# Patient Record
Sex: FEMALE | Race: WHITE | Employment: OTHER | URBAN - METROPOLITAN AREA
[De-identification: names, ages, dates, MRNs, and addresses within clinical notes are randomized per-mention and may not be internally consistent; named-entity substitution may affect disease eponyms.]

---

## 2017-01-06 ENCOUNTER — TELEPHONE (OUTPATIENT)
Dept: PULMONOLOGY | Age: 42
End: 2017-01-06

## 2017-01-31 PROBLEM — J44.1 COPD WITH EXACERBATION (HCC): Status: ACTIVE | Noted: 2017-01-31

## 2017-01-31 PROBLEM — E87.29 RESPIRATORY ACIDOSIS: Status: ACTIVE | Noted: 2017-01-31

## 2017-01-31 PROBLEM — J96.00 ACUTE RESPIRATORY FAILURE (HCC): Status: ACTIVE | Noted: 2017-01-31

## 2017-02-13 ENCOUNTER — TELEPHONE (OUTPATIENT)
Dept: PULMONOLOGY | Age: 42
End: 2017-02-13

## 2017-02-13 DIAGNOSIS — J44.9 COPD, MODERATE (HCC): Primary | ICD-10-CM

## 2017-02-21 ENCOUNTER — OFFICE VISIT (OUTPATIENT)
Dept: PULMONOLOGY | Age: 42
End: 2017-02-21

## 2017-02-21 ENCOUNTER — HOSPITAL ENCOUNTER (OUTPATIENT)
Dept: CARDIAC REHAB | Age: 42
Discharge: HOME | End: 2017-02-21
Attending: INTERNAL MEDICINE | Admitting: INTERNAL MEDICINE

## 2017-02-21 VITALS
HEIGHT: 60 IN | SYSTOLIC BLOOD PRESSURE: 116 MMHG | HEART RATE: 91 BPM | OXYGEN SATURATION: 95 % | WEIGHT: 251.8 LBS | DIASTOLIC BLOOD PRESSURE: 70 MMHG | RESPIRATION RATE: 18 BRPM | TEMPERATURE: 98.6 F | BODY MASS INDEX: 49.43 KG/M2

## 2017-02-21 DIAGNOSIS — R06.83 SNORING: ICD-10-CM

## 2017-02-21 DIAGNOSIS — G47.10 HYPERSOMNIA: ICD-10-CM

## 2017-02-21 DIAGNOSIS — J44.9 COPD, SEVERE (HCC): Primary | ICD-10-CM

## 2017-02-21 DIAGNOSIS — Z72.0 TOBACCO ABUSE: ICD-10-CM

## 2017-02-21 DIAGNOSIS — I50.810 RIGHT HEART FAILURE (HCC): ICD-10-CM

## 2017-02-21 DIAGNOSIS — J96.11 CHRONIC RESPIRATORY FAILURE WITH HYPOXIA (HCC): ICD-10-CM

## 2017-02-21 PROCEDURE — 99214 OFFICE O/P EST MOD 30 MIN: CPT | Performed by: NURSE PRACTITIONER

## 2017-02-21 PROCEDURE — 94620 PR PULMONARY STRESS TESTING,SIMPLE: CPT | Performed by: INTERNAL MEDICINE

## 2017-02-21 RX ORDER — CLONIDINE HYDROCHLORIDE 0.1 MG/1
0.1 TABLET ORAL 2 TIMES DAILY
Status: ON HOLD | COMMUNITY
End: 2020-11-11 | Stop reason: HOSPADM

## 2017-02-21 RX ORDER — GABAPENTIN 100 MG/1
100 CAPSULE ORAL 4 TIMES DAILY
COMMUNITY
End: 2021-02-22 | Stop reason: DRUGHIGH

## 2017-02-26 ENCOUNTER — HOSPITAL ENCOUNTER (OUTPATIENT)
Dept: OTHER | Age: 42
Discharge: HOME | End: 2017-02-28
Attending: NURSE PRACTITIONER | Admitting: NURSE PRACTITIONER

## 2017-02-26 DIAGNOSIS — J96.11 CHRONIC RESPIRATORY FAILURE WITH HYPOXIA (HCC): ICD-10-CM

## 2017-02-26 DIAGNOSIS — R06.83 SNORING: ICD-10-CM

## 2017-02-26 DIAGNOSIS — G47.10 HYPERSOMNIA: ICD-10-CM

## 2017-02-26 DIAGNOSIS — J44.9 COPD, SEVERE (HCC): ICD-10-CM

## 2017-03-02 ENCOUNTER — TELEPHONE (OUTPATIENT)
Dept: PULMONOLOGY | Age: 42
End: 2017-03-02

## 2017-03-02 DIAGNOSIS — G47.33 OSA (OBSTRUCTIVE SLEEP APNEA): Primary | ICD-10-CM

## 2017-03-02 DIAGNOSIS — J44.9 COPD, MODERATE (HCC): ICD-10-CM

## 2017-03-07 ENCOUNTER — HOSPITAL ENCOUNTER (OUTPATIENT)
Dept: OTHER | Age: 42
Discharge: HOME | End: 2017-03-31
Attending: INTERNAL MEDICINE | Admitting: INTERNAL MEDICINE

## 2017-03-08 ENCOUNTER — TELEPHONE (OUTPATIENT)
Dept: PULMONOLOGY | Age: 42
End: 2017-03-08

## 2017-03-09 DIAGNOSIS — J44.1 CHRONIC OBSTRUCTIVE PULMONARY DISEASE WITH ACUTE EXACERBATION (HCC): ICD-10-CM

## 2017-03-09 DIAGNOSIS — J44.9 COPD, MODERATE (HCC): Primary | ICD-10-CM

## 2017-03-11 RX ORDER — BUDESONIDE AND FORMOTEROL FUMARATE DIHYDRATE 160; 4.5 UG/1; UG/1
2 AEROSOL RESPIRATORY (INHALATION) 2 TIMES DAILY
Qty: 3 INHALER | Refills: 1 | Status: SHIPPED | OUTPATIENT
Start: 2017-03-11 | End: 2020-05-20 | Stop reason: SDUPTHER

## 2017-03-11 RX ORDER — ALBUTEROL SULFATE 2.5 MG/3ML
2.5 SOLUTION RESPIRATORY (INHALATION) 4 TIMES DAILY
Qty: 360 EACH | Refills: 1 | Status: SHIPPED | OUTPATIENT
Start: 2017-03-11 | End: 2020-02-20

## 2017-03-31 ENCOUNTER — HOSPITAL ENCOUNTER (OUTPATIENT)
Dept: OTHER | Age: 42
Discharge: HOME | End: 2017-04-01
Admitting: NURSE PRACTITIONER

## 2017-04-01 ENCOUNTER — HOSPITAL ENCOUNTER (OUTPATIENT)
Dept: OTHER | Age: 42
Discharge: HOME | End: 2017-04-30
Attending: INTERNAL MEDICINE | Admitting: INTERNAL MEDICINE

## 2017-04-12 ENCOUNTER — TELEPHONE (OUTPATIENT)
Dept: PULMONOLOGY | Age: 42
End: 2017-04-12

## 2017-04-12 RX ORDER — PREDNISONE 20 MG/1
40 TABLET ORAL DAILY
Qty: 10 TABLET | Refills: 0 | Status: SHIPPED | OUTPATIENT
Start: 2017-04-12 | End: 2017-04-17

## 2017-04-18 ENCOUNTER — OFFICE VISIT (OUTPATIENT)
Dept: PULMONOLOGY | Age: 42
End: 2017-04-18

## 2017-04-18 VITALS
RESPIRATION RATE: 16 BRPM | HEIGHT: 60 IN | DIASTOLIC BLOOD PRESSURE: 74 MMHG | BODY MASS INDEX: 51.24 KG/M2 | OXYGEN SATURATION: 97 % | WEIGHT: 261 LBS | TEMPERATURE: 98.1 F | HEART RATE: 80 BPM | SYSTOLIC BLOOD PRESSURE: 138 MMHG

## 2017-04-18 DIAGNOSIS — J96.11 CHRONIC RESPIRATORY FAILURE WITH HYPOXIA (HCC): ICD-10-CM

## 2017-04-18 DIAGNOSIS — G47.33 OSA (OBSTRUCTIVE SLEEP APNEA): ICD-10-CM

## 2017-04-18 DIAGNOSIS — R06.02 SHORTNESS OF BREATH: ICD-10-CM

## 2017-04-18 DIAGNOSIS — J44.9 COPD, MODERATE (HCC): Primary | ICD-10-CM

## 2017-04-18 DIAGNOSIS — I50.810 RIGHT HEART FAILURE (HCC): ICD-10-CM

## 2017-04-18 DIAGNOSIS — E66.01 MORBID OBESITY DUE TO EXCESS CALORIES (HCC): ICD-10-CM

## 2017-04-18 LAB
ANION GAP SERPL CALCULATED.3IONS-SCNC: 11 MMOL/L (ref 3–16)
BUN BLDV-MCNC: 20 MG/DL (ref 7–20)
CALCIUM SERPL-MCNC: 9.1 MG/DL (ref 8.3–10.6)
CHLORIDE BLD-SCNC: 97 MMOL/L (ref 99–110)
CO2: 35 MMOL/L (ref 21–32)
CREAT SERPL-MCNC: 0.7 MG/DL (ref 0.6–1.1)
GFR AFRICAN AMERICAN: >60
GFR NON-AFRICAN AMERICAN: >60
GLUCOSE BLD-MCNC: 99 MG/DL (ref 70–99)
POTASSIUM SERPL-SCNC: 3.7 MMOL/L (ref 3.5–5.1)
SODIUM BLD-SCNC: 143 MMOL/L (ref 136–145)

## 2017-04-18 PROCEDURE — 99214 OFFICE O/P EST MOD 30 MIN: CPT | Performed by: NURSE PRACTITIONER

## 2017-04-18 RX ORDER — FUROSEMIDE 40 MG/1
40 TABLET ORAL DAILY
Qty: 60 TABLET | Refills: 3 | Status: SHIPPED | OUTPATIENT
Start: 2017-04-18 | End: 2021-02-22 | Stop reason: DRUGHIGH

## 2017-04-19 RX ORDER — BUDESONIDE AND FORMOTEROL FUMARATE DIHYDRATE 160; 4.5 UG/1; UG/1
2 AEROSOL RESPIRATORY (INHALATION) 2 TIMES DAILY
Qty: 1 INHALER | Refills: 0 | COMMUNITY
Start: 2017-04-19 | End: 2017-07-14 | Stop reason: SDUPTHER

## 2017-05-01 ENCOUNTER — HOSPITAL ENCOUNTER (OUTPATIENT)
Dept: OTHER | Age: 42
Discharge: HOME | End: 2017-05-03
Attending: NURSE PRACTITIONER | Admitting: NURSE PRACTITIONER

## 2017-05-01 DIAGNOSIS — J44.9 COPD, MODERATE (HCC): ICD-10-CM

## 2017-05-01 DIAGNOSIS — G47.33 OSA (OBSTRUCTIVE SLEEP APNEA): ICD-10-CM

## 2017-05-05 ENCOUNTER — TELEPHONE (OUTPATIENT)
Dept: PULMONOLOGY | Age: 42
End: 2017-05-05

## 2017-05-18 ENCOUNTER — TELEPHONE (OUTPATIENT)
Dept: PULMONOLOGY | Age: 42
End: 2017-05-18

## 2017-05-24 ENCOUNTER — TELEPHONE (OUTPATIENT)
Dept: PULMONOLOGY | Age: 42
End: 2017-05-24

## 2017-07-07 ENCOUNTER — HOSPITAL ENCOUNTER (OUTPATIENT)
Dept: WOUND CARE | Age: 42
Discharge: HOME | End: 2017-07-07
Attending: NURSE PRACTITIONER | Admitting: NURSE PRACTITIONER

## 2017-07-07 VITALS
DIASTOLIC BLOOD PRESSURE: 85 MMHG | TEMPERATURE: 98.3 F | BODY MASS INDEX: 55.19 KG/M2 | WEIGHT: 281.09 LBS | SYSTOLIC BLOOD PRESSURE: 124 MMHG | HEART RATE: 80 BPM | RESPIRATION RATE: 16 BRPM | HEIGHT: 60 IN

## 2017-07-07 DIAGNOSIS — L97.922 NONHEALING ULCER OF LEFT LOWER LEG WITH FAT LAYER EXPOSED (HCC): ICD-10-CM

## 2017-07-07 PROCEDURE — 11042 DBRDMT SUBQ TIS 1ST 20SQCM/<: CPT | Performed by: NURSE PRACTITIONER

## 2017-07-07 PROCEDURE — 99203 OFFICE O/P NEW LOW 30 MIN: CPT | Performed by: NURSE PRACTITIONER

## 2017-07-07 RX ORDER — LIDOCAINE HYDROCHLORIDE 40 MG/ML
SOLUTION TOPICAL ONCE
Status: DISCONTINUED | OUTPATIENT
Start: 2017-07-07 | End: 2017-07-08 | Stop reason: HOSPADM

## 2017-07-07 RX ORDER — CLINDAMYCIN HYDROCHLORIDE 300 MG/1
300 CAPSULE ORAL EVERY 6 HOURS
COMMUNITY
End: 2017-07-14 | Stop reason: ALTCHOICE

## 2017-07-07 RX ORDER — ACETAMINOPHEN 500 MG
1000 TABLET ORAL EVERY 6 HOURS PRN
COMMUNITY
End: 2022-07-17 | Stop reason: ALTCHOICE

## 2017-07-07 RX ORDER — OMEPRAZOLE 40 MG/1
40 CAPSULE, DELAYED RELEASE ORAL EVERY EVENING
COMMUNITY

## 2017-07-07 ASSESSMENT — PAIN DESCRIPTION - LOCATION: LOCATION: LEG

## 2017-07-07 ASSESSMENT — PAIN DESCRIPTION - PAIN TYPE: TYPE: ACUTE PAIN

## 2017-07-07 ASSESSMENT — PAIN SCALES - GENERAL: PAINLEVEL_OUTOF10: 7

## 2017-07-07 ASSESSMENT — PAIN DESCRIPTION - ORIENTATION: ORIENTATION: LEFT

## 2017-07-07 ASSESSMENT — PAIN DESCRIPTION - DESCRIPTORS: DESCRIPTORS: ACHING;BURNING;STABBING

## 2017-07-14 ENCOUNTER — HOSPITAL ENCOUNTER (OUTPATIENT)
Dept: WOUND CARE | Age: 42
Discharge: HOME | End: 2017-07-14
Attending: NURSE PRACTITIONER | Admitting: NURSE PRACTITIONER

## 2017-07-14 VITALS
SYSTOLIC BLOOD PRESSURE: 137 MMHG | DIASTOLIC BLOOD PRESSURE: 78 MMHG | HEART RATE: 90 BPM | TEMPERATURE: 99.3 F | RESPIRATION RATE: 18 BRPM

## 2017-07-14 DIAGNOSIS — L97.922 NONHEALING ULCER OF LEFT LOWER LEG WITH FAT LAYER EXPOSED (HCC): ICD-10-CM

## 2017-07-14 DIAGNOSIS — M79.89 LEG SWELLING: ICD-10-CM

## 2017-07-14 DIAGNOSIS — Z72.0 TOBACCO USE: ICD-10-CM

## 2017-07-14 DIAGNOSIS — L97.921 NONHEALING ULCER OF LEFT LOWER LEG LIMITED TO BREAKDOWN OF SKIN (HCC): ICD-10-CM

## 2017-07-14 DIAGNOSIS — R60.0 LOCALIZED EDEMA: Primary | ICD-10-CM

## 2017-07-14 PROCEDURE — 97597 DBRDMT OPN WND 1ST 20 CM/<: CPT | Performed by: NURSE PRACTITIONER

## 2017-07-14 RX ORDER — LIDOCAINE 50 MG/G
OINTMENT TOPICAL PRN
Status: DISCONTINUED | OUTPATIENT
Start: 2017-07-14 | End: 2017-07-15 | Stop reason: HOSPADM

## 2017-08-28 ENCOUNTER — OFFICE VISIT (OUTPATIENT)
Dept: PULMONOLOGY | Age: 42
End: 2017-08-28

## 2017-08-28 VITALS
DIASTOLIC BLOOD PRESSURE: 72 MMHG | SYSTOLIC BLOOD PRESSURE: 120 MMHG | WEIGHT: 272 LBS | HEIGHT: 60 IN | RESPIRATION RATE: 24 BRPM | TEMPERATURE: 98.3 F | OXYGEN SATURATION: 96 % | HEART RATE: 90 BPM | BODY MASS INDEX: 53.4 KG/M2

## 2017-08-28 DIAGNOSIS — E66.2 OBESITY HYPOVENTILATION SYNDROME (HCC): ICD-10-CM

## 2017-08-28 DIAGNOSIS — J96.11 CHRONIC RESPIRATORY FAILURE WITH HYPOXIA AND HYPERCAPNIA (HCC): ICD-10-CM

## 2017-08-28 DIAGNOSIS — J96.12 CHRONIC RESPIRATORY FAILURE WITH HYPOXIA AND HYPERCAPNIA (HCC): ICD-10-CM

## 2017-08-28 DIAGNOSIS — G47.33 OSA TREATED WITH BIPAP: Primary | ICD-10-CM

## 2017-08-28 DIAGNOSIS — J44.9 COPD, SEVERE (HCC): ICD-10-CM

## 2017-08-28 DIAGNOSIS — Z72.0 TOBACCO ABUSE: ICD-10-CM

## 2017-08-28 DIAGNOSIS — I50.810 RIGHT HEART FAILURE (HCC): ICD-10-CM

## 2017-08-28 DIAGNOSIS — J44.9 COPD WITH ASTHMA (HCC): ICD-10-CM

## 2017-08-28 PROCEDURE — 99214 OFFICE O/P EST MOD 30 MIN: CPT | Performed by: INTERNAL MEDICINE

## 2017-08-28 ASSESSMENT — SLEEP AND FATIGUE QUESTIONNAIRES
ESS TOTAL SCORE: 13
HOW LIKELY ARE YOU TO NOD OFF OR FALL ASLEEP WHILE WATCHING TV: 3
HOW LIKELY ARE YOU TO NOD OFF OR FALL ASLEEP IN A CAR, WHILE STOPPED FOR A FEW MINUTES IN TRAFFIC: 0
HOW LIKELY ARE YOU TO NOD OFF OR FALL ASLEEP WHEN YOU ARE A PASSENGER IN A CAR FOR AN HOUR WITHOUT A BREAK: 2
HOW LIKELY ARE YOU TO NOD OFF OR FALL ASLEEP WHILE SITTING AND READING: 3
HOW LIKELY ARE YOU TO NOD OFF OR FALL ASLEEP WHILE SITTING QUIETLY AFTER LUNCH WITHOUT ALCOHOL: 2
HOW LIKELY ARE YOU TO NOD OFF OR FALL ASLEEP WHILE SITTING AND TALKING TO SOMEONE: 1
HOW LIKELY ARE YOU TO NOD OFF OR FALL ASLEEP WHILE LYING DOWN TO REST IN THE AFTERNOON WHEN CIRCUMSTANCES PERMIT: 1
HOW LIKELY ARE YOU TO NOD OFF OR FALL ASLEEP WHILE SITTING INACTIVE IN A PUBLIC PLACE: 1

## 2018-01-16 ENCOUNTER — OFFICE VISIT (OUTPATIENT)
Dept: PULMONOLOGY | Age: 43
End: 2018-01-16

## 2018-01-16 VITALS
TEMPERATURE: 99 F | DIASTOLIC BLOOD PRESSURE: 78 MMHG | HEIGHT: 60 IN | OXYGEN SATURATION: 93 % | BODY MASS INDEX: 50.26 KG/M2 | HEART RATE: 114 BPM | RESPIRATION RATE: 24 BRPM | WEIGHT: 256 LBS | SYSTOLIC BLOOD PRESSURE: 130 MMHG

## 2018-01-16 DIAGNOSIS — R50.81 FEVER IN OTHER DISEASES: ICD-10-CM

## 2018-01-16 DIAGNOSIS — J96.11 CHRONIC RESPIRATORY FAILURE WITH HYPOXIA AND HYPERCAPNIA (HCC): ICD-10-CM

## 2018-01-16 DIAGNOSIS — Z72.0 TOBACCO ABUSE: ICD-10-CM

## 2018-01-16 DIAGNOSIS — J96.12 CHRONIC RESPIRATORY FAILURE WITH HYPOXIA AND HYPERCAPNIA (HCC): ICD-10-CM

## 2018-01-16 DIAGNOSIS — J44.1 COPD EXACERBATION (HCC): Primary | ICD-10-CM

## 2018-01-16 PROCEDURE — 99214 OFFICE O/P EST MOD 30 MIN: CPT | Performed by: INTERNAL MEDICINE

## 2018-01-16 RX ORDER — PREDNISONE 10 MG/1
TABLET ORAL
Qty: 30 TABLET | Refills: 0 | Status: SHIPPED | OUTPATIENT
Start: 2018-01-16 | End: 2018-10-24 | Stop reason: ALTCHOICE

## 2018-01-16 RX ORDER — OSELTAMIVIR PHOSPHATE 75 MG/1
75 CAPSULE ORAL 2 TIMES DAILY
Qty: 10 CAPSULE | Refills: 0 | Status: SHIPPED | OUTPATIENT
Start: 2018-01-16 | End: 2018-01-21

## 2018-01-16 RX ORDER — BUDESONIDE AND FORMOTEROL FUMARATE DIHYDRATE 160; 4.5 UG/1; UG/1
2 AEROSOL RESPIRATORY (INHALATION) 2 TIMES DAILY
Qty: 1 INHALER | Refills: 6 | Status: SHIPPED | OUTPATIENT
Start: 2018-01-16 | End: 2018-10-24 | Stop reason: SDUPTHER

## 2018-01-16 RX ORDER — DOXYCYCLINE HYCLATE 100 MG/1
100 CAPSULE ORAL 2 TIMES DAILY
Qty: 20 CAPSULE | Refills: 0 | Status: SHIPPED | OUTPATIENT
Start: 2018-01-16 | End: 2018-10-24 | Stop reason: ALTCHOICE

## 2018-01-16 ASSESSMENT — SLEEP AND FATIGUE QUESTIONNAIRES
HOW LIKELY ARE YOU TO NOD OFF OR FALL ASLEEP IN A CAR, WHILE STOPPED FOR A FEW MINUTES IN TRAFFIC: 0
HOW LIKELY ARE YOU TO NOD OFF OR FALL ASLEEP WHILE SITTING AND TALKING TO SOMEONE: 0
HOW LIKELY ARE YOU TO NOD OFF OR FALL ASLEEP WHILE SITTING QUIETLY AFTER LUNCH WITHOUT ALCOHOL: 2
HOW LIKELY ARE YOU TO NOD OFF OR FALL ASLEEP WHILE SITTING AND READING: 2
HOW LIKELY ARE YOU TO NOD OFF OR FALL ASLEEP WHEN YOU ARE A PASSENGER IN A CAR FOR AN HOUR WITHOUT A BREAK: 1
HOW LIKELY ARE YOU TO NOD OFF OR FALL ASLEEP WHILE SITTING INACTIVE IN A PUBLIC PLACE: 1
HOW LIKELY ARE YOU TO NOD OFF OR FALL ASLEEP WHILE WATCHING TV: 2
ESS TOTAL SCORE: 9
HOW LIKELY ARE YOU TO NOD OFF OR FALL ASLEEP WHILE LYING DOWN TO REST IN THE AFTERNOON WHEN CIRCUMSTANCES PERMIT: 1

## 2018-03-14 ENCOUNTER — TELEPHONE (OUTPATIENT)
Dept: PULMONOLOGY | Age: 43
End: 2018-03-14

## 2018-03-14 DIAGNOSIS — J44.9 COPD, MILD (HCC): Primary | ICD-10-CM

## 2018-03-14 RX ORDER — ALBUTEROL SULFATE 2.5 MG/3ML
2.5 SOLUTION RESPIRATORY (INHALATION) EVERY 4 HOURS PRN
Qty: 360 ML | Refills: 3 | Status: SHIPPED | OUTPATIENT
Start: 2018-03-14 | End: 2018-10-15 | Stop reason: SDUPTHER

## 2018-03-14 RX ORDER — ALBUTEROL SULFATE 90 UG/1
2 AEROSOL, METERED RESPIRATORY (INHALATION) EVERY 4 HOURS PRN
Qty: 1 INHALER | Refills: 11 | Status: SHIPPED | OUTPATIENT
Start: 2018-03-14 | End: 2018-10-24 | Stop reason: ALTCHOICE

## 2018-05-22 ENCOUNTER — TELEPHONE (OUTPATIENT)
Dept: PULMONOLOGY | Age: 43
End: 2018-05-22

## 2018-10-15 DIAGNOSIS — J44.9 COPD, MILD (HCC): ICD-10-CM

## 2018-10-16 RX ORDER — ALBUTEROL SULFATE 2.5 MG/3ML
2.5 SOLUTION RESPIRATORY (INHALATION) EVERY 4 HOURS PRN
Qty: 360 ML | Refills: 0 | Status: SHIPPED | OUTPATIENT
Start: 2018-10-16 | End: 2020-02-20 | Stop reason: SDUPTHER

## 2018-10-24 ENCOUNTER — OFFICE VISIT (OUTPATIENT)
Dept: PULMONOLOGY | Age: 43
End: 2018-10-24
Payer: MEDICAID

## 2018-10-24 VITALS
RESPIRATION RATE: 24 BRPM | WEIGHT: 278 LBS | SYSTOLIC BLOOD PRESSURE: 127 MMHG | DIASTOLIC BLOOD PRESSURE: 82 MMHG | HEIGHT: 60 IN | HEART RATE: 110 BPM | BODY MASS INDEX: 54.58 KG/M2 | OXYGEN SATURATION: 93 % | TEMPERATURE: 99 F

## 2018-10-24 DIAGNOSIS — J44.1 COPD EXACERBATION (HCC): Primary | ICD-10-CM

## 2018-10-24 DIAGNOSIS — J44.9 COPD, SEVERE (HCC): ICD-10-CM

## 2018-10-24 DIAGNOSIS — J44.9 COPD WITH ASTHMA (HCC): ICD-10-CM

## 2018-10-24 DIAGNOSIS — Z23 NEED FOR INFLUENZA VACCINATION: ICD-10-CM

## 2018-10-24 DIAGNOSIS — J96.12 CHRONIC RESPIRATORY FAILURE WITH HYPOXIA AND HYPERCAPNIA (HCC): ICD-10-CM

## 2018-10-24 DIAGNOSIS — J96.11 CHRONIC RESPIRATORY FAILURE WITH HYPOXIA AND HYPERCAPNIA (HCC): ICD-10-CM

## 2018-10-24 PROCEDURE — 1036F TOBACCO NON-USER: CPT | Performed by: INTERNAL MEDICINE

## 2018-10-24 PROCEDURE — 99214 OFFICE O/P EST MOD 30 MIN: CPT | Performed by: INTERNAL MEDICINE

## 2018-10-24 PROCEDURE — G8427 DOCREV CUR MEDS BY ELIG CLIN: HCPCS | Performed by: INTERNAL MEDICINE

## 2018-10-24 PROCEDURE — G8482 FLU IMMUNIZE ORDER/ADMIN: HCPCS | Performed by: INTERNAL MEDICINE

## 2018-10-24 PROCEDURE — G8926 SPIRO NO PERF OR DOC: HCPCS | Performed by: INTERNAL MEDICINE

## 2018-10-24 PROCEDURE — G8417 CALC BMI ABV UP PARAM F/U: HCPCS | Performed by: INTERNAL MEDICINE

## 2018-10-24 PROCEDURE — 3023F SPIROM DOC REV: CPT | Performed by: INTERNAL MEDICINE

## 2018-10-24 RX ORDER — ALBUTEROL SULFATE 2.5 MG/3ML
2.5 SOLUTION RESPIRATORY (INHALATION) EVERY 4 HOURS PRN
Qty: 360 ML | Refills: 3 | Status: SHIPPED | OUTPATIENT
Start: 2018-10-24 | End: 2020-02-20

## 2018-10-24 RX ORDER — AZITHROMYCIN 250 MG/1
250 TABLET, FILM COATED ORAL DAILY
Qty: 6 TABLET | Refills: 0 | Status: SHIPPED | OUTPATIENT
Start: 2018-10-24 | End: 2020-02-20

## 2018-10-24 RX ORDER — PREDNISONE 10 MG/1
TABLET ORAL
Qty: 30 TABLET | Refills: 0 | Status: SHIPPED | OUTPATIENT
Start: 2018-10-24 | End: 2020-02-20

## 2018-10-24 NOTE — PROGRESS NOTES
Vaccine Information Sheet, \"Influenza - Inactivated\"  given to Mary Ann Abreu, or parent/legal guardian of  Mary Ann Abreu and verbalized understanding. Patient responses:    Have you ever had a reaction to a flu vaccine? No  Are you able to eat eggs without adverse effects? Yes  Do you have any current illness? No  Have you ever had Guillian [place] Syndrome? No    Flu vaccine given per order. Please see immunization tab.
Concern    None     Social History Narrative    None     Current Outpatient Prescriptions   Medication Sig Dispense Refill    albuterol (PROVENTIL) (2.5 MG/3ML) 0.083% nebulizer solution Take 3 mLs by nebulization every 4 hours as needed for Wheezing 360 mL 0    acetaminophen (TYLENOL) 500 MG tablet Take 1,000 mg by mouth every 6 hours as needed for Pain      omeprazole (PRILOSEC) 20 MG delayed release capsule Take 40 mg by mouth daily      furosemide (LASIX) 40 MG tablet Take 1 tablet by mouth daily (Patient taking differently: Take 40 mg by mouth 2 times daily Will start BID today per patient) 60 tablet 3    budesonide-formoterol (SYMBICORT) 160-4.5 MCG/ACT AERO Inhale 2 puffs into the lungs 2 times daily 3 Inhaler 1    albuterol (PROVENTIL) (2.5 MG/3ML) 0.083% nebulizer solution Take 3 mLs by nebulization 4 times daily 360 each 1    cloNIDine (CATAPRES) 0.1 MG tablet Take 0.1 mg by mouth 2 times daily      gabapentin (NEURONTIN) 100 MG capsule Take 100 mg by mouth 3 times daily      albuterol sulfate HFA (VENTOLIN HFA) 108 (90 BASE) MCG/ACT inhaler Inhale 2 puffs into the lungs every 6 hours as needed for Wheezing 1 Inhaler 3    tiotropium (SPIRIVA) 18 MCG inhalation capsule Inhale 1 capsule into the lungs daily 30 capsule 5    Spacer/Aero-Holding Chambers MARILOU 1 Device by Does not apply route daily as needed 1 Device 0    OXYGEN Inhale 2 L into the lungs See Admin Instructions. (Patient taking differently: Inhale 2 L into the lungs See Admin Instructions Continuous at night or as needed when at home per patient) 1 Can 0    methadone 10 MG/5ML solution Take 130 mg by mouth daily  From [facility] . No current facility-administered medications for this visit. REVIEW OF SYSTEMS:  Constitutional: Negative for fever.   No fevers  HENT: Negative for sore throat  Eyes: Negative for redness   Respiratory: SOB, more use of albuterol (getting it from friend) and more

## 2018-10-25 PROCEDURE — 90688 IIV4 VACCINE SPLT 0.5 ML IM: CPT | Performed by: INTERNAL MEDICINE

## 2018-10-25 PROCEDURE — 96372 THER/PROPH/DIAG INJ SC/IM: CPT | Performed by: INTERNAL MEDICINE

## 2019-05-09 DIAGNOSIS — J44.9 COPD, MILD (HCC): ICD-10-CM

## 2019-08-27 ENCOUNTER — TELEPHONE (OUTPATIENT)
Dept: PULMONOLOGY | Age: 44
End: 2019-08-27

## 2019-08-27 DIAGNOSIS — J44.9 CHRONIC OBSTRUCTIVE PULMONARY DISEASE, UNSPECIFIED COPD TYPE (HCC): Primary | ICD-10-CM

## 2020-02-20 ENCOUNTER — OFFICE VISIT (OUTPATIENT)
Dept: PULMONOLOGY | Age: 45
End: 2020-02-20
Payer: MEDICARE

## 2020-02-20 VITALS
TEMPERATURE: 97.3 F | WEIGHT: 280 LBS | HEIGHT: 60 IN | HEART RATE: 100 BPM | DIASTOLIC BLOOD PRESSURE: 84 MMHG | BODY MASS INDEX: 54.97 KG/M2 | SYSTOLIC BLOOD PRESSURE: 136 MMHG | RESPIRATION RATE: 16 BRPM | OXYGEN SATURATION: 88 %

## 2020-02-20 PROCEDURE — 99214 OFFICE O/P EST MOD 30 MIN: CPT | Performed by: INTERNAL MEDICINE

## 2020-02-20 RX ORDER — ALBUTEROL SULFATE 90 UG/1
2 AEROSOL, METERED RESPIRATORY (INHALATION) EVERY 4 HOURS PRN
Qty: 1 INHALER | Refills: 11 | Status: SHIPPED | OUTPATIENT
Start: 2020-02-20 | End: 2020-05-20 | Stop reason: SDUPTHER

## 2020-02-20 RX ORDER — BUDESONIDE AND FORMOTEROL FUMARATE DIHYDRATE 160; 4.5 UG/1; UG/1
2 AEROSOL RESPIRATORY (INHALATION) 2 TIMES DAILY
Qty: 1 INHALER | Refills: 6 | Status: SHIPPED
Start: 2020-02-20 | End: 2020-05-20 | Stop reason: CLARIF

## 2020-02-20 RX ORDER — IPRATROPIUM BROMIDE AND ALBUTEROL SULFATE 2.5; .5 MG/3ML; MG/3ML
1 SOLUTION RESPIRATORY (INHALATION) EVERY 4 HOURS
Qty: 360 ML | Refills: 1 | Status: SHIPPED | OUTPATIENT
Start: 2020-02-20 | End: 2020-07-17

## 2020-02-20 RX ORDER — PREDNISONE 10 MG/1
TABLET ORAL
Qty: 30 TABLET | Refills: 0 | Status: SHIPPED
Start: 2020-02-20 | End: 2020-05-20 | Stop reason: CLARIF

## 2020-02-20 ASSESSMENT — SLEEP AND FATIGUE QUESTIONNAIRES
HOW LIKELY ARE YOU TO NOD OFF OR FALL ASLEEP WHILE SITTING INACTIVE IN A PUBLIC PLACE: 1
HOW LIKELY ARE YOU TO NOD OFF OR FALL ASLEEP IN A CAR, WHILE STOPPED FOR A FEW MINUTES IN TRAFFIC: 0
HOW LIKELY ARE YOU TO NOD OFF OR FALL ASLEEP WHILE SITTING QUIETLY AFTER LUNCH WITHOUT ALCOHOL: 1
HOW LIKELY ARE YOU TO NOD OFF OR FALL ASLEEP WHILE WATCHING TV: 1
HOW LIKELY ARE YOU TO NOD OFF OR FALL ASLEEP WHILE SITTING AND TALKING TO SOMEONE: 1
HOW LIKELY ARE YOU TO NOD OFF OR FALL ASLEEP WHILE LYING DOWN TO REST IN THE AFTERNOON WHEN CIRCUMSTANCES PERMIT: 2
HOW LIKELY ARE YOU TO NOD OFF OR FALL ASLEEP WHEN YOU ARE A PASSENGER IN A CAR FOR AN HOUR WITHOUT A BREAK: 1
HOW LIKELY ARE YOU TO NOD OFF OR FALL ASLEEP WHILE SITTING AND READING: 2
ESS TOTAL SCORE: 9

## 2020-02-20 NOTE — PROGRESS NOTES
Cigarettes     Start date: 1984     Last attempt to quit: 2017     Years since quittin.1    Smokeless tobacco: Never Used    Tobacco comment: lives with smoker    Substance and Sexual Activity    Alcohol use: No     Alcohol/week: 0.0 standard drinks    Drug use: No    Sexual activity: None   Lifestyle    Physical activity:     Days per week: None     Minutes per session: None    Stress: None   Relationships    Social connections:     Talks on phone: None     Gets together: None     Attends Buddhist service: None     Active member of club or organization: None     Attends meetings of clubs or organizations: None     Relationship status: None    Intimate partner violence:     Fear of current or ex partner: None     Emotionally abused: None     Physically abused: None     Forced sexual activity: None   Other Topics Concern    None   Social History Narrative    None     Current Outpatient Medications   Medication Sig Dispense Refill    acetaminophen (TYLENOL) 500 MG tablet Take 1,000 mg by mouth every 6 hours as needed for Pain      omeprazole (PRILOSEC) 20 MG delayed release capsule Take 40 mg by mouth daily      furosemide (LASIX) 40 MG tablet Take 1 tablet by mouth daily (Patient taking differently: Take 40 mg by mouth 2 times daily Will start BID today per patient) 60 tablet 3    budesonide-formoterol (SYMBICORT) 160-4.5 MCG/ACT AERO Inhale 2 puffs into the lungs 2 times daily 3 Inhaler 1    cloNIDine (CATAPRES) 0.1 MG tablet Take 0.1 mg by mouth 2 times daily      gabapentin (NEURONTIN) 100 MG capsule Take 100 mg by mouth 3 times daily      tiotropium (SPIRIVA) 18 MCG inhalation capsule Inhale 1 capsule into the lungs daily 30 capsule 5    Spacer/Aero-Holding Chambers MARILOU 1 Device by Does not apply route daily as needed 1 Device 0    OXYGEN Inhale 2 L into the lungs See Admin Instructions.  (Patient taking differently: Inhale 2 L into the lungs See Admin Instructions Continuous at night or as needed when at home per patient) 1 Can 0    methadone 10 MG/5ML solution Take 130 mg by mouth daily  From [facility]adone clinic daily . No current facility-administered medications for this visit. REVIEW OF SYSTEMS:  Constitutional: cannot sleep, Drink [employer] all day long   HENT: Negative for sore throat  Eyes: Negative for redness   Respiratory: SOB all the time   Cardiovascular: Negative for chest pain. Gastrointestinal: Negative for vomiting, diarrhea   Genitourinary: Negative for hematuria   Musculoskeletal: Negative for arthralgias   Skin: Negative for rash  Neurological: Negative for syncope  Hematological: Negative for adenopathy  Psychiatric/Behavorial: Negative for anxiety    Objective:   PHYSICAL EXAM:  Blood pressure 136/84, pulse 100, temperature 97.3 °F (36.3 °C), temperature source Oral, resp. rate 16, height 5' (1.524 m), weight 280 lb (127 kg), SpO2 (!) 88 %, not currently breastfeeding.'  Gen: Looks terrible when compared to before   Eyes: Right eye opacified cornea  ENT: No discharge. Pharynx with mild injection   Neck: Trachea midline. No obvious mass. Resp:Diffuse wheezing  CV: Regular. No murmur or rub. GI: Non-tender. Non-distended. No hernia. obese  Skin: Edema  Lymph: No cervical LAD. No supraclavicular LAD. M/S: No cyanosis. No clubbing. No joint deformity. Neuro: Moves all four extremities. Psych: Oriented x 3. No anxiety. Awake. Alert. Intact judgement and insight.     Data Reviewed:   CBC and Renal reviewed  Last CBC  Lab Results   Component Value Date    WBC 11.7 02/06/2017    RBC 4.81 02/06/2017    HGB 12.2 02/06/2017    MCV 79.6 02/06/2017     02/06/2017     Last Renal  Lab Results   Component Value Date     04/18/2017    K 3.7 04/18/2017    CL 97 04/18/2017    CO2 35 04/18/2017    CO2 33 02/06/2017    CO2 31 02/05/2017    BUN 20 04/18/2017    CREATININE 0.7 04/18/2017    GLUCOSE 99 04/18/2017    CALCIUM 9.1 04/18/2017

## 2020-02-20 NOTE — PATIENT INSTRUCTIONS
Continue with inhalers    Walk test    Use duonebs breathing treatments every 4 hours as needed    Take prednisone burst    Need to get out of the house in order to sleep better     Follow up in 3 months

## 2020-03-10 ENCOUNTER — HOSPITAL ENCOUNTER (OUTPATIENT)
Dept: CARDIAC REHAB | Age: 45
Setting detail: THERAPIES SERIES
Discharge: HOME | End: 2020-03-10
Payer: MEDICARE

## 2020-03-10 PROCEDURE — 94618 PULMONARY STRESS TESTING: CPT

## 2020-03-10 PROCEDURE — 94618 PULMONARY STRESS TESTING: CPT | Performed by: INTERNAL MEDICINE

## 2020-03-10 NOTE — PROCEDURES
[facility] Cardiopulmonary Rehabilitation   Six Minute Walk Test    Marrero Lexi  DKari O.B: 1975  Account Number: [de-identified]  AGE: 38 y.o.     OP test [x]   Pulmonary Rehab PRE []  POST   []    Diagnosis: Chronic respiratory failure with hypoxia and hypercapnia ([facility] [address]      Referring Physician: Dr Charleen Esquivel    Gender []  male [x] female AGE:44     Height:5 ft 0 in 152 cm    Weight: 243 lbs  110 kg  Blood Pressure 136/80    Medications affecting heart rate:  2 puffs albuterol mdi @4hrs prn, Duoneb by nebulizer Q4 hrs prn      Supplemental O2 during the test []  no [x] yes 4 lpm     [x] continuous []  intermittent    Device : [] NC []  HFNC    []  oximizer  [] mask      Laps completed: 8 (1 lap = 100 ft)        Baseline (resting) Walking End of Test (recovery)      Heart Rate 109   124  99    BP  136/80   140/80  132/80    Dyspnea 3   6  4 (Laila scale)    Fatigue 3   6  3 (Laila scale)    SpO2 RA 92%   87%     O2 2L 98%   88%   O2 3L 97%   88%   O2 4L 98%   92%  99%      Stopped or paused before 6 mins? []  no [x] yes How long? 20 secs    Symptoms at end of exercise:[] angina  [] dizziness  []  hip, leg, calf, back pain       []  no complaints [x]  other  Loud exp wheezing     Number of laps 8 (x 30.48 meters) + final partial lap: Total distance walked in 6 mins: 244 meters    Predicted distance: 422 meters  Percent predicted:58%              [] normal       [x] reduced     Summary: This is an outpatient 6 minute walk test, performed on supplemental oxygen. The patient ambulated 244 meters which is abnormal- 58-% predicted. Her, heart rate response was normal, the blood pressure response was normal, oxygen saturations were abnormal as she desaturated while ambulating on room air. The patient desaturated to 87% while ambulating on room air, and was placed on 4 L of oxygen to keep saturations above 90%. The degree of symptoms based on the Laila Dyspnea/Fatigue scale were increased with testing. This test does indicate the need for supplemental oxygen. Elevated resting heart rate may suggest deconditioning           JACQUELIN Winston, DO  Pulmonary Rehab Director

## 2020-03-11 ENCOUNTER — TELEPHONE (OUTPATIENT)
Dept: PULMONOLOGY | Age: 45
End: 2020-03-11

## 2020-03-11 NOTE — TELEPHONE ENCOUNTER
Unable to reach patient with 6 min walk results. Per Dr. Hasmukh Ordoñez, it showed patient needs to be on 4L O2 with exertion.   See results in procedures

## 2020-03-23 ENCOUNTER — TELEPHONE (OUTPATIENT)
Dept: PULMONOLOGY | Age: 45
End: 2020-03-23

## 2020-05-20 ENCOUNTER — VIRTUAL VISIT (OUTPATIENT)
Dept: PULMONOLOGY | Age: 45
End: 2020-05-20
Payer: MEDICARE

## 2020-05-20 PROCEDURE — G8417 CALC BMI ABV UP PARAM F/U: HCPCS | Performed by: INTERNAL MEDICINE

## 2020-05-20 PROCEDURE — 1036F TOBACCO NON-USER: CPT | Performed by: INTERNAL MEDICINE

## 2020-05-20 PROCEDURE — 3023F SPIROM DOC REV: CPT | Performed by: INTERNAL MEDICINE

## 2020-05-20 PROCEDURE — G8427 DOCREV CUR MEDS BY ELIG CLIN: HCPCS | Performed by: INTERNAL MEDICINE

## 2020-05-20 PROCEDURE — G8926 SPIRO NO PERF OR DOC: HCPCS | Performed by: INTERNAL MEDICINE

## 2020-05-20 PROCEDURE — 99214 OFFICE O/P EST MOD 30 MIN: CPT | Performed by: INTERNAL MEDICINE

## 2020-05-20 RX ORDER — BUDESONIDE AND FORMOTEROL FUMARATE DIHYDRATE 160; 4.5 UG/1; UG/1
2 AEROSOL RESPIRATORY (INHALATION) 2 TIMES DAILY
Qty: 1 INHALER | Refills: 5 | Status: SHIPPED | OUTPATIENT
Start: 2020-05-20 | End: 2020-08-11 | Stop reason: SDUPTHER

## 2020-05-20 RX ORDER — ALBUTEROL SULFATE 90 UG/1
2 AEROSOL, METERED RESPIRATORY (INHALATION) EVERY 4 HOURS PRN
Qty: 1 INHALER | Refills: 11 | Status: SHIPPED | OUTPATIENT
Start: 2020-05-20 | End: 2020-08-11 | Stop reason: SDUPTHER

## 2020-07-17 ENCOUNTER — HOSPITAL ENCOUNTER (OUTPATIENT)
Age: 45
Discharge: HOME | End: 2020-07-17
Payer: MEDICARE

## 2020-07-17 ENCOUNTER — HOSPITAL ENCOUNTER (OUTPATIENT)
Dept: GENERAL RADIOLOGY | Age: 45
Discharge: HOME | End: 2020-07-17
Payer: MEDICARE

## 2020-07-17 PROCEDURE — 72072 X-RAY EXAM THORAC SPINE 3VWS: CPT

## 2020-07-20 RX ORDER — IPRATROPIUM BROMIDE AND ALBUTEROL SULFATE 2.5; .5 MG/3ML; MG/3ML
SOLUTION RESPIRATORY (INHALATION)
Qty: 120 VIAL | Refills: 3 | Status: SHIPPED | OUTPATIENT
Start: 2020-07-20 | End: 2020-07-27

## 2020-07-24 ENCOUNTER — HOSPITAL ENCOUNTER (OUTPATIENT)
Dept: ULTRASOUND IMAGING | Age: 45
Discharge: HOME | End: 2020-07-24
Payer: MEDICARE

## 2020-07-24 PROCEDURE — 76999 ECHO EXAMINATION PROCEDURE: CPT

## 2020-07-27 RX ORDER — IPRATROPIUM BROMIDE AND ALBUTEROL SULFATE 2.5; .5 MG/3ML; MG/3ML
SOLUTION RESPIRATORY (INHALATION)
Qty: 360 ML | Refills: 0 | Status: SHIPPED | OUTPATIENT
Start: 2020-07-27 | End: 2021-10-07

## 2020-08-11 ENCOUNTER — TELEPHONE (OUTPATIENT)
Dept: PULMONOLOGY | Age: 45
End: 2020-08-11

## 2020-08-11 RX ORDER — PREDNISONE 20 MG/1
TABLET ORAL
Qty: 13 TABLET | Refills: 0 | Status: ON HOLD
Start: 2020-08-11 | End: 2020-11-11 | Stop reason: HOSPADM

## 2020-08-11 RX ORDER — ALBUTEROL SULFATE 90 UG/1
2 AEROSOL, METERED RESPIRATORY (INHALATION) EVERY 4 HOURS PRN
Qty: 1 INHALER | Refills: 5 | Status: ON HOLD | OUTPATIENT
Start: 2020-08-11 | End: 2021-06-13

## 2020-08-11 RX ORDER — BUDESONIDE AND FORMOTEROL FUMARATE DIHYDRATE 160; 4.5 UG/1; UG/1
2 AEROSOL RESPIRATORY (INHALATION) 2 TIMES DAILY
Qty: 1 INHALER | Refills: 5 | Status: SHIPPED
Start: 2020-08-11 | End: 2021-02-22 | Stop reason: SDUPTHER

## 2020-08-11 NOTE — TELEPHONE ENCOUNTER
Patient called the answering service complaining of several days of shortness of breath. She denies cough or chest congestion. She is requesting steroids. She says she has also been out of her inhalers. I have sent prescriptions for a 10-day taper of prednisone as well as Symbicort and albuterol inhaler.

## 2020-10-22 ENCOUNTER — TELEPHONE (OUTPATIENT)
Dept: PULMONOLOGY | Age: 45
End: 2020-10-22

## 2020-10-22 RX ORDER — ALBUTEROL SULFATE 90 UG/1
2 AEROSOL, METERED RESPIRATORY (INHALATION) EVERY 4 HOURS PRN
Qty: 1 INHALER | Refills: 11 | Status: SHIPPED
Start: 2020-10-22 | End: 2020-11-17 | Stop reason: SDUPTHER

## 2020-10-22 RX ORDER — PREDNISONE 10 MG/1
TABLET ORAL
Qty: 30 TABLET | Refills: 0 | Status: ON HOLD
Start: 2020-10-22 | End: 2020-11-11 | Stop reason: HOSPADM

## 2020-10-22 NOTE — TELEPHONE ENCOUNTER
Complains of SOB  Duration: Couple of days  Cough with sputum production? No cough  Color: N/A  Fever? No  Any other Symptoms? No  Any current treatment tried? Neb and O2. O2 is set on 4L continuous   Using inhalers? Yes   Do they help? Not the Proair, the others do  Pharmacy? [facility] [address]    Patient is requesting a pred taper and refill on ventolin, not proair. I told her the proair is probably what her insurance covers.

## 2020-11-08 ENCOUNTER — TELEPHONE (OUTPATIENT)
Dept: PULMONOLOGY | Age: 45
End: 2020-11-08

## 2020-11-08 ENCOUNTER — APPOINTMENT (OUTPATIENT)
Dept: GENERAL RADIOLOGY | Age: 45
DRG: 189 | End: 2020-11-08
Payer: MEDICARE

## 2020-11-08 ENCOUNTER — HOSPITAL ENCOUNTER (INPATIENT)
Age: 45
LOS: 3 days | Discharge: HOME HEALTH | DRG: 189 | End: 2020-11-11
Attending: EMERGENCY MEDICINE | Admitting: INTERNAL MEDICINE
Payer: MEDICARE

## 2020-11-08 PROBLEM — I50.23 ACUTE ON CHRONIC SYSTOLIC (CONGESTIVE) HEART FAILURE (HCC): Status: ACTIVE | Noted: 2020-11-08

## 2020-11-08 PROBLEM — J81.1 PULMONARY EDEMA: Status: ACTIVE | Noted: 2020-11-08

## 2020-11-08 LAB
A/G RATIO: 1.2 (ref 1.1–2.2)
ALBUMIN SERPL-MCNC: 3.8 G/DL (ref 3.4–5)
ALP BLD-CCNC: 76 U/L (ref 40–129)
ALT SERPL-CCNC: 27 U/L (ref 10–40)
ANION GAP SERPL CALCULATED.3IONS-SCNC: 9 MMOL/L (ref 3–16)
AST SERPL-CCNC: 28 U/L (ref 15–37)
BASE EXCESS ARTERIAL: 10.5 MMOL/L (ref -3–3)
BASE EXCESS ARTERIAL: 13 MMOL/L (ref -3–3)
BASE EXCESS ARTERIAL: 14 MMOL/L (ref -3–3)
BASE EXCESS ARTERIAL: 7.1 MMOL/L (ref -3–3)
BASOPHILS ABSOLUTE: 0.1 K/UL (ref 0–0.2)
BASOPHILS RELATIVE PERCENT: 0.4 %
BILIRUB SERPL-MCNC: 1.1 MG/DL (ref 0–1)
BILIRUBIN URINE: NEGATIVE
BLOOD, URINE: ABNORMAL
BUN BLDV-MCNC: 12 MG/DL (ref 7–20)
CALCIUM SERPL-MCNC: 9.3 MG/DL (ref 8.3–10.6)
CARBOXYHEMOGLOBIN ARTERIAL: 1.4 % (ref 0–1.5)
CARBOXYHEMOGLOBIN ARTERIAL: 1.5 % (ref 0–1.5)
CARBOXYHEMOGLOBIN ARTERIAL: 1.7 % (ref 0–1.5)
CARBOXYHEMOGLOBIN ARTERIAL: 3.1 % (ref 0–1.5)
CHLORIDE BLD-SCNC: 96 MMOL/L (ref 99–110)
CLARITY: CLEAR
CO2: 35 MMOL/L (ref 21–32)
COARSE CASTS, UA: ABNORMAL /LPF (ref 0–2)
COLOR: YELLOW
CREAT SERPL-MCNC: 0.6 MG/DL (ref 0.6–1.1)
EOSINOPHILS ABSOLUTE: 0.1 K/UL (ref 0–0.6)
EOSINOPHILS RELATIVE PERCENT: 0.6 %
EPITHELIAL CELLS, UA: ABNORMAL /HPF (ref 0–5)
GFR AFRICAN AMERICAN: >60
GFR NON-AFRICAN AMERICAN: >60
GLOBULIN: 3.2 G/DL
GLUCOSE BLD-MCNC: 195 MG/DL (ref 70–99)
GLUCOSE URINE: NEGATIVE MG/DL
HCO3 ARTERIAL: 38.8 MMOL/L (ref 21–29)
HCO3 ARTERIAL: 42.4 MMOL/L (ref 21–29)
HCO3 ARTERIAL: 43 MMOL/L (ref 21–29)
HCO3 ARTERIAL: 44.1 MMOL/L (ref 21–29)
HCT VFR BLD CALC: 41.8 % (ref 36–48)
HEMOGLOBIN, ART, EXTENDED: 11.8 G/DL (ref 12–16)
HEMOGLOBIN, ART, EXTENDED: 12.4 G/DL (ref 12–16)
HEMOGLOBIN, ART, EXTENDED: 12.5 G/DL (ref 12–16)
HEMOGLOBIN, ART, EXTENDED: 12.7 G/DL (ref 12–16)
HEMOGLOBIN: 12.9 G/DL (ref 12–16)
HYALINE CASTS: ABNORMAL /LPF (ref 0–2)
KETONES, URINE: NEGATIVE MG/DL
LACTIC ACID: 2.1 MMOL/L (ref 0.4–2)
LEUKOCYTE ESTERASE, URINE: NEGATIVE
LYMPHOCYTES ABSOLUTE: 2.1 K/UL (ref 1–5.1)
LYMPHOCYTES RELATIVE PERCENT: 11.5 %
MCH RBC QN AUTO: 26.1 PG (ref 26–34)
MCHC RBC AUTO-ENTMCNC: 30.8 G/DL (ref 31–36)
MCV RBC AUTO: 84.7 FL (ref 80–100)
METHEMOGLOBIN ARTERIAL: 0.4 %
METHEMOGLOBIN ARTERIAL: 0.6 %
MICROSCOPIC EXAMINATION: YES
MONOCYTES ABSOLUTE: 1.2 K/UL (ref 0–1.3)
MONOCYTES RELATIVE PERCENT: 6.7 %
NEUTROPHILS ABSOLUTE: 14.6 K/UL (ref 1.7–7.7)
NEUTROPHILS RELATIVE PERCENT: 80.8 %
NITRITE, URINE: NEGATIVE
O2 CONTENT ARTERIAL: 17 ML/DL
O2 CONTENT ARTERIAL: 18 ML/DL
O2 SAT, ARTERIAL: 100 %
O2 SAT, ARTERIAL: 100 %
O2 SAT, ARTERIAL: 99.6 %
O2 SAT, ARTERIAL: 99.7 %
O2 THERAPY: ABNORMAL
PCO2 ARTERIAL: 102 MMHG (ref 35–45)
PCO2 ARTERIAL: 105 MMHG (ref 35–45)
PCO2 ARTERIAL: 87.3 MMHG (ref 35–45)
PCO2 ARTERIAL: 90.7 MMHG (ref 35–45)
PDW BLD-RTO: 20.2 % (ref 12.4–15.4)
PH ARTERIAL: 7.19 (ref 7.35–7.45)
PH ARTERIAL: 7.21 (ref 7.35–7.45)
PH ARTERIAL: 7.3 (ref 7.35–7.45)
PH ARTERIAL: 7.3 (ref 7.35–7.45)
PH UA: 6.5 (ref 5–8)
PLATELET # BLD: 293 K/UL (ref 135–450)
PMV BLD AUTO: 8 FL (ref 5–10.5)
PO2 ARTERIAL: 333 MMHG (ref 75–108)
PO2 ARTERIAL: 346 MMHG (ref 75–108)
PO2 ARTERIAL: 359 MMHG (ref 75–108)
PO2 ARTERIAL: 454 MMHG (ref 75–108)
POTASSIUM REFLEX MAGNESIUM: 4.1 MMOL/L (ref 3.5–5.1)
PRO-BNP: 7278 PG/ML (ref 0–124)
PROTEIN UA: 100 MG/DL
RBC # BLD: 4.93 M/UL (ref 4–5.2)
RBC UA: ABNORMAL /HPF (ref 0–4)
SODIUM BLD-SCNC: 140 MMOL/L (ref 136–145)
SPECIFIC GRAVITY UA: 1.01 (ref 1–1.03)
TCO2 ARTERIAL: 41.9 MMOL/L
TCO2 ARTERIAL: 45.6 MMOL/L
TCO2 ARTERIAL: 45.7 MMOL/L
TCO2 ARTERIAL: 46.9 MMOL/L
TOTAL PROTEIN: 7 G/DL (ref 6.4–8.2)
TROPONIN: <0.01 NG/ML
URINE REFLEX TO CULTURE: ABNORMAL
URINE TYPE: ABNORMAL
UROBILINOGEN, URINE: 2 E.U./DL
WBC # BLD: 18.1 K/UL (ref 4–11)
WBC UA: ABNORMAL /HPF (ref 0–5)

## 2020-11-08 PROCEDURE — 83880 ASSAY OF NATRIURETIC PEPTIDE: CPT

## 2020-11-08 PROCEDURE — 84484 ASSAY OF TROPONIN QUANT: CPT

## 2020-11-08 PROCEDURE — 96374 THER/PROPH/DIAG INJ IV PUSH: CPT

## 2020-11-08 PROCEDURE — 2700000000 HC OXYGEN THERAPY PER DAY

## 2020-11-08 PROCEDURE — 6370000000 HC RX 637 (ALT 250 FOR IP): Performed by: INTERNAL MEDICINE

## 2020-11-08 PROCEDURE — U0002 COVID-19 LAB TEST NON-CDC: HCPCS

## 2020-11-08 PROCEDURE — 2580000003 HC RX 258: Performed by: INTERNAL MEDICINE

## 2020-11-08 PROCEDURE — 94640 AIRWAY INHALATION TREATMENT: CPT

## 2020-11-08 PROCEDURE — 85025 COMPLETE CBC W/AUTO DIFF WBC: CPT

## 2020-11-08 PROCEDURE — U0003 INFECTIOUS AGENT DETECTION BY NUCLEIC ACID (DNA OR RNA); SEVERE ACUTE RESPIRATORY SYNDROME CORONAVIRUS 2 (SARS-COV-2) (CORONAVIRUS DISEASE [COVID-19]), AMPLIFIED PROBE TECHNIQUE, MAKING USE OF HIGH THROUGHPUT TECHNOLOGIES AS DESCRIBED BY CMS-2020-01-R: HCPCS

## 2020-11-08 PROCEDURE — 82803 BLOOD GASES ANY COMBINATION: CPT

## 2020-11-08 PROCEDURE — 93005 ELECTROCARDIOGRAM TRACING: CPT | Performed by: EMERGENCY MEDICINE

## 2020-11-08 PROCEDURE — 2500000003 HC RX 250 WO HCPCS: Performed by: EMERGENCY MEDICINE

## 2020-11-08 PROCEDURE — 6360000002 HC RX W HCPCS: Performed by: EMERGENCY MEDICINE

## 2020-11-08 PROCEDURE — 6370000000 HC RX 637 (ALT 250 FOR IP): Performed by: NURSE PRACTITIONER

## 2020-11-08 PROCEDURE — 81001 URINALYSIS AUTO W/SCOPE: CPT

## 2020-11-08 PROCEDURE — 6370000000 HC RX 637 (ALT 250 FOR IP): Performed by: EMERGENCY MEDICINE

## 2020-11-08 PROCEDURE — 80053 COMPREHEN METABOLIC PANEL: CPT

## 2020-11-08 PROCEDURE — 99285 EMERGENCY DEPT VISIT HI MDM: CPT

## 2020-11-08 PROCEDURE — 71045 X-RAY EXAM CHEST 1 VIEW: CPT

## 2020-11-08 PROCEDURE — 2000000000 HC ICU R&B

## 2020-11-08 PROCEDURE — 36600 WITHDRAWAL OF ARTERIAL BLOOD: CPT

## 2020-11-08 PROCEDURE — 6360000002 HC RX W HCPCS: Performed by: NURSE PRACTITIONER

## 2020-11-08 PROCEDURE — 51702 INSERT TEMP BLADDER CATH: CPT

## 2020-11-08 PROCEDURE — 36415 COLL VENOUS BLD VENIPUNCTURE: CPT

## 2020-11-08 PROCEDURE — 6360000002 HC RX W HCPCS: Performed by: INTERNAL MEDICINE

## 2020-11-08 PROCEDURE — 94761 N-INVAS EAR/PLS OXIMETRY MLT: CPT

## 2020-11-08 PROCEDURE — 96375 TX/PRO/DX INJ NEW DRUG ADDON: CPT

## 2020-11-08 PROCEDURE — 94660 CPAP INITIATION&MGMT: CPT

## 2020-11-08 PROCEDURE — 83605 ASSAY OF LACTIC ACID: CPT

## 2020-11-08 RX ORDER — CLONIDINE HYDROCHLORIDE 0.1 MG/1
0.1 TABLET ORAL 2 TIMES DAILY
Status: DISCONTINUED | OUTPATIENT
Start: 2020-11-08 | End: 2020-11-10

## 2020-11-08 RX ORDER — AZITHROMYCIN 250 MG/1
250 TABLET, FILM COATED ORAL SEE ADMIN INSTRUCTIONS
Qty: 6 TABLET | Refills: 0 | Status: ON HOLD
Start: 2020-11-08 | End: 2020-11-11 | Stop reason: HOSPADM

## 2020-11-08 RX ORDER — LABETALOL HYDROCHLORIDE 5 MG/ML
10 INJECTION, SOLUTION INTRAVENOUS ONCE
Status: COMPLETED | OUTPATIENT
Start: 2020-11-08 | End: 2020-11-08

## 2020-11-08 RX ORDER — SODIUM CHLORIDE 0.9 % (FLUSH) 0.9 %
10 SYRINGE (ML) INJECTION PRN
Status: DISCONTINUED | OUTPATIENT
Start: 2020-11-08 | End: 2020-11-11 | Stop reason: HOSPADM

## 2020-11-08 RX ORDER — METHYLPREDNISOLONE SODIUM SUCCINATE 40 MG/ML
40 INJECTION, POWDER, LYOPHILIZED, FOR SOLUTION INTRAMUSCULAR; INTRAVENOUS EVERY 6 HOURS
Status: DISCONTINUED | OUTPATIENT
Start: 2020-11-08 | End: 2020-11-10

## 2020-11-08 RX ORDER — POLYETHYLENE GLYCOL 3350 17 G/17G
17 POWDER, FOR SOLUTION ORAL DAILY PRN
Status: DISCONTINUED | OUTPATIENT
Start: 2020-11-08 | End: 2020-11-11 | Stop reason: HOSPADM

## 2020-11-08 RX ORDER — IBUPROFEN 800 MG/1
800 TABLET ORAL DAILY
Status: ON HOLD | COMMUNITY
End: 2021-06-17 | Stop reason: ALTCHOICE

## 2020-11-08 RX ORDER — ONDANSETRON 2 MG/ML
4 INJECTION INTRAMUSCULAR; INTRAVENOUS EVERY 6 HOURS PRN
Status: DISCONTINUED | OUTPATIENT
Start: 2020-11-08 | End: 2020-11-11 | Stop reason: HOSPADM

## 2020-11-08 RX ORDER — ACETAMINOPHEN 650 MG/1
650 SUPPOSITORY RECTAL EVERY 6 HOURS PRN
Status: DISCONTINUED | OUTPATIENT
Start: 2020-11-08 | End: 2020-11-11 | Stop reason: HOSPADM

## 2020-11-08 RX ORDER — PANTOPRAZOLE SODIUM 40 MG/1
40 TABLET, DELAYED RELEASE ORAL
Status: DISCONTINUED | OUTPATIENT
Start: 2020-11-09 | End: 2020-11-11 | Stop reason: HOSPADM

## 2020-11-08 RX ORDER — ALBUTEROL SULFATE 90 UG/1
2 AEROSOL, METERED RESPIRATORY (INHALATION) EVERY 4 HOURS PRN
Status: DISCONTINUED | OUTPATIENT
Start: 2020-11-08 | End: 2020-11-11 | Stop reason: HOSPADM

## 2020-11-08 RX ORDER — SODIUM CHLORIDE 0.9 % (FLUSH) 0.9 %
10 SYRINGE (ML) INJECTION EVERY 12 HOURS SCHEDULED
Status: DISCONTINUED | OUTPATIENT
Start: 2020-11-08 | End: 2020-11-11 | Stop reason: HOSPADM

## 2020-11-08 RX ORDER — IPRATROPIUM BROMIDE AND ALBUTEROL SULFATE 2.5; .5 MG/3ML; MG/3ML
1 SOLUTION RESPIRATORY (INHALATION) 4 TIMES DAILY
Status: DISCONTINUED | OUTPATIENT
Start: 2020-11-08 | End: 2020-11-09

## 2020-11-08 RX ORDER — FUROSEMIDE 10 MG/ML
40 INJECTION INTRAMUSCULAR; INTRAVENOUS 2 TIMES DAILY
Status: DISCONTINUED | OUTPATIENT
Start: 2020-11-08 | End: 2020-11-09

## 2020-11-08 RX ORDER — BUDESONIDE AND FORMOTEROL FUMARATE DIHYDRATE 160; 4.5 UG/1; UG/1
2 AEROSOL RESPIRATORY (INHALATION) 2 TIMES DAILY
Status: DISCONTINUED | OUTPATIENT
Start: 2020-11-08 | End: 2020-11-11 | Stop reason: HOSPADM

## 2020-11-08 RX ORDER — GABAPENTIN 100 MG/1
100 CAPSULE ORAL 3 TIMES DAILY
Status: DISCONTINUED | OUTPATIENT
Start: 2020-11-08 | End: 2020-11-08

## 2020-11-08 RX ORDER — ACETAMINOPHEN 325 MG/1
650 TABLET ORAL EVERY 6 HOURS PRN
Status: DISCONTINUED | OUTPATIENT
Start: 2020-11-08 | End: 2020-11-11 | Stop reason: HOSPADM

## 2020-11-08 RX ORDER — PREDNISONE 20 MG/1
40 TABLET ORAL DAILY
Status: DISCONTINUED | OUTPATIENT
Start: 2020-11-11 | End: 2020-11-10

## 2020-11-08 RX ORDER — FUROSEMIDE 10 MG/ML
40 INJECTION INTRAMUSCULAR; INTRAVENOUS ONCE
Status: COMPLETED | OUTPATIENT
Start: 2020-11-08 | End: 2020-11-08

## 2020-11-08 RX ORDER — PREDNISONE 20 MG/1
TABLET ORAL
Qty: 13 TABLET | Refills: 0 | Status: ON HOLD
Start: 2020-11-08 | End: 2020-11-11 | Stop reason: HOSPADM

## 2020-11-08 RX ORDER — FUROSEMIDE 10 MG/ML
20 INJECTION INTRAMUSCULAR; INTRAVENOUS ONCE
Status: DISCONTINUED | OUTPATIENT
Start: 2020-11-08 | End: 2020-11-08

## 2020-11-08 RX ORDER — PROMETHAZINE HYDROCHLORIDE 25 MG/1
12.5 TABLET ORAL EVERY 6 HOURS PRN
Status: DISCONTINUED | OUTPATIENT
Start: 2020-11-08 | End: 2020-11-11 | Stop reason: HOSPADM

## 2020-11-08 RX ORDER — GABAPENTIN 100 MG/1
100 CAPSULE ORAL 4 TIMES DAILY
Status: DISCONTINUED | OUTPATIENT
Start: 2020-11-08 | End: 2020-11-11 | Stop reason: HOSPADM

## 2020-11-08 RX ORDER — FENTANYL CITRATE 50 UG/ML
100 INJECTION, SOLUTION INTRAMUSCULAR; INTRAVENOUS ONCE
Status: COMPLETED | OUTPATIENT
Start: 2020-11-08 | End: 2020-11-08

## 2020-11-08 RX ADMIN — CLONIDINE HYDROCHLORIDE 0.1 MG: 0.1 TABLET ORAL at 20:59

## 2020-11-08 RX ADMIN — LABETALOL HYDROCHLORIDE 10 MG: 5 INJECTION INTRAVENOUS at 13:27

## 2020-11-08 RX ADMIN — FUROSEMIDE 40 MG: 10 INJECTION, SOLUTION INTRAMUSCULAR; INTRAVENOUS at 21:00

## 2020-11-08 RX ADMIN — ENOXAPARIN SODIUM 40 MG: 40 INJECTION SUBCUTANEOUS at 21:00

## 2020-11-08 RX ADMIN — FUROSEMIDE 40 MG: 10 INJECTION, SOLUTION INTRAMUSCULAR; INTRAVENOUS at 13:24

## 2020-11-08 RX ADMIN — IPRATROPIUM BROMIDE AND ALBUTEROL SULFATE 1 AMPULE: .5; 3 SOLUTION RESPIRATORY (INHALATION) at 20:28

## 2020-11-08 RX ADMIN — GABAPENTIN 100 MG: 100 CAPSULE ORAL at 21:05

## 2020-11-08 RX ADMIN — NITROGLYCERIN 1 INCH: 20 OINTMENT TOPICAL at 13:27

## 2020-11-08 RX ADMIN — CEFTRIAXONE 1 G: 1 INJECTION, POWDER, FOR SOLUTION INTRAMUSCULAR; INTRAVENOUS at 20:59

## 2020-11-08 RX ADMIN — FENTANYL CITRATE 100 MCG: 50 INJECTION, SOLUTION INTRAMUSCULAR; INTRAVENOUS at 14:09

## 2020-11-08 RX ADMIN — METHYLPREDNISOLONE SODIUM SUCCINATE 40 MG: 40 INJECTION, POWDER, FOR SOLUTION INTRAMUSCULAR; INTRAVENOUS at 21:00

## 2020-11-08 RX ADMIN — AZITHROMYCIN MONOHYDRATE 500 MG: 500 INJECTION, POWDER, LYOPHILIZED, FOR SOLUTION INTRAVENOUS at 22:00

## 2020-11-08 ASSESSMENT — PAIN SCALES - GENERAL
PAINLEVEL_OUTOF10: 8
PAINLEVEL_OUTOF10: 6

## 2020-11-08 NOTE — ED NOTES
Bed: A-17  Expected date: 11/8/20  Expected time: 12:28 PM  Means of arrival:   Comments:  42F CPAP     Wili Carpio RN  11/08/20 [id number]

## 2020-11-08 NOTE — ED NOTES
Patient resting comfortably with BIPAP. Denies needs. Denies pain. Will continue to monitor closely.            Caity Newby RN  11/08/20 [id number]

## 2020-11-08 NOTE — TELEPHONE ENCOUNTER
Patient called the answering service complaining of shortness of breath. She requested antibiotics and prednisone. She says she has noticed oxygen desaturation greater than normal with ambulation. We discussed that I will prescribe a 10-day taper of prednisone and 5 days of azithromycin, however, if she does not improve or gets worse over course the next 24 hours she should go to the emergency room. She expressed understanding.

## 2020-11-08 NOTE — ED TRIAGE NOTES
Pt arrived to ED via EMS for a complaint of shortness of breath. Pt arrived on CPAP machine. Pt states that she has been experiencing SOB x 1 week. Pt states that she called her pulmonologist who prescribed a medication and when her  left the house to  the medication she called EMS due to experiencing difficulty breathing. Pt denies any cardiac hhx but does have COPD. Pt denies any chest pain. Pt takes methadone daily and requests methadone for generalized pain. Pt has a hx of prescription drug use and pain disorder. VS noted, elevated blood pressure and stable. Patient A&Ox4. Respirations easy and unlabored. Skin warm and dry and appropriate for ethnicity. No acute distress noted at this time. Patient placed on continuous telemetry and pulse ox upon arrival to room.

## 2020-11-08 NOTE — ED PROVIDER NOTES
[facility] [place]      Pt Name: Diego Mahmood  MRN: [id number]  [place] 1975  Date of evaluation: 11/8/2020  Provider: David Hamilton  Chief Complaint   Patient presents with    Shortness of Breath     pt brought to ED via EMS from home for shortness of breath. per EMS pt 60% on 2L NC upon arrival. Hx COPD and CHF. pt 82% on CPAP upon arrival.        This patient is a high risk for COVID-19 viral infection. Therefore, full personal protective equipment was used including face shield, N95 mask, head cover, impermeable gown, gloves, and protection was discussed. All equipment was cleaned with with chlorine wipes after I evaluated the patient. HPI  Diego Mahmood is a 54 y.o. female who presents with short of breath for 2 days. She has COPD and wears oxygen at home. Pulse ox was 63% on arrival of EMS. That was on room air. They put her on oxygen per CPAP and her oxygen level came up to 89%. She had 1 DuoNeb in route. She may have had another DuoNeb at home. She received Solu-Medrol 125 mg IV. On EMS exam there physical exam revealed decreased breath sounds pulse 140 and blood pressure 133/74. Patient was weak and cyanotic. Patient is alert but severely dyspneic and can give no history at this time. Viola Anderson REVIEW OF SYSTEMS  Reviewed Nurses' notes and concur. History limited due to severe dyspnea. Patient's last menstrual period was 08/14/2015.     PAST MEDICAL HISTORY  Past Medical History:   Diagnosis Date    Arthritis     Asthma     Blind right eye     COPD (chronic obstructive pulmonary disease) ([address]     GERD (gastroesophageal reflux disease)     Hypertension     Movement disorder     Neuromuscular disorder ([facility] [address]     Pneumonia        FAMILY HISTORY  Family History   Problem Relation Age of Onset    Cancer Mother         lung cancer    Osteoporosis Mother     Cancer Father         cancer    Pain      omeprazole (PRILOSEC) 20 MG delayed release capsule Take 40 mg by mouth daily      furosemide (LASIX) 40 MG tablet Take 1 tablet by mouth daily (Patient taking differently: Take 40 mg by mouth 2 times daily Will start BID today per patient) 60 tablet 3    cloNIDine (CATAPRES) 0.1 MG tablet Take 0.1 mg by mouth 2 times daily      gabapentin (NEURONTIN) 100 MG capsule Take 100 mg by mouth 3 times daily      Spacer/Aero-Holding Chambers MARILOU 1 Device by Does not apply route daily as needed 1 Device 0    OXYGEN Inhale 2 L into the lungs See Admin Instructions. (Patient taking differently: Inhale 2 L into the lungs See Admin Instructions Continuous at night or as needed when at home per patient) 1 Can 0    methadone 10 MG/5ML solution Take 130 mg by mouth daily  [facility] daily . ALLERGIES  Allergies   Allergen Reactions    Latex      Rash developed    Sulfa Antibiotics Rash     Throat swelling    Aspartame     Morphine And Related     Nsaids Other (See Comments)     ulcer    Ultram [Tramadol Hcl] Swelling and Rash         PHYSICAL EXAM  VITAL SIGNS: BP (!) 106/92   Pulse 96   Temp 97.2 °F (36.2 °C) (Axillary)   Resp 14   LMP 08/14/2015   SpO2 100%   Constitutional: Well-developed, extremely well-nourished, appears cyanotic and severely dyspneic. Pulse ox came up to 95% with BiPAP per respiratory, nontoxic, activity: Lying on the cart, speaking in one-word sentences,  HENT: Normocephalic, Atraumatic, Bilateral external ears normal, TM's were normal, Mucus membranes are moist and oropharynx is patent and clear, No oral exudates, Nose normal, perioral cyanosis. Eyes: PERRLA, EOMI, Conjunctiva normal, No discharge. No scleral icterus. Neck: Normal range of motion, No tenderness, Supple, no JVD. Lymphatic: No lymphadenopathy noted. Cardiovascular: Mildly tachycardic heart rate, regular rhythm, no murmurs, no gallops, no rubs. Thorax & Lungs:  Moderately decreased 38.8 (*)     Base Excess, Arterial 7.1 (*)     Carboxyhgb, Arterial 3.1 (*)     All other components within normal limits    Narrative:     Ellareid Marley tel. [phone],  Chemistry results called to and read back by Vero Sam RN, 11/08/2020  13:28, by [facility]  Performed at:  [address] [place] [address]   Phone [phone]   URINE RT REFLEX TO CULTURE - Abnormal; Notable for the following components:    Blood, Urine SMALL (*)     Protein,  (*)     Urobilinogen, Urine 2.0 (*)     All other components within normal limits    Narrative:     Performed at:  [address] [address]   Phone [phone]   BRAIN NATRIURETIC PEPTIDE - Abnormal; Notable for the following components:    Pro-BNP 7,278 (*)     All other components within normal limits    Narrative:     Performed at:  [facility]  [address] [facility] [place] [address]   Phone [phone]   MICROSCOPIC URINALYSIS - Abnormal; Notable for the following components:    Hyaline Casts, UA 6-10 (*)     Coarse Casts, UA 3-5 (*)     RBC, UA 5-10 (*)     All other components within normal limits    Narrative:     Performed at:  [address] [place] [address]   Phone [phone]   TROPONIN    Narrative:     Performed at:  [address] [place] [address]   Phone [phone]   BLOOD GAS, ARTERIAL       ?   EKG  EKG Interpretation    Interpreted by emergency department physician  Time read: 1320    Rhythm: Sinus tachycardia  Ventricular Rate: 131  QRS Axis: 9  Ectopy: None noted  Conduction: Sinus tachycardia with left atrial hypertrophy and short MD interval  ST Segments: normal  T Waves: normal  Q Waves: None    Other findings: Motion artifact making it difficult to read

## 2020-11-08 NOTE — H&P
Hospital Medicine History & Physical      PCP: Regla Huang    Date of Admission: 11/8/2020    Date of Service: Pt seen/examined on 11/08/2020 and Admitted to Inpatient with expected LOS greater than two midnights due to medical therapy. Chief Complaint:  SOB, COUGH      History Of Present Illness:    54 y.o. female who presented to [facility] with worsening shortness of breath and cough per EMS she was down to 60 her saturation in emergency department she was on CPAP still low 80s started on BiPAP improved patient is complaining of any shortness of breath for the last few days associated with cough denies fever or chills poor historian at this time she is a little bit somnolent, denies abdominal pain nausea or vomiting. In emergency department found to have respiratory acidosis and hypoxia along with generalized edema minimally elevated lactic acid started on BiPAP we were asked to admit for further management and treatment    Past Medical History:          Diagnosis Date    Arthritis     Asthma     Blind right eye     COPD (chronic obstructive pulmonary disease) (HCC)     GERD (gastroesophageal reflux disease)     Hypertension     Movement disorder     Neuromuscular disorder ([address]     Pneumonia        Past Surgical History:          Procedure Laterality Date    ABDOMEN SURGERY      CHOLECYSTECTOMY      DILATATION, ESOPHAGUS      EYE SURGERY      TUBAL LIGATION         Medications Prior to Admission:      Prior to Admission medications    Medication Sig Start Date End Date Taking?  Authorizing Provider   predniSONE (DELTASONE) 20 MG tablet Take 40 mg daily for 4 days, then 20 mg daily for 3 days, then 10 mg daily for 3 days, then stop 11/8/20   Marko Dhillon MD   azithromycin (ZITHROMAX) 250 MG tablet Take 1 tablet by mouth See Admin Instructions for 5 days 500mg on day 1 followed by 250mg on days 2 - 5 11/8/20 11/13/20  Marko Dhillon MD   predniSONE (DELTASONE) 10 MG tablet 40mg for See Admin Instructions Continuous at night or as needed when at home per patient 12/5/13   Lb Monterroso MD   methadone 10 MG/5ML solution Take 130 mg by mouth daily  From [facility] methadone clinic daily . Historical Provider, MD       Allergies:  Latex; Sulfa antibiotics; Aspartame; Morphine and related; Nsaids; and Ultram [tramadol hcl]    Social History:      The patient currently lives at home    TOBACCO:   reports that she quit smoking about 2 years ago. Her smoking use included cigarettes. She started smoking about 36 years ago. She has a 15.00 pack-year smoking history. She has never used smokeless tobacco.  ETOH:   reports no history of alcohol use. E-Cigarettes Vaping or Juuling     Questions Responses    Vaping Use     Start Date     Does device contain nicotine? Quit Date     Vaping Type             Family History:      Reviewed in detail and npositive as follows:        Problem Relation Age of Onset    Cancer Mother         lung cancer    Osteoporosis Mother     Cancer Father         cancer    Arthritis Father        REVIEW OF SYSTEMS:   Pertinent positives as noted in the HPI. All other systems reviewed and negative. PHYSICAL EXAM PERFORMED:    /82   Pulse 88   Temp 97.2 °F (36.2 °C) (Axillary)   Resp 16   LMP 08/14/2015   SpO2 100%     General appearance: Moderate respiratory distress  HEENT:  Normal cephalic  Neck: Supple  Respiratory: Expiratory wheezes  Cardiovascular:  Regular rate and rhythm with normal S1/S2 without murmurs, rubs or gallops. Abdomen: Soft, non-tender, non-distended, obese  Musculoskeletal:  No clubbing, cyanosis. Bilateral pitting edema  Skin: Skin color, texture, turgor normal.  No rashes or lesions.   Neurologic: Moving all extremities  Psychiatric:  Alert   Capillary Refill: Brisk,< 3 seconds   Peripheral Pulses: +2 palpable, equal bilaterally       Labs:     Recent Labs     11/08/20  1303   WBC 18.1*   HGB 12.9   HCT 41.8        Recent Labs 11/08/20  1303      K 4.1   CL 96*   CO2 35*   BUN 12   CREATININE 0.6   CALCIUM 9.3     Recent Labs     11/08/20  1303   AST 28   ALT 27   BILITOT 1.1*   ALKPHOS 76     No results for input(s): INR in the last 72 hours. Recent Labs     11/08/20  1303   TROPONINI <0.01       Urinalysis:      Lab Results   Component Value Date    NITRU Negative 11/08/2020    WBCUA 0-2 11/08/2020    RBCUA 5-10 11/08/2020    BLOODU SMALL 11/08/2020    SPECGRAV 1.012 11/08/2020    GLUCOSEU Negative 11/08/2020       Radiology:     CXR: I have reviewed the CXR with the following interpretation: Pulmonary edema  EKG:  I have reviewed the EKG with the following interpretation: No ST elevation    XR CHEST PORTABLE   Final Result   Findings suggestive of CHF decompensation. ASSESSMENT:    Active Hospital Problems    Diagnosis Date Noted    Acute on chronic systolic (congestive) heart failure (HCC) [I50.23] 11/08/2020    Pulmonary edema [J81.1] 11/08/2020    COPD exacerbation (HCC) [J44.1] 01/31/2017    Acute on chronic respiratory failure with hypoxia and hypercapnia (HCC) [J96.21, J96.22]     Tobacco use [Z72.0]          PLAN:    1. Acute on chronic respiratory failure with hypoxia and hypercapnia likely multifactorial patient is wheezing, crackles, peripheral edema chest x-ray with pulmonary edema, on BiPAP at this time will repeat ABG, patient at risk for need of intubation and further complications, keep saturation above 90, will adjust BiPAP setting based on blood gas, discussed with RT, discussed with patient, patient will be admitted to intensive care unit at this time. At this time I will hold her methadone that I believe might attributed to her current presentation, consider restarting when her mental status improved  2. Acute on chronic systolic heart failure, will repeat echo, Lasix 40 mg IV twice daily, strict I&O, telemetry monitoring.   3.  COPD exacerbation, IV steroids, IV ceftriaxone and azithromycin, pulmonary consulted  4. Tobacco smoking, counseled for cessation  5. Acute metabolic encephalopathy improving now  6. Elevated lactic acid, trend patient is not septic at this time, likely due to [place]      Total critical care time including but not limited to physical exam, ordering and following on critical labs, ordering critical IV medication in a patient with potential life-threatening complications and not including any procedure time 42 minutes    DVT Prophylaxis: Lovenox  Diet: No diet orders on file  Code Status: Prior        Omar Parry MD    Thank you Dominick Juarez for the opportunity to be involved in this patient's care. If you have any questions or concerns please feel free to contact me at [phone].

## 2020-11-09 LAB
ANION GAP SERPL CALCULATED.3IONS-SCNC: 8 MMOL/L (ref 3–16)
BASOPHILS ABSOLUTE: 0 K/UL (ref 0–0.2)
BASOPHILS RELATIVE PERCENT: 0.2 %
BUN BLDV-MCNC: 19 MG/DL (ref 7–20)
CALCIUM SERPL-MCNC: 8.8 MG/DL (ref 8.3–10.6)
CHLORIDE BLD-SCNC: 92 MMOL/L (ref 99–110)
CO2: 42 MMOL/L (ref 21–32)
CREAT SERPL-MCNC: 0.7 MG/DL (ref 0.6–1.1)
EKG ATRIAL RATE: 131 BPM
EKG DIAGNOSIS: NORMAL
EKG P AXIS: 45 DEGREES
EKG P-R INTERVAL: 100 MS
EKG Q-T INTERVAL: 294 MS
EKG QRS DURATION: 84 MS
EKG QTC CALCULATION (BAZETT): 434 MS
EKG R AXIS: 9 DEGREES
EKG T AXIS: 27 DEGREES
EKG VENTRICULAR RATE: 131 BPM
EOSINOPHILS ABSOLUTE: 0 K/UL (ref 0–0.6)
EOSINOPHILS RELATIVE PERCENT: 0 %
GFR AFRICAN AMERICAN: >60
GFR NON-AFRICAN AMERICAN: >60
GLUCOSE BLD-MCNC: 118 MG/DL (ref 70–99)
GLUCOSE BLD-MCNC: 140 MG/DL (ref 70–99)
GLUCOSE BLD-MCNC: 140 MG/DL (ref 70–99)
GLUCOSE BLD-MCNC: 177 MG/DL (ref 70–99)
HCT VFR BLD CALC: 38.1 % (ref 36–48)
HEMOGLOBIN: 12 G/DL (ref 12–16)
LACTIC ACID: 1.2 MMOL/L (ref 0.4–2)
LACTIC ACID: 2.2 MMOL/L (ref 0.4–2)
LACTIC ACID: 2.3 MMOL/L (ref 0.4–2)
LACTIC ACID: 2.5 MMOL/L (ref 0.4–2)
LACTIC ACID: 2.6 MMOL/L (ref 0.4–2)
LV EF: 43 %
LVEF MODALITY: NORMAL
LYMPHOCYTES ABSOLUTE: 0.5 K/UL (ref 1–5.1)
LYMPHOCYTES RELATIVE PERCENT: 6 %
MAGNESIUM: 1.9 MG/DL (ref 1.8–2.4)
MCH RBC QN AUTO: 26.3 PG (ref 26–34)
MCHC RBC AUTO-ENTMCNC: 31.4 G/DL (ref 31–36)
MCV RBC AUTO: 83.7 FL (ref 80–100)
MONOCYTES ABSOLUTE: 0.4 K/UL (ref 0–1.3)
MONOCYTES RELATIVE PERCENT: 4.8 %
NEUTROPHILS ABSOLUTE: 8.1 K/UL (ref 1.7–7.7)
NEUTROPHILS RELATIVE PERCENT: 89 %
PDW BLD-RTO: 19.9 % (ref 12.4–15.4)
PERFORMED ON: ABNORMAL
PHOSPHORUS: 3.8 MG/DL (ref 2.5–4.9)
PLATELET # BLD: 236 K/UL (ref 135–450)
PMV BLD AUTO: 8.1 FL (ref 5–10.5)
POTASSIUM SERPL-SCNC: 3.8 MMOL/L (ref 3.5–5.1)
PROCALCITONIN: 0.49 NG/ML (ref 0–0.15)
PROCALCITONIN: 0.55 NG/ML (ref 0–0.15)
RBC # BLD: 4.55 M/UL (ref 4–5.2)
SARS-COV-2, PCR: NOT DETECTED
SODIUM BLD-SCNC: 142 MMOL/L (ref 136–145)
WBC # BLD: 9.1 K/UL (ref 4–11)

## 2020-11-09 PROCEDURE — 6360000002 HC RX W HCPCS: Performed by: INTERNAL MEDICINE

## 2020-11-09 PROCEDURE — 93010 ELECTROCARDIOGRAM REPORT: CPT | Performed by: INTERNAL MEDICINE

## 2020-11-09 PROCEDURE — 85025 COMPLETE CBC W/AUTO DIFF WBC: CPT

## 2020-11-09 PROCEDURE — 2700000000 HC OXYGEN THERAPY PER DAY

## 2020-11-09 PROCEDURE — 83735 ASSAY OF MAGNESIUM: CPT

## 2020-11-09 PROCEDURE — 6370000000 HC RX 637 (ALT 250 FOR IP): Performed by: INTERNAL MEDICINE

## 2020-11-09 PROCEDURE — 6370000000 HC RX 637 (ALT 250 FOR IP): Performed by: NURSE PRACTITIONER

## 2020-11-09 PROCEDURE — 83605 ASSAY OF LACTIC ACID: CPT

## 2020-11-09 PROCEDURE — 94660 CPAP INITIATION&MGMT: CPT

## 2020-11-09 PROCEDURE — 6360000002 HC RX W HCPCS: Performed by: NURSE PRACTITIONER

## 2020-11-09 PROCEDURE — 99223 1ST HOSP IP/OBS HIGH 75: CPT | Performed by: INTERNAL MEDICINE

## 2020-11-09 PROCEDURE — 93306 TTE W/DOPPLER COMPLETE: CPT

## 2020-11-09 PROCEDURE — 84145 PROCALCITONIN (PCT): CPT

## 2020-11-09 PROCEDURE — 84100 ASSAY OF PHOSPHORUS: CPT

## 2020-11-09 PROCEDURE — 2580000003 HC RX 258: Performed by: INTERNAL MEDICINE

## 2020-11-09 PROCEDURE — 2000000000 HC ICU R&B

## 2020-11-09 PROCEDURE — 94640 AIRWAY INHALATION TREATMENT: CPT

## 2020-11-09 PROCEDURE — 94761 N-INVAS EAR/PLS OXIMETRY MLT: CPT

## 2020-11-09 PROCEDURE — 36415 COLL VENOUS BLD VENIPUNCTURE: CPT

## 2020-11-09 PROCEDURE — 80048 BASIC METABOLIC PNL TOTAL CA: CPT

## 2020-11-09 RX ORDER — LACTOBACILLUS RHAMNOSUS GG 10B CELL
2 CAPSULE ORAL 2 TIMES DAILY WITH MEALS
Status: DISCONTINUED | OUTPATIENT
Start: 2020-11-09 | End: 2020-11-11 | Stop reason: HOSPADM

## 2020-11-09 RX ORDER — METHADONE HYDROCHLORIDE 10 MG/1
130 TABLET ORAL DAILY
Status: DISCONTINUED | OUTPATIENT
Start: 2020-11-09 | End: 2020-11-11 | Stop reason: HOSPADM

## 2020-11-09 RX ORDER — METHOCARBAMOL 500 MG/1
1000 TABLET, FILM COATED ORAL ONCE
Status: COMPLETED | OUTPATIENT
Start: 2020-11-09 | End: 2020-11-10

## 2020-11-09 RX ADMIN — IPRATROPIUM BROMIDE AND ALBUTEROL 1 PUFF: 20; 100 SPRAY, METERED RESPIRATORY (INHALATION) at 08:30

## 2020-11-09 RX ADMIN — FUROSEMIDE 40 MG: 10 INJECTION, SOLUTION INTRAMUSCULAR; INTRAVENOUS at 07:30

## 2020-11-09 RX ADMIN — ENOXAPARIN SODIUM 40 MG: 40 INJECTION SUBCUTANEOUS at 20:47

## 2020-11-09 RX ADMIN — ENOXAPARIN SODIUM 40 MG: 40 INJECTION SUBCUTANEOUS at 07:29

## 2020-11-09 RX ADMIN — PANTOPRAZOLE SODIUM 40 MG: 40 TABLET, DELAYED RELEASE ORAL at 07:30

## 2020-11-09 RX ADMIN — BUDESONIDE AND FORMOTEROL FUMARATE DIHYDRATE 2 PUFF: 160; 4.5 AEROSOL RESPIRATORY (INHALATION) at 20:55

## 2020-11-09 RX ADMIN — INSULIN LISPRO 1 UNITS: 100 INJECTION, SOLUTION INTRAVENOUS; SUBCUTANEOUS at 11:05

## 2020-11-09 RX ADMIN — GABAPENTIN 100 MG: 100 CAPSULE ORAL at 20:44

## 2020-11-09 RX ADMIN — Medication 10 ML: at 07:29

## 2020-11-09 RX ADMIN — BUDESONIDE AND FORMOTEROL FUMARATE DIHYDRATE 2 PUFF: 160; 4.5 AEROSOL RESPIRATORY (INHALATION) at 08:30

## 2020-11-09 RX ADMIN — METHADONE HYDROCHLORIDE 130 MG: 10 TABLET ORAL at 11:04

## 2020-11-09 RX ADMIN — ACETAMINOPHEN 650 MG: 325 TABLET ORAL at 22:47

## 2020-11-09 RX ADMIN — METHYLPREDNISOLONE SODIUM SUCCINATE 40 MG: 40 INJECTION, POWDER, FOR SOLUTION INTRAMUSCULAR; INTRAVENOUS at 05:04

## 2020-11-09 RX ADMIN — CLONIDINE HYDROCHLORIDE 0.1 MG: 0.1 TABLET ORAL at 07:30

## 2020-11-09 RX ADMIN — SALINE NASAL SPRAY 1 SPRAY: 1.5 SOLUTION NASAL at 20:45

## 2020-11-09 RX ADMIN — CLONIDINE HYDROCHLORIDE 0.1 MG: 0.1 TABLET ORAL at 20:44

## 2020-11-09 RX ADMIN — IPRATROPIUM BROMIDE AND ALBUTEROL 1 PUFF: 20; 100 SPRAY, METERED RESPIRATORY (INHALATION) at 16:23

## 2020-11-09 RX ADMIN — IPRATROPIUM BROMIDE AND ALBUTEROL 1 PUFF: 20; 100 SPRAY, METERED RESPIRATORY (INHALATION) at 12:12

## 2020-11-09 RX ADMIN — METHYLPREDNISOLONE SODIUM SUCCINATE 40 MG: 40 INJECTION, POWDER, FOR SOLUTION INTRAMUSCULAR; INTRAVENOUS at 07:29

## 2020-11-09 RX ADMIN — SODIUM CHLORIDE, PRESERVATIVE FREE 10 ML: 5 INJECTION INTRAVENOUS at 20:55

## 2020-11-09 RX ADMIN — Medication 2 CAPSULE: at 16:31

## 2020-11-09 RX ADMIN — METHYLPREDNISOLONE SODIUM SUCCINATE 40 MG: 40 INJECTION, POWDER, FOR SOLUTION INTRAMUSCULAR; INTRAVENOUS at 13:40

## 2020-11-09 RX ADMIN — Medication 2 CAPSULE: at 11:04

## 2020-11-09 RX ADMIN — CEFTRIAXONE 2 G: 2 INJECTION, POWDER, FOR SOLUTION INTRAMUSCULAR; INTRAVENOUS at 20:44

## 2020-11-09 RX ADMIN — TIOTROPIUM BROMIDE INHALATION SPRAY 2 PUFF: 3.12 SPRAY, METERED RESPIRATORY (INHALATION) at 08:29

## 2020-11-09 RX ADMIN — IPRATROPIUM BROMIDE AND ALBUTEROL 1 PUFF: 20; 100 SPRAY, METERED RESPIRATORY (INHALATION) at 20:54

## 2020-11-09 RX ADMIN — METHYLPREDNISOLONE SODIUM SUCCINATE 40 MG: 40 INJECTION, POWDER, FOR SOLUTION INTRAMUSCULAR; INTRAVENOUS at 20:44

## 2020-11-09 RX ADMIN — INSULIN LISPRO 1 UNITS: 100 INJECTION, SOLUTION INTRAVENOUS; SUBCUTANEOUS at 16:31

## 2020-11-09 RX ADMIN — AZITHROMYCIN MONOHYDRATE 500 MG: 500 INJECTION, POWDER, LYOPHILIZED, FOR SOLUTION INTRAVENOUS at 20:45

## 2020-11-09 ASSESSMENT — PAIN DESCRIPTION - ORIENTATION
ORIENTATION: LOWER

## 2020-11-09 ASSESSMENT — PAIN DESCRIPTION - LOCATION
LOCATION: BACK

## 2020-11-09 ASSESSMENT — PAIN SCALES - GENERAL
PAINLEVEL_OUTOF10: 6
PAINLEVEL_OUTOF10: 2
PAINLEVEL_OUTOF10: 10
PAINLEVEL_OUTOF10: 8
PAINLEVEL_OUTOF10: 0
PAINLEVEL_OUTOF10: 6
PAINLEVEL_OUTOF10: 0
PAINLEVEL_OUTOF10: 8

## 2020-11-09 ASSESSMENT — PAIN DESCRIPTION - PROGRESSION
CLINICAL_PROGRESSION: NOT CHANGED
CLINICAL_PROGRESSION: GRADUALLY IMPROVING
CLINICAL_PROGRESSION: NOT CHANGED
CLINICAL_PROGRESSION: GRADUALLY WORSENING
CLINICAL_PROGRESSION: GRADUALLY WORSENING

## 2020-11-09 ASSESSMENT — PAIN DESCRIPTION - FREQUENCY
FREQUENCY: CONTINUOUS

## 2020-11-09 ASSESSMENT — PAIN DESCRIPTION - DESCRIPTORS
DESCRIPTORS: ACHING

## 2020-11-09 ASSESSMENT — PAIN - FUNCTIONAL ASSESSMENT
PAIN_FUNCTIONAL_ASSESSMENT: PREVENTS OR INTERFERES SOME ACTIVE ACTIVITIES AND ADLS
PAIN_FUNCTIONAL_ASSESSMENT: PREVENTS OR INTERFERES WITH ALL ACTIVE AND SOME PASSIVE ACTIVITIES
PAIN_FUNCTIONAL_ASSESSMENT: PREVENTS OR INTERFERES WITH MANY ACTIVE NOT PASSIVE ACTIVITIES
PAIN_FUNCTIONAL_ASSESSMENT: PREVENTS OR INTERFERES SOME ACTIVE ACTIVITIES AND ADLS
PAIN_FUNCTIONAL_ASSESSMENT: PREVENTS OR INTERFERES WITH ALL ACTIVE AND SOME PASSIVE ACTIVITIES

## 2020-11-09 ASSESSMENT — PAIN DESCRIPTION - PAIN TYPE
TYPE: CHRONIC PAIN

## 2020-11-09 ASSESSMENT — PAIN DESCRIPTION - ONSET
ONSET: ON-GOING

## 2020-11-09 NOTE — ED NOTES
Patient requesting a break from her bipap. Respiratory called and informed of patient request. Patient also requesting her nightly dose of methadone.  Dr. Bobby Jimenez informed of patient request.      Kimberly Taylor RN  11/09/20 [id number]

## 2020-11-09 NOTE — PROGRESS NOTES
Pt admitted for ER to 2125 vis stretcher. Reports received from [place], [place]. [facility]VS. Pt is A&O x4. Pt on 6L high flow NC. Admission assessment complete, see flowsheet. Pt oriented to room. Call light within reach, bed in locked in lowest position. Patient encouraged to call with needs. Will continue to monitor.

## 2020-11-09 NOTE — ED NOTES
RN at bedside pt given juice and sandwich, and updated on plan of care.      Peter Torres RN  11/09/20 [id number]

## 2020-11-09 NOTE — CONSULTS
PATIENT IS SEEN AT THE REQUEST OF DR. Linda Dupree for Respiratory failure    CONSULTING PHYSICIAN: Tiffany     HISTORY OF PRESENT ILLNESS:  This is a 54 y.o. female who presented to the ED on 11/8 with a CC of SOB. Per ED notes she was noted to be hypoxic at home and needed CPAP for transfer to the hospital.  COVID test was ordered here but she said she tested negative during the week. She is feeling better today but has not been feeling well for days. Said she was waiting for supplies for her bipap machine and that is why she has not been using it. Established Pulmonologist:  Levi Raines     PAST MEDICAL HISTORY:  Past Medical History:   Diagnosis Date    Arthritis     Asthma     Blind right eye     COPD (chronic obstructive pulmonary disease) ([address]     GERD (gastroesophageal reflux disease)     Hypertension     Movement disorder     Neuromuscular disorder ([address]     Pneumonia        PAST SURGICAL HISTORY:  Past Surgical History:   Procedure Laterality Date    ABDOMEN SURGERY      CHOLECYSTECTOMY      DILATATION, ESOPHAGUS      EYE SURGERY      TUBAL LIGATION         FAMILY HISTORY:  family history includes Arthritis in her father; Cancer in her father and mother; Osteoporosis in her mother. SOCIAL HISTORY:   reports that she quit smoking about 2 years ago. Her smoking use included cigarettes. She started smoking about 36 years ago. She has a 15.00 pack-year smoking history.  She has never used smokeless tobacco.    Scheduled Meds:   albuterol-ipratropium  1 puff Inhalation 4x daily    cefTRIAXone (ROCEPHIN) IV  2 g Intravenous Q24H    lactobacillus  2 capsule Oral BID WC    insulin lispro  0-6 Units Subcutaneous TID WC    insulin lispro  0-3 Units Subcutaneous Nightly    methadone  130 mg Oral Daily    budesonide-formoterol  2 puff Inhalation BID    cloNIDine  0.1 mg Oral BID    pantoprazole  40 mg Oral QAM AC    sodium chloride flush  10 mL Intravenous 2 times per day    methylPREDNISolone  40 mg Intravenous Q6H    Followed by   [employer] ON 11/11/2020] predniSONE  40 mg Oral Daily    azithromycin  500 mg Intravenous Q24H    gabapentin  100 mg Oral 4x Daily    enoxaparin  40 mg Subcutaneous BID       Continuous Infusions:      PRN Meds:  albuterol sulfate HFA, sodium chloride flush, acetaminophen **OR** acetaminophen, polyethylene glycol, promethazine **OR** ondansetron, perflutren lipid microspheres    ALLERGIES:  Patient is allergic to latex; sulfa antibiotics; aspartame; morphine and related; nsaids; and ultram [tramadol hcl]. REVIEW OF SYSTEMS:  Constitutional: Negative for fever or chills  HENT: Negative for sore throat  Eyes: Negative for redness   Respiratory: SOB and difficulty in breathing, not using bpap   Cardiovascular: Negative for chest pain  Gastrointestinal: Negative for vomiting, diarrhea   Genitourinary: Negative for hematuria, negative for dysuria  Musculoskeletal: Negative for arthralgias   Skin: Negative for rash  Neurological: Negative for syncope  Hematological: Negative for adenopathy  Extremities:  Negative for swelling    PHYSICAL EXAM:  Blood pressure 133/88, pulse 88, temperature 98.6 °F (37 °C), temperature source Oral, resp. rate 21, height 5' (1.524 m), weight 220 lb 10.9 oz (100.1 kg), last menstrual period 08/14/2015, SpO2 93 %, not currently breastfeeding.'  Gen: Looks much older than listed age, lethargic   Eyes: PERRL. No sclera icterus. No conjunctival injection. ENT: No discharge. Pharynx clear. Neck: Trachea midline. No obvious mass. Resp: Wheezing, poor air movement   CV: Regular rate. Regular rhythm. No murmur or rub. GI: Non-tender. Non-distended. No hernia. BS present. Skin: Warm and dry. No nodule on exposed extremities. Lymph: No cervical LAD. No supraclavicular LAD. M/S: No cyanosis. No joint deformity.    Neuro: Lethargic, awakens easily but falls asleep easy   EXT:   no edema, no clubbing    LABS:  CBC:   Recent Labs

## 2020-11-09 NOTE — ED NOTES
Patient updated on communication with Dr. Ching Hoff. Patient agreeable to plan about methadone. Patient given cedric lam, and she has her drink at bedside. Denies other needs at this time. Patient is tolerating NC well at this time 95% with NC.       Susan Tay RN  11/09/20 8417 Godwin Covarrubias RN  11/09/20 1229

## 2020-11-09 NOTE — PLAN OF CARE
Problem: Falls - Risk of:  Goal: Will remain free from falls  Description: Will remain free from falls  Outcome: Ongoing     Problem: Falls - Risk of:  Goal: Absence of physical injury  Description: Absence of physical injury  Outcome: Ongoing     Problem: OXYGENATION/RESPIRATORY FUNCTION  Goal: Patient will maintain patent airway  Outcome: Ongoing     Problem: OXYGENATION/RESPIRATORY FUNCTION  Goal: Patient will achieve/maintain normal respiratory rate/effort  Description: Respiratory rate and effort will be within normal limits for the patient  Outcome: Ongoing     Problem: HEMODYNAMIC STATUS  Goal: Patient has stable vital signs and fluid balance  Outcome: Ongoing     Problem: FLUID AND ELECTROLYTE IMBALANCE  Goal: Fluid and electrolyte balance are achieved/maintained  Outcome: Ongoing     Problem: ACTIVITY INTOLERANCE/IMPAIRED MOBILITY  Goal: Mobility/activity is maintained at optimum level for patient  Outcome: Ongoing

## 2020-11-09 NOTE — ED NOTES
Report given to Marbella Mix RN to assume care. Denies questions.        Laura Powell RN  11/08/20 1926

## 2020-11-09 NOTE — PROGRESS NOTES
Hospitalist Progress Note      PCP: [facility]    Date of Admission: 11/8/2020    Chief Complaint: Shortness of breath    Hospital Course: Patient is a 48-year-old female with a past medical history of COPD along with acute on chronic hypoxic hypercapnic respiratory failure who presents to the hospital with shortness of breath along with cough. There was initial concern for COVID-19 but testing came back negative. Patient was found to be hypoxic with hypercapnia on ABG and was placed on BiPAP. Subjective: Patient seen and examined. Patient appears much more awake and alert today. There was concern for polypharmacy and/or CO2 narcosis being the cause of her confusion yesterday and this morning. Patient does take methadone daily 132 mg. COVID-19 PCR came back negative. Patient's BNP is elevated along with her lactic acid. Awaiting further microbiology.       Medications:  Reviewed    Infusion Medications   Scheduled Medications    albuterol-ipratropium  1 puff Inhalation 4x daily    cefTRIAXone (ROCEPHIN) IV  2 g Intravenous Q24H    lactobacillus  2 capsule Oral BID WC    insulin lispro  0-6 Units Subcutaneous TID WC    insulin lispro  0-3 Units Subcutaneous Nightly    methadone  130 mg Oral Daily    budesonide-formoterol  2 puff Inhalation BID    cloNIDine  0.1 mg Oral BID    pantoprazole  40 mg Oral QAM AC    sodium chloride flush  10 mL Intravenous 2 times per day    methylPREDNISolone  40 mg Intravenous Q6H    Followed by   Tanja Melissa ON 11/11/2020] predniSONE  40 mg Oral Daily    azithromycin  500 mg Intravenous Q24H    gabapentin  100 mg Oral 4x Daily    enoxaparin  40 mg Subcutaneous BID     PRN Meds: albuterol sulfate HFA, sodium chloride flush, acetaminophen **OR** acetaminophen, polyethylene glycol, promethazine **OR** ondansetron, perflutren lipid microspheres      Intake/Output Summary (Last 24 hours) at 11/9/2020 0905  Last data filed at 11/9/2020 0807  Gross per 24 hour decompensation.                  Assessment/Plan:    Acute on chronic respiratory failure with hypoxia and hypercapnia  BiPAP as needed  Continue supplemental oxygen  Wean oxygen to keep saturations above 90%    Acute on chronic systolic heart failure  Lasix on hold due to contraction alkalosis  Repeat echocardiogram ordered  Strict I's and O's  Telemetry monitoring  CHF nurse consulted    COPD exacerbation  Continue Rocephin and azithromycin  IV steroids started    Tobacco abuse  Counseled on cessation    Acute metabolic encephalopathy  Likely secondary to CO2 narcosis resolved    Lactic acidosis  Likely secondary to poor perfusion and hypoxia  Continue to trend      DVT Prophylaxis: Lovenox  Diet: DIET CARDIAC;  Code Status: Full Code    PT/OT Eval Status: Not applicable    Dispo -inpatient, can likely transfer from ICU    Julia Lr MD

## 2020-11-09 NOTE — PROGRESS NOTES
4 Eyes Skin Assessment     NAME:  Etelvina Pgae  YOB: 1975  MEDICAL RECORD NUMBER:  [id number]    The patient is being assess for  Shift Handoff    I agree that 2 RN's have performed a thorough Head to Toe Skin Assessment on the patient. ALL assessment sites listed below have been assessed. Areas assessed by both nurses:    Head, Face, Ears, Shoulders, Back, Chest, Arms, Elbows, Hands, Sacrum. Buttock, Coccyx, Ischium and Legs. Feet and Heels        Does the Patient have a Wound? Yes wound(s) were present on assessment.  LDA wound assessment was Initiated and completed        Jaison Prevention initiated:  Yes   Wound Care Orders initiated:  No    Pressure Injury (Stage 3,4, Unstageable, DTI, NWPT, and Complex wounds) if present place consult order under [de-identified] No    New and Established Ostomies if present place consult order under : No      Nurse 1 eSignature: Electronically signed by Edna Zuniga RN on 11/9/20 at 7:22 AM EST    **SHARE this note so that the co-signing nurse is able to place an eSignature**    Nurse 2 eSignature: {Esignature:[id number]}

## 2020-11-09 NOTE — PROGRESS NOTES
Pharmacy Medication Reconciliation Note     List of medications patient is currently taking is complete. Source of information:   1. Called Pt in her room    Notes regarding home medications:   1. Pt very hard to hear, but had only a few meds PTA  2. Pt stated she has not had her Methadone liquid (130-132 mg) yet today--she requests it!     Pt denied taking any other OTC or herbal medications    Don Rodriguez, Pharmacy Intern  11/8/2020  9:05 PM

## 2020-11-10 LAB
ANION GAP SERPL CALCULATED.3IONS-SCNC: 9 MMOL/L (ref 3–16)
BUN BLDV-MCNC: 19 MG/DL (ref 7–20)
CALCIUM SERPL-MCNC: 8.9 MG/DL (ref 8.3–10.6)
CHLORIDE BLD-SCNC: 89 MMOL/L (ref 99–110)
CO2: 41 MMOL/L (ref 21–32)
CREAT SERPL-MCNC: 0.6 MG/DL (ref 0.6–1.1)
GFR AFRICAN AMERICAN: >60
GFR NON-AFRICAN AMERICAN: >60
GLUCOSE BLD-MCNC: 107 MG/DL (ref 70–99)
GLUCOSE BLD-MCNC: 122 MG/DL (ref 70–99)
GLUCOSE BLD-MCNC: 128 MG/DL (ref 70–99)
GLUCOSE BLD-MCNC: 169 MG/DL (ref 70–99)
GLUCOSE BLD-MCNC: 309 MG/DL (ref 70–99)
LACTIC ACID: 2.2 MMOL/L (ref 0.4–2)
MAGNESIUM: 1.8 MG/DL (ref 1.8–2.4)
PERFORMED ON: ABNORMAL
PHOSPHORUS: 2.5 MG/DL (ref 2.5–4.9)
POTASSIUM SERPL-SCNC: 3.3 MMOL/L (ref 3.5–5.1)
POTASSIUM SERPL-SCNC: 3.4 MMOL/L (ref 3.5–5.1)
SODIUM BLD-SCNC: 139 MMOL/L (ref 136–145)

## 2020-11-10 PROCEDURE — 2700000000 HC OXYGEN THERAPY PER DAY

## 2020-11-10 PROCEDURE — 87449 NOS EACH ORGANISM AG IA: CPT

## 2020-11-10 PROCEDURE — 6370000000 HC RX 637 (ALT 250 FOR IP): Performed by: INTERNAL MEDICINE

## 2020-11-10 PROCEDURE — 84132 ASSAY OF SERUM POTASSIUM: CPT

## 2020-11-10 PROCEDURE — 6360000002 HC RX W HCPCS: Performed by: INTERNAL MEDICINE

## 2020-11-10 PROCEDURE — 97165 OT EVAL LOW COMPLEX 30 MIN: CPT

## 2020-11-10 PROCEDURE — 36415 COLL VENOUS BLD VENIPUNCTURE: CPT

## 2020-11-10 PROCEDURE — 1200000000 HC SEMI PRIVATE

## 2020-11-10 PROCEDURE — 6360000002 HC RX W HCPCS: Performed by: NURSE PRACTITIONER

## 2020-11-10 PROCEDURE — 83605 ASSAY OF LACTIC ACID: CPT

## 2020-11-10 PROCEDURE — 94640 AIRWAY INHALATION TREATMENT: CPT

## 2020-11-10 PROCEDURE — 6370000000 HC RX 637 (ALT 250 FOR IP): Performed by: NURSE PRACTITIONER

## 2020-11-10 PROCEDURE — 80048 BASIC METABOLIC PNL TOTAL CA: CPT

## 2020-11-10 PROCEDURE — 97161 PT EVAL LOW COMPLEX 20 MIN: CPT

## 2020-11-10 PROCEDURE — 83735 ASSAY OF MAGNESIUM: CPT

## 2020-11-10 PROCEDURE — 2580000003 HC RX 258: Performed by: INTERNAL MEDICINE

## 2020-11-10 PROCEDURE — 99223 1ST HOSP IP/OBS HIGH 75: CPT | Performed by: INTERNAL MEDICINE

## 2020-11-10 PROCEDURE — 94660 CPAP INITIATION&MGMT: CPT

## 2020-11-10 PROCEDURE — 94761 N-INVAS EAR/PLS OXIMETRY MLT: CPT

## 2020-11-10 PROCEDURE — 97530 THERAPEUTIC ACTIVITIES: CPT

## 2020-11-10 PROCEDURE — 84100 ASSAY OF PHOSPHORUS: CPT

## 2020-11-10 PROCEDURE — 99233 SBSQ HOSP IP/OBS HIGH 50: CPT | Performed by: INTERNAL MEDICINE

## 2020-11-10 RX ORDER — MAGNESIUM SULFATE IN WATER 40 MG/ML
2 INJECTION, SOLUTION INTRAVENOUS ONCE
Status: COMPLETED | OUTPATIENT
Start: 2020-11-10 | End: 2020-11-10

## 2020-11-10 RX ORDER — FUROSEMIDE 10 MG/ML
20 INJECTION INTRAMUSCULAR; INTRAVENOUS ONCE
Status: COMPLETED | OUTPATIENT
Start: 2020-11-10 | End: 2020-11-10

## 2020-11-10 RX ORDER — PREDNISONE 20 MG/1
40 TABLET ORAL DAILY
Status: DISCONTINUED | OUTPATIENT
Start: 2020-11-10 | End: 2020-11-11 | Stop reason: HOSPADM

## 2020-11-10 RX ORDER — LISINOPRIL 5 MG/1
2.5 TABLET ORAL DAILY
Status: DISCONTINUED | OUTPATIENT
Start: 2020-11-10 | End: 2020-11-11 | Stop reason: HOSPADM

## 2020-11-10 RX ORDER — CARVEDILOL 3.12 MG/1
3.12 TABLET ORAL 2 TIMES DAILY
Status: DISCONTINUED | OUTPATIENT
Start: 2020-11-10 | End: 2020-11-11 | Stop reason: HOSPADM

## 2020-11-10 RX ORDER — POTASSIUM CHLORIDE 7.45 MG/ML
10 INJECTION INTRAVENOUS PRN
Status: DISCONTINUED | OUTPATIENT
Start: 2020-11-10 | End: 2020-11-11 | Stop reason: HOSPADM

## 2020-11-10 RX ADMIN — BUDESONIDE AND FORMOTEROL FUMARATE DIHYDRATE 2 PUFF: 160; 4.5 AEROSOL RESPIRATORY (INHALATION) at 20:01

## 2020-11-10 RX ADMIN — METHOCARBAMOL TABLETS 1000 MG: 500 TABLET, COATED ORAL at 00:11

## 2020-11-10 RX ADMIN — IPRATROPIUM BROMIDE AND ALBUTEROL 1 PUFF: 20; 100 SPRAY, METERED RESPIRATORY (INHALATION) at 15:49

## 2020-11-10 RX ADMIN — GABAPENTIN 100 MG: 100 CAPSULE ORAL at 16:55

## 2020-11-10 RX ADMIN — IPRATROPIUM BROMIDE AND ALBUTEROL 1 PUFF: 20; 100 SPRAY, METERED RESPIRATORY (INHALATION) at 20:01

## 2020-11-10 RX ADMIN — Medication 2 PUFF: at 02:31

## 2020-11-10 RX ADMIN — SALINE NASAL SPRAY 1 SPRAY: 1.5 SOLUTION NASAL at 16:58

## 2020-11-10 RX ADMIN — INSULIN LISPRO 4 UNITS: 100 INJECTION, SOLUTION INTRAVENOUS; SUBCUTANEOUS at 16:55

## 2020-11-10 RX ADMIN — ENOXAPARIN SODIUM 40 MG: 40 INJECTION SUBCUTANEOUS at 08:10

## 2020-11-10 RX ADMIN — SODIUM CHLORIDE, PRESERVATIVE FREE 10 ML: 5 INJECTION INTRAVENOUS at 08:00

## 2020-11-10 RX ADMIN — POTASSIUM CHLORIDE 10 MEQ: 7.46 INJECTION, SOLUTION INTRAVENOUS at 12:25

## 2020-11-10 RX ADMIN — CEFTRIAXONE 2 G: 2 INJECTION, POWDER, FOR SOLUTION INTRAMUSCULAR; INTRAVENOUS at 21:06

## 2020-11-10 RX ADMIN — FUROSEMIDE 20 MG: 10 INJECTION, SOLUTION INTRAMUSCULAR; INTRAVENOUS at 08:30

## 2020-11-10 RX ADMIN — LISINOPRIL 2.5 MG: 5 TABLET ORAL at 16:55

## 2020-11-10 RX ADMIN — IPRATROPIUM BROMIDE AND ALBUTEROL 1 PUFF: 20; 100 SPRAY, METERED RESPIRATORY (INHALATION) at 11:54

## 2020-11-10 RX ADMIN — GABAPENTIN 100 MG: 100 CAPSULE ORAL at 12:41

## 2020-11-10 RX ADMIN — Medication 2 CAPSULE: at 08:10

## 2020-11-10 RX ADMIN — GABAPENTIN 100 MG: 100 CAPSULE ORAL at 21:06

## 2020-11-10 RX ADMIN — Medication 2 CAPSULE: at 16:55

## 2020-11-10 RX ADMIN — POTASSIUM CHLORIDE 10 MEQ: 7.46 INJECTION, SOLUTION INTRAVENOUS at 14:45

## 2020-11-10 RX ADMIN — AZITHROMYCIN MONOHYDRATE 500 MG: 500 INJECTION, POWDER, LYOPHILIZED, FOR SOLUTION INTRAVENOUS at 21:06

## 2020-11-10 RX ADMIN — PREDNISONE 40 MG: 20 TABLET ORAL at 08:26

## 2020-11-10 RX ADMIN — METHADONE HYDROCHLORIDE 130 MG: 10 TABLET ORAL at 08:10

## 2020-11-10 RX ADMIN — POTASSIUM CHLORIDE 10 MEQ: 7.46 INJECTION, SOLUTION INTRAVENOUS at 10:28

## 2020-11-10 RX ADMIN — CARVEDILOL 3.12 MG: 3.12 TABLET, FILM COATED ORAL at 21:06

## 2020-11-10 RX ADMIN — PANTOPRAZOLE SODIUM 40 MG: 40 TABLET, DELAYED RELEASE ORAL at 06:53

## 2020-11-10 RX ADMIN — BUDESONIDE AND FORMOTEROL FUMARATE DIHYDRATE 2 PUFF: 160; 4.5 AEROSOL RESPIRATORY (INHALATION) at 08:01

## 2020-11-10 RX ADMIN — POTASSIUM CHLORIDE 10 MEQ: 7.46 INJECTION, SOLUTION INTRAVENOUS at 08:27

## 2020-11-10 RX ADMIN — METHYLPREDNISOLONE SODIUM SUCCINATE 40 MG: 40 INJECTION, POWDER, FOR SOLUTION INTRAMUSCULAR; INTRAVENOUS at 01:51

## 2020-11-10 RX ADMIN — MAGNESIUM SULFATE HEPTAHYDRATE 2 G: 40 INJECTION, SOLUTION INTRAVENOUS at 08:26

## 2020-11-10 RX ADMIN — CLONIDINE HYDROCHLORIDE 0.1 MG: 0.1 TABLET ORAL at 08:10

## 2020-11-10 RX ADMIN — ENOXAPARIN SODIUM 40 MG: 40 INJECTION SUBCUTANEOUS at 21:05

## 2020-11-10 RX ADMIN — GABAPENTIN 100 MG: 100 CAPSULE ORAL at 08:10

## 2020-11-10 RX ADMIN — IPRATROPIUM BROMIDE AND ALBUTEROL 1 PUFF: 20; 100 SPRAY, METERED RESPIRATORY (INHALATION) at 08:01

## 2020-11-10 ASSESSMENT — PAIN DESCRIPTION - ORIENTATION
ORIENTATION: LOWER
ORIENTATION: LOWER

## 2020-11-10 ASSESSMENT — PAIN SCALES - GENERAL
PAINLEVEL_OUTOF10: 6
PAINLEVEL_OUTOF10: 10
PAINLEVEL_OUTOF10: 0
PAINLEVEL_OUTOF10: 7
PAINLEVEL_OUTOF10: 0
PAINLEVEL_OUTOF10: 7
PAINLEVEL_OUTOF10: 0

## 2020-11-10 ASSESSMENT — PAIN DESCRIPTION - LOCATION
LOCATION: BACK
LOCATION: BACK

## 2020-11-10 ASSESSMENT — PAIN - FUNCTIONAL ASSESSMENT
PAIN_FUNCTIONAL_ASSESSMENT: PREVENTS OR INTERFERES SOME ACTIVE ACTIVITIES AND ADLS
PAIN_FUNCTIONAL_ASSESSMENT: PREVENTS OR INTERFERES SOME ACTIVE ACTIVITIES AND ADLS

## 2020-11-10 ASSESSMENT — PAIN DESCRIPTION - ONSET
ONSET: ON-GOING

## 2020-11-10 ASSESSMENT — PAIN DESCRIPTION - PROGRESSION
CLINICAL_PROGRESSION: GRADUALLY IMPROVING
CLINICAL_PROGRESSION: NOT CHANGED
CLINICAL_PROGRESSION: RAPIDLY IMPROVING

## 2020-11-10 ASSESSMENT — PAIN DESCRIPTION - DESCRIPTORS
DESCRIPTORS: ACHING
DESCRIPTORS: ACHING;CONSTANT

## 2020-11-10 ASSESSMENT — PAIN DESCRIPTION - PAIN TYPE
TYPE: CHRONIC PAIN
TYPE: CHRONIC PAIN;ACUTE PAIN
TYPE: CHRONIC PAIN;ACUTE PAIN

## 2020-11-10 ASSESSMENT — PAIN DESCRIPTION - FREQUENCY
FREQUENCY: CONTINUOUS
FREQUENCY: CONTINUOUS

## 2020-11-10 NOTE — PROGRESS NOTES
Physical Therapy    Facility/Department: [facility] ICU  Initial Assessment    NAME: Deondre Mireles  : 1975  MRN: [id number]    Date of Service: 11/10/2020    Discharge Recommendations:  Continue to assess pending progress   PT Equipment Recommendations  Other: Will monitor. Pt has cane. Assessment   Body structures, Functions, Activity limitations: Decreased functional mobility ; Decreased endurance  Assessment: 55 y/o female admit 2020 with Acute Respiratory, COPD, Pulm Edema. PMH as noted including COPD, Blind R Eye. PTA pt living with , grandson (10 yr) in apt setting with steps to access. Pt reports independent with daily care and functional mobility (primarily within home). Pt reports adequate assist/support upon return home. Do not anticipate need cont PT Services upon d/c. Will monitor pt's progress. Prognosis: Good  Decision Making: Low Complexity  History: 55 y/o female admit 2020 with Acute Respiratory, COPD, Pulm Edema. PMH as noted including COPD, Blind R Eye. Exam: See above. Clinical Presentation: See above. Patient Education: Role of PT, POC, Need to call for assist, Safe use of Walker. Barriers to Learning: None. REQUIRES PT FOLLOW UP: Yes  Activity Tolerance  Activity Tolerance: Patient limited by endurance       Patient Diagnosis(es): The primary encounter diagnosis was Acute respiratory failure with hypoxia and hypercapnia ([address] Diagnoses of History of COPD and Pulmonary edema cardiac cause [facility]) were also pertinent to this visit. has a past medical history of Arthritis, Asthma, Blind right eye, COPD (chronic obstructive pulmonary disease) ([address] GERD (gastroesophageal reflux disease), Hypertension, Movement disorder, Neuromuscular disorder ([address] and Pneumonia. has a past surgical history that includes Cholecystectomy; Tubal ligation;  Abdomen surgery; eye surgery; and Dilatation, esophagus. Restrictions  Restrictions/Precautions  Restrictions/Precautions: Fall Risk  Position Activity Restriction  Other position/activity restrictions: O2 4L via NC (home O2 pta). Fluid Restriction 2000 ml/day. Blind R Eye. Vision/Hearing  Vision: Impaired(Blind R Eye.)  Hearing: Within functional limits     Subjective  General  Chart Reviewed: Yes  Patient assessed for rehabilitation services?: Yes  Additional Pertinent Hx: 55 y/o female admit 11/9/2020 with Acute Respiratory, COPD, Pulm Edema. PMH as noted including COPD, Blind R Eye. Family / Caregiver Present: No  Referring Practitioner: Dr. Melissa Mcleod  Diagnosis: Acute Respiratory, COPD, Pulm Edema  Follows Commands: Within Functional Limits  Subjective  Subjective: Pt agreeable to PT Eval/Rx. Pain Screening  Patient Currently in Pain: Yes          Orientation  Orientation  Overall Orientation Status: Within Functional Limits  Social/Functional History  Social/Functional History  Lives With: Spouse(, Grandson (21 yr). )  Type of Home: Apartment(5-6 steps to access apt.)  Home Layout: Two level  Home Access: Stairs to enter with rails(5-6 steps to enter.)  Bathroom Shower/Tub: Tub/Shower unit  Bathroom Toilet: Standard  Bathroom Accessibility: Accessible  Home Equipment: Cane, Oxygen  ADL Assistance: Independent  Homemaking Assistance: (Shared with .)  Ambulation Assistance: Independent(Without assist device within apt (furniture \"touch\" at times). Use of Cane when going out.)  Transfer Assistance: Independent  Occupation: On disability  Additional Comments: Pt reports adequate assist/support upon d/c home.   Cognition   Cognition  Overall Cognitive Status: WFL    Objective          AROM RLE (degrees)  RLE AROM: WFL  AROM LLE (degrees)  LLE AROM : WFL  AROM RUE (degrees)  RUE AROM : WFL  AROM LUE (degrees)  LUE AROM : WFL  Strength RLE  Strength RLE: WFL  Strength LLE  Strength LLE: WFL  Strength RUE  Strength RUE: WFL  Strength LUE  Strength LUE: WFL        Bed mobility  Supine to Sit: Stand by assistance(HOB elevated. Use of Cardiac Pillow.)  Transfers  Sit to Stand: Stand by assistance  Stand to sit: Stand by assistance  Ambulation  Ambulation?: Yes  Ambulation 1  Distance: Pt amb 5-6 steps bed to chair with Walker Slight CGA. No LE buckling/giving way although fatigued following. Comments: O2 sats briefly 87-88% with 4L during OOB activities, quickly recovered. Balance  Sitting - Static: Good  Sitting - Dynamic: Good  Standing - Static: Good(With Walker.)  Standing - Dynamic: Good;-(With Walker.)        Plan   Plan  Times per week: 3-5x week while in acute care setting. Current Treatment Recommendations: Functional Mobility Training, Transfer Training, Gait Training, Safety Education & Training, Patient/Caregiver Education & Training  Safety Devices  Type of devices: Call light within reach, Left in chair, Nurse notified(Pt aware to call for assist.)            AM-PAC Score  AM-PAC Inpatient Mobility Raw Score : 18 (11/10/20 1010)  AM-PAC Inpatient T-Scale Score : 43.63 (11/10/20 1010)  Mobility Inpatient CMS 0-100% Score: 46.58 (11/10/20 1010)  Mobility Inpatient CMS G-Code Modifier : CK (11/10/20 1010)          Goals  Short term goals  Time Frame for Short term goals: Upon d/c acute care setting. Short term goal 1: Bed Mob Independent. Short term goal 2: Transfers with/without assist device Supervision. Short term goal 3: Amb with/without assist device 50' SBA. Patient Goals   Patient goals : Return home with .        Therapy Time   Individual Concurrent Group Co-treatment   Time In 0900         Time Out 0930         Minutes Alex 2660 Mehrdad Amanda, PT

## 2020-11-10 NOTE — CONSULTS
Referring Physician: Dr. Sandy Michel  Reason for Consultation: Acute on chronic CHF exacerbation  Chief Complaint: Short of breath    Subjective:   History of Present Illness:  Kylee Gonzalez is a 54 y.o. patient who presented to the hospital with complaints of aggressive shortness of breath over the past few weeks. She became acutely and severely short of breath over the weekend and sought medical attention in the emergency department. In the emergency department she was evidently in significant distress and hypoxic. She was also profoundly hypercarbic and somnolent. She was admitted to the MICU. She has a known history of COPD with chronic hypoxic respiratory failure requiring supplemental oxygen. Her BNP was significantly elevated and she had an echocardiogram which shows a dilated LV with an ejection fraction of approximately 45%. The RV also appears dilated with normal systolic function. The patient was treated for a COPD exacerbation as well as with IV Lasix for his CHF exacerbation. She has apparently shown quite significant improvements. Currently she states that she feels better than she has in months. She is chronically on methadone which she says that she takes for prior addiction to pain medications. She is morbidly obese and acknowledges that she is not particularly active. She was not compliant with her home BiPAP. She was also not compliant with her home furosemide. When she was short of breath over the past few weeks, she denies associated chest pain. Past Medical History:   has a past medical history of Arthritis, Asthma, Blind right eye, COPD (chronic obstructive pulmonary disease) ([address] GERD (gastroesophageal reflux disease), Hypertension, Movement disorder, Neuromuscular disorder ([address] and Pneumonia. Surgical History:   has a past surgical history that includes Cholecystectomy; Tubal ligation; Abdomen surgery; eye surgery; and Dilatation, esophagus.      Social History: reports that she quit smoking about 2 years ago. Her smoking use included cigarettes. She started smoking about 36 years ago. She has a 15.00 pack-year smoking history. She has never used smokeless tobacco. She reports that she does not drink alcohol or use drugs. Family History:  family history includes Arthritis in her father; Cancer in her father and mother; Osteoporosis in her mother. Home Medications:  Were reviewed and are listed in nursing record and/or below  Prior to Admission medications    Medication Sig Start Date End Date Taking? Authorizing Provider   ibuprofen (ADVIL;MOTRIN) 800 MG tablet Take 800 mg by mouth daily   Yes Historical Provider, MD   albuterol sulfate  (90 Base) MCG/ACT inhaler Inhale 2 puffs into the lungs every 4 hours as needed for Wheezing or Shortness of Breath (or cough) 10/22/20  Yes Brooke Kay DO   budesonide-formoterol [facility]) 160-4.5 MCG/ACT AERO Inhale 2 puffs into the lungs 2 times daily 8/11/20  Yes Emma Ramirez MD   ipratropium-albuterol (DUONEB) 0.5-2.5 (3) MG/3ML SOLN nebulizer solution INHALE ONE VIAL (3 MILLILITERS) VIA NEBULIZATION BY MOUTH EVERY 4 HOURS 7/27/20  Yes Brooke Kay DO   tiotropium (SPIRIVA) 18 MCG inhalation capsule Inhale 1 capsule into the lungs daily 5/20/20  Yes Brooke Kay DO   omeprazole (PRILOSEC) 20 MG delayed release capsule Take 40 mg by mouth daily   Yes Historical Provider, MD   furosemide (LASIX) 40 MG tablet Take 1 tablet by mouth daily  Patient taking differently: Take 40 mg by mouth 2 times daily Will start BID today per patient 4/18/17  Yes JULIUS Bruce - CNP   cloNIDine (CATAPRES) 0.1 MG tablet Take 0.1 mg by mouth 2 times daily   Yes Historical Provider, MD   gabapentin (NEURONTIN) 100 MG capsule Take 100 mg by mouth 4 times daily. Yes Historical Provider, MD   methadone 10 MG/5ML solution Take 130 mg by mouth daily  From [facility] .    Yes Historical Provider, MD   predniSONE (DELTASONE) 20 MG tablet Take 40 mg daily for 4 days, then 20 mg daily for 3 days, then 10 mg daily for 3 days, then stop 11/8/20   Germain Chavira MD   azithromycin (ZITHROMAX) 250 MG tablet Take 1 tablet by mouth See Admin Instructions for 5 days 500mg on day 1 followed by 250mg on days 2 - 5 11/8/20 11/13/20  Germain Chavira MD   predniSONE (DELTASONE) 10 MG tablet 40mg for 3days 30mg for 3days 20mg for 3days 10mg for 3days 10/22/20   Lawerence Socorro, DO   predniSONE (DELTASONE) 20 MG tablet Take 40 mg daily for 4 days, then 20 mg daily for 3 days, then 10 mg daily for 3 days, then stop 8/11/20   Germain Chavira MD   albuterol sulfate HFA (PROAIR HFA) 108 (90 Base) MCG/ACT inhaler Inhale 2 puffs into the lungs every 4 hours as needed for Wheezing or Shortness of Breath 8/11/20   Germain Chavira MD   acetaminophen (TYLENOL) 500 MG tablet Take 1,000 mg by mouth every 6 hours as needed for Pain    Historical Provider, MD   Spacer/Aero-Holding Sands Daub 1 Device by Does not apply route daily as needed 6/30/15   JULIUS Nolen - CNP   OXYGEN Inhale 2 L into the lungs See Admin Instructions.   Patient taking differently: Inhale 2 L into the lungs See Admin Instructions Continuous at night or as needed when at home per patient 12/5/13   Mat Alvarenga MD        CURRENT Medications:  potassium chloride 10 mEq/100 mL IVPB (Peripheral Line), PRN  predniSONE (DELTASONE) tablet 40 mg, Daily  albuterol-ipratropium (COMBIVENT RESPIMAT)  MCG/ACT inhaler 1 puff, 4x daily  cefTRIAXone (ROCEPHIN) 2 g IVPB in D5W 50ml minibag, Q24H  lactobacillus (CULTURELLE) capsule 2 capsule, BID WC  insulin lispro (HUMALOG) injection vial 0-6 Units, TID WC  insulin lispro (HUMALOG) injection vial 0-3 Units, Nightly  methadone (DOLOPHINE) tablet 130 mg, Daily  sodium chloride (OCEAN, BABY AYR) 0.65 % nasal spray 1 spray, PRN  albuterol sulfate  (90 Base) MCG/ACT inhaler 2 puff, Q4H PRN  budesonide-formoterol (SYMBICORT) 160-4.5 MCG/ACT inhaler 2 puff, BID  cloNIDine (CATAPRES) tablet 0.1 mg, BID  pantoprazole (PROTONIX) tablet 40 mg, QAM AC  sodium chloride flush 0.9 % injection 10 mL, 2 times per day  sodium chloride flush 0.9 % injection 10 mL, PRN  acetaminophen (TYLENOL) tablet 650 mg, Q6H PRN    Or  acetaminophen (TYLENOL) suppository 650 mg, Q6H PRN  polyethylene glycol (GLYCOLAX) packet 17 g, Daily PRN  promethazine (PHENERGAN) tablet 12.5 mg, Q6H PRN    Or  ondansetron (ZOFRAN) injection 4 mg, Q6H PRN  perflutren lipid microspheres (DEFINITY) injection 1.65 mg, ONCE PRN  azithromycin (ZITHROMAX) 500 mg in D5W 250ml addavial, Q24H  gabapentin (NEURONTIN) capsule 100 mg, 4x Daily  enoxaparin (LOVENOX) injection 40 mg, BID        Allergies:  Latex; Sulfa antibiotics; Aspartame; Morphine and related; Nsaids; and Ultram [tramadol hcl]     Review of Systems:   · Constitutional: no unanticipated weight loss. There's been no change in energy level, sleep pattern, or activity level. No fevers, chills. · Eyes: No visual changes or diplopia. No scleral icterus. · ENT: No Headaches, hearing loss or vertigo. No mouth sores or sore throat. · Cardiovascular: as reviewed in HPI  · Respiratory: No cough or wheezing, no sputum production. No hemoptysis. · Gastrointestinal: No abdominal pain, appetite loss, blood in stools. No change in bowel or bladder habits. · Genitourinary: No dysuria, trouble voiding, or hematuria. · Musculoskeletal:  No gait disturbance, no joint complaints. · Integumentary: No rash or pruritis. · Neurological: No headache, diplopia, change in muscle strength, numbness or tingling. · Psychiatric: No anxiety or depression. · Endocrine: No temperature intolerance. No excessive thirst, fluid intake, or urination. No tremor. · Hematologic/Lymphatic: No abnormal bruising or bleeding, blood clots or swollen lymph nodes.   · Allergic/Immunologic: No nasal congestion or hives. Objective:   PHYSICAL EXAM:    Vitals:    11/10/20 1228   BP: 120/79   Pulse: 75   Resp: 13   Temp: 98.1 °F (36.7 °C)   SpO2: 94%    Weight: 231 lb 7.7 oz (105 kg)       General Appearance:  Alert, cooperative, no distress, appears older than stated age. Morbidly obese. Wearing supplemental O2. Head:  Normocephalic, without obvious abnormality, atraumatic. Eyes:  Blind right eye and dense cataract. No scleral icterus. Mouth: Moist mucosa, no pharyngeal erythema. Poor dentition. Nose: Nares normal. No drainage or sinus tenderness. Neck: Supple, symmetrical, trachea midline. No adenopathy. No tenderness/mass/nodules. No carotid bruit or elevated JVD. Lungs:   Clear to auscultation bilaterally, respirations unlabored. No wheeze, rales, or rhonchi. Chest Wall:  No tenderness or deformity. Heart:  Regular rate. S1/S2 normal. No murmur, rub, or gallop. Abdomen:   Soft, non-tender, bowel sounds active. Musculoskeletal: No muscle wasting or digital clubbing. Extremities: Extremities normal, atraumatic. No cyanosis or edema. Pulses: 2+ radial and carotid pulses, symmetric. Skin: No rashes or lesions. Pysch: Normal mood and affect. Alert and oriented x 4.    Neurologic: Normal gross motor and sensory exam.       Labs     CBC:   Lab Results   Component Value Date    WBC 9.1 11/09/2020    RBC 4.55 11/09/2020    HGB 12.0 11/09/2020    HCT 38.1 11/09/2020    MCV 83.7 11/09/2020    RDW 19.9 11/09/2020     11/09/2020     CMP:  Lab Results   Component Value Date     11/10/2020    K 3.3 11/10/2020    K 4.1 11/08/2020    CL 89 11/10/2020    CO2 41 11/10/2020    BUN 19 11/10/2020    CREATININE 0.6 11/10/2020    GFRAA >60 11/10/2020    GFRAA >60 05/31/2013    AGRATIO 1.2 11/08/2020    LABGLOM >60 11/10/2020    GLUCOSE 169 11/10/2020    PROT 7.0 11/08/2020    PROT 7.7 03/07/2013    CALCIUM 8.9 11/10/2020    BILITOT 1.1 11/08/2020    ALKPHOS 76 11/08/2020    AST 28

## 2020-11-10 NOTE — PROGRESS NOTES
Tried patient on nasal BiPAP at 16/8. Patient stated that she was having an anxiety attack and couldn't handle nasal BiPAP. Patient then stated that she wanted to try the full face mask again. Placed patient back on full face mask and patient again started panicking and requested it off again. Patient also requested her inhalers b/c she felt like she couldn't breathe. Patient stated she may try BiPAP again when she isn't feeling so anxious.   Electronically signed by Shanice Ivan RCP on 11/10/2020 at 2:43 AM

## 2020-11-10 NOTE — CONSULTS
Nutrition Note    Consult \"Heart failure diet guidelines\". New dx HF? Awaiting CHF nurse/cardiology. Will try to follow up with pt tomorrow regarding questions about diet.      Electronically signed by Rubio Godfrey RD, TERRI on 11/10/20 at 12:30 PM EST    Contact: [phone]

## 2020-11-10 NOTE — PLAN OF CARE
Problem: Falls - Risk of:  Goal: Will remain free from falls  Description: Will remain free from falls  11/10/2020 1327 by Otis Mccartney RN  Outcome: Ongoing     Problem: Falls - Risk of:  Goal: Absence of physical injury  Description: Absence of physical injury  11/10/2020 1327 by Otis Mccartney RN  Outcome: Ongoing     Problem: OXYGENATION/RESPIRATORY FUNCTION  Goal: Patient will maintain patent airway  11/10/2020 1327 by Otis Mccartney RN  Outcome: Ongoing     Problem: OXYGENATION/RESPIRATORY FUNCTION  Goal: Patient will achieve/maintain normal respiratory rate/effort  Description: Respiratory rate and effort will be within normal limits for the patient  11/10/2020 1327 by Otis Mccartney RN  Outcome: Ongoing     Problem: HEMODYNAMIC STATUS  Goal: Patient has stable vital signs and fluid balance  11/10/2020 1327 by Otis Mccartney RN  Outcome: Ongoing     Problem: FLUID AND ELECTROLYTE IMBALANCE  Goal: Fluid and electrolyte balance are achieved/maintained  11/10/2020 1327 by Otis Mccartney RN  Outcome: Ongoing     Problem: ACTIVITY INTOLERANCE/IMPAIRED MOBILITY  Goal: Mobility/activity is maintained at optimum level for patient  11/10/2020 1327 by Otis Mccartney RN  Outcome: Ongoing     Problem: Skin Integrity:  Goal: Will show no infection signs and symptoms  Description: Will show no infection signs and symptoms  Outcome: Ongoing     Problem: Skin Integrity:  Goal: Absence of new skin breakdown  Description: Absence of new skin breakdown  11/10/2020 1327 by Otis Mccartney RN  Outcome: Ongoing     Problem: Pain:  Goal: Pain level will decrease  Description: Pain level will decrease  11/10/2020 1327 by Otis Mccartney RN  Outcome: Ongoing     Problem: Discharge Planning:  Goal: Discharged to appropriate level of care  Description: Discharged to appropriate level of care  11/10/2020 1327 by Otis Mccartney RN  Outcome: Ongoing     Problem:  Activity Intolerance:  Goal: Ability to

## 2020-11-10 NOTE — PROGRESS NOTES
0208: Pt placed on Bipap per orders. Pt tolerated bipap for approximately 10min before refusing device. Pt states she feels like she is suffocating and that she feels claustrophobic.   8202: RN notified RT that the pt would be interested in the mask that only covers the nose. The pt is agreeable to plan of care. Pt oxygenation remains % SpO2 throughout event.    Electronically signed by Compa Mcdonough RN on 11/10/2020 at 6:58 AM

## 2020-11-10 NOTE — PROGRESS NOTES
methylPREDNISolone  40 mg Intravenous Q6H    Followed by   Tanja Melissa ON 2020] predniSONE  40 mg Oral Daily    azithromycin  500 mg Intravenous Q24H    gabapentin  100 mg Oral 4x Daily    enoxaparin  40 mg Subcutaneous BID       Continuous Infusions:      PRN Meds:  sodium chloride, albuterol sulfate HFA, sodium chloride flush, acetaminophen **OR** acetaminophen, polyethylene glycol, promethazine **OR** ondansetron, perflutren lipid microspheres    Labs:  CBC:   Recent Labs     20  1303 20  0453   WBC 18.1* 9.1   HGB 12.9 12.0   HCT 41.8 38.1   MCV 84.7 83.7    236     BMP:   Recent Labs     20  1303 20  0453 11/10/20  0505    142 139   K 4.1 3.8 3.3*   CL 96* 92* 89*   CO2 35* 42* 41*   PHOS  --  3.8 2.5   BUN 12 19 19   CREATININE 0.6 0.7 0.6     LIVER PROFILE:   Recent Labs     20  1303   AST 28   ALT 27   BILITOT 1.1*   ALKPHOS 76     PT/INR: No results for input(s): PROTIME, INR in the last 72 hours. APTT: No results for input(s): APTT in the last 72 hours. UA:  Recent Labs     20  1412   COLORU YELLOW   PHUR 6.5   WBCUA 0-2   RBCUA 5-10*   CLARITYU Clear   SPECGRAV 1.012   LEUKOCYTESUR Negative   UROBILINOGEN 2.0*   BILIRUBINUR Negative   BLOODU SMALL*   GLUCOSEU Negative     No results for input(s): PH, PCO2, PO2 in the last 72 hours. Films:  Chest imaging reports were reviewed and imaging was reviewed by me and showed no new films    AB.3/87/346    Cultures:  None    I reviewed the labs and images listed above    ASSESSMENT:  · Acute on Chronic Hypoxic and Hypercapnic Respiratory Failure due to COPD and abnormal CXR (infection vs edema)  · Severe COPD with exacerbation  · Abnormal CXR with atypical infection vs pulmonary edema   · Cardiomyopathy with EF of 40-45%  · Chronic methadone use     PLAN:  · Titrate oxygen for saturations greater than or equal to 90%  · Did not use bilevel overnight.  Will decrease pressure on unit to see if this help  · Change solumedrol to prednisone and finish 5 days. Higher dose of steroids might have been affecting her  · Symbicort q 12 hours  · Rocephin and azithromycin to continue.   Probably 7 days needed  · Check antigens   · Procal trend   · Lactobacillus   · Lovenox for DVT prophylaxis  · Diet  · Home methadone   · DC Lactic acid draws  · Potassium protocol  · Give magnesium today  · Lasix 20 mg IV once today   · Consider Cardiology consult regarding cardiomyopathy  · PT/OT  · Med-surg status    Maybe home tomorrow  I will arrange follow up with me    Lindy Knight DO  [facility]

## 2020-11-10 NOTE — PROGRESS NOTES
Dependent/Total(duvall)  Additional Comments: Pt declined ADLs but anticipate wll be supervision for all ADLs        Bed mobility  Comment: Pt in chair at start/end of session  Transfers  Sit to stand: Supervision  Stand to sit: Supervision     Cognition  Overall Cognitive Status: WFL  Perception  Overall Perceptual Status: WFL     Sensation  Overall Sensation Status: WFL        LUE AROM (degrees)  LUE AROM : WFL  Left Hand AROM (degrees)  Left Hand AROM: WFL  RUE AROM (degrees)  RUE AROM : WFL  Right Hand AROM (degrees)  Right Hand AROM: WFL             Plan   Plan  Times per week: DC acute OT      AM-PAC Score  AM-PAC Inpatient Daily Activity Raw Score: 21 (11/10/20 1529)  AM-PAC Inpatient ADL T-Scale Score : 44.27 (11/10/20 1529)  ADL Inpatient CMS 0-100% Score: 32.79 (11/10/20 1529)  ADL Inpatient CMS G-Code Modifier : CJ (11/10/20 1529)    Goals  Short term goals  Time Frame for Short term goals: no acute OT goals identified  Patient Goals   Patient goals : to go home       Therapy Time   Individual Concurrent Group Co-treatment   Time In 1450         Time Out 1520         Minutes 30         Timed Code Treatment Minutes: 15 Minutes     This note to serve as OT d/c summary if pt is d/c-ed prior to next therapy session.     Dolores Baltazar OTR/L

## 2020-11-10 NOTE — PLAN OF CARE
Problem: OXYGENATION/RESPIRATORY FUNCTION  Goal: Patient will maintain patent airway  Outcome: Met This Shift     Problem: HEMODYNAMIC STATUS  Goal: Patient has stable vital signs and fluid balance  Outcome: Met This Shift     Problem: Airway Clearance - Ineffective:  Goal: Ability to maintain a clear airway will improve  Description: Ability to maintain a clear airway will improve  Outcome: Met This Shift     Problem: Breathing Pattern - Ineffective:  Goal: Ability to achieve and maintain a regular respiratory rate will improve  Description: Ability to achieve and maintain a regular respiratory rate will improve  Outcome: Met This Shift     Problem: Gas Exchange - Impaired:  Goal: Levels of oxygenation will improve  Description: Levels of oxygenation will improve  Outcome: Met This Shift     Problem: Falls - Risk of:  Goal: Will remain free from falls  Description: Will remain free from falls  Outcome: Ongoing  Goal: Absence of physical injury  Description: Absence of physical injury  Outcome: Ongoing     Problem: OXYGENATION/RESPIRATORY FUNCTION  Goal: Patient will achieve/maintain normal respiratory rate/effort  Description: Respiratory rate and effort will be within normal limits for the patient  Outcome: Ongoing     Problem: FLUID AND ELECTROLYTE IMBALANCE  Goal: Fluid and electrolyte balance are achieved/maintained  Outcome: Ongoing     Problem: ACTIVITY INTOLERANCE/IMPAIRED MOBILITY  Goal: Mobility/activity is maintained at optimum level for patient  Outcome: Ongoing     Problem: Skin Integrity:  Goal: Absence of new skin breakdown  Description: Absence of new skin breakdown  Outcome: Ongoing     Problem: Pain:  Goal: Pain level will decrease  Description: Pain level will decrease  Outcome: Ongoing  Goal: Control of acute pain  Description: Control of acute pain  Outcome: Ongoing  Goal: Control of chronic pain  Description: Control of chronic pain  Outcome: Ongoing     Problem: Discharge Planning:  Goal: Discharged to appropriate level of care  Description: Discharged to appropriate level of care  Outcome: Ongoing     Problem:  Activity Intolerance:  Goal: Ability to tolerate increased activity will improve  Description: Ability to tolerate increased activity will improve  Outcome: Ongoing     Problem: Urinary Elimination:  Goal: Signs and symptoms of infection will decrease  Description: Signs and symptoms of infection will decrease  Outcome: Ongoing  Goal: Complications related to the disease process, condition or treatment will be avoided or minimized  Description: Complications related to the disease process, condition or treatment will be avoided or minimized  Outcome: Ongoing

## 2020-11-10 NOTE — PROGRESS NOTES
Hospitalist Progress Note      PCP: Ana Maria Thompson    Date of Admission: 11/8/2020    Chief Complaint: Shortness of breath    Hospital Course: Patient is a 79-year-old female with a past medical history of COPD along with acute on chronic hypoxic hypercapnic respiratory failure who presents to the hospital with shortness of breath along with cough. There was initial concern for COVID-19 but testing came back negative. Patient was found to be hypoxic with hypercapnia on ABG and was placed on BiPAP. Subjective: Patient seen and examined. Patient once again groggy in the morning. Patient is still -5.7 L for the trip. Holding off on further diuresis with contraction alkalosis is a concern. Echocardiogram yesterday shows decrease in ejection fraction, cardiology has been consulted.       Medications:  Reviewed    Infusion Medications   Scheduled Medications    magnesium sulfate  2 g Intravenous Once    predniSONE  40 mg Oral Daily    albuterol-ipratropium  1 puff Inhalation 4x daily    cefTRIAXone (ROCEPHIN) IV  2 g Intravenous Q24H    lactobacillus  2 capsule Oral BID WC    insulin lispro  0-6 Units Subcutaneous TID WC    insulin lispro  0-3 Units Subcutaneous Nightly    methadone  130 mg Oral Daily    budesonide-formoterol  2 puff Inhalation BID    cloNIDine  0.1 mg Oral BID    pantoprazole  40 mg Oral QAM AC    sodium chloride flush  10 mL Intravenous 2 times per day    azithromycin  500 mg Intravenous Q24H    gabapentin  100 mg Oral 4x Daily    enoxaparin  40 mg Subcutaneous BID     PRN Meds: potassium chloride, sodium chloride, albuterol sulfate HFA, sodium chloride flush, acetaminophen **OR** acetaminophen, polyethylene glycol, promethazine **OR** ondansetron, perflutren lipid microspheres      Intake/Output Summary (Last 24 hours) at 11/10/2020 0843  Last data filed at 11/10/2020 0821  Gross per 24 hour   Intake 1513 ml   Output 1265 ml   Net 248 ml       Physical Exam Performed:    BP 135/87   Pulse 72   Temp 97.7 °F (36.5 °C) (Axillary)   Resp 12   Ht 5' (1.524 m)   Wt 231 lb 7.7 oz (105 kg)   LMP 08/14/2015   SpO2 93%   BMI 45.21 kg/m²     General appearance: No apparent distress, appears stated age and cooperative,   Obese  HEENT: Pupils equal, round, and reactive to light. Conjunctivae/corneas clear. Neck: Supple, with full range of motion. No jugular venous distention. Trachea midline. Respiratory:  Normal respiratory effort. Diminished breath sounds bilateral  Cardiovascular: Regular rate and rhythm with normal S1/S2  Abdomen: Soft, non-tender, non-distended with normal bowel sounds. Musculoskeletal: No clubbing, cyanosis or edema bilaterally. Full range of motion without deformity. Skin: Skin color, texture, turgor normal.  No rashes or lesions. Neurologic:  Neurovascularly intact without any focal sensory/motor deficits. Cranial nerves: II-XII intact, grossly non-focal.  Psychiatric: Alert and oriented, thought content appropriate, normal insight  Capillary Refill: Brisk,< 3 seconds   Peripheral Pulses: +2 palpable, equal bilaterally       Labs:   Recent Labs     11/08/20  1303 11/09/20  0453   WBC 18.1* 9.1   HGB 12.9 12.0   HCT 41.8 38.1    236     Recent Labs     11/08/20  1303 11/09/20  0453 11/10/20  0505    142 139   K 4.1 3.8 3.3*   CL 96* 92* 89*   CO2 35* 42* 41*   BUN 12 19 19   CREATININE 0.6 0.7 0.6   CALCIUM 9.3 8.8 8.9   PHOS  --  3.8 2.5     Recent Labs     11/08/20  1303   AST 28   ALT 27   BILITOT 1.1*   ALKPHOS 76     No results for input(s): INR in the last 72 hours. Recent Labs     11/08/20  1303   TROPONINI <0.01       Urinalysis:      Lab Results   Component Value Date    NITRU Negative 11/08/2020    WBCUA 0-2 11/08/2020    RBCUA 5-10 11/08/2020    BLOODU SMALL 11/08/2020    SPECGRAV 1.012 11/08/2020    GLUCOSEU Negative 11/08/2020       Radiology:  XR CHEST PORTABLE   Final Result   Findings suggestive of CHF decompensation. Assessment/Plan:    Acute on chronic respiratory failure with hypoxia and hypercapnia  BiPAP as needed  Continue supplemental oxygen  Wean oxygen to keep saturations above 90%    Acute on chronic systolic heart failure  Lasix on hold due to contraction alkalosis  Repeat echocardiogram ordered with decreased ejection fraction  Cardiology consulted  Strict I's and O's; -5.7 L  Telemetry monitoring  CHF nurse consulted  Lasix IV once again    COPD exacerbation  Continue Rocephin and azithromycin  IV steroids started, transition to oral steroids    Tobacco abuse  Counseled on cessation    Acute metabolic encephalopathy  Likely secondary to CO2 narcosis resolved    Lactic acidosis  Likely secondary to poor perfusion and hypoxia  Continue to trend      DVT Prophylaxis: Lovenox  Diet: DIET CARDIAC; Low Sodium (2 GM);  Daily Fluid Restriction: 2000 ml  Code Status: Full Code    PT/OT Eval Status: Not applicable    Dispo -inpatient, can likely transfer from ICU    Adrianna Perez MD

## 2020-11-11 VITALS
TEMPERATURE: 98.3 F | SYSTOLIC BLOOD PRESSURE: 121 MMHG | HEIGHT: 60 IN | OXYGEN SATURATION: 95 % | HEART RATE: 88 BPM | BODY MASS INDEX: 45.14 KG/M2 | RESPIRATION RATE: 20 BRPM | WEIGHT: 229.94 LBS | DIASTOLIC BLOOD PRESSURE: 77 MMHG

## 2020-11-11 LAB
ANION GAP SERPL CALCULATED.3IONS-SCNC: 6 MMOL/L (ref 3–16)
BASE EXCESS VENOUS: 18.7 MMOL/L
BUN BLDV-MCNC: 22 MG/DL (ref 7–20)
CALCIUM SERPL-MCNC: 8.9 MG/DL (ref 8.3–10.6)
CARBOXYHEMOGLOBIN: 0.9 %
CHLORIDE BLD-SCNC: 87 MMOL/L (ref 99–110)
CO2: 44 MMOL/L (ref 21–32)
CREAT SERPL-MCNC: 0.8 MG/DL (ref 0.6–1.1)
GFR AFRICAN AMERICAN: >60
GFR NON-AFRICAN AMERICAN: >60
GLUCOSE BLD-MCNC: 105 MG/DL (ref 70–99)
GLUCOSE BLD-MCNC: 156 MG/DL (ref 70–99)
GLUCOSE BLD-MCNC: 72 MG/DL (ref 70–99)
HCO3 VENOUS: 50 MMOL/L (ref 23–29)
L. PNEUMOPHILA SEROGP 1 UR AG: NORMAL
MAGNESIUM: 2.4 MG/DL (ref 1.8–2.4)
METHEMOGLOBIN VENOUS: 0.3 %
O2 CONTENT, VEN: 8 ML/DL
O2 SAT, VEN: 44 %
O2 THERAPY: ABNORMAL
PCO2, VEN: 95.7 MMHG (ref 40–50)
PERFORMED ON: ABNORMAL
PERFORMED ON: ABNORMAL
PH VENOUS: 7.32 (ref 7.35–7.45)
PHOSPHORUS: 3.4 MG/DL (ref 2.5–4.9)
PO2, VEN: 28 MMHG
POTASSIUM SERPL-SCNC: 3.3 MMOL/L (ref 3.5–5.1)
PRO-BNP: 1610 PG/ML (ref 0–124)
SODIUM BLD-SCNC: 137 MMOL/L (ref 136–145)
STREP PNEUMONIAE ANTIGEN, URINE: NORMAL
TCO2 CALC VENOUS: 53 MMOL/L

## 2020-11-11 PROCEDURE — 2700000000 HC OXYGEN THERAPY PER DAY

## 2020-11-11 PROCEDURE — 6370000000 HC RX 637 (ALT 250 FOR IP): Performed by: NURSE PRACTITIONER

## 2020-11-11 PROCEDURE — 97116 GAIT TRAINING THERAPY: CPT

## 2020-11-11 PROCEDURE — 83880 ASSAY OF NATRIURETIC PEPTIDE: CPT

## 2020-11-11 PROCEDURE — 90686 IIV4 VACC NO PRSV 0.5 ML IM: CPT | Performed by: INTERNAL MEDICINE

## 2020-11-11 PROCEDURE — 99232 SBSQ HOSP IP/OBS MODERATE 35: CPT | Performed by: INTERNAL MEDICINE

## 2020-11-11 PROCEDURE — 2580000003 HC RX 258: Performed by: INTERNAL MEDICINE

## 2020-11-11 PROCEDURE — 99232 SBSQ HOSP IP/OBS MODERATE 35: CPT | Performed by: NURSE PRACTITIONER

## 2020-11-11 PROCEDURE — 94761 N-INVAS EAR/PLS OXIMETRY MLT: CPT

## 2020-11-11 PROCEDURE — 6370000000 HC RX 637 (ALT 250 FOR IP): Performed by: INTERNAL MEDICINE

## 2020-11-11 PROCEDURE — 94640 AIRWAY INHALATION TREATMENT: CPT

## 2020-11-11 PROCEDURE — 36415 COLL VENOUS BLD VENIPUNCTURE: CPT

## 2020-11-11 PROCEDURE — 6360000002 HC RX W HCPCS: Performed by: NURSE PRACTITIONER

## 2020-11-11 PROCEDURE — 6360000002 HC RX W HCPCS: Performed by: INTERNAL MEDICINE

## 2020-11-11 PROCEDURE — 82803 BLOOD GASES ANY COMBINATION: CPT

## 2020-11-11 PROCEDURE — 80048 BASIC METABOLIC PNL TOTAL CA: CPT

## 2020-11-11 PROCEDURE — 83735 ASSAY OF MAGNESIUM: CPT

## 2020-11-11 PROCEDURE — G0008 ADMIN INFLUENZA VIRUS VAC: HCPCS | Performed by: INTERNAL MEDICINE

## 2020-11-11 PROCEDURE — 84100 ASSAY OF PHOSPHORUS: CPT

## 2020-11-11 PROCEDURE — 97530 THERAPEUTIC ACTIVITIES: CPT

## 2020-11-11 RX ORDER — POTASSIUM CHLORIDE 20 MEQ/1
20 TABLET, EXTENDED RELEASE ORAL ONCE
Status: COMPLETED | OUTPATIENT
Start: 2020-11-11 | End: 2020-11-11

## 2020-11-11 RX ORDER — CARVEDILOL 3.12 MG/1
3.12 TABLET ORAL 2 TIMES DAILY
Qty: 60 TABLET | Refills: 3 | Status: SHIPPED | OUTPATIENT
Start: 2020-11-11 | End: 2020-12-22 | Stop reason: SDUPTHER

## 2020-11-11 RX ORDER — NICOTINE POLACRILEX 4 MG
15 LOZENGE BUCCAL PRN
Status: DISCONTINUED | OUTPATIENT
Start: 2020-11-11 | End: 2020-11-11 | Stop reason: HOSPADM

## 2020-11-11 RX ORDER — DEXTROSE MONOHYDRATE 50 MG/ML
100 INJECTION, SOLUTION INTRAVENOUS PRN
Status: DISCONTINUED | OUTPATIENT
Start: 2020-11-11 | End: 2020-11-11 | Stop reason: HOSPADM

## 2020-11-11 RX ORDER — PREDNISONE 20 MG/1
40 TABLET ORAL DAILY
Qty: 10 TABLET | Refills: 0 | Status: SHIPPED | OUTPATIENT
Start: 2020-11-12 | End: 2020-11-17 | Stop reason: ALTCHOICE

## 2020-11-11 RX ORDER — DOXYCYCLINE 100 MG/1
100 TABLET ORAL 2 TIMES DAILY
Qty: 4 TABLET | Refills: 0 | Status: SHIPPED | OUTPATIENT
Start: 2020-11-11 | End: 2020-11-13

## 2020-11-11 RX ORDER — LISINOPRIL 2.5 MG/1
2.5 TABLET ORAL DAILY
Qty: 30 TABLET | Refills: 3 | Status: SHIPPED | OUTPATIENT
Start: 2020-11-12 | End: 2020-12-22 | Stop reason: SDUPTHER

## 2020-11-11 RX ORDER — DEXTROSE MONOHYDRATE 25 G/50ML
12.5 INJECTION, SOLUTION INTRAVENOUS PRN
Status: DISCONTINUED | OUTPATIENT
Start: 2020-11-11 | End: 2020-11-11 | Stop reason: HOSPADM

## 2020-11-11 RX ADMIN — PANTOPRAZOLE SODIUM 40 MG: 40 TABLET, DELAYED RELEASE ORAL at 06:16

## 2020-11-11 RX ADMIN — INSULIN LISPRO 1 UNITS: 100 INJECTION, SOLUTION INTRAVENOUS; SUBCUTANEOUS at 08:38

## 2020-11-11 RX ADMIN — ENOXAPARIN SODIUM 40 MG: 40 INJECTION SUBCUTANEOUS at 08:36

## 2020-11-11 RX ADMIN — POTASSIUM CHLORIDE 20 MEQ: 1500 TABLET, EXTENDED RELEASE ORAL at 12:35

## 2020-11-11 RX ADMIN — Medication 2 CAPSULE: at 08:37

## 2020-11-11 RX ADMIN — SODIUM CHLORIDE, PRESERVATIVE FREE 10 ML: 5 INJECTION INTRAVENOUS at 08:38

## 2020-11-11 RX ADMIN — BUDESONIDE AND FORMOTEROL FUMARATE DIHYDRATE 2 PUFF: 160; 4.5 AEROSOL RESPIRATORY (INHALATION) at 11:20

## 2020-11-11 RX ADMIN — LISINOPRIL 2.5 MG: 5 TABLET ORAL at 08:37

## 2020-11-11 RX ADMIN — METHADONE HYDROCHLORIDE 130 MG: 10 TABLET ORAL at 08:37

## 2020-11-11 RX ADMIN — GABAPENTIN 100 MG: 100 CAPSULE ORAL at 12:32

## 2020-11-11 RX ADMIN — INFLUENZA A VIRUS A/VICTORIA/2454/2019 IVR-207 (H1N1) ANTIGEN (PROPIOLACTONE INACTIVATED), INFLUENZA A VIRUS A/HONG KONG/2671/2019 IVR-208 (H3N2) ANTIGEN (PROPIOLACTONE INACTIVATED), INFLUENZA B VIRUS B/VICTORIA/705/2018 BVR-11 ANTIGEN (PROPIOLACTONE INACTIVATED), INFLUENZA B VIRUS B/PHUKET/3073/2013 BVR-1B ANTIGEN (PROPIOLACTONE INACTIVATED) 0.5 ML: 15; 15; 15; 15 INJECTION, SUSPENSION INTRAMUSCULAR at 14:28

## 2020-11-11 RX ADMIN — GABAPENTIN 100 MG: 100 CAPSULE ORAL at 08:37

## 2020-11-11 RX ADMIN — CARVEDILOL 3.12 MG: 3.12 TABLET, FILM COATED ORAL at 08:37

## 2020-11-11 RX ADMIN — PREDNISONE 40 MG: 20 TABLET ORAL at 08:37

## 2020-11-11 RX ADMIN — IPRATROPIUM BROMIDE AND ALBUTEROL 1 PUFF: 20; 100 SPRAY, METERED RESPIRATORY (INHALATION) at 11:20

## 2020-11-11 ASSESSMENT — PAIN DESCRIPTION - ORIENTATION
ORIENTATION: LOWER
ORIENTATION: LOWER

## 2020-11-11 ASSESSMENT — PAIN DESCRIPTION - LOCATION
LOCATION: BACK
LOCATION: BACK

## 2020-11-11 ASSESSMENT — PAIN DESCRIPTION - DESCRIPTORS
DESCRIPTORS: ACHING
DESCRIPTORS: ACHING

## 2020-11-11 ASSESSMENT — PAIN DESCRIPTION - PAIN TYPE
TYPE: CHRONIC PAIN
TYPE: CHRONIC PAIN

## 2020-11-11 ASSESSMENT — PAIN DESCRIPTION - PROGRESSION
CLINICAL_PROGRESSION: GRADUALLY IMPROVING
CLINICAL_PROGRESSION: GRADUALLY IMPROVING

## 2020-11-11 ASSESSMENT — PAIN SCALES - GENERAL
PAINLEVEL_OUTOF10: 4
PAINLEVEL_OUTOF10: 6
PAINLEVEL_OUTOF10: 6

## 2020-11-11 ASSESSMENT — PAIN DESCRIPTION - FREQUENCY
FREQUENCY: CONTINUOUS
FREQUENCY: CONTINUOUS

## 2020-11-11 ASSESSMENT — PAIN DESCRIPTION - ONSET
ONSET: ON-GOING
ONSET: ON-GOING

## 2020-11-11 NOTE — DISCHARGE SUMMARY
Hospital Medicine Discharge Summary    Patient ID: Alisson Galdamez      Patient's PCP: Jim Bustillo    Admit Date: 11/8/2020     Discharge Date:   11/11/20    Admitting Physician: Juan Fernandes MD     Discharge Physician: Walter Griffin MD     Discharge Diagnoses: Active Hospital Problems    Diagnosis Date Noted    [facility] [J44.1]     Acute on chronic diastolic heart failure (HCC) [I50.33]     Acute metabolic encephalopathy [E03.42]     [facility] use ([facility] [F11.20]     Abnormal CXR [R93.89]     Acute on chronic systolic (congestive) heart failure (HCC) [I50.23] 11/08/2020    Pulmonary edema [J81.1] 11/08/2020    COPD exacerbation (HCC) [J44.1] 01/31/2017    Acute on chronic respiratory failure with hypoxia and hypercapnia (HCC) [J96.21, J96.22]     Tobacco use [Z72.0]        The patient was seen and examined on day of discharge and this discharge summary is in conjunction with any daily progress note from day of discharge. Hospital Course:   Patient is a 78-year-old female with a past medical history of COPD along with acute on chronic hypoxic hypercapnic respiratory failure who presents to the hospital with shortness of breath along with cough. There was initial concern for COVID-19 but testing came back negative. Patient was found to be hypoxic with hypercapnia on ABG and was placed on BiPAP.     Acute on chronic respiratory failure with hypoxia and hypercapnia  BiPAP as needed  Continue supplemental oxygen  Wean oxygen to keep saturations above 90%  Back to home oxygen     Acute on chronic systolic heart failure  Lasix on hold due to contraction alkalosis  Repeat echocardiogram ordered with decreased ejection fraction  Cardiology consulted: outpatient follow up  Strict I's and O's; -5.2 L  Telemetry monitoring  CHF nurse consulted  DC with lasix oral bid     COPD exacerbation  Continue Rocephin and azithromycin: DC with doxy to finish 5 days  IV steroids started, transition to oral steroids for 5 days at DC     Tobacco abuse  Counseled on cessation     Acute metabolic encephalopathy  Likely secondary to CO2 narcosis resolved     Lactic acidosis  Likely secondary to poor perfusion and hypoxia  Continue to trend      Exam:     /80   Pulse 74   Temp 97.5 °F (36.4 °C) (Oral)   Resp 20   Ht 5' (1.524 m)   Wt 229 lb 15 oz (104.3 kg)   LMP 08/14/2015   SpO2 93%   BMI 44.91 kg/m²     General appearance: No apparent distress, appears stated age and cooperative,   Obese  HEENT: Pupils equal, round, and reactive to light. Conjunctivae/corneas clear. Neck: Supple, with full range of motion. No jugular venous distention. Trachea midline. Respiratory:  Normal respiratory effort. Diminished breath sounds bilateral  Cardiovascular: Regular rate and rhythm with normal S1/S2  Abdomen: Soft, non-tender, non-distended with normal bowel sounds. Musculoskeletal: No clubbing, cyanosis or edema bilaterally. Full range of motion without deformity. Skin: Skin color, texture, turgor normal.  No rashes or lesions. Neurologic:  Neurovascularly intact without any focal sensory/motor deficits. Cranial nerves: II-XII intact, grossly non-focal.  Psychiatric: Alert and oriented, thought content appropriate, normal insight  Capillary Refill: Brisk,< 3 seconds   Peripheral Pulses: +2 palpable, equal bilaterally       Consults:     IP CONSULT TO HEART FAILURE NURSE/COORDINATOR  IP CONSULT TO DIETITIAN  IP CONSULT TO PULMONOLOGY  IP CONSULT TO CARDIOLOGY  IP CONSULT TO HOME CARE NEEDS    Significant Diagnostic Studies:     XR CHEST PORTABLE   Final Result   Findings suggestive of CHF decompensation. Disposition:  Home with      Condition at Discharge: Stable    Discharge Instructions/Follow-up:  PCP, Pulm, Cards    Code Status:  Full Code     Activity: activity as tolerated    Diet: cardiac diet      Labs:  For convenience and continuity at follow-up the following most recent labs are provided:      CBC:    Lab Results   Component Value Date    WBC 9.1 11/09/2020    HGB 12.0 11/09/2020    HCT 38.1 11/09/2020     11/09/2020       Renal:    Lab Results   Component Value Date     11/11/2020    K 3.3 11/11/2020    K 4.1 11/08/2020    CL 87 11/11/2020    CO2 44 11/11/2020    BUN 22 11/11/2020    CREATININE 0.8 11/11/2020    CALCIUM 8.9 11/11/2020    PHOS 3.4 11/11/2020       Discharge Medications:     Current Discharge Medication List           Details   lisinopril (PRINIVIL;ZESTRIL) 2.5 MG tablet Take 1 tablet by mouth daily  Qty: 30 tablet, Refills: 3      carvedilol (COREG) 3.125 MG tablet Take 1 tablet by mouth 2 times daily  Qty: 60 tablet, Refills: 3      doxycycline monohydrate (ADOXA) 100 MG tablet Take 1 tablet by mouth 2 times daily for 2 days  Qty: 4 tablet, Refills: 0              Details   predniSONE (DELTASONE) 20 MG tablet Take 2 tablets by mouth daily for 5 days  Qty: 10 tablet, Refills: 0              Details   ibuprofen (ADVIL;MOTRIN) 800 MG tablet Take 800 mg by mouth daily      !! albuterol sulfate  (90 Base) MCG/ACT inhaler Inhale 2 puffs into the lungs every 4 hours as needed for Wheezing or Shortness of Breath (or cough)  Qty: 1 Inhaler, Refills: 11    Associated Diagnoses: COPD exacerbation (HCC)      budesonide-formoterol (SYMBICORT) 160-4.5 MCG/ACT AERO Inhale 2 puffs into the lungs 2 times daily  Qty: 1 Inhaler, Refills: 5    Associated Diagnoses: COPD, severe (HCC)      ipratropium-albuterol (DUONEB) 0.5-2.5 (3) MG/3ML SOLN nebulizer solution INHALE ONE VIAL (3 MILLILITERS) VIA NEBULIZATION BY MOUTH EVERY 4 HOURS  Qty: 360 mL, Refills: 0    Associated Diagnoses: COPD, severe (HCC)      tiotropium (SPIRIVA) 18 MCG inhalation capsule Inhale 1 capsule into the lungs daily  Qty: 30 capsule, Refills: 5    Associated Diagnoses: COPD, severe (HCC)      omeprazole (PRILOSEC) 20 MG delayed release capsule Take 40 mg by mouth daily      furosemide (LASIX) 40 MG tablet Take 1 tablet by mouth daily  Qty: 60 tablet, Refills: 3    Associated Diagnoses: Shortness of breath; Right heart failure (HCC)      gabapentin (NEURONTIN) 100 MG capsule Take 100 mg by mouth 4 times daily. methadone 10 MG/5ML solution Take 130 mg by mouth daily  From [facility] daily . !! albuterol sulfate HFA (PROAIR HFA) 108 (90 Base) MCG/ACT inhaler Inhale 2 puffs into the lungs every 4 hours as needed for Wheezing or Shortness of Breath  Qty: 1 Inhaler, Refills: 5    Associated Diagnoses: COPD, severe (HCC)      acetaminophen (TYLENOL) 500 MG tablet Take 1,000 mg by mouth every 6 hours as needed for Pain      Spacer/Aero-Holding Chambers MARILOU 1 Device by Does not apply route daily as needed  Qty: 1 Device, Refills: 0    Associated Diagnoses: COPD with acute exacerbation (HCC)      OXYGEN Inhale 2 L into the lungs See Admin Instructions. Qty: 1 Can, Refills: 0    Comments: Use  While ambulating       !! - Potential duplicate medications found. Please discuss with provider. Future Appointments   Date Time Provider Jacob Ortiz   12/29/2020 12:00 PM Gerard Crawford [facility] PUL [facility]       Time Spent on discharge is more than 30 minutes in the examination, evaluation, counseling and review of medications and discharge plan. Signed:    Francisca Knowles MD   11/11/2020      Thank you [facility] for the opportunity to be involved in this patient's care. If you have any questions or concerns please feel free to contact me at [phone].

## 2020-11-11 NOTE — CARE COORDINATION
Critical access hospital  Unable to staff, [facility] is in network and able to accept. [id number] [address].  Shirin Huggins LPN  CTN with  [facility],  [address] [place] [address], Fax [phone]

## 2020-11-11 NOTE — PROGRESS NOTES
CLINICAL PHARMACY NOTE: MEDS TO [facility] Select Patient?: No  Total # of Prescriptions Filled: 3   The following medications were delivered to the patient:  Discharge Medication List as of 11/11/2020  2:45 PM      START taking these medications    Details   lisinopril (PRINIVIL;ZESTRIL) 2.5 MG tablet Take 1 tablet by mouth daily, Disp-30 tablet,R-3Normal      carvedilol (COREG) 3.125 MG tablet Take 1 tablet by mouth 2 times daily, Disp-60 tablet,R-3Normal      doxycycline monohydrate (ADOXA) 100 MG tablet Take 1 tablet by mouth 2 times daily for 2 days, Disp-4 tablet,R-0Normal         ·   ·   Total # of Interventions Completed: 0  Time Spent (min): 30    Additional Documentation:        Maryann Berg, PharmD, 11/11/2020 4:14 PM

## 2020-11-11 NOTE — PROGRESS NOTES
All verbal and written discharge instructions given to the pt at discharge regarding new meds, changes in home meds, follow up appointment and reinforced education on CHF, low salt diet. 2000 ml fluid restrictions, daily weight and taking meds as prescribed. Pt verbalized understandings. Electronically signed by Marielena Saldivar RN on 11/11/2020 at 2:33 PM

## 2020-11-11 NOTE — PROGRESS NOTES
Physical Therapy  Facility/Department: [address] MED SURG  Daily Treatment Note  NAME: Emily Cee  : 1975  MRN: [id number]    Date of Service: 2020    Discharge Recommendations:  Home with assist PRN, Home with Home health PT, S Level 1, Patient would benefit from continued therapy after discharge   PT Equipment Recommendations  Equipment Needed: No  Other: pt has cane     Emily Cee scored a 22/24 on the AM-PAC short mobility form. Current research shows that an AM-PAC score of 18 or greater is typically associated with a discharge to the patient's home setting. Based on the patient's AM-PAC score and their current functional mobility deficits, it is recommended that the patient have 2-3 sessions per week of Physical Therapy at d/c to increase the patient's independence. At this time, this patient demonstrates the endurance and safety to discharge home with home PT and a follow up treatment frequency of 2-3x/wk. Please see assessment section for further patient specific details. If patient discharges prior to next session this note will serve as a discharge summary. Please see below for the latest assessment towards goals. HOME HEALTH CARE: LEVEL 1 STANDARD     -Initial home health evaluation to occur within 24-48 hours, in patient home    -Home health agency to establish plan of care for patient over 60 day period    -Medication Reconciliation    -PCP Visit scheduled within seven days of discharge    -PT/OT to evaluate with goal of regaining prior level of functioning    -OT to evaluate if patient has [address] needs for personal care       Assessment   Body structures, Functions, Activity limitations: Decreased functional mobility ; Decreased endurance  Assessment: 53 y/o female admit 2020 with Acute Respiratory, COPD, Pulm Edema. PMH as noted including COPD, Blind R Eye. PTA pt living with , grandson (10 yr) in apt setting with steps to access.   Pt reports setting.   Current Treatment Recommendations: Functional Mobility Training, Transfer Training, Gait Training, Safety Education & Training, Patient/Caregiver Education & Training  Safety Devices  Type of devices: Call light within reach, Left in bed, Nurse notified(MALGORZATA Garland aware of PT tx; pt up ad aayush per RN)     Therapy Time   Individual Concurrent Group Co-treatment   Time In 7114         Time Out 1215         Minutes 23         Timed Code Treatment Minutes: 23 Minutes         Electronically signed by Tiara Fermin, PT [id number] on 11/11/2020 at 12:29 PM

## 2020-11-11 NOTE — PROGRESS NOTES
Pulmonary Progress Note    CC: COPD with acute exacerbation   Acute on chronic hypoxic/hypercarbic respiratory failure  Acute pulmonary edema    24 hr events:  Transferred from ICU yesterday. She feels her breathing is at baseline. She has an intermittent dry cough. ROS:  No SOB  +cough  No vomiting     PHYSICAL EXAM:  Blood pressure 117/80, pulse 74, temperature 97.5 °F (36.4 °C), temperature source Oral, resp. rate 20, height 5' (1.524 m), weight 229 lb 15 oz (104.3 kg), last menstrual period 08/14/2015, SpO2 93 %, not currently breastfeeding. on 3.5L NC    Intake/Output Summary (Last 24 hours) at 11/11/2020 0901  Last data filed at 11/11/2020 0836  Gross per 24 hour   Intake 967 ml   Output 1460 ml   Net -493 ml       Gen: Well developed; well nourished  Eyes: No scleral icterus. No conjunctival injection. ENT: External appearance of ears and nose normal.  Neck: Trachea midline. No obvious mass. No visible thyroid enlargement    Respiratory: Expiratory wheezes bilaterally, no accessory muscle use  Cardiovascular: Regular rate and rhythm, no appreciable murmurs. No edema. Skin: Warm and dry. No rashes or ulcers on visible areas. Normal texture and turgor  Musculoskeletal: No cyanosis, clubbing or joint deformity.     Psychiatric: Normal mood and affect; exhibits normal insight and judgement     Medications:  Current Facility-Administered Medications: glucose (GLUTOSE) 40 % oral gel 15 g, 15 g, Oral, PRN  dextrose 50 % IV solution, 12.5 g, Intravenous, PRN  glucagon (rDNA) injection 1 mg, 1 mg, Intramuscular, PRN  dextrose 5 % solution, 100 mL/hr, Intravenous, PRN  potassium chloride 10 mEq/100 mL IVPB (Peripheral Line), 10 mEq, Intravenous, PRN  predniSONE (DELTASONE) tablet 40 mg, 40 mg, Oral, Daily  lisinopril (PRINIVIL;ZESTRIL) tablet 2.5 mg, 2.5 mg, Oral, Daily  carvedilol (COREG) tablet 3.125 mg, 3.125 mg, Oral, BID  albuterol-ipratropium (COMBIVENT RESPIMAT)  MCG/ACT inhaler 1 puff, 1 puff, Inhalation, 4x daily  cefTRIAXone (ROCEPHIN) 2 g IVPB in D5W 50ml minibag, 2 g, Intravenous, Q24H  lactobacillus (CULTURELLE) capsule 2 capsule, 2 capsule, Oral, BID WC  insulin lispro (HUMALOG) injection vial 0-6 Units, 0-6 Units, Subcutaneous, TID WC  insulin lispro (HUMALOG) injection vial 0-3 Units, 0-3 Units, Subcutaneous, Nightly  methadone (DOLOPHINE) tablet 130 mg, 130 mg, Oral, Daily  sodium chloride (OCEAN, BABY AYR) 0.65 % nasal spray 1 spray, 1 spray, Each Nostril, PRN  albuterol sulfate  (90 Base) MCG/ACT inhaler 2 puff, 2 puff, Inhalation, Q4H PRN  budesonide-formoterol (SYMBICORT) 160-4.5 MCG/ACT inhaler 2 puff, 2 puff, Inhalation, BID  pantoprazole (PROTONIX) tablet 40 mg, 40 mg, Oral, QAM AC  sodium chloride flush 0.9 % injection 10 mL, 10 mL, Intravenous, 2 times per day  sodium chloride flush 0.9 % injection 10 mL, 10 mL, Intravenous, PRN  acetaminophen (TYLENOL) tablet 650 mg, 650 mg, Oral, Q6H PRN **OR** acetaminophen (TYLENOL) suppository 650 mg, 650 mg, Rectal, Q6H PRN  polyethylene glycol (GLYCOLAX) packet 17 g, 17 g, Oral, Daily PRN  promethazine (PHENERGAN) tablet 12.5 mg, 12.5 mg, Oral, Q6H PRN **OR** ondansetron (ZOFRAN) injection 4 mg, 4 mg, Intravenous, Q6H PRN  perflutren lipid microspheres (DEFINITY) injection 1.65 mg, 1.5 mL, Intravenous, ONCE PRN  azithromycin (ZITHROMAX) 500 mg in D5W 250ml addavial, 500 mg, Intravenous, Q24H  gabapentin (NEURONTIN) capsule 100 mg, 100 mg, Oral, 4x Daily  enoxaparin (LOVENOX) injection 40 mg, 40 mg, Subcutaneous, BID    Data reviewed:  Labs:  CBC:   Recent Labs     11/08/20  1303 11/09/20  0453   WBC 18.1* 9.1   HGB 12.9 12.0   HCT 41.8 38.1   MCV 84.7 83.7    236     BMP:   Recent Labs     11/09/20  0453 11/10/20  0505 11/10/20  1759 11/11/20  0610    139  --  137   K 3.8 3.3* 3.4* 3.3*   CL 92* 89*  --  87*   CO2 42* 41*  --  44*   PHOS 3.8 2.5  --  3.4   BUN 19 19  --  22*   CREATININE 0.7 0.6  --  0.8     LIVER PROFILE: Recent Labs     11/08/20  1303   AST 28   ALT 27   BILITOT 1.1*   ALKPHOS 76     PT/INR: No results for input(s): PROTIME, INR in the last 72 hours. APTT: No results for input(s): APTT in the last 72 hours. Films:  Chest images and reports were reviewed by me. My interpretation is:  No new images    Assessment:     COPD with acute exacerbation   Acute on chronic hypoxic/hypercarbic respiratory failure  Acute pulmonary edema      Plan:    COPD with acute exacerbation   -On prednisone 40mg daily x 5 days   -On ceftriaxone and azithromycin. Can use doxycycline at discharge to complete 5 days of antibiotics  -Symbicort and combivent. Resume home bronchodilators at discharge     Acute on chronic hypoxic/hypercarbic respiratory failure  -Repeat VBG  -Continue supplemental oxygen to keep saturation>90%    Acute pulmonary edema  -s/p lasix  -Strict I/Os    OK for discharge if VBG is acceptable. She has pulmonary follow up scheduled for 12/29/20.     Sharif Hartman MD  [facility] Pulmonary, Critical Care and Sleep

## 2020-11-11 NOTE — PROGRESS NOTES
[facility] 80   Daily Progress Note      Admit Date:  11/8/2020    Reason for follow up visit: CHF    CC: \"My breathing is doing better. \"    53 y/o female with PMH significant for asthma, COPD on home O2, HTN, GERD and neuromuscular disorder admitted with SOB for few weeks prior to admit. She became acutely severely SOB over weekend and sough medical attention. In ED noted to be somnolent and profoundly hypercarbic. She was admitted to ICU. BNP was elevated and echo showed LVEF 45%. She has been treated for exacerbation of her COPD, as well as IV lasix for her CHF. She has home O2, as well as BiPap (admits to noncompliance). Interval History:  Pt. seen and examined; records reviewed  Remains on O2 @ 3L and SOB continuing to improve. Net diuresis -5L since admission  BP stable; wt 229# (wts in chart are not accurate)  K+ 3.3  Currently not on diuretic    Subjective:  Pt with no acute overnight cardiac events. SOB at her baseline  Denies chest pain, productive cough, palpitations or dizziness    Review of Systems:   · Constitutional: no unanticipated weight loss. There's been no change in energy level, sleep pattern, or activity level. No fevers, chills. · Eyes: No visual changes or diplopia. No scleral icterus. · ENT: No Headaches, hearing loss or vertigo. No mouth sores or sore throat. · Cardiovascular: as reviewed in HPI. + chronic LE edema  · Respiratory: No cough or wheezing, no sputum production. No hematemesis. · Gastrointestinal: No abdominal pain, appetite loss, blood in stools. No change in bowel or bladder habits. · Genitourinary: No dysuria, trouble voiding, or hematuria. · Musculoskeletal:  No gait disturbance, no joint complaints. · Integumentary: No rash or pruritis. · Neurological: No headache, diplopia, change in muscle strength, numbness or tingling. · Psychiatric: No anxiety or depression. · Endocrine: No temperature intolerance.  No excessive thirst, fluid intake, or urination. No tremor. · Hematologic/Lymphatic: No abnormal bruising or bleeding, blood clots or swollen lymph nodes. · Allergic/Immunologic: No nasal congestion or hives. Objective:   /80   Pulse 74   Temp 97.5 °F (36.4 °C) (Oral)   Resp 20   Ht 5' (1.524 m)   Wt 229 lb 15 oz (104.3 kg)   LMP 08/14/2015   SpO2 93%   BMI 44.91 kg/m²       Intake/Output Summary (Last 24 hours) at 11/11/2020 0941  Last data filed at 11/11/2020 0836  Gross per 24 hour   Intake 967 ml   Output 1460 ml   Net -493 ml     Wt Readings from Last 3 Encounters:   11/11/20 229 lb 15 oz (104.3 kg)   02/20/20 280 lb (127 kg)   10/24/18 278 lb (126.1 kg)       Physical Exam:  General: In no acute distress. Awake, alert, and oriented X4. Resting in bed. Appears to be chronically ill. Morbidly obese  Skin:  Warm and dry. No new appearing rashes or lesions. Neck:  Supple and thick. No JVD or carotid bruit appreciated. Chest: Lungs with few expiratory wheezes  Cardiovascular:  RRR. Normal S1 and S2. No murmur/gallop or rub   Abdomen: obese, soft, nontender, +bowel sounds.    Extremities: No pitting edema to LE; chronic stasis changes; 1+ bilateral DP/PT pulses; 2+ bilateral radial pulses    Medications:    predniSONE  40 mg Oral Daily    lisinopril  2.5 mg Oral Daily    carvedilol  3.125 mg Oral BID    albuterol-ipratropium  1 puff Inhalation 4x daily    cefTRIAXone (ROCEPHIN) IV  2 g Intravenous Q24H    lactobacillus  2 capsule Oral BID WC    insulin lispro  0-6 Units Subcutaneous TID WC    insulin lispro  0-3 Units Subcutaneous Nightly    methadone  130 mg Oral Daily    budesonide-formoterol  2 puff Inhalation BID    pantoprazole  40 mg Oral QAM AC    sodium chloride flush  10 mL Intravenous 2 times per day    azithromycin  500 mg Intravenous Q24H    gabapentin  100 mg Oral 4x Daily    enoxaparin  40 mg Subcutaneous BID      dextrose       glucose, dextrose, glucagon (rDNA), dextrose, potassium chloride, sodium chloride, albuterol sulfate HFA, sodium chloride flush, acetaminophen **OR** acetaminophen, polyethylene glycol, promethazine **OR** ondansetron, perflutren lipid microspheres    Lab Data:  CBC:   Recent Labs     11/08/20  1303 11/09/20  0453   WBC 18.1* 9.1   HGB 12.9 12.0    236     BMP:    Recent Labs     11/09/20  0453 11/10/20  0505 11/10/20  1759 11/11/20  0610    139  --  137   K 3.8 3.3* 3.4* 3.3*   CO2 42* 41*  --  44*   BUN 19 19  --  22*   CREATININE 0.7 0.6  --  0.8     LIVR:   Recent Labs     11/08/20  1303   AST 28   ALT 27     Results for Deana Duarte (MRN [id number]) as of 11/11/2020 09:46   Ref. Range 11/8/2020 13:03 11/11/2020 06:10   Pro-BNP Latest Ref Range: 0 - 124 pg/mL 7,278 (H) 1,610 (H)   Troponin Latest Ref Range: <0.01 ng/mL <0.01      CXR 11/8/2020: CHF    Echo 11/9/2020:   Ejection fraction is visually estimated to be 40-45%. There is mild diffuse hypokinesis. Moderately dilated LV   Diastolic filling parameters grade II diastolic dysfunction. Mitral valve leaflets appear mildly thickened. Mild mitral regurgitation is present. The left atrium is moderately dilated. The right ventricle is enlarged. Right ventricular systolic function is normal.   TAPSE 2.3 cm   IVC size is dilated (>2.1 cm) and collapses < 50% with respiration   consistent with elevated RA pressure (15 mmHg). Estimated pulmonary artery systolic pressure is mildly elevated at 46 mmHg   assuming a right atrial pressure of 15 mmHg. Echo 7/7/2015:   Left ventricle size is normal.   Normal left ventricular wall thickness. Ejection fraction is visually estimated to be 55-60 %. Normal right ventricular size and function. Telemetry: Sinus rhythm    Assessment/Plan:    1. Acute on chronic diastolic heart failure/Cardiomyopathy  -LVEF 45%  -?  Etiology of cardiomyopathy (suspect multifactorial)-improved with diuresis; BNP much improved  -continue carvedilol and lisinopril  -not on diuretic currently; had been on lasix at home. Held at present d/t elevated CO2 (contraction alkalosis)  -would consider change to torsemide at discharge for home diuretic  -not on aldactone as LVEF > 40%    2. COPD/ Acute on chronic hypoxic and hypercapnic respiratory failure  -on O2 at home; supposed to be on BiPap for BLANCA but has been noncompliant  -exacerbation of her COPD: on antibiotics, steroids at direction of pulmonary  -Rx per Pulmonary    3. Chronic pain syndrome with chronic methadone use  -attempt to wean off methadone with CHF  -defer to primary team   -home med list shows daily use of ibuprofen (would discontinue given her CHF)    4. Acute metabolic encephalopathy  -improved  -felt to be secondary to CO2 narcosis    5. Morbid obesity  -BMI 44  -weight loss encouraged    6.  Tobacco use  -smoking cessation discussed    Electronically signed by JULIUS Baldwin - CNP on 11/11/2020 at 9:41 AM

## 2020-11-11 NOTE — PLAN OF CARE
Problem: Falls - Risk of:  Goal: Will remain free from falls  Description: Will remain free from falls  Outcome: Ongoing  Note: Pt free from falls this shift. Non skid socks provided. Pt educated on use of call light for assistance. Pt UAL. Call light always within reach. Pt able and agreeable to contact for safety appropriately. Problem: Falls - Risk of:  Goal: Absence of physical injury  Description: Absence of physical injury  Outcome: Ongoing     Problem: OXYGENATION/RESPIRATORY FUNCTION  Goal: Patient will maintain patent airway  Outcome: Ongoing  Note: Pt currently on 3.5 l nc. Able to maintain SpO2 > 90%. Problem: HEMODYNAMIC STATUS  Goal: Patient has stable vital signs and fluid balance  Outcome: Ongoing     Problem: FLUID AND ELECTROLYTE IMBALANCE  Goal: Fluid and electrolyte balance are achieved/maintained  Outcome: Ongoing  Note: Rn monitoring pt's fluid and electrolytes closely . Problem: Skin Integrity:  Goal: Will show no infection signs and symptoms  Description: Will show no infection signs and symptoms  Outcome: Ongoing  Note: Skin assessment performed each shift per protocol. Pt encouraged to reposition often. Will continue to monitor and assess for skin breakdown. Problem: Pain:  Goal: Pain level will decrease  Description: Pain level will decrease  Outcome: Ongoing  Note: Pt able to express presence and absence of pain using numerical pain scale. Pt pain is managed by PRN analgesics as ordered by MD. Pain reassess after each interventions. Will continue to monitor as needed. Problem: Discharge Planning:  Goal: Discharged to appropriate level of care  Description: Discharged to appropriate level of care  Outcome: Ongoing     Problem:  Activity Intolerance:  Goal: Ability to tolerate increased activity will improve  Description: Ability to tolerate increased activity will improve  Outcome: Ongoing     Problem: Breathing Pattern - Ineffective:  Goal: Ability to achieve and maintain a regular respiratory rate will improve  Description: Ability to achieve and maintain a regular respiratory rate will improve  Outcome: Ongoing     Problem: Gas Exchange - Impaired:  Goal: Levels of oxygenation will improve  Description: Levels of oxygenation will improve  Outcome: Ongoing     Problem: Urinary Elimination:  Goal: Signs and symptoms of infection will decrease  Description: Signs and symptoms of infection will decrease  Outcome: Ongoing

## 2020-11-11 NOTE — PLAN OF CARE
Transport bedside. Patient ambulated to wheelchair, placed on portable O2, personal items, ticket to ride, and patient chart given to transport. Care turned over to [facility] ([id number]). Patient on e-monitor.

## 2020-11-11 NOTE — CARE COORDINATION
Received notice of dc home today with home care. Spoke with pt who is aware and agreeable to dc. , pts daughter is to transport her home later today. Pt does have home o2 from [address] [place] [address] and has a cane for home use. Pt uses [facility] on [email or web address] for pharmacy. Provided home care agency names and pt has selected [facility] or [facility]. [facility] is checking staffing in pts zip code. Pt denies any other dc needs. Pt is aware and agreeble to [facility] who was able to accept pt.     Electronically signed by IDALIA Esparza on 11/11/2020 at 11:03 AM

## 2020-11-11 NOTE — CONSULTS
[facility]  HEART FAILURE PROGRAM      NAME:  Daphne RECORD NUMBER:  [id number]  AGE: 54 y.o.    GENDER: female  : 1975  TODAY'S DATE:  2020    Subjective:     VISIT TYPE: evaluation     ADMITTING PHYSICIAN:  Suresh Ruelas MD    PAST MEDICAL HISTORY        Diagnosis Date    Arthritis     Asthma     Blind right eye     [facility]ch[facility] obstructive pulmonary disease) [facility])     GERD (gastroesophageal reflux disease)     Hypertension     Movement disorder     Neuromuscular disorder ([address]     Pneumonia        SOCIAL HISTORY    Social History     Tobacco Use    Smoking status: Former Smoker     Packs/day: 0.50     Years: 30.00     Pack years: 15.00     Types: Cigarettes     Start date: 1984     Last attempt to quit: 2017     Years since quittin.9    Smokeless tobacco: Never Used    Tobacco comment: lives with smoker    Substance Use Topics    Alcohol use: No     Alcohol/week: 0.0 standard drinks    Drug use: No       ALLERGIES    Allergies   Allergen Reactions    Latex      Rash developed    Sulfa Antibiotics Rash     Throat swelling    Aspartame     Morphine And Related     Nsaids Other (See Comments)     ulcer    Ultram [Tramadol Hcl] Swelling and Rash       MEDICATIONS  Scheduled Meds:   potassium chloride  20 mEq Oral Once    predniSONE  40 mg Oral Daily    lisinopril  2.5 mg Oral Daily    carvedilol  3.125 mg Oral BID    albuterol-ipratropium  1 puff Inhalation 4x daily    cefTRIAXone (ROCEPHIN) IV  2 g Intravenous Q24H    lactobacillus  2 capsule Oral BID WC    insulin lispro  0-6 Units Subcutaneous TID WC    insulin lispro  0-3 Units Subcutaneous Nightly    methadone  130 mg Oral Daily    budesonide-formoterol  2 puff Inhalation BID    pantoprazole  40 mg Oral QAM AC    sodium chloride flush  10 mL Intravenous 2 times per day    azithromycin  500 mg Intravenous Q24H    gabapentin  100 mg Oral 4x Daily    enoxaparin 40 mg Subcutaneous BID       ADMIT DATE: 11/8/2020      Objective:     ADMISSION DIAGNOSIS:   Acute on chronic respiratory failure with hypoxia and hypercapnia (HCC) [J96.21, J96.22]     /80   Pulse 74   Temp 97.5 °F (36.4 °C) (Oral)   Resp 18   Ht 5' (1.524 m)   Wt 229 lb 15 oz (104.3 kg)   LMP 08/14/2015   SpO2 95%   BMI 44.91 kg/m²     ADMIT:  Weight: 296 lb (134.3 kg)    TODAY: Weight: 229 lb 15 oz (104.3 kg)    Wt Readings from Last 25 Encounters:   11/11/20 229 lb 15 oz (104.3 kg)   02/20/20 280 lb (127 kg)   10/24/18 278 lb (126.1 kg)   01/16/18 256 lb (116.1 kg)   08/28/17 272 lb (123.4 kg)   07/07/17 281 lb 1.4 oz (127.5 kg)   05/08/17 276 lb 0.3 oz (125.2 kg)   04/18/17 261 lb (118.4 kg)   02/21/17 251 lb 12.8 oz (114.2 kg)   02/06/17 249 lb 12.5 oz (113.3 kg)   08/16/16 266 lb 9.6 oz (120.9 kg)   07/14/16 264 lb (119.7 kg)   09/28/15 264 lb 9.6 oz (120 kg)   07/06/15 251 lb (113.9 kg)   06/30/15 255 lb (115.7 kg)          Intake/Output Summary (Last 24 hours) at 11/11/2020 1205  Last data filed at 11/11/2020 0836  Gross per 24 hour   Intake 917 ml   Output 660 ml   Net 257 ml       LABS  BMP:   Lab Results   Component Value Date     11/11/2020    K 3.3 11/11/2020    K 4.1 11/08/2020    CL 87 11/11/2020    CO2 44 11/11/2020    BUN 22 11/11/2020    LABALBU 3.8 11/08/2020    CREATININE 0.8 11/11/2020    CALCIUM 8.9 11/11/2020    GFRAA >60 11/11/2020    GFRAA >60 05/31/2013    LABGLOM >60 11/11/2020    GLUCOSE 72 11/11/2020     CBC:   Recent Labs     11/08/20  1303 11/09/20  0453   WBC 18.1* 9.1   HGB 12.9 12.0   HCT 41.8 38.1   MCV 84.7 83.7    236     BNP:   Lab Results   Component Value Date    BNP 9 12/01/2013       ECHOCARDIOGRAM: 11/09/2020  Summary   Ejection fraction is visually estimated to be 40-45%. There is mild diffuse hypokinesis. Diastolic filling parameters suggest grade I diastolic dysfunction. Mitral valve leaflets appear mildly thickened.    Mild mitral regurgitation is present. The left atrium is moderately dilated. The right ventricle is enlarged. Right ventricular systolic function is normal.   TAPSE 2.3 cm   IVC size is dilated (>2.1 cm) and collapses < 50% with respiration   consistent with elevated RA pressure (15 mmHg). Estimated pulmonary artery systolic pressure is mildly elevated at 46 mmHg   assuming a right atrial pressure of 15 mmHg. Assessment:     CONSULTS:   IP CONSULT TO HEART FAILURE NURSE/COORDINATOR  IP CONSULT TO DIETITIAN  IP CONSULT TO PULMONOLOGY  IP CONSULT TO CARDIOLOGY  IP CONSULT TO HOME CARE NEEDS    Patient has a CARDIOLOGY CONSULT: Yes      Patient taking an ACEI/ARB:  Yes       Patient taking a BETA BLOCKER:  Yes    SCALE AVAILABLE:  Yes     EDUCATION STATUS: Patient -- no one else present at this time. Pt declines my offer to include anyone by phone  [x]  Provided both written and verbal education on Heart Failure signs/symptoms. [x]  Provided instructions on daily medications. [x]  Provided instructions to monitor and record weight daily. [x]  Provided instructions to call if weight increases 3 lbs in one day or 5 lbs in one week. [x]  Received verbal acknowledgment/understanding of Heart Failure related causes. [x]  Provided instructions on how to maintain a low sodium diet. []  Provided recommendations for smoking cessation programs  [x]  Provided recommendations on activity and exercise    [x]  Other: HF  RN consult received from Dr Michelle Delgado as part of HF order set. Chart reviewed. Pt pressented to ED via EMS with c/o SOB x 2 days. Her sat was 60% on 2L per EMS. She was placed on CPAP with sat up to 89%. Upon arrival to ED, she was noted to be cyanotic and weak and with profound hyperbaric somnolence. She was immediately transitioned to BiPap. There was question of polypharmacy and / or CO2 narcosis. Pt I son methadone for prior addiction issue.      Pt has no prior history of HF and no established cardiologist.  She does have severe COPD. She has formal diagnosis of sleep apnea but has been noncompliant with BiPap. ProBNP on admission was 7278 with Cr 0.6. CXR showed decompensated HF. Weight was inaccurate on admission (296 / 220 / 231#). Pt was treated with IV bolus Lasix and had net diuresis of 5.2 liters to date. Cardiology was consulted for acute on chronic HF. Echo revealed EF 40-45% with Gr 1 DD. Dr Johnson Doing notes methadone being associated with cardiomyopathies. Ultimately, he suggests pt should be weaned from methadone if at all possible. I met with patient in her room. She is on room air but has O2 tubing around her neck with setting at 2L. I introduced myself and my role in her care. She is agreeable to spend time with me for HF education. She shares being SOB \"all the time\" but getting significantly worse the day before admission. We reviewed her echo results and discussed the general definition of systolic and diastolic HF. She recalls Dr Johnson Doing telling her the methadone is contributing to this condition. She is agreeable to continue her care with I. Brochure was provided with all contact info as well as [facility] [employer]. We discussed the importance of daily weights in the management of HF. Pt reports she has a scale at home and is agreeable to start weighing daily. I provided weight log and corresponding AHA HF zones sheet. We discussed the 3/5 rule and s/sx of worsening HF. We discussed the importance of EARLY report of concerns to most easily address issues. She voices understanding of importance. We next reviewed the rationale of sodium and fluid restriction for HF management. Pt states she typically cooks at home and describes a common meal as roast, potatoes and carrots. She states she does not use salt but is aware some items are high in sodium - such as beef broth. I urged her to look for low sodium options.   She shares she and family sometimes go out to eat but have not been doing so as of late. She reports \"rarely\" getting to go food as as well. Pt reports she drinks 2 cans of Mt Dew and 2 16oz bottles of water per day. We discussed this being within the recommended amounts but pt goes on to share she actually \"opens 4 or 5 cans of pop but I only drink a little bit\". I urged her to consider switching to bottles where she can reseal the container and keep it cold / fresh. We discussed how portions of containers sometimes make it very difficult to get a good estimate of true intake. She states \"I can start drinking the whole can instead so I know\". Pt confirms she continues to abstain from smoking. She finally quit two years ago. Praise given for this very positive accomplishment. As we talked, pt frequently seemed to doze off and would then jerk awake. She made low snoring sounds while doing this. When I would call her name, she would immediately wake back up but seems unable to stay awake for even a few minutes to listen to my conversation. I warn patient she exhibits definite s/sx of severe sleep apnea. She reports she was not using BiPap because a piece was broken on her unit. She states she ordered it \"even before I came to the hospital\" and hopes it will be waiting at her home upon her return. I urged her to make this a top priority. I urged her to faithfully use this every single night and to consider using it during the day if she continues with daytime sleepiness and this frequent nodding off. I stressed she reach out to her pulmonologist if the unit is nonfunctional or if she has other problems. I stressed her HF will not improve if she does not become 100% compliant with treatment. She voices understanding. Overall, pt was surprisingly engaged and asked excellent questions. She was even able to accurately teach back info provided. Praise given.   I urged her to consider the serious nature of this fear of Covid. I offered follow up virtually but she again declines. Brochure provided if she should change her mind.    Cardiac Rehab referral : pt declines as service is not covered by insurance in setting of preserved EF          Electronically signed by Colby García RN, BSN CHFN  on 11/11/2020 at 12:05 PM

## 2020-11-11 NOTE — DISCHARGE INSTR - COC
Continuity of Care Form    Patient Name: Diego Mahmood   :  1975  MRN:  [id number]    Admit date:  2020  Discharge date:  2020    Code Status Order: Full Code   Advance Directives:   Advance Care Flowsheet Documentation       Date/Time Healthcare Directive Type of Healthcare Directive Copy in [address] Agent's Name Healthcare Agent's Phone Number    20 0529  No, patient does not have an advance directive for healthcare treatment -- -- -- -- --            Admitting Physician:  Hasmukh Coburn MD  PCP: Sharifa Lerner    Discharging Nurse: Bay Rojas RN   [address] Unit/Room#: X1S-[id number]/2242-38  Discharging Unit Phone Number: ***    Emergency Contact:   Extended Emergency Contact Information  Primary Emergency Contact: Jeffrey Ashton  Address: [address] [place] [address] Phone: [phone]  Mobile Phone: [phone]  Relation: Spouse  Secondary Emergency Contact: Janine Ashton  Address: [address] Phone: [phone]  Relation: Child    Past Surgical History:  Past Surgical History:   Procedure Laterality Date    ABDOMEN SURGERY      CHOLECYSTECTOMY      DILATATION, ESOPHAGUS      EYE SURGERY      TUBAL LIGATION         Immunization History:   Immunization History   Administered Date(s) Administered    Influenza Vaccine, unspecified formulation 2011    Influenza Virus Vaccine 2013    Influenza, Dulcy Cocks, IM, (6 mo and older Fluzone, Flulaval, Fluarix and 3 yrs and older Afluria) 10/25/2018    Influenza, Quadv, IM, PF (6 mo and older Fluzone, Flulaval, Fluarix, and 3 yrs and older Afluria) 2017    Pneumococcal Polysaccharide ([id number]) 2011, 2016       Active Problems:  Patient Active Problem List   Diagnosis Code    Sepsis ([facility] [address] A41.9    Chronic respiratory failure with hypoxia and hypercapnia ([facility] J96.11, J96.12    Acute respiratory failure (HCC) J96.00    COPD exacerbation ([facility]) J44.1    Respiratory acidosis E87.2    COPD, moderate (HCC) J44.9    Acute on chronic respiratory failure with hypoxia and hypercapnia (HCC) J96.21, J96.22    Tobacco use Z72.0    Nonhealing ulcer of left lower leg limited to breakdown of skin ([facility]) L97.921    Leg swelling M79.89    Localized edema R60.0    Acute on chronic systolic (congestive) heart failure ([facility]) I50.23    Pulmonary edema J81.1    Abnormal CXR R93.89    Acute on chronic diastolic heart failure ([facility]) I50.33    Acute metabolic encephalopathy X15.15    Methadone use ([facility]) F11.20    COPD with acute exacerbation ([facility]) J44.1       Isolation/Infection:   Isolation            No Isolation          Patient Infection Status       Infection Onset Added Last Indicated Last Indicated By Review Planned Expiration Resolved Resolved By    None active    Resolved    COVID-19 Rule Out 11/08/20 11/08/20 11/08/20 COVID-19 (Ordered)   11/09/20 Rule-Out Test Resulted            Nurse Assessment:  Last Vital Signs: /80   Pulse 74   Temp 97.5 °F (36.4 °C) (Oral)   Resp 20   Ht 5' (1.524 m)   Wt 229 lb 15 oz (104.3 kg)   LMP 08/14/2015   SpO2 93%   BMI 44.91 kg/m²     Last documented pain score (0-10 scale): Pain Level: 6  Last Weight:   Wt Readings from Last 1 Encounters:   11/11/20 229 lb 15 oz (104.3 kg)     Mental Status:  oriented, alert and thought processes intact    IV Access:  - None    Nursing Mobility/ADLs:  Walking   Independent  Transfer  Independent  Bathing  Independent  Dressing  Independent  Toileting  Independent  Feeding  Independent  Med Admin  Independent  Med Delivery   whole    Wound Care Documentation and Therapy:        Elimination:  Continence:   · Bowel:  Yes  · Bladder: Yes  Urinary Catheter:none    Colostomy/Ileostomy/Ileal Conduit: No       Date of Last BM: 11/11/2020    Intake/Output Summary (Last 24 hours) at 11/11/2020 [address] filed at 2020 0836  Gross per 24 hour   Intake 967 ml   Output 1460 ml   Net -493 ml     I/O last 3 completed shifts: In: 573 [P.O.:480; I.V.:27; IV Piggyback:450]  Out: 1610 [Urine:1610]    Safety Concerns: At Risk for Falls    Impairments/Disabilities:      None    Nutrition Therapy:  Current Nutrition Therapy:   - Oral Diet:  [address] JAQUELIN Oral VP}    Routes of Feeding: Oral  Liquids: No Restrictions  Daily Fluid Restriction: yes - amount 2000 ml   Last Modified Barium Swallow with Video (Video Swallowing Test): not done    Treatments at the Time of Hospital Discharge:   Respiratory Treatments: inhalers, currently on 3.5 l nc   Oxygen Therapy:  is on oxygen at 2 l at rest, 4 l with activity  L/min per nasal cannula. Ventilator:    - BiPAP   IPAP: 16 cmH20, CPAP/EPAP: 8 cmH2O only when sleeping     Heart Failure Instructions for Daily Management  Patient was treated for acute on chronic systolic and diastolic heart failure. she  will require the following:     Please weigh daily on the same scale and approximately the same time of day. Report weight gain of 3 pounds/day or 5 pounds/week to : [facility] [phone].  Please use hospital discharge weight as baseline reference.  Please monitor for signs and symptoms of and report to MD:  o Worsening Heart Failure: sudden weight gain, shortness of breath, lower extremity or general edema/swelling, abdominal bloating/swelling, inability to lie flat, intolerance to usual activity, or cough (especially at night). Report these finding even if no increase in weight.  o Dehydration:  having difficulty or a decrease in urination, dizziness, worsening fatigue, or new onset/worsening of generalized weakness.  Please continue a LOW SODIUM diet and LIMIT fluid intake to 48 - 64 ounces ( 1.5 - 2 liters) per day.      Jennifer Farr Call [facility] [phone] and/or Graham Blanco @ [phone] with any questions or concerns.  Please continue heart failure education to patient and family/support system.  See After Visit Summary for hospital follow up appointment details.  Consider spiritual care referral for support and/or completion of advance directives [phone].  Consider: [place] [address] telehealth program if patient agreeable and able to participate, palliative care consult for ongoing goals of care, end of life, and/or chronic disease management discussions and referral to [facility] [phone]) once SNF/HHC complete. Dr Tan Bailey is pt's primary cardiologist.       Rehab Therapies: Physical Therapy and Occupational Therapy  Weight Bearing Status/Restrictions: No weight bearing restirctions  Other Medical Equipment (for information only, NOT a DME order):  cane  Other Treatments: ***    Patient's personal belongings (please select all that are sent with patient):  Glasses    RN SIGNATURE:  Electronically signed by Nunu Patel RN on 11/11/20 at 12:43 PM EST    CASE MANAGEMENT/SOCIAL WORK SECTION    Inpatient Status Date: ***    Readmission Risk Assessment Score:  Readmission Risk              Risk of Unplanned Readmission:        19           Discharging to Facility/ Agency   · Name: [facility].  · Address:  · Phone:  · Fax:    / signature: {Esignature:[id number]:::0}    PHYSICIAN SECTION    Prognosis: Fair    Condition at Discharge: Stable    Rehab Potential (if transferring to Rehab): Fair    Recommended Labs or Other Treatments After Discharge: PT, OT, HHA, VN    Physician Certification: I certify the above information and transfer of Taj Chung  is necessary for the continuing treatment of the diagnosis listed and that she requires Home Care for greater 30 days.      Update Admission H&P: No change in H&P    PHYSICIAN SIGNATURE:  Electronically signed by Julia Lr MD on 11/11/20 at 10:21 AM EST

## 2020-11-11 NOTE — PROGRESS NOTES
Pt settled into new room. Steady on feet. Pt voided since duvall was removed. Pt reminded she is on a fluid restriction,but pt doesn't follow it. O2 on at 3l. Call light in reach. Lower legs swollen,bonita in color. Pt has exp wheezing. Monitor showing NSR.the patient HAS A MEPILEX TO COCCYX. Pt has inner dry to skin folds for redness,pt stated it was feeling better.

## 2020-11-11 NOTE — PROGRESS NOTES
Pt awake in bed. Pt repots chronic back pain, rates her pain 6/10 and requesting methadone. -pt medicated with scheduled methadone as ordered. Pt still little SOB with exertions. Pt right lung sounds present with expiratory wheezes on ausculation. Pt able maintain SpO2 of 93% on 3.5 l nc. Pt UAL. Reinforced education on pt's daily fluid restrictions and maintain strict I & O's, pt non-compliant with fluid restrictions. Pt denies other needs at present. Call light and item need in reach.  Electronically signed by Mayte Alvarez RN on 11/11/2020 at 12:04 PM

## 2020-11-13 ENCOUNTER — FOLLOWUP TELEPHONE ENCOUNTER (OUTPATIENT)
Dept: INPATIENT UNIT | Age: 45
End: 2020-11-13

## 2020-11-13 NOTE — PROGRESS NOTES
Call made to patient for 72 hour HF hospital follow up. Pt answered phone. She recalls me from our education session. She states she is feeling \"great\" and voices her appreciation of taking time to teach her about HF. Pt reports she weighed today and logged it as I instructed. Praise given. She is able to speak to 3/5 rule and zone assignment. She places herself in the green zone today. Pt reports compliance with medications and diet. She is able to teach back some of the info -- sodium and fluid restriction and importance of not missing medications. She states the piece did not come for her CPAP. I urged her to make this a top priority and to call her pulmonologist to see if they can assist in the problem. She agrees to do so. I reminded pt of follow up appt on Tues 11/17 with Dr Risa Talavera. She is aware and says she has it in her appointment book so she won't forget. I urged her to call [facility] with any issues or the Resource line with education type questions. She voices agreement.

## 2020-11-16 NOTE — PROGRESS NOTES
[address]      Cardiology Consult    Robb Jensen  1975    November 17, 2020    Primary care physician: Pedro Millan MD  Reason for Referral: Diastolic heart failure     CC: \"always short of breath\"     HPI:  The patient is 54 y.o. female with a past medical history significant for obesity, hypertension, diastolic heart failure, and COPD on supplemental O2 who presents today for management of heart failure. She was admitted 11/2020 with complaints of worsening shortness of breath. Her BNP was elevated and her echo showed an EF of 45%. She was treated for a COPD exacerbation and with IV lasix. Today, she states she is doing better since the time of hospitalization. She has chronic shortness of breath and dyspnea on exertion but feels back to baseline. She has significant lower extremity edema but feels it is slowly improving since the time of hospitalization. She states her weight remains stable at home. She is on her baseline level of supplemental oxygen. She also follows with pulmonary. She reports compliance with her medications and tolerating. Patient denies exertional chest pain/pressure, worsening GALLOWAY, PND, orthopnea, palpitations, lightheadedness, weight changes, changes in LE edema, and syncope.     Past Medical History:   Diagnosis Date    Arthritis     Asthma     Blind right eye     COPD (chronic obstructive pulmonary disease) (HCC)     GERD (gastroesophageal reflux disease)     Hypertension     Movement disorder     Neuromuscular disorder (HCC)     Pneumonia      Past Surgical History:   Procedure Laterality Date    ABDOMEN SURGERY      CHOLECYSTECTOMY      DILATATION, ESOPHAGUS      EYE SURGERY      TUBAL LIGATION       Family History   Problem Relation Age of Onset    Cancer Mother         lung cancer    Osteoporosis Mother     Cancer Father         cancer    Arthritis Father      Social History     Tobacco Use    Smoking status: Former Smoker Packs/day: 0.50     Years: 30.00     Pack years: 15.00     Types: Cigarettes     Start date: 1984     Last attempt to quit: 2017     Years since quittin.9    Smokeless tobacco: Never Used    Tobacco comment: lives with smoker    Substance Use Topics    Alcohol use: No     Alcohol/week: 0.0 standard drinks    Drug use: No       Allergies   Allergen Reactions    Latex      Rash developed    Sulfa Antibiotics Rash     Throat swelling    Nsaids Other (See Comments)     ulcer    Ultram [Tramadol Hcl] Swelling and Rash     Current Outpatient Medications   Medication Sig Dispense Refill    lisinopril (PRINIVIL;ZESTRIL) 2.5 MG tablet Take 1 tablet by mouth daily 30 tablet 3    carvedilol (COREG) 3.125 MG tablet Take 1 tablet by mouth 2 times daily 60 tablet 3    ibuprofen (ADVIL;MOTRIN) 800 MG tablet Take 800 mg by mouth daily      budesonide-formoterol (SYMBICORT) 160-4.5 MCG/ACT AERO Inhale 2 puffs into the lungs 2 times daily 1 Inhaler 5    albuterol sulfate HFA (PROAIR HFA) 108 (90 Base) MCG/ACT inhaler Inhale 2 puffs into the lungs every 4 hours as needed for Wheezing or Shortness of Breath 1 Inhaler 5    ipratropium-albuterol (DUONEB) 0.5-2.5 (3) MG/3ML SOLN nebulizer solution INHALE ONE VIAL (3 MILLILITERS) VIA NEBULIZATION BY MOUTH EVERY 4 HOURS 360 mL 0    tiotropium (SPIRIVA) 18 MCG inhalation capsule Inhale 1 capsule into the lungs daily 30 capsule 5    acetaminophen (TYLENOL) 500 MG tablet Take 1,000 mg by mouth every 6 hours as needed for Pain      omeprazole (PRILOSEC) 20 MG delayed release capsule Take 40 mg by mouth daily      furosemide (LASIX) 40 MG tablet Take 1 tablet by mouth daily (Patient taking differently: Take 40 mg by mouth 2 times daily Will start BID today per patient) 60 tablet 3    gabapentin (NEURONTIN) 100 MG capsule Take 100 mg by mouth 4 times daily.        Spacer/Aero-Holding Chambers MARILOU 1 Device by Does not apply route daily as needed 1 Device 0    OXYGEN Inhale 2 L into the lungs See Admin Instructions. (Patient taking differently: Inhale 2 L into the lungs See Admin Instructions Indications: 2L sitting, 4L when up and around Continuous at night or as needed when at home per patient) 1 Can 0    methadone 10 MG/5ML solution Take 130 mg by mouth daily  From [facility] daily . No current facility-administered medications for this visit. Review of Systems:  · Constitutional: No unanticipated weight loss. There's been no change in energy level, sleep pattern, or activity level. No fevers, chills. · Eyes: No visual changes or diplopia. No scleral icterus. · ENT: No Headaches, hearing loss or vertigo. No mouth sores or sore throat. · Cardiovascular: as reviewed in HPI  · Respiratory: No cough or wheezing, no sputum production. No hemoptysis. · Gastrointestinal: No abdominal pain, appetite loss, blood in stools. No change in bowel or bladder habits. · Genitourinary: No dysuria, trouble voiding, or hematuria. · Musculoskeletal:  No gait disturbance, no joint complaints. · Integumentary: No rash or pruritis. · Neurological: No headache, diplopia, change in muscle strength, numbness or tingling. · Psychiatric: No anxiety or depression. · Endocrine: No temperature intolerance. No excessive thirst, fluid intake, or urination. No tremor. · Hematologic/Lymphatic: No abnormal bruising or bleeding, blood clots or swollen lymph nodes. · Allergic/Immunologic: No nasal congestion or hives.     Physical Exam:   /66 (Site: Left Upper Arm, Position: Sitting, Cuff Size: Medium Adult)   Pulse 88   Temp 98.1 °F (36.7 °C)   Ht 5' (1.524 m)   Wt 231 lb 6.4 oz (105 kg) Comment: with shoes  LMP 08/14/2015   SpO2 99% Comment: reno 4L  BMI 45.19 kg/m²   Wt Readings from Last 3 Encounters:   11/17/20 231 lb 6.4 oz (105 kg)   11/11/20 229 lb 15 oz (104.3 kg)   02/20/20 280 lb (127 kg)     Constitutional: She is an obese female who is oriented to person, place, and time. In no acute distress but appears older than stated age. Wearing supplemental O2  Head: Normocephalic and atraumatic. Blind right eye with dense cataract. No scleral icterus. Neck: Neck supple. No JVP or carotid bruit appreciated. No mass and no thyromegaly present. No lymphadenopathy present. Cardiovascular: Normal rate. Normal heart sounds. Exam reveals no gallop and no friction rub. No murmur heard. Pulmonary/Chest: Effort normal. No respiratory distress. She has no rhonchi or rales. Diffuse bilateral expiratory wheezes. Abdominal: Soft, non-tender. Bowel sounds are normal. She exhibits no organomegaly, mass or bruit. Extremities: 2+ BLE edema. No cyanosis or clubbing. Pulses are 2+ radial/carotid bilaterally. Neurological: No gross cranial nerve deficit. Coordination normal.   Skin: Skin is warm and dry. There is no rash or diaphoresis. Psychiatric: She has a normal mood and affect. Her speech is normal and behavior is normal.     Lab Review:   FLP:  No results found for: TRIG, HDL, LDLCALC, LDLDIRECT, LABVLDL  BUN/Creatinine:    Lab Results   Component Value Date    BUN 22 11/11/2020    CREATININE 0.8 11/11/2020     EKG Interpretation: 11/8/20. Baseline artifact. Sinus tachycardia. Possible Left atrial enlargement. Image Review:     Echo: 7/7/15  Left ventricle size is normal.  Normal left ventricular wall thickness. Ejection fraction is visually estimated to be 55-60 %. Normal right ventricular size and function. Echo: 11/9/2020. Personally reviewed. Overall, left ventricular systolic function appears mildly reduced with an ejection fraction estimated 45%. Mild diffuse hypokinesis. Moderately dilated left ventricle. Diastolic filling parameters just grade 2 diastolic dysfunction. Left atrium moderately dilated. Right ventricle is mildly dilated with normal function.  PASP estimated at 46 mmHg assuming a right atrial pressure of 15

## 2020-11-17 ENCOUNTER — TELEPHONE (OUTPATIENT)
Dept: PHARMACY | Age: 45
End: 2020-11-17

## 2020-11-17 ENCOUNTER — OFFICE VISIT (OUTPATIENT)
Dept: CARDIOLOGY CLINIC | Age: 45
End: 2020-11-17
Payer: MEDICARE

## 2020-11-17 VITALS
HEIGHT: 60 IN | DIASTOLIC BLOOD PRESSURE: 66 MMHG | BODY MASS INDEX: 45.43 KG/M2 | SYSTOLIC BLOOD PRESSURE: 102 MMHG | HEART RATE: 88 BPM | TEMPERATURE: 98.1 F | OXYGEN SATURATION: 99 % | WEIGHT: 231.4 LBS

## 2020-11-17 PROCEDURE — 99214 OFFICE O/P EST MOD 30 MIN: CPT | Performed by: INTERNAL MEDICINE

## 2020-11-17 NOTE — PATIENT INSTRUCTIONS
Patient Education        Heart Failure: Care Instructions  Your Care Instructions     Heart failure occurs when your heart does not pump as much blood as the body needs. Failure does not mean that the heart has stopped pumping but rather that it is not pumping as well as it should. Over time, this causes fluid buildup in your lungs and other parts of your body. Fluid buildup can cause shortness of breath, fatigue, swollen ankles, and other problems. By taking medicines regularly, reducing sodium (salt) in your diet, checking your weight every day, and making lifestyle changes, you can feel better and live longer. Follow-up care is a key part of your treatment and safety. Be sure to make and go to all appointments, and call your doctor if you are having problems. It's also a good idea to know your test results and keep a list of the medicines you take. How can you care for yourself at home? Medicines    · Be safe with medicines. Take your medicines exactly as prescribed. Call your doctor if you think you are having a problem with your medicine.     · Do not take any vitamins, over-the-counter medicine, or herbal products without talking to your doctor first. Nikki Schwartz not take ibuprofen (Advil or Motrin) and naproxen (Aleve) without talking to your doctor first. They could make your heart failure worse.     · You may take some of the following medicine. ? Angiotensin-converting enzyme inhibitors (ACEIs) or angiotensin II receptor blockers (ARBs) reduce the heart's workload, lower blood pressure, and reduce swelling. Taking an ACEI or ARB may lower your chance of needing to be hospitalized. ? Beta-blockers can slow heart rate, decrease blood pressure, and improve your condition. Taking a beta-blocker may lower your chance of needing to be hospitalized. ? Diuretics, also called water pills, reduce swelling. You will get more details on the specific medicines your doctor prescribes.   Diet    · Your doctor may suggest and 1 drink a day for women. Too much alcohol can cause health problems.     · Avoid getting sick from colds and the flu. Get a pneumococcal vaccine shot. If you have had one before, ask your doctor whether you need another dose. Get a flu shot each year. If you must be around people with colds or the flu, wash your hands often. When should you call for help? Call 911 if you have symptoms of sudden heart failure such as:    · You have severe trouble breathing.     · You cough up pink, foamy mucus.     · You have a new irregular or rapid heartbeat. Call your doctor now or seek immediate medical care if:    · You have new or increased shortness of breath.     · You are dizzy or lightheaded, or you feel like you may faint.     · You have sudden weight gain, such as more than 2 to 3 pounds in a day or 5 pounds in a week. (Your doctor may suggest a different range of weight gain.)     · You have increased swelling in your legs, ankles, or feet.     · You are suddenly so tired or weak that you cannot do your usual activities. Watch closely for changes in your health, and be sure to contact your doctor if you develop new symptoms. Where can you learn more? Go to [email or web address].[email or web address]. org and sign in to your [employer] account. Enter P739 in the [employer] box to learn more about \"Heart Failure: Care Instructions. \"     If you do not have an account, please click on the \"Sign Up Now\" link. Current as of: December 16, 2019               Content Version: 12.6  © 7462-4016 [employer]. Care instructions adapted under license by [facility] ([facility]). If you have questions about a medical condition or this instruction, always ask your healthcare professional. [place] [address] any warranty or liability for your use of this information.

## 2020-11-17 NOTE — TELEPHONE ENCOUNTER
Scheduled for new patient heart failure virtual visit with outpatient wellness center on Wednesday, November 25 at 11:00 AM

## 2020-11-25 ENCOUNTER — VIRTUAL VISIT (OUTPATIENT)
Dept: PHARMACY | Age: 45
End: 2020-11-25
Payer: MEDICARE

## 2020-11-25 PROCEDURE — 99211 OFF/OP EST MAY X REQ PHY/QHP: CPT | Performed by: NURSE PRACTITIONER

## 2020-11-25 NOTE — ASSESSMENT & PLAN NOTE
Discussed the importance of weight loss as it pertains to her chronic health conditions. We will continue to discuss at follow-up visits.

## 2020-11-25 NOTE — PROGRESS NOTES
[facility]  Heart Failure    [address], [address]  Phone: [phone]  Fax: [phone]      NAME: Krista Senior  MEDICAL RECORD NUMBER:  [id number]  AGE: 54 y.o.    GENDER: female  : 1975  EPISODE DATE:  2020      Chief Complaint   Patient presents with    Congestive Heart Failure        Past Medical History:   Diagnosis Date    Arthritis     Asthma     Blind right eye     COPD [facility]) [facility]     GERD (gastroesophageal reflux disease)     Hypertension     Movement disorder     Neuromuscular disorder ([facility]     Pneumonia       Past Surgical History:   Procedure Laterality Date    ABDOMEN SURGERY      CHOLECYSTECTOMY      DILATATION, ESOPHAGUS      EYE SURGERY      TUBAL LIGATION       Family History   Problem Relation Age of Onset    Cancer Mother         lung cancer    Osteoporosis Mother     Cancer Father         cancer    Arthritis Father      Social History     Tobacco Use    Smoking status: Former Smoker     Packs/day: 0.50     Years: 30.00     Pack years: 15.00     Types: Cigarettes     Start date: 1984     Last attempt to quit: 2017     Years since quittin.9    Smokeless tobacco: Never Used    Tobacco comment: lives with smoker    Substance Use Topics    Alcohol use: No     Alcohol/week: 0.0 standard drinks    Drug use: No     Counseling given: Not Answered  Comment: lives with smoker      Immunization History   Administered Date(s) Administered    Influenza Vaccine, unspecified formulation 2011    Influenza Virus Vaccine 2013    Influenza, Quadv, IM, (6 mo and older Fluzone, Flulaval, Fluarix and 3 yrs and older Afluria) 10/25/2018    Influenza, Quadv, IM, PF (6 mo and older Fluzone, Flulaval, Fluarix, and 3 yrs and older Afluria) 2017, 2020    Pneumococcal Polysaccharide ([id number]) 2011, 2016     Allergies Allergen Reactions    Latex      Rash developed    Sulfa Antibiotics Rash     Throat swelling    Nsaids Other (See Comments)     ulcer    Ultram [Tramadol Hcl] Swelling and Rash        ECHO EF%: 45 Date: 11/9/2020      Last Hospitalization: Discharge 11/11/2020    History of BLANCA: Yes, currently not using BiPAP due to mask being broken. States that she has reached out to the medical equipment company for replacement. [x]BIPAP/CPAP use:   [x]Education about proper cleaning completed today     Subjective   HPI: Ms. Diony Villalta is a 54 y.o. female being evaluated by a virtual visit via telephone encounter from patient's home due to [address] emergency. Due to the circumstances physical examination is very limited. This visit was conducted with the patient's consent for billing of her insurance. A telephone visit was necessary today to reduce the patient's exposure to COVID-19 and to provide necessary medical care. This patient has been advised to contact this office, her cardiologist, or primary care physician if symptoms develop or to call 911 for emergency medical treatment if deemed necessary. Medical history significant for diastolic heart failure, COPD, BLANCA, chronic respiratory failure. She has a BMI of 45 and is a former smoker. She tells me that overall she feels good today and has no complaint other than feeling depressed. States that she believes her depression is coming from the [employer] and she is feeling very sad about isolating herself from her family on Thanksgiving. She has custody of her 8year-old grandson. She reports shortness of breath with normal activities. Currently wearing 3 L of O2 baseline day and night. She reports good compliance with her medication as well as taking her vital signs daily she is also weighing herself daily and reports that her weight has been stable at around 229 pounds. She is watching her fluid intake and limits to 64 ounces per day. Not watching sodium is closely. She eats a lot of prepackaged convenience foods. She is also drinking [employer]. DEL in addition to water for her fluid intake daily. She is not using her BiPAP machine and tells me that her mask is broken and she has reached out to the supply company who has not returned her call. I encouraged her to reach out to them again today. I also encouraged her to reach out to her primary care doctor to get in for an appointment to talk about her depression. She denies thoughts of harming herself or others. She states her primary goal for her health is she wants to be able to breathe again and function normally. Overall her nutritional intake is poor and she does not exercise. She denies symptoms of acute exacerbation of her heart failure or COPD. Denies shortness of breath beyond baseline, denies edema, denies cough, sputum, or wheezing. Denies abdominal fullness, chest pain or palpitations. She has follow-up scheduled with pulmonary and cardiology later in December. Review of Systems:   Constitutional: Negative for fatigue, Negative for activity change, appetite change, chills, diaphoresis, fever and unexpected weight change. HENT: Negative for congestion, ear pain, postnasal drip, rhinorrhea, sinus pressure, sinus pain, sneezing, sore throat and trouble swallowing. Eyes: Negative for discharge and redness. Respiratory: Negative for cough, chest tightness, sputum, or wheezing. Cardiovascular: Negative for chest pain, palpitations and leg swelling. +SOB  Gastrointestinal: Negative for abdominal distention, abdominal pain, constipation, diarrhea, nausea and vomiting. Genitourinary: Negative for decreased urine volume, difficulty urinating, dysuria and hematuria. Skin: Negative for pallor and rash. Neurological: Negative for dizziness, tremors, weakness, light-headedness and headaches. Psychiatric/Behavioral: Negative for confusion.  The patient is not 11/11/2020    CREATININE 0.8 11/11/2020       CBC:    Lab Results   Component Value Date    WBC 9.1 11/09/2020    HGB 12.0 11/09/2020    HCT 38.1 11/09/2020     11/09/2020     HgA1C: No results found for: LABA1C  TSH: No results found for: TSH  Lipids: No results found for: CHOL, TRIG, HDL, LDLCALC, VLDL  ProBNP:   Lab Results   Component Value Date    PROBNP 1,610 11/11/2020    PROBNP 7,278 11/08/2020       [x]Reviewed daily weight log and assessed self management skills including early recognition and notification of exacerbation  [x]Encouraged daily weights and to call with increase of 3 pounds in one day or 5 pounds in one week or weight increased above dry weight    [x]Discussed fluid restriction and sodium restriction as well as nutrition goals   [x]Weight loss counseling performed, including carbohydrate and calorie reduction  [x]Exercise Counseling performed        Medications reviewed:   [x] compared patient's bottles with EPIC list  [] patient did not bring medication bottles      Current medications:  Outpatient Medications Marked as Taking for the 11/25/20 encounter (Virtual Visit) with JULIUS Mcdaniels CNP   Medication Sig Dispense Refill    lisinopril (PRINIVIL;ZESTRIL) 2.5 MG tablet Take 1 tablet by mouth daily 30 tablet 3    carvedilol (COREG) 3.125 MG tablet Take 1 tablet by mouth 2 times daily 60 tablet 3    ibuprofen (ADVIL;MOTRIN) 800 MG tablet Take 800 mg by mouth daily      budesonide-formoterol (SYMBICORT) 160-4.5 MCG/ACT AERO Inhale 2 puffs into the lungs 2 times daily 1 Inhaler 5    albuterol sulfate HFA (PROAIR HFA) 108 (90 Base) MCG/ACT inhaler Inhale 2 puffs into the lungs every 4 hours as needed for Wheezing or Shortness of Breath 1 Inhaler 5    ipratropium-albuterol (DUONEB) 0.5-2.5 (3) MG/3ML SOLN nebulizer solution INHALE ONE VIAL (3 MILLILITERS) VIA NEBULIZATION BY MOUTH EVERY 4 HOURS 360 mL 0    tiotropium (SPIRIVA) 18 MCG inhalation capsule Inhale 1 capsule into Details of this discussion including any medical advice provided: See visit note      I affirm this is a Patient Initiated Episode with a Patient who has not had a related appointment within my department in the past 7 days or scheduled within the next 24 hours. Patient identification was verified at the start of the visit: Yes    Total Time: minutes: 21-30 minutes    Note: not billable if this call serves to triage the patient into an appointment for the relevant concern      KJ BURDEN      A heart failure binder has been provided prior to discharge in addition to extensive education including the information noted above. Assessment/Plan     Problem List Items Addressed This Visit     COPD, moderate ([address]     No symptoms of acute exacerbation at this time. Currently has all meds. Action plan reviewed today with patient. Has follow-up scheduled with pulmonary December 29, 2020. Chronic diastolic congestive heart failure (HCC) - Primary     No symptoms of acute exacerbation at this time. Strongly encouraged to monitor sodium and fluid intake carefully. Continue daily weights. Action plan reviewed today with patient. Has cardiology follow-up scheduled for December 22, 2020. BMI 45.0-49.9, adult [facility])     Discussed the importance of weight loss as it pertains to her chronic health conditions. We will continue to discuss at follow-up visits. No orders of the defined types were placed in this encounter. Follow up with wellness center: 2 Weeks  Time spent in visit today including counseling and education: 30 minutes. *This note was transcribed using [id number] [employer]. Please disregard any translational errors.          Electronically signed by JULIUS Mendoza CNP, on 11/25/2020 at 12:22 PM

## 2020-11-25 NOTE — ASSESSMENT & PLAN NOTE
No symptoms of acute exacerbation at this time. Currently has all meds. Action plan reviewed today with patient. Has follow-up scheduled with pulmonary December 29, 2020.

## 2020-11-25 NOTE — ASSESSMENT & PLAN NOTE
No symptoms of acute exacerbation at this time. Strongly encouraged to monitor sodium and fluid intake carefully. Continue daily weights. Action plan reviewed today with patient. Has cardiology follow-up scheduled for December 22, 2020.

## 2020-12-11 ENCOUNTER — TELEPHONE (OUTPATIENT)
Dept: PHARMACY | Age: 45
End: 2020-12-11

## 2020-12-11 NOTE — TELEPHONE ENCOUNTER
Left message for return call to outpatient wellness center. Attempting to contact patient for reschedule of missed virtual encounter with outpatient wellness center on 12/9/2020.

## 2020-12-14 DIAGNOSIS — I50.32 CHRONIC DIASTOLIC CONGESTIVE HEART FAILURE (HCC): ICD-10-CM

## 2020-12-14 LAB
ANION GAP SERPL CALCULATED.3IONS-SCNC: 11 MMOL/L (ref 3–16)
BUN BLDV-MCNC: 10 MG/DL (ref 7–20)
CALCIUM SERPL-MCNC: 9.3 MG/DL (ref 8.3–10.6)
CHLORIDE BLD-SCNC: 95 MMOL/L (ref 99–110)
CO2: 35 MMOL/L (ref 21–32)
CREAT SERPL-MCNC: 0.6 MG/DL (ref 0.6–1.1)
GFR AFRICAN AMERICAN: >60
GFR NON-AFRICAN AMERICAN: >60
GLUCOSE BLD-MCNC: 113 MG/DL (ref 70–99)
POTASSIUM SERPL-SCNC: 3.9 MMOL/L (ref 3.5–5.1)
PRO-BNP: 1036 PG/ML (ref 0–124)
SODIUM BLD-SCNC: 141 MMOL/L (ref 136–145)

## 2020-12-21 NOTE — PATIENT INSTRUCTIONS

## 2020-12-21 NOTE — PROGRESS NOTES
[facility]      Cardiology Consult    Sandra Boggs  1975    December 22, 2020    Primary care physician: Jose Ward MD  Reason for Referral: Diastolic heart failure     CC: \"Swelling is better. \"     HPI:  The patient is 54 y.o. female with a past medical history significant for obesity, hypertension, diastolic heart failure, and COPD on supplemental O2 who presents today for management of heart failure. She was admitted 11/2020 with complaints of worsening shortness of breath. Her BNP was elevated and her echo showed an EF of 45%. She was treated for a COPD exacerbation and with IV lasix. Today, she states she is generally feeling better and the edema has improved. She denies any new cardiac sounding complaints. She reports she is compliant with her medications and tolerating. She denies any worsening shortness of breath or chest pains. She reports chronic GALLOWAY and continues to require supplemental oxygen but denies any acute progression. She is also now compliant with CPAP and feels that is also helping. Patient denies exertional chest pain/pressure, dyspnea at rest, worsening GALLOWAY, PND, orthopnea, palpitations, lightheadedness, weight changes, changes in LE edema, and syncope.       Past Medical History:   Diagnosis Date    Arthritis     Asthma     Blind right eye     COPD (chronic obstructive pulmonary disease) (HCC)     GERD (gastroesophageal reflux disease)     Hypertension     Movement disorder     Neuromuscular disorder (HCC)     Pneumonia      Past Surgical History:   Procedure Laterality Date    ABDOMEN SURGERY      CHOLECYSTECTOMY      DILATATION, ESOPHAGUS      EYE SURGERY      TUBAL LIGATION       Family History   Problem Relation Age of Onset    Cancer Mother         lung cancer    Osteoporosis Mother     Cancer Father         cancer    Arthritis Father      Social History     Tobacco Use    Smoking status: Former Smoker     Packs/day: 0.50     Years: 30.00 Pack years: 15.00     Types: Cigarettes     Start date: 4/21/1984     Quit date: 12/16/2017     Years since quitting: 3.0    Smokeless tobacco: Never Used    Tobacco comment: lives with smoker    Substance Use Topics    Alcohol use: No     Alcohol/week: 0.0 standard drinks    Drug use: No       Allergies   Allergen Reactions    Latex      Rash developed    Sulfa Antibiotics Rash     Throat swelling    Nsaids Other (See Comments)     ulcer    Ultram [Tramadol Hcl] Swelling and Rash     Current Outpatient Medications   Medication Sig Dispense Refill    sertraline (ZOLOFT) 50 MG tablet Take 50 mg by mouth daily      lisinopril (PRINIVIL;ZESTRIL) 2.5 MG tablet Take 1 tablet by mouth daily 30 tablet 3    carvedilol (COREG) 3.125 MG tablet Take 1 tablet by mouth 2 times daily 60 tablet 3    ibuprofen (ADVIL;MOTRIN) 800 MG tablet Take 800 mg by mouth daily      budesonide-formoterol (SYMBICORT) 160-4.5 MCG/ACT AERO Inhale 2 puffs into the lungs 2 times daily 1 Inhaler 5    albuterol sulfate HFA (PROAIR HFA) 108 (90 Base) MCG/ACT inhaler Inhale 2 puffs into the lungs every 4 hours as needed for Wheezing or Shortness of Breath 1 Inhaler 5    ipratropium-albuterol (DUONEB) 0.5-2.5 (3) MG/3ML SOLN nebulizer solution INHALE ONE VIAL (3 MILLILITERS) VIA NEBULIZATION BY MOUTH EVERY 4 HOURS 360 mL 0    tiotropium (SPIRIVA) 18 MCG inhalation capsule Inhale 1 capsule into the lungs daily 30 capsule 5    acetaminophen (TYLENOL) 500 MG tablet Take 1,000 mg by mouth every 6 hours as needed for Pain      omeprazole (PRILOSEC) 20 MG delayed release capsule Take 40 mg by mouth daily      furosemide (LASIX) 40 MG tablet Take 1 tablet by mouth daily (Patient taking differently: Take 40 mg by mouth 2 times daily Will start BID today per patient) 60 tablet 3    gabapentin (NEURONTIN) 100 MG capsule Take 100 mg by mouth 4 times daily.        Spacer/Aero-Holding Chambers MARILOU 1 Device by Does not apply route daily as needed 1 Device 0    OXYGEN Inhale 2 L into the lungs See Admin Instructions. (Patient taking differently: Inhale 2 L into the lungs See Admin Instructions Indications: 2L sitting, 4L when up and around Continuous at night or as needed when at home per patient) 1 Can 0    methadone 10 MG/5ML solution Take 130 mg by mouth daily  From [facility] daily . No current facility-administered medications for this visit. Review of Systems:  · Constitutional: No unanticipated weight loss. There's been no change in energy level, sleep pattern, or activity level. No fevers, chills. · Eyes: No visual changes or diplopia. No scleral icterus. · ENT: No Headaches, hearing loss or vertigo. No mouth sores or sore throat. · Cardiovascular: as reviewed in HPI  · Respiratory: No cough or wheezing, no sputum production. No hemoptysis. · Gastrointestinal: No abdominal pain, appetite loss, blood in stools. No change in bowel or bladder habits. · Genitourinary: No dysuria, trouble voiding, or hematuria. · Musculoskeletal:  No gait disturbance, no joint complaints. · Integumentary: No rash or pruritis. · Neurological: No headache, diplopia, change in muscle strength, numbness or tingling. · Psychiatric: No anxiety or depression. · Endocrine: No temperature intolerance. No excessive thirst, fluid intake, or urination. No tremor. · Hematologic/Lymphatic: No abnormal bruising or bleeding, blood clots or swollen lymph nodes. · Allergic/Immunologic: No nasal congestion or hives. Physical Exam:   /82   Pulse 85   Temp 96 °F (35.6 °C)   Ht 5' (1.524 m)   Wt 228 lb (103.4 kg)   LMP 08/14/2015   SpO2 98%   BMI 44.53 kg/m²   Wt Readings from Last 3 Encounters:   12/22/20 228 lb (103.4 kg)   11/17/20 231 lb 6.4 oz (105 kg)   11/11/20 229 lb 15 oz (104.3 kg)     Constitutional: She is an obese female who is oriented to person, place, and time.  In no acute distress but appears older than stated age.  Wearing supplemental O2  Head: Normocephalic and atraumatic. Blind right eye with dense cataract. No scleral icterus. Neck: Neck supple. No JVP or carotid bruit appreciated. No mass and no thyromegaly present. No lymphadenopathy present. Cardiovascular: Normal rate. Normal heart sounds. Exam reveals no gallop and no friction rub. No murmur heard. Pulmonary/Chest: Effort normal. No respiratory distress. She has no rhonchi or rales. Diffuse bilateral expiratory wheezes. Abdominal: Soft, non-tender. Bowel sounds are normal. She exhibits no organomegaly, mass or bruit. Extremities: 2+ BLE edema. No cyanosis or clubbing. Pulses are 2+ radial/carotid bilaterally. Neurological: No gross cranial nerve deficit. Coordination normal.   Skin: Skin is warm and dry. There is no rash or diaphoresis. Psychiatric: She has a normal mood and affect. Her speech is normal and behavior is normal.     Lab Review:   FLP:  No results found for: TRIG, HDL, LDLCALC, LDLDIRECT, LABVLDL  BUN/Creatinine:    Lab Results   Component Value Date    BUN 10 12/14/2020    CREATININE 0.6 12/14/2020     EKG Interpretation: 11/8/20. Baseline artifact. Sinus tachycardia. Possible Left atrial enlargement. Image Review:     Echo: 7/7/15  Left ventricle size is normal.  Normal left ventricular wall thickness. Ejection fraction is visually estimated to be 55-60 %. Normal right ventricular size and function. Echo: 11/9/2020. Personally reviewed. Overall, left ventricular systolic function appears mildly reduced with an ejection fraction estimated 45%. Mild diffuse hypokinesis. Moderately dilated left ventricle. Diastolic filling parameters just grade 2 diastolic dysfunction. Left atrium moderately dilated. Right ventricle is mildly dilated with normal function. PASP estimated at 46 mmHg assuming a right atrial pressure of 15 mmHg. Assessment/Plan:   1) Chronic diastolic heart failure. EF 45%.  Volume status difficult to assess with morbid obesity but subjectively improved with diuresis. Her weight remains stable and BNP has improved. Continues to have 2+ LE edema. Etiology uncertain but possibly multifactorial.  Discussed methadone being associated with cardiomyopathies but uncertain if it is realistic for the patient to discontinue medication. No Aldactone at this time with EF >40%. Increase Lasix to 80 mg daily for 3 days to see if edema improves and will increase lisinopril to 5 mg daily. Encouraged medication compliance.      2) Chronic hypoxic and hypercapnic respiratory failure/COPD/BLANCA. Following with pulmonary and requires supplemental oxygen.    3) Chronic methadone use. Will defer pain management to primary care physician but ideally would wean off methadone with CHF. 4) Morbid obesity. BMI 44.5. Encouraged weight loss. Follow up 4-6 weeks    Thank you very much for allowing me to participate in the care of your patient. Please do not hesitate to contact me if you have any questions. Sincerely,  Taylor Almonte. Marla Grande, [address] [place] [address], [address] [place] [address]  Ph: [phone]  Fax: [phone]    This note was scribed in the presence of Dr Marla Grande MD by Jovanni Brooks RN. Physician Attestation: The scribes documentation has been prepared under my direction and personally reviewed by me in its entirety. I confirm that the note above accurately reflects all work, treatment, procedures, and medical decision making performed by me. All portions of the note including but not limited to the chief complaint, history of present illness, physical exam, assessment and plan/medical decision making were personally reviewed, edited, and updated on the day of the visit.

## 2020-12-22 ENCOUNTER — OFFICE VISIT (OUTPATIENT)
Dept: CARDIOLOGY CLINIC | Age: 45
End: 2020-12-22
Payer: MEDICARE

## 2020-12-22 VITALS
HEART RATE: 85 BPM | OXYGEN SATURATION: 98 % | SYSTOLIC BLOOD PRESSURE: 124 MMHG | DIASTOLIC BLOOD PRESSURE: 82 MMHG | HEIGHT: 60 IN | TEMPERATURE: 96 F | BODY MASS INDEX: 44.76 KG/M2 | WEIGHT: 228 LBS

## 2020-12-22 PROCEDURE — 99214 OFFICE O/P EST MOD 30 MIN: CPT | Performed by: INTERNAL MEDICINE

## 2020-12-22 RX ORDER — CARVEDILOL 3.12 MG/1
3.12 TABLET ORAL 2 TIMES DAILY
Qty: 60 TABLET | Refills: 3 | Status: SHIPPED | OUTPATIENT
Start: 2020-12-22 | End: 2021-04-14 | Stop reason: SDUPTHER

## 2020-12-22 RX ORDER — LISINOPRIL 5 MG/1
5 TABLET ORAL DAILY
Qty: 30 TABLET | Refills: 3 | Status: SHIPPED | OUTPATIENT
Start: 2020-12-22 | End: 2021-04-14 | Stop reason: SDUPTHER

## 2020-12-29 ENCOUNTER — OFFICE VISIT (OUTPATIENT)
Dept: PULMONOLOGY | Age: 45
End: 2020-12-29
Payer: MEDICARE

## 2020-12-29 VITALS
SYSTOLIC BLOOD PRESSURE: 136 MMHG | BODY MASS INDEX: 45.16 KG/M2 | HEART RATE: 89 BPM | WEIGHT: 230 LBS | TEMPERATURE: 96 F | DIASTOLIC BLOOD PRESSURE: 88 MMHG | RESPIRATION RATE: 14 BRPM | HEIGHT: 60 IN | OXYGEN SATURATION: 98 %

## 2020-12-29 PROCEDURE — 99214 OFFICE O/P EST MOD 30 MIN: CPT | Performed by: INTERNAL MEDICINE

## 2020-12-29 RX ORDER — TIOTROPIUM BROMIDE 18 UG/1
18 CAPSULE ORAL; RESPIRATORY (INHALATION) DAILY
Qty: 30 CAPSULE | Refills: 11 | Status: ON HOLD | OUTPATIENT
Start: 2020-12-29 | End: 2021-06-13

## 2020-12-29 RX ORDER — PREDNISONE 10 MG/1
TABLET ORAL
Qty: 30 TABLET | Refills: 0 | Status: SHIPPED
Start: 2020-12-29 | End: 2021-02-22 | Stop reason: DRUGHIGH

## 2020-12-29 RX ORDER — ALBUTEROL SULFATE 90 UG/1
2 AEROSOL, METERED RESPIRATORY (INHALATION) EVERY 4 HOURS PRN
Qty: 1 INHALER | Refills: 11 | Status: SHIPPED | OUTPATIENT
Start: 2020-12-29 | End: 2022-01-14

## 2020-12-29 RX ORDER — BUDESONIDE AND FORMOTEROL FUMARATE DIHYDRATE 160; 4.5 UG/1; UG/1
2 AEROSOL RESPIRATORY (INHALATION) 2 TIMES DAILY
Qty: 1 INHALER | Refills: 11 | Status: SHIPPED | OUTPATIENT
Start: 2020-12-29

## 2020-12-29 NOTE — PATIENT INSTRUCTIONS
Take prednisone     Continue with inhalers    Try to get more hours with bipap machine    Follow up in 3 months

## 2020-12-29 NOTE — PROGRESS NOTES
Chief Complaint   Patient presents with    COPD     f/u       HPI  Here for CC of COPD, recent admission back in November for respiratory failure     She was admitted back in November for hypoxia and hypercapnia with possible infection. She was not using her bipap prior to admission. COVID-19 was negative. EF was found to be low to (40-45%)  She is doing ok. Trying to use her bipap again but not getting a lot of hours because she says she cannot sleep. She says she has cut back on her [facility]. She is breathing slightly worse due to the cold. On diuretics now that have help her some. Not smoking. Wondering when she can get her COVID vaccine. Has had her flu shot     Does not do any physical activity     Machine:  Patient is using a bilevel machine. Average usage is 1 hrs 55 min 00 sec. She is meeting 4 or more hours per night 7% of the time. Average AHI is 1.2. Patient admits to trouble sleeping due to insomnia  .        PAST MEDICAL HISTORY:  Past Medical History:   Diagnosis Date    Arthritis     Asthma     Blind right eye     COPD [facility] obstructive pulmonary disease) [facility] [address]     GERD (gastroesophageal reflux disease)     Hypertension     Movement disorder     Neuromuscular disorder ([facility] [address]     Pneumonia      PAST SURGICAL HISTORY:  Past Surgical History:   Procedure Laterality Date    ABDOMEN SURGERY      CHOLECYSTECTOMY      DILATATION, ESOPHAGUS      EYE SURGERY      TUBAL LIGATION       Social History     Socioeconomic History    Marital status:      Spouse name: None    Number of children: 3    Years of education: 9    Highest education level: None   Occupational History    Occupation: disabled   Social Needs    Financial resource strain: None    Food insecurity     Worry: None     Inability: None    Transportation needs     Medical: None     Non-medical: None   Tobacco Use    Smoking status: Former Smoker     Packs/day: 0.50     Years: 30.00     Pack years: 15.00     Types: Cigarettes     Start date: 4/21/1984     Quit date: 12/16/2017     Years since quitting: 3.0    Smokeless tobacco: Never Used    Tobacco comment: lives with smoker    Substance and Sexual Activity    Alcohol use: No     Alcohol/week: 0.0 standard drinks    Drug use: No    Sexual activity: None   Lifestyle    Physical activity     Days per week: None     Minutes per session: None    Stress: None   Relationships    Social connections     Talks on phone: None     Gets together: None     Attends Taoism service: None     Active member of club or organization: None     Attends meetings of clubs or organizations: None     Relationship status: None    Intimate partner violence     Fear of current or ex partner: None     Emotionally abused: None     Physically abused: None     Forced sexual activity: None   Other Topics Concern    None   Social History Narrative    None     Current Outpatient Medications   Medication Sig Dispense Refill    predniSONE (DELTASONE) 10 MG tablet 40mg for 3days 30mg for 3days 20mg for 3days 10mg for 3days 30 tablet 0    SYMBICORT 160-4.5 MCG/ACT AERO Inhale 2 puffs into the lungs 2 times daily 1 Inhaler 11    tiotropium (SPIRIVA HANDIHALER) 18 MCG inhalation capsule Inhale 1 capsule into the lungs daily 30 capsule 11    albuterol sulfate HFA (PROAIR HFA) 108 (90 Base) MCG/ACT inhaler Inhale 2 puffs into the lungs every 4 hours as needed for Wheezing or Shortness of Breath 1 Inhaler 11    sertraline (ZOLOFT) 50 MG tablet Take 50 mg by mouth daily      lisinopril (PRINIVIL;ZESTRIL) 5 MG tablet Take 1 tablet by mouth daily 30 tablet 3    carvedilol (COREG) 3.125 MG tablet Take 1 tablet by mouth 2 times daily 60 tablet 3    ibuprofen (ADVIL;MOTRIN) 800 MG tablet Take 800 mg by mouth daily      budesonide-formoterol (SYMBICORT) 160-4.5 MCG/ACT AERO Inhale 2 puffs into the lungs 2 times daily 1 Inhaler 5    albuterol sulfate HFA (PROAIR HFA) 108 (90 Base) MCG/ACT inhaler Inhale 2 puffs into the lungs every 4 hours as needed for Wheezing or Shortness of Breath 1 Inhaler 5    ipratropium-albuterol (DUONEB) 0.5-2.5 (3) MG/3ML SOLN nebulizer solution INHALE ONE VIAL (3 MILLILITERS) VIA NEBULIZATION BY MOUTH EVERY 4 HOURS 360 mL 0    tiotropium (SPIRIVA) 18 MCG inhalation capsule Inhale 1 capsule into the lungs daily 30 capsule 5    acetaminophen (TYLENOL) 500 MG tablet Take 1,000 mg by mouth every 6 hours as needed for Pain      omeprazole (PRILOSEC) 20 MG delayed release capsule Take 40 mg by mouth daily      furosemide (LASIX) 40 MG tablet Take 1 tablet by mouth daily (Patient taking differently: Take 40 mg by mouth 2 times daily Will start BID today per patient) 60 tablet 3    gabapentin (NEURONTIN) 100 MG capsule Take 100 mg by mouth 4 times daily.  Spacer/Aero-Holding Chambers MARILOU 1 Device by Does not apply route daily as needed 1 Device 0    OXYGEN Inhale 2 L into the lungs See Admin Instructions. (Patient taking differently: Inhale 2 L into the lungs See Admin Instructions Indications: 2L sitting, 4L when up and around Continuous at night or as needed when at home per patient) 1 Can 0    methadone 10 MG/5ML solution Take 130 mg by mouth daily  From [facility] methadone clinic daily . No current facility-administered medications for this visit. REVIEW OF SYSTEMS:  Constitutional: unable to sleep still, has cut back on mountain dew   HENT: Negative for sore throat  Eyes: Negative for redness   Respiratory: more SOB with the cold air   Cardiovascular: Negative for chest pain.    Gastrointestinal: Negative for vomiting, diarrhea   Genitourinary: Negative for hematuria   Musculoskeletal: Negative for arthralgias   Skin: Negative for rash  Neurological: Negative for syncope  Hematological: Negative for adenopathy  Psychiatric/Behavorial: Negative for anxiety    Objective:   PHYSICAL EXAM:    GENERAL:  Well nourished, alert, appears stated age, no distress. Calm. Unkempt  HEENT:  No apparent conjunctival irritation, extra-ocular muscles seem intact. Wearing glasses   NECK:  No masses visibile, no torticollis   LYMPH:  NO visible masses  LUNGS:  Wheezing, scoliosis   HEART:  Regular rate and rhythm   ABDOMEN:  No gross apparent distention  EXTREMITIES:  No visibile swelling, no clubbing   NEURO:  No localizing deficits, CN II-XII intact. No aphasia noted   SKIN:  no visible rashes on exposed skin  PSYCH:  no hallucinations, normal affect    Data Reviewed:   CBC and Renal reviewed  Last CBC  Lab Results   Component Value Date    WBC 9.1 11/09/2020    RBC 4.55 11/09/2020    HGB 12.0 11/09/2020    MCV 83.7 11/09/2020     11/09/2020     Last Renal  Lab Results   Component Value Date     12/14/2020    K 3.9 12/14/2020    K 4.1 11/08/2020    CL 95 12/14/2020    CO2 35 12/14/2020    CO2 44 11/11/2020    CO2 41 11/10/2020    BUN 10 12/14/2020    CREATININE 0.6 12/14/2020    GLUCOSE 113 12/14/2020    CALCIUM 9.3 12/14/2020     PFT 2015  Moderately-severe obstruction with response to bronchodilators   (which could be consistent with asthma)    Borderline air trapping is present    Mild reduced diffusing capacity with no recent hemoglobin to   correct for    FEV1 is 1.39 L at 53% predicted. CXR from 11/2020 is reviewed. My impression is bilateral airspace disease which might be pulmonary edema    Alpha-1 Anti-trypsin levels:  148, Phenotype:  MM       ECHO 11/2020  Ejection fraction is visually estimated to be 40-45%. There is mild diffuse hypokinesis. Diastolic filling parameters suggest grade I diastolic dysfunction. Mitral valve leaflets appear mildly thickened. Mild mitral regurgitation is present. The left atrium is moderately dilated. The right ventricle is enlarged. Right ventricular systolic function is normal. TAPSE 2.3 cm IVC size is dilated (>2.1 cm) and collapses < 50% with respiration consistent with elevated RA pressure (15 mmHg). Estimated pulmonary artery systolic pressure is mildly elevated at 46 mmHg assuming a right atrial pressure of 15 mmHg.    6 MWT 3/2020  The patient ambulated 244 meters which is   abnormal- 58-% predicted.  Her, heart rate response was normal,   the blood pressure response was normal, oxygen saturations were   abnormal as she desaturated while ambulating on room air.  The   patient desaturated to 87% while ambulating on room air, and was   placed on 4 L of oxygen to keep saturations above 90%.   The   degree of symptoms based on the Laila Dyspnea/Fatigue scale were   increased with testing.  This test does indicate the need for   supplemental oxygen. COPD Assessment Test    1. I never cough vs I cough all the time:  3  2. I have no phlegm vs I am completely full of phlegm. 2  3. My chest does not feel tight vs my chest feel vs tight.  0  4. Not breathless with inclines vs breathless with inclines. 5  5. Not limited with ADLs vs Very limited with ADLs. 3  6. Confidence leaving home vs no confidence.  0  7. I sleep soundly vs I don't sleep soundly. 3  8. I have lots of energy vs I have no energy. 3    TOTAL POINTS:  19    Scoring:    A) >30. Very high impact on quality of life. Optimize therapy including rehab  B) >20. High impact on quality of life. C) 10-20. Medium impact on qualify of life  D) <10. Low impact on life  E)  5.  Upper limit of normal in health non-smoker    I reviewed the above labs and images      Assessment/Plan  1. COPD, severe ([address]  Might be on the verge of a flare up. Prednisone burst.  Continue with triple inhalers  - predniSONE (DELTASONE) 10 MG tablet; 40mg for 3days 30mg for 3days 20mg for 3days 10mg for 3days  Dispense: 30 tablet; Refill: 0  - SYMBICORT 160-4.5 MCG/ACT AERO; Inhale 2 puffs into the lungs 2 times daily  Dispense: 1 Inhaler; Refill: 11  - tiotropium (SPIRIVA HANDIHALER) 18 MCG inhalation capsule;  Inhale 1 capsule into the lungs daily  Dispense: 30 capsule; Refill: 11  - albuterol sulfate HFA (PROAIR HFA) 108 (90 Base) MCG/ACT inhaler; Inhale 2 puffs into the lungs every 4 hours as needed for Wheezing or Shortness of Breath  Dispense: 1 Inhaler; Refill: 11    2. Chronic respiratory failure with hypoxia and hypercapnia (HCC)  Uses 4 liters of oxygen with exertion and uses bilevel with sleep. Has not been using her machine. No plans for NIV machine because if she would use her bilevel she would probably be fine/.  Does benefit from oxygen use with exertion etc.     3. BLANCA treated with BiPAP  Not compliant due to poor sleep habits.   She is not a candidate for a sleep aid due to ongoing methadone use    Flu shot up to date     Follow up in 4 months

## 2021-02-04 ENCOUNTER — TELEPHONE (OUTPATIENT)
Dept: PULMONOLOGY | Age: 46
End: 2021-02-04

## 2021-02-04 RX ORDER — PREDNISONE 20 MG/1
TABLET ORAL
Qty: 13 TABLET | Refills: 0 | Status: SHIPPED
Start: 2021-02-04 | End: 2021-02-22 | Stop reason: DRUGHIGH

## 2021-02-04 NOTE — TELEPHONE ENCOUNTER
Patient called the answering service complaining of 1 day of exertional shortness of breath and wheezing. She denies cough or sputum production. She is requesting prednisone. I have sent a prescription for a 10-day taper of prednisone, however, we discussed that if her symptoms worsen or do not improve she should go to the emergency room. She has agreed to do so.

## 2021-02-19 NOTE — PROGRESS NOTES
[facility]      Cardiology Progress Note    Sekou Tomas  1975    February 22, 2021    Primary care physician: Mary Monroe MD  Reason for Referral: Diastolic heart failure     CC: \"swelling gets better sometimes. \"     HPI:  The patient is 54 y.o. female with a past medical history significant for obesity, hypertension, diastolic heart failure, and COPD on supplemental O2 who presents today for management of heart failure. She was admitted 11/2020 with complaints of worsening shortness of breath. Her BNP was elevated and her echo showed an EF of 45%. She was treated for a COPD exacerbation and with IV lasix. Today, she denies any new cardiac sounding complaints. She presents in a wheelchair and is accompanied by her daughter. She remains on oxygen therapy per [place]. She denies that she has needed an increase in requirements. The patient report improvement with the increase in Lasix to 80 mg for 3 days at her last visit. She is currently taking 60 mg daily. She has chronic dyspnea on exertion but denies any acute or progressive changes since last visit. She reports that today her swelling is a little worse but in general is fairly stable. Patient denies exertional chest pain/pressure, PND, orthopnea, palpitations, lightheadedness, weight changes, and syncope.     Past Medical History:   Diagnosis Date    Arthritis     Asthma     Blind right eye     COPD (chronic obstructive pulmonary disease) (HCC)     GERD (gastroesophageal reflux disease)     Hypertension     Movement disorder     Neuromuscular disorder (HCC)     Pneumonia      Past Surgical History:   Procedure Laterality Date    ABDOMEN SURGERY      CHOLECYSTECTOMY      DILATATION, ESOPHAGUS      EYE SURGERY      TUBAL LIGATION       Family History   Problem Relation Age of Onset    Cancer Mother         lung cancer    Osteoporosis Mother     Cancer Father         cancer    Arthritis Father      Social History     Tobacco Use    Smoking status: Former Smoker     Packs/day: 0.50     Years: 30.00     Pack years: 15.00     Types: Cigarettes     Start date: 4/21/1984     Quit date: 12/16/2017     Years since quitting: 3.1    Smokeless tobacco: Never Used    Tobacco comment: lives with smoker    Substance Use Topics    Alcohol use: No     Alcohol/week: 0.0 standard drinks    Drug use: No       Allergies   Allergen Reactions    Latex      Rash developed    Sulfa Antibiotics Rash     Throat swelling    Nsaids Other (See Comments)     ulcer    Ultram [Tramadol Hcl] Swelling and Rash     Current Outpatient Medications   Medication Sig Dispense Refill    predniSONE (DELTASONE) 10 MG tablet Take 40 mg by mouth daily      furosemide (LASIX) 40 MG tablet Take 60 mg by mouth daily      gabapentin (NEURONTIN) 300 MG capsule Take 300 mg by mouth 3 times daily.       sertraline (ZOLOFT) 100 MG tablet Take 100 mg by mouth daily      SYMBICORT 160-4.5 MCG/ACT AERO Inhale 2 puffs into the lungs 2 times daily 1 Inhaler 11    tiotropium (SPIRIVA HANDIHALER) 18 MCG inhalation capsule Inhale 1 capsule into the lungs daily 30 capsule 11    albuterol sulfate HFA (PROAIR HFA) 108 (90 Base) MCG/ACT inhaler Inhale 2 puffs into the lungs every 4 hours as needed for Wheezing or Shortness of Breath 1 Inhaler 11    lisinopril (PRINIVIL;ZESTRIL) 5 MG tablet Take 1 tablet by mouth daily 30 tablet 3    carvedilol (COREG) 3.125 MG tablet Take 1 tablet by mouth 2 times daily 60 tablet 3    ibuprofen (ADVIL;MOTRIN) 800 MG tablet Take 800 mg by mouth daily      albuterol sulfate HFA (PROAIR HFA) 108 (90 Base) MCG/ACT inhaler Inhale 2 puffs into the lungs every 4 hours as needed for Wheezing or Shortness of Breath 1 Inhaler 5    ipratropium-albuterol (DUONEB) 0.5-2.5 (3) MG/3ML SOLN nebulizer solution INHALE ONE VIAL (3 MILLILITERS) VIA NEBULIZATION BY MOUTH EVERY 4 HOURS 360 mL 0    acetaminophen (TYLENOL) 500 MG tablet Take 1,000 mg by mouth every 6 hours as needed for Pain      omeprazole (PRILOSEC) 20 MG delayed release capsule Take 40 mg by mouth daily      Spacer/Aero-Holding Chambers MARILOU 1 Device by Does not apply route daily as needed 1 Device 0    OXYGEN Inhale 2 L into the lungs See Admin Instructions. (Patient taking differently: Inhale 2 L into the lungs See Admin Instructions Indications: 2L sitting, 4L when up and around Continuous at night or as needed when at home per patient) 1 Can 0    methadone 10 MG/5ML solution Take 130 mg by mouth daily  From [facility] methadone clinic daily . No current facility-administered medications for this visit. Review of Systems:  · Constitutional: No unanticipated weight loss. There's been no change in energy level, sleep pattern, or activity level. No fevers, chills. · Eyes: No visual changes or diplopia. No scleral icterus. · ENT: No Headaches, hearing loss or vertigo. No mouth sores or sore throat. · Cardiovascular: as reviewed in HPI  · Respiratory: No cough or wheezing, no sputum production. No hemoptysis. · Gastrointestinal: No abdominal pain, appetite loss, blood in stools. No change in bowel or bladder habits. · Genitourinary: No dysuria, trouble voiding, or hematuria. · Musculoskeletal:  No gait disturbance, no joint complaints. · Integumentary: No rash or pruritis. · Neurological: No headache, diplopia, change in muscle strength, numbness or tingling. · Psychiatric: No anxiety or depression. · Endocrine: No temperature intolerance. No excessive thirst, fluid intake, or urination. No tremor. · Hematologic/Lymphatic: No abnormal bruising or bleeding, blood clots or swollen lymph nodes. · Allergic/Immunologic: No nasal congestion or hives.     Physical Exam:   /78   Pulse 86   Temp 97.1 °F (36.2 °C)   Ht 5' (1.524 m)   Wt 231 lb (104.8 kg) Comment: with shoes  LMP 08/14/2015   SpO2 98%   BMI 45.11 kg/m²   Wt Readings from Last 3 Encounters:   02/22/21 231 lb (104.8 kg)   12/29/20 230 lb (104.3 kg)   12/22/20 228 lb (103.4 kg)     Constitutional: She is an obese female who is oriented to person, place, and time. In no acute distress but appears older than stated age. Wearing supplemental O2  Head: Normocephalic and atraumatic. Blind right eye with dense cataract. No scleral icterus. Neck: Neck supple. No JVP or carotid bruit appreciated. No mass and no thyromegaly present. No lymphadenopathy present. Cardiovascular: Normal rate. Normal heart sounds. Exam reveals no gallop and no friction rub. No murmur heard. Pulmonary/Chest: Effort normal. No respiratory distress. She has no rhonchi or rales. Diffuse bilateral expiratory wheezes. Abdominal: Soft, non-tender. Bowel sounds are normal. She exhibits no organomegaly, mass or bruit. Extremities: 2+ BLE edema. No cyanosis or clubbing. Pulses are 2+ radial/carotid bilaterally. Neurological: No gross cranial nerve deficit. Coordination normal.   Skin: Skin is warm and dry. There is no rash or diaphoresis. Psychiatric: She has a normal mood and affect. Her speech is normal and behavior is normal.     Lab Review:    No results found for: TRIG, HDL, LDLCALC, LDLDIRECT, LABVLDL   Lab Results   Component Value Date     11/09/2020     Lab Results   Component Value Date    HGB 12.0 11/09/2020    HCT 38.1 11/09/2020     Lab Results   Component Value Date     12/14/2020     Lab Results   Component Value Date    K 3.9 12/14/2020    K 4.1 11/08/2020     Lab Results   Component Value Date    BUN 10 12/14/2020    CREATININE 0.6 12/14/2020     EKG Interpretation:   11/8/20: Baseline artifact. Sinus tachycardia. Possible Left atrial enlargement. Image Review:     Echo: 7/7/15  Left ventricle size is normal.  Normal left ventricular wall thickness. Ejection fraction is visually estimated to be 55-60 %. Normal right ventricular size and function. Echo: 11/9/2020. Personally reviewed.     Overall, left ventricular systolic function appears mildly reduced with an ejection fraction estimated 45%. Mild diffuse hypokinesis. Moderately dilated left ventricle. Diastolic filling parameters just grade 2 diastolic dysfunction. Left atrium moderately dilated. Right ventricle is mildly dilated with normal function. PASP estimated at 46 mmHg assuming a right atrial pressure of 15 mmHg. Assessment/Plan:   1) Chronic diastolic heart failure. EF 45%. Volume status difficult to assess with morbid obesity but subjectively improved with increase in diuretics. Her weight remains stable. Continues to have 2+ LE edema with chronic stable dyspnea. Etiology uncertain but possibly multifactorial.  Discussed methadone being associated with cardiomyopathies but uncertain if it is realistic for the patient to discontinue medication. No Aldactone at this time with EF >40%. I will increase Lasix to 80 mg daily as she had subjective improvements on higher dose at her last visit. BMP/BNP in 1-2 weeks. She will also remain on ACEI and BB. Encouraged medication compliance.      2) Chronic hypoxic and hypercapnic respiratory failure/COPD/BLANCA. Following with pulmonary and requires supplemental oxygen.    3) Chronic methadone use. Will continue to defer to pain management to primary care physician but ideally would wean off methadone with CHF. 4) Morbid obesity. BMI 45. 1. again encouraged weight loss. Follow up 3-4 months. Thank you very much for allowing me to participate in the care of your patient. Please do not hesitate to contact me if you have any questions. Sincerely,  Shavonne Cardenas. Giana Palacio, [address] [place] [address]  Ph: [phone]  Fax: [phone]    This note was scribed in the presence of Dr. Hermann Perez MD by Amanda Dash RN. Physician Attestation: The scribes documentation has been prepared under my direction and personally reviewed by me in its entirety. I confirm that the note above accurately reflects all work, treatment, procedures, and medical decision making performed by me. All portions of the note including but not limited to the chief complaint, history of present illness, physical exam, assessment and plan/medical decision making were personally reviewed, edited, and updated on the day of the visit.

## 2021-02-22 ENCOUNTER — OFFICE VISIT (OUTPATIENT)
Dept: CARDIOLOGY CLINIC | Age: 46
End: 2021-02-22
Payer: COMMERCIAL

## 2021-02-22 VITALS
OXYGEN SATURATION: 98 % | WEIGHT: 231 LBS | BODY MASS INDEX: 45.35 KG/M2 | HEIGHT: 60 IN | TEMPERATURE: 97.1 F | SYSTOLIC BLOOD PRESSURE: 126 MMHG | DIASTOLIC BLOOD PRESSURE: 78 MMHG | HEART RATE: 86 BPM

## 2021-02-22 DIAGNOSIS — E66.01 MORBID OBESITY (HCC): ICD-10-CM

## 2021-02-22 DIAGNOSIS — I50.32 CHRONIC DIASTOLIC HEART FAILURE (HCC): Primary | ICD-10-CM

## 2021-02-22 DIAGNOSIS — F11.90 METHADONE USE: ICD-10-CM

## 2021-02-22 DIAGNOSIS — J96.12 CHRONIC RESPIRATORY FAILURE WITH HYPOXIA AND HYPERCAPNIA (HCC): ICD-10-CM

## 2021-02-22 DIAGNOSIS — J96.11 CHRONIC RESPIRATORY FAILURE WITH HYPOXIA AND HYPERCAPNIA (HCC): ICD-10-CM

## 2021-02-22 PROCEDURE — 99214 OFFICE O/P EST MOD 30 MIN: CPT | Performed by: INTERNAL MEDICINE

## 2021-02-22 PROCEDURE — 1036F TOBACCO NON-USER: CPT | Performed by: INTERNAL MEDICINE

## 2021-02-22 PROCEDURE — G8427 DOCREV CUR MEDS BY ELIG CLIN: HCPCS | Performed by: INTERNAL MEDICINE

## 2021-02-22 PROCEDURE — G8482 FLU IMMUNIZE ORDER/ADMIN: HCPCS | Performed by: INTERNAL MEDICINE

## 2021-02-22 PROCEDURE — G8417 CALC BMI ABV UP PARAM F/U: HCPCS | Performed by: INTERNAL MEDICINE

## 2021-02-22 RX ORDER — GABAPENTIN 300 MG/1
300 CAPSULE ORAL 3 TIMES DAILY
Status: ON HOLD | COMMUNITY
End: 2021-06-17 | Stop reason: HOSPADM

## 2021-02-22 RX ORDER — SERTRALINE HYDROCHLORIDE 100 MG/1
125 TABLET, FILM COATED ORAL DAILY
COMMUNITY
End: 2022-07-19 | Stop reason: ALTCHOICE

## 2021-02-22 RX ORDER — PREDNISONE 10 MG/1
40 TABLET ORAL DAILY
COMMUNITY
End: 2021-05-31

## 2021-02-22 RX ORDER — FUROSEMIDE 80 MG
80 TABLET ORAL DAILY
Qty: 90 TABLET | Refills: 3 | Status: ON HOLD
Start: 2021-02-22 | End: 2021-06-17 | Stop reason: HOSPADM

## 2021-02-22 RX ORDER — FUROSEMIDE 40 MG/1
60 TABLET ORAL DAILY
COMMUNITY
End: 2021-02-22

## 2021-02-22 NOTE — PATIENT INSTRUCTIONS
Patient Education        Avoiding Triggers With Heart Failure: Care Instructions  Your Care Instructions     Triggers are anything that make your heart failure flare up. A flare-up is also called \"sudden heart failure\" or \"acute heart failure. \" When you have a flare-up, fluid builds up in your lungs, and you have problems breathing. You might need to go to the hospital. By watching for changes in your condition and avoiding triggers, you can prevent heart failure flare-ups. Follow-up care is a key part of your treatment and safety. Be sure to make and go to all appointments, and call your doctor if you are having problems. It's also a good idea to know your test results and keep a list of the medicines you take. How can you care for yourself at home? Watch for changes in your weight and condition  · Weigh yourself without clothing at the same time each day. Record your weight. Call your doctor if you have sudden weight gain, such as more than 2 to 3 pounds in a day or 5 pounds in a week. (Your doctor may suggest a different range of weight gain.) A sudden weight gain may mean that your heart failure is getting worse. · Keep a daily record of your symptoms. Write down any changes in how you feel, such as new shortness of breath, cough, or problems eating. Also record if your ankles are more swollen than usual and if you feel more tired than usual. Note anything that you ate or did that could have triggered these changes. Limit sodium  Sodium causes your body to hold on to extra water. This may cause your heart failure symptoms to get worse. People get most of their sodium from processed foods. Fast food and restaurant meals also tend to be very high in sodium. · Your doctor may suggest that you limit sodium. Your doctor can tell you how much sodium is right for you. This includes limiting sodium in cooked and packaged foods. · Read food labels on cans and food packages.  They tell you how much sodium you get over-the-counter medicines, vitamins, or supplements you take. Take this list with you when you go to any doctor. · Take your medicines at the same time every day. It may help you to post a list of all the medicines you take every day and what time of day you take them. · Make taking your medicine as simple as you can. Plan times to take your medicines when you are doing other things, such as eating a meal or getting ready for bed. This will make it easier to remember to take your medicines. · Get organized. Use helpful tools, such as daily or weekly pill containers. When should you call for help? Call 911 if you have symptoms of sudden heart failure such as:    · You have severe trouble breathing.     · You cough up pink, foamy mucus.     · You have a new irregular or rapid heartbeat. Call your doctor now or seek immediate medical care if:    · You have new or increased shortness of breath.     · You are dizzy or lightheaded, or you feel like you may faint.     · You have sudden weight gain, such as more than 2 to 3 pounds in a day or 5 pounds in a week. (Your doctor may suggest a different range of weight gain.)     · You have increased swelling in your legs, ankles, or feet.     · You are suddenly so tired or weak that you cannot do your usual activities. Watch closely for changes in your health, and be sure to contact your doctor if you develop new symptoms. Where can you learn more? Go to https://[email or web address] [employer]. org and sign in to your [employer] account. Enter Z256 in the [employer] box to learn more about \"Avoiding Triggers With Heart Failure: Care Instructions. \"     If you do not have an account, please click on the \"Sign Up Now\" link. Current as of: December 16, 2019               Content Version: 12.6  © 5835-9222 [employer]. Care instructions adapted under license by [facility] [employer] [facility] ([facility]).  If you have questions about a medical condition or this instruction, always ask your healthcare professional. [place] [address] any warranty or liability for your use of this information.

## 2021-04-14 RX ORDER — LISINOPRIL 5 MG/1
5 TABLET ORAL DAILY
Qty: 90 TABLET | Refills: 3 | Status: SHIPPED | OUTPATIENT
Start: 2021-04-14 | End: 2022-06-24

## 2021-04-14 RX ORDER — CARVEDILOL 3.12 MG/1
3.12 TABLET ORAL 2 TIMES DAILY
Qty: 180 TABLET | Refills: 3 | Status: SHIPPED | OUTPATIENT
Start: 2021-04-14 | End: 2022-06-24

## 2021-04-17 DIAGNOSIS — J44.9 COPD, SEVERE (HCC): Primary | ICD-10-CM

## 2021-04-17 RX ORDER — PREDNISONE 10 MG/1
TABLET ORAL
Qty: 30 TABLET | Refills: 0 | Status: SHIPPED | OUTPATIENT
Start: 2021-04-17 | End: 2021-04-27

## 2021-04-28 ENCOUNTER — OFFICE VISIT (OUTPATIENT)
Dept: PULMONOLOGY | Age: 46
End: 2021-04-28
Payer: COMMERCIAL

## 2021-04-28 VITALS
RESPIRATION RATE: 14 BRPM | HEIGHT: 60 IN | DIASTOLIC BLOOD PRESSURE: 90 MMHG | TEMPERATURE: 96.4 F | WEIGHT: 223 LBS | HEART RATE: 80 BPM | SYSTOLIC BLOOD PRESSURE: 144 MMHG | OXYGEN SATURATION: 97 % | BODY MASS INDEX: 43.78 KG/M2

## 2021-04-28 DIAGNOSIS — G47.33 OSA TREATED WITH BIPAP: ICD-10-CM

## 2021-04-28 DIAGNOSIS — J96.12 CHRONIC RESPIRATORY FAILURE WITH HYPOXIA AND HYPERCAPNIA (HCC): ICD-10-CM

## 2021-04-28 DIAGNOSIS — J44.9 COPD, SEVERE (HCC): Primary | ICD-10-CM

## 2021-04-28 DIAGNOSIS — J96.11 CHRONIC RESPIRATORY FAILURE WITH HYPOXIA AND HYPERCAPNIA (HCC): ICD-10-CM

## 2021-04-28 PROCEDURE — G8926 SPIRO NO PERF OR DOC: HCPCS | Performed by: INTERNAL MEDICINE

## 2021-04-28 PROCEDURE — G8427 DOCREV CUR MEDS BY ELIG CLIN: HCPCS | Performed by: INTERNAL MEDICINE

## 2021-04-28 PROCEDURE — G8417 CALC BMI ABV UP PARAM F/U: HCPCS | Performed by: INTERNAL MEDICINE

## 2021-04-28 PROCEDURE — 1036F TOBACCO NON-USER: CPT | Performed by: INTERNAL MEDICINE

## 2021-04-28 PROCEDURE — 99214 OFFICE O/P EST MOD 30 MIN: CPT | Performed by: INTERNAL MEDICINE

## 2021-04-28 PROCEDURE — 3023F SPIROM DOC REV: CPT | Performed by: INTERNAL MEDICINE

## 2021-04-28 ASSESSMENT — SLEEP AND FATIGUE QUESTIONNAIRES
HOW LIKELY ARE YOU TO NOD OFF OR FALL ASLEEP WHILE SITTING INACTIVE IN A PUBLIC PLACE: 3
HOW LIKELY ARE YOU TO NOD OFF OR FALL ASLEEP WHEN YOU ARE A PASSENGER IN A CAR FOR AN HOUR WITHOUT A BREAK: 3
HOW LIKELY ARE YOU TO NOD OFF OR FALL ASLEEP WHILE WATCHING TV: 2
HOW LIKELY ARE YOU TO NOD OFF OR FALL ASLEEP WHILE LYING DOWN TO REST IN THE AFTERNOON WHEN CIRCUMSTANCES PERMIT: 0
HOW LIKELY ARE YOU TO NOD OFF OR FALL ASLEEP IN A CAR, WHILE STOPPED FOR A FEW MINUTES IN TRAFFIC: 0

## 2021-04-28 ASSESSMENT — COPD QUESTIONNAIRES
QUESTION5_HOMEACTIVITIES: 3
QUESTION6_LEAVINGHOUSE: 1
QUESTION2_CHESTPHLEGM: 2

## 2021-04-28 NOTE — PROGRESS NOTES
Chief Complaint   Patient presents with    COPD    Sleep Apnea       HPI  Here for CC of COPD, recent admission back in November for respiratory failure     She is doing ok for the most part but feels like she is getting worse. Feels worse because she feels she needs to use more oxygen but is not necessarily more hypoxic. On 4 liters with exertion  On symbicort, spiriva and albuterol. Has duonebs to use PRN. Trying to get back to using her bpap with sleep. Had an admission for hypercapnia but was not using her machine. She is not sick at this time. Had a cigarette or two a few weeks ago when she previously quit. Says she wants to get better    Machine:  Patient is using a bilevel machine. Average usage is 1 hrs 43 min 00 sec. She is meeting 4 or more hours per night 7% of the time. Average AHI is 2.3. She is still struggling to use this and struggling to stay consistent.         PAST MEDICAL HISTORY:  Past Medical History:   Diagnosis Date    Arthritis     Blind right eye     Chronic respiratory failure with hypoxia and hypercapnia (HCC)     COPD (chronic obstructive pulmonary disease) (HCC)     GERD (gastroesophageal reflux disease)     Hypertension     Movement disorder     Neuromuscular disorder ([facility] [address]     Pneumonia      PAST SURGICAL HISTORY:  Past Surgical History:   Procedure Laterality Date    ABDOMEN SURGERY      CHOLECYSTECTOMY      DILATATION, ESOPHAGUS      EYE SURGERY      TUBAL LIGATION       Social History     Socioeconomic History    Marital status:      Spouse name: None    Number of children: 3    Years of education: 9    Highest education level: None   Occupational History    Occupation: disabled   Social Needs    Financial resource strain: None    Food insecurity     Worry: None     Inability: None    Transportation needs     Medical: None     Non-medical: None   Tobacco Use    Smoking status: Former Smoker     Packs/day: 0.50     Years: 30.00     Pack years: 15.00     Types: Cigarettes     Start date: 4/21/1984     Quit date: 12/16/2017     Years since quitting: 3.3    Smokeless tobacco: Never Used    Tobacco comment: occ. 2 cigarettes a day   Substance and Sexual Activity    Alcohol use: No     Alcohol/week: 0.0 standard drinks    Drug use: No    Sexual activity: None   Lifestyle    Physical activity     Days per week: None     Minutes per session: None    Stress: None   Relationships    Social connections     Talks on phone: None     Gets together: None     Attends Christianity service: None     Active member of club or organization: None     Attends meetings of clubs or organizations: None     Relationship status: None    Intimate partner violence     Fear of current or ex partner: None     Emotionally abused: None     Physically abused: None     Forced sexual activity: None   Other Topics Concern    None   Social History Narrative    None     Current Outpatient Medications   Medication Sig Dispense Refill    carvedilol (COREG) 3.125 MG tablet Take 1 tablet by mouth 2 times daily 180 tablet 3    lisinopril (PRINIVIL;ZESTRIL) 5 MG tablet Take 1 tablet by mouth daily 90 tablet 3    gabapentin (NEURONTIN) 300 MG capsule Take 300 mg by mouth 3 times daily.       sertraline (ZOLOFT) 100 MG tablet Take 100 mg by mouth daily      furosemide (LASIX) 80 MG tablet Take 1 tablet by mouth daily 90 tablet 3    SYMBICORT 160-4.5 MCG/ACT AERO Inhale 2 puffs into the lungs 2 times daily 1 Inhaler 11    tiotropium (SPIRIVA HANDIHALER) 18 MCG inhalation capsule Inhale 1 capsule into the lungs daily 30 capsule 11    ibuprofen (ADVIL;MOTRIN) 800 MG tablet Take 800 mg by mouth daily      albuterol sulfate HFA (PROAIR HFA) 108 (90 Base) MCG/ACT inhaler Inhale 2 puffs into the lungs every 4 hours as needed for Wheezing or Shortness of Breath 1 Inhaler 5    ipratropium-albuterol (DUONEB) 0.5-2.5 (3) MG/3ML SOLN nebulizer solution INHALE ONE VIAL (3 MILLILITERS) VIA NEBULIZATION BY MOUTH EVERY 4 HOURS 360 mL 0    acetaminophen (TYLENOL) 500 MG tablet Take 1,000 mg by mouth every 6 hours as needed for Pain      omeprazole (PRILOSEC) 20 MG delayed release capsule Take 40 mg by mouth daily      Spacer/Aero-Holding Chambers MARILUO 1 Device by Does not apply route daily as needed 1 Device 0    OXYGEN Inhale 2 L into the lungs See Admin Instructions. (Patient taking differently: Inhale 2 L into the lungs See Admin Instructions Indications: 2L sitting, 4L when up and around Continuous at night or as needed when at home per patient) 1 Can 0    methadone 10 MG/5ML solution Take 130 mg by mouth daily  From [facility] daily .  predniSONE (DELTASONE) 10 MG tablet Take 40 mg by mouth daily      albuterol sulfate HFA (PROAIR HFA) 108 (90 Base) MCG/ACT inhaler Inhale 2 puffs into the lungs every 4 hours as needed for Wheezing or Shortness of Breath (Patient not taking: Reported on 4/28/2021) 1 Inhaler 11     No current facility-administered medications for this visit. Objective:   PHYSICAL EXAM:    GENERAL:  Well nourished, alert, appears stated age, no distress. Calm. Unkempt  HEENT:  No apparent conjunctival irritation, extra-ocular muscles seem intact. NECK:  No masses visibile, no torticollis   LYMPH:  NO visible masses  LUNGS:  Forced wheezing, upper lobe wheezing, kyphosis with scoliosis   HEART:  Regular rate and rhythm   ABDOMEN:  No gross apparent distention  EXTREMITIES:  No visibile swelling, no clubbing   NEURO:  No localizing deficits, CN II-XII intact.   No aphasia noted   SKIN:  no visible rashes on exposed skin  PSYCH:  no hallucinations, normal affect    Data Reviewed:   CBC and Renal reviewed  Last CBC  Lab Results   Component Value Date    WBC 9.1 11/09/2020    RBC 4.55 11/09/2020    HGB 12.0 11/09/2020    MCV 83.7 11/09/2020     11/09/2020     Last Renal  Lab Results   Component Value Date     12/14/2020    K 3.9 12/14/2020    K 4.1 11/08/2020    CL 95 12/14/2020    CO2 35 12/14/2020    CO2 44 11/11/2020    CO2 41 11/10/2020    BUN 10 12/14/2020    CREATININE 0.6 12/14/2020    GLUCOSE 113 12/14/2020    CALCIUM 9.3 12/14/2020     PFT 2015  Moderately-severe obstruction with response to bronchodilators   (which could be consistent with asthma)    Borderline air trapping is present    Mild reduced diffusing capacity with no recent hemoglobin to   correct for    FEV1 is 1.39 L at 53% predicted. CXR from 11/2020 is reviewed. My impression is bilateral airspace disease which might be pulmonary edema    Alpha-1 Anti-trypsin levels:  148, Phenotype:  MM       ECHO 11/2020  Ejection fraction is visually estimated to be 40-45%. There is mild diffuse hypokinesis. Diastolic filling parameters suggest grade I diastolic dysfunction. Mitral valve leaflets appear mildly thickened. Mild mitral regurgitation is present. The left atrium is moderately dilated. The right ventricle is enlarged. Right ventricular systolic function is normal. TAPSE 2.3 cm IVC size is dilated (>2.1 cm) and collapses < 50% with respiration consistent with elevated RA pressure (15 mmHg). Estimated pulmonary artery systolic pressure is mildly elevated at 46 mmHg assuming a right atrial pressure of 15 mmHg.    6 MWT 3/2020  The patient ambulated 244 meters which is   abnormal- 58-% predicted.  Her, heart rate response was normal,   the blood pressure response was normal, oxygen saturations were   abnormal as she desaturated while ambulating on room air.  The   patient desaturated to 87% while ambulating on room air, and was   placed on 4 L of oxygen to keep saturations above 90%.   The   degree of symptoms based on the Laila Dyspnea/Fatigue scale were   increased with testing.  This test does indicate the need for   supplemental oxygen. COPD Assessment Test    1. I never cough vs I cough all the time:  3  2. I have no phlegm vs I am completely full of phlegm. 2  3.   My

## 2021-05-18 DIAGNOSIS — J44.1 COPD EXACERBATION (HCC): Primary | ICD-10-CM

## 2021-05-18 RX ORDER — PREDNISONE 10 MG/1
TABLET ORAL
Qty: 30 TABLET | Refills: 0 | Status: SHIPPED | OUTPATIENT
Start: 2021-05-18 | End: 2021-05-31

## 2021-05-31 ENCOUNTER — APPOINTMENT (OUTPATIENT)
Dept: CT IMAGING | Age: 46
End: 2021-05-31
Payer: MEDICARE

## 2021-05-31 ENCOUNTER — HOSPITAL ENCOUNTER (EMERGENCY)
Age: 46
Discharge: HOME | End: 2021-05-31
Attending: EMERGENCY MEDICINE
Payer: MEDICARE

## 2021-05-31 ENCOUNTER — APPOINTMENT (OUTPATIENT)
Dept: GENERAL RADIOLOGY | Age: 46
End: 2021-05-31
Payer: MEDICARE

## 2021-05-31 VITALS
HEIGHT: 60 IN | HEART RATE: 82 BPM | BODY MASS INDEX: 43.93 KG/M2 | TEMPERATURE: 98.2 F | SYSTOLIC BLOOD PRESSURE: 159 MMHG | DIASTOLIC BLOOD PRESSURE: 77 MMHG | RESPIRATION RATE: 15 BRPM | WEIGHT: 223.77 LBS | OXYGEN SATURATION: 93 %

## 2021-05-31 DIAGNOSIS — M54.14 THORACIC RADICULOPATHY: ICD-10-CM

## 2021-05-31 DIAGNOSIS — J44.1 COPD EXACERBATION (HCC): Primary | ICD-10-CM

## 2021-05-31 LAB
ANION GAP SERPL CALCULATED.3IONS-SCNC: 9 MMOL/L (ref 3–16)
BASOPHILS ABSOLUTE: 0.1 K/UL (ref 0–0.2)
BASOPHILS RELATIVE PERCENT: 0.4 %
BUN BLDV-MCNC: 13 MG/DL (ref 7–20)
CALCIUM SERPL-MCNC: 8.9 MG/DL (ref 8.3–10.6)
CHLORIDE BLD-SCNC: 101 MMOL/L (ref 99–110)
CO2: 32 MMOL/L (ref 21–32)
CREAT SERPL-MCNC: 0.5 MG/DL (ref 0.6–1.1)
EOSINOPHILS ABSOLUTE: 0.1 K/UL (ref 0–0.6)
EOSINOPHILS RELATIVE PERCENT: 0.5 %
GFR AFRICAN AMERICAN: >60
GFR NON-AFRICAN AMERICAN: >60
GLUCOSE BLD-MCNC: 104 MG/DL (ref 70–99)
HCG QUALITATIVE: NEGATIVE
HCT VFR BLD CALC: 34.3 % (ref 36–48)
HEMOGLOBIN: 11.2 G/DL (ref 12–16)
LYMPHOCYTES ABSOLUTE: 1.1 K/UL (ref 1–5.1)
LYMPHOCYTES RELATIVE PERCENT: 7.1 %
MCH RBC QN AUTO: 28.9 PG (ref 26–34)
MCHC RBC AUTO-ENTMCNC: 32.7 G/DL (ref 31–36)
MCV RBC AUTO: 88.4 FL (ref 80–100)
MONOCYTES ABSOLUTE: 0.7 K/UL (ref 0–1.3)
MONOCYTES RELATIVE PERCENT: 4.2 %
NEUTROPHILS ABSOLUTE: 14.2 K/UL (ref 1.7–7.7)
NEUTROPHILS RELATIVE PERCENT: 87.8 %
PDW BLD-RTO: 14.6 % (ref 12.4–15.4)
PLATELET # BLD: 282 K/UL (ref 135–450)
PMV BLD AUTO: 7.3 FL (ref 5–10.5)
POTASSIUM REFLEX MAGNESIUM: 4.2 MMOL/L (ref 3.5–5.1)
PRO-BNP: 1543 PG/ML (ref 0–124)
RBC # BLD: 3.88 M/UL (ref 4–5.2)
SODIUM BLD-SCNC: 142 MMOL/L (ref 136–145)
TROPONIN: <0.01 NG/ML
WBC # BLD: 16.1 K/UL (ref 4–11)

## 2021-05-31 PROCEDURE — 71260 CT THORAX DX C+: CPT

## 2021-05-31 PROCEDURE — 84484 ASSAY OF TROPONIN QUANT: CPT

## 2021-05-31 PROCEDURE — 93005 ELECTROCARDIOGRAM TRACING: CPT | Performed by: EMERGENCY MEDICINE

## 2021-05-31 PROCEDURE — 99283 EMERGENCY DEPT VISIT LOW MDM: CPT

## 2021-05-31 PROCEDURE — 6360000002 HC RX W HCPCS: Performed by: EMERGENCY MEDICINE

## 2021-05-31 PROCEDURE — 2700000000 HC OXYGEN THERAPY PER DAY

## 2021-05-31 PROCEDURE — 71046 X-RAY EXAM CHEST 2 VIEWS: CPT

## 2021-05-31 PROCEDURE — 83880 ASSAY OF NATRIURETIC PEPTIDE: CPT

## 2021-05-31 PROCEDURE — 6370000000 HC RX 637 (ALT 250 FOR IP): Performed by: EMERGENCY MEDICINE

## 2021-05-31 PROCEDURE — 6360000004 HC RX CONTRAST MEDICATION: Performed by: EMERGENCY MEDICINE

## 2021-05-31 PROCEDURE — 94640 AIRWAY INHALATION TREATMENT: CPT

## 2021-05-31 PROCEDURE — 96374 THER/PROPH/DIAG INJ IV PUSH: CPT

## 2021-05-31 PROCEDURE — 3209999900 CT THORACIC SPINE TRAUMA RECONSTRUCTION

## 2021-05-31 PROCEDURE — 84703 CHORIONIC GONADOTROPIN ASSAY: CPT

## 2021-05-31 PROCEDURE — 94761 N-INVAS EAR/PLS OXIMETRY MLT: CPT

## 2021-05-31 PROCEDURE — 85025 COMPLETE CBC W/AUTO DIFF WBC: CPT

## 2021-05-31 PROCEDURE — 80048 BASIC METABOLIC PNL TOTAL CA: CPT

## 2021-05-31 RX ORDER — IPRATROPIUM BROMIDE AND ALBUTEROL SULFATE 2.5; .5 MG/3ML; MG/3ML
1 SOLUTION RESPIRATORY (INHALATION) ONCE
Status: COMPLETED | OUTPATIENT
Start: 2021-05-31 | End: 2021-05-31

## 2021-05-31 RX ORDER — DOXYCYCLINE HYCLATE 100 MG
100 TABLET ORAL 2 TIMES DAILY
Qty: 20 TABLET | Refills: 0 | Status: SHIPPED | OUTPATIENT
Start: 2021-05-31 | End: 2021-06-10

## 2021-05-31 RX ORDER — ALBUTEROL SULFATE 2.5 MG/3ML
5 SOLUTION RESPIRATORY (INHALATION) ONCE
Status: COMPLETED | OUTPATIENT
Start: 2021-05-31 | End: 2021-05-31

## 2021-05-31 RX ORDER — METHYLPREDNISOLONE SODIUM SUCCINATE 125 MG/2ML
125 INJECTION, POWDER, LYOPHILIZED, FOR SOLUTION INTRAMUSCULAR; INTRAVENOUS ONCE
Status: COMPLETED | OUTPATIENT
Start: 2021-05-31 | End: 2021-05-31

## 2021-05-31 RX ORDER — DOXYCYCLINE HYCLATE 100 MG
100 TABLET ORAL ONCE
Status: COMPLETED | OUTPATIENT
Start: 2021-05-31 | End: 2021-05-31

## 2021-05-31 RX ORDER — PREDNISONE 20 MG/1
TABLET ORAL
Qty: 18 TABLET | Refills: 0 | Status: SHIPPED | OUTPATIENT
Start: 2021-05-31 | End: 2021-06-10

## 2021-05-31 RX ORDER — PREDNISONE 20 MG/1
60 TABLET ORAL ONCE
Status: COMPLETED | OUTPATIENT
Start: 2021-05-31 | End: 2021-05-31

## 2021-05-31 RX ADMIN — IPRATROPIUM BROMIDE AND ALBUTEROL SULFATE 1 AMPULE: .5; 3 SOLUTION RESPIRATORY (INHALATION) at 19:29

## 2021-05-31 RX ADMIN — ALBUTEROL SULFATE 5 MG: 2.5 SOLUTION RESPIRATORY (INHALATION) at 19:29

## 2021-05-31 RX ADMIN — PREDNISONE 60 MG: 20 TABLET ORAL at 22:16

## 2021-05-31 RX ADMIN — METHYLPREDNISOLONE SODIUM SUCCINATE 125 MG: 125 INJECTION, POWDER, FOR SOLUTION INTRAMUSCULAR; INTRAVENOUS at 19:12

## 2021-05-31 RX ADMIN — IOPAMIDOL 75 ML: 755 INJECTION, SOLUTION INTRAVENOUS at 20:08

## 2021-05-31 RX ADMIN — DOXYCYCLINE HYCLATE 100 MG: 100 TABLET, COATED ORAL at 22:16

## 2021-05-31 ASSESSMENT — PAIN SCALES - GENERAL: PAINLEVEL_OUTOF10: 8

## 2021-05-31 ASSESSMENT — PAIN DESCRIPTION - DESCRIPTORS: DESCRIPTORS: SHARP

## 2021-05-31 ASSESSMENT — PAIN DESCRIPTION - LOCATION: LOCATION: ABDOMEN;CHEST

## 2021-05-31 ASSESSMENT — PAIN DESCRIPTION - PAIN TYPE: TYPE: ACUTE PAIN

## 2021-05-31 ASSESSMENT — PAIN DESCRIPTION - ORIENTATION: ORIENTATION: RIGHT

## 2021-05-31 NOTE — ED PROVIDER NOTES
[facility]  eMERGENCY dEPARTMENT eNCOUnter      Pt Name: Anuradha Larsen  MRN: [id number]  [place] 1975  Date of evaluation: 5/31/2021  Provider: Momo Gutierrez MD    [address] [place] [address]       Chief Complaint   Patient presents with    Shortness of Breath     shortness of breath with pain to \"rib area\" with back pain. reports \"sharp hollow feeling\". worse with breathing. chronic oxygen.  Back Pain         CRITICAL CARE TIME   Total Critical Care time was 0 minutes, excluding separately reportable procedures. There was a high probability of clinically significant/life threatening deterioration in the patient's condition which required my urgent intervention. HISTORY OF PRESENT ILLNESS  (Location/Symptom, Timing/Onset, Context/Setting, Quality, Duration, Modifying Factors, Severity.)   Anuradha Larsen is a 54 y.o. female who presents to the emergency department complaining of a sharp pain in her upper back that radiates around her chest underneath her breast.  This has been there for \"several days\". It is gotten gradually worse. Is worse with movement and inspiration. She is on chronic oxygen therapy. She has COPD and chronic diastolic heart failure. She states she does feel like she is more short of breath than usual.  She denies any fever or chills. No increase in cough. She has chronic leg swelling which is no worse than usual.      Nursing Notes were reviewed and I agree. REVIEW OF SYSTEMS    (2-9 systems for level 4, 10 or more for level 5)     HEENT: Negative. No sore throat nasal congestion or earache. Cardiovascular: Right upper back pain that radiates around underneath her breast.  Pulmonary: Increased difficulty breathing, wheezing. She is on home oxygen. GI: No abdominal pain, nausea, vomiting. Musculoskeletal: Chronic leg swelling, unchanged. Skin: Chronic stasis dermatitis of the lower extremities. No new rash.   Neuro: No dizziness or passing out. Except as noted above the remainder of the review of systems was reviewed and negative. PAST MEDICAL HISTORY     Past Medical History:   Diagnosis Date    Arthritis     Blind right eye     Chronic respiratory failure with hypoxia and hypercapnia (HCC)     COPD (chronic obstructive pulmonary disease) (HCC)     GERD (gastroesophageal reflux disease)     Hypertension     Movement disorder     Neuromuscular disorder ([facility] [address]     Pneumonia          SURGICAL HISTORY       Past Surgical History:   Procedure Laterality Date    ABDOMEN SURGERY      CHOLECYSTECTOMY      DILATATION, ESOPHAGUS      EYE SURGERY      TUBAL LIGATION           CURRENT MEDICATIONS       Previous Medications    ACETAMINOPHEN (TYLENOL) 500 MG TABLET    Take 1,000 mg by mouth every 6 hours as needed for Pain    ALBUTEROL SULFATE HFA (PROAIR HFA) 108 (90 BASE) MCG/ACT INHALER    Inhale 2 puffs into the lungs every 4 hours as needed for Wheezing or Shortness of Breath    ALBUTEROL SULFATE HFA (PROAIR HFA) 108 (90 BASE) MCG/ACT INHALER    Inhale 2 puffs into the lungs every 4 hours as needed for Wheezing or Shortness of Breath    CARVEDILOL (COREG) 3.125 MG TABLET    Take 1 tablet by mouth 2 times daily    FUROSEMIDE (LASIX) 80 MG TABLET    Take 1 tablet by mouth daily    GABAPENTIN (NEURONTIN) 300 MG CAPSULE    Take 300 mg by mouth 3 times daily. IBUPROFEN (ADVIL;MOTRIN) 800 MG TABLET    Take 800 mg by mouth daily    IPRATROPIUM-ALBUTEROL (DUONEB) 0.5-2.5 (3) MG/3ML SOLN NEBULIZER SOLUTION    INHALE ONE VIAL (3 MILLILITERS) VIA NEBULIZATION BY MOUTH EVERY 4 HOURS    LISINOPRIL (PRINIVIL;ZESTRIL) 5 MG TABLET    Take 1 tablet by mouth daily    METHADONE 10 MG/5ML SOLUTION    Take 130 mg by mouth daily  From [facility] methadone clinic daily . OMEPRAZOLE (PRILOSEC) 20 MG DELAYED RELEASE CAPSULE    Take 40 mg by mouth daily    OXYGEN    Inhale 2 L into the lungs See Admin Instructions.     SERTRALINE (ZOLOFT) 100 MG TABLET Take 100 mg by mouth daily    SPACER/AERO-HOLDING CHAMBERS MARILOU    1 Device by Does not apply route daily as needed    SYMBICORT 160-4.5 MCG/ACT AERO    Inhale 2 puffs into the lungs 2 times daily    TIOTROPIUM (Jennifer Hodgkin) 18 MCG INHALATION CAPSULE    Inhale 1 capsule into the lungs daily       ALLERGIES     Latex, Sulfa antibiotics, Nsaids, and Ultram [tramadol hcl]    FAMILY HISTORY       Family History   Problem Relation Age of Onset    Cancer Mother         lung cancer    Osteoporosis Mother     Cancer Father         cancer    Arthritis Father           SOCIAL HISTORY       Social History     Socioeconomic History    Marital status:      Spouse name: Not on file    Number of children: 3    Years of education: 9    Highest education level: Not on file   Occupational History    Occupation: disabled   Tobacco Use    Smoking status: Former Smoker     Packs/day: 0.50     Years: 30.00     Pack years: 15.00     Types: Cigarettes     Start date: 4/21/1984     Quit date: 12/16/2017     Years since quitting: 3.4    Smokeless tobacco: Never Used    Tobacco comment: occ. 2 cigarettes a day   Vaping Use    Vaping Use: Never used   Substance and Sexual Activity    Alcohol use: No     Alcohol/week: 0.0 standard drinks    Drug use: No    Sexual activity: Not on file   Other Topics Concern    Not on file   Social History Narrative    Not on file     Social Determinants of Health     Financial Resource Strain:     Difficulty of Paying Living Expenses:    Food Insecurity:     Worried About Running Out of Food in the Last Year:     Ran Out of Food in the Last Year:    Transportation Needs:     Lack of Transportation (Medical):      Lack of Transportation (Non-Medical):    Physical Activity:     Days of Exercise per Week:     Minutes of Exercise per Session:    Stress:     Feeling of Stress :    Social Connections:     Frequency of Communication with Friends and Family:     Frequency of Social Gatherings with Friends and Family:     Attends [employer] Services:     Active Member of Clubs or Organizations:     Attends Club or Organization Meetings:     Marital Status:    Intimate Partner Violence:     Fear of Current or Ex-Partner:     Emotionally Abused:     Physically Abused:     Sexually Abused:          PHYSICAL EXAM    (up to 7 for level 4, 8 or more for level 5)     ED Triage Vitals [05/31/21 1823]   BP Temp Temp Source Pulse Resp SpO2 Height Weight   (!) 176/75 98.2 °F (36.8 °C) Temporal 89 18 99 % 5' (1.524 m) 223 lb 12.3 oz (101.5 kg)       General: Alert moderately obese white female who appears mildly dyspneic. Head: Atraumatic and normocephalic. Eyes: No conjunctival injection. No pallor. Pupils equal round reactive. ENT: TMs are normal.  Nose clear. Oropharynx moist without erythema. Neck: Supple without adenopathy, nontender. Heart: Regular rate and rhythm. No murmurs or gallops noted. Lungs: Breath sounds decreased bilaterally with moderate wheezing, prolonged expiratory phase. No rales or rhonchi. Speaks in 3 and 4 word sentences. No retractions or accessory muscle use. Abdomen: Obese, soft, nontender. No masses organomegaly. Musculoskeletal: She has some tenderness in her right mid thoracic region in the paraspinous muscle region. Severe kyphoscoliosis. She has no focal rib tenderness. She has 3+ mildly pitting edema bilateral lower extremities below the knees including the ankle and feet. Skin: Warm and dry, good turgor. Stasis dermatitis of lower extremities bilaterally. No other rash noted. Neuro: Awake, alert, oriented. No focal motor deficits. DIFFERENTIAL DIAGNOSIS   Differential includes but is not limited to COPD exacerbation, pneumonia, bronchitis, pleurisy, radiculopathy, compression fracture, osteoarthritis of the thoracic spine, COPD exacerbation, congestive heart failure.       DIAGNOSTIC RESULTS     EKG: All EKG's are interpreted by Magalis Raymundo MD in the absence of a cardiologist.    Sinus rhythm, rate of 86, short MN interval.  Age-indeterminate anterior infarct. Rhythm strip shows a sinus rhythm with a rate of 86, MN interval 106 ms, QRS of 82 ms with no other ectopy as interpreted by me. Compared to EKG dated 11/8/2020, previously noted sinus tachycardia has resolved. No other significant changes noted. RADIOLOGY:   Non-plain film images such as CT, Ultrasound and MRI are read by the radiologist. Plain radiographic images are visualized and preliminarily interpreted Magalis Raymundo MD with the below findings:        Interpretation per the Radiologist below, if available at the time of this note:    CT CHEST PULMONARY EMBOLISM W CONTRAST   Final Result   No evidence of a pulmonary embolus. Unremarkable thoracic aorta and mediastinum. Subsegmental infiltrate vs atelectasis and scarring along the right middle   lobe and mild discoid atelectasis or scarring along the lingula. Incidental mild hepatomegaly with slight prominence of the central biliary   ducts. Recommend correlating with lab values. Moderate degenerative changes in the spine with associated moderate scoliosis. CT THORACIC SPINE TRAUMA RECONSTRUCTION   Final Result   Moderately severe degenerative disc changes throughout the mid lower thoracic   region which is more prominent inferiorly and associated mild kyphotic   deformity centered along the thoracolumbar region with no obvious acute   fracture seen. Moderate scoliosis. No large disc bulge or herniation. Moderate degenerative changes along the costovertebral junctions throughout   the lower thoracic spine which is more prominent on the left. XR CHEST (2 VW)   Final Result   Mild cardiomegaly which is less prominent with mild central pulmonary   congestion and questionable interstitial edema or pneumonitis vs underlying   pulmonary fibrosis.   Recommend short-term follow-up. Mild subsegmental linear atelectasis or scarring along the lung bases   posteriorly               ED BEDSIDE ULTRASOUND:   Performed by ED Physician - none    LABS:  Labs Reviewed   CBC WITH AUTO DIFFERENTIAL - Abnormal; Notable for the following components:       Result Value    WBC 16.1 (*)     RBC 3.88 (*)     Hemoglobin 11.2 (*)     Hematocrit 34.3 (*)     Neutrophils Absolute 14.2 (*)     All other components within normal limits    Narrative:     Performed at:  [address] [place]   Phone [phone]   BASIC METABOLIC PANEL W/ REFLEX TO MG FOR LOW K - Abnormal; Notable for the following components:    Glucose 104 (*)     CREATININE 0.5 (*)     All other components within normal limits    Narrative:     Performed at:  [address] [place] [address]   Phone [phone]   BRAIN NATRIURETIC PEPTIDE - Abnormal; Notable for the following components:    Pro-BNP 1,543 (*)     All other components within normal limits    Narrative:     Performed at:  [address] [facility] [place] [address]   Phone [phone]   HCG, SERUM, QUALITATIVE    Narrative:     Performed at:  [address] [place] [address]   Phone [phone]   TROPONIN    Narrative:     Performed at:  [facility]  [address] [place] [address] [place] [address]   Phone [phone]       All other labs were within normal range or not returned as of this dictation.     EMERGENCY DEPARTMENT COURSE and DIFFERENTIAL DIAGNOSIS/MDM:   Vitals:    Vitals:    05/31/21 2000 05/31/21 2045 05/31/21 2115 05/31/21 2200   BP: (!) 159/83 (!) 146/81 (!) 164/87 (!) 159/77   Pulse: 77 85 85 82   Resp: 21 15 22 15   Temp:       TempSrc:       SpO2: 96% 95% 93% 93%   Weight:       Height: This patient has a history of COPD. She is on home oxygen. She is somewhat more short of breath than usual.  She is primarily concerned with some back pain that is developed. Pain is in her mid thoracic area, it sharp, it radiates around her chest.  She has severe scoliosis. She is afebrile here. She is not tachycardic. Her blood pressure is stable, on the hypertensive side. She is not tachypneic. Her O2 saturations are in the mid 90s on her baseline oxygen. Her H&H is stable. Her white blood cell count is elevated at 16,100. Her renal function is normal.  Her BNP is 1543. There is no evidence of pulmonary edema on her chest x-ray or CT scan. Her troponin is less than 0.01. She has no acute EKG changes. Her pain is atypical for cardiac pain. Her CT pulmonary embolism study shows no evidence of pulmonary embolism. There is some infiltrate versus atelectasis and scarring along the right middle lobe area. I think this is most likely atelectasis, but in light of her elevated white blood cell count I am going to cover her with antibiotics. I think she can be managed at home. She was given hand-held nebulizers and steroids here. The steroids will help with her COPD exacerbation as well as her to colopathy. She has some significant degenerative changes on her CT thoracic spine. No evidence of an acute compression fracture. I will have her follow-up with her primary care physician this week. She was discharged on prednisone and doxycycline. Test results, diagnosis, and treatment plan were discussed with the patient. She understands the treatment plan and follow-up as discussed. CONSULTS:  None    PROCEDURES:  None    FINAL IMPRESSION      1. COPD exacerbation ([address]    2.  Thoracic radiculopathy          DISPOSITION/PLAN   DISPOSITION Decision To Discharge 05/31/2021 10:12:48 PM      PATIENT REFERRED TO:  Lauryn Panda  [address] [place] [id number] [phone]    In 3 days        DISCHARGE MEDICATIONS:  New Prescriptions    DOXYCYCLINE HYCLATE (VIBRA-TABS) 100 MG TABLET    Take 1 tablet by mouth 2 times daily for 10 days    PREDNISONE (DELTASONE) 20 MG TABLET    3 tabs po qam for 3 days then 2 tabs qam for 3 days the 1 tab qam for 3 days       (Please note that portions of this note were completed with a voice recognition program.  Efforts were made to edit the dictations but occasionally words are mis-transcribed.)    Shaheed Ashton MD  Attending Emergency Physician        Ann Mckay MD  05/31/21 0179

## 2021-06-01 LAB
EKG ATRIAL RATE: 86 BPM
EKG DIAGNOSIS: NORMAL
EKG P AXIS: 36 DEGREES
EKG P-R INTERVAL: 106 MS
EKG Q-T INTERVAL: 384 MS
EKG QRS DURATION: 82 MS
EKG QTC CALCULATION (BAZETT): 459 MS
EKG R AXIS: 19 DEGREES
EKG T AXIS: 28 DEGREES
EKG VENTRICULAR RATE: 86 BPM

## 2021-06-01 PROCEDURE — 93010 ELECTROCARDIOGRAM REPORT: CPT | Performed by: INTERNAL MEDICINE

## 2021-06-12 ENCOUNTER — HOSPITAL ENCOUNTER (INPATIENT)
Age: 46
LOS: 5 days | Discharge: HOME | DRG: 291 | End: 2021-06-17
Attending: STUDENT IN AN ORGANIZED HEALTH CARE EDUCATION/TRAINING PROGRAM
Payer: MEDICARE

## 2021-06-12 ENCOUNTER — APPOINTMENT (OUTPATIENT)
Dept: GENERAL RADIOLOGY | Age: 46
DRG: 291 | End: 2021-06-12
Payer: MEDICARE

## 2021-06-12 DIAGNOSIS — J44.1 COPD EXACERBATION (HCC): ICD-10-CM

## 2021-06-12 DIAGNOSIS — J96.22 ACUTE ON CHRONIC RESPIRATORY FAILURE WITH HYPOXIA AND HYPERCAPNIA (HCC): Primary | ICD-10-CM

## 2021-06-12 DIAGNOSIS — I50.1 PULMONARY EDEMA CARDIAC CAUSE (HCC): ICD-10-CM

## 2021-06-12 DIAGNOSIS — G89.4 CHRONIC PAIN SYNDROME: ICD-10-CM

## 2021-06-12 DIAGNOSIS — J96.21 ACUTE ON CHRONIC RESPIRATORY FAILURE WITH HYPOXIA AND HYPERCAPNIA (HCC): Primary | ICD-10-CM

## 2021-06-12 PROBLEM — I50.9 HEART FAILURE (HCC): Status: ACTIVE | Noted: 2021-06-12

## 2021-06-12 LAB
A/G RATIO: 1.3 (ref 1.1–2.2)
ALBUMIN SERPL-MCNC: 3.6 G/DL (ref 3.4–5)
ALP BLD-CCNC: 72 U/L (ref 40–129)
ALT SERPL-CCNC: 8 U/L (ref 10–40)
ANION GAP SERPL CALCULATED.3IONS-SCNC: 11 MMOL/L (ref 3–16)
AST SERPL-CCNC: 10 U/L (ref 15–37)
BASE EXCESS VENOUS: 11 MMOL/L (ref -3–3)
BASOPHILS ABSOLUTE: 0 K/UL (ref 0–0.2)
BASOPHILS RELATIVE PERCENT: 0.5 %
BILIRUB SERPL-MCNC: <0.2 MG/DL (ref 0–1)
BUN BLDV-MCNC: 8 MG/DL (ref 7–20)
CALCIUM SERPL-MCNC: 8.9 MG/DL (ref 8.3–10.6)
CHLORIDE BLD-SCNC: 97 MMOL/L (ref 99–110)
CO2: 33 MMOL/L (ref 21–32)
CREAT SERPL-MCNC: <0.5 MG/DL (ref 0.6–1.1)
EOSINOPHILS ABSOLUTE: 0.3 K/UL (ref 0–0.6)
EOSINOPHILS RELATIVE PERCENT: 3.6 %
GFR AFRICAN AMERICAN: >60
GFR NON-AFRICAN AMERICAN: >60
GLOBULIN: 2.7 G/DL
GLUCOSE BLD-MCNC: 113 MG/DL (ref 70–99)
HCO3 VENOUS: 36.7 MMOL/L (ref 23–29)
HCT VFR BLD CALC: 33.1 % (ref 36–48)
HEMOGLOBIN: 10.8 G/DL (ref 12–16)
LACTIC ACID, SEPSIS: 1.3 MMOL/L (ref 0.4–1.9)
LYMPHOCYTES ABSOLUTE: 1.2 K/UL (ref 1–5.1)
LYMPHOCYTES RELATIVE PERCENT: 14.9 %
MCH RBC QN AUTO: 29.2 PG (ref 26–34)
MCHC RBC AUTO-ENTMCNC: 32.6 G/DL (ref 31–36)
MCV RBC AUTO: 89.5 FL (ref 80–100)
MONOCYTES ABSOLUTE: 0.7 K/UL (ref 0–1.3)
MONOCYTES RELATIVE PERCENT: 8.3 %
NEUTROPHILS ABSOLUTE: 6 K/UL (ref 1.7–7.7)
NEUTROPHILS RELATIVE PERCENT: 72.7 %
O2 SAT, VEN: 93 %
O2 THERAPY: ABNORMAL
PCO2, VEN: 68 MMHG (ref 40–50)
PDW BLD-RTO: 14.7 % (ref 12.4–15.4)
PH VENOUS: 7.34 (ref 7.35–7.45)
PLATELET # BLD: 256 K/UL (ref 135–450)
PMV BLD AUTO: 7.5 FL (ref 5–10.5)
PO2, VEN: 73.8 MMHG (ref 25–40)
POTASSIUM REFLEX MAGNESIUM: 3.7 MMOL/L (ref 3.5–5.1)
PRO-BNP: 1597 PG/ML (ref 0–124)
RBC # BLD: 3.69 M/UL (ref 4–5.2)
SODIUM BLD-SCNC: 141 MMOL/L (ref 136–145)
TCO2 CALC VENOUS: 39 MMOL/L
TOTAL PROTEIN: 6.3 G/DL (ref 6.4–8.2)
TROPONIN: <0.01 NG/ML
WBC # BLD: 8.3 K/UL (ref 4–11)

## 2021-06-12 PROCEDURE — 2580000003 HC RX 258: Performed by: NURSE PRACTITIONER

## 2021-06-12 PROCEDURE — 85025 COMPLETE CBC W/AUTO DIFF WBC: CPT

## 2021-06-12 PROCEDURE — 83605 ASSAY OF LACTIC ACID: CPT

## 2021-06-12 PROCEDURE — 71045 X-RAY EXAM CHEST 1 VIEW: CPT

## 2021-06-12 PROCEDURE — 93005 ELECTROCARDIOGRAM TRACING: CPT | Performed by: STUDENT IN AN ORGANIZED HEALTH CARE EDUCATION/TRAINING PROGRAM

## 2021-06-12 PROCEDURE — U0003 INFECTIOUS AGENT DETECTION BY NUCLEIC ACID (DNA OR RNA); SEVERE ACUTE RESPIRATORY SYNDROME CORONAVIRUS 2 (SARS-COV-2) (CORONAVIRUS DISEASE [COVID-19]), AMPLIFIED PROBE TECHNIQUE, MAKING USE OF HIGH THROUGHPUT TECHNOLOGIES AS DESCRIBED BY CMS-2020-01-R: HCPCS

## 2021-06-12 PROCEDURE — 6370000000 HC RX 637 (ALT 250 FOR IP): Performed by: NURSE PRACTITIONER

## 2021-06-12 PROCEDURE — 6360000002 HC RX W HCPCS: Performed by: NURSE PRACTITIONER

## 2021-06-12 PROCEDURE — 83880 ASSAY OF NATRIURETIC PEPTIDE: CPT

## 2021-06-12 PROCEDURE — 84145 PROCALCITONIN (PCT): CPT

## 2021-06-12 PROCEDURE — U0005 INFEC AGEN DETEC AMPLI PROBE: HCPCS

## 2021-06-12 PROCEDURE — 94660 CPAP INITIATION&MGMT: CPT

## 2021-06-12 PROCEDURE — 96375 TX/PRO/DX INJ NEW DRUG ADDON: CPT

## 2021-06-12 PROCEDURE — 82803 BLOOD GASES ANY COMBINATION: CPT

## 2021-06-12 PROCEDURE — 6360000002 HC RX W HCPCS: Performed by: STUDENT IN AN ORGANIZED HEALTH CARE EDUCATION/TRAINING PROGRAM

## 2021-06-12 PROCEDURE — 96374 THER/PROPH/DIAG INJ IV PUSH: CPT

## 2021-06-12 PROCEDURE — 36415 COLL VENOUS BLD VENIPUNCTURE: CPT

## 2021-06-12 PROCEDURE — 80053 COMPREHEN METABOLIC PANEL: CPT

## 2021-06-12 PROCEDURE — 99284 EMERGENCY DEPT VISIT MOD MDM: CPT

## 2021-06-12 PROCEDURE — 2580000003 HC RX 258: Performed by: STUDENT IN AN ORGANIZED HEALTH CARE EDUCATION/TRAINING PROGRAM

## 2021-06-12 PROCEDURE — 51701 INSERT BLADDER CATHETER: CPT

## 2021-06-12 PROCEDURE — 6370000000 HC RX 637 (ALT 250 FOR IP): Performed by: STUDENT IN AN ORGANIZED HEALTH CARE EDUCATION/TRAINING PROGRAM

## 2021-06-12 PROCEDURE — 84484 ASSAY OF TROPONIN QUANT: CPT

## 2021-06-12 PROCEDURE — 94761 N-INVAS EAR/PLS OXIMETRY MLT: CPT

## 2021-06-12 PROCEDURE — 94640 AIRWAY INHALATION TREATMENT: CPT

## 2021-06-12 PROCEDURE — 2060000000 HC ICU INTERMEDIATE R&B

## 2021-06-12 PROCEDURE — 87040 BLOOD CULTURE FOR BACTERIA: CPT

## 2021-06-12 RX ORDER — SODIUM CHLORIDE 9 MG/ML
25 INJECTION, SOLUTION INTRAVENOUS PRN
Status: DISCONTINUED | OUTPATIENT
Start: 2021-06-12 | End: 2021-06-17 | Stop reason: HOSPADM

## 2021-06-12 RX ORDER — ONDANSETRON 2 MG/ML
4 INJECTION INTRAMUSCULAR; INTRAVENOUS EVERY 6 HOURS PRN
Status: DISCONTINUED | OUTPATIENT
Start: 2021-06-12 | End: 2021-06-17 | Stop reason: HOSPADM

## 2021-06-12 RX ORDER — IPRATROPIUM BROMIDE AND ALBUTEROL SULFATE 2.5; .5 MG/3ML; MG/3ML
1 SOLUTION RESPIRATORY (INHALATION)
Status: DISCONTINUED | OUTPATIENT
Start: 2021-06-13 | End: 2021-06-13 | Stop reason: CLARIF

## 2021-06-12 RX ORDER — METHYLPREDNISOLONE SODIUM SUCCINATE 125 MG/2ML
125 INJECTION, POWDER, LYOPHILIZED, FOR SOLUTION INTRAMUSCULAR; INTRAVENOUS ONCE
Status: COMPLETED | OUTPATIENT
Start: 2021-06-12 | End: 2021-06-12

## 2021-06-12 RX ORDER — ACETAMINOPHEN 650 MG/1
650 SUPPOSITORY RECTAL EVERY 6 HOURS PRN
Status: DISCONTINUED | OUTPATIENT
Start: 2021-06-12 | End: 2021-06-17 | Stop reason: HOSPADM

## 2021-06-12 RX ORDER — BUDESONIDE AND FORMOTEROL FUMARATE DIHYDRATE 160; 4.5 UG/1; UG/1
2 AEROSOL RESPIRATORY (INHALATION) 2 TIMES DAILY
Status: DISCONTINUED | OUTPATIENT
Start: 2021-06-12 | End: 2021-06-17 | Stop reason: HOSPADM

## 2021-06-12 RX ORDER — ALBUTEROL SULFATE 2.5 MG/3ML
2.5 SOLUTION RESPIRATORY (INHALATION)
Status: DISCONTINUED | OUTPATIENT
Start: 2021-06-12 | End: 2021-06-17 | Stop reason: HOSPADM

## 2021-06-12 RX ORDER — SERTRALINE HYDROCHLORIDE 100 MG/1
100 TABLET, FILM COATED ORAL DAILY
Status: DISCONTINUED | OUTPATIENT
Start: 2021-06-13 | End: 2021-06-17 | Stop reason: HOSPADM

## 2021-06-12 RX ORDER — GABAPENTIN 300 MG/1
300 CAPSULE ORAL 3 TIMES DAILY
Status: DISCONTINUED | OUTPATIENT
Start: 2021-06-13 | End: 2021-06-13

## 2021-06-12 RX ORDER — PANTOPRAZOLE SODIUM 40 MG/1
40 TABLET, DELAYED RELEASE ORAL
Status: DISCONTINUED | OUTPATIENT
Start: 2021-06-13 | End: 2021-06-17 | Stop reason: HOSPADM

## 2021-06-12 RX ORDER — SODIUM CHLORIDE 0.9 % (FLUSH) 0.9 %
10 SYRINGE (ML) INJECTION EVERY 12 HOURS SCHEDULED
Status: DISCONTINUED | OUTPATIENT
Start: 2021-06-13 | End: 2021-06-17 | Stop reason: HOSPADM

## 2021-06-12 RX ORDER — ACETAMINOPHEN 325 MG/1
650 TABLET ORAL EVERY 6 HOURS PRN
Status: DISCONTINUED | OUTPATIENT
Start: 2021-06-12 | End: 2021-06-17 | Stop reason: HOSPADM

## 2021-06-12 RX ORDER — LISINOPRIL 5 MG/1
5 TABLET ORAL DAILY
Status: DISCONTINUED | OUTPATIENT
Start: 2021-06-13 | End: 2021-06-17 | Stop reason: HOSPADM

## 2021-06-12 RX ORDER — SODIUM CHLORIDE 0.9 % (FLUSH) 0.9 %
10 SYRINGE (ML) INJECTION PRN
Status: DISCONTINUED | OUTPATIENT
Start: 2021-06-12 | End: 2021-06-17 | Stop reason: HOSPADM

## 2021-06-12 RX ORDER — PREDNISONE 20 MG/1
40 TABLET ORAL DAILY
Status: COMPLETED | OUTPATIENT
Start: 2021-06-13 | End: 2021-06-17

## 2021-06-12 RX ORDER — 0.9 % SODIUM CHLORIDE 0.9 %
500 INTRAVENOUS SOLUTION INTRAVENOUS ONCE
Status: DISCONTINUED | OUTPATIENT
Start: 2021-06-12 | End: 2021-06-12

## 2021-06-12 RX ORDER — IPRATROPIUM BROMIDE AND ALBUTEROL SULFATE 2.5; .5 MG/3ML; MG/3ML
3 SOLUTION RESPIRATORY (INHALATION) ONCE
Status: COMPLETED | OUTPATIENT
Start: 2021-06-12 | End: 2021-06-12

## 2021-06-12 RX ORDER — CARVEDILOL 3.12 MG/1
3.12 TABLET ORAL 2 TIMES DAILY WITH MEALS
Status: DISCONTINUED | OUTPATIENT
Start: 2021-06-13 | End: 2021-06-17 | Stop reason: HOSPADM

## 2021-06-12 RX ORDER — FUROSEMIDE 10 MG/ML
80 INJECTION INTRAMUSCULAR; INTRAVENOUS ONCE
Status: COMPLETED | OUTPATIENT
Start: 2021-06-12 | End: 2021-06-12

## 2021-06-12 RX ORDER — MAGNESIUM SULFATE IN WATER 40 MG/ML
2000 INJECTION, SOLUTION INTRAVENOUS ONCE
Status: COMPLETED | OUTPATIENT
Start: 2021-06-12 | End: 2021-06-12

## 2021-06-12 RX ORDER — DOXYCYCLINE HYCLATE 100 MG/1
100 CAPSULE ORAL 2 TIMES DAILY
Status: ON HOLD | COMMUNITY
End: 2021-06-17 | Stop reason: HOSPADM

## 2021-06-12 RX ORDER — ONDANSETRON 4 MG/1
4 TABLET, ORALLY DISINTEGRATING ORAL EVERY 8 HOURS PRN
Status: DISCONTINUED | OUTPATIENT
Start: 2021-06-12 | End: 2021-06-17 | Stop reason: HOSPADM

## 2021-06-12 RX ADMIN — METHYLPREDNISOLONE SODIUM SUCCINATE 125 MG: 125 INJECTION, POWDER, FOR SOLUTION INTRAMUSCULAR; INTRAVENOUS at 17:11

## 2021-06-12 RX ADMIN — CEFTRIAXONE 1000 MG: 1 INJECTION, POWDER, FOR SOLUTION INTRAMUSCULAR; INTRAVENOUS at 23:02

## 2021-06-12 RX ADMIN — Medication 2 PUFF: at 22:56

## 2021-06-12 RX ADMIN — MAGNESIUM SULFATE HEPTAHYDRATE 2000 MG: 40 INJECTION, SOLUTION INTRAVENOUS at 17:25

## 2021-06-12 RX ADMIN — IPRATROPIUM BROMIDE AND ALBUTEROL SULFATE 3 AMPULE: .5; 3 SOLUTION RESPIRATORY (INHALATION) at 17:12

## 2021-06-12 RX ADMIN — AZITHROMYCIN MONOHYDRATE 500 MG: 500 INJECTION, POWDER, LYOPHILIZED, FOR SOLUTION INTRAVENOUS at 17:24

## 2021-06-12 RX ADMIN — FUROSEMIDE 80 MG: 10 INJECTION, SOLUTION INTRAMUSCULAR; INTRAVENOUS at 17:48

## 2021-06-12 ASSESSMENT — PAIN SCALES - GENERAL
PAINLEVEL_OUTOF10: 8
PAINLEVEL_OUTOF10: 8
PAINLEVEL_OUTOF10: 7
PAINLEVEL_OUTOF10: 8

## 2021-06-12 ASSESSMENT — PAIN DESCRIPTION - PAIN TYPE
TYPE: CHRONIC PAIN

## 2021-06-12 ASSESSMENT — PAIN DESCRIPTION - LOCATION
LOCATION: BACK

## 2021-06-12 ASSESSMENT — PAIN DESCRIPTION - FREQUENCY
FREQUENCY: CONTINUOUS
FREQUENCY: CONTINUOUS

## 2021-06-12 ASSESSMENT — PAIN DESCRIPTION - ONSET
ONSET: ON-GOING
ONSET: ON-GOING

## 2021-06-12 ASSESSMENT — PAIN DESCRIPTION - DESCRIPTORS
DESCRIPTORS: SORE
DESCRIPTORS: ACHING

## 2021-06-12 ASSESSMENT — PAIN - FUNCTIONAL ASSESSMENT: PAIN_FUNCTIONAL_ASSESSMENT: 0-10

## 2021-06-12 NOTE — ED NOTES
No difference after treatment  She does not feel like getting any increased air     Teri Loya RN  06/12/21 1909

## 2021-06-12 NOTE — ED NOTES
18 fr duvall cath placed  Regis well  Too weak and sob to get up to [facility]  After lasix  Requested duvall  To well  Sterile procedure  10 cc if saline in balloon       Verna Bateman RN  06/12/21 5259

## 2021-06-12 NOTE — ED PROVIDER NOTES
GERD (gastroesophageal reflux disease)     Hypertension     Movement disorder     Neuromuscular disorder ([facility] [address]     Pneumonia          SURGICALHISTORY       Past Surgical History:   Procedure Laterality Date    ABDOMEN SURGERY      CHOLECYSTECTOMY      DILATATION, ESOPHAGUS      EYE SURGERY      TUBAL LIGATION           CURRENT MEDICATIONS       Previous Medications    ACETAMINOPHEN (TYLENOL) 500 MG TABLET    Take 1,000 mg by mouth every 6 hours as needed for Pain    ALBUTEROL SULFATE HFA (PROAIR HFA) 108 (90 BASE) MCG/ACT INHALER    Inhale 2 puffs into the lungs every 4 hours as needed for Wheezing or Shortness of Breath    ALBUTEROL SULFATE HFA (PROAIR HFA) 108 (90 BASE) MCG/ACT INHALER    Inhale 2 puffs into the lungs every 4 hours as needed for Wheezing or Shortness of Breath    CARVEDILOL (COREG) 3.125 MG TABLET    Take 1 tablet by mouth 2 times daily    FUROSEMIDE (LASIX) 80 MG TABLET    Take 1 tablet by mouth daily    GABAPENTIN (NEURONTIN) 300 MG CAPSULE    Take 300 mg by mouth 3 times daily. IBUPROFEN (ADVIL;MOTRIN) 800 MG TABLET    Take 800 mg by mouth daily    IPRATROPIUM-ALBUTEROL (DUONEB) 0.5-2.5 (3) MG/3ML SOLN NEBULIZER SOLUTION    INHALE ONE VIAL (3 MILLILITERS) VIA NEBULIZATION BY MOUTH EVERY 4 HOURS    LISINOPRIL (PRINIVIL;ZESTRIL) 5 MG TABLET    Take 1 tablet by mouth daily    METHADONE 10 MG/5ML SOLUTION    Take 130 mg by mouth daily  From [facility] methadone clinic daily . OMEPRAZOLE (PRILOSEC) 20 MG DELAYED RELEASE CAPSULE    Take 40 mg by mouth daily    OXYGEN    Inhale 2 L into the lungs See Admin Instructions.     SERTRALINE (ZOLOFT) 100 MG TABLET    Take 100 mg by mouth daily    SPACER/AERO-HOLDING CHAMBERS MARILOU    1 Device by Does not apply route daily as needed    SYMBICORT 160-4.5 MCG/ACT AERO    Inhale 2 puffs into the lungs 2 times daily    TIOTROPIUM (SPIRIVA HANDIHALER) 18 MCG INHALATION CAPSULE    Inhale 1 capsule into the lungs daily       ALLERGIES     Latex, Sulfa antibiotics, Nsaids, and Ultram [tramadol hcl]    FAMILY HISTORY       Family History   Problem Relation Age of Onset    Cancer Mother         lung cancer    Osteoporosis Mother     Cancer Father         cancer    Arthritis Father           SOCIAL HISTORY       Social History     Socioeconomic History    Marital status:      Spouse name: None    Number of children: 3    Years of education: 5    Highest education level: None   Occupational History    Occupation: disabled   Tobacco Use    Smoking status: Former Smoker     Packs/day: 0.50     Years: 30.00     Pack years: 15.00     Types: Cigarettes     Start date: 4/21/1984     Quit date: 12/16/2017     Years since quitting: 3.4    Smokeless tobacco: Never Used    Tobacco comment: occ. 2 cigarettes a day   Vaping Use    Vaping Use: Never used   Substance and Sexual Activity    Alcohol use: No     Alcohol/week: 0.0 standard drinks    Drug use: No    Sexual activity: None   Other Topics Concern    None   Social History Narrative    None     Social Determinants of Health     Financial Resource Strain:     Difficulty of Paying Living Expenses:    Food Insecurity:     Worried About Running Out of Food in the Last Year:     Ran Out of Food in the Last Year:    Transportation Needs:     Lack of Transportation (Medical):      Lack of Transportation (Non-Medical):    Physical Activity:     Days of Exercise per Week:     Minutes of Exercise per Session:    Stress:     Feeling of Stress :    Social Connections:     Frequency of Communication with Friends and Family:     Frequency of Social Gatherings with Friends and Family:     Attends Druze Services:     Active Member of Clubs or Organizations:     Attends Club or Organization Meetings:     Marital Status:    Intimate Partner Violence:     Fear of Current or Ex-Partner:     Emotionally Abused:     Physically Abused:     Sexually Abused:        SCREENINGS             PHYSICAL EXAM interpretation of this is less indicative of pulmonary edema, more likely COPD exacerbation.       LABS:  Labs Reviewed   CBC WITH AUTO DIFFERENTIAL - Abnormal; Notable for the following components:       Result Value    RBC 3.69 (*)     Hemoglobin 10.8 (*)     Hematocrit 33.1 (*)     All other components within normal limits    Narrative:     Performed at:  [facility]  [address] [place]   Phone [phone]   COMPREHENSIVE METABOLIC PANEL W/ REFLEX TO MG FOR LOW K - Abnormal; Notable for the following components:    Chloride 97 (*)     CO2 33 (*)     Glucose 113 (*)     CREATININE <0.5 (*)     Total Protein 6.3 (*)     ALT 8 (*)     AST 10 (*)     All other components within normal limits    Narrative:     Performed at:  [facility]  [address] [place], [place]   Phone [phone]   BLOOD GAS, VENOUS - Abnormal; Notable for the following components:    pH, Mainor 7.341 (*)     pCO2, Mainor 68.0 (*)     pO2, Main[place] 73.8 (*)     HCO3, Venous 36.7 (*)     Base Excess, Mainor 11.0 (*)     All other components within normal limits    Narrative:     Performed at:  [facility]  [address] [place], [place]   Phone [phone]   BRAIN NATRIURETIC PEPTIDE - Abnormal; Notable for the following components:    Pro-BNP 1,597 (*)     All other components within normal limits    Narrative:     Performed at:  [facility]  [address] [place], [place]   Phone [phone]   CULTURE, BLOOD 1   CULTURE, BLOOD 2   LACTATE, SEPSIS    Narrative:     Performed at:  [facility]  [address] [place], [place]   Phone [phone]   TROPONIN    Narrative:     Performed at:  [facility]  [address] [place] [address]   Phone [phone]   LACTATE, SEPSIS   PROCALCITONIN   COVID-19   COVID-19       All other labs were within normal range or not returned as of this dictation. EMERGENCY DEPARTMENT COURSE and DIFFERENTIAL DIAGNOSIS/MDM:   Vitals:    Vitals:    21 1815 21 1824 21 1830 21 1845   BP:   128/83    Pulse: 101  102 108   Resp:    Temp:  98.5 °F (36.9 °C)     TempSrc:  Oral     SpO2: 95%  92% 90%   Weight:       Height:             Medical decision makin-year-old female with history of COPD, CHF with chronic hypoxemic and hypercapnic respiratory failure on 3 L nasal cannula at home who presents with worsening shortness of breath over the past several days, not responsive to nebulizer treatments at home, has increased oxygen requirement at home from 3 L to 5 L, increased work of breathing using her home CPAP more, states home CPAP is helped with her increased work of breathing. She denies fevers, is status post a 10-day course of doxycycline for treatment of atypical pneumonia. Has significant wheezing throughout, scoliosis present which is limiting the patient's tidal volumes. Placed on supplemental oxygen, 5 L, given nebs, steroids, treatment of presumed COPD exacerbation, also given azithromycin. CXR does reval vascular congestion, BNP 1500, c/w comorbid acute HF, given IV lasix. On reassessment, pt with objective and subjective improvement in WOB. Admitted to the hospitalist, PCU. Signed out pending transport at 1900.         Medications   ipratropium-albuterol (DUONEB) nebulizer solution 3 ampule (3 ampules Inhalation Given 21)   methylPREDNISolone sodium (SOLU-MEDROL) injection 125 mg (125 mg Intravenous Given 21 171)   azithromycin (ZITHROMAX) 500 mg in D5W 250ml addavial (500 mg Intravenous New Bag 21 1724)   magnesium sulfate 2000 mg in 50 mL IVPB premix (2,000 mg Intravenous New Bag 21 1725)   furosemide (LASIX) injection 80 mg (80 mg Intravenous Given 6/12/21 0358)           CRITICAL CARE TIME   Total Critical Care time was 40 minutes, excluding separately reportable procedures. There was a high probability of clinically significant/life threatening deterioration in the patient's condition which required my urgent intervention. Frequent reassessments of hemodynamics, respiratory status, administration of proximal oxygen for hypoxemic respiratory failure, administration of IV mag for presumed bronchospastic process as well as IV Lasix for treatment of acute heart failure exacerbation, IV Solu-Medrol. FINAL IMPRESSION      1. Acute on chronic respiratory failure with hypoxia and hypercapnia (HCC)    2.  COPD exacerbation ([facility] [address]    3. Pulmonary edema cardiac cause [facility])          DISPOSITION/PLAN   DISPOSITION Admitted 06/12/2021 06:19:09 PM        (Please note that portions of this note were completed with a voice recognition program.Efforts were made to edit the dictations but occasionally words are mis-transcribed.)    Jennifer Bowser MD (electronically signed)  Attending Emergency Physician         Jennifer Bowser MD  06/12/21 4126

## 2021-06-12 NOTE — ED NOTES
Bedside report to Elíasdoris Warren RN  Daughter at bedside  Awake and alert  She is eating and Sat down to 89  Encouraged to take deep breaths  She is going to stop eating now and rest.  She states she tired because not sleeping     Khushboo Oviedo RN  06/12/21 5394

## 2021-06-12 NOTE — H&P
Hospital Medicine History & Physical      PCP: Gail Agarwal    Date of Admission: 6/12/2021    Date of Service: Pt seen/examined on 6/12/2021 and Admitted to Inpatient with expected LOS greater than two midnights due to medical therapy. Chief Complaint:  Shortness of breath      History Of Present Illness:      54 y.o. female with PMHx of COPD, CHF, GERD, HTN and Obesity presented to [facility] in transfer from [facility] for evaluation of shortness of breath. Patient reports worsening shortness of breath over the last 2 to 3 days. She just completed a course of antibiotics and steroids without improvement. She states since she has felt bad she has not used her CPAP overnight nor has she taken her Lasix. She denies fever, chills or body aches. She has chronic pain which has been exaggerated with her shortness of breath she denies nausea or vomiting. Past Medical History:          Diagnosis Date    Arthritis     Blind right eye     Chronic respiratory failure with hypoxia and hypercapnia (HCC)     COPD (chronic obstructive pulmonary disease) (HCC)     GERD (gastroesophageal reflux disease)     Hypertension     Movement disorder     Neuromuscular disorder ([address]     Pneumonia        Past Surgical History:          Procedure Laterality Date    ABDOMEN SURGERY      CHOLECYSTECTOMY      DILATATION, ESOPHAGUS      EYE SURGERY      TUBAL LIGATION         Medications Prior to Admission:      Prior to Admission medications    Medication Sig Start Date End Date Taking? Authorizing Provider   doxycycline hyclate (VIBRAMYCIN) 100 MG capsule Take 100 mg by mouth 2 times daily   Yes Historical Provider, MD   carvedilol (COREG) 3.125 MG tablet Take 1 tablet by mouth 2 times daily 4/14/21  Yes Jacob Soliz MD   lisinopril (PRINIVIL;ZESTRIL) 5 MG tablet Take 1 tablet by mouth daily 4/14/21  Yes Jacob Soliz MD   gabapentin (NEURONTIN) 300 MG capsule Take 300 mg by mouth 3 times daily. Yes Historical Provider, MD   sertraline (ZOLOFT) 100 MG tablet Take 100 mg by mouth daily   Yes Historical Provider, MD   furosemide (LASIX) 80 MG tablet Take 1 tablet by mouth daily 2/22/21  Yes Karen Momin MD   SYMBICORT 160-4.5 MCG/ACT AERO Inhale 2 puffs into the lungs 2 times daily 12/29/20  Yes Kings Payne DO   tiotropium (Estil Soho) 18 MCG inhalation capsule Inhale 1 capsule into the lungs daily 12/29/20  Yes Kings Payne DO   albuterol sulfate HFA (PROAIR HFA) 108 (90 Base) MCG/ACT inhaler Inhale 2 puffs into the lungs every 4 hours as needed for Wheezing or Shortness of Breath 8/11/20  Yes Mayra Buitrago MD   ipratropium-albuterol (DUONEB) 0.5-2.5 (3) MG/3ML SOLN nebulizer solution INHALE ONE VIAL (3 MILLILITERS) VIA NEBULIZATION BY MOUTH EVERY 4 HOURS 7/27/20  Yes Kings Payne DO   acetaminophen (TYLENOL) 500 MG tablet Take 1,000 mg by mouth every 6 hours as needed for Pain   Yes Historical Provider, MD   omeprazole (PRILOSEC) 20 MG delayed release capsule Take 40 mg by mouth daily   Yes Historical Provider, MD   Spacer/Aero-Holding Collene Si 1 Device by Does not apply route daily as needed 6/30/15  Yes JULIUS Nuñez - CNP   OXYGEN Inhale 2 L into the lungs See Admin Instructions. Patient taking differently: Inhale 2 L into the lungs See Admin Instructions Indications: 2L sitting, 4L when up and around Continuous at night or as needed when at home per patient 12/5/13  Yes Rafael Jackson MD   methadone 10 MG/5ML solution Take 130 mg by mouth daily  From [facility] methadone clinic daily .    Yes Historical Provider, MD   albuterol sulfate HFA (PROAIR HFA) 108 (90 Base) MCG/ACT inhaler Inhale 2 puffs into the lungs every 4 hours as needed for Wheezing or Shortness of Breath  Patient not taking: Reported on 4/28/2021 12/29/20   Kings Payne DO   ibuprofen (ADVIL;MOTRIN) 800 MG tablet Take 800 mg by mouth daily    Historical Provider, MD       Allergies:  Latex, Sulfa antibiotics, Nsaids, and Ultram [tramadol hcl]    Social History:      The patient currently lives home with family. TOBACCO:   reports that she quit smoking about 3 years ago. Her smoking use included cigarettes. She started smoking about 37 years ago. She has a 15.00 pack-year smoking history. She has never used smokeless tobacco.  ETOH:   reports no history of alcohol use. Family History:      Reviewed in detail positive as follows:        Problem Relation Age of Onset    Cancer Mother         lung cancer    Osteoporosis Mother     Cancer Father         cancer    Arthritis Father        REVIEW OF SYSTEMS:   Pertinent positives as noted in the HPI. All other systems reviewed and negative. PHYSICAL EXAM PERFORMED:    BP (!) 173/106   Pulse 100   Temp 98.8 °F (37.1 °C) (Oral)   Resp 22   Ht 5' (1.524 m)   Wt 236 lb (107 kg)   LMP 08/14/2015   SpO2 90%   BMI 46.09 kg/m²     General appearance: Chronically ill-appearing  female, uncomfortable in appearance but in no acute distress. Patient appears much older than stated age. HEENT:  Normal cephalic, atraumatic without obvious deformity. Pupils equal, round, and reactive to light. Extra ocular muscles intact. Conjunctivae/corneas clear. Neck: Supple, with full range of motion. No jugular venous distention. Trachea midline. Respiratory: Increased respiratory effort. Inspiratory and expiratory wheezing bilaterally. Accessory muscle use. Cardiovascular: Tachycardic rate with regular rhythm. 2+ lower extremity edema bilaterally  Abdomen: Soft, morbidly obese abdomen, non-tender, non-distended, without rebound or guarding. Normal bowel sounds. Musculoskeletal:  No clubbing, cyanosis or edema bilaterally. Full range of motion without deformity. Skin: Skin color, texture, turgor normal.  No rashes or lesions. Neurologic:  Neurovascularly intact without any focal sensory/motor deficits. Cranial nerves: II-XII intact, grossly non-focal.  Psychiatric:  Alert and oriented, thought content appropriate, normal insight  Capillary Refill: Brisk,< 3 seconds   Peripheral Pulses: +2 palpable, equal bilaterally       Labs:     Recent Labs     06/12/21  1650   WBC 8.3   HGB 10.8*   HCT 33.1*        Recent Labs     06/12/21  1650      K 3.7   CL 97*   CO2 33*   BUN 8   CREATININE <0.5*   CALCIUM 8.9     Recent Labs     06/12/21  1650   AST 10*   ALT 8*   BILITOT <0.2   ALKPHOS 72     No results for input(s): INR in the last 72 hours. Recent Labs     06/12/21  1650   TROPONINI <0.01       Urinalysis:      Lab Results   Component Value Date    NITRU Negative 11/08/2020    WBCUA 0-2 11/08/2020    RBCUA 5-10 11/08/2020    BLOODU SMALL 11/08/2020    SPECGRAV 1.012 11/08/2020    GLUCOSEU Negative 11/08/2020       Radiology:     EKG:  I have reviewed the EKG with the following interpretation: Normal sinus, ventricular rate 100. No acute ST elevation. XR CHEST PORTABLE   Final Result   Stable mild cardiomegaly with prominent central pulmonary vessels suggestive   of pulmonary artery hypertension or venous congestion which is more prominent   with no infiltrate or effusion             ASSESSMENT:    Active Hospital Problems    Diagnosis Date Noted    Heart failure ([facility] [I50.9] 06/12/2021    BMI 45.0-49.9, adult ([facility] [Z68.42] 11/25/2020    COPD exacerbation (HCC) [J44.1] 01/31/2017    Acute on chronic respiratory failure with hypoxia and hypercapnia (HCC) [J96.21, J96.22]          PLAN:    Acute on chronic hypercapnic and hypoxic respiratory failure  - due to COPD and CHF, pt not wearing cpap all night and hasnt been taking lasix (doesn't want to pee all the time)  - with increased work of breath, tachypnea and hypoxia  - home O2 4L 86%  - provide supplemental oxygen as necessary to keep SaO2 92% or greater.     Acute COPD exacerbation   - Nebulizer treatments every 4 hours and every 2 hours prn - Steroids and Antibiotics have been prescribed. - Continue home meds and Cpap  - Patient will be monitored closely, and deep breathing and coughing will be encouraged while awake. Diastolic CHF   - last ECHO: 11/2020 EF 40-45% mild diffuse hypokinesia. - proBNP 1597  - lasix 80 mg IV given in ED, will continue home dose, noncompliant at home  - cont BB, ACEi/ARB  - daily wts  - I/Os  - consult CHF RN  - consult cardiology    HTN  - monitor blood pressure  - continue home medications    Obesity   -  With Body mass index is 46 kg/m². Complicating assessment and treatment. Placing patient at risk for multiple co-morbidities as well as early death and contributing to the patient's presentation. Counseled on weight loss. DVT Prophylaxis: Lovenox  Diet: ADULT DIET; Regular; Low Fat/Low Chol/High Fiber/CARO; Low Sodium (2 gm); 1200 ml  Code Status: Full Code    Dispo - Inpatient       [address], APRN - CNP    Thank you Rubio Alicea for the opportunity to be involved in this patient's care. If you have any questions or concerns please feel free to contact me at [phone].

## 2021-06-12 NOTE — ED NOTES
Daughter at side, assisting patient to eat, patient remains drowsy but arouses easily     Placido Dupont RN  06/12/21 1918

## 2021-06-12 NOTE — ED NOTES
Attempted IV X2  Lavell MCGARRY in to place     Khushboo Oviedo, [address] [place] [address]  06/12/21 5548

## 2021-06-13 LAB
ANION GAP SERPL CALCULATED.3IONS-SCNC: 10 MMOL/L (ref 3–16)
APTT: 27.9 SEC (ref 24.2–36.2)
BUN BLDV-MCNC: 8 MG/DL (ref 7–20)
CALCIUM SERPL-MCNC: 8.9 MG/DL (ref 8.3–10.6)
CHLORIDE BLD-SCNC: 93 MMOL/L (ref 99–110)
CO2: 39 MMOL/L (ref 21–32)
CREAT SERPL-MCNC: 0.6 MG/DL (ref 0.6–1.1)
EKG ATRIAL RATE: 100 BPM
EKG DIAGNOSIS: NORMAL
EKG P AXIS: 51 DEGREES
EKG P-R INTERVAL: 126 MS
EKG Q-T INTERVAL: 354 MS
EKG QRS DURATION: 78 MS
EKG QTC CALCULATION (BAZETT): 456 MS
EKG R AXIS: 17 DEGREES
EKG T AXIS: 9 DEGREES
EKG VENTRICULAR RATE: 100 BPM
GFR AFRICAN AMERICAN: >60
GFR NON-AFRICAN AMERICAN: >60
GLUCOSE BLD-MCNC: 160 MG/DL (ref 70–99)
HCT VFR BLD CALC: 34.1 % (ref 36–48)
HEMOGLOBIN: 11.3 G/DL (ref 12–16)
INR BLD: 0.97 (ref 0.86–1.14)
L. PNEUMOPHILA SEROGP 1 UR AG: NORMAL
LACTIC ACID, SEPSIS: 1.4 MMOL/L (ref 0.4–1.9)
MCH RBC QN AUTO: 29.5 PG (ref 26–34)
MCHC RBC AUTO-ENTMCNC: 33.3 G/DL (ref 31–36)
MCV RBC AUTO: 88.5 FL (ref 80–100)
PDW BLD-RTO: 14.7 % (ref 12.4–15.4)
PLATELET # BLD: 225 K/UL (ref 135–450)
PMV BLD AUTO: 7.1 FL (ref 5–10.5)
POTASSIUM REFLEX MAGNESIUM: 4 MMOL/L (ref 3.5–5.1)
PROCALCITONIN: 0.03 NG/ML (ref 0–0.15)
PROCALCITONIN: 0.03 NG/ML (ref 0–0.15)
PROTHROMBIN TIME: 11.3 SEC (ref 10–13.2)
RBC # BLD: 3.85 M/UL (ref 4–5.2)
SARS-COV-2, NAAT: NOT DETECTED
SARS-COV-2, PCR: NOT DETECTED
SODIUM BLD-SCNC: 142 MMOL/L (ref 136–145)
STREP PNEUMONIAE ANTIGEN, URINE: NORMAL
WBC # BLD: 7.1 K/UL (ref 4–11)

## 2021-06-13 PROCEDURE — 99223 1ST HOSP IP/OBS HIGH 75: CPT | Performed by: INTERNAL MEDICINE

## 2021-06-13 PROCEDURE — 6370000000 HC RX 637 (ALT 250 FOR IP): Performed by: INTERNAL MEDICINE

## 2021-06-13 PROCEDURE — 36415 COLL VENOUS BLD VENIPUNCTURE: CPT

## 2021-06-13 PROCEDURE — 6370000000 HC RX 637 (ALT 250 FOR IP): Performed by: NURSE PRACTITIONER

## 2021-06-13 PROCEDURE — 84145 PROCALCITONIN (PCT): CPT

## 2021-06-13 PROCEDURE — 85610 PROTHROMBIN TIME: CPT

## 2021-06-13 PROCEDURE — 85027 COMPLETE CBC AUTOMATED: CPT

## 2021-06-13 PROCEDURE — 2700000000 HC OXYGEN THERAPY PER DAY

## 2021-06-13 PROCEDURE — 93010 ELECTROCARDIOGRAM REPORT: CPT | Performed by: INTERNAL MEDICINE

## 2021-06-13 PROCEDURE — 2580000003 HC RX 258: Performed by: NURSE PRACTITIONER

## 2021-06-13 PROCEDURE — 83605 ASSAY OF LACTIC ACID: CPT

## 2021-06-13 PROCEDURE — 94660 CPAP INITIATION&MGMT: CPT

## 2021-06-13 PROCEDURE — 87635 SARS-COV-2 COVID-19 AMP PRB: CPT

## 2021-06-13 PROCEDURE — 94761 N-INVAS EAR/PLS OXIMETRY MLT: CPT

## 2021-06-13 PROCEDURE — 6360000002 HC RX W HCPCS: Performed by: NURSE PRACTITIONER

## 2021-06-13 PROCEDURE — 94640 AIRWAY INHALATION TREATMENT: CPT

## 2021-06-13 PROCEDURE — 85730 THROMBOPLASTIN TIME PARTIAL: CPT

## 2021-06-13 PROCEDURE — 6360000002 HC RX W HCPCS: Performed by: INTERNAL MEDICINE

## 2021-06-13 PROCEDURE — 87449 NOS EACH ORGANISM AG IA: CPT

## 2021-06-13 PROCEDURE — 2060000000 HC ICU INTERMEDIATE R&B

## 2021-06-13 RX ORDER — METHADONE HYDROCHLORIDE 10 MG/1
130 TABLET ORAL DAILY
Status: DISCONTINUED | OUTPATIENT
Start: 2021-06-13 | End: 2021-06-17 | Stop reason: HOSPADM

## 2021-06-13 RX ORDER — UMECLIDINIUM 62.5 UG/1
1 AEROSOL, POWDER ORAL DAILY
COMMUNITY
End: 2022-07-17 | Stop reason: ALTCHOICE

## 2021-06-13 RX ORDER — OXYCODONE HYDROCHLORIDE AND ACETAMINOPHEN 5; 325 MG/1; MG/1
1 TABLET ORAL EVERY 4 HOURS PRN
Status: DISCONTINUED | OUTPATIENT
Start: 2021-06-13 | End: 2021-06-17 | Stop reason: HOSPADM

## 2021-06-13 RX ORDER — FUROSEMIDE 10 MG/ML
40 INJECTION INTRAMUSCULAR; INTRAVENOUS 2 TIMES DAILY
Status: DISCONTINUED | OUTPATIENT
Start: 2021-06-13 | End: 2021-06-14

## 2021-06-13 RX ORDER — GABAPENTIN 100 MG/1
100 CAPSULE ORAL 3 TIMES DAILY
Status: DISCONTINUED | OUTPATIENT
Start: 2021-06-13 | End: 2021-06-17 | Stop reason: HOSPADM

## 2021-06-13 RX ORDER — KETOROLAC TROMETHAMINE 15 MG/ML
15 INJECTION, SOLUTION INTRAMUSCULAR; INTRAVENOUS ONCE
Status: COMPLETED | OUTPATIENT
Start: 2021-06-13 | End: 2021-06-13

## 2021-06-13 RX ORDER — HYDROCODONE BITARTRATE AND ACETAMINOPHEN 5; 325 MG/1; MG/1
1 TABLET ORAL ONCE
Status: COMPLETED | OUTPATIENT
Start: 2021-06-13 | End: 2021-06-13

## 2021-06-13 RX ADMIN — IPRATROPIUM BROMIDE AND ALBUTEROL 1 PUFF: 20; 100 SPRAY, METERED RESPIRATORY (INHALATION) at 09:28

## 2021-06-13 RX ADMIN — IPRATROPIUM BROMIDE AND ALBUTEROL 1 PUFF: 20; 100 SPRAY, METERED RESPIRATORY (INHALATION) at 20:35

## 2021-06-13 RX ADMIN — ENOXAPARIN SODIUM 40 MG: 40 INJECTION SUBCUTANEOUS at 09:33

## 2021-06-13 RX ADMIN — Medication 2 PUFF: at 20:35

## 2021-06-13 RX ADMIN — CARVEDILOL 3.12 MG: 3.12 TABLET, FILM COATED ORAL at 17:20

## 2021-06-13 RX ADMIN — IPRATROPIUM BROMIDE AND ALBUTEROL 1 PUFF: 20; 100 SPRAY, METERED RESPIRATORY (INHALATION) at 16:41

## 2021-06-13 RX ADMIN — OXYCODONE HYDROCHLORIDE AND ACETAMINOPHEN 1 TABLET: 5; 325 TABLET ORAL at 21:10

## 2021-06-13 RX ADMIN — LISINOPRIL 5 MG: 5 TABLET ORAL at 09:34

## 2021-06-13 RX ADMIN — PREDNISONE 40 MG: 20 TABLET ORAL at 09:34

## 2021-06-13 RX ADMIN — OXYCODONE HYDROCHLORIDE AND ACETAMINOPHEN 1 TABLET: 5; 325 TABLET ORAL at 13:23

## 2021-06-13 RX ADMIN — GABAPENTIN 100 MG: 100 CAPSULE ORAL at 13:24

## 2021-06-13 RX ADMIN — FUROSEMIDE 40 MG: 10 INJECTION, SOLUTION INTRAVENOUS at 13:22

## 2021-06-13 RX ADMIN — GABAPENTIN 300 MG: 300 CAPSULE ORAL at 09:34

## 2021-06-13 RX ADMIN — TIOTROPIUM BROMIDE INHALATION SPRAY 2 PUFF: 3.12 SPRAY, METERED RESPIRATORY (INHALATION) at 08:44

## 2021-06-13 RX ADMIN — FUROSEMIDE 40 MG: 10 INJECTION, SOLUTION INTRAVENOUS at 17:20

## 2021-06-13 RX ADMIN — METHADONE HYDROCHLORIDE 130 MG: 10 TABLET ORAL at 09:33

## 2021-06-13 RX ADMIN — KETOROLAC TROMETHAMINE 15 MG: 15 INJECTION, SOLUTION INTRAMUSCULAR; INTRAVENOUS at 02:54

## 2021-06-13 RX ADMIN — Medication 2 PUFF: at 08:44

## 2021-06-13 RX ADMIN — SERTRALINE 100 MG: 100 TABLET, FILM COATED ORAL at 09:34

## 2021-06-13 RX ADMIN — Medication 10 ML: at 09:33

## 2021-06-13 RX ADMIN — GABAPENTIN 100 MG: 100 CAPSULE ORAL at 21:10

## 2021-06-13 RX ADMIN — HYDROCODONE BITARTRATE AND ACETAMINOPHEN 1 TABLET: 5; 325 TABLET ORAL at 02:54

## 2021-06-13 RX ADMIN — PANTOPRAZOLE SODIUM 40 MG: 40 TABLET, DELAYED RELEASE ORAL at 06:43

## 2021-06-13 RX ADMIN — CARVEDILOL 3.12 MG: 3.12 TABLET, FILM COATED ORAL at 09:34

## 2021-06-13 ASSESSMENT — PAIN DESCRIPTION - LOCATION
LOCATION: BACK

## 2021-06-13 ASSESSMENT — PAIN SCALES - GENERAL
PAINLEVEL_OUTOF10: 8
PAINLEVEL_OUTOF10: 5
PAINLEVEL_OUTOF10: 0
PAINLEVEL_OUTOF10: 2
PAINLEVEL_OUTOF10: 8
PAINLEVEL_OUTOF10: 8
PAINLEVEL_OUTOF10: 6
PAINLEVEL_OUTOF10: 7

## 2021-06-13 ASSESSMENT — PAIN DESCRIPTION - FREQUENCY
FREQUENCY: CONTINUOUS

## 2021-06-13 ASSESSMENT — PAIN DESCRIPTION - PROGRESSION
CLINICAL_PROGRESSION: NOT CHANGED
CLINICAL_PROGRESSION: NOT CHANGED

## 2021-06-13 ASSESSMENT — PAIN DESCRIPTION - ONSET
ONSET: ON-GOING

## 2021-06-13 ASSESSMENT — PAIN DESCRIPTION - DESCRIPTORS
DESCRIPTORS: ACHING

## 2021-06-13 ASSESSMENT — PAIN DESCRIPTION - PAIN TYPE
TYPE: CHRONIC PAIN

## 2021-06-13 NOTE — ED NOTES
Patient remains in pain, \"always have\" [facility] to bedside, report given     Carlos Friend RN  06/12/21 3212

## 2021-06-13 NOTE — CONSULTS
[facility]  Cardiology Consult Note        CC:      CHF      HPI:   This is a 54 y.o. female who presented to the ER with progressive shortness of breath for the last 4 days. She was recently in the hospital and treated with antibiotics without improvement in her dyspnea.   She has noticed worsening with wheezing weight gain bilateral lower extremity edema cough and increasing home oxygen requirements      Past Medical History:   Diagnosis Date    Arthritis     Blind right eye     Chronic respiratory failure with hypoxia and hypercapnia (HCC)     COPD (chronic obstructive pulmonary disease) (HCC)     GERD (gastroesophageal reflux disease)     Hypertension     Movement disorder     Neuromuscular disorder ([address]     Pneumonia       Past Surgical History:   Procedure Laterality Date    ABDOMEN SURGERY      CHOLECYSTECTOMY      DILATATION, ESOPHAGUS      EYE SURGERY      TUBAL LIGATION        Family History   Problem Relation Age of Onset    Cancer Mother         lung cancer    Osteoporosis Mother     Cancer Father         cancer    Arthritis Father       Social History     Tobacco Use    Smoking status: Former Smoker     Packs/day: 0.50     Years: 30.00     Pack years: 15.00     Types: Cigarettes     Start date: 4/21/1984     Quit date: 12/16/2017     Years since quitting: 3.4    Smokeless tobacco: Never Used    Tobacco comment: occ. 2 cigarettes a day   Vaping Use    Vaping Use: Never used   Substance Use Topics    Alcohol use: No     Alcohol/week: 0.0 standard drinks    Drug use: No     Allergies   Allergen Reactions    Latex      Rash developed    Sulfa Antibiotics Rash     Throat swelling    Nsaids Other (See Comments)     ulcer    Ultram [Tramadol Hcl] Swelling and Rash      methadone  130 mg Oral Daily    albuterol-ipratropium  1 puff Inhalation Q4H WA    carvedilol  3.125 mg Oral BID WC    gabapentin  300 mg Oral TID    lisinopril  5 mg Oral Daily    pantoprazole  40 mg Oral QAM AC    sertraline  100 mg Oral Daily    budesonide-formoterol  2 puff Inhalation BID    tiotropium  2 puff Inhalation Daily    sodium chloride flush  10 mL Intravenous 2 times per day    enoxaparin  40 mg Subcutaneous Daily    predniSONE  40 mg Oral Daily    cefTRIAXone (ROCEPHIN) IV  1,000 mg Intravenous Q24H       Review of Systems -   Constitutional: Negative for weight gain/loss; malaise, fever  Respiratory: Negative for Asthma;  cough and hemoptysis  Cardiovascular: Negative for palpitations,dizziness   Gastrointestinal: Negative for abd.pain; constipation/diarrhea;    Genitourinary: Negative for stones; hematuria; frequency hesitancy  Integumentt: Negative for rash or pruritis  Hematologic/lymphatic: Negative for blood dyscrasia; leukemia/lymphoma  Musculoskeletal: Negative for Connective tissue disease  Neurological:  Negative for Seizure   Behavioral/Psych:Negative for Bipolar disorder, Schizophrenia; Dementia  Endocrine: negative for thyroid, parathyroid disease      Intake/Output Summary (Last 24 hours) at 6/13/2021 0938  Last data filed at 6/13/2021 0754  Gross per 24 hour   Intake --   Output 1325 ml   Net -1325 ml       Physical Examination:    BP (!) 156/96   Pulse 79   Temp 98.3 °F (36.8 °C) (Oral)   Resp 24   Ht 5' (1.524 m)   Wt 229 lb 15 oz (104.3 kg)   LMP 08/14/2015   SpO2 93%   BMI 44.91 kg/m²    HEENT:  Face: Atraumatic, Conjunctiva: Pink; non icteric,  Mucous Memb:  Moist, No thyromegaly or Lymphadenopathy  Respiratory:  Resp Assessment: normal, Resp Auscultation: clear   Cardiovascular: Auscultation: nl S1 & S2, Palpation:  Nl PMI;  No heaves or thrills, JVP:  normal  Abdomen: Soft, non-tender, Normal bowel sounds,  No organomegaly  Extremities: No Cyanosis or Clubbing; Edema none  Neurological: Oriented to time, place, and person, Non-anxious  Psychiatric: Normal mood and affect  Skin: Warm and dry,  No rash seen      Current Facility-Administered Medications: methadone (DOLOPHINE) tablet 130 mg, 130 mg, Oral, Daily  albuterol-ipratropium (COMBIVENT RESPIMAT)  MCG/ACT inhaler 1 puff, 1 puff, Inhalation, Q4H WA  carvedilol (COREG) tablet 3.125 mg, 3.125 mg, Oral, BID WC  gabapentin (NEURONTIN) capsule 300 mg, 300 mg, Oral, TID  lisinopril (PRINIVIL;ZESTRIL) tablet 5 mg, 5 mg, Oral, Daily  pantoprazole (PROTONIX) tablet 40 mg, 40 mg, Oral, QAM AC  sertraline (ZOLOFT) tablet 100 mg, 100 mg, Oral, Daily  budesonide-formoterol (SYMBICORT) 160-4.5 MCG/ACT inhaler 2 puff, 2 puff, Inhalation, BID  tiotropium (SPIRIVA RESPIMAT) 2.5 MCG/ACT inhaler 2 puff, 2 puff, Inhalation, Daily  sodium chloride flush 0.9 % injection 10 mL, 10 mL, Intravenous, 2 times per day  sodium chloride flush 0.9 % injection 10 mL, 10 mL, Intravenous, PRN  0.9 % sodium chloride infusion, 25 mL, Intravenous, PRN  ondansetron (ZOFRAN-ODT) disintegrating tablet 4 mg, 4 mg, Oral, Q8H PRN **OR** ondansetron (ZOFRAN) injection 4 mg, 4 mg, Intravenous, Q6H PRN  magnesium hydroxide (MILK OF MAGNESIA) 400 MG/5ML suspension 30 mL, 30 mL, Oral, Daily PRN  enoxaparin (LOVENOX) injection 40 mg, 40 mg, Subcutaneous, Daily  acetaminophen (TYLENOL) tablet 650 mg, 650 mg, Oral, Q6H PRN **OR** acetaminophen (TYLENOL) suppository 650 mg, 650 mg, Rectal, Q6H PRN  albuterol (PROVENTIL) nebulizer solution 2.5 mg, 2.5 mg, Nebulization, Q2H PRN  predniSONE (DELTASONE) tablet 40 mg, 40 mg, Oral, Daily  cefTRIAXone (ROCEPHIN) 1000 mg IVPB in 50 mL D5W minibag, 1,000 mg, Intravenous, Q24H      Labs:   Recent Labs     06/12/21  1650 06/13/21  0644   WBC 8.3 7.1   HGB 10.8* 11.3*   HCT 33.1* 34.1*    225     Recent Labs     06/12/21  0049 06/12/21  1650    141   K 4.0 3.7   CO2 39* 33*   BUN 8 8   CREATININE 0.6 <0.5*   GLUCOSE 160* 113*     No results for input(s): BNP in the last 72 hours.   Recent Labs     06/13/21  0644   PROTIME 11.3   INR 0.97     Recent Labs     06/13/21  0644   APTT 27.9     Recent Labs 06/12/21  1650   TROPONINI <0.01     No results found for: HDL, LDLDIRECT, LDLCALC, TRIG  Recent Labs     06/12/21  1650   AST 10*   ALT 8*   LABALBU 3.6         EKG:   Normal sinus rhythm    Chest X-Ray:  Stable mild cardiomegaly with prominent central pulmonary vessels suggestive   of pulmonary artery hypertension or venous congestion which is more prominent   with no infiltrate or effusion       ECHO:  EF 40-45%. Grade I diastolic dysfunction. Mild mitral regurgitation is present. The left atrium is moderately dilated. The right ventricle is enlarged. IVC size is dilated (>2.1 cm) and collapses < 50% with respiration  consistent with elevated RA pressure (15 mmHg). PASP is mildly elevated at 46 mmHg  assuming a right atrial pressure of 15 mmHg. ASSESSMENT AND PLAN:      50-year-old patient with history of cardiomyopathy EF 40 to 45% presenting with shortness of breath.   Likely causes noncompliance with medications  Admission proBNP is 1597  Agree with MILLIE Tian M.D  6/13/2021

## 2021-06-13 NOTE — CONSULTS
PATIENT IS SEEN AT THE REQUEST OF CAMERON. Dallin Pedroza for acute on chronic respiratory failure     CONSULTING PHYSICIAN: CAMERON Layton     HISTORY OF PRESENT ILLNESS:  This is a 54 y.o. female who presented to the ED on 6/12 with a CC of SOB. Per ED Notes she has had SOB for the past 3-4 days. Failed doxycycline. Also with more swelling in legs. Admitted for COPD exacerbation. She said the prednisone and doxycycline did nothing for her breathing. She is not feeling ill and has no sick contacts. She did have COVID vaccine. She uses 3-4 liters of oxygen at home and uses bilevel at night. She has been lax with using bilevel up until recently. Established Pulmonologist:  Alejandra Huynh     PAST MEDICAL HISTORY:  Past Medical History:   Diagnosis Date    Arthritis     Blind right eye     Chronic respiratory failure with hypoxia and hypercapnia (HCC)     COPD (chronic obstructive pulmonary disease) (HCC)     GERD (gastroesophageal reflux disease)     Hypertension     Movement disorder     Neuromuscular disorder ([address]     Pneumonia        PAST SURGICAL HISTORY:  Past Surgical History:   Procedure Laterality Date    ABDOMEN SURGERY      CHOLECYSTECTOMY      DILATATION, ESOPHAGUS      EYE SURGERY      TUBAL LIGATION         FAMILY HISTORY:  family history includes Arthritis in her father; Cancer in her father and mother; Osteoporosis in her mother. SOCIAL HISTORY:   reports that she quit smoking about 3 years ago. Her smoking use included cigarettes. She started smoking about 37 years ago. She has a 15.00 pack-year smoking history.  She has never used smokeless tobacco.    Scheduled Meds:   methadone  130 mg Oral Daily    albuterol-ipratropium  1 puff Inhalation Q4H WA    carvedilol  3.125 mg Oral BID WC    gabapentin  300 mg Oral TID    lisinopril  5 mg Oral Daily    pantoprazole  40 mg Oral QAM AC    sertraline  100 mg Oral Daily    budesonide-formoterol  2 puff Inhalation BID    tiotropium  2 puff 21  1650    141   K 4.0 3.7   CL 93* 97*   CO2 39* 33*   BUN 8 8   CREATININE 0.6 <0.5*     LIVER PROFILE:   Recent Labs     21  1650   AST 10*   ALT 8*   BILITOT <0.2   ALKPHOS 72     PT/INR:   Recent Labs     21  0644   PROTIME 11.3   INR 0.97     APTT:   Recent Labs     21  0644   APTT 27.9     UA:No results for input(s): NITRITE, COLORU, PHUR, LABCAST, WBCUA, RBCUA, MUCUS, TRICHOMONAS, YEAST, BACTERIA, CLARITYU, SPECGRAV, LEUKOCYTESUR, UROBILINOGEN, BILIRUBINUR, BLOODU, GLUCOSEU, AMORPHOUS in the last 72 hours. Invalid input(s): KETONESU  No results for input(s): PHART, GAM9OCL, PO2ART in the last 72 hours. Cultures: In process     PFTs:   Moderately-severe obstruction        ECHO:   Ejection fraction is visually estimated to be 40-45%. There is mild diffuse hypokinesis. Diastolic filling parameters suggest grade I diastolic dysfunction. Mitral valve leaflets appear mildly thickened. Mild mitral regurgitation is present. The left atrium is moderately dilated. The right ventricle is enlarged. Right ventricular systolic function is normal.   TAPSE 2.3 cm   IVC size is dilated (>2.1 cm) and collapses < 50% with respiration   consistent with elevated RA pressure (15 mmHg). Estimated pulmonary artery systolic pressure is mildly elevated at 46 mmHg assuming a right atrial pressure of 15 mmHg. VB.34    Chest X-ray:  Chest imaging was reviewed by me and showed vascular congestion         I reviewed all the above labs and studies pertaining to this visit.       ASSESSMENT/PLAN:  · Chronic Hypoxic/Hypercapnic Respiratory Failure  Titrate oxygen for saturations greater than or equal to 90%  · Change CPAP to bilevel at hs.   is home settings   · Severe COPD with possible exacerbation however CXR looks like edema  · Symbicort, Spiriva, Combivent  · Prednisone for 5 days  · Hold antibiotics   · Abnormal CXR with appearance of pulmonary edema   · Needs diuresis.   Also takes methadone so she could also have some non-cardiogenic edema as well  · BLANCA  · Bilevel at hs.  12/7      DVT prophylaxis  Lovenox           DO JUDY Nuñez [facility] Pulmonary

## 2021-06-13 NOTE — PROGRESS NOTES
4 Eyes Skin Assessment     NAME:  Mackie Landau  YOB: 1975  MEDICAL RECORD NUMBER:  [id number]    The patient is being assess for  Admission    I agree that 2 RN's have performed a thorough Head to Toe Skin Assessment on the patient. ALL assessment sites listed below have been assessed. Areas assessed by both nurses:    Head, Face, Ears, Shoulders, Back, Chest, Arms, Elbows, Hands, Sacrum. Buttock, Coccyx, Ischium and Legs. Feet and Heels        Does the Patient have a Wound?  No noted wound(s)       Jaison Prevention initiated:  Yes   Wound Care Orders initiated:  No    Pressure Injury (Stage 3,4, Unstageable, DTI, NWPT, and Complex wounds) if present place consult order under [de-identified] NA    New and Established Ostomies if present place consult order under : NA      Nurse 1 eSignature: Electronically signed by Fidencio De La Rosa RN on 6/13/21 at 1:38 AM EDT    **SHARE this note so that the co-signing nurse is able to place an eSignature**    Nurse 2 eSignature: Electronically signed by Danitza Krishnamurthy RN on 6/13/21 at 2:17 AM EDT

## 2021-06-13 NOTE — PLAN OF CARE
Problem: Pain:  Goal: Pain level will decrease  Description: Pain level will decrease  Outcome: Ongoing  Goal: Control of acute pain  Description: Control of acute pain  Outcome: Ongoing  Goal: Control of chronic pain  Description: Control of chronic pain  Outcome: Ongoing     Problem: Falls - Risk of:  Goal: Will remain free from falls  Description: Will remain free from falls  Outcome: Ongoing  Goal: Absence of physical injury  Description: Absence of physical injury  Outcome: Ongoing     Problem: Skin Integrity:  Goal: Will show no infection signs and symptoms  Description: Will show no infection signs and symptoms  Outcome: Ongoing  Goal: Absence of new skin breakdown  Description: Absence of new skin breakdown  Outcome: Ongoing     Problem:  Activity:  Goal: Capacity to carry out activities will improve  Description: Capacity to carry out activities will improve  Outcome: Ongoing  Goal: Will verbalize the importance of balancing activity with adequate rest periods  Description: Will verbalize the importance of balancing activity with adequate rest periods  Outcome: Ongoing     Problem: Cardiac:  Goal: Hemodynamic stability will improve  Description: Hemodynamic stability will improve  Outcome: Ongoing  Goal: Ability to maintain an adequate cardiac output will improve  Description: Ability to maintain an adequate cardiac output will improve  Outcome: Ongoing     Problem: Coping:  Goal: Verbalizations of decreased anxiety will decrease  Description: Verbalizations of decreased anxiety will decrease  Outcome: Ongoing     Problem: Fluid Volume:  Goal: Risk for excess fluid volume will decrease  Description: Risk for excess fluid volume will decrease  Outcome: Ongoing  Goal: Maintenance of adequate hydration will improve  Description: Maintenance of adequate hydration will improve  Outcome: Ongoing  Goal: Will show no signs and symptoms of electrolyte imbalance  Description: Will show no signs and symptoms of electrolyte imbalance  Outcome: Ongoing     Problem: Health Behavior:  Goal: Ability to manage health-related needs will improve  Description: Ability to manage health-related needs will improve  Outcome: Ongoing  Goal: Ability to seek appropriate health care will improve  Description: Ability to seek appropriate health care will improve  Outcome: Ongoing     Problem: Nutritional:  Goal: Maintenance of adequate nutrition will improve  Description: Maintenance of adequate nutrition will improve  Outcome: Ongoing     Problem: Physical Regulation:  Goal: Complications related to the disease process, condition or treatment will be avoided or minimized  Description: Complications related to the disease process, condition or treatment will be avoided or minimized  Outcome: Ongoing     Problem: Respiratory:  Goal: Ability to maintain adequate ventilation will improve  Description: Ability to maintain adequate ventilation will improve  Outcome: Ongoing  Goal: Respiratory status will improve  Description: Respiratory status will improve  Outcome: Ongoing

## 2021-06-13 NOTE — PROGRESS NOTES
Pt states she recently finished a regimen of prednisone and is still currently taking an antibiotic she is unsure of which antibiotic. Pt calling home to figure out which antibiotic.

## 2021-06-13 NOTE — PLAN OF CARE
Problem: Pain:  Goal: Pain level will decrease  Description: Pain level will decrease  6/13/2021 1022 by Mariam Rossi RN  Outcome: Ongoing  Note:   Pain/discomfort being managed with PRN analgesics per MD orders. Pt able to express presence and absence of pain and rate pain appropriately using numerical scale. 6/13/2021 0427 by Derrill Babinski, RN  Outcome: Ongoing  Goal: Control of acute pain  Description: Control of acute pain  6/13/2021 1022 by Mariam Rossi RN  Outcome: Ongoing  6/13/2021 0427 by Derrill Babinski, RN  Outcome: Ongoing  Goal: Control of chronic pain  Description: Control of chronic pain  6/13/2021 1022 by Mariam Rossi RN  Outcome: Ongoing  6/13/2021 0427 by Derrill Babinski, RN  Outcome: Ongoing     Problem: Falls - Risk of:  Goal: Will remain free from falls  Description: Will remain free from falls  6/13/2021 1022 by Mariam Rossi RN  Outcome: Ongoing  Note: Fall risk assessment completed per unit protocol. Patient's bed is in the lowest position, call light is within reach and the patient's room is free of clutter. The patient has been instructed to call for assistance before getting out of bed or the chair. 6/13/2021 0427 by Derrill Babinski, RN  Outcome: Ongoing  Goal: Absence of physical injury  Description: Absence of physical injury  6/13/2021 1022 by Mariam Rossi RN  Outcome: Ongoing  6/13/2021 0427 by Derrill Babinski, RN  Outcome: Ongoing     Problem: Skin Integrity:  Goal: Will show no infection signs and symptoms  Description: Will show no infection signs and symptoms  6/13/2021 1022 by Mariam Rossi RN  Outcome: Ongoing  Note: Patient's skin has been assessed per unit protocol and the patient is being repositioned or encouraged to turn every two hours to prevent skin breakdown and promote healing.      6/13/2021 0427 by Derrill Babinski, RN  Outcome: Ongoing  Goal: Absence of new skin breakdown  Description: Absence of new skin breakdown  6/13/2021 1022 by Mariam Rossi RN  Outcome: Ongoing  6/13/2021 0427 by Vanesa Mccabe RN  Outcome: Ongoing     Problem:  Activity:  Goal: Capacity to carry out activities will improve  Description: Capacity to carry out activities will improve  6/13/2021 1022 by Casandra Killian RN  Outcome: Ongoing  6/13/2021 0427 by Vanesa Mccabe RN  Outcome: Ongoing  Goal: Will verbalize the importance of balancing activity with adequate rest periods  Description: Will verbalize the importance of balancing activity with adequate rest periods  6/13/2021 1022 by Casandra Killian RN  Outcome: Ongoing  6/13/2021 0427 by Vanesa Mccabe RN  Outcome: Ongoing     Problem: Cardiac:  Goal: Hemodynamic stability will improve  Description: Hemodynamic stability will improve  6/13/2021 1022 by Casandra Killian RN  Outcome: Ongoing  6/13/2021 0427 by Vanesa Mccabe RN  Outcome: Ongoing  Goal: Ability to maintain an adequate cardiac output will improve  Description: Ability to maintain an adequate cardiac output will improve  6/13/2021 1022 by Casandra Killian RN  Outcome: Ongoing  6/13/2021 0427 by Vanesa Mccabe RN  Outcome: Ongoing     Problem: Coping:  Goal: Verbalizations of decreased anxiety will decrease  Description: Verbalizations of decreased anxiety will decrease  6/13/2021 1022 by Casandra Killian RN  Outcome: Ongoing  6/13/2021 0427 by Vanesa Mccabe RN  Outcome: Ongoing     Problem: Fluid Volume:  Goal: Risk for excess fluid volume will decrease  Description: Risk for excess fluid volume will decrease  6/13/2021 1022 by Casandra Killian RN  Outcome: Ongoing  6/13/2021 0427 by Vanesa Mccabe RN  Outcome: Ongoing  Goal: Maintenance of adequate hydration will improve  Description: Maintenance of adequate hydration will improve  6/13/2021 1022 by Casandra Killian RN  Outcome: Ongoing  6/13/2021 0427 by Vanesa Mccabe RN  Outcome: Ongoing  Goal: Will show no signs and symptoms of electrolyte imbalance  Description: Will show no signs and symptoms of electrolyte imbalance  6/13/2021 1022 by Orin Proctor Baltazar Steve RN  Outcome: Ongoing  6/13/2021 0427 by Marcio John RN  Outcome: Ongoing     Problem: Health Behavior:  Goal: Ability to manage health-related needs will improve  Description: Ability to manage health-related needs will improve  6/13/2021 1022 by Kristofer Enriquez RN  Outcome: Ongoing  6/13/2021 0427 by Marcio John RN  Outcome: Ongoing  Goal: Ability to seek appropriate health care will improve  Description: Ability to seek appropriate health care will improve  6/13/2021 1022 by Kristofer Enriquez RN  Outcome: Ongoing  6/13/2021 0427 by Marcio John RN  Outcome: Ongoing     Problem: Nutritional:  Goal: Maintenance of adequate nutrition will improve  Description: Maintenance of adequate nutrition will improve  6/13/2021 1022 by Kristofer Enriquez RN  Outcome: Ongoing  Note: Patient dietary needs being monitored by physician/dietitian. Nursing staff is encouraging patient to eat as tolerated and documenting intake. Will continue to monitor.     6/13/2021 0427 by Marcio John RN  Outcome: Ongoing     Problem: Physical Regulation:  Goal: Complications related to the disease process, condition or treatment will be avoided or minimized  Description: Complications related to the disease process, condition or treatment will be avoided or minimized  6/13/2021 1022 by Kristofer Enriquez RN  Outcome: Ongoing  6/13/2021 0427 by Marcio John RN  Outcome: Ongoing     Problem: Respiratory:  Goal: Ability to maintain adequate ventilation will improve  Description: Ability to maintain adequate ventilation will improve  6/13/2021 1022 by Kristofer Enriquez RN  Outcome: Ongoing  6/13/2021 0427 by Marcio John RN  Outcome: Ongoing  Goal: Respiratory status will improve  Description: Respiratory status will improve  6/13/2021 1022 by Kristofer Enriquez RN  Outcome: Ongoing  6/13/2021 0427 by Marcio John RN  Outcome: Ongoing

## 2021-06-13 NOTE — PROGRESS NOTES
Patient refused the use of hospital CPAP x2. Patient is now stating that she needs to eat. Instructed patient to call when she is ready.  Electronically signed by Marie Dodge RCP on 6/13/2021 at 1:15 AM

## 2021-06-13 NOTE — PROGRESS NOTES
respiratory effort. Significant bilateral wheezing  Cardiovascular: Regular rate and rhythm with normal S1/S2 without murmurs, rubs or gallops. Abdomen: Soft, non-tender, non-distended with normal bowel sounds. Musculoskeletal: No clubbing, cyanosis 2+ edema bilaterally. ScoliosisSkin: Skin color, texture, turgor normal.  No rashes or lesions. Neurologic:  Neurovascularly intact without any focal sensory/motor deficits.  Cranial nerves: II-XII intact, grossly non-focal.  Psychiatric: Alert and oriented, thought content appropriate, normal insight  Capillary Refill: Brisk,< 3 seconds   Peripheral Pulses: +2 palpable, equal bilaterally       Labs:   Recent Labs     06/12/21  1650 06/13/21  0644   WBC 8.3 7.1   HGB 10.8* 11.3*   HCT 33.1* 34.1*    225     Recent Labs     06/12/21  0049 06/12/21  1650    141   K 4.0 3.7   CL 93* 97*   CO2 39* 33*   BUN 8 8   CREATININE 0.6 <0.5*   CALCIUM 8.9 8.9     Recent Labs     06/12/21  1650   AST 10*   ALT 8*   BILITOT <0.2   ALKPHOS 72     Recent Labs     06/13/21  0644   INR 0.97     Recent Labs     06/12/21  1650   TROPONINI <0.01       Urinalysis:      Lab Results   Component Value Date    NITRU Negative 11/08/2020    WBCUA 0-2 11/08/2020    RBCUA 5-10 11/08/2020    BLOODU SMALL 11/08/2020    SPECGRAV 1.012 11/08/2020    GLUCOSEU Negative 11/08/2020       Radiology:  XR CHEST PORTABLE   Final Result   Stable mild cardiomegaly with prominent central pulmonary vessels suggestive   of pulmonary artery hypertension or venous congestion which is more prominent   with no infiltrate or effusion               Assessment/Plan:    Active Hospital Problems    Diagnosis     Heart failure ([facility] [I50.9]     BMI 45.0-49.9, adult ([facility] [Z68.42]     COPD exacerbation (HCC) [J44.1]     Acute on chronic respiratory failure with hypoxia and hypercapnia (HCC) [J96.21, J96.22]      Acute on chronic hypercapnic and hypoxic respiratory failure  Due to COPD and CHF, pt not wearing cpap all night and hasnt been taking lasix (doesn't want to pee all the time)  - with increased work of breath, tachypnea and hypoxia  - home O2 4L 86%  - provide supplemental oxygen as necessary to keep SaO2 92% or greater.     Acute COPD exacerbation   - Nebulizer treatments every 4 hours and every 2 hours prn   - IV steroids.   - Continue home meds and Cpap  - pulm consulted  - procal low     HFrEF with exacerbation   ECHO: 11/2020 EF 40-45% mild diffuse hypokinesia. - lasix 80 mg IV given in ED, will continue home dose, noncompliant at home  - cont BB, ACEi/ARB  - daily wts  - I/Os  - consult CHF RN  - consult cardiology     HTN  - monitor blood pressure  - continue home medications     Obesity   -  With Body mass index is 46 kg/m². Complicating assessment and treatment. Placing patient at risk for multiple co-morbidities as well as early death and contributing to the patient's presentation. Counseled on weight loss. Chronic pain  On high doses methadone. Will verify with pharmacy. DVT Prophylaxis: Lovenox  Diet: ADULT DIET; Regular; Low Fat/Low Chol/High Fiber/CARO; Low Sodium (2 gm); 1200 ml  Code Status: Full Code    PT/OT Eval Status: deferred    Dispo - pending    Ross Brambila MD    This note was transcribed using [id number]. Please disregard any translational errors.

## 2021-06-14 PROBLEM — E66.01 MORBID OBESITY WITH BMI OF 40.0-44.9, ADULT (HCC): Status: ACTIVE | Noted: 2020-11-25

## 2021-06-14 LAB
ALBUMIN SERPL-MCNC: 3.6 G/DL (ref 3.4–5)
ANION GAP SERPL CALCULATED.3IONS-SCNC: 7 MMOL/L (ref 3–16)
BUN BLDV-MCNC: 17 MG/DL (ref 7–20)
CALCIUM SERPL-MCNC: 8.8 MG/DL (ref 8.3–10.6)
CHLORIDE BLD-SCNC: 94 MMOL/L (ref 99–110)
CO2: 39 MMOL/L (ref 21–32)
CREAT SERPL-MCNC: 0.6 MG/DL (ref 0.6–1.1)
GFR AFRICAN AMERICAN: >60
GFR NON-AFRICAN AMERICAN: >60
GLUCOSE BLD-MCNC: 111 MG/DL (ref 70–99)
HCT VFR BLD CALC: 35.1 % (ref 36–48)
HEMOGLOBIN: 11.5 G/DL (ref 12–16)
MAGNESIUM: 2.2 MG/DL (ref 1.8–2.4)
MCH RBC QN AUTO: 29.3 PG (ref 26–34)
MCHC RBC AUTO-ENTMCNC: 32.8 G/DL (ref 31–36)
MCV RBC AUTO: 89.3 FL (ref 80–100)
PDW BLD-RTO: 15.3 % (ref 12.4–15.4)
PHOSPHORUS: 4.1 MG/DL (ref 2.5–4.9)
PLATELET # BLD: 250 K/UL (ref 135–450)
PMV BLD AUTO: 7.5 FL (ref 5–10.5)
POTASSIUM SERPL-SCNC: 3.6 MMOL/L (ref 3.5–5.1)
PRO-BNP: 196 PG/ML (ref 0–124)
RBC # BLD: 3.93 M/UL (ref 4–5.2)
SODIUM BLD-SCNC: 140 MMOL/L (ref 136–145)
WBC # BLD: 10.5 K/UL (ref 4–11)

## 2021-06-14 PROCEDURE — 87205 SMEAR GRAM STAIN: CPT

## 2021-06-14 PROCEDURE — 97530 THERAPEUTIC ACTIVITIES: CPT

## 2021-06-14 PROCEDURE — 94660 CPAP INITIATION&MGMT: CPT

## 2021-06-14 PROCEDURE — 97165 OT EVAL LOW COMPLEX 30 MIN: CPT

## 2021-06-14 PROCEDURE — 6370000000 HC RX 637 (ALT 250 FOR IP): Performed by: NURSE PRACTITIONER

## 2021-06-14 PROCEDURE — 99232 SBSQ HOSP IP/OBS MODERATE 35: CPT | Performed by: INTERNAL MEDICINE

## 2021-06-14 PROCEDURE — 6370000000 HC RX 637 (ALT 250 FOR IP): Performed by: INTERNAL MEDICINE

## 2021-06-14 PROCEDURE — 99233 SBSQ HOSP IP/OBS HIGH 50: CPT | Performed by: INTERNAL MEDICINE

## 2021-06-14 PROCEDURE — 6360000002 HC RX W HCPCS: Performed by: INTERNAL MEDICINE

## 2021-06-14 PROCEDURE — 2060000000 HC ICU INTERMEDIATE R&B

## 2021-06-14 PROCEDURE — 85027 COMPLETE CBC AUTOMATED: CPT

## 2021-06-14 PROCEDURE — 87070 CULTURE OTHR SPECIMN AEROBIC: CPT

## 2021-06-14 PROCEDURE — 83735 ASSAY OF MAGNESIUM: CPT

## 2021-06-14 PROCEDURE — 80069 RENAL FUNCTION PANEL: CPT

## 2021-06-14 PROCEDURE — 94640 AIRWAY INHALATION TREATMENT: CPT

## 2021-06-14 PROCEDURE — 97161 PT EVAL LOW COMPLEX 20 MIN: CPT

## 2021-06-14 PROCEDURE — 6360000002 HC RX W HCPCS: Performed by: NURSE PRACTITIONER

## 2021-06-14 PROCEDURE — 36415 COLL VENOUS BLD VENIPUNCTURE: CPT

## 2021-06-14 PROCEDURE — 83880 ASSAY OF NATRIURETIC PEPTIDE: CPT

## 2021-06-14 PROCEDURE — 94761 N-INVAS EAR/PLS OXIMETRY MLT: CPT

## 2021-06-14 PROCEDURE — 2580000003 HC RX 258: Performed by: NURSE PRACTITIONER

## 2021-06-14 PROCEDURE — 2700000000 HC OXYGEN THERAPY PER DAY

## 2021-06-14 RX ORDER — GUAIFENESIN 600 MG/1
600 TABLET, EXTENDED RELEASE ORAL 2 TIMES DAILY
Status: DISCONTINUED | OUTPATIENT
Start: 2021-06-14 | End: 2021-06-17 | Stop reason: HOSPADM

## 2021-06-14 RX ORDER — FUROSEMIDE 10 MG/ML
80 INJECTION INTRAMUSCULAR; INTRAVENOUS 2 TIMES DAILY
Status: DISCONTINUED | OUTPATIENT
Start: 2021-06-14 | End: 2021-06-15

## 2021-06-14 RX ADMIN — FUROSEMIDE 80 MG: 10 INJECTION, SOLUTION INTRAMUSCULAR; INTRAVENOUS at 17:12

## 2021-06-14 RX ADMIN — SERTRALINE 100 MG: 100 TABLET, FILM COATED ORAL at 09:23

## 2021-06-14 RX ADMIN — OXYCODONE HYDROCHLORIDE AND ACETAMINOPHEN 1 TABLET: 5; 325 TABLET ORAL at 17:13

## 2021-06-14 RX ADMIN — CARVEDILOL 3.12 MG: 3.12 TABLET, FILM COATED ORAL at 09:23

## 2021-06-14 RX ADMIN — Medication 2 PUFF: at 08:05

## 2021-06-14 RX ADMIN — Medication 10 ML: at 20:54

## 2021-06-14 RX ADMIN — GUAIFENESIN 600 MG: 600 TABLET ORAL at 10:51

## 2021-06-14 RX ADMIN — PANTOPRAZOLE SODIUM 40 MG: 40 TABLET, DELAYED RELEASE ORAL at 09:23

## 2021-06-14 RX ADMIN — IPRATROPIUM BROMIDE AND ALBUTEROL 1 PUFF: 20; 100 SPRAY, METERED RESPIRATORY (INHALATION) at 20:16

## 2021-06-14 RX ADMIN — TIOTROPIUM BROMIDE INHALATION SPRAY 2 PUFF: 3.12 SPRAY, METERED RESPIRATORY (INHALATION) at 08:05

## 2021-06-14 RX ADMIN — GUAIFENESIN 600 MG: 600 TABLET ORAL at 20:54

## 2021-06-14 RX ADMIN — GABAPENTIN 100 MG: 100 CAPSULE ORAL at 09:23

## 2021-06-14 RX ADMIN — OXYCODONE HYDROCHLORIDE AND ACETAMINOPHEN 1 TABLET: 5; 325 TABLET ORAL at 10:45

## 2021-06-14 RX ADMIN — CEFTRIAXONE 1000 MG: 1 INJECTION, POWDER, FOR SOLUTION INTRAMUSCULAR; INTRAVENOUS at 00:35

## 2021-06-14 RX ADMIN — GABAPENTIN 100 MG: 100 CAPSULE ORAL at 15:04

## 2021-06-14 RX ADMIN — IPRATROPIUM BROMIDE AND ALBUTEROL 1 PUFF: 20; 100 SPRAY, METERED RESPIRATORY (INHALATION) at 15:42

## 2021-06-14 RX ADMIN — Medication 10 ML: at 09:28

## 2021-06-14 RX ADMIN — METHADONE HYDROCHLORIDE 130 MG: 10 TABLET ORAL at 09:23

## 2021-06-14 RX ADMIN — LISINOPRIL 5 MG: 5 TABLET ORAL at 09:23

## 2021-06-14 RX ADMIN — Medication 2 PUFF: at 20:16

## 2021-06-14 RX ADMIN — GABAPENTIN 100 MG: 100 CAPSULE ORAL at 20:54

## 2021-06-14 RX ADMIN — PREDNISONE 40 MG: 20 TABLET ORAL at 09:23

## 2021-06-14 RX ADMIN — FUROSEMIDE 40 MG: 10 INJECTION, SOLUTION INTRAVENOUS at 09:23

## 2021-06-14 RX ADMIN — ENOXAPARIN SODIUM 40 MG: 40 INJECTION SUBCUTANEOUS at 09:23

## 2021-06-14 RX ADMIN — IPRATROPIUM BROMIDE AND ALBUTEROL 1 PUFF: 20; 100 SPRAY, METERED RESPIRATORY (INHALATION) at 11:44

## 2021-06-14 RX ADMIN — OXYCODONE HYDROCHLORIDE AND ACETAMINOPHEN 1 TABLET: 5; 325 TABLET ORAL at 23:33

## 2021-06-14 RX ADMIN — IPRATROPIUM BROMIDE AND ALBUTEROL 1 PUFF: 20; 100 SPRAY, METERED RESPIRATORY (INHALATION) at 08:05

## 2021-06-14 RX ADMIN — CARVEDILOL 3.12 MG: 3.12 TABLET, FILM COATED ORAL at 17:12

## 2021-06-14 ASSESSMENT — PAIN DESCRIPTION - FREQUENCY
FREQUENCY: CONTINUOUS
FREQUENCY: CONTINUOUS

## 2021-06-14 ASSESSMENT — PAIN DESCRIPTION - LOCATION
LOCATION: BACK
LOCATION: BACK

## 2021-06-14 ASSESSMENT — PAIN DESCRIPTION - DESCRIPTORS
DESCRIPTORS: ACHING
DESCRIPTORS: ACHING

## 2021-06-14 ASSESSMENT — PAIN SCALES - GENERAL
PAINLEVEL_OUTOF10: 8
PAINLEVEL_OUTOF10: 8
PAINLEVEL_OUTOF10: 9

## 2021-06-14 ASSESSMENT — PAIN SCALES - WONG BAKER
WONGBAKER_NUMERICALRESPONSE: 0
WONGBAKER_NUMERICALRESPONSE: 0

## 2021-06-14 ASSESSMENT — PAIN DESCRIPTION - ONSET
ONSET: ON-GOING
ONSET: ON-GOING

## 2021-06-14 ASSESSMENT — PAIN DESCRIPTION - PROGRESSION
CLINICAL_PROGRESSION: NOT CHANGED
CLINICAL_PROGRESSION: NOT CHANGED

## 2021-06-14 ASSESSMENT — PAIN DESCRIPTION - PAIN TYPE
TYPE: CHRONIC PAIN
TYPE: CHRONIC PAIN

## 2021-06-14 NOTE — PROGRESS NOTES
Cardiology Progress Note     Admit Date: 2021     Reason for follow up: CHF, acute, systolic + diastolic    HPI and Interval History:   55 yo female with morbid obesity, COPD, CHF (Systolic + diastolic; LVEF 52-52%) who presents with worsening LE swelling and dyspnea with minimal exertion, 30 degree orthopnea, and PND (she claims for the last 5 years). Upon workup, BNP noted to be 1500+. Given IV lasix over the last 1.5 days with improvement in her breathing. Interval hx: Patient seen and examined. Clinical notes reviewed. Telemetry reviewed - SR 80's bpm. No new complaint today. No major events overnight. Denies having angina at the time of this visit. Review of System:  All other systems reviewed except for that noted above. Pertinent negatives and positives are:     · General: negative for fever, chills   · Ophthalmic ROS: negative for - eye pain or loss of vision  · ENT ROS: negative for - headaches, sore throat   · Respiratory: negative for - cough, sputum  · Cardiovascular: Reviewed in HPI  · Gastrointestinal: negative for - abdominal pain, diarrhea, N/V  · Hematology: negative for - bleeding, blood clots, bruising or jaundice  · Genito-Urinary:  negative for - Dysuria or incontinence  · Musculoskeletal: negative for - Joint swelling, muscle pain  · Neurological: negative for - confusion, dizziness, headaches   · Psychiatric: No anxiety, no depression.   · Dermatological: negative for - rash      Physical Examination:  Vitals:    21 1144   BP:    Pulse:    Resp: 20   Temp:    SpO2: 91%        Intake/Output Summary (Last 24 hours) at 2021 1205  Last data filed at 2021 1053  Gross per 24 hour   Intake 1010 ml   Output 3250 ml   Net -2240 ml     In: 1010 [P.O.:1010]  Out: 2850    Wt Readings from Last 3 Encounters:   21 228 lb 2.8 oz (103.5 kg)   21 223 lb 12.3 oz (101.5 kg)   21 223 lb (101.2 kg)     Temp  Av.2 °F (36.8 °C)  Min: 97.5 °F (36.4 °C)  Max: 98.9 °F (37.2 °C)  Pulse  Av.2  Min: 78  Max: 91  BP  Min: 123/88  Max: 152/91  SpO2  Av.1 %  Min: 88 %  Max: 100 %  FiO2   Av %  Min: 45 %  Max: 45 %    · Telemetry: Sinus rhythm with v-rates 80's bpm   · Constitutional: Alert. Oriented to person, place, and time. No distress. · Head: Normocephalic and atraumatic. · Mouth/Throat: Lips appear moist. Oropharynx is clear and moist.  · Eyes: Conjunctivae normal. EOM are normal.   · Neck: Neck supple. No lymphadenopathy. No rigidity. ? JVD present due to obese neck. · Cardiovascular: Normal rate, regular rhythm. Normal S1&S2. Carotid pulse 2+ bilaterally. · Pulmonary/Chest: Bilateral respiratory sounds present. No respiratory accessory muscle use. No wheezes, No rhonchi. + rales R>L. · Abdominal: Soft. Normal bowel sounds present. No distension, No tenderness. No splenomegaly. No hernia. · Musculoskeletal: No tenderness. Full range of motion in bilateral upper and lower extremities. 2+ pitting BLE edema    · Lymphadenopathy: Has no cervical adenopathy. · Neurological: Alert and oriented. Cranial nerve II-XII grossly intact, No gross deficit to touch. · Skin: Skin is warm and dry. No rash, lesions, ulcerations noted. · Psychiatric: No anxiety nor agitation. Labs, diagnostic and imaging results reviewed. Reviewed. Recent Labs     21  0049 21  1650 21  0536    141 140   K 4.0 3.7 3.6   CL 93* 97* 94*   CO2 39* 33* 39*   PHOS  --   --  4.1   BUN 8 8 17   CREATININE 0.6 <0.5* 0.6     Recent Labs     21  1650 21  0644 21  0536   WBC 8.3 7.1 10.5   HGB 10.8* 11.3* 11.5*   HCT 33.1* 34.1* 35.1*   MCV 89.5 88.5 89.3    225 250     Lab Results   Component Value Date    TROPONINI <0.01 2021     Estimated Creatinine Clearance: 127 mL/min (based on SCr of 0.6 mg/dL).    Lab Results   Component Value Date    BNP 9 2013     Lab Results   Component Value Date    PROTIME 11.3 2021    INR 0.97 06/13/2021     No results found for: CHOL, HDL, TRIG    Scheduled Meds:   guaiFENesin  600 mg Oral BID    methadone  130 mg Oral Daily    albuterol-ipratropium  1 puff Inhalation Q4H WA    gabapentin  100 mg Oral TID    furosemide  40 mg Intravenous BID    carvedilol  3.125 mg Oral BID WC    lisinopril  5 mg Oral Daily    pantoprazole  40 mg Oral QAM AC    sertraline  100 mg Oral Daily    budesonide-formoterol  2 puff Inhalation BID    tiotropium  2 puff Inhalation Daily    sodium chloride flush  10 mL Intravenous 2 times per day    enoxaparin  40 mg Subcutaneous Daily    predniSONE  40 mg Oral Daily     Continuous Infusions:   sodium chloride       PRN Meds:oxyCODONE-acetaminophen, sodium chloride flush, sodium chloride, ondansetron **OR** ondansetron, magnesium hydroxide, acetaminophen **OR** acetaminophen, albuterol     Patient Active Problem List    Diagnosis Date Noted    Heart failure ([facility] 06/12/2021    BMI 45.0-49.9, adult ([facility] 11/25/2020    COPD with acute exacerbation (HCC)     Chronic diastolic congestive heart failure (HCC)     Acute metabolic encephalopathy     Methadone use (HCC)     Abnormal CXR     Acute on chronic systolic (congestive) heart failure (HCC) 11/08/2020    Pulmonary edema 11/08/2020    Leg swelling 07/14/2017    Localized edema     Nonhealing ulcer of left lower leg limited to breakdown of skin ([facility] 07/07/2017    Acute respiratory failure ([facility] 01/31/2017    COPD exacerbation (HCC) 01/31/2017    Respiratory acidosis 01/31/2017    COPD, severe (HCC)     Acute on chronic respiratory failure with hypoxia and hypercapnia (HCC)     Tobacco use     Chronic respiratory failure with hypoxia and hypercapnia (HCC) 08/25/2016    Sepsis ([facility] 12/02/2013      Active Hospital Problems    Diagnosis Date Noted    Heart failure ([facility] [I50.9] 06/12/2021    BMI 45.0-49.9, adult ([facility] [Z68.42] 11/25/2020    COPD exacerbation ([facility] [J44.1] 01/31/2017    Acute on

## 2021-06-14 NOTE — RT PROTOCOL NOTE
RT Inhaler-Nebulizer Bronchodilator Protocol Note    There is a bronchodilator order in the chart from a provider indicating to follow the RT Bronchodilator Protocol and there is an Initiate RT Bronchodilator Protocol order as well (see protocol at bottom of note). The findings from the last RT Protocol Assessment were as follows:  Smoking: >15 Pack years  Surgical Status: General surgery/Lower abdominal  Xray: Chronic changes  Respiratory Pattern: Dyspnea with exertion  Mental Status: Alert and Oriented  Breath Sounds: Scattered wheeze  Cough: Strong, productive  Activity Level: Walking with assistance  Oxygen Requirement: 29% or 3LNC - 5LNC/40%  Indication for Bronchodilator Therapy: On home bronchodilators  Bronchodilator Assessment Score: 8  Pt is on home regimen  Aerosolized bronchodilator medication orders have been revised according to the RT Bronchodilator Protocol. RT Inhaler-Nebulizer Bronchodilator Protocol:    Respiratory Therapist to perform RT Therapy Protocol Assessment then follow the protocol. No Indications - adjust the frequency to every 6 hours PRN wheezing or bronchospasm, if no treatments needed after 48 hours then discontinue using Per Protocol order mode. If indication present, adjust the RT bronchodilator orders based on the Bronchodilator Assessment Score as follows:    0-6 - enter or revise RT bronchodilator order to Albuterol Inhaler order with frequency of every 2 hours PRN for wheezing or increased work of breathing using Per Protocol order mode. If Albuterol Inhaler not tolerated or not effective, then discontinue the Albuterol Inhaler order and enter Albuterol Nebulizer order with same frequency and PRN reasons. Repeat RT Therapy Protocol Assessment as needed.     7-10 - discontinue any other Inpatient aerosolized bronchodilator medication orders and enter or revise two Albuterol Inhaler orders, one with BID frequency and one with frequency of every 2 hours PRN

## 2021-06-14 NOTE — PROGRESS NOTES
Hospitalist Progress Note      PCP: Rena Cai    Date of Admission: 6/12/2021    Chief Complaint:   Chief Complaint   Patient presents with    Shortness of Breath     has copd  Uses oxygen at home     Hospital Course: 54year old with COPD on home O2, HTN, HFrEF who presented with SOB. 3L O2 at home, up to 5L prior to admission. S/p 10 day course doxy without improvement. COPD exacerbation and volume overload, diuresing. Subjective: Still very wheezy and tight. Asking for mucinex. Coughing but not getting much up. Medications:  Reviewed    Infusion Medications    sodium chloride       Scheduled Medications    methadone  130 mg Oral Daily    albuterol-ipratropium  1 puff Inhalation Q4H WA    gabapentin  100 mg Oral TID    furosemide  40 mg Intravenous BID    carvedilol  3.125 mg Oral BID WC    lisinopril  5 mg Oral Daily    pantoprazole  40 mg Oral QAM AC    sertraline  100 mg Oral Daily    budesonide-formoterol  2 puff Inhalation BID    tiotropium  2 puff Inhalation Daily    sodium chloride flush  10 mL Intravenous 2 times per day    enoxaparin  40 mg Subcutaneous Daily    predniSONE  40 mg Oral Daily    cefTRIAXone (ROCEPHIN) IV  1,000 mg Intravenous Q24H     PRN Meds: oxyCODONE-acetaminophen, sodium chloride flush, sodium chloride, ondansetron **OR** ondansetron, magnesium hydroxide, acetaminophen **OR** acetaminophen, albuterol      Intake/Output Summary (Last 24 hours) at 6/14/2021 0934  Last data filed at 6/14/2021 0356  Gross per 24 hour   Intake 890 ml   Output 2350 ml   Net -1460 ml       Physical Exam Performed:    /84   Pulse 78   Temp 97.5 °F (36.4 °C) (Axillary)   Resp 18   Ht 5' (1.524 m)   Wt 228 lb 2.8 oz (103.5 kg)   LMP 08/14/2015   SpO2 100%   BMI 44.56 kg/m²     General appearance: No apparent distress, appears stated age and cooperative. HEENT: Pupils equal, round, and reactive to light. Conjunctivae/corneas clear.   Neck: Supple, with full range of motion. Trachea midline. Respiratory:  Decr bilaterally. . Significant bilateral wheezing  Cardiovascular: Regular rate and rhythm with normal S1/S2 without murmurs, rubs or gallops. Abdomen: Soft, non-tender, non-distended with normal bowel sounds. Musculoskeletal: No clubbing, cyanosis 2+ edema bilaterally. ScoliosisSkin: Skin color, texture, turgor normal.  No rashes or lesions. Neurologic:  Neurovascularly intact without any focal sensory/motor deficits.  Cranial nerves: II-XII intact, grossly non-focal.  Psychiatric: Alert and oriented, thought content appropriate, normal insight  Capillary Refill: Brisk,< 3 seconds   Peripheral Pulses: +2 palpable, equal bilaterally       Labs:   Recent Labs     06/12/21  1650 06/13/21  0644 06/14/21  0536   WBC 8.3 7.1 10.5   HGB 10.8* 11.3* 11.5*   HCT 33.1* 34.1* 35.1*    225 250     Recent Labs     06/12/21  0049 06/12/21  1650 06/14/21  0536    141 140   K 4.0 3.7 3.6   CL 93* 97* 94*   CO2 39* 33* 39*   BUN 8 8 17   CREATININE 0.6 <0.5* 0.6   CALCIUM 8.9 8.9 8.8   PHOS  --   --  4.1     Recent Labs     06/12/21  1650   AST 10*   ALT 8*   BILITOT <0.2   ALKPHOS 72     Recent Labs     06/13/21  0644   INR 0.97     Recent Labs     06/12/21  1650   TROPONINI <0.01       Urinalysis:      Lab Results   Component Value Date    NITRU Negative 11/08/2020    WBCUA 0-2 11/08/2020    RBCUA 5-10 11/08/2020    BLOODU SMALL 11/08/2020    SPECGRAV 1.012 11/08/2020    GLUCOSEU Negative 11/08/2020       Radiology:  XR CHEST PORTABLE   Final Result   Stable mild cardiomegaly with prominent central pulmonary vessels suggestive   of pulmonary artery hypertension or venous congestion which is more prominent   with no infiltrate or effusion               Assessment/Plan:    Active Hospital Problems    Diagnosis     Heart failure ([facility] [I50.9]     BMI 45.0-49.9, adult ([facility] [Z68.42]     COPD exacerbation ([facility] [J44.1]     Acute on chronic respiratory failure with hypoxia and hypercapnia (HCC) [J96.21, J96.22]     COPD, severe (HCC) [J44.9]      Acute on chronic hypercapnic and hypoxic respiratory failure  Due to COPD and CHF, pt not wearing cpap all night and hasnt been taking lasix (doesn't want to pee all the time)  - with increased work of breath, tachypnea and hypoxia  - home O2 4L 86%  - provide supplemental oxygen as necessary to keep SaO2 92% or greater.     Acute COPD exacerbation   Significant wheezing on exam.  - Nebulizer treatments every 4 hours and every 2 hours prn   - IV steroids.   - Continue home meds and Cpap  - pulm consulted  - procal low-- stop abx     HFrEF with exacerbation   ECHO: 11/2020 EF 40-45% mild diffuse hypokinesia. - lasix 80 mg IV given in ED, will continue diuresis, noncompliant at home, also likely not following any fluid/Na restriction  - cont BB, ACEi/ARB  - daily wts  - I/Os  - consult CHF RN  - consult cardiology     HTN  - monitor blood pressure  - continue home medications     Obesity   -  With Body mass index is 46 kg/m². Complicating assessment and treatment. Placing patient at risk for multiple co-morbidities as well as early death and contributing to the patient's presentation. Counseled on weight loss. Chronic pain  On high doses methadone. Will verify with pharmacy. DVT Prophylaxis: Lovenox  Diet: ADULT DIET; Regular; Low Fat/Low Chol/High Fiber/CARO; Low Sodium (2 gm); 1200 ml  Code Status: Full Code    PT/OT Eval Status: deferred    Dispo - pending    Ross Brambila MD    This note was transcribed using [id number]. Please disregard any translational errors.

## 2021-06-14 NOTE — PLAN OF CARE
Problem: Pain:  Goal: Pain level will decrease  Description: Pain level will decrease  6/14/2021 1223 by Sandy Smyth RN  Outcome: Ongoing  Note:   Pain/discomfort being managed with PRN analgesics per MD orders. Pt able to express presence and absence of pain and rate pain appropriately using numerical scale. 6/14/2021 0111 by Lalito Barlow RN  Outcome: Ongoing  Goal: Control of acute pain  Description: Control of acute pain  6/14/2021 1223 by Sandy Smyth RN  Outcome: Ongoing  6/14/2021 0111 by Lalito Barlow RN  Outcome: Ongoing  Note: Pain/discomfort being managed with PRN analgesics per MD orders. Pt able to express presence and absence of pain and rate pain appropriately using numerical scale. Goal: Control of chronic pain  Description: Control of chronic pain  6/14/2021 1223 by Sandy Smyth RN  Outcome: Ongoing  6/14/2021 0111 by Lalito Barlow RN  Outcome: Ongoing     Problem: Falls - Risk of:  Goal: Will remain free from falls  Description: Will remain free from falls  6/14/2021 1223 by Sandy Smyth RN  Outcome: Ongoing  Note: Fall risk assessment completed per unit protocol. Patient's bed is in the lowest position, call light is within reach and the patient's room is free of clutter. The patient has been instructed to call for assistance before getting out of bed or the chair. 6/14/2021 0111 by Lalito Barlow RN  Outcome: Ongoing  Note: Fall risk precautions in place. Bed in lowest position with wheels locked,bed alarm in place and activated,non-skid socks on pt, fall risk ID on pt, call light in reach, will continue to monitor.      Goal: Absence of physical injury  Description: Absence of physical injury  6/14/2021 1223 by Sandy Smyth RN  Outcome: Ongoing  6/14/2021 0111 by Lalito Barlow RN  Outcome: Ongoing     Problem: Skin Integrity:  Goal: Will show no infection signs and symptoms  Description: Will show no infection signs and symptoms  6/14/2021 1223 by Sandy Smyth RN  Outcome: Ongoing  Note: Patient's skin has been assessed per unit protocol and the patient is being repositioned or encouraged to turn every two hours to prevent skin breakdown and promote healing. 6/14/2021 0111 by Bri Oconnor RN  Outcome: Ongoing  Note: Skin assessment completed every shift. Pt assessed for incontinence, appropriate barrier cream applied prn. Pt encouraged to turn/rotate every 2 hours. Assistance provided if pt unable to do so themselves. Goal: Absence of new skin breakdown  Description: Absence of new skin breakdown  6/14/2021 1223 by Wendie Issa RN  Outcome: Ongoing  6/14/2021 0111 by Bri Oconnor RN  Outcome: Ongoing     Problem:  Activity:  Goal: Capacity to carry out activities will improve  Description: Capacity to carry out activities will improve  6/14/2021 1223 by Wendie Issa RN  Outcome: Ongoing  6/14/2021 0111 by Bri Oconnor RN  Outcome: Ongoing  Goal: Will verbalize the importance of balancing activity with adequate rest periods  Description: Will verbalize the importance of balancing activity with adequate rest periods  6/14/2021 1223 by Wendie Issa RN  Outcome: Ongoing  6/14/2021 0111 by Bri Oconnor RN  Outcome: Ongoing     Problem: Cardiac:  Goal: Hemodynamic stability will improve  Description: Hemodynamic stability will improve  6/14/2021 1223 by Wendie Issa RN  Outcome: Ongoing  6/14/2021 0111 by Bri Oconnor RN  Outcome: Ongoing  Goal: Ability to maintain an adequate cardiac output will improve  Description: Ability to maintain an adequate cardiac output will improve  6/14/2021 1223 by Wendie Issa RN  Outcome: Ongoing  6/14/2021 0111 by Bri Oconnor RN  Outcome: Ongoing     Problem: Coping:  Goal: Verbalizations of decreased anxiety will decrease  Description: Verbalizations of decreased anxiety will decrease  6/14/2021 1223 by Wendie Issa RN  Outcome: Ongoing  6/14/2021 0111 by Bri Oconnor RN  Outcome: Ongoing     Problem: Fluid Volume:  Goal: Risk for excess fluid volume will decrease  Description: Risk for excess fluid volume will decrease  6/14/2021 1223 by Andie Walters RN  Outcome: Ongoing  6/14/2021 0111 by Susan Rasmussen RN  Outcome: Ongoing  Goal: Maintenance of adequate hydration will improve  Description: Maintenance of adequate hydration will improve  6/14/2021 1223 by Andie Walters RN  Outcome: Ongoing  6/14/2021 0111 by Susan Rasmussen RN  Outcome: Ongoing  Goal: Will show no signs and symptoms of electrolyte imbalance  Description: Will show no signs and symptoms of electrolyte imbalance  6/14/2021 1223 by Andie Walters RN  Outcome: Ongoing  6/14/2021 0111 by Susan Rasmussen RN  Outcome: Ongoing     Problem: Health Behavior:  Goal: Ability to manage health-related needs will improve  Description: Ability to manage health-related needs will improve  6/14/2021 1223 by Andie Walters RN  Outcome: Ongoing  6/14/2021 0111 by Susan Rasmussen RN  Outcome: Ongoing  Goal: Ability to seek appropriate health care will improve  Description: Ability to seek appropriate health care will improve  6/14/2021 1223 by Andie Walters RN  Outcome: Ongoing  6/14/2021 0111 by Susan Rasmussen RN  Outcome: Ongoing     Problem: Nutritional:  Goal: Maintenance of adequate nutrition will improve  Description: Maintenance of adequate nutrition will improve  6/14/2021 1223 by Andie Walters RN  Outcome: Ongoing  Note: Patient dietary needs being monitored by physician/dietitian. Nursing staff is encouraging patient to eat as tolerated and documenting intake. Will continue to monitor.     6/14/2021 0111 by Susan Rasumssen RN  Outcome: Ongoing     Problem: Physical Regulation:  Goal: Complications related to the disease process, condition or treatment will be avoided or minimized  Description: Complications related to the disease process, condition or treatment will be avoided or minimized  6/14/2021 1223 by Andie Walters RN  Outcome: Ongoing  6/14/2021 0111 by Carlo Newton RN  Outcome: Ongoing     Problem: Respiratory:  Goal: Ability to maintain adequate ventilation will improve  Description: Ability to maintain adequate ventilation will improve  6/14/2021 1223 by Cheo Thomas RN  Outcome: Ongoing  6/14/2021 0111 by Carlo Newton RN  Outcome: Ongoing  Goal: Respiratory status will improve  Description: Respiratory status will improve  6/14/2021 1223 by Cheo Thomas RN  Outcome: Ongoing  6/14/2021 0111 by Carlo Newton RN  Outcome: Ongoing

## 2021-06-14 NOTE — CARE COORDINATION
INITIAL CASE MANAGEMENT ASSESSMENT    Reviewed chart, met with patient to assess possible discharge needs. Explained Case Management role/services. Living Situation: Confirmed address, lives with  (daughter lives up the street), 1 level house, 5-6 steps to enter w/ railing    ADLs: Independent, patient &  share homemaking duties     DME: Myra Mike, O2 at 3-4L baseline -- provided by [address]    PT/OT Recs: Patient would benefit from continued therapy after discharge, Home with Home health PT, S Level 1   PT Equipment Recommendations  Equipment Needed: No  Discharge Recommendations: Yamilet Tan scored a 16/24 on the AM-PAC ADL Inpatient form. At this time, no further OT is recommended upon discharge due to anticipate pt safe to return home with improved O2 and family support. Recommend patient returns to prior setting with prior services. Continue to assess pending progress, 24 hour supervision or assist     Active Services: None     Transportation: Active  - family transport home     Medications: Uses [employer] in [facility] - no issues    PCP: Dorie Cartagena NP      HD/PD: n/a    PLAN/COMMENTS: Patient wants to return home at MD. Patient says her  is available 24/7 and daughter lives nearby. Patient agrees to [address] list given, f/u on choice. Family can transport home and bring up O2 tank for transport. CM provided contact information for patient or family to call with any questions. CM will follow and assist as needed.   Electronically signed by Nikki Proctor RN Case Management [phone] on 6/14/2021 at 12:59 PM

## 2021-06-14 NOTE — PLAN OF CARE
Problem: Pain:  Goal: Pain level will decrease  Description: Pain level will decrease  Outcome: Ongoing  Goal: Control of acute pain  Description: Control of acute pain  Outcome: Ongoing  Note: Pain/discomfort being managed with PRN analgesics per MD orders. Pt able to express presence and absence of pain and rate pain appropriately using numerical scale. Goal: Control of chronic pain  Description: Control of chronic pain  Outcome: Ongoing     Problem: Falls - Risk of:  Goal: Will remain free from falls  Description: Will remain free from falls  Outcome: Ongoing  Note: Fall risk precautions in place. Bed in lowest position with wheels locked,bed alarm in place and activated,non-skid socks on pt, fall risk ID on pt, call light in reach, will continue to monitor. Goal: Absence of physical injury  Description: Absence of physical injury  Outcome: Ongoing     Problem: Skin Integrity:  Goal: Will show no infection signs and symptoms  Description: Will show no infection signs and symptoms  Outcome: Ongoing  Note: Skin assessment completed every shift. Pt assessed for incontinence, appropriate barrier cream applied prn. Pt encouraged to turn/rotate every 2 hours. Assistance provided if pt unable to do so themselves. Goal: Absence of new skin breakdown  Description: Absence of new skin breakdown  Outcome: Ongoing     Problem:  Activity:  Goal: Capacity to carry out activities will improve  Description: Capacity to carry out activities will improve  Outcome: Ongoing  Goal: Will verbalize the importance of balancing activity with adequate rest periods  Description: Will verbalize the importance of balancing activity with adequate rest periods  Outcome: Ongoing     Problem: Cardiac:  Goal: Hemodynamic stability will improve  Description: Hemodynamic stability will improve  Outcome: Ongoing  Goal: Ability to maintain an adequate cardiac output will improve  Description: Ability to maintain an adequate cardiac output will improve  Outcome: Ongoing     Problem: Coping:  Goal: Verbalizations of decreased anxiety will decrease  Description: Verbalizations of decreased anxiety will decrease  Outcome: Ongoing     Problem: Fluid Volume:  Goal: Risk for excess fluid volume will decrease  Description: Risk for excess fluid volume will decrease  Outcome: Ongoing  Goal: Maintenance of adequate hydration will improve  Description: Maintenance of adequate hydration will improve  Outcome: Ongoing  Goal: Will show no signs and symptoms of electrolyte imbalance  Description: Will show no signs and symptoms of electrolyte imbalance  Outcome: Ongoing     Problem: Health Behavior:  Goal: Ability to manage health-related needs will improve  Description: Ability to manage health-related needs will improve  Outcome: Ongoing  Goal: Ability to seek appropriate health care will improve  Description: Ability to seek appropriate health care will improve  Outcome: Ongoing     Problem: Nutritional:  Goal: Maintenance of adequate nutrition will improve  Description: Maintenance of adequate nutrition will improve  Outcome: Ongoing     Problem: Physical Regulation:  Goal: Complications related to the disease process, condition or treatment will be avoided or minimized  Description: Complications related to the disease process, condition or treatment will be avoided or minimized  Outcome: Ongoing     Problem: Respiratory:  Goal: Ability to maintain adequate ventilation will improve  Description: Ability to maintain adequate ventilation will improve  Outcome: Ongoing  Goal: Respiratory status will improve  Description: Respiratory status will improve  Outcome: Ongoing

## 2021-06-14 NOTE — PROGRESS NOTES
demand. Prognosis: Good  Decision Making: Low Complexity  Exam: see above  Assistance / Modification: RW  OT Education: Transfer Training;Plan of Care;OT Role  REQUIRES OT FOLLOW UP: Yes  Activity Tolerance  Activity Tolerance: Patient Tolerated treatment well;Treatment limited secondary to medical complications (free text); Patient limited by fatigue  Activity Tolerance: limited due to increased O2 demand  Safety Devices  Safety Devices in place: Yes  Type of devices: Chair alarm in place;Call light within reach; Left in chair;Nurse notified           Patient Diagnosis(es): The primary encounter diagnosis was Acute on chronic respiratory failure with hypoxia and hypercapnia ([facility] Diagnoses of COPD exacerbation ([facility] and Pulmonary edema cardiac cause [facility]) were also pertinent to this visit. has a past medical history of Arthritis, Blind right eye, Chronic respiratory failure with hypoxia and hypercapnia ([address] COPD (chronic obstructive pulmonary disease) ([address] GERD (gastroesophageal reflux disease), Hypertension, Movement disorder, Neuromuscular disorder ([address] and Pneumonia. has a past surgical history that includes Cholecystectomy; Tubal ligation; Abdomen surgery; eye surgery; and Dilatation, esophagus. Restrictions  Restrictions/Precautions  Restrictions/Precautions: Fall Risk  Position Activity Restriction  Other position/activity restrictions: 4L O2    Subjective   General  Chart Reviewed: Yes  Patient assessed for rehabilitation services?: Yes  Additional Pertinent Hx: per ED note, Chana Kim is a 54 y.o. female who presents to the emergency department c/o worsening SOB x 3-4 days.   Recently seen and d/c with doxycycline x 10 days without improvement in SOB, has had worsening associated with wheezing but also weight gain, bilateral LE edema which is chronic, +ve productive cough, SOB severe enough to cause increase in her home O2 requirement from 3L to 4-5 L and requiring increased CPAP Comments: no LOB; no reports of light headedness or dizziness; SpO2 Decreasing into low/mid 80s; SpO2 ~86-88% at rest prior to ambulation  Toilet Transfers  Toilet - Technique: Ambulating (cane)  Equipment Used: Grab bars  Toilet Transfer: Stand by assistance  Wheelchair Bed Transfers  Wheelchair/Bed - Technique: Ambulating (cane)  Equipment Used: Bed;Other (bed to chair)  Level of Asssistance: Stand by assistance  ADL  LE Dressing:  Moderate assistance (assist to don bilateral socks)  Toileting: Dependent/Total (duvall)  Additional Comments: Anticipate pt needing up to Min/Mod A for ADLs including dressing, bathing, and toileting based on ROM, strength, balance, and endurance  Tone RUE  RUE Tone: Normotonic  Tone LUE  LUE Tone: Normotonic  Coordination  Movements Are Fluid And Coordinated: Yes     Bed mobility  Supine to Sit: Supervision  Sit to Supine: Unable to assess  Scooting: Supervision  Transfers  Sit to stand: Stand by assistance  Stand to sit: Stand by assistance  Transfer Comments: cane     Cognition  Overall Cognitive Status: WFL        Sensation  Overall Sensation Status:  (N/T)        LUE AROM (degrees)  LUE AROM : WFL  RUE AROM (degrees)  RUE AROM : WFL  LUE Strength  Gross LUE Strength: WFL  RUE Strength  Gross RUE Strength: WFL                   Plan   Plan  Times per week: 3-5x  Current Treatment Recommendations: Strengthening, Functional Mobility Training, Balance Training, Safety Education & Training, Self-Care / ADL, Patient/Caregiver Education & Training, Endurance Training      AM-PAC Score        AM-PAC Inpatient Daily Activity Raw Score: 16 (06/14/21 1057)  AM-PAC Inpatient ADL T-Scale Score : 35.96 (06/14/21 1057)  ADL Inpatient CMS 0-100% Score: 53.32 (06/14/21 1057)  ADL Inpatient CMS G-Code Modifier : CK (06/14/21 1057)    Goals  Short term goals  Time Frame for Short term goals: prior to D/C  Short term goal 1: complete functional mobility and transfers Mod Ind  Short term goal 2: complete toileting Mod Ind  Short term goal 3: complete grooming in stance at sink Mod Ind  Short term goal 4: complete bathing and dressing Min A  Long term goals  Time Frame for Long term goals : STG=LTG  Patient Goals   Patient goals : return home       Therapy Time   Individual Concurrent Group Co-treatment   Time In 1012         Time Out 1052         Minutes 40         Timed Code Treatment Minutes: 25 Minutes (15 minute eval)       Elner Level, OTR/L

## 2021-06-14 NOTE — PROGRESS NOTES
Presentation: Stable  PT Education: Goals;Gait Training;PT Role;Orientation;Plan of Care;General Safety;Transfer Training  Barriers to Learning: Fatigue  REQUIRES PT FOLLOW UP: Yes  Activity Tolerance  Activity Tolerance: Patient Tolerated treatment well;Patient limited by fatigue       Patient Diagnosis(es): The primary encounter diagnosis was Acute on chronic respiratory failure with hypoxia and hypercapnia ([facility] [address] Diagnoses of COPD exacerbation ([facility] [address] and Pulmonary edema cardiac cause [facility]) were also pertinent to this visit. has a past medical history of Arthritis, Blind right eye, Chronic respiratory failure with hypoxia and hypercapnia ([address] COPD (chronic obstructive pulmonary disease) ([facility] [address] GERD (gastroesophageal reflux disease), Hypertension, Movement disorder, Neuromuscular disorder ([address] and Pneumonia. has a past surgical history that includes Cholecystectomy; Tubal ligation; Abdomen surgery; eye surgery; and Dilatation, esophagus. Restrictions  Restrictions/Precautions  Restrictions/Precautions: Fall Risk  Position Activity Restriction  Other position/activity restrictions: 5L O2    Subjective  General  Chart Reviewed: Yes  Patient assessed for rehabilitation services?: Yes  Additional Pertinent Hx: per ED note, Isadora Bergeron is a 54 y.o. female who presents to the emergency department c/o worsening SOB x 3-4 days. Recently seen and d/c with doxycycline x 10 days without improvement in SOB, has had worsening associated with wheezing but also weight gain, bilateral LE edema which is chronic, +ve productive cough, SOB severe enough to cause increase in her home O2 requirement from 3L to 4-5 L and requiring increased CPAP usage at home. Denies fever. Symptoms not otherwise alleviated or exacerbated by other factors.   Response To Previous Treatment: Not applicable  Referring Practitioner: Dr. Tammie Bateman  Referral Date : 06/14/21  Diagnosis: Acute respiratory failure; CHF; COPD  Follows Commands: Within Functional Limits  Subjective  Subjective: Pt resting in chair. Pain Screening  Patient Currently in Pain: Denies    Orientation  Orientation  Overall Orientation Status: Within Normal Limits     Social/Functional History  Social/Functional History  Lives With:  (, Grandson (21 yr), and uncle)  Type of Home: House  Home Layout: One level  Home Access: Stairs to enter with rails  Entrance Stairs - Number of Steps: 3  Entrance Stairs - Rails: Right  Bathroom Shower/Tub: Tub/Shower unit, Shower chair with back  Bathroom Toilet: Standard  Bathroom Accessibility: Accessible  Home Equipment: Cane, Oxygen, Wheelchair-manual (4L O2 with activity)  ADL Assistance: Needs assistance (PRN assist from  pending breathing)  Homemaking Assistance:  (Shared with )  Ambulation Assistance: Independent ((Without assist device within apt (furniture \"touch\" at times).   Use of Cane when going out.))  Transfer Assistance: Independent  Occupation: On disability  Additional Comments: Denies hx of falls    Objective    AROM RLE (degrees)  RLE AROM: WFL  RLE General AROM: Hip Flex, knee Flex/Ext, and Ankle PF/DF WFL  AROM LLE (degrees)  LLE AROM : WFL  LLE General AROM: Hip Flex, knee Flex/Ext, and Ankle PF/DF WFL  AROM RUE (degrees)  RUE AROM : WFL  RUE General AROM: Shoulder Flex, Elbow Flex/Ext WFL  AROM LUE (degrees)  LUE AROM : WFL  LUE General AROM: Shoulder Flex, Elbow Flex/Ext WFL     Strength RLE  Strength RLE: WFL  Comment: Hip Flex, knee Flex/Ext, and Ankle PF/DF grosssly 4/5  Strength LLE  Strength LLE: WFL  Comment: Hip Flex, knee Flex/Ext, and Ankle PF/DF grosssly 4/5  Strength RUE  Strength RUE: WFL  Comment: Shoulder Flex, Elbow Flex/Ext WFL  Strength LUE  Strength LUE: WFL  Comment: Shoulder Flex, Elbow Flex/Ext WFL     Tone RLE  RLE Tone: Normotonic  Tone LLE  LLE Tone: Normotonic  Motor Control  Gross Motor?: WFL     Transfers  Sit to Stand: Stand by assistance  Stand to sit: Stand by assistance

## 2021-06-14 NOTE — PROGRESS NOTES
PCA states pt asked to remove BIPAP for a drink. Pt removed bipap and PCA placed pt on NC and began obtaining vitals, per PCA spO2 was reading in the 50's, at this time the respiratory therapist entered the room and per RT pt sats were in the 70's at this time, This RN was notified and entered the room. pt placed on High flow NC and continuous pulse ox resumed.        Electronically signed by Bri Oconnor RN on 6/14/2021 at 4:22 AM

## 2021-06-14 NOTE — PROGRESS NOTES
[facility]   Daily Progress Note      Admit Date:  6/12/2021    Reason for initial consultation: CHF exacerbation    CC: \"short of breath\"    HPI: Per Dr. Cj Gorman is a 54 y.o. female who presented to the ER with progressive shortness of breath for the last 4 days. She was recently in the hospital and treated with antibiotics without improvement in her dyspnea. She has noticed worsening with wheezing weight gain bilateral lower extremity edema cough and increasing home oxygen requirements. \"    Interval History:  Pt with no acute overnight events. Patient still feels short of breath above baseline but better than admission. She denies associated chest pains. She endorses audible wheezing. She denies palpitations and lightheadedness. Past surgical history/social history/family history:   has a past surgical history that includes Cholecystectomy; Tubal ligation; Abdomen surgery; eye surgery; and Dilatation, esophagus. reports that she quit smoking about 3 years ago. Her smoking use included cigarettes. She started smoking about 37 years ago. She has a 15.00 pack-year smoking history. She has never used smokeless tobacco. She reports that she does not drink alcohol and does not use drugs. family history includes Arthritis in her father; Cancer in her father and mother; Osteoporosis in her mother. Review of Systems:   · Constitutional: No unanticipated weight loss. There's been no change in energy level, sleep pattern, or activity level. No fevers, chills. · Eyes: No visual changes or diplopia. No scleral icterus. · ENT: No Headaches, hearing loss or vertigo. No mouth sores or sore throat. · Cardiovascular: as reviewed in HPI  · Respiratory: No cough or wheezing, no sputum production. No hemoptysis. · Gastrointestinal: No abdominal pain, appetite loss, blood in stools. No change in bowel or bladder habits.   · Genitourinary: No dysuria, trouble voiding, or hematuria. · Musculoskeletal:  No gait disturbance, no joint complaints. · Integumentary: No rash or pruritis. · Neurological: No headache, diplopia, change in muscle strength, numbness or tingling. · Psychiatric: No anxiety or depression. · Endocrine: No temperature intolerance. No excessive thirst, fluid intake, or urination. No tremor. · Hematologic/Lymphatic: No abnormal bruising or bleeding, blood clots or swollen lymph nodes. · Allergic/Immunologic: No nasal congestion or hives. Objective:   /84   Pulse 78   Temp 97.5 °F (36.4 °C) (Axillary)   Resp 18   Ht 5' (1.524 m)   Wt 228 lb 2.8 oz (103.5 kg)   LMP 08/14/2015   SpO2 100%   BMI 44.56 kg/m²       Intake/Output Summary (Last 24 hours) at 6/14/2021 0931  Last data filed at 6/14/2021 0356  Gross per 24 hour   Intake 895 ml   Output 2350 ml   Net -1455 ml     Wt Readings from Last 3 Encounters:   06/14/21 228 lb 2.8 oz (103.5 kg)   05/31/21 223 lb 12.3 oz (101.5 kg)   04/28/21 223 lb (101.2 kg)       Physical Exam:  General:  NAD. Obese. Wearing supplemental O2. Appears older than stated age. Eyes: Blind right eye with cataract. EOMI. No icterus. Skin:  Warm and dry. No new appearing rashes or lesions. Neck:  Supple. No JVP or bruit appreciated. Respiratory:  No respiratory distress at rest.  Diffuse bilateral expiratory wheezing. Cardiovascular:  Regular rate. S1S2. No M/R/G. Abdomen:  Soft, nontender, +bowel sounds. MSK: 1+ BLE edema. No clubbing or cyanosis. Moves all extremities. Psych: Normal insight and judgement. Awake, alert, and oriented X4.     Medications:    methadone  130 mg Oral Daily    albuterol-ipratropium  1 puff Inhalation Q4H WA    gabapentin  100 mg Oral TID    furosemide  40 mg Intravenous BID    carvedilol  3.125 mg Oral BID WC    lisinopril  5 mg Oral Daily    pantoprazole  40 mg Oral QAM AC    sertraline  100 mg Oral Daily    budesonide-formoterol  2 puff Inhalation BID    tiotropium  2 puff Inhalation Daily    sodium chloride flush  10 mL Intravenous 2 times per day    enoxaparin  40 mg Subcutaneous Daily    predniSONE  40 mg Oral Daily    cefTRIAXone (ROCEPHIN) IV  1,000 mg Intravenous Q24H      sodium chloride       oxyCODONE-acetaminophen, sodium chloride flush, sodium chloride, ondansetron **OR** ondansetron, magnesium hydroxide, acetaminophen **OR** acetaminophen, albuterol    Lab Data:  CBC:   Recent Labs     06/12/21  1650 06/13/21  0644 06/14/21  0536   WBC 8.3 7.1 10.5   HGB 10.8* 11.3* 11.5*    225 250     BMP:    Recent Labs     06/12/21  0049 06/12/21  1650 06/14/21  0536    141 140   K 4.0 3.7 3.6   CO2 39* 33* 39*   BUN 8 8 17   CREATININE 0.6 <0.5* 0.6     LIVR:   Recent Labs     06/12/21  1650   AST 10*   ALT 8*     INR:    Recent Labs     06/13/21  0644   INR 0.97     APTT:   Recent Labs     06/13/21  0644   APTT 27.9     BNP:  No results for input(s): BNP in the last 72 hours. Telemetry: Personally interpreted. Sinus    ECG: Personally interpreted. 6/12/21 NSR. Low voltage in precordial leads    Echo: 11/9/2020.  Personally interpreted.    Overall, left ventricular systolic function appears mildly reduced with an ejection fraction estimated 45%. Mild diffuse hypokinesis.  Moderately dilated left ventricle. Diastolic filling parameters just grade 2 diastolic dysfunction. Left atrium moderately dilated. Right ventricle is mildly dilated with normal function. PASP estimated at 46 mmHg assuming a right atrial pressure of 15 mmHg. Assessment/Plan:    1) Acute chronic diastolic heart failure. EF 45%. Volume status difficult to assess with morbid obesity. Weight in office 2/22/21 was 231 and now 228 but subjectively improving with diuresis. Continue IV Lasix. Continue ACE-I and beta-blocker. No emergent indication for right or left heart catheterization but if patient not clinically improving, will reconsider.     2) Chronic hypoxic and hypercapnic respiratory failure/Severe COPD/BLANCA. Following with pulmonary and requires supplemental oxygen.    3) Chronic methadone use. Will continue to defer to pain management to primary team but ideally would wean off methadone with CHF.    4) Morbid obesity. BMI 44. Encouraged weight loss. Overall, the problems requiring hospitalization are high in severity.      Electronically signed by Karen Momin MD on 6/14/2021 at 9:31 AM

## 2021-06-14 NOTE — PROGRESS NOTES
Ace wraps and SCDs applied bilaterally below knee. Pt has several mountain dew cans, juice cups and a cup of water on bedside table. Pt also states family brought in frozen meals for her. Pt educated on the importance of adhering to the fluid restriction and educated on sodium restrictions and the importance of limiting sodium as well.      Electronically signed by Katarzyna Jason RN on 6/13/2021 at 8:05 PM

## 2021-06-15 LAB
ALBUMIN SERPL-MCNC: 3.5 G/DL (ref 3.4–5)
ANION GAP SERPL CALCULATED.3IONS-SCNC: 7 MMOL/L (ref 3–16)
BUN BLDV-MCNC: 23 MG/DL (ref 7–20)
CALCIUM SERPL-MCNC: 8.6 MG/DL (ref 8.3–10.6)
CHLORIDE BLD-SCNC: 92 MMOL/L (ref 99–110)
CO2: 43 MMOL/L (ref 21–32)
CREAT SERPL-MCNC: 0.7 MG/DL (ref 0.6–1.1)
FERRITIN: 145.7 NG/ML (ref 15–150)
GFR AFRICAN AMERICAN: >60
GFR NON-AFRICAN AMERICAN: >60
GLUCOSE BLD-MCNC: 112 MG/DL (ref 70–99)
HCT VFR BLD CALC: 34 % (ref 36–48)
HEMOGLOBIN: 11.2 G/DL (ref 12–16)
IRON SATURATION: 18 % (ref 15–50)
IRON: 45 UG/DL (ref 37–145)
MAGNESIUM: 2.1 MG/DL (ref 1.8–2.4)
MCH RBC QN AUTO: 29.3 PG (ref 26–34)
MCHC RBC AUTO-ENTMCNC: 32.9 G/DL (ref 31–36)
MCV RBC AUTO: 89.1 FL (ref 80–100)
PDW BLD-RTO: 15.2 % (ref 12.4–15.4)
PHOSPHORUS: 4 MG/DL (ref 2.5–4.9)
PLATELET # BLD: 239 K/UL (ref 135–450)
PMV BLD AUTO: 7.8 FL (ref 5–10.5)
POTASSIUM SERPL-SCNC: 3.4 MMOL/L (ref 3.5–5.1)
RBC # BLD: 3.82 M/UL (ref 4–5.2)
SODIUM BLD-SCNC: 142 MMOL/L (ref 136–145)
TOTAL IRON BINDING CAPACITY: 250 UG/DL (ref 260–445)
WBC # BLD: 9.3 K/UL (ref 4–11)

## 2021-06-15 PROCEDURE — 80069 RENAL FUNCTION PANEL: CPT

## 2021-06-15 PROCEDURE — 6370000000 HC RX 637 (ALT 250 FOR IP): Performed by: INTERNAL MEDICINE

## 2021-06-15 PROCEDURE — 94640 AIRWAY INHALATION TREATMENT: CPT

## 2021-06-15 PROCEDURE — 36415 COLL VENOUS BLD VENIPUNCTURE: CPT

## 2021-06-15 PROCEDURE — 97530 THERAPEUTIC ACTIVITIES: CPT

## 2021-06-15 PROCEDURE — 94761 N-INVAS EAR/PLS OXIMETRY MLT: CPT

## 2021-06-15 PROCEDURE — 83540 ASSAY OF IRON: CPT

## 2021-06-15 PROCEDURE — 99232 SBSQ HOSP IP/OBS MODERATE 35: CPT | Performed by: INTERNAL MEDICINE

## 2021-06-15 PROCEDURE — 82728 ASSAY OF FERRITIN: CPT

## 2021-06-15 PROCEDURE — 6370000000 HC RX 637 (ALT 250 FOR IP): Performed by: NURSE PRACTITIONER

## 2021-06-15 PROCEDURE — 99233 SBSQ HOSP IP/OBS HIGH 50: CPT | Performed by: INTERNAL MEDICINE

## 2021-06-15 PROCEDURE — 2700000000 HC OXYGEN THERAPY PER DAY

## 2021-06-15 PROCEDURE — 85027 COMPLETE CBC AUTOMATED: CPT

## 2021-06-15 PROCEDURE — 6360000002 HC RX W HCPCS: Performed by: NURSE PRACTITIONER

## 2021-06-15 PROCEDURE — 97535 SELF CARE MNGMENT TRAINING: CPT

## 2021-06-15 PROCEDURE — 83735 ASSAY OF MAGNESIUM: CPT

## 2021-06-15 PROCEDURE — 2060000000 HC ICU INTERMEDIATE R&B

## 2021-06-15 PROCEDURE — 83550 IRON BINDING TEST: CPT

## 2021-06-15 PROCEDURE — 6360000002 HC RX W HCPCS: Performed by: INTERNAL MEDICINE

## 2021-06-15 PROCEDURE — 94660 CPAP INITIATION&MGMT: CPT

## 2021-06-15 PROCEDURE — 2580000003 HC RX 258: Performed by: NURSE PRACTITIONER

## 2021-06-15 RX ORDER — FUROSEMIDE 10 MG/ML
60 INJECTION INTRAMUSCULAR; INTRAVENOUS 2 TIMES DAILY
Status: DISCONTINUED | OUTPATIENT
Start: 2021-06-15 | End: 2021-06-16

## 2021-06-15 RX ADMIN — IPRATROPIUM BROMIDE AND ALBUTEROL 1 PUFF: 20; 100 SPRAY, METERED RESPIRATORY (INHALATION) at 08:41

## 2021-06-15 RX ADMIN — OXYCODONE HYDROCHLORIDE AND ACETAMINOPHEN 1 TABLET: 5; 325 TABLET ORAL at 13:05

## 2021-06-15 RX ADMIN — IPRATROPIUM BROMIDE AND ALBUTEROL 1 PUFF: 20; 100 SPRAY, METERED RESPIRATORY (INHALATION) at 16:02

## 2021-06-15 RX ADMIN — IPRATROPIUM BROMIDE AND ALBUTEROL 1 PUFF: 20; 100 SPRAY, METERED RESPIRATORY (INHALATION) at 11:58

## 2021-06-15 RX ADMIN — CARVEDILOL 3.12 MG: 3.12 TABLET, FILM COATED ORAL at 07:49

## 2021-06-15 RX ADMIN — Medication 10 ML: at 20:58

## 2021-06-15 RX ADMIN — Medication 10 ML: at 07:49

## 2021-06-15 RX ADMIN — GABAPENTIN 100 MG: 100 CAPSULE ORAL at 13:05

## 2021-06-15 RX ADMIN — GUAIFENESIN 600 MG: 600 TABLET ORAL at 07:48

## 2021-06-15 RX ADMIN — PANTOPRAZOLE SODIUM 40 MG: 40 TABLET, DELAYED RELEASE ORAL at 06:55

## 2021-06-15 RX ADMIN — CARVEDILOL 3.12 MG: 3.12 TABLET, FILM COATED ORAL at 17:49

## 2021-06-15 RX ADMIN — ACETAMINOPHEN 650 MG: 325 TABLET ORAL at 06:55

## 2021-06-15 RX ADMIN — SERTRALINE 100 MG: 100 TABLET, FILM COATED ORAL at 07:48

## 2021-06-15 RX ADMIN — GABAPENTIN 100 MG: 100 CAPSULE ORAL at 07:49

## 2021-06-15 RX ADMIN — Medication 2 PUFF: at 08:41

## 2021-06-15 RX ADMIN — FUROSEMIDE 60 MG: 10 INJECTION, SOLUTION INTRAVENOUS at 17:49

## 2021-06-15 RX ADMIN — FUROSEMIDE 80 MG: 10 INJECTION, SOLUTION INTRAMUSCULAR; INTRAVENOUS at 07:49

## 2021-06-15 RX ADMIN — IPRATROPIUM BROMIDE AND ALBUTEROL 1 PUFF: 20; 100 SPRAY, METERED RESPIRATORY (INHALATION) at 21:12

## 2021-06-15 RX ADMIN — TIOTROPIUM BROMIDE INHALATION SPRAY 2 PUFF: 3.12 SPRAY, METERED RESPIRATORY (INHALATION) at 08:41

## 2021-06-15 RX ADMIN — METHADONE HYDROCHLORIDE 130 MG: 10 TABLET ORAL at 07:48

## 2021-06-15 RX ADMIN — PREDNISONE 40 MG: 20 TABLET ORAL at 07:48

## 2021-06-15 RX ADMIN — GABAPENTIN 100 MG: 100 CAPSULE ORAL at 20:57

## 2021-06-15 RX ADMIN — GUAIFENESIN 600 MG: 600 TABLET ORAL at 20:57

## 2021-06-15 RX ADMIN — OXYCODONE HYDROCHLORIDE AND ACETAMINOPHEN 1 TABLET: 5; 325 TABLET ORAL at 17:52

## 2021-06-15 RX ADMIN — LISINOPRIL 5 MG: 5 TABLET ORAL at 07:49

## 2021-06-15 RX ADMIN — Medication 2 PUFF: at 21:12

## 2021-06-15 RX ADMIN — ENOXAPARIN SODIUM 40 MG: 40 INJECTION SUBCUTANEOUS at 07:49

## 2021-06-15 ASSESSMENT — PAIN SCALES - WONG BAKER
WONGBAKER_NUMERICALRESPONSE: 0
WONGBAKER_NUMERICALRESPONSE: 0
WONGBAKER_NUMERICALRESPONSE: 4
WONGBAKER_NUMERICALRESPONSE: 0

## 2021-06-15 ASSESSMENT — PAIN DESCRIPTION - DIRECTION
RADIATING_TOWARDS: RIGHT FLANK
RADIATING_TOWARDS: RIGHT FLANK

## 2021-06-15 ASSESSMENT — PAIN DESCRIPTION - PROGRESSION
CLINICAL_PROGRESSION: GRADUALLY WORSENING

## 2021-06-15 ASSESSMENT — PAIN DESCRIPTION - DESCRIPTORS
DESCRIPTORS: ACHING

## 2021-06-15 ASSESSMENT — PAIN DESCRIPTION - ORIENTATION
ORIENTATION: RIGHT

## 2021-06-15 ASSESSMENT — PAIN SCALES - GENERAL
PAINLEVEL_OUTOF10: 8
PAINLEVEL_OUTOF10: 9
PAINLEVEL_OUTOF10: 0

## 2021-06-15 ASSESSMENT — PAIN DESCRIPTION - FREQUENCY
FREQUENCY: CONTINUOUS

## 2021-06-15 ASSESSMENT — PAIN - FUNCTIONAL ASSESSMENT
PAIN_FUNCTIONAL_ASSESSMENT: PREVENTS OR INTERFERES SOME ACTIVE ACTIVITIES AND ADLS

## 2021-06-15 ASSESSMENT — PAIN DESCRIPTION - LOCATION
LOCATION: BACK

## 2021-06-15 ASSESSMENT — PAIN DESCRIPTION - ONSET
ONSET: ON-GOING

## 2021-06-15 ASSESSMENT — PAIN DESCRIPTION - PAIN TYPE
TYPE: CHRONIC PAIN

## 2021-06-15 NOTE — PLAN OF CARE
Problem: Pain:  Description: Pain management should include both nonpharmacologic and pharmacologic interventions. Goal: Pain level will decrease  Description: Pain level will decrease  6/15/2021 1013 by Janki Collier RN  Outcome: Ongoing  Pain being managed with PO medications as ordered by MD as needed per pt      Problem: Falls - Risk of:  Goal: Will remain free from falls  Description: Will remain free from falls  6/15/2021 1013 by Janki Collier RN  Outcome: Ongoing  Patient is wearing nonskid socks. Bed is alarmed, locked, and in lowest position. Room is free of clutter. Call light is within reach and used appropriately. Fall risk assessed per protocol. Will continue to monitor. Problem: Skin Integrity:  Goal: Will show no infection signs and symptoms  Description: Will show no infection signs and symptoms  6/15/2021 1013 by Janki Collier RN  Outcome: Ongoing  Skin assessment completed every shift per protocol. Pt assessed for incontinence, appropriate barrier cream applied as needed. Pt encouraged to turn/rotate every 2 hours. Assistance provided if pt unable to do so themselves. Will continue to monitor. Problem: Activity:  Goal: Capacity to carry out activities will improve  Description: Capacity to carry out activities will improve  6/15/2021 1013 by Janki Collier RN  Up with assistance      Problem: Cardiac:  Goal: Hemodynamic stability will improve  Description: Hemodynamic stability will improve  6/15/2021 1013 by Janki Collier RN  Outcome: Ongoing  Heart rate and rhythm continuously monitored. Vitals taken every four hours. Trace BLE edema. Palpable pulses. Daily weights.       Problem: Coping:  Goal: Verbalizations of decreased anxiety will decrease  Description: Verbalizations of decreased anxiety will decrease  6/15/2021 1013 by Janki Collier RN  Outcome: Ongoing  No complaints of feeling anxious      Problem: Fluid Volume:  Goal: Risk for excess fluid volume will decrease  Description: Risk for excess fluid volume will decrease  6/15/2021 1013 by Camilla Garcia RN  Outcome: Ongoing  IV lasix   Goal: Maintenance of adequate hydration will improve  Description: Maintenance of adequate hydration will improve  6/15/2021 1013 by Camilla Garcia RN  Outcome: Ongoing  1200 ml fluid restriction   Goal: Will show no signs and symptoms of electrolyte imbalance  Description: Will show no signs and symptoms of electrolyte imbalance  6/15/2021 1013 by Camilla Garcia RN  Outcome: Ongoing  Is and Os monitored      Problem: Health Behavior:  Goal: Ability to manage health-related needs will improve  Description: Ability to manage health-related needs will improve  6/15/2021 1013 by Camilla Garcia RN  Outcome: Ongoing  Needs more education on fluid restriction      Problem: Nutritional:  Goal: Maintenance of adequate nutrition will improve  Description: Maintenance of adequate nutrition will improve  6/15/2021 1013 by Camilla Garcia RN  Outcome: Ongoing  Good appetite      Problem: Physical Regulation:  Goal: Complications related to the disease process, condition or treatment will be avoided or minimized  Description: Complications related to the disease process, condition or treatment will be avoided or minimized  6/15/2021 1013 by Camilla Garcia RN  Outcome: Ongoing  Up with assistance     Problem: Respiratory:  Goal: Ability to maintain adequate ventilation will improve  Description: Ability to maintain adequate ventilation will improve  6/15/2021 1013 by Camilla Garcia RN  Outcome: Ongoing  Expiratory wheezes, BIPAP

## 2021-06-15 NOTE — PROGRESS NOTES
Hospitalist Progress Note      PCP: Eric Carroll    Date of Admission: 6/12/2021    Chief Complaint:   Chief Complaint   Patient presents with    Shortness of Breath     has copd  Uses oxygen at home     Hospital Course: 54year old with COPD on home O2, HTN, HFrEF who presented with SOB. 3L O2 at home, up to 5L prior to admission. S/p 10 day course doxy without improvement. COPD exacerbation and volume overload, diuresing. Subjective: Pt seen and examined. States breathing has improved. Still visibly wheezy. Medications:  Reviewed    Infusion Medications    sodium chloride       Scheduled Medications    guaiFENesin  600 mg Oral BID    furosemide  80 mg Intravenous BID    methadone  130 mg Oral Daily    albuterol-ipratropium  1 puff Inhalation Q4H WA    gabapentin  100 mg Oral TID    carvedilol  3.125 mg Oral BID WC    lisinopril  5 mg Oral Daily    pantoprazole  40 mg Oral QAM AC    sertraline  100 mg Oral Daily    budesonide-formoterol  2 puff Inhalation BID    tiotropium  2 puff Inhalation Daily    sodium chloride flush  10 mL Intravenous 2 times per day    enoxaparin  40 mg Subcutaneous Daily    predniSONE  40 mg Oral Daily     PRN Meds: oxyCODONE-acetaminophen, sodium chloride flush, sodium chloride, ondansetron **OR** ondansetron, magnesium hydroxide, acetaminophen **OR** acetaminophen, albuterol      Intake/Output Summary (Last 24 hours) at 6/15/2021 1336  Last data filed at 6/15/2021 1219  Gross per 24 hour   Intake 2555 ml   Output 1975 ml   Net 580 ml       Physical Exam Performed:    /89   Pulse 60   Temp 98.1 °F (36.7 °C) (Oral)   Resp 16   Ht 5' (1.524 m)   Wt 230 lb 9.6 oz (104.6 kg)   LMP 08/14/2015   SpO2 92%   BMI 45.04 kg/m²     General appearance: No apparent distress, appears stated age and cooperative. HEENT: Pupils equal, round, and reactive to light. Conjunctivae/corneas clear. Neck: Supple, with full range of motion.  Trachea midline. Respiratory:  Decr bilaterally. . Significant bilateral wheezing and crackles. Cardiovascular: Regular rate and rhythm with normal S1/S2 without murmurs, rubs or gallops. Abdomen: Soft, non-tender, non-distended with normal bowel sounds. Musculoskeletal: No clubbing, cyanosis 2+ edema bilaterally. ScoliosisSkin: Skin color, texture, turgor normal.  No rashes or lesions. Neurologic:  Neurovascularly intact without any focal sensory/motor deficits.  Cranial nerves: II-XII intact, grossly non-focal.  Psychiatric: Alert and oriented, thought content appropriate, normal insight  Capillary Refill: Brisk,< 3 seconds   Peripheral Pulses: +2 palpable, equal bilaterally       Labs:   Recent Labs     06/13/21  0644 06/14/21  0536 06/15/21  0546   WBC 7.1 10.5 9.3   HGB 11.3* 11.5* 11.2*   HCT 34.1* 35.1* 34.0*    250 239     Recent Labs     06/12/21  1650 06/14/21  0536 06/15/21  0546    140 142   K 3.7 3.6 3.4*   CL 97* 94* 92*   CO2 33* 39* 43*   BUN 8 17 23*   CREATININE <0.5* 0.6 0.7   CALCIUM 8.9 8.8 8.6   PHOS  --  4.1 4.0     Recent Labs     06/12/21  1650   AST 10*   ALT 8*   BILITOT <0.2   ALKPHOS 72     Recent Labs     06/13/21  0644   INR 0.97     Recent Labs     06/12/21  1650   TROPONINI <0.01       Urinalysis:      Lab Results   Component Value Date    NITRU Negative 11/08/2020    WBCUA 0-2 11/08/2020    RBCUA 5-10 11/08/2020    BLOODU SMALL 11/08/2020    SPECGRAV 1.012 11/08/2020    GLUCOSEU Negative 11/08/2020       Radiology:  XR CHEST PORTABLE   Final Result   Stable mild cardiomegaly with prominent central pulmonary vessels suggestive   of pulmonary artery hypertension or venous congestion which is more prominent   with no infiltrate or effusion               Assessment/Plan:    Active Hospital Problems    Diagnosis     Heart failure ([facility] [address] [I50.9]     Morbid obesity with BMI of 40.0-44.9, adult ([facility] [address] [E66.01, Z68.41]     COPD exacerbation ([facility] [J44.1]     Acute on chronic respiratory failure with hypoxia and hypercapnia (HCC) [J96.21, J96.22]     COPD, severe (HCC) [J44.9]      Acute on chronic hypercapnic and hypoxic respiratory failure  Due to COPD and CHF, pt not wearing cpap all night and hasnt been taking lasix (doesn't want to pee all the time)  - with increased work of breath, tachypnea and hypoxia  - home O2 4L 86%  - provide supplemental oxygen as necessary to keep SaO2 92% or greater.     Acute COPD exacerbation - improved  Significant wheezing on exam.  - Nebulizer treatments every 4 hours and every 2 hours prn   - IV steroids.   - Continue home meds and Cpap  - pulm consulted  - procal low-- stop abx     HFrEF with exacerbation   ECHO: 11/2020 EF 40-45% mild diffuse hypokinesia. - lasix 80 mg IV given in ED, will continue diuresis, noncompliant at home, also likely not following any fluid/Na restriction  - cont BB, ACEi/ARB  - daily wts  - I/Os  - consult CHF RN  - consult cardiology     HTN  - monitor blood pressure  - continue home medications     Obesity   -  With Body mass index is 46 kg/m². Complicating assessment and treatment. Placing patient at risk for multiple co-morbidities as well as early death and contributing to the patient's presentation. Counseled on weight loss. Chronic pain  On high doses methadone. Will verify with pharmacy. DVT Prophylaxis: Lovenox  Diet: ADULT DIET;  Regular; Low Fat/Low Chol/High Fiber/CARO; Low Sodium (2 gm); 1200 ml  Code Status: Full Code    PT/OT Eval Status: deferred    Dispo - pending    Mary Gould MD

## 2021-06-15 NOTE — PROGRESS NOTES
Occupational Therapy  Facility/Department: [facility] 5N PROGRESSIVE CARE  Daily Treatment Note  This note to serve as discharge summary if pt d/c'd prior to next session    NAME: Mackie Landau  : 1975  MRN: [id number]    Date of Service: 6/15/2021    Discharge Recommendations:  24 hour supervision or assist       Assessment   Performance deficits / Impairments: Decreased functional mobility ; Decreased endurance;Decreased ADL status; Decreased high-level IADLs;Decreased balance  Assessment: Discussed with OTR: Pt tolerated tx fairly well. Pt on 5L 02 and sats remained WNL. Pt visibly SOB, wheezing throughout tx. ADL's completed (sponge bath from EOB), pt needing up to Min A for areas addressed and anticipate would require up to Min/Mod. Pt SBA for transfers, mob wtih cane. Pt continues to be limited in activity tolerance/endurance. Feel pt will be able to return home with assit as PTA, when medically stable and progression of therapy. Cont poc. Prognosis: Good  History: Mackie Landau is a 54 y.o. female who presents to the emergency department c/o worsening SOB x 3-4 days. PTA pt from home with family where pt was Ind with mobility and cane and PRN assist for ADLs. OT Education: Transfer Training;Plan of Care;OT Role;ADL Adaptive Strategies  REQUIRES OT FOLLOW UP: Yes  Activity Tolerance  Activity Tolerance: Patient Tolerated treatment well;Treatment limited secondary to medical complications (free text); Patient limited by fatigue  Activity Tolerance: Wheezing throughout tx. Safety Devices  Safety Devices in place: Yes  Type of devices: Chair alarm in place;Call light within reach; Left in chair;Nurse notified;Gait belt         Patient Diagnosis(es): The primary encounter diagnosis was Acute on chronic respiratory failure with hypoxia and hypercapnia ([facility] [address] Diagnoses of COPD exacerbation ([facility] [address] and Pulmonary edema cardiac cause [facility]) were also pertinent to this visit.       has a past medical history of Arthritis, Blind right eye, Chronic respiratory failure with hypoxia and hypercapnia (HCC), COPD (chronic obstructive pulmonary disease) ([facility] [address] GERD (gastroesophageal reflux disease), Hypertension, Movement disorder, Neuromuscular disorder ([facility] [address] and Pneumonia. has a past surgical history that includes Cholecystectomy; Tubal ligation; Abdomen surgery; eye surgery; and Dilatation, esophagus. Restrictions  Restrictions/Precautions  Restrictions/Precautions: Fall Risk  Position Activity Restriction  Other position/activity restrictions: 5L O2; duvall  Subjective   General  Chart Reviewed: Yes, Orders, Progress Notes, Labs  Patient assessed for rehabilitation services?: Yes  Additional Pertinent Hx: per ED note, José Tripp is a 54 y.o. female who presents to the emergency department c/o worsening SOB x 3-4 days. Recently seen and d/c with doxycycline x 10 days without improvement in SOB, has had worsening associated with wheezing but also weight gain, bilateral LE edema which is chronic, +ve productive cough, SOB severe enough to cause increase in her home O2 requirement from 3L to 4-5 L and requiring increased CPAP usage at home. Denies fever. Symptoms not otherwise alleviated or exacerbated by other factors. Family / Caregiver Present: No  Referring Practitioner: Jan Saldana MD  Subjective  Subjective: Pt met BS, in bed and agreeable to OT. Pt's 02 off, sats reading 87%. 02 increaed to >90% with 02 re-applied. Pt on 5L today. General Comment  Comments: okay for therapy per RN. Orientation  Orientation  Overall Orientation Status: Within Functional Limits  Objective    ADL  Grooming: Setup (seated to wash face)  UE Bathing: Setup;Stand by assistance (seated)  LE Bathing: Minimal assistance (buttocks, abd folds. Pt's LE ace wrapped, knees to feet)  UE Dressing:  (gown changed)  LE Dressing: Unable to assess(comment) (footies remained on pt.  Pt able to reach to feet, when up in chair, to straighten footies.)  Toileting: Dependent/Total (duvall)  Additional Comments: Anticipate pt needing up to Min/Mod A for ADLs including dressing, bathing, and toileting based on ROM, strength, balance, and endurance. Pt reports having family support for ADL's. Balance  Sitting Balance: Supervision (at EOB for ADL's, x20 min)  Standing Balance  Activity: SBA for static stance for ADL's. Pt amb close SBA with cane, bed to chair, 10 ft.  Pt's 02 sats WNL on 5L.   Bed mobility  Supine to Sit: Supervision  Sit to Supine: Unable to assess  Transfers  Sit to stand: Stand by assistance  Stand to sit: Stand by assistance  Transfer Comments: cane, at bed and to recliner         Cognition  Overall Cognitive Status: [facility]  Times per week: 3-5x  Current Treatment Recommendations: Strengthening, Functional Mobility Training, Balance Training, Safety Education & Training, Self-Care / ADL, Patient/Caregiver Education & Training, Endurance Training    AM-PAC Score        AM-[facility] Inpatient Daily Activity Raw Score: 16 (06/15/21 1129)  AM-PAC Inpatient ADL T-Scale Score : 35.96 (06/15/21 1129)  ADL Inpatient CMS 0-100% Score: 53.32 (06/15/21 1129)  ADL Inpatient CMS G-Code Modifier : CK (06/15/21 1129)    Goals  Short term goals  Time Frame for Short term goals: prior to D/C. status: goals ongoing  Short term goal 1: complete functional mobility and transfers Mod Ind  Short term goal 2: complete toileting Mod Ind  Short term goal 3: complete grooming in stance at sink Mod Ind  Short term goal 4: complete bathing and dressing Min A  Long term goals  Time Frame for Long term goals : STG=LTG  Patient Goals   Patient goals : return home       Therapy Time   Individual Concurrent Group Co-treatment   Time In 1050         Time Out 1133         Minutes Gagandeep SHULTZ/CARMEN,515

## 2021-06-15 NOTE — PROGRESS NOTES
Pulmonary Progress Note    CC:  Follow up respiratory failure, COPD, edema    Subjective:  5 liters of oxygen and bilevel at night  Afebrile  Says her breathing is better than it has been in weeks         Intake/Output Summary (Last 24 hours) at 6/15/2021 0857  Last data filed at 6/15/2021 0657  Gross per 24 hour   Intake 2060 ml   Output 2875 ml   Net -815 ml         PHYSICAL EXAM:  Blood pressure 131/78, pulse 77, temperature 98.1 °F (36.7 °C), temperature source Oral, resp. rate 18, height 5' (1.524 m), weight 230 lb 9.6 oz (104.6 kg), last menstrual period 08/14/2015, SpO2 92 %, not currently breastfeeding.'  Gen: No distress. Eyes: right eye opaque  ENT: No discharge. Pharynx clear. Neck: Trachea midline. No obvious mass. Resp: Forced wheezing    CV: Regular rate. Regular rhythm. No murmur or rub. GI: Non-tender. Non-distended. No hernia. BS present. Skin: Stasis changes to lower extremities   Lymph: No cervical LAD. No supraclavicular LAD. M/S: No cyanosis. No joint deformity.    Neuro: Slightly lethargic today  EXT:   ++ edema with stasis dermatitis     Medications:    Scheduled Meds:   guaiFENesin  600 mg Oral BID    furosemide  80 mg Intravenous BID    methadone  130 mg Oral Daily    albuterol-ipratropium  1 puff Inhalation Q4H WA    gabapentin  100 mg Oral TID    carvedilol  3.125 mg Oral BID WC    lisinopril  5 mg Oral Daily    pantoprazole  40 mg Oral QAM AC    sertraline  100 mg Oral Daily    budesonide-formoterol  2 puff Inhalation BID    tiotropium  2 puff Inhalation Daily    sodium chloride flush  10 mL Intravenous 2 times per day    enoxaparin  40 mg Subcutaneous Daily    predniSONE  40 mg Oral Daily       Continuous Infusions:   sodium chloride         PRN Meds:  oxyCODONE-acetaminophen, sodium chloride flush, sodium chloride, ondansetron **OR** ondansetron, magnesium hydroxide, acetaminophen **OR** acetaminophen, albuterol    Labs:  CBC:   Recent Labs     06/13/21  0644 21  0536 06/15/21  0546   WBC 7.1 10.5 9.3   HGB 11.3* 11.5* 11.2*   HCT 34.1* 35.1* 34.0*   MCV 88.5 89.3 89.1    250 239     BMP:   Recent Labs     21  1650 21  0536 06/15/21  0546    140 142   K 3.7 3.6 3.4*   CL 97* 94* 92*   CO2 33* 39* 43*   PHOS  --  4.1 4.0   BUN 8 17 23*   CREATININE <0.5* 0.6 0.7     LIVER PROFILE:   Recent Labs     21  1650   AST 10*   ALT 8*   BILITOT <0.2   ALKPHOS 72     PT/INR:   Recent Labs     21  0644   PROTIME 11.3   INR 0.97     APTT:   Recent Labs     21  0644   APTT 27.9     UA:No results for input(s): NITRITE, COLORU, PHUR, LABCAST, WBCUA, RBCUA, MUCUS, TRICHOMONAS, YEAST, BACTERIA, CLARITYU, SPECGRAV, LEUKOCYTESUR, UROBILINOGEN, BILIRUBINUR, BLOODU, GLUCOSEU, AMORPHOUS in the last 72 hours. Invalid input(s): Valeta   No results for input(s): PH, PCO2, PO2 in the last 72 hours. VB.34     Chest X-ray:  Chest imaging was reviewed by me and showed vascular congestion            I reviewed all the above labs and studies pertaining to this visit.        ASSESSMENT/PLAN:  · Chronic Hypoxic/Hypercapnic Respiratory Failure  · Titrate oxygen for saturations greater than or equal to 90%  ? Bilevel with sleeping (home settings on current machine)  · Severe COPD with possible exacerbation however CXR looks like edema  ? Symbicort, Spiriva, Combivent  ? Prednisone for 5 days  ? Hold antibiotics as procal was WNL  · Abnormal CXR with appearance of pulmonary edema   ? Diuresis to continue. · BLANCA  ?  Bilevel at hs.  12                 DVT prophylaxis  Lovenox   Stable from COPD standpoint for DC  She follows with me and has an appt     Parth Lloyd, DO  [facility]

## 2021-06-15 NOTE — CARE COORDINATION
Met with patient. Patient still plans on returning home at [place]. Agrees to [facility]. Patient chose [facility], referral made to Andrés Vaughn. If [facility] unable to staff patient has no preference otherwise for home care and okay with us finding any agency that takes her insurance.   Electronically signed by Nilay Vila RN Case Management [phone] on 6/15/2021 at 3:56 PM

## 2021-06-15 NOTE — PROGRESS NOTES
Pt is not on BiPAP at this time because she is eating. Pt also states she can take BiPAP on/off by herself. Will continue to monitor.

## 2021-06-15 NOTE — PLAN OF CARE
Problem: Pain:  Goal: Pain level will decrease  Description: Pain level will decrease  6/15/2021 0127 by Nina Covarrubias RN  Outcome: Ongoing     Problem: Pain:  Goal: Control of acute pain  Description: Control of acute pain  6/15/2021 0127 by Nina Covarrubias RN  Outcome: Ongoing     Problem: Pain:  Goal: Control of chronic pain  Description: Control of chronic pain  6/15/2021 0127 by Nina Covarrubias RN  Outcome: Ongoing     Problem: Falls - Risk of:  Goal: Will remain free from falls  Description: Will remain free from falls  6/15/2021 0127 by Nina Covarrubias RN  Outcome: Ongoing     Problem: Skin Integrity:  Goal: Will show no infection signs and symptoms  Description: Will show no infection signs and symptoms  6/15/2021 0127 by Nina Covarrubias RN  Outcome: Ongoing     Problem: Falls - Risk of:  Goal: Absence of physical injury  Description: Absence of physical injury  6/15/2021 0127 by Nina Covarrubias RN  Outcome: Ongoing     Problem: Activity:  Goal: Capacity to carry out activities will improve  Description: Capacity to carry out activities will improve  6/15/2021 0127 by Nina Covarrubias RN  Outcome: Ongoing     Problem:  Activity:  Goal: Will verbalize the importance of balancing activity with adequate rest periods  Description: Will verbalize the importance of balancing activity with adequate rest periods  6/15/2021 0127 by Nina Covarrubias RN  Outcome: Ongoing     Problem: Cardiac:  Goal: Ability to maintain an adequate cardiac output will improve  Description: Ability to maintain an adequate cardiac output will improve  6/15/2021 0127 by Nina Covarrubias RN  Outcome: Ongoing     Problem: Cardiac:  Goal: Hemodynamic stability will improve  Description: Hemodynamic stability will improve  6/15/2021 0127 by Nina Covarrubias RN  Outcome: Ongoing     Problem: Health Behavior:  Goal: Ability to manage health-related needs will improve  Description: Ability to manage health-related needs will improve  6/15/2021 0127 by Adelene Hatchet, RN  Outcome: Ongoing     Problem: Fluid Volume:  Goal: Will show no signs and symptoms of electrolyte imbalance  Description: Will show no signs and symptoms of electrolyte imbalance  6/15/2021 0127 by Adelene Hatchet, RN  Outcome: Ongoing

## 2021-06-15 NOTE — CONSULTS
[facility]  HEART FAILURE PROGRAM      NAME:  Daphne RECORD NUMBER:  [id number]  AGE: 54 y.o. GENDER: female  : 1975  TODAY'S DATE:  2021    Subjective:     VISIT TYPE: evaluation     ADMITTING PHYSICIAN:  No admitting provider for patient encounter.     PAST MEDICAL HISTORY        Diagnosis Date    Arthritis     Blind right eye     Chronic respiratory failure with hypoxia and hypercapnia (HCC)     COPD (chronic obstructive pulmonary disease) (HCC)     GERD (gastroesophageal reflux disease)     Hypertension     Movement disorder     Neuromuscular disorder ([address]     Pneumonia        SOCIAL HISTORY    Social History     Tobacco Use    Smoking status: Former Smoker     Packs/day: 0.50     Years: 30.00     Pack years: 15.00     Types: Cigarettes     Start date: 1984     Quit date: 2017     Years since quitting: 3.4    Smokeless tobacco: Never Used    Tobacco comment: occ. 2 cigarettes a day   Vaping Use    Vaping Use: Never used   Substance Use Topics    Alcohol use: No     Alcohol/week: 0.0 standard drinks    Drug use: No       ALLERGIES    Allergies   Allergen Reactions    Latex      Rash developed    Sulfa Antibiotics Rash     Throat swelling    Nsaids Other (See Comments)     ulcer    Ultram [Tramadol Hcl] Swelling and Rash       MEDICATIONS  Scheduled Meds:   guaiFENesin  600 mg Oral BID    furosemide  80 mg Intravenous BID    methadone  130 mg Oral Daily    albuterol-ipratropium  1 puff Inhalation Q4H WA    gabapentin  100 mg Oral TID    carvedilol  3.125 mg Oral BID WC    lisinopril  5 mg Oral Daily    pantoprazole  40 mg Oral QAM AC    sertraline  100 mg Oral Daily    budesonide-formoterol  2 puff Inhalation BID    tiotropium  2 puff Inhalation Daily    sodium chloride flush  10 mL Intravenous 2 times per day    enoxaparin  40 mg Subcutaneous Daily    predniSONE  40 mg Oral Daily       ADMIT DATE: 6/12/2021      Objective:     ADMISSION DIAGNOSIS:   Heart failure (HCC) [I50.9]     /71   Pulse 79   Temp 98 °F (36.7 °C) (Oral)   Resp 16   Ht 5' (1.524 m)   Wt 228 lb 2.8 oz (103.5 kg)   LMP 08/14/2015   SpO2 98%   BMI 44.56 kg/m²     ADMIT:  Weight: 236 lb (107 kg)    TODAY: Weight: 228 lb 2.8 oz (103.5 kg)    Wt Readings from Last 25 Encounters:   06/14/21 228 lb 2.8 oz (103.5 kg)   05/31/21 223 lb 12.3 oz (101.5 kg)   04/28/21 223 lb (101.2 kg)   02/22/21 231 lb (104.8 kg)   12/29/20 230 lb (104.3 kg)   12/22/20 228 lb (103.4 kg)   11/17/20 231 lb 6.4 oz (105 kg)   11/11/20 229 lb 15 oz (104.3 kg)   02/20/20 280 lb (127 kg)   10/24/18 278 lb (126.1 kg)   01/16/18 256 lb (116.1 kg)   08/28/17 272 lb (123.4 kg)   07/07/17 281 lb 1.4 oz (127.5 kg)   05/08/17 276 lb 0.3 oz (125.2 kg)   04/18/17 261 lb (118.4 kg)   02/21/17 251 lb 12.8 oz (114.2 kg)   02/06/17 249 lb 12.5 oz (113.3 kg)   08/16/16 266 lb 9.6 oz (120.9 kg)   07/14/16 264 lb (119.7 kg)   09/28/15 264 lb 9.6 oz (120 kg)   07/06/15 251 lb (113.9 kg)   06/30/15 255 lb (115.7 kg)          Intake/Output Summary (Last 24 hours) at 6/14/2021 2346  Last data filed at 6/14/2021 2220  Gross per 24 hour   Intake 1560 ml   Output 3125 ml   Net -1565 ml       LABS  BMP:   Lab Results   Component Value Date     06/14/2021    K 3.6 06/14/2021    K 3.7 06/12/2021    CL 94 06/14/2021    CO2 39 06/14/2021    BUN 17 06/14/2021    LABALBU 3.6 06/14/2021    CREATININE 0.6 06/14/2021    CALCIUM 8.8 06/14/2021    GFRAA >60 06/14/2021    GFRAA >60 05/31/2013    LABGLOM >60 06/14/2021    GLUCOSE 111 06/14/2021     CBC:   Recent Labs     06/12/21  1650 06/13/21  0644 06/14/21  0536   WBC 8.3 7.1 10.5   HGB 10.8* 11.3* 11.5*   HCT 33.1* 34.1* 35.1*   MCV 89.5 88.5 89.3    225 250     BNP:   Lab Results   Component Value Date    BNP 9 12/01/2013       ECHOCARDIOGRAM: 11/09/2020   Summary   Ejection fraction is visually estimated to be 40-45%.    There is mild diffuse hypokinesis. Diastolic filling parameters suggest grade I diastolic dysfunction. Mitral valve leaflets appear mildly thickened. Mild mitral regurgitation is present. The left atrium is moderately dilated. The right ventricle is enlarged. Right ventricular systolic function is normal.   TAPSE 2.3 cm   IVC size is dilated (>2.1 cm) and collapses < 50% with respiration   consistent with elevated RA pressure (15 mmHg). Estimated pulmonary artery systolic pressure is mildly elevated at 46 mmHg   assuming a right atrial pressure of 15 mmHg. Assessment:     CONSULTS:   IP CONSULT TO PULMONOLOGY  IP CONSULT TO CARDIOLOGY  IP CONSULT TO HEART FAILURE NURSE/COORDINATOR  IP CONSULT TO HEART FAILURE NURSE/COORDINATOR    Patient has a CARDIOLOGY CONSULT: Yes      Patient taking an ACEI/ARB:  Yes       Patient taking a BETA BLOCKER:  Yes    SCALE AVAILABLE:  No: advised pt of availability in OP pharmacy - she will send daughter to get one before they leave today. EDUCATION STATUS: Patient and Patient's son, Devorah Whitaker   [x]  Provided both written and verbal education on Heart Failure signs/symptoms. [x]  Provided instructions on daily medications. [x]  Provided instructions to monitor and record weight daily. [x]  Provided instructions to call if weight increases 3 lbs in one day or 5 lbs in one week. [x]  Received verbal acknowledgment/understanding of Heart Failure related causes. [x]  Provided instructions on how to maintain a low sodium diet. []  Provided recommendations for smoking cessation programs  [x]  Provided recommendations on activity and exercise    [x]  Other: HF RN consult received from Cari Vila NP and Dr Jamal Acosta. Chart reviewed. Pt presented to ED with c/o SOB worsening over 3-4 days. She was seen in ED on 5/31/21 with similar complaints and discharged on steroids and ABX for COPD exacerbation. pBNP at that time was 1543. She reports no improvement.  She now endorses weight gain, ford LE edema and cough. She has increased her home O2 setting from usual 3L to 4-5 L d/t sx and reports using her home CPAP more in attempt to get relief without success. Pt is known to HF RN services and was last seen on 11/11/20 with full HF education completed at that time by Aditi Vega. She was hospitalized 11/8 - 11/11/2020 with HF. ProBNP at that time was 7300. Pt has known diastolic HF and follows with I. She was last seen in office on 2/22/21 by her primary cardiologist, Dr Nikolai Dupont. She was instructed to increase Lasix to 80 mg QD. Weight at that time was 231#. ProBNP on admission was 1597 with Cr 0.5. Pt was treated with IV bolus Lasix. Cardiology was consulted. Pt has had copious diuresis with net 8 liters to date. Weight on admission was 236# (ED) and 229# (floor). She is 227# today but unsure of accuracy. I met with patient in her room. She is in chair, on 3L NC and appears drowsy. I introduced myself and my role in her care. She recalls me from last admission. \"You told me about weighing myself\". Praise given for recall. Family member, young man pt refers to as Wil Diaz but unclear of relationship, present. As we reviewed info, pt stated repeatedly \"You hear that, Wil Diaz? You tell them why I fall asleep. Casandra Brill [place] You tell them my brain gets poison in it if I don't wear my oxygen. Casandra Brill [place] You tell them why I have to weigh myself\". He remained silent but nodded. He appeared to be listening to entire conversation but did not actively participate. Pt is aware of her HF diagnosis and is able to speak to many elements of HF management. She reports she has moved and no longer has a scale. She has been provided one from the [facility] supply in the past.  I informed her the OP pharmacy is now stocking them and she can purchase one there for $20. She states this is doable and she will have her daughter get one as they leave.    I provided weight log and corresponding AHA HF zones sheet. We discussed yellow zone items and she states \"I had all of those. I had those when I came to the ED and they told me it was my lungs\". We discussed how it is sometimes difficult to discern if SOB is COPD or HF. But I advised if she is having any weight gain or worsening LE edema / abdominal fullness, it is more likely to be HF related as COPD by itself does not cause fluid retention. She voices understanding. We reviewed how she should call [facility] when she has yellow zone concerns EARLY in order to most easily address these issues  -- typically over the phone. She states she did not do that in the past because her breathing had gotten \"so bad\". We discussed how she should be calling anytime she feels she has these concerns and the entire point is to not let her breathing get to that point if possible. We discussed how each heart failure flare /hospitalization negatively impacts her life -- both in longevity and quality. I urged her to take actions BEFORE issues become so severe she requires hospitalization. She voices motivation to do so. She states \"I am only 54years old\". Of note, she appears much older. Pt states she does not use added salt and \"tries to keep track of what (she) drinks\". She states she only drinks pop and \"I will only drink it in a can\". She states she goes through a 12 pack \"every other day or so\" but claims to \"not drink that much\" as she states she \"pours it out when it gets hot\". I suggested she switch to bottles so that she can put the screw lid back on and switch them in / out of freezer. I suggested she would be able to keep more accurate track of her intake an also be less costly. She states she might try that but did not seem very convinced of benefit. Pt claims they cook at home. She relies on children to do so because of her oxygen. She denies much fast food / convenience foods but is unsure of \"how much attention they pay when they buy stuff\".   She is put into practice. I tried to stress importance of HF management by telling her average survival times -- 30% one year and 60% dead in 5 years -- she did not react. HF RNs will continue to follow and assist with transition of care at discharge. CURRENT DIET: ADULT DIET; Regular; Low Fat/Low Chol/High Fiber/CARO; Low Sodium (2 gm); 1200 ml    EDUCATIONAL PACKETS PROVIDED- PRINTED FROM [employer]. Titles and material given:  Yes   [x]  What is Heart Failure? [x]  Heart Failure: Warning Signs of a Flare-Up  [x]  Heart Failure: Making Changes to Your Diet  [x]  Heart Failure: Medications to Help Your Heart   [x]  Other: Weight log, AHA HF zones sheet     PATIENT/CAREGIVER TEACHING:   Level of patient/caregiver understanding able to:   [x] Verbalize understanding   [] Demonstrate understanding       [x] Teach back        [x] Needs reinforcement     []  Other:      TEACHING TIME:  50 minutes       Plan:       DISCHARGE PLAN:  Placement for patient upon discharge: home with support   Hospice Care:  no  Code Status: Full Code  Discharge appointment scheduled: Yes     RECOMMENDATIONS:   [x]  Encourage to call Heart Failure Resource Line with any questions or concerns. [x]  Educate further Ms. Ruffin on fluid restriction 48 oz- 64 oz during inpatient stay so she can understand how to measure intake at home. [x]  Continue to educate on S/S of Heart Failure. [x]  Emphasize daily weights, diet, and if changes, to call Heart Failure Resource Line  [x]  Other:  Cardiac Rehab referral - pt declines as not covere by insurance -- EF 40-45%  [address] referral - pt declines -- does \"not want all those people calling me\".            Electronically signed by Aster Medrano, RN, BSN CHFN  on 6/14/2021 at 11:46 PM

## 2021-06-15 NOTE — PROGRESS NOTES
[address]   Daily Progress Note      Admit Date:  6/12/2021    Reason for initial consultation: CHF exacerbation    CC: \"short of breath\"    HPI: Per Dr. Steele is a 54 y.o. female who presented to the ER with progressive shortness of breath for the last 4 days. She was recently in the hospital and treated with antibiotics without improvement in her dyspnea. She has noticed worsening with wheezing weight gain bilateral lower extremity edema cough and increasing home oxygen requirements. \"    Interval History:  Pt with no acute overnight events. Patient still feels short of breath above baseline but better than admission. She denies associated chest pains. She endorses audible wheezing. She denies palpitations and lightheadedness. Past surgical history/social history/family history:   has a past surgical history that includes Cholecystectomy; Tubal ligation; Abdomen surgery; eye surgery; and Dilatation, esophagus. reports that she quit smoking about 3 years ago. Her smoking use included cigarettes. She started smoking about 37 years ago. She has a 15.00 pack-year smoking history. She has never used smokeless tobacco. She reports that she does not drink alcohol and does not use drugs. family history includes Arthritis in her father; Cancer in her father and mother; Osteoporosis in her mother. Review of Systems:   · Constitutional: No unanticipated weight loss. There's been no change in energy level, sleep pattern, or activity level. No fevers, chills. · Eyes: No visual changes or diplopia. No scleral icterus. · ENT: No Headaches, hearing loss or vertigo. No mouth sores or sore throat. · Cardiovascular: as reviewed in HPI  · Respiratory: No cough or wheezing, no sputum production. No hemoptysis. · Gastrointestinal: No abdominal pain, appetite loss, blood in stools. No change in bowel or bladder habits.   · Genitourinary: No dysuria, trouble voiding, or hematuria. · Musculoskeletal:  No gait disturbance, no joint complaints. · Integumentary: No rash or pruritis. · Neurological: No headache, diplopia, change in muscle strength, numbness or tingling. · Psychiatric: No anxiety or depression. · Endocrine: No temperature intolerance. No excessive thirst, fluid intake, or urination. No tremor. · Hematologic/Lymphatic: No abnormal bruising or bleeding, blood clots or swollen lymph nodes. · Allergic/Immunologic: No nasal congestion or hives. Objective:   /89   Pulse 60   Temp 98.1 °F (36.7 °C) (Oral)   Resp 16   Ht 5' (1.524 m)   Wt 230 lb 9.6 oz (104.6 kg)   LMP 08/14/2015   SpO2 92%   BMI 45.04 kg/m²       Intake/Output Summary (Last 24 hours) at 6/15/2021 1316  Last data filed at 6/15/2021 1219  Gross per 24 hour   Intake 2555 ml   Output 1975 ml   Net 580 ml     Wt Readings from Last 3 Encounters:   06/15/21 230 lb 9.6 oz (104.6 kg)   05/31/21 223 lb 12.3 oz (101.5 kg)   04/28/21 223 lb (101.2 kg)       Physical Exam:  General:  NAD. Obese. Wearing supplemental O2. Appears older than stated age. Eyes: Blind right eye with cataract. EOMI. No icterus. Skin:  Warm and dry. No new appearing rashes or lesions. Neck:  Supple. No JVP or bruit appreciated. Respiratory:  No respiratory distress at rest.  Diffuse bilateral expiratory wheezing. Cardiovascular:  Regular rate. S1S2. No M/R/G. Abdomen:  Soft, nontender, +bowel sounds. MSK: 1+ BLE edema. No clubbing or cyanosis. Moves all extremities. Psych: Normal insight and judgement. Awake, alert, and oriented X4.     Medications:    guaiFENesin  600 mg Oral BID    furosemide  80 mg Intravenous BID    methadone  130 mg Oral Daily    albuterol-ipratropium  1 puff Inhalation Q4H WA    gabapentin  100 mg Oral TID    carvedilol  3.125 mg Oral BID WC    lisinopril  5 mg Oral Daily    pantoprazole  40 mg Oral QAM AC    sertraline  100 mg Oral Daily    budesonide-formoterol  2 puff failure/Severe COPD/BLANCA. Following with pulmonary and requires supplemental oxygen.    3) Chronic methadone use. Will continue to defer to pain management to primary team but ideally would wean off methadone with CHF.    4) Anemia. Chronic issue. Will repeat routine iron studies. 5) Morbid obesity. BMI 45. Encouraged weight loss. Overall, the problems requiring hospitalization are high in severity.      Electronically signed by Arlene Mendes MD on 6/15/2021 at 1:16 PM

## 2021-06-16 LAB
ALBUMIN SERPL-MCNC: 3.5 G/DL (ref 3.4–5)
ANION GAP SERPL CALCULATED.3IONS-SCNC: 8 MMOL/L (ref 3–16)
BASE EXCESS ARTERIAL: 22.8 MMOL/L (ref -3–3)
BLOOD CULTURE, ROUTINE: NORMAL
BUN BLDV-MCNC: 24 MG/DL (ref 7–20)
CALCIUM SERPL-MCNC: 8.8 MG/DL (ref 8.3–10.6)
CARBOXYHEMOGLOBIN ARTERIAL: 1.2 % (ref 0–1.5)
CHLORIDE BLD-SCNC: 88 MMOL/L (ref 99–110)
CO2: 43 MMOL/L (ref 21–32)
CREAT SERPL-MCNC: 0.7 MG/DL (ref 0.6–1.1)
CULTURE, RESPIRATORY: NORMAL
FOLATE: 4.19 NG/ML (ref 4.78–24.2)
GFR AFRICAN AMERICAN: >60
GFR NON-AFRICAN AMERICAN: >60
GLUCOSE BLD-MCNC: 108 MG/DL (ref 70–99)
GRAM STAIN RESULT: NORMAL
HCO3 ARTERIAL: 53.1 MMOL/L (ref 21–29)
HCT VFR BLD CALC: 34.9 % (ref 36–48)
HEMOGLOBIN, ART, EXTENDED: 12.7 G/DL (ref 12–16)
HEMOGLOBIN: 11.3 G/DL (ref 12–16)
MAGNESIUM: 2.2 MG/DL (ref 1.8–2.4)
MCH RBC QN AUTO: 28.8 PG (ref 26–34)
MCHC RBC AUTO-ENTMCNC: 32.5 G/DL (ref 31–36)
MCV RBC AUTO: 88.7 FL (ref 80–100)
METHEMOGLOBIN ARTERIAL: 0.5 %
O2 CONTENT ARTERIAL: 16 ML/DL
O2 SAT, ARTERIAL: 92.2 %
O2 THERAPY: ABNORMAL
PCO2 ARTERIAL: 89.5 MMHG (ref 35–45)
PDW BLD-RTO: 14.8 % (ref 12.4–15.4)
PH ARTERIAL: 7.38 (ref 7.35–7.45)
PHOSPHORUS: 4.1 MG/DL (ref 2.5–4.9)
PLATELET # BLD: 255 K/UL (ref 135–450)
PMV BLD AUTO: 7.5 FL (ref 5–10.5)
PO2 ARTERIAL: 63.9 MMHG (ref 75–108)
POTASSIUM SERPL-SCNC: 3.1 MMOL/L (ref 3.5–5.1)
PRO-BNP: 76 PG/ML (ref 0–124)
RBC # BLD: 3.93 M/UL (ref 4–5.2)
SODIUM BLD-SCNC: 139 MMOL/L (ref 136–145)
TCO2 ARTERIAL: 55.8 MMOL/L
VITAMIN B-12: 699 PG/ML (ref 211–911)
WBC # BLD: 12.1 K/UL (ref 4–11)

## 2021-06-16 PROCEDURE — 97530 THERAPEUTIC ACTIVITIES: CPT

## 2021-06-16 PROCEDURE — 6360000002 HC RX W HCPCS: Performed by: NURSE PRACTITIONER

## 2021-06-16 PROCEDURE — 97116 GAIT TRAINING THERAPY: CPT

## 2021-06-16 PROCEDURE — 85027 COMPLETE CBC AUTOMATED: CPT

## 2021-06-16 PROCEDURE — 6370000000 HC RX 637 (ALT 250 FOR IP): Performed by: INTERNAL MEDICINE

## 2021-06-16 PROCEDURE — 36600 WITHDRAWAL OF ARTERIAL BLOOD: CPT

## 2021-06-16 PROCEDURE — 82803 BLOOD GASES ANY COMBINATION: CPT

## 2021-06-16 PROCEDURE — 2580000003 HC RX 258: Performed by: NURSE PRACTITIONER

## 2021-06-16 PROCEDURE — 2060000000 HC ICU INTERMEDIATE R&B

## 2021-06-16 PROCEDURE — 2700000000 HC OXYGEN THERAPY PER DAY

## 2021-06-16 PROCEDURE — 6370000000 HC RX 637 (ALT 250 FOR IP): Performed by: NURSE PRACTITIONER

## 2021-06-16 PROCEDURE — 82746 ASSAY OF FOLIC ACID SERUM: CPT

## 2021-06-16 PROCEDURE — 6360000002 HC RX W HCPCS: Performed by: INTERNAL MEDICINE

## 2021-06-16 PROCEDURE — 97110 THERAPEUTIC EXERCISES: CPT

## 2021-06-16 PROCEDURE — 36415 COLL VENOUS BLD VENIPUNCTURE: CPT

## 2021-06-16 PROCEDURE — 83735 ASSAY OF MAGNESIUM: CPT

## 2021-06-16 PROCEDURE — 83880 ASSAY OF NATRIURETIC PEPTIDE: CPT

## 2021-06-16 PROCEDURE — 94640 AIRWAY INHALATION TREATMENT: CPT

## 2021-06-16 PROCEDURE — 97535 SELF CARE MNGMENT TRAINING: CPT

## 2021-06-16 PROCEDURE — 94660 CPAP INITIATION&MGMT: CPT

## 2021-06-16 PROCEDURE — 99233 SBSQ HOSP IP/OBS HIGH 50: CPT | Performed by: NURSE PRACTITIONER

## 2021-06-16 PROCEDURE — 94761 N-INVAS EAR/PLS OXIMETRY MLT: CPT

## 2021-06-16 PROCEDURE — 80069 RENAL FUNCTION PANEL: CPT

## 2021-06-16 PROCEDURE — 82607 VITAMIN B-12: CPT

## 2021-06-16 RX ORDER — FOLIC ACID 1 MG/1
1 TABLET ORAL DAILY
Status: DISCONTINUED | OUTPATIENT
Start: 2021-06-16 | End: 2021-06-17 | Stop reason: HOSPADM

## 2021-06-16 RX ORDER — POTASSIUM CHLORIDE 20 MEQ/1
20 TABLET, EXTENDED RELEASE ORAL 2 TIMES DAILY WITH MEALS
Status: DISCONTINUED | OUTPATIENT
Start: 2021-06-17 | End: 2021-06-17 | Stop reason: HOSPADM

## 2021-06-16 RX ORDER — TORSEMIDE 20 MG/1
60 TABLET ORAL DAILY
Status: DISCONTINUED | OUTPATIENT
Start: 2021-06-16 | End: 2021-06-17 | Stop reason: HOSPADM

## 2021-06-16 RX ORDER — POTASSIUM CHLORIDE 20 MEQ/1
40 TABLET, EXTENDED RELEASE ORAL 2 TIMES DAILY WITH MEALS
Status: COMPLETED | OUTPATIENT
Start: 2021-06-16 | End: 2021-06-16

## 2021-06-16 RX ADMIN — FUROSEMIDE 60 MG: 10 INJECTION, SOLUTION INTRAVENOUS at 09:41

## 2021-06-16 RX ADMIN — ENOXAPARIN SODIUM 40 MG: 40 INJECTION SUBCUTANEOUS at 09:41

## 2021-06-16 RX ADMIN — Medication 2 PUFF: at 08:04

## 2021-06-16 RX ADMIN — OXYCODONE HYDROCHLORIDE AND ACETAMINOPHEN 1 TABLET: 5; 325 TABLET ORAL at 17:33

## 2021-06-16 RX ADMIN — CARVEDILOL 3.12 MG: 3.12 TABLET, FILM COATED ORAL at 09:41

## 2021-06-16 RX ADMIN — LISINOPRIL 5 MG: 5 TABLET ORAL at 09:40

## 2021-06-16 RX ADMIN — IPRATROPIUM BROMIDE AND ALBUTEROL 1 PUFF: 20; 100 SPRAY, METERED RESPIRATORY (INHALATION) at 20:39

## 2021-06-16 RX ADMIN — IPRATROPIUM BROMIDE AND ALBUTEROL 1 PUFF: 20; 100 SPRAY, METERED RESPIRATORY (INHALATION) at 08:04

## 2021-06-16 RX ADMIN — GUAIFENESIN 600 MG: 600 TABLET ORAL at 09:41

## 2021-06-16 RX ADMIN — FOLIC ACID 1 MG: 1 TABLET ORAL at 11:48

## 2021-06-16 RX ADMIN — CARVEDILOL 3.12 MG: 3.12 TABLET, FILM COATED ORAL at 17:33

## 2021-06-16 RX ADMIN — Medication 10 ML: at 21:26

## 2021-06-16 RX ADMIN — GABAPENTIN 100 MG: 100 CAPSULE ORAL at 21:25

## 2021-06-16 RX ADMIN — POTASSIUM CHLORIDE 40 MEQ: 1500 TABLET, EXTENDED RELEASE ORAL at 17:34

## 2021-06-16 RX ADMIN — OXYCODONE HYDROCHLORIDE AND ACETAMINOPHEN 1 TABLET: 5; 325 TABLET ORAL at 00:33

## 2021-06-16 RX ADMIN — IPRATROPIUM BROMIDE AND ALBUTEROL 1 PUFF: 20; 100 SPRAY, METERED RESPIRATORY (INHALATION) at 11:42

## 2021-06-16 RX ADMIN — OXYCODONE HYDROCHLORIDE AND ACETAMINOPHEN 1 TABLET: 5; 325 TABLET ORAL at 06:51

## 2021-06-16 RX ADMIN — GABAPENTIN 100 MG: 100 CAPSULE ORAL at 09:40

## 2021-06-16 RX ADMIN — METHADONE HYDROCHLORIDE 130 MG: 10 TABLET ORAL at 09:40

## 2021-06-16 RX ADMIN — PANTOPRAZOLE SODIUM 40 MG: 40 TABLET, DELAYED RELEASE ORAL at 06:34

## 2021-06-16 RX ADMIN — Medication 10 ML: at 09:43

## 2021-06-16 RX ADMIN — GUAIFENESIN 600 MG: 600 TABLET ORAL at 21:25

## 2021-06-16 RX ADMIN — PREDNISONE 40 MG: 20 TABLET ORAL at 09:41

## 2021-06-16 RX ADMIN — Medication 2 PUFF: at 20:39

## 2021-06-16 RX ADMIN — OXYCODONE HYDROCHLORIDE AND ACETAMINOPHEN 1 TABLET: 5; 325 TABLET ORAL at 21:40

## 2021-06-16 RX ADMIN — SERTRALINE 100 MG: 100 TABLET, FILM COATED ORAL at 09:41

## 2021-06-16 RX ADMIN — TORSEMIDE 60 MG: 20 TABLET ORAL at 11:48

## 2021-06-16 RX ADMIN — POTASSIUM CHLORIDE 40 MEQ: 1500 TABLET, EXTENDED RELEASE ORAL at 11:49

## 2021-06-16 RX ADMIN — IPRATROPIUM BROMIDE AND ALBUTEROL 1 PUFF: 20; 100 SPRAY, METERED RESPIRATORY (INHALATION) at 16:57

## 2021-06-16 RX ADMIN — TIOTROPIUM BROMIDE INHALATION SPRAY 2 PUFF: 3.12 SPRAY, METERED RESPIRATORY (INHALATION) at 08:04

## 2021-06-16 ASSESSMENT — PAIN DESCRIPTION - ONSET
ONSET: ON-GOING

## 2021-06-16 ASSESSMENT — PAIN DESCRIPTION - PAIN TYPE
TYPE: CHRONIC PAIN

## 2021-06-16 ASSESSMENT — PAIN DESCRIPTION - PROGRESSION
CLINICAL_PROGRESSION: GRADUALLY WORSENING

## 2021-06-16 ASSESSMENT — PAIN SCALES - WONG BAKER
WONGBAKER_NUMERICALRESPONSE: 0
WONGBAKER_NUMERICALRESPONSE: 4

## 2021-06-16 ASSESSMENT — PAIN DESCRIPTION - ORIENTATION
ORIENTATION: RIGHT
ORIENTATION: RIGHT;MID
ORIENTATION: RIGHT

## 2021-06-16 ASSESSMENT — PAIN DESCRIPTION - DIRECTION
RADIATING_TOWARDS: RIGHT FLANK
RADIATING_TOWARDS: RIGHT FLANK

## 2021-06-16 ASSESSMENT — PAIN - FUNCTIONAL ASSESSMENT
PAIN_FUNCTIONAL_ASSESSMENT: PREVENTS OR INTERFERES SOME ACTIVE ACTIVITIES AND ADLS
PAIN_FUNCTIONAL_ASSESSMENT: PREVENTS OR INTERFERES SOME ACTIVE ACTIVITIES AND ADLS
PAIN_FUNCTIONAL_ASSESSMENT: ACTIVITIES ARE NOT PREVENTED
PAIN_FUNCTIONAL_ASSESSMENT: ACTIVITIES ARE NOT PREVENTED
PAIN_FUNCTIONAL_ASSESSMENT: PREVENTS OR INTERFERES SOME ACTIVE ACTIVITIES AND ADLS

## 2021-06-16 ASSESSMENT — PAIN SCALES - GENERAL
PAINLEVEL_OUTOF10: 9
PAINLEVEL_OUTOF10: 0
PAINLEVEL_OUTOF10: 8
PAINLEVEL_OUTOF10: 9
PAINLEVEL_OUTOF10: 4
PAINLEVEL_OUTOF10: 4
PAINLEVEL_OUTOF10: 8
PAINLEVEL_OUTOF10: 2

## 2021-06-16 ASSESSMENT — PAIN DESCRIPTION - DESCRIPTORS
DESCRIPTORS: ACHING

## 2021-06-16 ASSESSMENT — PAIN DESCRIPTION - LOCATION
LOCATION: BACK

## 2021-06-16 ASSESSMENT — PAIN DESCRIPTION - FREQUENCY
FREQUENCY: CONTINUOUS

## 2021-06-16 NOTE — PROGRESS NOTES
Yenni Contreras is a 54 y.o. female who presents to the emergency department c/o worsening SOB x 3-4 days. Recently seen and d/c with doxycycline x 10 days without improvement in SOB, has had worsening associated with wheezing but also weight gain, bilateral LE edema which is chronic, +ve productive cough, SOB severe enough to cause increase in her home O2 requirement from 3L to 4-5 L and requiring increased CPAP usage at home. Denies fever. Symptoms not otherwise alleviated or exacerbated by other factors. Family / Caregiver Present: No  Referring Practitioner: Dr. Marion Davis  Subjective  Subjective: Pt up in chair. Agreeable to therapy, reports not sleeping well last night. Reports chronic back pain. Objective   Bed mobility  Supine to Sit: Unable to assess  Sit to Supine: Unable to assess  Comment: Pt up in chair upon arrival and exit  Transfers  Sit to Stand: Stand by assistance (from recliner x2 reps)  Stand to sit: Stand by assistance  Ambulation  Ambulation?: Yes  Ambulation 1  Surface: level tile  Device: Single point cane  Assistance: Stand by assistance  Quality of Gait: decreased lor, lean to R d/t scoliosis, no loss of balance  Distance: 22' x2  Comments: SpO2 92% after ambulation  Stairs/Curb  Stairs?: No        Exercises  Hip Flexion: x10 BLE  Knee Long Arc Quad: x10 BLE  Ankle Pumps: x15 BLE  Comments: SpO2 87% after exercises - cues for pursed lip breathing     AM-PAC Score  AM-PAC Inpatient Mobility Raw Score : 18 (06/16/21 0937)  AM-PAC Inpatient T-Scale Score : 43.63 (06/16/21 0937)  Mobility Inpatient CMS 0-100% Score: 46.58 (06/16/21 0937)  Mobility Inpatient CMS G-Code Modifier : CK (06/16/21 7822)        Goals  Short term goals  Time Frame for Short term goals:  In 2-3 days pt will perform  Short term goal 1: Bed mobility Mod I  Short term goal 2: Transfers mod I  Short term goal 3: Ambulation 48' with SPC, Mod I  Short term goal 4: Ascend/Descend 3 steps with CGA  Long term goals  Time Frame

## 2021-06-16 NOTE — PROGRESS NOTES
[id number] spoke with Dr Melody Kiser on the floor that patient seemed more drowsy. Order for ABG. 1343 call from chemistry that patient's C02 elevated 89.5.     1400 rounded on patient, explained that her C02 is high and she needs to wear her BiPAP. Patient's daughter currently visiting. Patient verbalized that she will be put on the BiPAP when her daughter leaves. RN will check back.       Perfect serve sent to Dr. Melody Kiser

## 2021-06-16 NOTE — PROGRESS NOTES
age and cooperative. HEENT: Pupils equal, round, and reactive to light. Conjunctivae/corneas clear. Neck: Supple, with full range of motion. Trachea midline. Respiratory:  Decr bilaterally. . Significant bilateral wheezing and crackles. Cardiovascular: Regular rate and rhythm with normal S1/S2 without murmurs, rubs or gallops. Abdomen: Soft, non-tender, non-distended with normal bowel sounds. Musculoskeletal: No clubbing, cyanosis 2+ edema bilaterally. ScoliosisSkin: Skin color, texture, turgor normal.  No rashes or lesions. Neurologic:  Neurovascularly intact without any focal sensory/motor deficits. Cranial nerves: II-XII intact, grossly non-focal.  Psychiatric: Alert and oriented, thought content appropriate, normal insight  Capillary Refill: Brisk,< 3 seconds   Peripheral Pulses: +2 palpable, equal bilaterally       Labs:   Recent Labs     06/14/21  0536 06/15/21  0546 06/16/21  0545   WBC 10.5 9.3 12.1*   HGB 11.5* 11.2* 11.3*   HCT 35.1* 34.0* 34.9*    239 255     Recent Labs     06/14/21  0536 06/15/21  0546 06/16/21  0545    142 139   K 3.6 3.4* 3.1*   CL 94* 92* 88*   CO2 39* 43* 43*   BUN 17 23* 24*   CREATININE 0.6 0.7 0.7   CALCIUM 8.8 8.6 8.8   PHOS 4.1 4.0 4.1     No results for input(s): AST, ALT, BILIDIR, BILITOT, ALKPHOS in the last 72 hours. No results for input(s): INR in the last 72 hours. No results for input(s): Janeece Gambles in the last 72 hours.     Urinalysis:      Lab Results   Component Value Date    NITRU Negative 11/08/2020    WBCUA 0-2 11/08/2020    RBCUA 5-10 11/08/2020    BLOODU SMALL 11/08/2020    SPECGRAV 1.012 11/08/2020    GLUCOSEU Negative 11/08/2020       Radiology:  XR CHEST PORTABLE   Final Result   Stable mild cardiomegaly with prominent central pulmonary vessels suggestive   of pulmonary artery hypertension or venous congestion which is more prominent   with no infiltrate or effusion               Assessment/Plan:    Active Hospital Problems Diagnosis     Chronic anemia [D64.9]     Heart failure ([facility] [address] [I50.9]     Morbid obesity with BMI of 40.0-44.9, adult ([facility]) [E66.01, Z68.41]     Acute on chronic diastolic HF (heart failure) ([facility]) [I50.33]     COPD exacerbation ([facility]) [J44.1]     Acute on chronic respiratory failure with hypoxia and hypercapnia ([facility]) [J96.21, J96.22]     COPD, severe ([facility]) [J44.9]      Acute on chronic hypercapnic and hypoxic respiratory failure  Due to COPD and CHF, pt not wearing cpap all night and hasnt been taking lasix (doesn't want to pee all the time)  - with increased work of breath, tachypnea and hypoxia  - home O2 4L 86%  - provide supplemental oxygen as necessary to keep SaO2 92% or greater.     Acute COPD exacerbation - improved  Significant wheezing on exam.  - Nebulizer treatments every 4 hours and every 2 hours prn   - IV steroids --> PO prednisone  - Continue home meds and Cpap  - pulm consulted  - procal low-- stop abx     HFrEF with exacerbation   ECHO: 11/2020 EF 40-45% mild diffuse hypokinesia. - lasix 80 mg IV given in ED, will continue diuresis, noncompliant at home, also likely not following any fluid/Na restriction  - cont BB, ACEi/ARB  - daily wts  - I/Os  - consult CHF RN  - consult cardiology     HTN  - monitor blood pressure  - continue home medications     Obesity   -  With Body mass index is 46 kg/m². Complicating assessment and treatment. Placing patient at risk for multiple co-morbidities as well as early death and contributing to the patient's presentation. Counseled on weight loss. Chronic pain  On high doses methadone. Will verify with pharmacy. DVT Prophylaxis: Lovenox  Diet: ADULT DIET;  Regular; Low Fat/Low Chol/High Fiber/CARO; Low Sodium (2 gm); 1200 ml  Code Status: Full Code    PT/OT Eval Status: deferred    Dispo - pending    Rosa Mcdowell MD

## 2021-06-16 NOTE — PLAN OF CARE
Ongoing  Fluid restriction   Goal: Will show no signs and symptoms of electrolyte imbalance  Description: Will show no signs and symptoms of electrolyte imbalance  Outcome: Ongoing  Daily lab values      Problem: Health Behavior:  Goal: Ability to manage health-related needs will improve  Description: Ability to manage health-related needs will improve  Outcome: Ongoing  Adequately able to assess needs     Problem: Nutritional:  Goal: Maintenance of adequate nutrition will improve  Description: Maintenance of adequate nutrition will improve  Outcome: Ongoing  Fair appetite     Problem: Respiratory:  Goal: Ability to maintain adequate ventilation will improve  Description: Ability to maintain adequate ventilation will improve  Outcome: Ongoing  4 L NC.

## 2021-06-16 NOTE — PROGRESS NOTES
(36.7 °C) (Oral)   Resp 18   Ht 5' (1.524 m)   Wt 227 lb (103 kg)   LMP 08/14/2015   SpO2 92%   BMI 44.33 kg/m²       Intake/Output Summary (Last 24 hours) at 6/16/2021 0925  Last data filed at 6/16/2021 0600  Gross per 24 hour   Intake 680 ml   Output 2975 ml   Net -2295 ml     Wt Readings from Last 3 Encounters:   06/16/21 227 lb (103 kg)   05/31/21 223 lb 12.3 oz (101.5 kg)   04/28/21 223 lb (101.2 kg)       Physical Exam:  General: In no acute distress. Awake, alert, and oriented X4. Up in chair  Skin:  Warm and dry. No new appearing rashes or lesions. Neck:  Supple and thick. No JVD or bruit appreciated. Chest: Lungs with expiratory wheezes  Cardiovascular:  RRR. Normal S1 and S2. No murmur/gallop or rub  Abdomen:  Soft, nontender, nondistended, +bowel sounds. No hepatomegaly  Extremities:  1+ bilateral pretibial edema. No clubbing or cyanosis.  2+ bilateral radial/DP/PT    Medications:    folic acid  1 mg Oral Daily    furosemide  60 mg Intravenous BID    guaiFENesin  600 mg Oral BID    methadone  130 mg Oral Daily    albuterol-ipratropium  1 puff Inhalation Q4H WA    gabapentin  100 mg Oral TID    carvedilol  3.125 mg Oral BID WC    lisinopril  5 mg Oral Daily    pantoprazole  40 mg Oral QAM AC    sertraline  100 mg Oral Daily    budesonide-formoterol  2 puff Inhalation BID    tiotropium  2 puff Inhalation Daily    sodium chloride flush  10 mL Intravenous 2 times per day    enoxaparin  40 mg Subcutaneous Daily    predniSONE  40 mg Oral Daily      sodium chloride       oxyCODONE-acetaminophen, sodium chloride flush, sodium chloride, ondansetron **OR** ondansetron, magnesium hydroxide, acetaminophen **OR** acetaminophen, albuterol    Lab Data:  CBC:   Recent Labs     06/14/21  0536 06/15/21  0546 06/16/21  0545   WBC 10.5 9.3 12.1*   HGB 11.5* 11.2* 11.3*    239 255     BMP:    Recent Labs     06/14/21  0536 06/15/21  0546 06/16/21  0545    142 139   K 3.6 3.4* 3.1*   CO2 39* 43* 43*   BUN 17 23* 24*   CREATININE 0.6 0.7 0.7     Results for Era Kaye (MRN [id number]) as of 6/16/2021 09:31   Ref. Range 6/12/2021 16:50 6/14/2021 22:20   Pro-BNP Latest Ref Range: 0 - 124 pg/mL 1,597 (H) 196 (H)   Troponin Latest Ref Range: <0.01 ng/mL <0.01      6/12/2021 CXR  Stable mild cardiomegaly with prominent central pulmonary vessels suggestive   of pulmonary artery hypertension or venous congestion which is more prominent   with no infiltrate or effusion         ECG: Normal sinus rhythm    Echo 11/9/2020:   Ejection fraction is visually estimated to be 40-45%. There is mild diffuse hypokinesis. Diastolic filling parameters suggest grade I diastolic dysfunction. Mitral valve leaflets appear mildly thickened. Mild mitral regurgitation is present. The left atrium is moderately dilated. The right ventricle is enlarged. Right ventricular systolic function is normal.   TAPSE 2.3 cm   IVC size is dilated (>2.1 cm) and collapses < 50% with respiration   consistent with elevated RA pressure (15 mmHg). Estimated pulmonary artery systolic pressure is mildly elevated at 46 mmHg   assuming a right atrial pressure of 15 mmHg. Telemetry: Sinus rhythm    Assessment/Plan:    1. Acute on chronic diastolic HF  -LVEF 15%; appears to be compensated at present; NYHA difficult to ascertain d/t body habitus  -currently at dry weight  -remains on IV lasix (change to po)  -continue carvedilol, lisinopril  -low sodium diet and fluid restriction    2. Chronic hypoxic and hypercapnic respiratory failure/severe COPD/BLANCA  -on supplemental O2 at home    3. Morbid obesity  -BMI 45  -weight loss encouraged    4. Chronic anemia  -stable    5. Chronic methadone use      Improved from cardiac standpoint. Changed to po diuretic and potassium added. Hopefully home soon.  Will need 7 day follow up in office and already scheduled on 6/21/2021 with Dr. Jazmine Quick    Electronically signed by JULIUS Urrutia - CNP on 6/16/2021 at 9:25 AM

## 2021-06-16 NOTE — PROGRESS NOTES
history of Arthritis, Blind right eye, Chronic respiratory failure with hypoxia and hypercapnia ([address] COPD (chronic obstructive pulmonary disease) ([address] GERD (gastroesophageal reflux disease), Hypertension, Movement disorder, Neuromuscular disorder ([facility] [address] and Pneumonia. has a past surgical history that includes Cholecystectomy; Tubal ligation; Abdomen surgery; eye surgery; and Dilatation, esophagus. Restrictions  Restrictions/Precautions  Restrictions/Precautions: Fall Risk  Position Activity Restriction  Other position/activity restrictions: 4L O2; duvall  Subjective   General  Chart Reviewed: Yes, Orders, Progress Notes, Labs  Patient assessed for rehabilitation services?: Yes  Additional Pertinent Hx: per ED note, Joseph Ramos is a 54 y.o. female who presents to the emergency department c/o worsening SOB x 3-4 days. Recently seen and d/c with doxycycline x 10 days without improvement in SOB, has had worsening associated with wheezing but also weight gain, bilateral LE edema which is chronic, +ve productive cough, SOB severe enough to cause increase in her home O2 requirement from 3L to 4-5 L and requiring increased CPAP usage at home. Denies fever. Symptoms not otherwise alleviated or exacerbated by other factors. Family / Caregiver Present: No  Referring Practitioner: Christal Steen MD  Subjective  Subjective: Pt met BS, in bed. \"I feel a little better\". Pt on 4L 02. Pt agreeable to therapy. General Comment  Comments: okay for therapy per RN. Orientation  Orientation  Overall Orientation Status: Within Functional Limits  Objective    ADL  Grooming: Supervision (stance at sink to wash hands, face)  LE Dressing: Unable to assess(comment) (Pt demonstrated reaching forward to touch feet without difficulty.)  Toileting: Unable to assess(comment) (pt requesting touse commode for BM. Pt did not void. Pt wearing hospital gown, no pants. Pt Supervision in stance to manage gown.  Anticipate would be supervision for task)  Additional Comments: Pt wanting to wait for daughter to come and assist with bath(shower). Anticipate pt needing up to Min A for ADLs including dressing, bathing; supervision for toileting based on ROM, strength, balance, and endurance. Pt reports she takes tub bathes at home and having family support for ADL's. Standing Balance  Time: not timed  Activity: Pt with good endurance for standing for ADL's, Supervision, at commode, sink. Pt amb with cane in room, SBA.  Pt desating briefly to 86% and qickly recovering to >90% within seconds of seated rest.  Toilet Transfers  Toilet - Technique: Ambulating  Equipment Used: Grab bars  Toilet Transfer: Supervision  Toilet Transfers Comments: using sp cane  Wheelchair Bed Transfers  Wheelchair/Bed - Technique: Ambulating  Equipment Used: Bed (recliner)  Level of Asssistance: Supervision  Wheelchair Transfers Comments: using sp cane  Bed mobility  Supine to Sit: Modified independent         Cognition  Overall Cognitive Status: [facility]         Plan   Plan  Times per week: 3-5x  Current Treatment Recommendations: Strengthening, Functional Mobility Training, Balance Training, Safety Education & Training, Self-Care / ADL, Patient/Caregiver Education & Training, Endurance Training    AM-PAC Score        AM-PAC Inpatient Daily Activity Raw Score: 18 (06/16/21 0854)  AM-PAC Inpatient ADL T-Scale Score : 38.66 (06/16/21 0854)  ADL Inpatient CMS 0-100% Score: 46.65 (06/16/21 0854)  ADL Inpatient CMS G-Code Modifier : CK (06/16/21 0854)    Goals  Short term goals  Time Frame for Short term goals: prior to D/C. status: goals ongoing  Short term goal 1: complete functional mobility and transfers Mod Ind  Short term goal 2: complete toileting Mod Ind  Short term goal 3: complete grooming in stance at sink Mod Ind  Short term goal 4: complete bathing and dressing Min A  Long term goals  Time Frame for Long term goals : STG=LTG  Patient Goals   Patient goals : return home       Therapy Time   Individual Concurrent Group Co-treatment   Time In 0814         Time Out 0900         Minutes [address] [id number]/L,[id number]

## 2021-06-17 VITALS
HEART RATE: 87 BPM | BODY MASS INDEX: 44.19 KG/M2 | SYSTOLIC BLOOD PRESSURE: 111 MMHG | TEMPERATURE: 98.2 F | WEIGHT: 225.09 LBS | OXYGEN SATURATION: 91 % | RESPIRATION RATE: 16 BRPM | DIASTOLIC BLOOD PRESSURE: 73 MMHG | HEIGHT: 60 IN

## 2021-06-17 LAB
ALBUMIN SERPL-MCNC: 3.6 G/DL (ref 3.4–5)
ANION GAP SERPL CALCULATED.3IONS-SCNC: 4 MMOL/L (ref 3–16)
BUN BLDV-MCNC: 28 MG/DL (ref 7–20)
CALCIUM SERPL-MCNC: 9.2 MG/DL (ref 8.3–10.6)
CHLORIDE BLD-SCNC: 89 MMOL/L (ref 99–110)
CO2: 46 MMOL/L (ref 21–32)
CREAT SERPL-MCNC: 0.9 MG/DL (ref 0.6–1.1)
CULTURE, BLOOD 2: NORMAL
GFR AFRICAN AMERICAN: >60
GFR NON-AFRICAN AMERICAN: >60
GLUCOSE BLD-MCNC: 87 MG/DL (ref 70–99)
HCT VFR BLD CALC: 34.5 % (ref 36–48)
HEMOGLOBIN: 11.3 G/DL (ref 12–16)
MAGNESIUM: 2.2 MG/DL (ref 1.8–2.4)
MCH RBC QN AUTO: 29.2 PG (ref 26–34)
MCHC RBC AUTO-ENTMCNC: 32.8 G/DL (ref 31–36)
MCV RBC AUTO: 89.2 FL (ref 80–100)
PDW BLD-RTO: 14.7 % (ref 12.4–15.4)
PHOSPHORUS: 4.3 MG/DL (ref 2.5–4.9)
PLATELET # BLD: 249 K/UL (ref 135–450)
PMV BLD AUTO: 7.5 FL (ref 5–10.5)
POTASSIUM SERPL-SCNC: 3.9 MMOL/L (ref 3.5–5.1)
RBC # BLD: 3.87 M/UL (ref 4–5.2)
SODIUM BLD-SCNC: 139 MMOL/L (ref 136–145)
WBC # BLD: 12.4 K/UL (ref 4–11)

## 2021-06-17 PROCEDURE — 97113 AQUATIC THERAPY/EXERCISES: CPT

## 2021-06-17 PROCEDURE — 94640 AIRWAY INHALATION TREATMENT: CPT

## 2021-06-17 PROCEDURE — 2700000000 HC OXYGEN THERAPY PER DAY

## 2021-06-17 PROCEDURE — 36415 COLL VENOUS BLD VENIPUNCTURE: CPT

## 2021-06-17 PROCEDURE — 6370000000 HC RX 637 (ALT 250 FOR IP): Performed by: INTERNAL MEDICINE

## 2021-06-17 PROCEDURE — 80069 RENAL FUNCTION PANEL: CPT

## 2021-06-17 PROCEDURE — 99233 SBSQ HOSP IP/OBS HIGH 50: CPT | Performed by: NURSE PRACTITIONER

## 2021-06-17 PROCEDURE — 94761 N-INVAS EAR/PLS OXIMETRY MLT: CPT

## 2021-06-17 PROCEDURE — 6370000000 HC RX 637 (ALT 250 FOR IP): Performed by: NURSE PRACTITIONER

## 2021-06-17 PROCEDURE — 97110 THERAPEUTIC EXERCISES: CPT

## 2021-06-17 PROCEDURE — 97530 THERAPEUTIC ACTIVITIES: CPT

## 2021-06-17 PROCEDURE — 2580000003 HC RX 258: Performed by: NURSE PRACTITIONER

## 2021-06-17 PROCEDURE — 83735 ASSAY OF MAGNESIUM: CPT

## 2021-06-17 PROCEDURE — 6360000002 HC RX W HCPCS: Performed by: INTERNAL MEDICINE

## 2021-06-17 PROCEDURE — 85027 COMPLETE CBC AUTOMATED: CPT

## 2021-06-17 PROCEDURE — 6360000002 HC RX W HCPCS: Performed by: NURSE PRACTITIONER

## 2021-06-17 RX ORDER — GABAPENTIN 100 MG/1
100 CAPSULE ORAL 3 TIMES DAILY
Qty: 90 CAPSULE | Refills: 0 | Status: SHIPPED | OUTPATIENT
Start: 2021-06-17 | End: 2022-09-12

## 2021-06-17 RX ORDER — OXYCODONE HYDROCHLORIDE AND ACETAMINOPHEN 5; 325 MG/1; MG/1
1 TABLET ORAL EVERY 4 HOURS PRN
Qty: 9 TABLET | Refills: 0 | Status: SHIPPED | OUTPATIENT
Start: 2021-06-17 | End: 2021-06-20

## 2021-06-17 RX ORDER — VALACYCLOVIR HYDROCHLORIDE 1 G/1
2000 TABLET, FILM COATED ORAL 3 TIMES DAILY
Qty: 42 TABLET | Refills: 0 | Status: SHIPPED | OUTPATIENT
Start: 2021-06-17 | End: 2021-06-24

## 2021-06-17 RX ORDER — FOLIC ACID 1 MG/1
1 TABLET ORAL DAILY
Qty: 30 TABLET | Refills: 3 | Status: SHIPPED | OUTPATIENT
Start: 2021-06-18

## 2021-06-17 RX ORDER — ACETAZOLAMIDE 500 MG/5ML
500 INJECTION, POWDER, LYOPHILIZED, FOR SOLUTION INTRAVENOUS ONCE
Status: COMPLETED | OUTPATIENT
Start: 2021-06-17 | End: 2021-06-17

## 2021-06-17 RX ORDER — TORSEMIDE 20 MG/1
60 TABLET ORAL DAILY
Qty: 30 TABLET | Refills: 3 | Status: SHIPPED | OUTPATIENT
Start: 2021-06-18 | End: 2022-05-03 | Stop reason: SDUPTHER

## 2021-06-17 RX ADMIN — FOLIC ACID 1 MG: 1 TABLET ORAL at 08:39

## 2021-06-17 RX ADMIN — OXYCODONE HYDROCHLORIDE AND ACETAMINOPHEN 1 TABLET: 5; 325 TABLET ORAL at 08:39

## 2021-06-17 RX ADMIN — GABAPENTIN 100 MG: 100 CAPSULE ORAL at 13:35

## 2021-06-17 RX ADMIN — Medication 2 PUFF: at 08:24

## 2021-06-17 RX ADMIN — OXYCODONE HYDROCHLORIDE AND ACETAMINOPHEN 1 TABLET: 5; 325 TABLET ORAL at 03:45

## 2021-06-17 RX ADMIN — GUAIFENESIN 600 MG: 600 TABLET ORAL at 08:39

## 2021-06-17 RX ADMIN — Medication 10 ML: at 08:38

## 2021-06-17 RX ADMIN — SERTRALINE 100 MG: 100 TABLET, FILM COATED ORAL at 08:39

## 2021-06-17 RX ADMIN — IPRATROPIUM BROMIDE AND ALBUTEROL 1 PUFF: 20; 100 SPRAY, METERED RESPIRATORY (INHALATION) at 08:24

## 2021-06-17 RX ADMIN — ACETAZOLAMIDE 500 MG: 500 INJECTION, POWDER, LYOPHILIZED, FOR SOLUTION INTRAVENOUS at 11:13

## 2021-06-17 RX ADMIN — PANTOPRAZOLE SODIUM 40 MG: 40 TABLET, DELAYED RELEASE ORAL at 05:54

## 2021-06-17 RX ADMIN — METHADONE HYDROCHLORIDE 130 MG: 10 TABLET ORAL at 08:39

## 2021-06-17 RX ADMIN — GABAPENTIN 100 MG: 100 CAPSULE ORAL at 08:39

## 2021-06-17 RX ADMIN — IPRATROPIUM BROMIDE AND ALBUTEROL 1 PUFF: 20; 100 SPRAY, METERED RESPIRATORY (INHALATION) at 11:31

## 2021-06-17 RX ADMIN — TIOTROPIUM BROMIDE INHALATION SPRAY 2 PUFF: 3.12 SPRAY, METERED RESPIRATORY (INHALATION) at 08:24

## 2021-06-17 RX ADMIN — LISINOPRIL 5 MG: 5 TABLET ORAL at 08:39

## 2021-06-17 RX ADMIN — OXYCODONE HYDROCHLORIDE AND ACETAMINOPHEN 1 TABLET: 5; 325 TABLET ORAL at 13:35

## 2021-06-17 RX ADMIN — IPRATROPIUM BROMIDE AND ALBUTEROL 1 PUFF: 20; 100 SPRAY, METERED RESPIRATORY (INHALATION) at 15:22

## 2021-06-17 RX ADMIN — ENOXAPARIN SODIUM 40 MG: 40 INJECTION SUBCUTANEOUS at 08:39

## 2021-06-17 RX ADMIN — CARVEDILOL 3.12 MG: 3.12 TABLET, FILM COATED ORAL at 08:39

## 2021-06-17 RX ADMIN — TORSEMIDE 60 MG: 20 TABLET ORAL at 08:38

## 2021-06-17 RX ADMIN — PREDNISONE 40 MG: 20 TABLET ORAL at 08:39

## 2021-06-17 RX ADMIN — POTASSIUM CHLORIDE 20 MEQ: 1500 TABLET, EXTENDED RELEASE ORAL at 08:39

## 2021-06-17 ASSESSMENT — PAIN DESCRIPTION - ONSET
ONSET: ON-GOING

## 2021-06-17 ASSESSMENT — PAIN SCALES - GENERAL
PAINLEVEL_OUTOF10: 0
PAINLEVEL_OUTOF10: 7
PAINLEVEL_OUTOF10: 8
PAINLEVEL_OUTOF10: 0
PAINLEVEL_OUTOF10: 0
PAINLEVEL_OUTOF10: 7
PAINLEVEL_OUTOF10: 4
PAINLEVEL_OUTOF10: 0

## 2021-06-17 ASSESSMENT — PAIN DESCRIPTION - PROGRESSION
CLINICAL_PROGRESSION: NOT CHANGED

## 2021-06-17 ASSESSMENT — PAIN - FUNCTIONAL ASSESSMENT
PAIN_FUNCTIONAL_ASSESSMENT: ACTIVITIES ARE NOT PREVENTED
PAIN_FUNCTIONAL_ASSESSMENT: PREVENTS OR INTERFERES SOME ACTIVE ACTIVITIES AND ADLS
PAIN_FUNCTIONAL_ASSESSMENT: PREVENTS OR INTERFERES SOME ACTIVE ACTIVITIES AND ADLS

## 2021-06-17 ASSESSMENT — PAIN DESCRIPTION - ORIENTATION
ORIENTATION: RIGHT;MID
ORIENTATION: POSTERIOR;RIGHT;LEFT
ORIENTATION: POSTERIOR;RIGHT;LEFT

## 2021-06-17 ASSESSMENT — PAIN DESCRIPTION - FREQUENCY
FREQUENCY: CONTINUOUS

## 2021-06-17 ASSESSMENT — PAIN DESCRIPTION - DESCRIPTORS
DESCRIPTORS: ACHING;CONSTANT;DISCOMFORT
DESCRIPTORS: ACHING;CONSTANT;DISCOMFORT
DESCRIPTORS: ACHING

## 2021-06-17 ASSESSMENT — PAIN DESCRIPTION - PAIN TYPE
TYPE: CHRONIC PAIN

## 2021-06-17 ASSESSMENT — PAIN DESCRIPTION - LOCATION
LOCATION: BACK
LOCATION: BACK;LEG;GENERALIZED
LOCATION: BACK;LEG

## 2021-06-17 NOTE — CARE COORDINATION
DISCHARGE SUMMARY     DATE OF DISCHARGE: 6/17/21    DISCHARGE DESTINATION: Home with family    TRANSPORTATION: Daughter to transport    Time: late afternoon    COMMENTS: Met with patient and her son at bedside. Patient ready to DC home today. Patient declines wanting home care. Family to transport and bring home O2 tank up for transport. No additional needs to note.   Electronically signed by Noe Flowers RN Case Management [phone] on 6/17/2021 at 2:56 PM

## 2021-06-17 NOTE — DISCHARGE SUMMARY
Hospital Medicine Discharge Summary    Patient ID: Kaelyn       Patient's PCP: Wendy Romo    Admit Date: 6/12/2021     Discharge Date:   6/17/2021    Admitting Physician: No admitting provider for patient encounter. Discharge Physician: Arely Gaytan MD     Discharge Diagnoses: Active Hospital Problems    Diagnosis Date Noted    Chronic anemia [D64.9]     Heart failure ([address] [I50.9] 06/12/2021    Morbid obesity with BMI of 40.0-44.9, adult ([address] [E66.01, Z68.41] 11/25/2020    Acute on chronic diastolic HF (heart failure) (HCC) [I50.33]     COPD exacerbation (HCC) [J44.1] 01/31/2017    Acute on chronic respiratory failure with hypoxia and hypercapnia (HCC) [P41.81, J96.22]     COPD, severe ([address] [J44.9]        The patient was seen and examined on day of discharge and this discharge summary is in conjunction with any daily progress note from day of discharge. Hospital Course: 54 y.o. female with PMHx of COPD, CHF, GERD, HTN and Obesity presented to [facility] in transfer from [facility] for evaluation of shortness of breath. Patient reports worsening shortness of breath over the last 2 to 3 days. She just completed a course of antibiotics and steroids without improvement. She states since she has felt bad she has not used her CPAP overnight nor has she taken her Lasix. She denies fever, chills or body aches. She has chronic pain which has been exaggerated with her shortness of breath she denies nausea or vomiting. Acute on chronic hypercapnic and hypoxic respiratory failure  Due to COPD and CHF, pt not wearing cpap all night and hasnt been taking lasix (doesn't want to pee all the time)  - with increased work of breath, tachypnea and hypoxia  - home O2 4L 86%  - provide supplemental oxygen as necessary to keep SaO2 92% or greater.   - discharging on PO torsemide, stressed compliance with patient; she has a follow-up scheduled with cardiology     Acute COPD exacerbation - improved  Significant wheezing on exam.  - Nebulizer treatments every 4 hours and every 2 hours prn   - IV steroids --> PO prednisone and completed 5 day course  - Continue home meds and Cpap  - pulm consulted  - procal low-- stopped abx     HFrEF with exacerbation   ECHO: 11/2020 EF 40-45% mild diffuse hypokinesia. - lasix 80 mg IV given in ED, will continue diuresis, noncompliant at home, also likely not following any fluid/Na restriction  - cont BB, ACEi/ARB  - daily wts  - I/Os  - consult CHF RN  - consult cardiology  - changed to PO torsemide at discharge; also given a dose of IV Diamox prior to discharge to help with contraction alkalosis     HTN  - monitor blood pressure  - continue home medications     Obesity   -  With Body mass index is 46 kg/m². Complicating assessment and treatment. Placing patient at risk for multiple co-morbidities as well as early death and contributing to the patient's presentation. Counseled on weight loss.     Chronic pain  On high doses methadone. Will verify with pharmacy. Shingles  -PO Valtrex TID x 7 days at discharge  -continue gabapentin  -advised that until vesicles heal over, patient should avoid contact with immunocompromised or unvaccinated individuals      Exam:     /77   Pulse 80   Temp 97.9 °F (36.6 °C) (Oral)   Resp 16   Ht 5' (1.524 m)   Wt 225 lb 1.4 oz (102.1 kg)   LMP 08/14/2015   SpO2 94%   BMI 43.96 kg/m²       General appearance: No apparent distress, appears stated age and cooperative. HEENT: Pupils equal, round, and reactive to light. Conjunctivae/corneas clear. Neck: Supple, with full range of motion. Trachea midline. Respiratory:  Decr bilaterally. . Significant bilateral wheezing and crackles. Cardiovascular: Regular rate and rhythm with normal S1/S2 without murmurs, rubs or gallops. Abdomen: Soft, non-tender, non-distended with normal bowel sounds. Musculoskeletal: No clubbing, cyanosis 2+ edema bilaterally.   ScoliosisSkin: Skin color, texture, turgor normal.  No rashes or lesions. Neurologic:  Neurovascularly intact without any focal sensory/motor deficits. Cranial nerves: II-XII intact, grossly non-focal.  Psychiatric: Alert and oriented, thought content appropriate, normal insight  Capillary Refill: Brisk,< 3 seconds   Peripheral Pulses: +2 palpable, equal bilaterally       Labs: For convenience and continuity at follow-up the following most recent labs are provided:      CBC:    Lab Results   Component Value Date    WBC 12.4 06/17/2021    HGB 11.3 06/17/2021    HCT 34.5 06/17/2021     06/17/2021       Renal:    Lab Results   Component Value Date     06/17/2021    K 3.9 06/17/2021    K 3.7 06/12/2021    CL 89 06/17/2021    CO2 46 06/17/2021    BUN 28 06/17/2021    CREATININE 0.9 06/17/2021    CALCIUM 9.2 06/17/2021    PHOS 4.3 06/17/2021         Significant Diagnostic Studies    Radiology:   XR CHEST PORTABLE   Final Result   Stable mild cardiomegaly with prominent central pulmonary vessels suggestive   of pulmonary artery hypertension or venous congestion which is more prominent   with no infiltrate or effusion                Consults:     IP CONSULT TO PULMONOLOGY  IP CONSULT TO CARDIOLOGY  IP CONSULT TO HEART FAILURE NURSE/COORDINATOR  IP CONSULT TO HEART FAILURE NURSE/COORDINATOR    Disposition:  home     Condition at Discharge: Stable    Discharge Instructions/Follow-up:  meds as prescribed, follow-up with cardiology, pulmonology, and PCP    Code Status:  Full Code     Activity: activity as tolerated    Diet: cardiac diet      Discharge Medications:     Current Discharge Medication List           Details   oxyCODONE-acetaminophen (PERCOCET) 5-325 MG per tablet Take 1 tablet by mouth every 4 hours as needed for Pain for up to 3 days.   Qty: 9 tablet, Refills: 0    Comments: Reduce doses taken as pain becomes manageable  Associated Diagnoses: Chronic pain syndrome      torsemide (DEMADEX) 20 MG tablet Take 3 tablets by mouth daily  Qty: 30 tablet, Refills: 3      folic acid (FOLVITE) 1 MG tablet Take 1 tablet by mouth daily  Qty: 30 tablet, Refills: 3      valACYclovir (VALTREX) 1 g tablet Take 2 tablets by mouth 3 times daily for 7 days  Qty: 42 tablet, Refills: 0              Details   Umeclidinium Bromide (INCRUSE ELLIPTA) 62.5 MCG/INH AEPB Inhale 1 puff into the lungs daily      carvedilol (COREG) 3.125 MG tablet Take 1 tablet by mouth 2 times daily  Qty: 180 tablet, Refills: 3      lisinopril (PRINIVIL;ZESTRIL) 5 MG tablet Take 1 tablet by mouth daily  Qty: 90 tablet, Refills: 3      gabapentin (NEURONTIN) 300 MG capsule Take 300 mg by mouth 3 times daily. sertraline (ZOLOFT) 100 MG tablet Take 100 mg by mouth daily      SYMBICORT 160-4.5 MCG/ACT AERO Inhale 2 puffs into the lungs 2 times daily  Qty: 1 Inhaler, Refills: 11    Associated Diagnoses: COPD, severe (HCC)      ipratropium-albuterol (DUONEB) 0.5-2.5 (3) MG/3ML SOLN nebulizer solution INHALE ONE VIAL (3 MILLILITERS) VIA NEBULIZATION BY MOUTH EVERY 4 HOURS  Qty: 360 mL, Refills: 0    Associated Diagnoses: COPD, severe (HCC)      acetaminophen (TYLENOL) 500 MG tablet Take 1,000 mg by mouth every 6 hours as needed for Pain      omeprazole (PRILOSEC) 40 MG delayed release capsule Take 40 mg by mouth daily       Spacer/Aero-Holding Darren ZAMARRIPA 1 Device by Does not apply route daily as needed  Qty: 1 Device, Refills: 0    Associated Diagnoses: COPD with acute exacerbation (HCC)      OXYGEN Inhale 2 L into the lungs See Admin Instructions. Qty: 1 Can, Refills: 0    Comments: Use  While ambulating      methadone 10 MG/5ML solution Take 132 mg by mouth daily.        albuterol sulfate HFA (PROAIR HFA) 108 (90 Base) MCG/ACT inhaler Inhale 2 puffs into the lungs every 4 hours as needed for Wheezing or Shortness of Breath  Qty: 1 Inhaler, Refills: 11    Associated Diagnoses: COPD, severe (HCC)      ibuprofen (ADVIL;MOTRIN) 800 MG tablet Take 800 mg by mouth daily

## 2021-06-17 NOTE — PLAN OF CARE
Problem: Pain:  Goal: Pain level will decrease  6/17/2021 1102 by Keiko Stevens RN  Outcome: Ongoing     Problem: Pain:  Goal: Control of acute pain  Outcome: Ongoing     Problem: Pain:  Goal: Control of chronic pain  6/17/2021 1102 by Keiko Stevens RN  Outcome: Ongoing     Problem: Falls - Risk of:  Goal: Will remain free from falls  6/17/2021 1102 by Keiko Stevens RN  Outcome: Ongoing     Problem: Falls - Risk of:  Goal: Absence of physical injury  6/17/2021 1102 by Keiko Stevens RN  Outcome: Ongoing     Problem: Skin Integrity:  Goal: Will show no infection signs and symptoms  6/17/2021 1102 by Keiko Stevens RN  Outcome: Ongoing     Problem: Skin Integrity:  Goal: Absence of new skin breakdown  6/17/2021 1102 by Keiko Stevens RN  Outcome: Ongoing     Problem: Activity:  Goal: Capacity to carry out activities will improve  6/17/2021 1102 by Keiko Stevens RN  Outcome: Ongoing     Problem:  Activity:  Goal: Will verbalize the importance of balancing activity with adequate rest periods  6/17/2021 1102 by Keiko Stevens RN  Outcome: Ongoing     Problem: Cardiac:  Goal: Hemodynamic stability will improve  6/17/2021 1102 by Keiko Stevens RN  Outcome: Ongoing     Problem: Cardiac:  Goal: Ability to maintain an adequate cardiac output will improve  6/17/2021 1102 by Keiko Stevens RN  Outcome: Ongoing     Problem: Coping:  Goal: Verbalizations of decreased anxiety will decrease  Outcome: Ongoing     Problem: Fluid Volume:  Goal: Risk for excess fluid volume will decrease  6/17/2021 1102 by Keiko Stevens RN  Outcome: Ongoing     Problem: Fluid Volume:  Goal: Maintenance of adequate hydration will improve  Outcome: Ongoing     Problem: Fluid Volume:  Goal: Will show no signs and symptoms of electrolyte imbalance  6/17/2021 1102 by Keiko Stevens RN  Outcome: Ongoing     Problem: Health Behavior:  Goal: Ability to manage health-related needs will improve  6/17/2021 1102 by Keiko Stevens

## 2021-06-17 NOTE — PROGRESS NOTES
Pharmacy Heart Failure Medication Reconciliation Note    Pt discharged from [facility] today after admission for SOB. Clementeen Friday has a diagnosis of diastolic heart failure (last EF = 40-45% on 11/2020). Pertinent Labs:  BMP:   Lab Results   Component Value Date     06/17/2021    K 3.9 06/17/2021    K 3.7 06/12/2021    CL 89 06/17/2021    CO2 46 06/17/2021    BUN 28 06/17/2021    CREATININE 0.9 06/17/2021     BNP:   Lab Results   Component Value Date    PROBNP 76 06/16/2021    PROBNP 196 06/14/2021    PROBNP 1,597 06/12/2021       Patient taking an ACEI / ARB / Entresto:  Yes     Patient taking a BETA BLOCKER:  Yes  Patient taking a LOOP DIURETIC: Yes  Patient taking a ALDOSTERONE RECEPTOR ANTAGONIST: No: pEF    Patient has a diagnosis of Atrial Fibrillation: \. If yes, patient is on appropriate anticoagulation:     Patient has a diagnosis of type 2 diabetes:  No: . If yes, patient prescribed the following anti-hyperglycemic medication at discharge:       Corrections to discharge medications include:  Recommend stopping ibuprofen - counseled patient on limiting use. She said she was only using when pain was uncontrollable. - also recommend prescribing gabapentin at lower dose 100 mg TID as it was changed to in the hospital. She will d/w her provider who prescribed to discuss risk vs benefit of continuing       Discharge Medications:         Medication List        START taking these medications      folic acid 1 MG tablet  Commonly known as: FOLVITE  Take 1 tablet by mouth daily  Start taking on: June 18, 2021     oxyCODONE-acetaminophen 5-325 MG per tablet  Commonly known as: PERCOCET  Take 1 tablet by mouth every 4 hours as needed for Pain for up to 3 days.      torsemide 20 MG tablet  Commonly known as: DEMADEX  Take 3 tablets by mouth daily  Start taking on: June 18, 2021     valACYclovir 1 g tablet  Commonly known as: Valtrex  Take 2 tablets by mouth 3 times daily for 7 days            CHANGE how you take these medications      gabapentin 100 MG capsule  Commonly known as: NEURONTIN  Take 1 capsule by mouth 3 times daily for 30 days. What changed:   medication strength  how much to take     OXYGEN  Inhale 2 L into the lungs See Admin Instructions.   What changed: additional instructions            CONTINUE taking these medications      acetaminophen 500 MG tablet  Commonly known as: TYLENOL     albuterol sulfate  (90 Base) MCG/ACT inhaler  Commonly known as: ProAir HFA  Inhale 2 puffs into the lungs every 4 hours as needed for Wheezing or Shortness of Breath     carvedilol 3.125 MG tablet  Commonly known as: COREG  Take 1 tablet by mouth 2 times daily     Incruse Ellipta 62.5 MCG/INH Aepb  Generic drug: Umeclidinium Bromide     ipratropium-albuterol 0.5-2.5 (3) MG/3ML Soln nebulizer solution  Commonly known as: DUONEB  INHALE ONE VIAL (3 MILLILITERS) VIA NEBULIZATION BY MOUTH EVERY 4 HOURS     lisinopril 5 MG tablet  Commonly known as: PRINIVIL;ZESTRIL  Take 1 tablet by mouth daily     methadone 10 MG/5ML solution     omeprazole 40 MG delayed release capsule  Commonly known as: PRILOSEC     sertraline 100 MG tablet  Commonly known as: ZOLOFT     Spacer/Aero-Holding [employer]  1 Device by Does not apply route daily as needed     Symbicort 160-4.5 MCG/ACT Aero  Generic drug: budesonide-formoterol  Inhale 2 puffs into the lungs 2 times daily            STOP taking these medications      doxycycline hyclate 100 MG capsule  Commonly known as: VIBRAMYCIN     furosemide 80 MG tablet  Commonly known as: LASIX     ibuprofen 800 MG tablet  Commonly known as: ADVIL;MOTRIN               Where to Get Your Medications        These medications were sent to [address] [place] [address] & [facility]  [address] [address]      Phone: [phone]   folic acid 1 MG tablet  gabapentin 100 MG capsule  oxyCODONE-acetaminophen 5-325 MG per tablet  torsemide 20 MG tablet  valACYclovir 1 g tablet         [facility], [address] [facility]  Heart Failure Discharge Medication Reconciliation Program  [phone]

## 2021-06-17 NOTE — PROGRESS NOTES
The patient is maintaining a stock of mt dew soda cans at bedside. This nurse reinforced education of fluid restriction. Patient stated \" I know\".

## 2021-06-17 NOTE — PROGRESS NOTES
12.4*   HGB 11.2* 11.3* 11.3*    255 249     BMP:    Recent Labs     06/15/21  0546 06/16/21  0545 06/17/21  0548    139 139   K 3.4* 3.1* 3.9   CO2 43* 43* 46*   BUN 23* 24* 28*   CREATININE 0.7 0.7 0.9     Results for Phoebe Amanda (MRN [id number]) as of 6/17/2021 10:49   Ref. Range 6/12/2021 16:50 6/14/2021 22:20 6/16/2021 21:47   Pro-BNP Latest Ref Range: 0 - 124 pg/mL 1,597 (H) 196 (H) 76     ECG: Normal sinus rhythm     Echo 11/9/2020:   Ejection fraction is visually estimated to be 40-45%.  Deanna Copper is mild diffuse hypokinesis.   Diastolic filling parameters suggest grade I diastolic dysfunction.   Mitral valve leaflets appear mildly thickened.   Mild mitral regurgitation is present.   The left atrium is moderately dilated.   The right ventricle is enlarged.   Right ventricular systolic function is normal.   TAPSE 2.3 cm   IVC size is dilated (>2.1 cm) and collapses < 50% with respiration   consistent with elevated RA pressure (15 mmHg).  Estimated pulmonary artery systolic pressure is mildly elevated at 46 mmHg   assuming a right atrial pressure of 15 mmHg.     Telemetry: Sinus rhythm    Assessment/Plan:    1. Acute on chronic diastolic HF  -LVEF 24%; appears to be euvolemic at present; NYHA class II  -current weight 225#  -Pro-BNP now down to 76 (> 1500 on admit)  -diamox today for contractual alkalosis  -continue torsemide  -continue carvedilol and lisinopril  -low sodium diet and fluid restriction     2. Chronic hypoxic and hypercapnic respiratory failure/severe COPD/BLANCA  -on supplemental O2 at home and advised to use her BiPap at night at home  -follows with Dr. Dilma Geronimo     3. Morbid obesity  -BMI 45  -reinforced weight loss     4. Chronic anemia  -stable     5.  Chronic methadone use    Stable from cardiac standpoint and okay to discharge    Scheduled for hospital follow up with Dr. Sharad Dukes on 6/21/2021 @ 2:30 PM    Emphasized and discussed low-fat/low sodium diet, monitoring of daily weights, fluid restriction, worsening signs and symptoms of heart failure and when to call, and the importance of regular exercise and activity.     Discussed importance of BiPap usage at night and with napping    Discharge Cardiac Meds:  Carvedilol 3.125 mg BID  Lisinopril 5 mg daily  Klor con 20 mEq BID  Torsemide 60 mg daily (in place of furosemide)    Discussed with Dr. Flaca Bravo    Electronically signed by JULIUS Jang CNP on 6/17/2021 at 10:48 AM

## 2021-06-17 NOTE — PROGRESS NOTES
Occupational Therapy  Facility/Department: [address] PROGRESSIVE CARE  Daily Treatment Note  NAME: Giana Shah  : 1975  MRN: [id number]    Date of Service: 2021    Discharge Recommendations:  24 hour supervision or assist       Assessment   Performance deficits / Impairments: Decreased functional mobility ; Decreased endurance;Decreased ADL status; Decreased high-level IADLs;Decreased balance  Assessment: Discussed with OTR: Pt slighlty more SOB, wheezing today. Pt desating to mid80's with ADL tasks (leaning forward for bathing/dressing). Pt reports family able to assist prn for ADL's. Pt transfers, mob wtih cane, with Supervision, SBA. Pt completing standing for ADL's, Supervision and overall SBA for bathing, dressing. Feel pt will be able to return home with assit as PTA, when medically stable and progression of therapy. Cont poc. Prognosis: Good  History: Giana Shah is a 54 y.o. female who presents to the emergency department c/o worsening SOB x 3-4 days. PTA pt from home with family where pt was Ind with mobility and cane and PRN assist for ADLs. OT Education: Transfer Training;Plan of Care;OT Role;ADL Adaptive Strategies; Energy Conservation  REQUIRES OT FOLLOW UP: Yes  Activity Tolerance  Activity Tolerance: Patient Tolerated treatment well;Treatment limited secondary to medical complications (free text)  Activity Tolerance: SOB, desating briefly to mid 80's with ADL's, and quickly recovering to >90% within seconds of rest.  Safety Devices  Safety Devices in place: Yes  Type of devices: Chair alarm in place;Call light within reach; Left in chair;Nurse notified;Gait belt         Patient Diagnosis(es): The primary encounter diagnosis was Acute on chronic respiratory failure with hypoxia and hypercapnia ([address] Diagnoses of COPD exacerbation ([facility] and Pulmonary edema cardiac cause [facility]) were also pertinent to this visit.       has a past medical history of Arthritis, Blind right eye, Chronic respiratory failure with hypoxia and hypercapnia (HCC), COPD (chronic obstructive pulmonary disease) ([facility] GERD (gastroesophageal reflux disease), Hypertension, Movement disorder, Neuromuscular disorder ([facility] and Pneumonia. has a past surgical history that includes Cholecystectomy; Tubal ligation; Abdomen surgery; eye surgery; and Dilatation, esophagus. Restrictions  Restrictions/Precautions  Restrictions/Precautions: Fall Risk  Position Activity Restriction  Other position/activity restrictions: 4L O2  Subjective   General  Chart Reviewed: Yes, Orders, Progress Notes, Labs  Patient assessed for rehabilitation services?: Yes  Additional Pertinent Hx: per ED note, Murtaza Aj is a 54 y.o. female who presents to the emergency department c/o worsening SOB x 3-4 days. Recently seen and d/c with doxycycline x 10 days without improvement in SOB, has had worsening associated with wheezing but also weight gain, bilateral LE edema which is chronic, +ve productive cough, SOB severe enough to cause increase in her home O2 requirement from 3L to 4-5 L and requiring increased CPAP usage at home. Denies fever. Symptoms not otherwise alleviated or exacerbated by other factors. Family / Caregiver Present: No  Referring Practitioner: Ezequiel Watts MD  Subjective  Subjective: Pt seen BS. Pt in recliner. Pt reporting chronic back, side pain (not rated). Pt also c/o LE pain to touch below knees-not rated . (RN notified and in to see pt). General Comment  Comments: okay for therapy per RN. Orientation  Orientation  Overall Orientation Status: Within Functional Limits  Objective    ADL  Grooming: Supervision (stance at sink to brush teeth. Seated to wash face.)  UE Bathing: Modified independent  (seated from chair at sink)  LE Bathing: Setup;Stand by assistance (Pt able to reach to feet, while seated from chair at sink and bringing LE\"s up to rest of edge of sink or leaning over to reach.  Pt SOB w/leaning and desats to mid [de-identified] with the effort. Pt reports cleaning buttocks, michelle area earlier)  UE Dressing: Minimal assistance (gown changed)  LE Dressing: Setup;Stand by assistance (Pt leans over to don footies, again, easily SOB w/the effort and desating briefly to mid 80's.)  Additional Comments: Pt sponge bathed from chair at sink. Pt easily SOB, with leaning forward for LB bathing, dressing, desating briefly to mid 80's. Pt reports her , family will be able to assist prn for ADL's. Standing Balance  Time: 6-7 min  Activity: ADL's at sink, supervision. Pt amb with SPC, SBA in room and able to manage 02 line. 02 sats WNL for amb. Functional Mobility  Functional - Mobility Device: Cane  Activity: To/from bathroom  Assist Level: Stand by assistance  Wheelchair Bed Transfers  Wheelchair/Bed - Technique: Ambulating  Equipment Used:  (recliner, chair with arms)  Level of Asssistance: Supervision  Wheelchair Transfers Comments: using sp cane         Cognition  Overall Cognitive Status: WFL            Type of ROM/Therapeutic Exercise  Type of ROM/Therapeutic Exercise: Cane/Wand;Free weights  Comment: Pt uses cane for overhead shoulder flexion ex and water bottle (1/2-3/4 lb), for resistive elbow flex, shoulder flex ex.   Exercises  Shoulder Flexion: x10, c2 bouts  Shoulder Extension: x10, c2 bouts  Elbow Flexion: x10  Elbow Extension: x10         Plan   Plan  Times per week: 3-5x  Current Treatment Recommendations: Strengthening, Functional Mobility Training, Balance Training, Safety Education & Training, Self-Care / ADL, Patient/Caregiver Education & Training, Endurance Training    AM-PAC Score        AM-PAC Inpatient Daily Activity Raw Score: 21 (06/17/21 1005)  AM-PAC Inpatient ADL T-Scale Score : 44.27 (06/17/21 1005)  ADL Inpatient CMS 0-100% Score: 32.79 (06/17/21 1005)  ADL Inpatient CMS G-Code Modifier : Thornell Lesches (06/17/21 1005)    Goals  Short term goals  Time Frame for Short term goals: prior to D/C. status: goals ongoing, except where noted below  Short term goal 1: complete functional mobility and transfers Mod Ind  Short term goal 2: complete toileting Mod Ind  Short term goal 3: complete grooming in stance at sink Mod Ind  Short term goal 4: complete bathing and dressing Min A-goal met  Long term goals  Time Frame for Long term goals : STG=LTG  Patient Goals   Patient goals : return home       Therapy Time   Individual Concurrent Group Co-treatment   Time In 0904         Time Out 1004         Minutes [address] [id number]

## 2021-06-17 NOTE — PLAN OF CARE
Problem: Pain:  Goal: Pain level will decrease  Description: Pain level will decrease  Outcome: Ongoing  Note: Pain/discomfort being managed with PRN analgesics per MD orders. Pt able to express presence and absence of pain and rate pain appropriately using numerical scale. Goal: Control of chronic pain  Description: Control of chronic pain  Outcome: Ongoing     Problem: Falls - Risk of:  Goal: Will remain free from falls  Description: Will remain free from falls  Outcome: Ongoing  Note: Fall risk assessment completed every shift. All precautions in place. Pt has call light within reach at all times. Room clear of clutter. Pt aware to call for assistance when getting up. Goal: Absence of physical injury  Description: Absence of physical injury  Outcome: Ongoing     Problem: Skin Integrity:  Goal: Will show no infection signs and symptoms  Description: Will show no infection signs and symptoms  Outcome: Ongoing  Note: Skin assessment completed every shift. Pt assessed for incontinence, appropriate barrier cream applied prn. Pt encouraged to turn/rotate every 2 hours. Assistance provided if pt unable to do so themselves. Goal: Absence of new skin breakdown  Description: Absence of new skin breakdown  Outcome: Ongoing     Problem:  Activity:  Goal: Capacity to carry out activities will improve  Description: Capacity to carry out activities will improve  Outcome: Ongoing  Goal: Will verbalize the importance of balancing activity with adequate rest periods  Description: Will verbalize the importance of balancing activity with adequate rest periods  Outcome: Ongoing     Problem: Cardiac:  Goal: Hemodynamic stability will improve  Description: Hemodynamic stability will improve  Outcome: Ongoing  Goal: Ability to maintain an adequate cardiac output will improve  Description: Ability to maintain an adequate cardiac output will improve  Outcome: Ongoing     Problem: Fluid Volume:  Goal: Risk for excess fluid volume will decrease  Description: Risk for excess fluid volume will decrease  Outcome: Ongoing  Goal: Will show no signs and symptoms of electrolyte imbalance  Description: Will show no signs and symptoms of electrolyte imbalance  Outcome: Ongoing     Problem: Health Behavior:  Goal: Ability to manage health-related needs will improve  Description: Ability to manage health-related needs will improve  Outcome: Ongoing  Goal: Ability to seek appropriate health care will improve  Description: Ability to seek appropriate health care will improve  Outcome: Ongoing     Problem: Nutritional:  Goal: Maintenance of adequate nutrition will improve  Description: Maintenance of adequate nutrition will improve  Outcome: Ongoing     Problem: Physical Regulation:  Goal: Complications related to the disease process, condition or treatment will be avoided or minimized  Description: Complications related to the disease process, condition or treatment will be avoided or minimized  Outcome: Ongoing     Problem: Respiratory:  Goal: Ability to maintain adequate ventilation will improve  Description: Ability to maintain adequate ventilation will improve  Outcome: Ongoing  Goal: Respiratory status will improve  Description: Respiratory status will improve  Outcome: Ongoing

## 2021-06-17 NOTE — DISCHARGE INSTR - COC
Continuity of Care Form    Patient Name: Misty Bruce   :  1975  MRN:  [id number]    Admit date:  2021  Discharge date:  ***    Code Status Order: Full Code   Advance Directives:   Advance Care Flowsheet Documentation     Date/Time Healthcare Directive Type of Healthcare Directive Copy in [address] [facility] [address] Agent's Name Healthcare Agent's Phone Number    [phone]  No, patient does not have an advance directive for healthcare treatment -- -- -- -- --          Admitting Physician:  No admitting provider for patient encounter.   PCP: Denise Schrader    Discharging Nurse: [facility] Unit/Room#: I4R-3290/2908-36  Discharging Unit Phone Number: ***    Emergency Contact:   Extended Emergency Contact Information  Primary Emergency Contact: Jeffrey Ashton  Address: [address] [place] [address] Phone: [phone]  Mobile Phone: [phone]  Relation: Spouse  Secondary Emergency Contact: Janine Ashton  Address: [address] Phone: [phone]  Relation: Child    Past Surgical History:  Past Surgical History:   Procedure Laterality Date    ABDOMEN SURGERY      CHOLECYSTECTOMY      DILATATION, ESOPHAGUS      EYE SURGERY      TUBAL LIGATION         Immunization History:   Immunization History   Administered Date(s) Administered    Influenza Vaccine, unspecified formulation 2011    Influenza Virus Vaccine 2013    Influenza, Epimenio Jeevan, IM, (6 mo and older Fluzone, Flulaval, Fluarix and 3 yrs and older Afluria) 10/25/2018    Influenza, Quadv, IM, PF (6 mo and older Fluzone, Flulaval, Fluarix, and 3 yrs and older Afluria) 2017, 2020    Pneumococcal Polysaccharide ([id number]) 2011, 2016       Active Problems:  Patient Active Problem List   Diagnosis Code    Sepsis ([facility] [address] A41.9    Chronic respiratory failure with hypoxia and hypercapnia ([facility]) J96.11, J96.12    Acute respiratory failure ([facility]) J96.00    COPD exacerbation ([facility]) J44.1    Respiratory acidosis E87.2    COPD, severe ([facility]) J44.9    Acute on chronic respiratory failure with hypoxia and hypercapnia ([facility]) J96.21, J96.22    Tobacco use Z72.0    Nonhealing ulcer of left lower leg limited to breakdown of skin ([facility]) L97.921    Leg swelling M79.89    Localized edema R60.0    Acute on chronic systolic (congestive) heart failure ([facility]) I50.23    Pulmonary edema J81.1    Abnormal CXR R93.89    Acute on chronic diastolic HF (heart failure) ([facility]) I50.33    Acute metabolic encephalopathy U41.12    Methadone use ([facility]) F11.20    COPD with acute exacerbation ([facility]) J44.1    Morbid obesity with BMI of 40.0-44.9, adult ([facility]) E66.01, Z68.41    Heart failure ([facility]) I50.9    Chronic anemia D64.9       Isolation/Infection:   Isolation          No Isolation        Patient Infection Status     Infection Onset Added Last Indicated Last Indicated By Review Planned Expiration Resolved Resolved By    None active    Resolved    COVID-19 Rule Out 06/12/21 06/12/21 06/13/21 COVID-19, Rapid (Ordered)   06/13/21 Rule-Out Test Resulted    COVID-19 Rule Out 11/08/20 11/08/20 11/08/20 COVID-19 (Ordered)   11/09/20 Rule-Out Test Resulted          Nurse Assessment:  Last Vital Signs: /77   Pulse 80   Temp 97.9 °F (36.6 °C) (Oral)   Resp 16   Ht 5' (1.524 m)   Wt 225 lb 1.4 oz (102.1 kg)   LMP 08/14/2015   SpO2 94%   BMI 43.96 kg/m²     Last documented pain score (0-10 scale): Pain Level: 7  Last Weight:   Wt Readings from Last 1 Encounters:   06/17/21 225 lb 1.4 oz (102.1 kg)     Mental Status:  {IP PT MENTAL STATUS:20030}    IV Access:  {[facility] IV ACCESS:187343783}    Nursing Mobility/ADLs:  Walking   {[facility] EHET:022060777}  Transfer  {[facility] UMRZ:508871615}  Bathing  {[facility] XPQR:118771814}  Dressing  {[facility] MILM:887041466}  Toileting  {[facility] GVZQ:983794341}  Feeding  {[facility] · Name:  · Address:  · Dialysis Schedule:  · Phone:  · Fax:    / signature: {Esignature:256026982}    PHYSICIAN SECTION    Prognosis: {Prognosis:5338626249}    Condition at Discharge: 50Venkat Han Patient Condition:710324107}    Rehab Potential (if transferring to Rehab): {Prognosis:6649659014}    Recommended Labs or Other Treatments After Discharge: ***    Physician Certification: I certify the above information and transfer of Ryan Yoo  is necessary for the continuing treatment of the diagnosis listed and that she requires {Admit to Appropriate Level of Care:41596} for {GREATER/LESS:359667821} 30 days.      Update Admission H&P: {[facility] D[facility] Changes in OIIRT:012721373}    PHYSICIAN SIGNATURE:  {Esignature:762851956}

## 2021-06-18 NOTE — PROGRESS NOTES
CLINICAL PHARMACY NOTE: MEDS TO BEDS    Total # of Prescriptions Filled: 6   The following medications were delivered to the patient:  Discharge Medication List as of 6/17/2021  2:00 PM      START taking these medications    Details   oxyCODONE-acetaminophen (PERCOCET) 5-325 MG per tablet Take 1 tablet by mouth every 4 hours as needed for Pain for up to 3 days. , Disp-9 tablet, R-0Normal      torsemide (DEMADEX) 20 MG tablet Take 3 tablets by mouth daily, Disp-30 tablet, [id number]      folic acid (FOLVITE) 1 MG tablet Take 1 tablet by mouth daily, Disp-30 tablet, R-3Normal      valACYclovir (VALTREX) 1 g tablet Take 2 tablets by mouth 3 times daily for 7 days, Disp-42 tablet, R-0Normal         · Gabapentin 100 mg   · Doxycycline 100 mg     Additional Documentation:

## 2021-06-21 ENCOUNTER — OFFICE VISIT (OUTPATIENT)
Dept: CARDIOLOGY CLINIC | Age: 46
End: 2021-06-21
Payer: MEDICARE

## 2021-06-21 ENCOUNTER — FOLLOWUP TELEPHONE ENCOUNTER (OUTPATIENT)
Dept: INPATIENT UNIT | Age: 46
End: 2021-06-21

## 2021-06-21 VITALS
SYSTOLIC BLOOD PRESSURE: 116 MMHG | HEIGHT: 60 IN | BODY MASS INDEX: 44.17 KG/M2 | HEART RATE: 93 BPM | OXYGEN SATURATION: 98 % | DIASTOLIC BLOOD PRESSURE: 70 MMHG | WEIGHT: 225 LBS

## 2021-06-21 DIAGNOSIS — F11.90 METHADONE USE: ICD-10-CM

## 2021-06-21 DIAGNOSIS — E66.01 MORBID OBESITY (HCC): ICD-10-CM

## 2021-06-21 DIAGNOSIS — J96.11 CHRONIC RESPIRATORY FAILURE WITH HYPOXIA AND HYPERCAPNIA (HCC): ICD-10-CM

## 2021-06-21 DIAGNOSIS — I50.32 CHRONIC DIASTOLIC HEART FAILURE (HCC): Primary | ICD-10-CM

## 2021-06-21 DIAGNOSIS — J96.12 CHRONIC RESPIRATORY FAILURE WITH HYPOXIA AND HYPERCAPNIA (HCC): ICD-10-CM

## 2021-06-21 PROCEDURE — 99214 OFFICE O/P EST MOD 30 MIN: CPT | Performed by: INTERNAL MEDICINE

## 2021-06-21 NOTE — PATIENT INSTRUCTIONS

## 2021-06-21 NOTE — PROGRESS NOTES
[address]      Cardiology Progress Note    Yamilet Tan  1975    June 21, 2021    Primary care physician: Dorie Cartagena MD  Reason for Referral: Diastolic heart failure     CC: \"My side still hurts. \"     HPI:  The patient is 54 y.o. female with a past medical history significant for obesity, hypertension, diastolic heart failure, and COPD on supplemental O2 who presents today for management of heart failure. She was admitted 11/2020 with complaints of worsening shortness of breath. Her BNP was elevated and her echo showed an EF of 45%. She was treated for a COPD exacerbation and with IV lasix. She was admitted 6/17/21 with with worsening shortness of breath. She reported noncompliance with her CPAP and lasix. Today, she denies any new cardiac sounding complaints. She is accompanied by her daughter. She continues to reports chronic back and abdominal pain. She has chronic GALLOWAY that remains unchanged. She reports chronic LE edema that is improved relative to the time of hospitalization. Her weight remains stable since discharge. She reports compliance with her medications and tolerating. She requires supplemental oxygen and continues to follow with pulmonary. Patient denies exertional chest pain/pressure, dyspnea at rest, worsening GALLOWAY, PND, orthopnea, palpitations, lightheadedness, weight changes, changes in LE edema, and syncope.     Past Medical History:   Diagnosis Date    Arthritis     Blind right eye     Chronic respiratory failure with hypoxia and hypercapnia (HCC)     COPD (chronic obstructive pulmonary disease) (HCC)     GERD (gastroesophageal reflux disease)     Hypertension     Movement disorder     Neuromuscular disorder ([address]     Pneumonia      Past Surgical History:   Procedure Laterality Date    ABDOMEN SURGERY      CHOLECYSTECTOMY      DILATATION, ESOPHAGUS      EYE SURGERY      TUBAL LIGATION       Family History   Problem Relation Age of Onset    Cancer Mother MILLILITERS) VIA NEBULIZATION BY MOUTH EVERY 4 HOURS 360 mL 0    acetaminophen (TYLENOL) 500 MG tablet Take 1,000 mg by mouth every 6 hours as needed for Pain      omeprazole (PRILOSEC) 40 MG delayed release capsule Take 40 mg by mouth daily       Spacer/Aero-Holding Chambers MARILOU 1 Device by Does not apply route daily as needed 1 Device 0    OXYGEN Inhale 2 L into the lungs See Admin Instructions. (Patient taking differently: Inhale 2 L into the lungs See Admin Instructions Indications: 2L sitting, 4L when up and around Continuous at night or as needed when at home per patient) 1 Can 0    methadone 10 MG/5ML solution Take 132 mg by mouth daily. No current facility-administered medications for this visit. Review of Systems:  · Constitutional: No unanticipated weight loss. There's been no change in energy level, sleep pattern, or activity level. No fevers, chills. · Eyes: No visual changes or diplopia. No scleral icterus. · ENT: No Headaches, hearing loss or vertigo. No mouth sores or sore throat. · Cardiovascular: as reviewed in HPI  · Respiratory: No cough or wheezing, no sputum production. No hemoptysis. · Gastrointestinal: No abdominal pain, appetite loss, blood in stools. No change in bowel or bladder habits. · Genitourinary: No dysuria, trouble voiding, or hematuria. · Musculoskeletal:  No gait disturbance, no joint complaints. · Integumentary: No rash or pruritis. · Neurological: No headache, diplopia, change in muscle strength, numbness or tingling. · Psychiatric: No anxiety or depression. · Endocrine: No temperature intolerance. No excessive thirst, fluid intake, or urination. No tremor. · Hematologic/Lymphatic: No abnormal bruising or bleeding, blood clots or swollen lymph nodes. · Allergic/Immunologic: No nasal congestion or hives.     Physical Exam:   /70   Pulse 93   Ht 5' (1.524 m)   Wt 225 lb (102.1 kg)   LMP 08/14/2015   SpO2 98%   BMI 43.94 kg/m²   Wt Readings from Last 3 Encounters:   06/21/21 225 lb (102.1 kg)   06/17/21 225 lb 1.4 oz (102.1 kg)   05/31/21 223 lb 12.3 oz (101.5 kg)     Constitutional: She is an obese female who is oriented to person, place, and time. In no acute distress but appears older than stated age. Wearing supplemental O2  Head: Normocephalic and atraumatic. Blind right eye with dense cataract. No scleral icterus. Neck: Neck supple. No JVP or carotid bruit appreciated. No mass and no thyromegaly present. No lymphadenopathy present. Cardiovascular: Normal rate. Normal heart sounds. Exam reveals no gallop and no friction rub. No murmur heard. Pulmonary/Chest: Effort normal. No respiratory distress. She has no rhonchi or rales. Diffuse bilateral expiratory wheezes. Abdominal: Soft, non-tender. Bowel sounds are normal. She exhibits no organomegaly, mass or bruit. Extremities: 2+ BLE edema. No cyanosis or clubbing. Pulses are 2+ radial/carotid bilaterally. Neurological: No gross cranial nerve deficit. Coordination normal.   Skin: Skin is warm and dry. There is no rash or diaphoresis. Psychiatric: She has a normal mood and affect. Her speech is normal and behavior is normal.     Lab Review:    No results found for: TRIG, HDL, LDLCALC, LDLDIRECT, LABVLDL   Lab Results   Component Value Date     06/17/2021     Lab Results   Component Value Date    HGB 11.3 06/17/2021    HCT 34.5 06/17/2021     Lab Results   Component Value Date     06/17/2021     Lab Results   Component Value Date    K 3.9 06/17/2021    K 3.7 06/12/2021     Lab Results   Component Value Date    BUN 28 06/17/2021    CREATININE 0.9 06/17/2021     EKG Interpretation:   11/8/20: Baseline artifact. Sinus tachycardia. Possible Left atrial enlargement. Image Review:     Echo: 7/7/15  Left ventricle size is normal.  Normal left ventricular wall thickness. Ejection fraction is visually estimated to be 55-60 %. Normal right ventricular size and function. procedures, and medical decision making performed by me. All portions of the note including but not limited to the chief complaint, history of present illness, physical exam, assessment and plan/medical decision making were personally reviewed, edited, and updated on the day of the visit.

## 2021-06-21 NOTE — PROGRESS NOTES
REBEL 6/19/2021  Attempted to reach patient for 72 hour  hospital follow up. Calls made at 1100, 1300, and 1500. Message left all three times with no return call received.  met with patient on 6/17/2021. She was aware of follow up with Dr Silvia Aguilar on 6/21. Documentation done 6/21 d/t no epic access at writer's home on 6/19.

## 2021-06-28 ENCOUNTER — HOSPITAL ENCOUNTER (OUTPATIENT)
Dept: PHYSICAL THERAPY | Age: 46
Setting detail: THERAPIES SERIES
Discharge: HOME | End: 2021-06-28
Payer: MEDICARE

## 2021-06-28 NOTE — PLAN OF CARE
[facility], [facility]  [address] [place]  Phone: [phone]   Fax:     [phone]    Physical Therapy  Cancellation/No-show Note  Patient Name:  Ricardo Rubio  :  1975   Date:  2021  Cancelled visits to date: 0  No-shows to date: 1- Eval    Patient status for today's appointment patient:  []  Cancelled  []  Rescheduled appointment  [x]  No-show     Reason given by patient:  []  Patient ill  []  Conflicting appointment  []  No transportation    []  Conflict with work  []  No reason given  []  Other:     Comments:      Phone call information:   []  Phone call made today to patient at _ time at number provided:      []  Patient answered, conversation as follows:    []  Patient did not answer, message left as follows:  [x]  Phone call not made today    Electronically signed by:  Yair SALINAS#[id number]

## 2021-07-26 ENCOUNTER — TELEPHONE (OUTPATIENT)
Dept: PULMONOLOGY | Age: 46
End: 2021-07-26

## 2021-07-26 DIAGNOSIS — J44.1 COPD EXACERBATION (HCC): Primary | ICD-10-CM

## 2021-07-27 RX ORDER — PREDNISONE 10 MG/1
TABLET ORAL
Qty: 30 TABLET | Refills: 0 | Status: SHIPPED | OUTPATIENT
Start: 2021-07-27 | End: 2022-02-25

## 2021-10-06 DIAGNOSIS — J44.9 COPD, SEVERE (HCC): ICD-10-CM

## 2021-10-07 RX ORDER — IPRATROPIUM BROMIDE AND ALBUTEROL SULFATE 2.5; .5 MG/3ML; MG/3ML
SOLUTION RESPIRATORY (INHALATION)
Qty: 360 ML | Refills: 5 | Status: SHIPPED | OUTPATIENT
Start: 2021-10-07

## 2021-10-10 RX ORDER — PREDNISONE 20 MG/1
20 TABLET ORAL DAILY
Qty: 7 TABLET | Refills: 0 | Status: SHIPPED | OUTPATIENT
Start: 2021-10-10 | End: 2021-10-17

## 2021-10-10 RX ORDER — DOXYCYCLINE HYCLATE 100 MG
100 TABLET ORAL 2 TIMES DAILY
Qty: 14 TABLET | Refills: 0 | Status: SHIPPED | OUTPATIENT
Start: 2021-10-10 | End: 2021-10-17

## 2021-12-05 RX ORDER — PREDNISONE 20 MG/1
20 TABLET ORAL DAILY
Qty: 7 TABLET | Refills: 0 | Status: SHIPPED | OUTPATIENT
Start: 2021-12-05 | End: 2021-12-12

## 2021-12-05 RX ORDER — DOXYCYCLINE HYCLATE 100 MG
100 TABLET ORAL 2 TIMES DAILY
Qty: 14 TABLET | Refills: 0 | Status: SHIPPED | OUTPATIENT
Start: 2021-12-05 | End: 2021-12-12

## 2021-12-29 RX ORDER — PREDNISONE 20 MG/1
20 TABLET ORAL DAILY
Qty: 7 TABLET | Refills: 0 | Status: SHIPPED | OUTPATIENT
Start: 2021-12-29 | End: 2022-01-05

## 2022-01-13 DIAGNOSIS — J44.9 COPD, SEVERE (HCC): ICD-10-CM

## 2022-01-14 RX ORDER — ALBUTEROL SULFATE 90 UG/1
AEROSOL, METERED RESPIRATORY (INHALATION)
Qty: 8.5 G | Refills: 11 | Status: SHIPPED | OUTPATIENT
Start: 2022-01-14

## 2022-01-16 DIAGNOSIS — J44.9 COPD, SEVERE (HCC): Primary | ICD-10-CM

## 2022-01-16 RX ORDER — PREDNISONE 10 MG/1
TABLET ORAL
Qty: 30 TABLET | Refills: 0 | Status: SHIPPED | OUTPATIENT
Start: 2022-01-16 | End: 2022-01-26

## 2022-01-24 RX ORDER — TIOTROPIUM BROMIDE 18 UG/1
CAPSULE ORAL; RESPIRATORY (INHALATION)
Qty: 30 CAPSULE | Refills: 0 | Status: SHIPPED | OUTPATIENT
Start: 2022-01-24

## 2022-02-02 ENCOUNTER — HOSPITAL ENCOUNTER (EMERGENCY)
Age: 47
Discharge: HOME | End: 2022-02-02
Payer: MEDICARE

## 2022-02-02 VITALS
SYSTOLIC BLOOD PRESSURE: 120 MMHG | BODY MASS INDEX: 42.51 KG/M2 | OXYGEN SATURATION: 95 % | RESPIRATION RATE: 31 BRPM | DIASTOLIC BLOOD PRESSURE: 66 MMHG | TEMPERATURE: 98.5 F | HEART RATE: 98 BPM | HEIGHT: 61 IN

## 2022-02-02 DIAGNOSIS — T14.8XXA INFECTED WOUND: Primary | ICD-10-CM

## 2022-02-02 DIAGNOSIS — I87.2 VENOUS INSUFFICIENCY OF BOTH LOWER EXTREMITIES: ICD-10-CM

## 2022-02-02 DIAGNOSIS — L08.9 INFECTED WOUND: Primary | ICD-10-CM

## 2022-02-02 PROCEDURE — 99283 EMERGENCY DEPT VISIT LOW MDM: CPT

## 2022-02-02 RX ORDER — CLINDAMYCIN HYDROCHLORIDE 150 MG/1
450 CAPSULE ORAL 3 TIMES DAILY
Qty: 63 CAPSULE | Refills: 0 | Status: SHIPPED | OUTPATIENT
Start: 2022-02-02 | End: 2022-02-02 | Stop reason: ALTCHOICE

## 2022-02-02 RX ORDER — DOXYCYCLINE HYCLATE 100 MG
100 TABLET ORAL 2 TIMES DAILY
Qty: 14 TABLET | Refills: 0 | Status: SHIPPED | OUTPATIENT
Start: 2022-02-02 | End: 2022-02-09

## 2022-02-02 RX ORDER — CEPHALEXIN 500 MG/1
500 CAPSULE ORAL 4 TIMES DAILY
Qty: 28 CAPSULE | Refills: 0 | Status: SHIPPED | OUTPATIENT
Start: 2022-02-02 | End: 2022-02-09

## 2022-02-02 ASSESSMENT — ENCOUNTER SYMPTOMS
RESPIRATORY NEGATIVE: 1
GASTROINTESTINAL NEGATIVE: 1

## 2022-02-03 NOTE — ED PROVIDER NOTES
[facility] [address]  Dept: [phone]  Loc: [phone]  eMERGENCYdEPARTMENT eNCOUnter      Pt Name: Hannah Basurto  MRN: [id number]  Jany 1975  Date of evaluation: 2/2/2022  Will Cai PA-C    CHIEF COMPLAINT       Chief Complaint   Patient presents with    Wound Check     has had wound on right leg for over a couple of weeks  Has a dressing in place  Had squirting blood out of it today  No bleeding now       [address] time was 0 minutes, excluding separately reportable procedures. There was a high probability of clinically significant/life threatening deterioration in the patient's condition which required my urgentintervention. HISTORY OF PRESENT ILLNESS  (Location/Symptom, Timing/Onset, Context/Setting, Quality, Duration,Modifying Factors, Severity.)   Hannah Basurto is a 51 y.o. female who presents to the emergency department by private vehicle complaining of wound on right shin. Patient states she had a fall and cut her right shin about 2 weeks ago. Patient states she keeps picking the scab and now has a wound. States wound has decreased in size, but \"won't heal\". She has mild redness surrounding wound, denies any significant pain. Denies any redness streaking up the leg, fever, chills, generalized ill feeling. She is not a diabetic. Has not been on any antibiotics or seen for wound previously. Nursing Notes were reviewedand agreed with or any disagreements were addressed in the HPI. REVIEW OF SYSTEMS    (2-9 systems for level 4, 10 or more for level 5)     Review of Systems   Constitutional: Negative for chills and fever. HENT: Negative. Respiratory: Negative. Cardiovascular: Negative. Gastrointestinal: Negative. Genitourinary: Negative. Musculoskeletal: Negative for arthralgias and myalgias. Skin: Positive for wound. Neurological: Negative. Psychiatric/Behavioral: Negative for behavioral problems and confusion. Except as noted above the remainder of the review of systems was reviewed and negative. PAST MEDICAL HISTORY         Diagnosis Date    Arthritis     Blind right eye     Chronic respiratory failure with hypoxia and hypercapnia (HCC)     COPD (chronic obstructive pulmonary disease) (HCC)     GERD (gastroesophageal reflux disease)     Hypertension     Movement disorder     Neuromuscular disorder ([facility] [address]     Pneumonia        SURGICAL HISTORY           Procedure Laterality Date    ABDOMEN SURGERY      CHOLECYSTECTOMY      DILATATION, ESOPHAGUS      EYE SURGERY      TUBAL LIGATION         CURRENT MEDICATIONS     [unfilled]    ALLERGIES     Latex, Sulfa antibiotics, Nsaids, and Ultram [tramadol hcl]    FAMILY HISTORY           Problem Relation Age of Onset    Cancer Mother         lung cancer    Osteoporosis Mother     Cancer Father         cancer    Arthritis Father      Family Status   Relation Name Status    Mother      Father      Sister  Alive    Brother  Alive    Brother  Alive    Brother  Alive    Brother  Alive        [facility]      reports that she quit smoking about 4 years ago. Her smoking use included cigarettes. She started smoking about 37 years ago. She has a 15.00 pack-year smoking history. She has never used smokeless tobacco. She reports that she does not drink alcohol and does not use drugs. PHYSICAL EXAM    (up to 7 for level 4, 8 or more for level 5)     ED Triage Vitals [22]   Enc Vitals Group      BP (!) 147/86      Pulse 98      Resp (!) 31      Temp 98.5 °F (36.9 °C)      Temp Source Oral      SpO2 (S) (!) 88 %      Weight       Height 5' 1\" (1.549 m)      Head Circumference       Peak Flow       Pain Score       Pain Loc       Pain Edu? Excl. in [address]? Physical Exam  Vitals reviewed.    Constitutional: Appearance: Normal appearance. HENT:      Head: Normocephalic and atraumatic. Pulmonary:      Effort: Pulmonary effort is normal. No respiratory distress. Musculoskeletal:         General: Normal range of motion. Cervical back: Normal range of motion and neck supple. Skin:     General: Skin is warm. Comments: 3 cm x 5 cm ulcer appearing wound to right anterior shin with about 4 cm of surrounding erythema, chronic appearing skin changes consistent with venous stasis to shins bilaterally, no erythema streaking proximally   Neurological:      General: No focal deficit present. Mental Status: She is alert and oriented to person, place, and time. Psychiatric:         Mood and Affect: Mood normal.         Behavior: Behavior normal.           DIAGNOSTIC RESULTS     EKG: All EKG's are interpreted by the Emergency Department Physician who either signs or Co-signs this chart in the absence of a cardiologist.    RADIOLOGY:   Non-plain film images such as CT, Ultrasound and MRI are read by the radiologist. Plain radiographic images are visualized and preliminarilyinterpreted by the emergency physician with the below findings:    Interpretation per the Radiologist below,if available at the time of this note:    No orders to display         LABS:  Labs Reviewed   CULTURE, WOUND       All other labs were within normal range or not returned as of this dictation. EMERGENCY DEPARTMENT COURSE and DIFFERENTIAL DIAGNOSIS/MDM:   Vitals:    Vitals:    02/02/22 1819 02/02/22 1830 02/02/22 1933 02/02/22 1938   BP: (!) 147/86 120/66     Pulse: 98      Resp: (!) 31      Temp: 98.5 °F (36.9 °C)      TempSrc: Oral      SpO2: (S) (!) 88% 92% 92% 95%   Height: 5' 1\" (1.549 m)          MDM     Patient presents ED with HPI noted above. Oxygen saturation 88% on 3 L, patient takes 3 to 4 L at all times, oxygen was obtained while patient transitioning from home oxygen to wall unit and moving to bed.  Oxygen saturation my bedside exam 95% on room air. Physical exam as above. Patient with chronic appearing skin changes to shins bilaterally consistent with venous stasis, right anterior shin with open ulcer appearing wound. There is mild surrounding cellulitis. Cellulitis isolated to anterior shin does not streak proximally. Patient is afebrile. No symptoms consistent with a systemic illness. Do not believe IV antibiotics indicated at this time. After discussion with patient will provide referral to wound clinic and start on oral antibiotics. Patient to have wound check in 48 hours. She was educated concerning symptoms that should prompt reevaluation in the ED regarding worsening infection. She was started on Keflex and doxycycline. She was discharged home in stable condition. The patient tolerated their visit well. I saw the patient independently with physician available for consultation as needed. I have discussed the findings of today's workup with the patient and addressed the patient's questions and concerns. Important warning signs as well as new or worsening symptoms which would necessitate immediate return to the ED were discussed. The plan is to discharge from the ED at this time, and the patient is in stable condition. The patient acknowledged understanding is agreeable with this plan. CONSULTS:  None    PROCEDURES:  Procedures    FINAL IMPRESSION      1. Infected wound    2. Venous insufficiency of both lower extremities          DISPOSITION/PLAN   [unfilled]    PATIENT REFERRED TO:  [address]  [facility] [place] [address]  [phone]  Go to   If symptoms worsen    Tim Cuello  [address] [place] [address]  [phone]    Go to   For follow up and reevaluation as scheduled.     [facility] [address] [place] [address]  [phone]  Call in 1 day  For follow up and reevaluation.       DISCHARGE MEDICATIONS:  Discharge Medication List as of 2/2/2022  7:09 PM      START taking these medications    Details   doxycycline hyclate (VIBRA-TABS) 100 MG tablet Take 1 tablet by mouth 2 times daily for 7 days, Disp-14 tablet, R-0Print      cephALEXin (KEFLEX) 500 MG capsule Take 1 capsule by mouth 4 times daily for 7 days, Disp-28 capsule, R-0Print             (Please note that portions of this note were completed with a voice recognition program.  Efforts were made to edit the dictations but occasionally words are mis-transcribed.)    [address], PA-C          [facility]  02/02/22 [address]  02/02/22 2019

## 2022-02-03 NOTE — ED NOTES
Awake alert  Resp easy and unlabored  Wound cleansed and sterile dressing placed  To follow up with wound clinic asap  To return increased redness or worsening     Everardo Pina RN  02/02/22 9239

## 2022-02-17 ENCOUNTER — HOSPITAL ENCOUNTER (OUTPATIENT)
Dept: GENERAL RADIOLOGY | Age: 47
Discharge: HOME | End: 2022-02-17
Payer: MEDICARE

## 2022-02-17 ENCOUNTER — HOSPITAL ENCOUNTER (OUTPATIENT)
Age: 47
End: 2022-02-17
Payer: MEDICARE

## 2022-02-17 DIAGNOSIS — R07.81 PLEURODYNIA: ICD-10-CM

## 2022-02-17 PROCEDURE — 71101 X-RAY EXAM UNILAT RIBS/CHEST: CPT

## 2022-02-25 ENCOUNTER — HOSPITAL ENCOUNTER (OUTPATIENT)
Dept: WOUND CARE | Age: 47
Discharge: HOME | End: 2022-02-25
Payer: MEDICARE

## 2022-02-25 VITALS
RESPIRATION RATE: 22 BRPM | SYSTOLIC BLOOD PRESSURE: 163 MMHG | TEMPERATURE: 98.1 F | DIASTOLIC BLOOD PRESSURE: 95 MMHG | HEART RATE: 99 BPM

## 2022-02-25 DIAGNOSIS — M79.89 LEG SWELLING: Primary | ICD-10-CM

## 2022-02-25 DIAGNOSIS — L97.912 NONHEALING ULCER OF RIGHT LOWER LEG WITH FAT LAYER EXPOSED (HCC): ICD-10-CM

## 2022-02-25 DIAGNOSIS — I87.2 CHRONIC VENOUS INSUFFICIENCY: ICD-10-CM

## 2022-02-25 PROCEDURE — 99214 OFFICE O/P EST MOD 30 MIN: CPT

## 2022-02-25 PROCEDURE — 11042 DBRDMT SUBQ TIS 1ST 20SQCM/<: CPT | Performed by: NURSE PRACTITIONER

## 2022-02-25 PROCEDURE — 11042 DBRDMT SUBQ TIS 1ST 20SQCM/<: CPT

## 2022-02-25 PROCEDURE — 99202 OFFICE O/P NEW SF 15 MIN: CPT | Performed by: NURSE PRACTITIONER

## 2022-02-25 RX ORDER — GINSENG 100 MG
CAPSULE ORAL ONCE
OUTPATIENT
Start: 2022-02-25 | End: 2022-02-25

## 2022-02-25 RX ORDER — GENTAMICIN SULFATE 1 MG/G
OINTMENT TOPICAL ONCE
OUTPATIENT
Start: 2022-02-25 | End: 2022-02-25

## 2022-02-25 RX ORDER — LIDOCAINE HYDROCHLORIDE 20 MG/ML
JELLY TOPICAL ONCE
OUTPATIENT
Start: 2022-02-25 | End: 2022-02-25

## 2022-02-25 RX ORDER — BACITRACIN, NEOMYCIN, POLYMYXIN B 400; 3.5; 5 [USP'U]/G; MG/G; [USP'U]/G
OINTMENT TOPICAL ONCE
OUTPATIENT
Start: 2022-02-25 | End: 2022-02-25

## 2022-02-25 RX ORDER — CLOBETASOL PROPIONATE 0.5 MG/G
OINTMENT TOPICAL ONCE
OUTPATIENT
Start: 2022-02-25 | End: 2022-02-25

## 2022-02-25 RX ORDER — LIDOCAINE 40 MG/G
CREAM TOPICAL ONCE
OUTPATIENT
Start: 2022-02-25 | End: 2022-02-25

## 2022-02-25 RX ORDER — LIDOCAINE 50 MG/G
OINTMENT TOPICAL ONCE
OUTPATIENT
Start: 2022-02-25 | End: 2022-02-25

## 2022-02-25 RX ORDER — BETAMETHASONE DIPROPIONATE 0.05 %
OINTMENT (GRAM) TOPICAL ONCE
OUTPATIENT
Start: 2022-02-25 | End: 2022-02-25

## 2022-02-25 RX ORDER — LIDOCAINE HYDROCHLORIDE 40 MG/ML
SOLUTION TOPICAL ONCE
OUTPATIENT
Start: 2022-02-25 | End: 2022-02-25

## 2022-02-25 RX ORDER — LIDOCAINE HYDROCHLORIDE 40 MG/ML
SOLUTION TOPICAL ONCE
Status: COMPLETED | OUTPATIENT
Start: 2022-02-25 | End: 2022-02-25

## 2022-02-25 RX ORDER — BACITRACIN ZINC AND POLYMYXIN B SULFATE 500; 1000 [USP'U]/G; [USP'U]/G
OINTMENT TOPICAL ONCE
OUTPATIENT
Start: 2022-02-25 | End: 2022-02-25

## 2022-02-25 RX ADMIN — LIDOCAINE HYDROCHLORIDE: 40 SOLUTION TOPICAL at 09:24

## 2022-02-25 ASSESSMENT — PAIN DESCRIPTION - ORIENTATION: ORIENTATION: RIGHT

## 2022-02-25 ASSESSMENT — PAIN DESCRIPTION - DESCRIPTORS: DESCRIPTORS: BURNING;STABBING

## 2022-02-25 ASSESSMENT — PAIN DESCRIPTION - ONSET: ONSET: ON-GOING

## 2022-02-25 ASSESSMENT — PAIN DESCRIPTION - FREQUENCY: FREQUENCY: CONTINUOUS

## 2022-02-25 ASSESSMENT — PAIN SCALES - GENERAL: PAINLEVEL_OUTOF10: 10

## 2022-02-25 ASSESSMENT — PAIN - FUNCTIONAL ASSESSMENT: PAIN_FUNCTIONAL_ASSESSMENT: ACTIVITIES ARE NOT PREVENTED

## 2022-02-25 ASSESSMENT — PAIN DESCRIPTION - PROGRESSION: CLINICAL_PROGRESSION: NOT CHANGED

## 2022-02-25 ASSESSMENT — PAIN DESCRIPTION - LOCATION: LOCATION: LEG

## 2022-02-25 ASSESSMENT — PAIN DESCRIPTION - PAIN TYPE: TYPE: ACUTE PAIN

## 2022-02-25 NOTE — LETTER
[address]  [address]ICE [address] [facility]  [phone]  Dept: [phone]                 Dear Richard Chau:     Romulo Chaudhry RECORD NUMBER: [id number]   AGE: 51 y.o. GENDER: female : 1975   TODAYS DATE: 2022        We would like to thank you for referring Ms. Estrada to our Wound Care and [facility] [address]. We appreciate your confidence in us and will strive to provide the best care possible for your patient. You can follow their plan of care in [facility] as needed. Please call us if you have any questions and we look forward to assisting your patient.     Sincerely,      [address] [place] [address] Pkwy and [facility] Therapy

## 2022-02-25 NOTE — PLAN OF CARE
[address]:     [employer] [id number] [address] p: [phone] f: [phone]     Ordering Center:     [place] [address] [address]  [address] [address]  [phone]  WOUND CARE Dept: [phone]   FAX NUMBER [unfilled]    Patient Information:      [address]  [place] [address]   [phone]   : 1975  AGE: 51 y.o. GENDER: female   TODAYS DATE:  2022    Insurance:      PRIMARY INSURANCE:  Plan: Peggy Grullon COMPLETE  Coverage: [facility] MEDICARE  Effective Date: 2022  [id number] - (Medicare Managed)    SECONDARY INSURANCE:  Plan:   Coverage:   Effective Date:   [unfilled]    [unfilled]   [unfilled]     Patient Wound Information:      Problem List Items Addressed This Visit     None        ICD-10 codes: G41.962    WOUNDS REQUIRING DRESSING SUPPLIES:     Wound 22 Leg Right; Lower # 1 (21) (Active)   Wound Image   22   Wound Etiology Venous 22   Wound Length (cm) 4.5 cm 22   Wound Width (cm) 4 cm 22   Wound Depth (cm) 0.4 cm 22   Wound Surface Area (cm^2) 18 cm^2 22   Wound Volume (cm^3) 7.2 cm^3 2224   Post-Procedure Length (cm) 4.6 cm 22   Post-Procedure Width (cm) 4.1 cm 22   Post-Procedure Depth (cm) 0.4 cm 22   Post-Procedure Surface Area (cm^2) 18.86 cm^2 22   Post-Procedure Volume (cm^3) 7.544 cm^3 22   Wound Assessment Granulation tissue;Slough 22   Drainage Amount Small 22   Drainage Description Serosanguinous 22   Odor None 22   Cira-wound Assessment Dry/flaky; Blanchable erythema 22   Margins Attached edges 22   Wound Thickness Description not for Pressure Injury Full thickness 22   Number of days: 0 Supplies Requested :      WOUND #: 1   PRIMARY DRESSING:  Honey gel   Cover and Secure with: 4X4 gauze pad  Conforming roll gauze     FREQUENCY OF DRESSING CHANGES:  Daily         ADDITIONAL ITEMS:  [] Gloves Small  [] Gloves Medium [x] Gloves Large [] Gloves XLarge  [] Tape 1\" [x] Tape 2\" [] Tape 3\"  [] Medipore Tape  [x] Saline  [] Skin Prep   [] Adhesive Remover   [] Cotton Tip Applicators   [] Other:    Patient Wound(s) Debrided: [x] Yes   [] No    Debribement Type: subcutaneous tissue    Debridement Date: 2/25/22    Patient currently being seen by Home Health: [] Yes   [x] No    Duration for needed supplies:  []15  [x]30  []60  []90 Days    Provider Information:      PROVIDER'S NAME: JULIUS Thapa - JESÚS     NPI: Bryanna SCHULER  [id number]

## 2022-02-25 NOTE — PROGRESS NOTES
[address]   Progress Note and Procedure Note      Ferny Molina  MEDICAL RECORD NUMBER:  [id number]  AGE: 51 y.o. GENDER: female  : 1975  EPISODE DATE:  2022    Subjective:     Chief Complaint   Patient presents with    Wound Check     Initial right lower leg wound. Open to air- no dressing in place. Went to [facility] ER, placed on two antibiotics which has finished. HISTORY of PRESENT ILLNESS HPI     Ferny Molina is a 51 y.o. female who presents today for wound/ulcer evaluation. History of Wound Context: Wound started as a hematoma on [place], but has evolved into a venous ulcer. She was seen in the ED on 22 and put on doxycycline and keflex X7 days (course complete). She has not been covering the wound with any dressing, thinking that it should just dry out. She is on 4L of O2 for COPD with audible wheezing. She is blind in her right eye and tends to lean to the right from scoliosis. She uses a cane for stability when she is at home, but when she goes out, she uses a wheelchair. She was prescribed torsemide, but has been taking furosemide since she ran out.   Wound/Ulcer Pain Timing/Severity: constant  Quality of pain: burning, stabbing  Severity:  10 / 10   Modifying Factors: Pain is relieved/improved with rest  Associated Signs/Symptoms: edema, drainage and pain    Ulcer Identification:  Ulcer Type: venous    Contributing Factors: edema, obesity and Methadone use    Acute Wound: N/A not an acute wound    PAST MEDICAL HISTORY        Diagnosis Date    Arthritis     Blind right eye     Chronic respiratory failure with hypoxia and hypercapnia (HCC)     COPD (chronic obstructive pulmonary disease) (HCC)     GERD (gastroesophageal reflux disease)     Hypertension     Movement disorder     Neuromuscular disorder ([facility] [address]     Pneumonia        PAST SURGICAL HISTORY    Past Surgical History:   Procedure Laterality Date    ABDOMEN SURGERY  CHOLECYSTECTOMY      DILATATION, ESOPHAGUS      EYE SURGERY      TUBAL LIGATION         FAMILY HISTORY    Family History   Problem Relation Age of Onset    Cancer Mother         lung cancer    Osteoporosis Mother     Cancer Father         cancer    Arthritis Father        SOCIAL HISTORY    Social History     Tobacco Use    Smoking status: Former Smoker     Packs/day: 0.50     Years: 30.00     Pack years: 15.00     Types: Cigarettes     Start date: 1984     Quit date: 2017     Years since quittin.1    Smokeless tobacco: Never Used    Tobacco comment: occ. 2 cigarettes a day   Vaping Use    Vaping Use: Never used   Substance Use Topics    Alcohol use: No     Alcohol/week: 0.0 standard drinks    Drug use: No       ALLERGIES    Allergies   Allergen Reactions    Latex      Rash developed    Sulfa Antibiotics Rash     Throat swelling    Nsaids Other (See Comments)     ulcer    Ultram [Tramadol Hcl] Swelling and Rash       MEDICATIONS    Current Outpatient Medications on File Prior to Encounter   Medication Sig Dispense Refill    SPIRIVA HANDIHALER 18 MCG inhalation capsule INHALE THE ENTIRE CONTENTS OF 1 CAPSULE ONCE A DAY USING HANDIHALER DEVICE 30 capsule 0    albuterol sulfate  (90 Base) MCG/ACT inhaler INHALE TWO PUFFS BY MOUTH EVERY 4 HOURS AS NEEDED FOR WHEEZING OR SHORTNESS OF BREATH 8.5 g 11    ipratropium-albuterol (DUONEB) 0.5-2.5 (3) MG/3ML SOLN nebulizer solution INHALE THREE MILLILITERS (ONE VIAL) VIA NEBULIZATION BY MOUTH EVERY 4 HOURS 360 mL 5    torsemide (DEMADEX) 20 MG tablet Take 3 tablets by mouth daily 30 tablet 3    folic acid (FOLVITE) 1 MG tablet Take 1 tablet by mouth daily 30 tablet 3    gabapentin (NEURONTIN) 100 MG capsule Take 1 capsule by mouth 3 times daily for 30 days.  (Patient taking differently: Take 300 mg by mouth 3 times daily. ) 90 capsule 0    Umeclidinium Bromide (INCRUSE ELLIPTA) 62.5 MCG/INH AEPB Inhale 1 puff into the lungs daily edema, LLE with +4 pitting edema  Musculoskeletal: normal range of motion, no joint swelling, deformity or tenderness  Neurologic: reflexes normal and symmetric, no cranial nerve deficit, gait, coordination and speech normal      Assessment:        Problem List Items Addressed This Visit     Nonhealing ulcer of right lower leg with fat layer exposed ([address]    Leg swelling - Primary    Chronic venous insufficiency           Procedure Note  Indications:  Based on my examination of this patient's wound(s)/ulcer(s) today, debridement is required to promote healing and evaluate the wound base. Performed by: JULIUS Vogel CNP    Consent obtained:  Yes    Time out taken:  Yes    Pain Control: Anesthetic  Anesthetic: 4% Lidocaine Liquid Topical       Debridement: Excisional Debridement    Using curette, scissors and forceps the wound(s)/ulcer(s) was/were debrided down through and including the removal of subcutaneous tissue. Devitalized Tissue Debrided:  fibrin, biofilm, slough and necrotic tissue    Pre Debridement Measurements:  Are located in the [place]  Documentation Flow Sheet    Diabetic/Pressure/Non Pressure Ulcers only:  Ulcer: N/A     Wound/Ulcer #: 1    Post Debridement Measurements:  Wound/Ulcer Descriptions are Pre Debridement except measurements:    Wound 02/25/22 Leg Right; Lower # 1 (12/25/21) (Active)   Wound Image   02/25/22 0924   Wound Etiology Venous 02/25/22 0924   Wound Cleansed Cleansed with saline 02/25/22 1009   Dressing/Treatment Honey gel/honey paste;Gauze dressing/dressing sponge;Roll gauze 02/25/22 1009   Wound Length (cm) 4.5 cm 02/25/22 0924   Wound Width (cm) 4 cm 02/25/22 0924   Wound Depth (cm) 0.4 cm 02/25/22 0924   Wound Surface Area (cm^2) 18 cm^2 02/25/22 0924   Wound Volume (cm^3) 7.2 cm^3 02/25/22 0924   Post-Procedure Length (cm) 4.6 cm 02/25/22 0957   Post-Procedure Width (cm) 4.1 cm 02/25/22 0957   Post-Procedure Depth (cm) 0.4 cm 02/25/22 0957 Post-Procedure Surface Area (cm^2) 18.86 cm^2 02/25/22 0957   Post-Procedure Volume (cm^3) 7.544 cm^3 02/25/22 0957   Wound Assessment Granulation tissue;Slough 02/25/22 0924   Drainage Amount Small 02/25/22 0924   Drainage Description Serosanguinous 02/25/22 0924   Odor None 02/25/22 0924   Cira-wound Assessment Dry/flaky; Blanchable erythema 02/25/22 0924   Margins Attached edges 02/25/22 3955   Wound Thickness Description not for Pressure Injury Full thickness 02/25/22 0924   Number of days: 0          Total Surface Area Debrided:  18.86 sq cm     Estimated Blood Loss:  Minimal    Hemostasis Achieved:  by pressure    Procedural Pain:  10  / 10     Post Procedural Pain:  10 / 10     Response to treatment:  Tolerated by patient, with complaints of pain. Instructed patient on Medihoney and controlling leg swelling. PATIENT EDUCATION focused on the need for compression. We applied Spandagrips. They improve blood flow in the leg, prevent blood clotting and inhibit the progression of a variety of venous disorders. Spandagrips help squeeze the venous blood back up toward the heart, to enhance circulation. Plan:     Treatment Note please see attached Discharge Instructions    Written patient dismissal instructions given to patient and signed by patient or POA. Discharge [address] [place] [address] Physician Orders and Discharge [place] [address]  Telephone: [phone]  [address] 8:30 am - 4:30 pm and Friday 8:30 am - 1:00 pm.        NAME:  Anuradha Larsen  YOB: 1975  MEDICAL RECORD NUMBER:  [id number]  DATE:  2/25/2022    Wound Cleansing:      Please wash hands with soap and water prior to and right after  every dressing change   · Do not scrub or use excessive force. · With each dressing change, rinse wounds with 0.9% Saline.    · Keep wounds dry in the shower unless otherwise instructed by the physician. · For wounds on lower legs, cast covers can be purchased at local drug stores, so that you may shower and keep the wound(s) dry. Please note: All products for home use, including multiple products for a single wound application, are medically necessary in order to achieve the best results to wound healing. Topical Treatments:  [x] Apply around the wound:   [x] moisturizing lotion  [] Antifungal ointment      [] No-Sting barrier film   [] Zinc paste  [] Other:    Vashe wound cleanser:  [] Vashe Soak  5 minutes in clinic today . DO NOT rinse after Vashe.   ____________________________________________________________   WHAT TO APPLY TO YOUR WOUND AND HOW OFTEN TO CHANGE IT.     WOUND LOCATION: RIGHT LOWER LEG     PRIMARY DRESSING:  Honey gel    Cover and Secure with:   4X4 gauze pad  Bulky roll gauze    Compression and edema control:   SpandaGrip applied in clinic to bilateral lower legs-MEDIUM    FREQUENCY OF DRESSING CHANGES:  Daily           COMPRESSION IS A SIGNIFICANT FACTOR TO HEALING YOUR WOUNDS. · Every morning immediately when getting up should be applied to affected leg(s) from mid foot to knee making sure to cover the heel. Remove every night before going to bed. ·  Elevate leg(s) above the level of the heart when sitting. · Avoid prolonged standing in one place.                      _____________________________________________________________________________________________    Dietary:  [x] Continue your diet as tolerated. ? Eat heart-healthy foods. These foods include vegetables, fruits, nuts, beans, lean meat, fish, and whole grains. Limit sodium, alcohol, and sugar. ? Protein is a key nutrient in helping to repair damaged tissue and promote new tissue growth. Good sources of protein are milk, yogurt, cheese, meat, and beans.   ? If you are a diabetic, having diabetes can make it hard for wounds to heal. So try to keep your blood sugar in its target range.  __________________________________________________________________________________________________    ACTIVITY:  Activity as tolerated      ___________________________________________________________________________________________________________________  PAIN:  Elevate the affected limb. Use over-the-counter medications you would normally use for pain as permitted by your family doctor. For persistent pain not relieved by the above interventions, please call your family doctor. Call your doctor now or seek immediate medical care if:    · You have symptoms of infection, such as:  ? Increased pain, swelling, warmth, or redness. ? Red streaks leading from the area. ? Pus draining from the area. ? A fever. **Please note, all wounds were mechanically debrided at the wound care center today. A small amount of pain, drainage and/or bleeding from this process might be expected, and is normal. **    ___________________________________________________________________________________________________________________      Return Appointment:  · DME/Wound Dressing Supplies Provided by: [address] [place] [address] [id number] [facility] p: [phone] f: [phone]   ? (We have place a prescription, supply companies distribute one month supply at a time. Please call them directly to reorder supplies when you run out)     · ECF or Home Healthcare:   · [address] [place] [address] is responsible to order your supplies.      · Return Appointment: With Evonne Fernandez CNP  in  [address] [place] [address])                  [x] Orders placed during your visit: Get your Medihoney prescription filled        : Sami      Electronically signed by Victorina Reyna RN on 2/24/2022 at 10:05 AM     [facility] Information: Should you experience any significant changes in your wound(s) or have questions about your wound care, please contact the [employer] at [address] 8:30 am - 4:30 pm and Friday 8:30 am - 1:00 pm.  If you need help with your wound outside these hours and cannot wait until we are again available, contact your PCP or go to the hospital emergency room. PLEASE NOTE: IF YOU ARE UNABLE TO OBTAIN WOUND SUPPLIES, CONTINUE TO USE THE SUPPLIES YOU HAVE AVAILABLE UNTIL YOU ARE ABLE TO REACH US. IT IS MOST IMPORTANT TO KEEP THE WOUND COVERED AT ALL TIMES.          Physician Signature:_______________________    Date: ___________ Time:  ____________      Fred Stahl CNP            Electronically signed by JULIUS Pham CNP on 2/25/2022 at 10:27 AM

## 2022-02-25 NOTE — LETTER
[address]  [address] [address] OFFICE [address] 2 [address] [facility]  [phone]  Dept: [phone]        Thank you Ms. Estrada for choosing [facility] for your Wound Care needs. We hope you found your first visit both encouraging and educational.  We look forward to serving you throughout the healing process. Before your next visit please review the information you received in your [employer]. The first visit can be overwhelming and this is a useful tool to refresh what information you may have been given. If you find yourself with any questions prior to your upcoming appointment, please feel free to contact us. Often wounds can be challenging and it is our goal to see you through the healing process with as much support possible. Again, thank you for choosing [facility]!     Sincerely,      The Staff of [address] [place] [address] Pkwy and [facility] O[facility] Therapy

## 2022-02-25 NOTE — LETTER
[address]  [address]ICE [address] [place] [phone]  Dept: [phone]   TODAYS DATE: 2/23/2022    [facility] AT [place] [facility]   Appointment Treatment Guidelines  Welcome Ms. Estrada to the [address] [place] [address] Pkwy. We appreciate the confidence you have shown in choosing us as your wound care provider. Our goal is to heal your wound(s) as quickly as possible. Please read the items below regarding the nature of your appointments. 1. We will make every effort to schedule appointments that are convenient for you. Certain days and times may not be available, depending on your providers office hours and details of your care. 2. Patients will not necessarily be brought to an exam room in the order in which they arrive. Many providers work out of this office and patients are here for different procedures. Our goal is to serve you as quickly as possible. 3. We acknowledge that your time is valuable. Please remember that wound healing takes time and we appreciate your understanding that the length of each patients appointment will vary depending upon their need. 4. It is for your protection that we ask for insurance cards, photo ID, and new consent forms on your first visit and periodically throughout your treatment at all our facilities. 5. Wound Care treatment is known to be most effective when provided on a regular basis. Missed appointments, and not following the recommended plan of care can result in ineffective treatment and a poor outcome. If you find it difficult to keep appointments or to follow the recommended plan of care, it is your responsibility to let the staff know, so that we can work with you toward a solution. 6. If you need to miss an appointment, please call to let us know. We expect 24 hours notice for all cancellations.  We also expect missed visits to be rescheduled as soon as possible, preferably within the same week to promote the most effective healing time for your wound(s). 7. If you will be late for an appointment, please call our center to be sure that the provider can still see you when you arrive. If you are more than 15 minutes late your appointment may need to be rescheduled. 8. If two (2) appointments are missed without notifying us, your care plan may be discontinued. The same may happen if multiple visits are cancelled or rescheduled, even with notice. A missed visit is time when another patient, who also needs care, could have been seen. Thank you for your understanding and consideration.

## 2022-03-02 ENCOUNTER — HOSPITAL ENCOUNTER (OUTPATIENT)
Dept: PHYSICAL THERAPY | Age: 47
Setting detail: THERAPIES SERIES
Discharge: HOME | End: 2022-03-02

## 2022-03-02 NOTE — PLAN OF CARE
[place] [facility], [facility]  [address] [facility], [facility]  Phone: [phone]   Fax:     [phone]    Physical Therapy  Cancellation/No-show Note  Patient Name:  Martin Morris  :  1975   Date:  3/2/2022  Cancelled visits to date: 0  No-shows to date: 1 - Eval    Patient status for today's appointment patient:  []  Cancelled  []  Rescheduled appointment  [x]  No-show     Reason given by patient:  []  Patient ill  []  Conflicting appointment  []  No transportation    []  Conflict with work  []  No reason given  []  Other:     Comments:      Phone call information:   []  Phone call made today to patient at _ time at number provided:      []  Patient answered, conversation as follows:    []  Patient did not answer, message left as follows:  []  Phone call not made today    Electronically signed by:  Leslie Murphy [id number]

## 2022-03-04 ENCOUNTER — HOSPITAL ENCOUNTER (OUTPATIENT)
Dept: WOUND CARE | Age: 47
Discharge: HOME | End: 2022-03-04

## 2022-03-07 ENCOUNTER — HOSPITAL ENCOUNTER (OUTPATIENT)
Dept: WOUND CARE | Age: 47
Discharge: HOME | End: 2022-03-07

## 2022-03-14 ENCOUNTER — HOSPITAL ENCOUNTER (OUTPATIENT)
Dept: WOUND CARE | Age: 47
Discharge: HOME | End: 2022-03-14

## 2022-04-11 NOTE — TELEPHONE ENCOUNTER
She was scheduled for follow up in September but did not show up to her appointment and is now scheduled to be seen 5/2/22 with Dr. Ad Subramanian. We do not prescribe steroids but she has been seen with Dr. Garcia  who has prescribes these in the past. She can call their office to discuss.

## 2022-04-11 NOTE — TELEPHONE ENCOUNTER
Called patient to confirm if she is taking Lasix and she states she is not taking Lasix but she is taking Torsemide 20 mg tid. Patient also asked to get a follow up according to her last ov on 06/21/2021 patient was supposed to come back for a 3 months after. Patient also mentioned that she has noticed she has been getting a little more out of breathe. Patient states she is out of breathe on exertion and has edema on both legs. Patient denies weight gain, chest pain, and abdominal fullness. Patient would like to know if we can prescribe her steroids for her sob. Please advise.

## 2022-04-24 ENCOUNTER — TELEPHONE (OUTPATIENT)
Dept: PULMONOLOGY | Age: 47
End: 2022-04-24

## 2022-04-24 RX ORDER — PREDNISONE 50 MG/1
50 TABLET ORAL DAILY
Qty: 5 TABLET | Refills: 0 | Status: SHIPPED | OUTPATIENT
Start: 2022-04-24 | End: 2022-04-29

## 2022-05-02 ENCOUNTER — TELEPHONE (OUTPATIENT)
Dept: CARDIOLOGY CLINIC | Age: 47
End: 2022-05-02

## 2022-05-03 RX ORDER — FUROSEMIDE 80 MG
TABLET ORAL
Qty: 30 TABLET | Refills: 0 | OUTPATIENT
Start: 2022-05-03

## 2022-05-03 RX ORDER — TORSEMIDE 20 MG/1
60 TABLET ORAL DAILY
Qty: 270 TABLET | Refills: 3 | Status: SHIPPED | OUTPATIENT
Start: 2022-05-03

## 2022-05-03 NOTE — TELEPHONE ENCOUNTER
Pt is taking Torsemide 20 TID   Pt is not taking Furosemide     Pt stated she has lost around  40 lbs in the last year

## 2022-05-09 NOTE — PROGRESS NOTES
[address]      Cardiology Progress Note    Anuj Tan  1975    May 10, 2022    Primary care physician: Lawyer Viviana MD  Reason for Referral: Diastolic heart failure     CC: \"Severe back pain. \"     HPI:  The patient is 51 y.o. female with a past medical history significant for obesity, hypertension, diastolic heart failure, and COPD on supplemental O2 who presents today for management of heart failure. She was admitted 11/2020 with complaints of worsening shortness of breath. Her BNP was elevated and her echo showed an EF of 45%. She was treated for a COPD exacerbation and with IV lasix. She was admitted 6/17/21 with with worsening shortness of breath. She reported noncompliance with her CPAP and lasix. Today, she denies any new cardiac sounding complaints. Her primary issue is pain particularly in her back. She continues to lose weight and believes she has lost a total of >25 pounds. She has chronic dyspnea on exertion but feels that it is stable. She has been drinking more water and increased her daily exercise but her functional status is quite limited. She monitors blood pressure at home and states it typically runs in the 110-120s range. She feels feels LE edema has improved. She reports compliance with her medications and tolerating. She requires supplemental oxygen and continues to follow with pulmonary. Patient denies exertional chest pain/pressure, dyspnea at rest, worsening GALLOWAY, PND, orthopnea, palpitations, lightheadedness, and syncope.     Past Medical History:   Diagnosis Date    Arthritis     Blind right eye     Chronic respiratory failure with hypoxia and hypercapnia (HCC)     COPD (chronic obstructive pulmonary disease) (HCC)     GERD (gastroesophageal reflux disease)     Hypertension     Movement disorder     Neuromuscular disorder ([facility] [address]     Pneumonia      Past Surgical History:   Procedure Laterality Date    ABDOMEN SURGERY      CHOLECYSTECTOMY      DILATATION, ESOPHAGUS      EYE SURGERY      TUBAL LIGATION       Family History   Problem Relation Age of Onset    Cancer Mother         lung cancer    Osteoporosis Mother     Cancer Father         cancer    Arthritis Father      Social History     Tobacco Use    Smoking status: Former Smoker     Packs/day: 0.50     Years: 30.00     Pack years: 15.00     Types: Cigarettes     Start date: 1984     Quit date: 2017     Years since quittin.4    Smokeless tobacco: Never Used    Tobacco comment: occ. 2 cigarettes a day   Vaping Use    Vaping Use: Never used   Substance Use Topics    Alcohol use: No     Alcohol/week: 0.0 standard drinks    Drug use: No     Allergies   Allergen Reactions    Latex      Rash developed    Sulfa Antibiotics Rash     Throat swelling    Nsaids Other (See Comments)     ulcer    Ultram [Tramadol Hcl] Swelling and Rash     Current Outpatient Medications   Medication Sig Dispense Refill    torsemide (DEMADEX) 20 MG tablet Take 3 tablets by mouth daily 270 tablet 3    SPIRIVA HANDIHALER 18 MCG inhalation capsule INHALE THE ENTIRE CONTENTS OF 1 CAPSULE ONCE A DAY USING HANDIHALER DEVICE 30 capsule 0    albuterol sulfate  (90 Base) MCG/ACT inhaler INHALE TWO PUFFS BY MOUTH EVERY 4 HOURS AS NEEDED FOR WHEEZING OR SHORTNESS OF BREATH 8.5 g 11    ipratropium-albuterol (DUONEB) 0.5-2.5 (3) MG/3ML SOLN nebulizer solution INHALE THREE MILLILITERS (ONE VIAL) VIA NEBULIZATION BY MOUTH EVERY 4 HOURS 379 mL 5    folic acid (FOLVITE) 1 MG tablet Take 1 tablet by mouth daily 30 tablet 3    gabapentin (NEURONTIN) 100 MG capsule Take 1 capsule by mouth 3 times daily for 30 days.  (Patient taking differently: Take 300 mg by mouth 3 times daily. ) 90 capsule 0    Umeclidinium Bromide (INCRUSE ELLIPTA) 62.5 MCG/INH AEPB Inhale 1 puff into the lungs daily      carvedilol (COREG) 3.125 MG tablet Take 1 tablet by mouth 2 times daily 180 tablet 3    lisinopril (PRINIVIL;ZESTRIL) 5 MG tablet Take 1 tablet by mouth daily 90 tablet 3    sertraline (ZOLOFT) 100 MG tablet Take 125 mg by mouth daily       SYMBICORT 160-4.5 MCG/ACT AERO Inhale 2 puffs into the lungs 2 times daily 1 Inhaler 11    acetaminophen (TYLENOL) 500 MG tablet Take 1,000 mg by mouth every 6 hours as needed for Pain      omeprazole (PRILOSEC) 40 MG delayed release capsule Take 40 mg by mouth daily       Spacer/Aero-Holding Chambers MARILOU 1 Device by Does not apply route daily as needed 1 Device 0    OXYGEN Inhale 2 L into the lungs See Admin Instructions. (Patient taking differently: Inhale 2 L into the lungs See Admin Instructions Indications: 2L sitting, 4L when up and around Continuous at night or as needed when at home per patient) 1 Can 0    methadone 10 MG/5ML solution Take 130 mg by mouth daily. No current facility-administered medications for this visit. Review of Systems:  · Constitutional: No unanticipated weight loss. There's been no change in energy level, sleep pattern, or activity level. No fevers, chills. · Eyes: No visual changes or diplopia. No scleral icterus. · ENT: No Headaches, hearing loss or vertigo. No mouth sores or sore throat. · Cardiovascular: as reviewed in HPI  · Respiratory: No cough or wheezing, no sputum production. No hemoptysis. · Gastrointestinal: No abdominal pain, appetite loss, blood in stools. No change in bowel or bladder habits. · Genitourinary: No dysuria, trouble voiding, or hematuria. · Musculoskeletal:  No gait disturbance, no joint complaints. · Integumentary: No rash or pruritis. · Neurological: No headache, diplopia, change in muscle strength, numbness or tingling. · Psychiatric: No anxiety or depression. · Endocrine: No temperature intolerance. No excessive thirst, fluid intake, or urination. No tremor. · Hematologic/Lymphatic: No abnormal bruising or bleeding, blood clots or swollen lymph nodes.   · Allergic/Immunologic: No nasal congestion or hives.    Physical Exam:   BP (!) 170/102   Pulse 96   Ht 5' 1\" (1.549 m)   Wt 202 lb 12.8 oz (92 kg)   LMP 08/14/2015   SpO2 98%   BMI 38.32 kg/m²   Wt Readings from Last 3 Encounters:   05/10/22 202 lb 12.8 oz (92 kg)   06/21/21 225 lb (102.1 kg)   06/17/21 225 lb 1.4 oz (102.1 kg)     Constitutional: She is an obese female who is oriented to person, place, and time. In no acute distress but appears older than stated age. Chronically ill-appearing. Wearing supplemental O2  Head: Normocephalic and atraumatic. Blind right eye with dense cataract. No scleral icterus. Neck: Neck supple. No JVP or carotid bruit appreciated. No mass and no thyromegaly present. No lymphadenopathy present. Cardiovascular: Normal rate. Normal heart sounds. Exam reveals no gallop and no friction rub. No murmur heard. Pulmonary/Chest: Effort normal. No respiratory distress. She has no rhonchi or rales. Diffuse bilateral expiratory wheezes. Abdominal: Soft, non-tender. Bowel sounds are normal. She exhibits no organomegaly, mass or bruit. Extremities: 1+ BLE edema. No cyanosis or clubbing. Pulses are 2+ radial/carotid bilaterally. Neurological: No gross cranial nerve deficit. Coordination normal.   Skin: Skin is warm and dry. There is no rash or diaphoresis. Psychiatric: She has a normal mood and affect. Her speech is normal and behavior is normal.     Lab Review:    No results found for: TRIG, HDL, LDLCALC, LDLDIRECT, LABVLDL   Lab Results   Component Value Date     06/17/2021     Lab Results   Component Value Date    HGB 11.3 06/17/2021    HCT 34.5 06/17/2021     Lab Results   Component Value Date     06/17/2021     Lab Results   Component Value Date    K 3.9 06/17/2021    K 3.7 06/12/2021     Lab Results   Component Value Date    BUN 28 06/17/2021    CREATININE 0.9 06/17/2021     EKG Interpretation:   11/8/20: Baseline artifact. Sinus tachycardia. Possible Left atrial enlargement.      Image Review:     Echo: 7/7/15  Left ventricle size is normal.  Normal left ventricular wall thickness. Ejection fraction is visually estimated to be 55-60 %. Normal right ventricular size and function. Echo: 11/9/2020. Personally reviewed. Overall, left ventricular systolic function appears mildly reduced with an ejection fraction estimated 45%. Mild diffuse hypokinesis. Moderately dilated left ventricle. Diastolic filling parameters just grade 2 diastolic dysfunction. Left atrium moderately dilated. Right ventricle is mildly dilated with normal function. PASP estimated at 46 mmHg assuming a right atrial pressure of 15 mmHg. Assessment/Plan:   1) Chronic diastolic heart failure. EF 45%. She continues with chronic stable dyspnea. Etiology uncertain but possibly multifactorial. Discussed methadone being associated with cardiomyopathies but uncertain if it is realistic for the patient to discontinue medication. No Aldactone at this time with EF >40%. Continue ACE-I, torsemide, and B-Blocker. Encouraged medication compliance.      2) Chronic hypoxic and hypercapnic respiratory failure/COPD/BLANCA. Following with pulmonary and requires supplemental oxygen.    3) Chronic methadone use. Will continue to defer to pain management to primary care physician but ideally would wean off methadone with CHF. 4) Obesity. BMI 43.9->38. Encouraged weight loss. 5) Essential hypertension. Goal BP <130/80. Typically well controlled. Potentially exacerbated by her reported pain. Continue to monitor BP at home and call if remains elevated. Follow up in 4 months     Thank you very much for allowing me to participate in the care of your patient. Please do not hesitate to contact me if you have any questions. Sincerely,  Taylor Almonte.  Marla Grande MD    [address] [place] [address]  Ph: [phone]  Fax: [phone]    This note was scribed in the presence of Dr. Fina Awad MD by Cierra Monrael RN  Physician Attestation: The scribes documentation has been prepared under my direction and personally reviewed by me in its entirety. I confirm that the note above accurately reflects all work, treatment, procedures, and medical decision making performed by me. All portions of the note including but not limited to the chief complaint, history of present illness, physical exam, assessment and plan/medical decision making were personally reviewed, edited, and updated on the day of the visit.

## 2022-05-10 ENCOUNTER — OFFICE VISIT (OUTPATIENT)
Dept: CARDIOLOGY CLINIC | Age: 47
End: 2022-05-10
Payer: MEDICARE

## 2022-05-10 VITALS
DIASTOLIC BLOOD PRESSURE: 92 MMHG | OXYGEN SATURATION: 98 % | BODY MASS INDEX: 38.29 KG/M2 | WEIGHT: 202.8 LBS | HEART RATE: 96 BPM | SYSTOLIC BLOOD PRESSURE: 150 MMHG | HEIGHT: 61 IN

## 2022-05-10 DIAGNOSIS — F11.90 METHADONE USE: ICD-10-CM

## 2022-05-10 DIAGNOSIS — J96.11 CHRONIC RESPIRATORY FAILURE WITH HYPOXIA AND HYPERCAPNIA (HCC): ICD-10-CM

## 2022-05-10 DIAGNOSIS — J96.12 CHRONIC RESPIRATORY FAILURE WITH HYPOXIA AND HYPERCAPNIA (HCC): ICD-10-CM

## 2022-05-10 DIAGNOSIS — I50.32 CHRONIC DIASTOLIC HEART FAILURE (HCC): Primary | ICD-10-CM

## 2022-05-10 DIAGNOSIS — E66.9 OBESITY (BMI 35.0-39.9 WITHOUT COMORBIDITY): ICD-10-CM

## 2022-05-10 DIAGNOSIS — I50.32 CHRONIC DIASTOLIC HEART FAILURE (HCC): ICD-10-CM

## 2022-05-10 LAB
ALBUMIN SERPL-MCNC: 3.8 G/DL (ref 3.4–5)
ALP BLD-CCNC: 103 U/L (ref 40–129)
ALT SERPL-CCNC: 9 U/L (ref 10–40)
ANION GAP SERPL CALCULATED.3IONS-SCNC: 9 MMOL/L (ref 3–16)
AST SERPL-CCNC: 13 U/L (ref 15–37)
BASOPHILS ABSOLUTE: 0 K/UL (ref 0–0.2)
BASOPHILS RELATIVE PERCENT: 0.6 %
BILIRUB SERPL-MCNC: <0.2 MG/DL (ref 0–1)
BILIRUBIN DIRECT: <0.2 MG/DL (ref 0–0.3)
BILIRUBIN, INDIRECT: ABNORMAL MG/DL (ref 0–1)
BUN BLDV-MCNC: 15 MG/DL (ref 7–20)
CALCIUM SERPL-MCNC: 9.3 MG/DL (ref 8.3–10.6)
CHLORIDE BLD-SCNC: 90 MMOL/L (ref 99–110)
CO2: 38 MMOL/L (ref 21–32)
CREAT SERPL-MCNC: 0.8 MG/DL (ref 0.6–1.1)
EOSINOPHILS ABSOLUTE: 0.2 K/UL (ref 0–0.6)
EOSINOPHILS RELATIVE PERCENT: 3.5 %
GFR AFRICAN AMERICAN: >60
GFR NON-AFRICAN AMERICAN: >60
GLUCOSE BLD-MCNC: 97 MG/DL (ref 70–99)
HCT VFR BLD CALC: 30.1 % (ref 36–48)
HEMOGLOBIN: 9.5 G/DL (ref 12–16)
LYMPHOCYTES ABSOLUTE: 1.4 K/UL (ref 1–5.1)
LYMPHOCYTES RELATIVE PERCENT: 20.1 %
MCH RBC QN AUTO: 25.9 PG (ref 26–34)
MCHC RBC AUTO-ENTMCNC: 31.5 G/DL (ref 31–36)
MCV RBC AUTO: 82.3 FL (ref 80–100)
MONOCYTES ABSOLUTE: 0.6 K/UL (ref 0–1.3)
MONOCYTES RELATIVE PERCENT: 8 %
NEUTROPHILS ABSOLUTE: 4.9 K/UL (ref 1.7–7.7)
NEUTROPHILS RELATIVE PERCENT: 67.8 %
PDW BLD-RTO: 16.7 % (ref 12.4–15.4)
PLATELET # BLD: 303 K/UL (ref 135–450)
PMV BLD AUTO: 8.7 FL (ref 5–10.5)
POTASSIUM SERPL-SCNC: 4.7 MMOL/L (ref 3.5–5.1)
PRO-BNP: 111 PG/ML (ref 0–124)
RBC # BLD: 3.66 M/UL (ref 4–5.2)
SODIUM BLD-SCNC: 137 MMOL/L (ref 136–145)
TOTAL PROTEIN: 7.1 G/DL (ref 6.4–8.2)
WBC # BLD: 7.2 K/UL (ref 4–11)

## 2022-05-10 PROCEDURE — 99214 OFFICE O/P EST MOD 30 MIN: CPT | Performed by: INTERNAL MEDICINE

## 2022-05-10 PROCEDURE — G8417 CALC BMI ABV UP PARAM F/U: HCPCS | Performed by: INTERNAL MEDICINE

## 2022-05-10 PROCEDURE — 93000 ELECTROCARDIOGRAM COMPLETE: CPT | Performed by: INTERNAL MEDICINE

## 2022-05-10 PROCEDURE — G8427 DOCREV CUR MEDS BY ELIG CLIN: HCPCS | Performed by: INTERNAL MEDICINE

## 2022-05-10 PROCEDURE — 1036F TOBACCO NON-USER: CPT | Performed by: INTERNAL MEDICINE

## 2022-05-10 NOTE — PATIENT INSTRUCTIONS
Patient Education        Learning About High Blood Pressure  What is high blood pressure? Blood pressure is a measure of how hard the blood pushes against the walls of your arteries. It's normal for blood pressure to go up and down throughout the day. But if it stays up, you have high blood pressure. Another name for highblood pressure is hypertension. Two numbers tell you your blood pressure. The first number is the systolic pressure (top number). It shows how hard the blood pushes when your heart is pumping. The second number is the diastolic pressure (bottom number). It shows how hard the blood pushes between heartbeats, when your heart is relaxed andfilling with blood. Your doctor will give you a goal for your blood pressure based on your health and your age. High blood pressure (hypertension) means that the top numberstays high, or the bottom number stays high, or both. High blood pressure increases the risk of stroke, heart attack, and otherproblems. What happens when you have high blood pressure?  Blood flows through your arteries with too much force. Over time, this can damage the heart and the walls of your arteries. But you can't feel it. High blood pressure usually doesn't cause symptoms.  High blood pressure makes your heart work harder. And that can lead to heart failure, which means your heart doesn't pump as much blood as your body needs.  Fat and calcium start to build up in your arteries. This buildup is called hardening of the arteries. It can cause many problems including a heart attack and stroke.  Arteries also carry blood and oxygen to organs like your eyes, kidneys, and brain. If high blood pressure damages those arteries, it can lead to vision loss, kidney disease, stroke, and a higher risk of dementia. How can you prevent high blood pressure?  Stay at a healthy weight.  Try to limit how much sodium you eat to less than 2,300 milligrams (mg) a day.  If you limit your sodium to 1,500 mg a day, you can lower your blood pressure even more. ? Buy foods that are labeled \"unsalted,\" \"sodium-free,\" or \"low-sodium. \" Foods labeled \"reduced-sodium\" and \"light sodium\" may still have too much sodium. ? Flavor your food with garlic, lemon juice, onion, vinegar, herbs, and spices instead of salt. Do not use soy sauce, steak sauce, onion salt, garlic salt, mustard, or ketchup on your food. ? Use less salt (or none) when recipes call for it. You can often use half the salt a recipe calls for without losing flavor.  Be physically active. Get at least 30 minutes of exercise on most days of the week. Walking is a good choice. You also may want to do other activities, such as running, swimming, cycling, or playing tennis or team sports.  Limit alcohol to 2 drinks a day for men and 1 drink a day for women.  Eat plenty of fruits, vegetables, and low-fat dairy products. Eat less saturated and total fats. How is high blood pressure treated?  Your doctor will suggest making lifestyle changes to help your heart. For example, your doctor may ask you to eat healthy foods, quit smoking, lose extra weight, and be more active.  If lifestyle changes don't help enough, your doctor may recommend that you take medicine.  When blood pressure is very high, medicines are needed to lower it. Follow-up care is a key part of your treatment and safety. Be sure to make and go to all appointments, and call your doctor if you are having problems. It's also a good idea to know your test results and keep alist of the medicines you take. Where can you learn more? Go to [email or web address] and sign in to your [employer] account. Enter P501 in the [employer] box to learn more about \"Learning About High Blood Pressure. \"     If you do not have an account, please click on the \"Sign Up Now\" link.   Current as of: January 10, 2022               Content Version: 13.2  © 9048-5908 [employer], [employer]. Care instructions adapted under license by [facility] ([facility]). If you have questions about a medical condition or this instruction, always ask your healthcare professional. [address] any warranty or liability for your use of this information.

## 2022-05-11 DIAGNOSIS — D64.9 ANEMIA, UNSPECIFIED TYPE: Primary | ICD-10-CM

## 2022-05-26 ENCOUNTER — HOSPITAL ENCOUNTER (OUTPATIENT)
Dept: PHYSICAL THERAPY | Age: 47
Setting detail: THERAPIES SERIES
Discharge: HOME | End: 2022-05-26
Payer: MEDICARE

## 2022-05-26 PROCEDURE — 97162 PT EVAL MOD COMPLEX 30 MIN: CPT

## 2022-05-26 PROCEDURE — 97530 THERAPEUTIC ACTIVITIES: CPT

## 2022-05-26 NOTE — PLAN OF CARE
[facility],  [facility]  [address] [facility]  Phone: [phone]   Fax:     [phone]    Physical Therapy Certification    Dear Referring Provider (secondary): Octavia Cross,    We had the pleasure of evaluating the following patient for physical therapy services at [facility]. A summary of our findings can be found in the initial assessment below. This includes our plan of care. If you have any questions or concerns regarding these findings, please do not hesitate to contact me at the office phone number checked above. Thank you for the referral.       Physician Signature:_______________________________Date:__________________  By signing above (or electronic signature), therapists plan is approved by physician    C-SSRS Triggered by Intake questionnaire (Past 2 wk assessment ):   [] No, Questionnaire did not trigger screening. [x] Yes, Patient intake triggered C-SSRS Screening      [] C-SSRS Screening completed  [x] PCP notified via Epic (patient checked yes to both PHQ-2 questions on our intake paperwork, but she did not finish paperwork till after our session, so I was unaware. )    Patient: Sophia Ren   : 1975   MRN: [id number]  Referring Physician: Referring Provider (secondary): Octavia Cross      Evaluation Date: 2022        Medical Diagnosis Information:  Diagnosis: M62.81 (ICD-10-CM) - Muscle weakness (generalized)M41.9 (ICD-10-CM) - Scoliosis, unspecified   Treatment Diagnosis: weakness, limited mobility/ function                                           Insurance information:      Precautions/ Contra-indications: scoliosis, impaired cardiac ejection fraction  Latex Allergy:  [x]NO      []YES  Preferred Language for Healthcare:   [x]English       []other:    C-SSRS Triggered by Intake questionnaire (Past 2 wk assessment ):   [] No, Questionnaire did not trigger screening.    [x] Yes, Patient intake triggered C-SSRS Screening      [] C-SSRS Screening completed  [x] PCP notified via Epic (patient checked yes to both PHQ-2 questions on our intake paperwork, but she did not finish paperwork till after our session, so I was unaware. )    SUBJECTIVE: Patient stated complaint: Pt here for evaluation for generalized weakness and back pain. She is in wheelchair propelled by her daughter, wearing supplemental O2. She states she has been using this for the past 8 months or so. She uses her wheelchair for all distances outside the home and also in the home sometimes. Rarely she is able to ambulate w/ cane, but she guesses she is limited to about 30ft with cane. Her daughter states that for the past 1-1.5 years, the patients spine posture had dramatically worsened with curvature to the right. She sits with severe trunk lean to the right, she tries to correct, but still trunk appears shifted. She reports high pain on her middle right spine area. She also reports high pain levels in right knee anteriorly and laterally. She states her PCP wanted her to see an orthopedic doctor, but she missed the visit. She has seen a pain doctor in June of 2021 who recommended physical therapy. She states about a year ago before things went down hill, she was able to walk community distances without cane or oxygen, her daughter gives the example of the patient was able to walk to/from the hospital to visit her w/o cane or O2. She does see a cardiologist, but she does not state anything has been mentioned as far as her scoliotic curvature affecting her cardiopulmonary system. She started using oxygen full time about 2 years ago. She denies any bouts of illness which contributed to her worsening condition. Upon review of cardiologist notes, she has had around 2 hospital admission for COPD exacerbations in the past couple years.      She denies any bowel/ bladder changes and only reports numbness around her L lateral hip/ pelvis areas. She hopes to improve her ability to walk and improve back pain. She does not have a walker. Imaging:     Thoracic CT report from pain mgmt visit June 2021:    FINDINGS:   BONES/ALIGNMENT: There is moderate disc space narrowing throughout the mid to   lower thoracic spine with associated moderately severe scoliosis. There is   mild kyphotic deformity along the thoracolumbar region. No fracture or   subluxation is seen. The vertebral body height is well maintained throughout. DEGENERATIVE CHANGES: The posterior elements are intact. There is moderate   narrowing and sclerosis with vacuum disc phenomena along the costovertebral   junctions from the 9th through the 11th ribs which is more prominent on the   left. No obvious large disc bulge or herniation is seen. The paravertebral   soft tissues are normal. No gross spinal canal stenosis or bony neural   foraminal narrowing of the thoracic spine. No obvious large disc bulge or   herniation is seen. SOFT TISSUES: No paraspinal mass is seen. IMPRESSION:   Moderately severe degenerative disc changes throughout the mid lower thoracic   region which is more prominent inferiorly and associated mild kyphotic   deformity centered along the thoracolumbar region with no obvious acute   fracture seen. Moderate scoliosis. RIB Xray from Feb 2022:  Impression   Borderline cardiomegaly with mild central pulmonary congestion       Hypoinflation with mild bibasilar atelectasis or infiltrates       Old healed fracture of the 5th rib on the left with no acute bony abnormality   seen.          Relevant Medical History:   Functional Outcome: FOTO = 33    Pain Scale: 8-10/10  Easing factors: rest  Provocative factors: standing, walking, stairs     Type: [x]Constant   []Intermittent  []Radiating []Localized []other:     Numbness/Tingling: see above    Occupation/School: disabled     Living Status/Prior Level of Function: Prior to this injury / incident, pt was independent with ADLs and IADLs,     OBJECTIVE:     Palpation:     Functional Mobility/Transfers: uses arms to rise from chair    Inspection:     Posture: moderate scoliosis, leans to right    Bandages/Dressings/Incisions:     Gait: (include devices/WB status): mod antalgia w/ cane, mild antalgia w/ FWW    Dermatomes Normal Abnormal Comments   Top of head (C1)      Posterior occipital region (C2)      Side of neck (C3)      Top of shoulder (C4)      Lateral deltoid (C5)      Tip of thumb (C6)      Distal middle finger (C7)      Distal fifth finger (C8)      Medial forearm (T1)      inguinal area (L1)       anterior mid-thigh (L2)      distal ant thigh/med knee (L3)      medial lower leg and foot (L4)      lateral lower leg and foot (L5)      posterior calf (S1)      medial calcaneus (S2)          Myotomes Normal Abnormal Comments   Neck flexion (C1-C2)      Neck sidebending (C3)      Shoulder elevation (C4)      Shoulder abduction (C5)      Elbow flexion/wrist extension (C6)      Elbow extension/wrist flexion (C7)      Thumb abduction (C8)      Finger abduction (T1)      Hip flexion (L1-L2)      Knee extension (L2-L4)      Dorsiflexion (L4-L5)      Great Toe Ext (L5)      Ankle Eversion (S1-S2)      Ankle PF(S1-S2)          Reflexes Normal Abnormal Comments   C5-6 Biceps      C5-6 Brachioradialis      C7-8 Triceps      Hamptons      S1-2 Seated achilles      S1-2 Prone knee bend      L3-4 Patellar tendon      Clonus      Babinski          Joint mobility:    []Normal    []Hypo   []Hyper    Strength (0-5) Left Right                                                                         Mobility     Shldr AROM elevation WFL 50% (pt has known shoulder problem)                       Girth (cm)                                      Orthopaedic Special Tests  Positive  Negative  NT Comments           TUG test w/single point cane    35 sec (O2 86% after 15sec rest)   TUG test w/ FWW    30sec (O2 91% immediately after)                                                                    [x] Patient history, allergies, meds reviewed. Medical chart reviewed. See intake form. Review Of Systems (ROS):  [x]Performed Review of systems (Integumentary, CardioPulmonary, Neurological) by intake and observation. Intake form has been scanned into medical record. Patient has been instructed to contact their primary care physician regarding ROS issues if not already being addressed at this time. Co-morbidities/Complexities (which will affect course of rehabilitation):  has a past medical history of Arthritis, Blind right eye, Chronic respiratory failure with hypoxia and hypercapnia ([address] COPD (chronic obstructive pulmonary disease) ([address] GERD (gastroesophageal reflux disease), Hypertension, Movement disorder, Neuromuscular disorder ([address] and Pneumonia.     []None           Arthritic conditions   []Rheumatoid arthritis (M05.9)  [x]Osteoarthritis (M19.91)   Cardiovascular conditions   [x]Hypertension (I10)  []Hyperlipidemia (E78.5)  []Angina pectoris (I20)  []Atherosclerosis (I70)   Musculoskeletal conditions   []Disc pathology   [x]Congenital spine pathologies   []Prior surgical intervention  []Osteoporosis (M81.8)  []Osteopenia (M85.8)   Endocrine conditions   []Hypothyroid (E03.9)  []Hyperthyroid Gastrointestinal conditions   []Constipation (U37.85)   Metabolic conditions   []Morbid obesity (E66.01)  []Diabetes type 1(E10.65) or 2 (E11.65)   []Neuropathy (G60.9)     Pulmonary conditions   []Asthma (J45)  []Coughing   []COPD (J44.9)   Psychological Disorders  [x]Anxiety (F41.9)  [x]Depression (F32.9)   []Other:   [x]Other:     See above     Barriers to/and or personal factors that will affect rehab potential:              []Age  []Sex   []Smoker              []Motivation/Lack of Motivation                        [x]Co-Morbidities              []Cognitive Function, education/learning barriers [x]Environmental, home barriers              []profession/work barriers  []past PT/medical experience  []other:  Justification:     Falls Risk Assessment (30 days):   [x] Falls Risk assessed and no intervention required. [] Falls Risk assessed and Patient requires intervention due to being higher risk   TUG score (>12s at risk):     [] Falls education provided, including         ASSESSMENT:    Functional Impairments:     []Noted  joint hypomobility   []Noted  joint hypermobility   [x]Decreased functional ROM   []Abnormal reflexes/sensation/myotomal/dermatomal deficits   [x]Decreased strength and neuromuscular control    [x]Decreased functional strength   [x]other: decr activity tolerance/ cardiovascular endurance     Functional Activity Limitations (from functional questionnaire and intake)   [x]Reduced ability to tolerate prolonged functional positions   [x]Reduced ability or difficulty with changes of positions or transfers between positions   []Reduced ability to maintain good posture and demonstrate good body mechanics with sitting, bending, and lifting   [x] Reduced ability or tolerance with driving and/or computer work   [x]Reduced ability to perform lifting, reaching, carrying tasks   []Reduced ability to concentrate   [x]Reduced ability to sleep    []Reduced ability to tolerate any impact through    [x]Reduced ability to ambulate prolonged functional periods/distances   []other:    Participation Restrictions   []Reduced participation in self care activities   [x]Reduced participation in home management activities   []Reduced participation in work activities   [x]Reduced participation in social activities. []Reduced participation in sport/recreational activities.     Classification/Subgrouping:   []signs/symptoms consistent with     Prognosis/Rehab Potential:      []Excellent   []Good    [x]Fair   []Poor    Tolerance of evaluation/treatment:    []Excellent   [x]Good    []Fair   []Poor    Physical Therapy Evaluation Complexity Justification  [x] A history of present problem with:  [] no personal factors and/or comorbidities that impact the plan of care;  []1-2 personal factors and/or comorbidities that impact the plan of care  [x]3 personal factors and/or comorbidities that impact the plan of care  [x] An examination of body systems using standardized tests and measures addressing any of the following: body structures and functions (impairments), activity limitations, and/or participation restrictions;:  [] a total of 1-2 or more elements   [x] a total of 3 or more elements   [] a total of 4 or more elements   [x] A clinical presentation with:  [] stable and/or uncomplicated characteristics   [x] evolving clinical presentation with changing characteristics  [] unstable and unpredictable characteristics;   [x] Clinical decision making of [] low, [] moderate, [] high complexity using standardized patient assessment instrument and/or measurable assessment of functional outcome. [] EVAL (LOW) 22605 (typically 20 minutes face-to-face)  [x] EVAL (MOD) 56117 (typically 30 minutes face-to-face)  [] EVAL (HIGH) 94106 (typically 45 minutes face-to-face)  [] RE-EVAL     PLAN: Initiate PT with emphasis on functional restoration. DME recommendation: FWW. Also would recommend spine consult to assess moralez angle to help determine severity of scoliotic curve to better direct long-term mgmt of her worsening curve. Frequency/Duration:  Up to 2 days per week for up to 6-10 Weeks:  Interventions:  [x]  Therapeutic exercise including: strength training, ROM, including postural re-education. [x]  NMR activation and proprioception, including postural re-education. [x]  Manual therapy as indicated to include: Dry Needling/IASTM, STM, PROM, Gr I-IV mobilizations, manipulation.    [x] Modalities as needed that may include: thermal agents, E-stim, Biofeedback, US, iontophoresis as indicated  [x] Patient education on joint protection, postural re-education, activity modification, progression of HEP. [] Aquatic exercises including: trength training, ROM, including postural re-education. HEP instruction: Written HEP instructions provided and reviewed    GOALS:  Patient stated goal: able to walk at least short community distances w/ FWW or less. Therapist goals for Patient:   Short Term Goals: To be achieved in: 2 weeks  1. Independent in HEP and progression per patient tolerance, in order to prevent re-injury. 2. Patient will have a decrease in pain to facilitate improvement in movement, function, and ADLs as indicated by Functional Deficits. Long Term Goals: To be achieved in: up to 10 weeks  1. Increase FOTO functional outcome score from 33 to 43 to assist with reaching prior level of function. 2. Patient will demonstrate an increase in postural awareness and control to allow for proper functional mobility as indicated by patients Functional Deficits. 3. Patient will demonstrate an increase in Strength to be able to perform at least 8 sit to stands in 30 seconds to demonstrate proper functional mobility as indicated by patients Functional Deficits. 4. Pt will be able to perform TUG test in under 25 seconds with O2 remaining >90%. Electronically signed by:   Miladys Grimm, PT , DPT, OCS #[id number]

## 2022-05-26 NOTE — FLOWSHEET NOTE
[facility] - Outpatient Rehabilitation and Therapy, [facility]  [address] [facility], [facility]  Phone: [phone]   Fax:     [phone]      Physical Therapy Treatment Note/ Progress Report:     Date:  2022    Patient Name:  Andree Dean    :  1975  MRN: [id number]    Pertinent Medical History:     Medical/Treatment Diagnosis Information:  · Diagnosis: M62.81 (ICD-10-CM) - Muscle weakness (generalized)M41.9 (ICD-10-CM) - Scoliosis, unspecified  · Treatment Diagnosis: weakness, limited mobility/ function    Insurance/Certification information:     Physician Information:  Referring Provider (secondary): [address] [place] [address] signed (Y/N):     Date of Patient follow up with Physician:      Progress Report: []  Yes  [x]  No     Date Range for reporting period:  Beginnin2022  Ending:      Progress report due (10 Rx/or 30 days whichever is less): 78     Recertification due (POC duration/ or 90 days whichever is less):      Visit # POC/Insurance Allowable Auth Needed   1 BMN []Yes   []No     Latex Allergy:  [x]NO      []YES  Preferred Language for Healthcare:   [x]English       []Other:    Functional Scale:       Date assessed: at eval  Test:FOTO  Score:    Pain level:  /10     History of Injury: Pt here for evaluation for generalized weakness and back pain. She is in wheelchair propelled by her daughter, wearing supplemental O2. She states she has been using this for the past 8 months or so. She uses her wheelchair for all distances outside the home and also in the home sometimes. Rarely she is able to ambulate w/ cane, but she guesses she is limited to about 30ft with cane. Her daughter states that for the past 1-1.5 years, the patients spine posture had dramatically worsened with curvature to the right. She sits with severe trunk lean to the right, she tries to correct, but still trunk appears shifted.  She reports high pain on her middle right spine area. She also reports high pain levels in right knee anteriorly and laterally. She states her PCP wanted her to see an orthopedic doctor, but she missed the visit. She has seen a pain doctor in June of 2021 who recommended physical therapy. She states about a year ago before things went down hill, she was able to walk community distances without cane or oxygen, her daughter gives the example of the patient was able to walk to/from the hospital to visit her w/o cane or O2. She does see a cardiologist, but she does not state anything has been mentioned as far as her scoliotic curvature affecting her cardiopulmonary system.      She started using oxygen full time about 2 years ago. She denies any bouts of illness which contributed to her worsening condition. Upon review of cardiologist notes, she has had around 2 hospital admission for COPD exacerbations in the past couple years.      She denies any bowel/ bladder changes and only reports numbness around her L lateral hip/ pelvis areas.      She hopes to improve her ability to walk and improve back pain. She does not have a walker.      Imaging:      Thoracic CT report from pain mgmt visit June 2021:    FINDINGS:   BONES/ALIGNMENT: There is moderate disc space narrowing throughout the mid to   lower thoracic spine with associated moderately severe scoliosis. There is   mild kyphotic deformity along the thoracolumbar region. No fracture or   subluxation is seen. The vertebral body height is well maintained throughout. DEGENERATIVE CHANGES: The posterior elements are intact. There is moderate   narrowing and sclerosis with vacuum disc phenomena along the costovertebral   junctions from the 9th through the 11th ribs which is more prominent on the   left. No obvious large disc bulge or herniation is seen.  The paravertebral   soft tissues are normal. No gross spinal canal stenosis or bony neural   foraminal narrowing of the thoracic spine. No obvious large disc bulge or   herniation is seen. SOFT TISSUES: No paraspinal mass is seen. IMPRESSION:   Moderately severe degenerative disc changes throughout the mid lower thoracic   region which is more prominent inferiorly and associated mild kyphotic   deformity centered along the thoracolumbar region with no obvious acute   fracture seen. Moderate scoliosis.      RIB Xray from Feb 2022:  Impression   Borderline cardiomegaly with mild central pulmonary congestion       Hypoinflation with mild bibasilar atelectasis or infiltrates       Old healed fracture of the 5th rib on the left with no acute bony abnormality   seen.        *Spine consult for moralez angle? *DME: FWW  *possible bracing/ wheel chair eval for bolsters? SUBJECTIVE:  See eval    OBJECTIVE:   Observation:    Test measurements:      RESTRICTIONS/PRECAUTIONS: scoliosis, use of O2, impaired cardiac ejection fraction    Exercises/Interventions:     Therapeutic Ex (37659)   Min: Reps/Resistance Notes/CUES                            Therapeutic Activity (19713) Min: 25'     Pt edu Role of PT, pathology, possible influence of spine curvature affecting cardiac capacity, use of proper AD    TUG test/ gait 1x cane, 1x FWW W/supervision and PT carrying O2 tank                       NMR re-education (69048)  Min:  CUES NEEDED             Manual Intervention (46506) Min:                               Modalities  Min:                      Other Therapeutic Activities: Pt was educated on PT POC, Diagnosis, Prognosis, pathomechanics as well as frequency and duration of scheduling future physical therapy appointments. Time was also taken on this day to answer all patient questions and participation in PT. Reviewed appointment policy in detail with patient and patient verbalized understanding. Home Exercise Program: Patient was instructed in the following for HEP:      5/26: no exercises, just education; see above. Danyelle Castañeda Patient verbalized/demonstrated understanding and was issued written handout. Therapeutic Exercise and NMR EXR  [x] (15101) Provided verbal/tactile cueing for activities related to strengthening, flexibility, endurance, ROM for improvements in LE, proximal hip, and core control with self care, mobility, lifting, ambulation. [x] (15697) Provided verbal/tactile cueing for activities related to improving balance, coordination, kinesthetic sense, posture, motor skill, proprioception  to assist with LE, proximal hip, and core control in self care, mobility, lifting, ambulation and eccentric single leg control. [address] and Therapeutic Activities:    [x] (36446 or 13790) Provided verbal/tactile cueing for activities related to improving balance, coordination, kinesthetic sense, posture, motor skill, proprioception and motor activation to allow for proper function of core, proximal hip and LE with self care and ADLs and functional mobility.    [x] (70886) Gait Re-education- Provided training and instruction to the patient for proper LE, core and proximal hip recruitment and positioning and eccentric body weight control with ambulation re-education including up and down stairs     Home Exercise Program:    [x] (56412) Reviewed/Progressed HEP activities related to strengthening, flexibility, endurance, ROM of core, proximal hip and LE for functional self-care, mobility, lifting and ambulation/stair navigation   [x] (11575)Reviewed/Progressed HEP activities related to improving balance, coordination, kinesthetic sense, posture, motor skill, proprioception of core, proximal hip and LE for self care, mobility, lifting, and ambulation/stair navigation      Manual Treatments:  PROM / STM / Oscillations-Mobs:  G-I, II, III, IV (PA's, Inf., Post.)  [x] (24548) Provided manual therapy to mobilize LE, proximal hip and/or LS spine soft tissue/joints for the purpose of modulating pain, promoting relaxation,  increasing ROM, reducing/eliminating soft tissue swelling/inflammation/restriction, improving soft tissue extensibility and allowing for proper ROM for normal function with self care, mobility, lifting and ambulation. Approval Dates:  CPT Code Units Approved Units Used  Date Updated:                     Charges:  Timed Code Treatment Minutes: 25   Total Treatment Minutes: 50      [] EVAL (LOW) 33207 (typically 20 minutes face-to-face)  [x] EVAL (MOD) 50710 (typically 30 minutes face-to-face)  [] EVAL (HIGH) 77869 (typically 45 minutes face-to-face)  [] RE-EVAL     [] XA(29854) x     [] Dry needle 1 or 2 Muscles (79799)  [] NMR (59785) x     [] Dry needle 3+ Muscles (16290)  [] Manual (43374) x     [] Ultrasound (27477) x  [x] TA (11549) x     [] Mech Traction (75863)  [] ES(attended) (57630)     [] ES (un) (11866):   [] Vasopump (03440) [] Ionto (19253)   [] Other:    GOALS:  Patient stated goal: able to walk at least short community distances w/ FWW or less. Therapist goals for Patient:   Short Term Goals: To be achieved in: 2 weeks  1. Independent in HEP and progression per patient tolerance, in order to prevent re-injury. 2. Patient will have a decrease in pain to facilitate improvement in movement, function, and ADLs as indicated by Functional Deficits. Long Term Goals: To be achieved in: up to 10 weeks  1. Increase FOTO functional outcome score from 33 to 43 to assist with reaching prior level of function. 2. Patient will demonstrate an increase in postural awareness and control to allow for proper functional mobility as indicated by patients Functional Deficits. 3. Patient will demonstrate an increase in Strength to be able to perform at least 8 sit to stands in 30 seconds to demonstrate proper functional mobility as indicated by patients Functional Deficits. 4. Pt will be able to perform TUG test in under 25 seconds with O2 remaining >90%.     ASSESSMENT:  See eval    Treatment/Activity Tolerance:  [x] Patient tolerated treatment well [] Patient limited by fatique  [] Patient limited by pain  [] Patient limited by other medical complications  [] Other:     Overall Progression Towards Functional goals/ Treatment Progress Update:  [] Patient is progressing as expected towards functional goals listed. [] Progression is slowed due to complexities/Impairments listed. [] Progression has been slowed due to co-morbidities. [x] Plan just implemented, too soon to assess goals progression <30days   [] Goals require adjustment due to lack of progress  [] Patient is not progressing as expected and requires additional follow up with physician  [] Other    Prognosis for POC: [x] Good [] Fair  [] Poor    Patient requires continued skilled intervention: [x] Yes  [] No        PLAN:   [] Continue per plan of care [] Alter current plan (see comments)  [x] Plan of care initiated [] Hold pending MD visit [] Discharge    Electronically signed by: Salud Guzmán, PT , DPT, OCS #[id number]      Note: If patient does not return for scheduled/recommended follow up visits, this note will serve as a discharge from care along with the most recent update on progress.

## 2022-06-09 ENCOUNTER — HOSPITAL ENCOUNTER (OUTPATIENT)
Dept: PHYSICAL THERAPY | Age: 47
Setting detail: THERAPIES SERIES
Discharge: HOME | End: 2022-06-09

## 2022-06-09 NOTE — FLOWSHEET NOTE
[place] [facility] Therapy, [facility]  [address] [facility] [place], [facility]  Phone: [phone]   Fax:     [phone]    Physical Therapy  Cancellation/No-show Note  Patient Name:  Kwadwo Taylor  :  1975   Date:  2022  Cancelled visits to date: 0  No-shows to date: 1    Patient status for today's appointment patient:  []  Cancelled  []  Rescheduled appointment  [x]  No-show     Reason given by patient:  []  Patient ill  []  Conflicting appointment  []  No transportation    []  Conflict with work  []  No reason given  []  Other:     Comments:      Phone call information:   [x]  Phone call made today to patient at 4:00 time at number provided:      []  Patient answered, conversation as follows:    [x]  Patient did not answer, message left as follows: informed of missed visit, next scheduled visit date/ time. Attendance policy. []  Phone call not made today    Electronically signed by:   Marj Chacon PT

## 2022-06-16 ENCOUNTER — HOSPITAL ENCOUNTER (OUTPATIENT)
Dept: PHYSICAL THERAPY | Age: 47
Setting detail: THERAPIES SERIES
Discharge: HOME | End: 2022-06-16

## 2022-06-16 NOTE — FLOWSHEET NOTE
[address] and Therapy, [facility]  [address] [facility] [place], [facility]  Phone: [phone]   Fax:     [phone]    Physical Therapy  Cancellation/No-show Note  Patient Name:  Deena Truong  :  1975   Date:  2022  Cancelled visits to date: 0  No-shows to date: 2    Patient status for today's appointment patient:  []  Cancelled  []  Rescheduled appointment  [x]  No-show     Reason given by patient:  []  Patient ill  []  Conflicting appointment  []  No transportation    []  Conflict with work  []  No reason given  []  Other:     Comments:      Phone call information:   []  Phone call made today to patient at 4:00 time at number provided:      []  Patient answered, conversation as follows:    []  Patient did not answer, message left as follows:   [x]  Phone call not made today    Electronically signed by:   Vasiliy Hopkins PT

## 2022-06-24 DIAGNOSIS — J44.9 COPD, SEVERE (HCC): ICD-10-CM

## 2022-06-24 RX ORDER — LISINOPRIL 5 MG/1
TABLET ORAL
Qty: 90 TABLET | Refills: 3 | Status: SHIPPED | OUTPATIENT
Start: 2022-06-24

## 2022-06-24 RX ORDER — CARVEDILOL 3.12 MG/1
TABLET ORAL
Qty: 180 TABLET | Refills: 3 | Status: SHIPPED | OUTPATIENT
Start: 2022-06-24

## 2022-06-24 RX ORDER — PREDNISONE 10 MG/1
TABLET ORAL
Qty: 30 TABLET | Refills: 0 | OUTPATIENT
Start: 2022-06-24

## 2022-06-24 NOTE — TELEPHONE ENCOUNTER
Last appt: 6-  Next appt: No future appointment scheduled at this time. Last scheduled appointment was canceled.    Medication matches medication on Epic list

## 2022-06-24 NOTE — TELEPHONE ENCOUNTER
Last OV: 5/10/22  Next OV: 9/12/22  Last refill:4/14/21  Most recent Labs: 5/10/22  Last EKG (if needed):5/10/22

## 2022-07-17 ENCOUNTER — HOSPITAL ENCOUNTER (EMERGENCY)
Age: 47
Discharge: HOME | End: 2022-07-17
Payer: MEDICARE

## 2022-07-17 ENCOUNTER — APPOINTMENT (OUTPATIENT)
Dept: GENERAL RADIOLOGY | Age: 47
End: 2022-07-17
Payer: MEDICARE

## 2022-07-17 VITALS
DIASTOLIC BLOOD PRESSURE: 74 MMHG | BODY MASS INDEX: 35.24 KG/M2 | HEART RATE: 93 BPM | HEIGHT: 60 IN | TEMPERATURE: 99 F | OXYGEN SATURATION: 96 % | SYSTOLIC BLOOD PRESSURE: 120 MMHG | WEIGHT: 179.5 LBS | RESPIRATION RATE: 16 BRPM

## 2022-07-17 DIAGNOSIS — M25.511 RIGHT SHOULDER PAIN, UNSPECIFIED CHRONICITY: ICD-10-CM

## 2022-07-17 DIAGNOSIS — M25.561 RIGHT KNEE PAIN, UNSPECIFIED CHRONICITY: ICD-10-CM
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
## 2022-07-17 PROCEDURE — 73030 X-RAY EXAM OF SHOULDER: CPT

## 2022-07-17 PROCEDURE — 6370000000 HC RX 637 (ALT 250 FOR IP): Performed by: PHYSICIAN ASSISTANT

## 2022-07-17 PROCEDURE — 99284 EMERGENCY DEPT VISIT MOD MDM: CPT

## 2022-07-17 PROCEDURE — 73560 X-RAY EXAM OF KNEE 1 OR 2: CPT

## 2022-07-17 RX ORDER — ASPIRIN 81 MG/1
81 TABLET ORAL DAILY
COMMUNITY

## 2022-07-17 RX ORDER — OXYCODONE HYDROCHLORIDE AND ACETAMINOPHEN 5; 325 MG/1; MG/1
1 TABLET ORAL ONCE
Status: COMPLETED | OUTPATIENT
Start: 2022-07-17 | End: 2022-07-17

## 2022-07-17 RX ORDER — LIDOCAINE 4 G/G
1 PATCH TOPICAL DAILY
Qty: 30 PATCH | Refills: 0 | Status: SHIPPED | OUTPATIENT
Start: 2022-07-17 | End: 2022-08-16

## 2022-07-17 RX ADMIN — OXYCODONE AND ACETAMINOPHEN 1 TABLET: 5; 325 TABLET ORAL at 15:28

## 2022-07-17 ASSESSMENT — PAIN SCALES - GENERAL
PAINLEVEL_OUTOF10: 8
PAINLEVEL_OUTOF10: 9

## 2022-07-17 ASSESSMENT — ENCOUNTER SYMPTOMS
VOMITING: 0
NAUSEA: 0
EYE PAIN: 0
COUGH: 0
SHORTNESS OF BREATH: 0
ABDOMINAL PAIN: 0
BACK PAIN: 0
SORE THROAT: 0

## 2022-07-17 ASSESSMENT — PAIN DESCRIPTION - LOCATION: LOCATION: KNEE;SHOULDER

## 2022-07-17 ASSESSMENT — PAIN DESCRIPTION - PAIN TYPE: TYPE: ACUTE PAIN;CHRONIC PAIN

## 2022-07-17 ASSESSMENT — PAIN DESCRIPTION - DESCRIPTORS: DESCRIPTORS: SORE;THROBBING;SHARP

## 2022-07-17 NOTE — DISCHARGE INSTRUCTIONS
Follow-up orthopedics if worsens or no improvement. Otherwise, follow-up with your primary care physician  Use prescribed medication as prescribed only. You may apply patch to your right shoulder or your right knee as needed. Patches only to be worn for 12 hours/day. 12 hours off before replacing with another patch. Take OTC Tylenol for arthritis as needed for pain.

## 2022-07-17 NOTE — ED PROVIDER NOTES
**ADVANCED PRACTICE PROVIDER, I HAVE EVALUATED THIS PATIENT**        [address] ENCOUNTER      Pt Name: Yariel Lainez  GRP:[id number]  [place] 1975  Date of evaluation: 7/17/2022  Provider: Tab Godfrey PA-C      Chief Complaint:    Chief Complaint   Patient presents with    Shoulder Pain    Knee Pain     Many chronic medical issues. Hx of falls (had 2 falls on 7/14-fell out of bed once and lost balance and fell once). Pain right shoulder and right knee at 8. Able to take few steps from wheelchair to stretcher. Old bruising left upper arm-no pain there. Takes methadone daily--Chronic pain and hx of addiction. Nursing Notes, Past Medical Hx, Past Surgical Hx, Social Hx, Allergies, and Family Hx were all reviewed and agreed with or any disagreements were addressed in the HPI.    HPI: (Location, Duration, Timing, Severity, Quality, Assoc Sx, Context, Modifying factors)    Chief Complaint of complaint of fall. Patient states he had a fall on this past Thursday. She fell twice actually. States that when she lost her footing and she fell and then later that night she fell out of bed. She complained of right shoulder and right knee pain. Denies loss of consciousness. She has history of scoliosis. She denies neck and back pain. No chest pain, no abdominal pain, no loss of bowel or urinary control. No numbness or tingling her feet or finger. She does take Suboxone that she gets daily. She got a dose this morning at 530. She rated her pain a 8 out of 10. She was just given Tylenol Motrin prior to arrival by her daughter. No other complaints. This is a  49 y.o. female who presents to the emergency room with the above complaint.     PastMedical/Surgical History:      Diagnosis Date    Arthritis     Blind right eye     CHF (congestive heart failure) (HCC)     Chronic respiratory failure with hypoxia and hypercapnia (HCC)     COPD (chronic obstructive pulmonary disease) (HCC)     GERD (gastroesophageal reflux disease)     Hypertension     Movement disorder     Neuromuscular disorder (HCC)     Pneumonia     Sleep apnea          Procedure Laterality Date    ABDOMEN SURGERY      CHOLECYSTECTOMY      DILATATION, ESOPHAGUS      EYE SURGERY      TUBAL LIGATION         Medications:  Previous Medications    ALBUTEROL SULFATE  (90 BASE) MCG/ACT INHALER    INHALE TWO PUFFS BY MOUTH EVERY 4 HOURS AS NEEDED FOR WHEEZING OR SHORTNESS OF BREATH    ASPIRIN 81 MG EC TABLET    Take 81 mg by mouth in the morning. Not taking. CARVEDILOL (COREG) 3.125 MG TABLET    TAKE ONE TABLET BY MOUTH TWICE A DAY    FOLIC ACID (FOLVITE) 1 MG TABLET    Take 1 tablet by mouth daily    GABAPENTIN (NEURONTIN) 100 MG CAPSULE    Take 1 capsule by mouth 3 times daily for 30 days. IPRATROPIUM-ALBUTEROL (DUONEB) 0.5-2.5 (3) MG/3ML SOLN NEBULIZER SOLUTION    INHALE THREE MILLILITERS (ONE VIAL) VIA NEBULIZATION BY MOUTH EVERY 4 HOURS    LISINOPRIL (PRINIVIL;ZESTRIL) 5 MG TABLET    TAKE ONE TABLET BY MOUTH DAILY    METHADONE 10 MG/5ML SOLUTION    Take 130 mg by mouth daily. OMEPRAZOLE (PRILOSEC) 40 MG DELAYED RELEASE CAPSULE    Take 40 mg by mouth every evening At 1800    OXYGEN    Inhale 2 L into the lungs See Admin Instructions. SERTRALINE (ZOLOFT) 100 MG TABLET    Take 125 mg by mouth daily     SPACER/AERO-HOLDING CHAMBERS MARILOU    1 Device by Does not apply route daily as needed    SPIRIVA HANDIHALER 18 MCG INHALATION CAPSULE    INHALE THE ENTIRE CONTENTS OF 1 CAPSULE ONCE A DAY USING HANDIHALER DEVICE    SYMBICORT 160-4.5 MCG/ACT AERO    Inhale 2 puffs into the lungs 2 times daily    TORSEMIDE (DEMADEX) 20 MG TABLET    Take 3 tablets by mouth daily         Review of Systems:  (2-9 systems needed)  Review of Systems   Constitutional:  Negative for chills and fever. HENT:  Negative for congestion and sore throat. Eyes:  Negative for pain and visual disturbance. Respiratory:  Negative for cough and shortness of breath. Cardiovascular:  Negative for chest pain and leg swelling. Gastrointestinal:  Negative for abdominal pain, nausea and vomiting. Genitourinary:  Negative for dysuria and frequency. Musculoskeletal:  Negative for back pain and neck pain. Skin:  Negative for rash and wound. Neurological:  Negative for dizziness and light-headedness. \"Positives and Pertinent negatives as per HPI\"    Physical Exam:  Physical Exam  Vitals and nursing note reviewed. Cardiovascular:      Rate and Rhythm: Normal rate and regular rhythm. Heart sounds: Normal heart sounds. No murmur heard. No friction rub. No gallop. Pulmonary:      Effort: Pulmonary effort is normal. No respiratory distress. Breath sounds: Normal breath sounds. No wheezing or rales. Chest:      Chest wall: No tenderness. Musculoskeletal:      Right shoulder: Tenderness and bony tenderness present. No swelling, deformity, effusion or crepitus. Decreased range of motion. Normal strength. Normal pulse. Left shoulder: Normal.      Right knee: No swelling, deformity, effusion or erythema. Decreased range of motion. Tenderness present over the medial joint line and lateral joint line. Normal pulse. Left knee: Normal.       MEDICAL DECISION MAKING    Vitals:    Vitals:    07/17/22 1437   BP: 120/74   Pulse: 93   Resp: 16   Temp: 99 °F (37.2 °C)   TempSrc: Oral   SpO2: 96%   Weight: 179 lb 8 oz (81.4 kg)   Height: 5' (1.524 m)       LABS:Labs Reviewed - No data to display     Remainder of labs reviewed and were negative at this time or not returned at the time of this note.     RADIOLOGY:   Non-plain film images such as CT, Ultrasound and MRI are read by the radiologist. Starr Swain PA-C have directly visualized the radiologic plain film image(s) with the below findings:      Interpretation per the Radiologist below, if available at the time of this note:    XR KNEE RIGHT (1-2 VIEWS)   Final Result   Osteoarthrosis at 3 knee joint compartments. XR SHOULDER RIGHT (MIN 2 VIEWS)   Final Result   No acute abnormality. AC joint and glenohumeral joint degenerative changes              No results found. MEDICAL DECISION MAKING / ED COURSE:      PROCEDURES:   Procedures    None    Patient was given:  Medications   oxyCODONE-acetaminophen (PERCOCET) 5-325 MG per tablet 1 tablet (1 tablet Oral Given 7/17/22 1528)       Emergency room course: Patient on exam cardiovascular regular rate rhythm, lungs are clear no wheeze rales or rhonchi. Patient no chest wall tenderness to palpation. She has no midline to cervical, thoracic or lumbar spine. She does have mild tenderness off to the right trapezius extending to the right shoulder with palpation. Full range of motion of the right shoulder with mild tenderness. No step-off or crepitus noted. Right elbow and right wrist is nontender with full range of motion.  strength 5+ equal bilaterally. No right lower extremity hip was nontender knee shows mild tenderness along the anterior medial and lateral joint line. Crepitus noted with flexion extension. No effusion noted. No swelling noted no erythema no warmth. Pain with flexion extension. Calf show no tenderness. She has full range of motion all extremity. Alert oriented x4. Does not appear to be in acute distress. X-ray of the right shoulder show no acute osseous abnormality. X-ray of the knee show osteoarthritis 3 compartments of the knee. At this point discussed x-ray results. Patient lidocaine patch. Squared to the right upper shoulder and over knee. Follow-up with Dr. Medina St orthopedics and return to emergency room as needed. Otherwise follow with her primary care physician. She may take OTC Tylenol as needed for pain. Preferably Tylenol for arthritis. The patient tolerated their visit well.   I evaluated the patient. The physician was available for consultation as needed. The patient and / or the family were informed of the results of any tests, a time was given to answer questions, a plan was proposed and they agreed with plan. CLINICAL IMPRESSION:  1. Fall from bed, initial encounter    2. Right shoulder pain, unspecified chronicity    3. Right knee pain, unspecified chronicity        DISPOSITION Decision To Discharge 07/17/2022 03:36:48 PM      PATIENT REFERRED TO:  Jia Doss  [address] [place] [id number] [phone]    Call   As needed    Ana Spencer MD  [address] [place] [address]  [phone]    Call in 1 day  If symptoms worsen    DISCHARGE MEDICATIONS:  New Prescriptions    LIDOCAINE 4 % EXTERNAL PATCH    Place 1 patch onto the skin in the morning. Patches only to be worn for 12 hours/day. 12 hours on and 12 hours off before replacing with another patch. .       DISCONTINUED MEDICATIONS:  Discontinued Medications    ACETAMINOPHEN (TYLENOL) 500 MG TABLET    Take 1,000 mg by mouth every 6 hours as needed for Pain    UMECLIDINIUM BROMIDE (INCRUSE ELLIPTA) 62.5 MCG/INH AEPB    Inhale 1 puff into the lungs daily              (Please note the MDM and HPI sections of this note were completed with a voice recognition program.  Efforts were made to edit the dictations but occasionally words are mis-transcribed.)    Electronically signed, Janice Bergman PA-C,          Janice Bergman PA-C  07/17/22 3020

## 2022-07-17 NOTE — ED NOTES
Portable xrays being done. Took tylenol and motrin just prior to coming to ED.   Took daily methadone dose at [address] RN  07/17/22 9728

## 2022-07-17 NOTE — ED NOTES
Many chronic medical issues. Hx of falls (had 2 falls on 7/14-fell out of bed once and lost balance and fell once). Pain right shoulder and right knee at 8. Able to take few steps from wheelchair to stretcher. Old bruising left upper arm-no pain there. Takes methadone daily--Chronic pain and hx of addiction.      Sekou Lew RN  07/17/22 7259

## 2022-07-17 NOTE — ED NOTES
Percocet ordered explained and given. Ice pack to right knee at pt request.    Multiple pillows being used to position pt for comfort.      Lizett Farfan RN  07/17/22 2177

## 2022-07-19 NOTE — ED NOTES
ANGEL already reviewed xray results. Pain at 8 to right shoulder and right knee. Discharge instructions with pt. Explained rx. Encouraged follow up with PCP and orthopedic specialist as referred. Assisted pt to wheelchair-pt took several steps. Gait steady. Then to bathroom. Able to get to toilet and back to wheelchair independently. This RN took pt out to car and assisted SBA to help pt into  Car.          Carole Bruce RN  07/19/22 5215

## 2022-07-29 RX ORDER — TIOTROPIUM BROMIDE 18 UG/1
CAPSULE ORAL; RESPIRATORY (INHALATION)
Qty: 30 CAPSULE | Refills: 0 | OUTPATIENT
Start: 2022-07-29

## 2022-08-29 RX ORDER — TIOTROPIUM BROMIDE 18 UG/1
CAPSULE ORAL; RESPIRATORY (INHALATION)
Qty: 30 CAPSULE | Refills: 0 | OUTPATIENT
Start: 2022-08-29

## 2022-08-29 NOTE — TELEPHONE ENCOUNTER
Last appt: 4-  Next appt: No future appointment is scheduled at this time.    Medication matches medication on Epic list

## 2022-09-12 ENCOUNTER — OFFICE VISIT (OUTPATIENT)
Dept: CARDIOLOGY CLINIC | Age: 47
End: 2022-09-12
Payer: MEDICARE

## 2022-09-12 VITALS
DIASTOLIC BLOOD PRESSURE: 78 MMHG | BODY MASS INDEX: 37.46 KG/M2 | WEIGHT: 198.4 LBS | OXYGEN SATURATION: 97 % | SYSTOLIC BLOOD PRESSURE: 120 MMHG | HEART RATE: 91 BPM | HEIGHT: 61 IN

## 2022-09-12 DIAGNOSIS — I50.32 CHRONIC DIASTOLIC HEART FAILURE (HCC): Primary | ICD-10-CM

## 2022-09-12 DIAGNOSIS — E66.9 OBESITY (BMI 35.0-39.9 WITHOUT COMORBIDITY): ICD-10-CM

## 2022-09-12 DIAGNOSIS — F11.90 METHADONE USE: ICD-10-CM

## 2022-09-12 DIAGNOSIS — I10 ESSENTIAL HYPERTENSION: ICD-10-CM

## 2022-09-12 DIAGNOSIS — J96.12 CHRONIC RESPIRATORY FAILURE WITH HYPOXIA AND HYPERCAPNIA (HCC): ICD-10-CM

## 2022-09-12 DIAGNOSIS — J96.11 CHRONIC RESPIRATORY FAILURE WITH HYPOXIA AND HYPERCAPNIA (HCC): ICD-10-CM

## 2022-09-12 PROCEDURE — 99214 OFFICE O/P EST MOD 30 MIN: CPT | Performed by: INTERNAL MEDICINE

## 2022-09-12 PROCEDURE — G8427 DOCREV CUR MEDS BY ELIG CLIN: HCPCS | Performed by: INTERNAL MEDICINE

## 2022-09-12 PROCEDURE — 1036F TOBACCO NON-USER: CPT | Performed by: INTERNAL MEDICINE

## 2022-09-12 PROCEDURE — G8417 CALC BMI ABV UP PARAM F/U: HCPCS | Performed by: INTERNAL MEDICINE

## 2022-09-12 NOTE — PROGRESS NOTES
[facility]      Cardiology Progress Note    Juwan Pierre  1975    September 12, 2022    Primary care physician: Sonia Cobb MD  Reason for Referral: Diastolic heart failure     CC: \"Loosing weight. \"     HPI:  The patient is 49 y.o. female with a past medical history significant for obesity, hypertension, diastolic heart failure, and COPD on supplemental O2 who presents today for management of heart failure. She was admitted 11/2020 with complaints of worsening shortness of breath. Her BNP was elevated and her echo showed an EF of 45%. She was treated for a COPD exacerbation and with IV lasix. She was admitted 6/17/21 with with worsening shortness of breath. She reported noncompliance with her CPAP and lasix. Today, she denies any new cardiac sounding complaints. She presents in a wheelchair accompanied by family. She continues to lose weight related to increasing her activity along with dietary changes. She has chronic dyspnea on exertion but feels that it is stable. She reports compliance with her medications and tolerating. She requires supplemental oxygen and continues to follow with pulmonary. Patient denies exertional chest pain/pressure, dyspnea at rest, worsening GALLOWAY, PND, orthopnea, palpitations, lightheadedness, and syncope.     Past Medical History:   Diagnosis Date    Arthritis     Blind right eye     CHF (congestive heart failure) (HCC)     Chronic respiratory failure with hypoxia and hypercapnia (HCC)     COPD (chronic obstructive pulmonary disease) (HCC)     GERD (gastroesophageal reflux disease)     Hypertension     Movement disorder     Neuromuscular disorder ([address]     On home O2     Pneumonia     Sleep apnea      Past Surgical History:   Procedure Laterality Date    ABDOMEN SURGERY      CHOLECYSTECTOMY      DILATATION, ESOPHAGUS      EYE SURGERY      TUBAL LIGATION       Family History   Problem Relation Age of Onset    Cancer Mother         lung cancer    Osteoporosis Mother     Cancer Father         cancer    Arthritis Father      Social History     Tobacco Use    Smoking status: Former     Packs/day: 0.50     Years: 30.00     Pack years: 15.00     Types: Cigarettes     Start date: 1984     Quit date: 2017     Years since quittin.7    Smokeless tobacco: Never    Tobacco comments:     o   Vaping Use    Vaping Use: Never used   Substance Use Topics    Alcohol use: No     Alcohol/week: 0.0 standard drinks    Drug use: No     Allergies   Allergen Reactions    Latex      Rash developed    Sulfa Antibiotics Rash     Throat swelling    Nsaids Other (See Comments)     ulcer    Ultram [Tramadol Hcl] Swelling and Rash     Current Outpatient Medications   Medication Sig Dispense Refill    Sertraline HCl (ZOLOFT PO) Take 125 mg by mouth Daily      aspirin 81 MG EC tablet Take 81 mg by mouth in the morning. Not taking. lisinopril (PRINIVIL;ZESTRIL) 5 MG tablet TAKE ONE TABLET BY MOUTH DAILY 90 tablet 3    carvedilol (COREG) 3.125 MG tablet TAKE ONE TABLET BY MOUTH TWICE A  tablet 3    torsemide (DEMADEX) 20 MG tablet Take 3 tablets by mouth daily 270 tablet 3    SPIRIVA HANDIHALER 18 MCG inhalation capsule INHALE THE ENTIRE CONTENTS OF 1 CAPSULE ONCE A DAY USING HANDIHALER DEVICE 30 capsule 0    albuterol sulfate  (90 Base) MCG/ACT inhaler INHALE TWO PUFFS BY MOUTH EVERY 4 HOURS AS NEEDED FOR WHEEZING OR SHORTNESS OF BREATH 8.5 g 11    ipratropium-albuterol (DUONEB) 0.5-2.5 (3) MG/3ML SOLN nebulizer solution INHALE THREE MILLILITERS (ONE VIAL) VIA NEBULIZATION BY MOUTH EVERY 4 HOURS 575 mL 5    folic acid (FOLVITE) 1 MG tablet Take 1 tablet by mouth daily 30 tablet 3    gabapentin (NEURONTIN) 100 MG capsule Take 1 capsule by mouth 3 times daily for 30 days.  (Patient taking differently: Take 300 mg by mouth 3 times daily.) 90 capsule 0    SYMBICORT 160-4.5 MCG/ACT AERO Inhale 2 puffs into the lungs 2 times daily 1 Inhaler 11    omeprazole (PRILOSEC) 40 MG delayed release capsule Take 40 mg by mouth every evening At 1800      Spacer/Aero-Holding Chambers MARILOU 1 Device by Does not apply route daily as needed 1 Device 0    OXYGEN Inhale 2 L into the lungs See Admin Instructions. (Patient taking differently: Inhale 2 L into the lungs See Admin Instructions Indications: 2L sitting, 4L when up and around Continuous at night or as needed when at home per patient) 1 Can 0    methadone 10 MG/5ML solution Take 130 mg by mouth daily. No current facility-administered medications for this visit. Review of Systems:  Constitutional: No unanticipated weight loss. There's been no change in energy level, sleep pattern, or activity level. No fevers, chills. Eyes: No visual changes or diplopia. No scleral icterus. ENT: No Headaches, hearing loss or vertigo. No mouth sores or sore throat. Cardiovascular: as reviewed in HPI  Respiratory: No cough or wheezing, no sputum production. No hemoptysis. Gastrointestinal: No abdominal pain, appetite loss, blood in stools. No change in bowel or bladder habits. Genitourinary: No dysuria, trouble voiding, or hematuria. Musculoskeletal:  No gait disturbance, no joint complaints. Integumentary: No rash or pruritis. Neurological: No headache, diplopia, change in muscle strength, numbness or tingling. Psychiatric: No anxiety or depression. Endocrine: No temperature intolerance. No excessive thirst, fluid intake, or urination. No tremor. Hematologic/Lymphatic: No abnormal bruising or bleeding, blood clots or swollen lymph nodes. Allergic/Immunologic: No nasal congestion or hives.     Physical Exam:   /78   Pulse 91   Ht 5' 1\" (1.549 m)   Wt 198 lb 6.4 oz (90 kg)   LMP 08/14/2015   SpO2 97%   BMI 37.49 kg/m²   Wt Readings from Last 3 Encounters:   09/12/22 198 lb 6.4 oz (90 kg)   07/17/22 179 lb 8 oz (81.4 kg)   05/10/22 202 lb 12.8 oz (92 kg)     Constitutional: She is an obese female who is oriented to person, place, and time. In no acute distress but appears older than stated age. Chronically ill-appearing. Wearing supplemental O2  Head: Normocephalic and atraumatic. Blind right eye with dense cataract. No scleral icterus. Neck: Neck supple. No JVP or carotid bruit appreciated. No mass and no thyromegaly present. No lymphadenopathy present. Cardiovascular: Normal rate. Normal heart sounds. Exam reveals no gallop and no friction rub. No murmur heard. Pulmonary/Chest: Effort normal. No respiratory distress. She has no rhonchi or rales. Diffuse bilateral expiratory wheezes. Abdominal: Soft, non-tender. Bowel sounds are normal. She exhibits no organomegaly, mass or bruit. Extremities: 1+ BLE edema. No cyanosis or clubbing. Pulses are 2+ radial/carotid bilaterally. Neurological: No gross cranial nerve deficit. Coordination normal.   Skin: Skin is warm and dry. There is no rash or diaphoresis. Psychiatric: She has a normal mood and affect. Her speech is normal and behavior is normal.     Lab Review:    No results found for: TRIG, HDL, LDLCALC, LDLDIRECT, LABVLDL   Lab Results   Component Value Date/Time     05/10/2022 12:26 PM     Lab Results   Component Value Date/Time    HGB 9.5 05/10/2022 12:26 PM    HCT 30.1 05/10/2022 12:26 PM     Lab Results   Component Value Date/Time     05/10/2022 12:26 PM     Lab Results   Component Value Date/Time    K 4.7 05/10/2022 12:26 PM    K 3.7 06/12/2021 04:50 PM     Lab Results   Component Value Date/Time    BUN 15 05/10/2022 12:26 PM    CREATININE 0.8 05/10/2022 12:26 PM     EKG Interpretation:   11/8/20: Baseline artifact. Sinus tachycardia. Possible Left atrial enlargement. Image Review:     Echo: 7/7/15  Left ventricle size is normal.  Normal left ventricular wall thickness. Ejection fraction is visually estimated to be 55-60 %. Normal right ventricular size and function. Echo: 11/9/2020. Personally reviewed.     Overall, left ventricular systolic function appears mildly reduced with an ejection fraction estimated 45%. Mild diffuse hypokinesis. Moderately dilated left ventricle. Diastolic filling parameters just grade 2 diastolic dysfunction. Left atrium moderately dilated. Right ventricle is mildly dilated with normal function. PASP estimated at 46 mmHg assuming a right atrial pressure of 15 mmHg. Assessment/Plan:   1) Chronic diastolic heart failure. EF 45%. She continues with chronic stable dyspnea. Etiology uncertain but possibly multifactorial. Discussed methadone being associated with cardiomyopathies but uncertain if it is realistic for the patient to discontinue medication. No Aldactone at this time with EF >40%. Continue ACE-I, torsemide, and B-Blocker. Encouraged medication compliance. Discussed addition of SGLT2i but she feels that she subjectively is improving. 2) Chronic hypoxic and hypercapnic respiratory failure/COPD/BLANCA. Following with pulmonary and requires supplemental oxygen. 3) Chronic methadone use. Will continue to defer to pain management to primary care physician but ideally would wean off methadone with CHF. 4) Obesity. BMI 43.9->37. Encouraged weight loss. 5) Essential hypertension. Controlled. Goal BP <130/80. Continue medical therapy. Follow up in 6 months     Thank you very much for allowing me to participate in the care of your patient. Please do not hesitate to contact me if you have any questions. Sincerely,  Surendra Prasad. Tricia Diana, [address] [place] [address]  Ph: [phone]  Fax: [phone]    This note was scribed in the presence of Dr Tricia Diana MD by Angy Crowley RN. Physician Attestation: The scribes documentation has been prepared under my direction and personally reviewed by me in its entirety. I confirm that the note above accurately reflects all work, treatment, procedures, and medical decision making performed by me.  All portions of the note including but not limited to the chief complaint, history of present illness, physical exam, assessment and plan/medical decision making were personally reviewed, edited, and updated on the day of the visit.

## 2022-09-19 ENCOUNTER — TELEPHONE (OUTPATIENT)
Dept: PULMONOLOGY | Age: 47
End: 2022-09-19

## 2022-09-19 NOTE — TELEPHONE ENCOUNTER
Pt having cough, sob. Pt has not been seen in office in over a year. Pt has no ride to office for an appt. Pt instructed to call for a squad and to be seen in the ER.

## 2022-10-13 ENCOUNTER — OFFICE VISIT (OUTPATIENT)
Dept: ORTHOPEDIC SURGERY | Age: 47
End: 2022-10-13
Payer: MEDICARE

## 2022-10-13 VITALS — RESPIRATION RATE: 14 BRPM | BODY MASS INDEX: 37.38 KG/M2 | HEIGHT: 61 IN | WEIGHT: 198 LBS

## 2022-10-13 DIAGNOSIS — M54.50 LOW BACK PAIN, UNSPECIFIED BACK PAIN LATERALITY, UNSPECIFIED CHRONICITY, UNSPECIFIED WHETHER SCIATICA PRESENT: Primary | ICD-10-CM

## 2022-10-13 DIAGNOSIS — M41.35 THORACOGENIC SCOLIOSIS OF THORACOLUMBAR REGION: ICD-10-CM

## 2022-10-13 PROCEDURE — G8417 CALC BMI ABV UP PARAM F/U: HCPCS | Performed by: PHYSICIAN ASSISTANT

## 2022-10-13 PROCEDURE — 1036F TOBACCO NON-USER: CPT | Performed by: PHYSICIAN ASSISTANT

## 2022-10-13 PROCEDURE — G8427 DOCREV CUR MEDS BY ELIG CLIN: HCPCS | Performed by: PHYSICIAN ASSISTANT

## 2022-10-13 PROCEDURE — G8484 FLU IMMUNIZE NO ADMIN: HCPCS | Performed by: PHYSICIAN ASSISTANT

## 2022-10-13 PROCEDURE — 99203 OFFICE O/P NEW LOW 30 MIN: CPT | Performed by: PHYSICIAN ASSISTANT

## 2022-10-13 NOTE — PROGRESS NOTES
History of present illness:   Ms. Yolanda Cotto is a pleasant lady appearing than her stated age of 49 y.o. female kindly referred by Inga Horner for consultation regarding her chronic back pain. Her pain began 2 years ago. Pain has steadily worsened since onset. Back pain 10/10 VAS, right and left buttock pain 0/10 VAS, right and left leg pain 10/10 VAS. Pain is describes as an ache, throbbing pain with occasional sharp episodes of pain. She reports numbness and tingling to her left hip. She reports weakness of her right and left leg. She denies bowel or bladder dysfunction and saddle anesthesia. She can sit for a maximum of unlimited minutes and stand for a maximum 10 minutes. Pain does disrupt her sleep. Prior medications and treatment tried include she is currently on methadone and gabapentin. He was with a pain management specialist at [facility]. Not happy with care provided there. Past medical history:  Her past medical history has been reviewed. Past Medical History:   Diagnosis Date    Arthritis     Blind right eye     CHF (congestive heart failure) (HCC)     Chronic respiratory failure with hypoxia and hypercapnia (HCC)     COPD (chronic obstructive pulmonary disease) (HCC)     GERD (gastroesophageal reflux disease)     Hypertension     Movement disorder     Neuromuscular disorder ([address]     On home O2     Pneumonia     Sleep apnea        Her past surgical history has been reviewed. Past Surgical History:   Procedure Laterality Date    ABDOMEN SURGERY      CHOLECYSTECTOMY      DILATATION, ESOPHAGUS      EYE SURGERY      TUBAL LIGATION           Her medications and allergies were reviewed. Current Outpatient Medications   Medication Sig Dispense Refill    Sertraline HCl (ZOLOFT PO) Take 125 mg by mouth Daily      aspirin 81 MG EC tablet Take 81 mg by mouth in the morning. Not taking.       lisinopril (PRINIVIL;ZESTRIL) 5 MG tablet TAKE ONE TABLET BY MOUTH DAILY 90 tablet 3 carvedilol (COREG) 3.125 MG tablet TAKE ONE TABLET BY MOUTH TWICE A  tablet 3    torsemide (DEMADEX) 20 MG tablet Take 3 tablets by mouth daily 270 tablet 3    SPIRIVA HANDIHALER 18 MCG inhalation capsule INHALE THE ENTIRE CONTENTS OF 1 CAPSULE ONCE A DAY USING HANDIHALER DEVICE 30 capsule 0    albuterol sulfate  (90 Base) MCG/ACT inhaler INHALE TWO PUFFS BY MOUTH EVERY 4 HOURS AS NEEDED FOR WHEEZING OR SHORTNESS OF BREATH 8.5 g 11    ipratropium-albuterol (DUONEB) 0.5-2.5 (3) MG/3ML SOLN nebulizer solution INHALE THREE MILLILITERS (ONE VIAL) VIA NEBULIZATION BY MOUTH EVERY 4 HOURS 388 mL 5    folic acid (FOLVITE) 1 MG tablet Take 1 tablet by mouth daily 30 tablet 3    gabapentin (NEURONTIN) 100 MG capsule Take 1 capsule by mouth 3 times daily for 30 days. (Patient taking differently: Take 300 mg by mouth 3 times daily.) 90 capsule 0    SYMBICORT 160-4.5 MCG/ACT AERO Inhale 2 puffs into the lungs 2 times daily 1 Inhaler 11    omeprazole (PRILOSEC) 40 MG delayed release capsule Take 40 mg by mouth every evening At 1800      Spacer/Aero-Holding Chambers MARILOU 1 Device by Does not apply route daily as needed 1 Device 0    OXYGEN Inhale 2 L into the lungs See Admin Instructions. (Patient taking differently: Inhale 2 L into the lungs See Admin Instructions Indications: 2L sitting, 4L when up and around Continuous at night or as needed when at home per patient) 1 Can 0    methadone 10 MG/5ML solution Take 130 mg by mouth daily. No current facility-administered medications for this visit. Her social history has been reviewed.   Social History     Occupational History    Occupation: disabled   Tobacco Use    Smoking status: Former     Packs/day: 0.50     Years: 30.00     Pack years: 15.00     Types: Cigarettes     Start date: 1984     Quit date: 2017     Years since quittin.8    Smokeless tobacco: Never    Tobacco comments:     o   Vaping Use    Vaping Use: Never used   Substance and Sexual Activity    Alcohol use: No     Alcohol/week: 0.0 standard drinks    Drug use: No    Sexual activity: Not on file         Her family history has been reviewed. Family History   Problem Relation Age of Onset    Cancer Mother         lung cancer    Osteoporosis Mother     Cancer Father         cancer    Arthritis Father          Review of Systems:  I have reviewed the clinically relevant past medical history, medications, allergies, family history, social history, and 13 point Review of Systems from the patient's recent history form & documented any details relevant to today's presenting complaints in the history above. The patient's self-reported past medical history, medications, allergies, family history, social history, and Review of Systems and spine forms from today's date have been scanned into the chart under the \"Media\" tab. Review of symptoms was reviewed and is significant for back pain and negative for recent weight loss, fatigue, chills, visual disturbances, blood in stool or urine, recent infection. Physical examination:  Ms. Ken Estrada's most recent vitals:  Vitals  Resp: 14  Height: 5' 1\" (154.9 cm)  Weight: 198 lb (89.8 kg)  Body mass index is 37.41 kg/m². General exam:  She is well-developed and well-nourished, is in obvious discomfort and alert and oriented to person, place, and time. She demonstrates appropriate mood and affect. Her skin is warm and dry. She is in a wheelchair on nasal O2. Back:  She stands with slight lumbar flexion and is noted to have a very large, on her left hemithorax. Her lumbar flexion, extension and lateral bending are severely reduced with pain. She has mild tenderness over her fourth toe-lumbar spine without obvious muscle spasm. The skin over her lumbar spine is normal without a surgical scar. Lower extremities:  She has 5/5 motor strength of bilateral lower extremities. She has a negative straight leg raise, bilaterally. Deep tendon reflexes: 2+ patella and Achilles symmetrically  Sensation is intact to light touch L3 to S1 bilaterally. She has no clonus. Hip range of motion is diminished. Stinchfield test is negative. Imaging:  X rays AP and lateral lumbar spine obtained in the office today. She has severe scoliosis curvature of her thoracolumbar spine. Multilevel degenerative disc disease and facet arthritis. Diagnosis:      ICD-10-CM    1. Low back pain, unspecified back pain laterality, unspecified chronicity, unspecified whether sciatica present  M54.50 XR LUMBAR SPINE (2-3 VIEWS)      2. Thoracogenic scoliosis of thoracolumbar region  M41.35            Assessment/ Plan: This is a very unhealthy 79-year-old female with chronic back pain. Exam today consistent with scoliosis of the thoracolumbar spine. She had been in pain management but was only prescribed gabapentin. She is on methadone as well. He states the gabapentin causes drowsiness. She is seeking narcotics/pain management. I had an extensive discussion with Ms. Lor Solis and/or family regarding the natural history, etiology, and long term consequences of her condition. I have presented reasonable alternatives to the patient's proposed care, treatment, and services. Risks and benefits of the treatment options also reviewed in detail. I have outlined a treatment plan with them. She has had full opportunity to ask her questions. I have answered them all to her satisfaction. I feel that Ms. Lor Solis understands our discussion today. I believe she is high surgical risk given her COPD and heart failure. New Medications prescribed today:  No new medications today. List of pain management providers  was given today. Referral to [address] [place] [address] pain management was also performed today. Follow up:         She was instructed to call us emergently if she begins to experience bowel or bladder dysfunction, saddle anesthesia, increasing muscle weakness, or onset/ worsening leg symptoms. The total time spent on today's visit including reviewing test results, history, performance of physical exam, counseling/ education, ordering of medications, tests or procedures was 35 minutes. This time does include completion of the medical record. This time excludes any time spent performing procedures or tests in the office. Winston Zimmerman PA-C   Senior Physician Assistant   [facility]                                         Disclaimer: This note was generated with use of a verbal recognition program (DRAGON) and an attempt was made to check for errors. It is possible that there are still dictated errors within this office note. If so, please bring any significant errors to my attention for an addendum. All efforts were made to ensure that this office note is accurate.

## 2023-01-01 ENCOUNTER — APPOINTMENT (OUTPATIENT)
Dept: GENERAL RADIOLOGY | Age: 48
End: 2023-01-01
Payer: MEDICARE

## 2023-01-01 ENCOUNTER — HOSPITAL ENCOUNTER (INPATIENT)
Age: 48
LOS: 5 days | Discharge: HOME | End: 2023-01-06
Attending: EMERGENCY MEDICINE | Admitting: INTERNAL MEDICINE
Payer: MEDICARE

## 2023-01-01 DIAGNOSIS — J96.02 ACUTE RESPIRATORY FAILURE WITH HYPOXIA AND HYPERCAPNIA (HCC): ICD-10-CM

## 2023-01-01 DIAGNOSIS — J44.1 COPD EXACERBATION (HCC): Primary | ICD-10-CM

## 2023-01-01 DIAGNOSIS — J96.01 ACUTE RESPIRATORY FAILURE WITH HYPOXIA AND HYPERCAPNIA (HCC): ICD-10-CM

## 2023-01-01 LAB
A/G RATIO: 0.9 (ref 1.1–2.2)
ALBUMIN SERPL-MCNC: 3.5 G/DL (ref 3.4–5)
ALP BLD-CCNC: 98 U/L (ref 40–129)
ALT SERPL-CCNC: 7 U/L (ref 10–40)
ANION GAP SERPL CALCULATED.3IONS-SCNC: 9 MMOL/L (ref 3–16)
AST SERPL-CCNC: 12 U/L (ref 15–37)
BASE EXCESS VENOUS: 16 MMOL/L
BASE EXCESS VENOUS: 16.8 MMOL/L
BASOPHILS ABSOLUTE: 0.1 K/UL (ref 0–0.2)
BASOPHILS RELATIVE PERCENT: 0.6 %
BILIRUB SERPL-MCNC: <0.2 MG/DL (ref 0–1)
BUN BLDV-MCNC: 14 MG/DL (ref 7–20)
CALCIUM SERPL-MCNC: 8.9 MG/DL (ref 8.3–10.6)
CARBOXYHEMOGLOBIN: 2.2 %
CARBOXYHEMOGLOBIN: 2.8 %
CHLORIDE BLD-SCNC: 91 MMOL/L (ref 99–110)
CO2: 38 MMOL/L (ref 21–32)
CREAT SERPL-MCNC: 0.9 MG/DL (ref 0.6–1.1)
D DIMER: 0.65 UG/ML FEU (ref 0–0.6)
EOSINOPHILS ABSOLUTE: 0.3 K/UL (ref 0–0.6)
EOSINOPHILS RELATIVE PERCENT: 2.5 %
GFR SERPL CREATININE-BSD FRML MDRD: >60 ML/MIN/{1.73_M2}
GLUCOSE BLD-MCNC: 91 MG/DL (ref 70–99)
HCO3 VENOUS: 46 MMOL/L (ref 23–29)
HCO3 VENOUS: 47 MMOL/L (ref 23–29)
HCT VFR BLD CALC: 31.7 % (ref 36–48)
HEMOGLOBIN: 9.5 G/DL (ref 12–16)
LACTIC ACID, SEPSIS: 0.6 MMOL/L (ref 0.4–1.9)
LYMPHOCYTES ABSOLUTE: 1.4 K/UL (ref 1–5.1)
LYMPHOCYTES RELATIVE PERCENT: 11.7 %
MCH RBC QN AUTO: 26.2 PG (ref 26–34)
MCHC RBC AUTO-ENTMCNC: 30.1 G/DL (ref 31–36)
MCV RBC AUTO: 86.9 FL (ref 80–100)
METHEMOGLOBIN VENOUS: 0.4 %
METHEMOGLOBIN VENOUS: 0.6 %
MONOCYTES ABSOLUTE: 0.6 K/UL (ref 0–1.3)
MONOCYTES RELATIVE PERCENT: 5.5 %
NEUTROPHILS ABSOLUTE: 9.4 K/UL (ref 1.7–7.7)
NEUTROPHILS RELATIVE PERCENT: 79.7 %
O2 SAT, VEN: 100 %
O2 SAT, VEN: 62 %
O2 THERAPY: ABNORMAL
O2 THERAPY: ABNORMAL
PCO2, VEN: 103 MMHG (ref 40–50)
PCO2, VEN: 98.8 MMHG (ref 40–50)
PDW BLD-RTO: 15.3 % (ref 12.4–15.4)
PH VENOUS: 7.26 (ref 7.35–7.45)
PH VENOUS: 7.28 (ref 7.35–7.45)
PLATELET # BLD: 331 K/UL (ref 135–450)
PMV BLD AUTO: 7 FL (ref 5–10.5)
PO2, VEN: 156 MMHG
PO2, VEN: 36 MMHG
POTASSIUM SERPL-SCNC: 4.5 MMOL/L (ref 3.5–5.1)
PRO-BNP: 272 PG/ML (ref 0–124)
PROCALCITONIN: 0.04 NG/ML (ref 0–0.15)
RAPID INFLUENZA  B AGN: NEGATIVE
RAPID INFLUENZA A AGN: NEGATIVE
RBC # BLD: 3.64 M/UL (ref 4–5.2)
REPORT: NORMAL
RESPIRATORY PANEL PCR: NORMAL
SARS-COV-2, NAAT: NOT DETECTED
SODIUM BLD-SCNC: 138 MMOL/L (ref 136–145)
TCO2 CALC VENOUS: 50 MMOL/L
TCO2 CALC VENOUS: 50 MMOL/L
TOTAL PROTEIN: 7.6 G/DL (ref 6.4–8.2)
TROPONIN: <0.01 NG/ML
WBC # BLD: 11.8 K/UL (ref 4–11)

## 2023-01-01 PROCEDURE — 80053 COMPREHEN METABOLIC PANEL: CPT

## 2023-01-01 PROCEDURE — 96374 THER/PROPH/DIAG INJ IV PUSH: CPT

## 2023-01-01 PROCEDURE — 6370000000 HC RX 637 (ALT 250 FOR IP): Performed by: INTERNAL MEDICINE

## 2023-01-01 PROCEDURE — 99285 EMERGENCY DEPT VISIT HI MDM: CPT

## 2023-01-01 PROCEDURE — 94640 AIRWAY INHALATION TREATMENT: CPT

## 2023-01-01 PROCEDURE — 0202U NFCT DS 22 TRGT SARS-COV-2: CPT

## 2023-01-01 PROCEDURE — 85025 COMPLETE CBC W/AUTO DIFF WBC: CPT

## 2023-01-01 PROCEDURE — 6360000002 HC RX W HCPCS: Performed by: INTERNAL MEDICINE

## 2023-01-01 PROCEDURE — 87804 INFLUENZA ASSAY W/OPTIC: CPT

## 2023-01-01 PROCEDURE — 2060000000 HC ICU INTERMEDIATE R&B

## 2023-01-01 PROCEDURE — 71045 X-RAY EXAM CHEST 1 VIEW: CPT

## 2023-01-01 PROCEDURE — 6360000002 HC RX W HCPCS: Performed by: EMERGENCY MEDICINE

## 2023-01-01 PROCEDURE — 6370000000 HC RX 637 (ALT 250 FOR IP): Performed by: EMERGENCY MEDICINE

## 2023-01-01 PROCEDURE — 2700000000 HC OXYGEN THERAPY PER DAY

## 2023-01-01 PROCEDURE — 2580000003 HC RX 258: Performed by: INTERNAL MEDICINE

## 2023-01-01 PROCEDURE — 94660 CPAP INITIATION&MGMT: CPT

## 2023-01-01 PROCEDURE — 87635 SARS-COV-2 COVID-19 AMP PRB: CPT

## 2023-01-01 PROCEDURE — 87449 NOS EACH ORGANISM AG IA: CPT

## 2023-01-01 PROCEDURE — 85379 FIBRIN DEGRADATION QUANT: CPT

## 2023-01-01 PROCEDURE — 99223 1ST HOSP IP/OBS HIGH 75: CPT | Performed by: INTERNAL MEDICINE

## 2023-01-01 PROCEDURE — 93005 ELECTROCARDIOGRAM TRACING: CPT | Performed by: EMERGENCY MEDICINE

## 2023-01-01 PROCEDURE — 36415 COLL VENOUS BLD VENIPUNCTURE: CPT

## 2023-01-01 PROCEDURE — 84484 ASSAY OF TROPONIN QUANT: CPT

## 2023-01-01 PROCEDURE — 82803 BLOOD GASES ANY COMBINATION: CPT

## 2023-01-01 PROCEDURE — 84145 PROCALCITONIN (PCT): CPT

## 2023-01-01 PROCEDURE — 83880 ASSAY OF NATRIURETIC PEPTIDE: CPT

## 2023-01-01 PROCEDURE — 94761 N-INVAS EAR/PLS OXIMETRY MLT: CPT

## 2023-01-01 PROCEDURE — 83605 ASSAY OF LACTIC ACID: CPT

## 2023-01-01 RX ORDER — IPRATROPIUM BROMIDE AND ALBUTEROL SULFATE 2.5; .5 MG/3ML; MG/3ML
1 SOLUTION RESPIRATORY (INHALATION)
Status: DISCONTINUED | OUTPATIENT
Start: 2023-01-01 | End: 2023-01-06 | Stop reason: HOSPADM

## 2023-01-01 RX ORDER — ONDANSETRON 4 MG/1
4 TABLET, ORALLY DISINTEGRATING ORAL EVERY 8 HOURS PRN
Status: DISCONTINUED | OUTPATIENT
Start: 2023-01-01 | End: 2023-01-06 | Stop reason: HOSPADM

## 2023-01-01 RX ORDER — SODIUM CHLORIDE 0.9 % (FLUSH) 0.9 %
5-40 SYRINGE (ML) INJECTION EVERY 12 HOURS SCHEDULED
Status: DISCONTINUED | OUTPATIENT
Start: 2023-01-01 | End: 2023-01-06 | Stop reason: HOSPADM

## 2023-01-01 RX ORDER — TORSEMIDE 20 MG/1
60 TABLET ORAL DAILY
Status: DISCONTINUED | OUTPATIENT
Start: 2023-01-01 | End: 2023-01-06 | Stop reason: HOSPADM

## 2023-01-01 RX ORDER — CLONIDINE HYDROCHLORIDE 0.1 MG/1
0.1 TABLET ORAL 2 TIMES DAILY
Status: ON HOLD | COMMUNITY
End: 2023-01-06 | Stop reason: HOSPADM

## 2023-01-01 RX ORDER — ACETAMINOPHEN 650 MG/1
650 SUPPOSITORY RECTAL EVERY 6 HOURS PRN
Status: DISCONTINUED | OUTPATIENT
Start: 2023-01-01 | End: 2023-01-06 | Stop reason: HOSPADM

## 2023-01-01 RX ORDER — POLYETHYLENE GLYCOL 3350 17 G/17G
17 POWDER, FOR SOLUTION ORAL DAILY PRN
Status: DISCONTINUED | OUTPATIENT
Start: 2023-01-01 | End: 2023-01-06 | Stop reason: HOSPADM

## 2023-01-01 RX ORDER — IPRATROPIUM BROMIDE AND ALBUTEROL SULFATE 2.5; .5 MG/3ML; MG/3ML
2 SOLUTION RESPIRATORY (INHALATION) ONCE
Status: COMPLETED | OUTPATIENT
Start: 2023-01-01 | End: 2023-01-01

## 2023-01-01 RX ORDER — CARVEDILOL 3.12 MG/1
3.12 TABLET ORAL 2 TIMES DAILY WITH MEALS
Status: DISCONTINUED | OUTPATIENT
Start: 2023-01-01 | End: 2023-01-06 | Stop reason: HOSPADM

## 2023-01-01 RX ORDER — SODIUM CHLORIDE 0.9 % (FLUSH) 0.9 %
5-40 SYRINGE (ML) INJECTION PRN
Status: DISCONTINUED | OUTPATIENT
Start: 2023-01-01 | End: 2023-01-06 | Stop reason: HOSPADM

## 2023-01-01 RX ORDER — ACETAMINOPHEN 325 MG/1
650 TABLET ORAL EVERY 6 HOURS PRN
Status: DISCONTINUED | OUTPATIENT
Start: 2023-01-01 | End: 2023-01-06 | Stop reason: HOSPADM

## 2023-01-01 RX ORDER — SODIUM CHLORIDE 9 MG/ML
INJECTION, SOLUTION INTRAVENOUS PRN
Status: DISCONTINUED | OUTPATIENT
Start: 2023-01-01 | End: 2023-01-06 | Stop reason: HOSPADM

## 2023-01-01 RX ORDER — ASPIRIN 81 MG/1
81 TABLET ORAL DAILY
Status: DISCONTINUED | OUTPATIENT
Start: 2023-01-01 | End: 2023-01-06 | Stop reason: HOSPADM

## 2023-01-01 RX ORDER — ENOXAPARIN SODIUM 100 MG/ML
40 INJECTION SUBCUTANEOUS DAILY
Status: DISCONTINUED | OUTPATIENT
Start: 2023-01-02 | End: 2023-01-06 | Stop reason: HOSPADM

## 2023-01-01 RX ORDER — LISINOPRIL 5 MG/1
5 TABLET ORAL DAILY
Status: DISCONTINUED | OUTPATIENT
Start: 2023-01-01 | End: 2023-01-06 | Stop reason: HOSPADM

## 2023-01-01 RX ORDER — PREDNISONE 20 MG/1
40 TABLET ORAL DAILY
Status: DISCONTINUED | OUTPATIENT
Start: 2023-01-04 | End: 2023-01-06 | Stop reason: HOSPADM

## 2023-01-01 RX ORDER — METHYLPREDNISOLONE SODIUM SUCCINATE 125 MG/2ML
125 INJECTION, POWDER, LYOPHILIZED, FOR SOLUTION INTRAMUSCULAR; INTRAVENOUS EVERY 6 HOURS
Status: DISCONTINUED | OUTPATIENT
Start: 2023-01-01 | End: 2023-01-01

## 2023-01-01 RX ORDER — PANTOPRAZOLE SODIUM 40 MG/1
40 TABLET, DELAYED RELEASE ORAL
Status: DISCONTINUED | OUTPATIENT
Start: 2023-01-01 | End: 2023-01-06 | Stop reason: HOSPADM

## 2023-01-01 RX ORDER — ALBUTEROL SULFATE 90 UG/1
2 AEROSOL, METERED RESPIRATORY (INHALATION) EVERY 6 HOURS PRN
Status: DISCONTINUED | OUTPATIENT
Start: 2023-01-01 | End: 2023-01-06 | Stop reason: HOSPADM

## 2023-01-01 RX ORDER — METHYLPREDNISOLONE SODIUM SUCCINATE 40 MG/ML
40 INJECTION, POWDER, LYOPHILIZED, FOR SOLUTION INTRAMUSCULAR; INTRAVENOUS EVERY 6 HOURS
Status: COMPLETED | OUTPATIENT
Start: 2023-01-01 | End: 2023-01-03

## 2023-01-01 RX ORDER — ONDANSETRON 2 MG/ML
4 INJECTION INTRAMUSCULAR; INTRAVENOUS EVERY 6 HOURS PRN
Status: DISCONTINUED | OUTPATIENT
Start: 2023-01-01 | End: 2023-01-06 | Stop reason: HOSPADM

## 2023-01-01 RX ORDER — METHADONE HYDROCHLORIDE 10 MG/1
60 TABLET ORAL DAILY
Status: DISCONTINUED | OUTPATIENT
Start: 2023-01-02 | End: 2023-01-02

## 2023-01-01 RX ORDER — FOLIC ACID 1 MG/1
1 TABLET ORAL DAILY
Status: DISCONTINUED | OUTPATIENT
Start: 2023-01-01 | End: 2023-01-01

## 2023-01-01 RX ADMIN — CARVEDILOL 3.12 MG: 3.12 TABLET, FILM COATED ORAL at 16:57

## 2023-01-01 RX ADMIN — IPRATROPIUM BROMIDE AND ALBUTEROL SULFATE 2 AMPULE: .5; 3 SOLUTION RESPIRATORY (INHALATION) at 09:11

## 2023-01-01 RX ADMIN — Medication 81 MG: at 15:25

## 2023-01-01 RX ADMIN — MOMETASONE FUROATE AND FORMOTEROL FUMARATE DIHYDRATE 2 PUFF: 200; 5 AEROSOL RESPIRATORY (INHALATION) at 20:47

## 2023-01-01 RX ADMIN — SODIUM CHLORIDE 25 ML: 9 INJECTION, SOLUTION INTRAVENOUS at 15:41

## 2023-01-01 RX ADMIN — IPRATROPIUM BROMIDE AND ALBUTEROL SULFATE 1 AMPULE: .5; 3 SOLUTION RESPIRATORY (INHALATION) at 20:46

## 2023-01-01 RX ADMIN — METHYLPREDNISOLONE SODIUM SUCCINATE 40 MG: 40 INJECTION, POWDER, FOR SOLUTION INTRAMUSCULAR; INTRAVENOUS at 21:11

## 2023-01-01 RX ADMIN — LISINOPRIL 5 MG: 5 TABLET ORAL at 15:25

## 2023-01-01 RX ADMIN — TORSEMIDE 60 MG: 20 TABLET ORAL at 15:25

## 2023-01-01 RX ADMIN — CEFTRIAXONE 1000 MG: 1 INJECTION, POWDER, FOR SOLUTION INTRAMUSCULAR; INTRAVENOUS at 15:43

## 2023-01-01 RX ADMIN — PANTOPRAZOLE SODIUM 40 MG: 40 TABLET, DELAYED RELEASE ORAL at 15:24

## 2023-01-01 RX ADMIN — SERTRALINE 125 MG: 50 TABLET, FILM COATED ORAL at 15:24

## 2023-01-01 RX ADMIN — METHYLPREDNISOLONE SODIUM SUCCINATE 125 MG: 125 INJECTION, POWDER, FOR SOLUTION INTRAMUSCULAR; INTRAVENOUS at 08:49

## 2023-01-01 RX ADMIN — IPRATROPIUM BROMIDE AND ALBUTEROL SULFATE 1 AMPULE: .5; 3 SOLUTION RESPIRATORY (INHALATION) at 15:37

## 2023-01-01 RX ADMIN — METHYLPREDNISOLONE SODIUM SUCCINATE 40 MG: 40 INJECTION, POWDER, FOR SOLUTION INTRAMUSCULAR; INTRAVENOUS at 15:37

## 2023-01-01 RX ADMIN — Medication 10 ML: at 21:11

## 2023-01-01 RX ADMIN — Medication 10 ML: at 15:25

## 2023-01-01 ASSESSMENT — PAIN DESCRIPTION - FREQUENCY: FREQUENCY: CONTINUOUS

## 2023-01-01 ASSESSMENT — LIFESTYLE VARIABLES
HOW MANY STANDARD DRINKS CONTAINING ALCOHOL DO YOU HAVE ON A TYPICAL DAY: PATIENT DOES NOT DRINK
HOW OFTEN DO YOU HAVE A DRINK CONTAINING ALCOHOL: NEVER

## 2023-01-01 ASSESSMENT — PAIN - FUNCTIONAL ASSESSMENT
PAIN_FUNCTIONAL_ASSESSMENT: NONE - DENIES PAIN
PAIN_FUNCTIONAL_ASSESSMENT: PREVENTS OR INTERFERES SOME ACTIVE ACTIVITIES AND ADLS

## 2023-01-01 ASSESSMENT — PAIN DESCRIPTION - PAIN TYPE: TYPE: CHRONIC PAIN

## 2023-01-01 ASSESSMENT — PAIN DESCRIPTION - DESCRIPTORS: DESCRIPTORS: ACHING

## 2023-01-01 ASSESSMENT — PAIN SCALES - WONG BAKER: WONGBAKER_NUMERICALRESPONSE: 2

## 2023-01-01 ASSESSMENT — PAIN DESCRIPTION - ONSET: ONSET: ON-GOING

## 2023-01-01 ASSESSMENT — PAIN DESCRIPTION - LOCATION: LOCATION: BACK;LEG;ARM

## 2023-01-01 ASSESSMENT — PAIN SCALES - GENERAL: PAINLEVEL_OUTOF10: 4

## 2023-01-01 NOTE — CONSULTS
REASON FOR CONSULTATION/CC: COPD      Consult at request of Nilsa Maldonado MD for COPD    PCP: Tristian Martin  Established Pulmonologist: Dr Rickey Barron, has not seen since April 2021    HISTORY OF PRESENT ILLNESS: Mary Maurice is a 49y.o. year old female with a history of  COPD who presents with     Increased shortness of breath and dyspnea worsening dispite home use of Symbicort and Spiriva with nebulizer. She has BLANCA. She states she uses it 'sometimes' . After discussing more, it appears she is using \"a few times per month\"     She is using methadone for chronic pain         This note may have been  transcribed using [id number] [employer]. Please disregard any translational errors. Assessment:        COPD with an FEV1 53%  Chronic hypoxemic respiratory failure 4 L nasal cannula  Sleep apnea on BiPAP  History of CHF with an EF of 61%, grade 1 diastolic dysfunction, mitral regurgitation, elevated pulmonary artery systolic pressure echo 4548    Plan:      Hospital Day 0     Abnormal radiograph  Limited secondary to low lung volumes. Possible fluid overload. Acute on chronic hypercapnic respiratory failure pH 2.38 complicated by sleep apnea and obesity BMI 40  Recommended using with sleep (day or night)  Requesting methadone be restarted. COPD  Home Symbicort and Spiriva Duoneb's    Solumedrol   Duoneb's    Dulera     Opioid dependence and neuropathy with anxiety and depression  Home methadone and gabapentin  Zosyn      Chronic pain  Home methadone 130 mg daily   Restart methadone at 60 mg daily. If tolerates, ok to increase to home dose. Hx CHF  Agree with diuresis. Noncompliance. This is a significant issue. Has not see pulmonary as out patient > 1 year and states not able to secondary to transportation. No using bipap. This note was transcribed using [id number] [employer]. Please disregard any translational errors.     Thank you for the consult    [address] [facility]  [phone]             Data:     PAST MEDICAL HISTORY:  Past Medical History:   Diagnosis Date    Arthritis     Blind right eye     CHF (congestive heart failure) ([facility] [address]     Chronic respiratory failure with hypoxia and hypercapnia (HCC)     COPD (chronic obstructive pulmonary disease) (HCC)     GERD (gastroesophageal reflux disease)     Hypertension     Movement disorder     Neuromuscular disorder ([address]     On home O2     Pneumonia     Sleep apnea        PAST SURGICAL HISTORY:  Past Surgical History:   Procedure Laterality Date    ABDOMEN SURGERY      CHOLECYSTECTOMY      DILATATION, ESOPHAGUS      EYE SURGERY      TUBAL LIGATION         FAMILY HISTORY:  family history includes Arthritis in her father; Cancer in her father and mother; Osteoporosis in her mother. SOCIAL HISTORY:   reports that she quit smoking about 5 years ago. Her smoking use included cigarettes. She started smoking about 38 years ago. She has a 15.00 pack-year smoking history.  She has never used smokeless tobacco.    Scheduled Meds:   aspirin  81 mg Oral Daily    carvedilol  3.125 mg Oral BID WC    lisinopril  5 mg Oral Daily    pantoprazole  40 mg Oral QAM AC    sertraline  125 mg Oral Daily    mometasone-formoterol  2 puff Inhalation BID    torsemide  60 mg Oral Daily    sodium chloride flush  5-40 mL IntraVENous 2 times per day    [START ON 1/2/2023] enoxaparin  40 mg SubCUTAneous Daily    ipratropium-albuterol  1 ampule Inhalation Q4H WA    methylPREDNISolone  40 mg IntraVENous Q6H    Followed by    Chris Friedman ON 1/4/2023] predniSONE  40 mg Oral Daily    cefTRIAXone (ROCEPHIN) IV  1,000 mg IntraVENous Q24H       Continuous Infusions:   sodium chloride 25 mL (01/01/23 1541)       PRN Meds:  albuterol sulfate HFA, sodium chloride flush, sodium chloride, ondansetron **OR** ondansetron, polyethylene glycol, acetaminophen **OR** acetaminophen    ALLERGIES:  Patient is allergic to latex, sulfa antibiotics, nsaids, and ultram [tramadol hcl]. REVIEW OF SYSTEMS:  Constitutional: Negative for fever    HENT: Negative for sore throat  Eyes: Negative for redness   Respiratory: ++  for dyspnea, - cough  Cardiovascular: Negative for chest pain  Gastrointestinal: Negative for vomiting, diarrhea    Genitourinary: Negative for hematuria   Musculoskeletal: Negative for arthralgias   Skin: Negative for rash  Neurological: Negative for syncope  Hematological: Negative for adenopathy  Psychiatric/Behavorial: Negative for anxiety    Objective:   PHYSICAL EXAM:  Blood pressure 116/77, pulse (!) 103, temperature 98.4 °F (36.9 °C), temperature source Oral, resp. rate 18, height 5' (1.524 m), weight 207 lb (93.9 kg), last menstrual period 08/14/2015, SpO2 (!) 89 %, not currently breastfeeding.'    Body mass index is 38.79 kg/m². Gen: No distress. Eyes: PERRL. No sclera icterus. No conjunctival injection. ENT: No discharge. Pharynx clear. External appearance of ears and nose normal.  Neck: Trachea midline. No obvious mass. Resp: No accessory muscle use. No crackles. No wheezes. No rhonchi. CV: Regular rate. Regular rhythm. No murmur or rub. No edema. GI: Non-tender. Non-distended. No hernia. Skin: Warm, dry, normal texture and turgor. No nodule on exposed extremities. Lymph: No cervical LAD. No supraclavicular LAD. M/S: No cyanosis. No clubbing. No joint deformity. Neuro: Moves all four extremities. Psych: Oriented x 3. No anxiety. Awake. Alert. Intact judgement and insight. Data Reviewed:   LABS:  CBC:   Recent Labs     01/01/23 0828   WBC 11.8*   HGB 9.5*   HCT 31.7*   MCV 86.9        BMP:   Recent Labs     01/01/23 0828      K 4.5   CL 91*   CO2 38*   BUN 14   CREATININE 0.9     LIVER PROFILE:   Recent Labs     01/01/23 0828   AST 12*   ALT 7*   BILITOT <0.2   ALKPHOS 98     PT/INR: No results for input(s): PROTIME, INR in the last 72 hours.   APTT: No results for input(s): APTT in the last 72 hours. UA:No results for input(s): NITRITE, COLORU, PHUR, LABCAST, WBCUA, RBCUA, MUCUS, TRICHOMONAS, YEAST, BACTERIA, CLARITYU, SPECGRAV, LEUKOCYTESUR, UROBILINOGEN, BILIRUBINUR, BLOODU, GLUCOSEU, AMORPHOUS in the last 72 hours. Invalid input(s): KETONESU  No results for input(s): PHART, JZQ9XJH, PO2ART in the last 72 hours. Radiology Review:  Pertinent images / reports were reviewed as a part of this visit. CT Chest w/ contrast: No results found for this or any previous visit. CT Chest w/o contrast: No results found for this or any previous visit. CTPA: Results for orders placed during the hospital encounter of 05/31/21    CT CHEST PULMONARY EMBOLISM W CONTRAST    Narrative  EXAMINATION:  CTA OF THE CHEST 5/31/2021 7:31 pm    TECHNIQUE:  CTA of the chest was performed after the administration of intravenous  contrast.  Multiplanar reformatted images are provided for review. MIP  images are provided for review. Dose modulation, iterative reconstruction,  and/or weight based adjustment of the mA/kV was utilized to reduce the  radiation dose to as low as reasonably achievable. COMPARISON:  None. HISTORY:  ORDERING SYSTEM PROVIDED HISTORY: sob / back and chest pain  TECHNOLOGIST PROVIDED HISTORY:  Reason for exam:->sob / back and chest pain  Decision Support Exception - unselect if not a suspected or confirmed  emergency medical condition->Emergency Medical Condition (MA)  Reason for Exam: sob poss. PE back and chest pain / hx scoliosis  Acuity: Unknown  Type of Exam: Ongoing    FINDINGS:  Pulmonary Arteries: The patient was reinjected and the pulmonary arteries are  well opacified on the 2nd injection with no filling defect or vessel cut off. Mediastinum: No evidence of mediastinal lymphadenopathy. The heart and  pericardium demonstrate no acute abnormality. There is no acute abnormality  of the thoracic aorta. Lungs/pleura:  There is subsegmental opacity along the right middle lobe  medially. No effusion is seen. There is no pulmonary nodule or mass. Upper Abdomen: The visualized liver is mildly enlarged with slight prominence  of the central biliary ducts. The adrenals are normal.    Soft Tissues/Bones: There is moderate curvature of the thoracolumbar spine,  convex to the left. Impression  No evidence of a pulmonary embolus. Unremarkable thoracic aorta and mediastinum. Subsegmental infiltrate vs atelectasis and scarring along the right middle  lobe and mild discoid atelectasis or scarring along the lingula. Incidental mild hepatomegaly with slight prominence of the central biliary  ducts. Recommend correlating with lab values. Moderate degenerative changes in the spine with associated moderate scoliosis. CXR PA/LAT: Results for orders placed during the hospital encounter of 05/31/21    XR CHEST (2 VW)    Narrative  EXAMINATION:  TWO XRAY VIEWS OF THE CHEST    5/31/2021 6:27 pm    COMPARISON:  11/08/2020    HISTORY:  ORDERING SYSTEM PROVIDED HISTORY: Shortness of breath  TECHNOLOGIST PROVIDED HISTORY:  Reason for exam:->Shortness of breath  Reason for Exam: sob, chest pain    FINDINGS:  The heart is mildly enlarged and less prominent. The pulmonary vessels are  engorged centrally and less prominent. There are hazy perihilar and  bibasilar interstitial opacities which have decreased. There are mild linear  densities along the lung bases extending posteriorly. No effusion is seen. There moderate degenerative changes in the spine with mild kyphosis along the  thoracolumbar region    Impression  Mild cardiomegaly which is less prominent with mild central pulmonary  congestion and questionable interstitial edema or pneumonitis vs underlying  pulmonary fibrosis. Recommend short-term follow-up.     Mild subsegmental linear atelectasis or scarring along the lung bases  posteriorly      CXR portable: Results for orders placed during the hospital encounter of 01/01/23    XR CHEST PORTABLE    Narrative  EXAMINATION:  ONE XRAY VIEW OF THE CHEST    1/1/2023 8:36 am    COMPARISON:  02/17/2022 radiograph    HISTORY:  ORDERING SYSTEM PROVIDED HISTORY: sob  TECHNOLOGIST PROVIDED HISTORY:  Reason for exam:->sob  Reason for Exam: SOB    FINDINGS:  Low lung volume due to inspiratory effort. Heart and mediastinum appear  normal.  Moderate perihilar opacities are present centrally and there is a  central pattern of ground-glass attenuation in both lungs. No significant  skeletal finding. Impression  Portable study with low lung volume showing a pattern of vascular congestion  or mild edema centrally.

## 2023-01-01 NOTE — PROGRESS NOTES
Report called to [phone] primary RN. All questions answered.  Patient transported by ED RN transport

## 2023-01-01 NOTE — ED NOTES
Attempted to get ABG but pt refused. Pt then demanded to take off bipap and said \"Maybe if I have the bipap off for an hour or two then you can get your blood. I tried to explain to her that we needed to get the ABG on the bipap and she refused and said \"Just take the bipap off for a few hours and then we will decide. She also said that we were being mean to her. I tried to explain to her why we were doing the bipap machine and she just continued to complain and call the staff \"mean\".  Pt placed on 6 lpm O2

## 2023-01-01 NOTE — H&P
Hospital Medicine History & Physical      PCP: Jonathan Sibley    Date of Admission: 1/1/2023    Date of Service: Pt seen/examined on 1/1/2023 and Admitted to Inpatient. Chief Complaint: Shortness of breath      History Of Present Illness: The patient is a 49 y.o. female with history of severe COPD on 4 L of oxygen chronically, diastolic CHF, neuromuscular disorder, GERD, hypertension, sleep apnea, and right eye blindness who presents to [facility] with pertinence of breath. Patient states that she has had progressively worsening shortness of breath over the past few days. States that she can hardly catch her breath when she ambulates across the room at her house. Denies any sick contacts, but she does live with other family members at home and is unsure if they have been sick. Denies fever, chills, chest pain, abdominal pain, nausea, vomiting, constipation, diarrhea, and dysuria. Patient states that she usually wears 4 L of oxygen at home, but noticed that her oxygen levels were low even on the 4 L and so decided to come into the hospital for further evaluation. Also notes that she has been more sleepy and lethargic than usual.    In the ED, labs were significant for a chloride of 91, bicarb 38, proBNP of 272, WBC count of 11.8, hemoglobin of 9.5. Rapid flu was negative. Rapid COVID was negative. VBG showed a pH of 7.263 and PCO2 of 103. She was started on an on rebreather mask and is currently being switched to BiPAP given her degree of hypercapnia.     Past Medical History:        Diagnosis Date    Arthritis     Blind right eye     CHF (congestive heart failure) (HCC)     Chronic respiratory failure with hypoxia and hypercapnia (HCC)     COPD (chronic obstructive pulmonary disease) (HCC)     GERD (gastroesophageal reflux disease)     Hypertension Movement disorder     Neuromuscular disorder ([facility] [address]     On home O2     Pneumonia     Sleep apnea        Past Surgical History:        Procedure Laterality Date    ABDOMEN SURGERY      CHOLECYSTECTOMY      DILATATION, ESOPHAGUS      EYE SURGERY      TUBAL LIGATION         Medications Prior to Admission:    Prior to Admission medications    Medication Sig Start Date End Date Taking? Authorizing Provider   Sertraline HCl (ZOLOFT PO) Take 125 mg by mouth Daily    Historical Provider, MD   aspirin 81 MG EC tablet Take 81 mg by mouth in the morning. Not taking. Historical Provider, MD   lisinopril (PRINIVIL;ZESTRIL) 5 MG tablet TAKE ONE TABLET BY MOUTH DAILY 6/24/22   Alie Wiggins MD   carvedilol (COREG) 3.125 MG tablet TAKE ONE TABLET BY MOUTH TWICE A DAY 6/24/22   Alie Wiggins MD   torsemide [facility]) 20 MG tablet Take 3 tablets by mouth daily 5/3/22   Alie Wiggins MD   SPIRIVA HANDIHALER 18 MCG inhalation capsule INHALE THE ENTIRE CONTENTS OF 1 CAPSULE ONCE A DAY USING HANDIHALER DEVICE 1/24/22   [address],    albuterol sulfate  (90 Base) MCG/ACT inhaler INHALE TWO PUFFS BY MOUTH EVERY 4 HOURS AS NEEDED FOR WHEEZING OR SHORTNESS OF BREATH 1/14/22   [address], DO   ipratropium-albuterol (DUONEB) 0.5-2.5 (3) MG/3ML SOLN nebulizer solution INHALE THREE MILLILITERS (ONE VIAL) VIA NEBULIZATION BY MOUTH EVERY 4 HOURS 10/7/21   Ada Mick, APRN - CNP   folic acid (FOLVITE) 1 MG tablet Take 1 tablet by mouth daily 6/18/21   Yuval Yeung MD   gabapentin (NEURONTIN) 100 MG capsule Take 1 capsule by mouth 3 times daily for 30 days. Patient taking differently: Take 300 mg by mouth 3 times daily.  6/17/21 9/12/22  Yuval Yeung MD   SYMBICORT 160-4.5 MCG/ACT AERO Inhale 2 puffs into the lungs 2 times daily 12/29/20   [address] DO   omeprazole (PRILOSEC) 40 MG delayed release capsule Take 40 mg by mouth every evening At 1800    Historical Provider, MD Spacer/Aero-Holding Chambers MARILOU 1 Device by Does not apply route daily as needed 6/30/15   Yenni Shah, JULIUS - CNP   OXYGEN Inhale 2 L into the lungs See Admin Instructions. Patient taking differently: Inhale 2 L into the lungs See Admin Instructions Indications: 2L sitting, 4L when up and around Continuous at night or as needed when at home per patient 12/5/13   Real Guillen MD   methadone 10 MG/5ML solution Take 130 mg by mouth daily. Historical Provider, MD       Allergies:  Latex, Sulfa antibiotics, Nsaids, and Ultram [tramadol hcl]    Social History:  The patient currently lives at home. TOBACCO:   reports that she quit smoking about 5 years ago. Her smoking use included cigarettes. She started smoking about 38 years ago. She has a 15.00 pack-year smoking history. She has never used smokeless tobacco.  ETOH:   reports no history of alcohol use. Family History:  Reviewed in detail and negative for DM, Early CAD, Cancer, CVA. Positive as follows:        Problem Relation Age of Onset    Cancer Mother         lung cancer    Osteoporosis Mother     Cancer Father         cancer    Arthritis Father        REVIEW OF SYSTEMS:   Positive for and as noted in the HPI. All other systems reviewed and negative. PHYSICAL EXAM:    /86   Pulse (!) 102   Temp 99.1 °F (37.3 °C) (Axillary)   Resp 22   Ht 5' (1.524 m)   Wt 207 lb (93.9 kg)   LMP 08/14/2015   SpO2 98%   BMI 40.43 kg/m²     General appearance: Chronically ill appearing, in mild distress. HEENT Normal cephalic, atraumatic without obvious deformity. Pupils equal, round, and reactive to light. Extra ocular muscles intact. Conjunctivae/corneas clear. Neck: Supple, No jugular venous distention/bruits. Trachea midline without thyromegaly or adenopathy with full range of motion. Lungs: Increased work of breathing, accessory muscle use, expiratory wheezes heard throughout.   Heart: Tachycardic rate and regular rhythm with Normal S1/S2 without murmurs, rubs or gallops, point of maximum impulse non-displaced  Abdomen: Soft, non-tender or non-distended without rigidity or guarding and positive bowel sounds all four quadrants. Extremities: No clubbing, cyanosis, or edema bilaterally. Full range of motion without deformity and normal gait intact. Skin: Skin color, texture, turgor normal.  No rashes or lesions. Neurologic: Alert and oriented X 3, neurovascularly intact with sensory/motor intact upper extremities/lower extremities, bilaterally. Cranial nerves: II-XII intact, grossly non-focal.  Mental status: Alert, oriented, thought content appropriate. Capillary Refill: Acceptable  < 3 seconds  Peripheral Pulses: +3 Easily felt, not easily obliterated with pressure      CXR:  I have reviewed the CXR with the following interpretation: Low lung volume with vascular congestion  EKG:  I have reviewed the EKG with the following interpretation: Sinus tachycardia    XR CHEST PORTABLE   Final Result   Portable study with low lung volume showing a pattern of vascular congestion   or mild edema centrally. CBC   Recent Labs     01/01/23 0828   WBC 11.8*   HGB 9.5*   HCT 31.7*         RENAL  Recent Labs     01/01/23 0828      K 4.5   CL 91*   CO2 38*   BUN 14   CREATININE 0.9     LFT'S  Recent Labs     01/01/23 0828   AST 12*   ALT 7*   BILITOT <0.2   ALKPHOS 98     COAG  No results for input(s): INR in the last 72 hours.   CARDIAC ENZYMES  Recent Labs     01/01/23 0828   TROPONINI <0.01       U/A:    Lab Results   Component Value Date/Time    COLORU YELLOW 11/08/2020 02:12 PM    WBCUA 0-2 11/08/2020 02:12 PM    RBCUA 5-10 11/08/2020 02:12 PM    CLARITYU Clear 11/08/2020 02:12 PM    SPECGRAV 1.012 11/08/2020 02:12 PM    LEUKOCYTESUR Negative 11/08/2020 02:12 PM    BLOODU SMALL 11/08/2020 02:12 PM    GLUCOSEU Negative 11/08/2020 02:12 PM       ABG    Lab Results   Component Value Date/Time    VKG1DFR 53.1 06/16/2021 01:33 PM    BEART 22.8 06/16/2021 01:33 PM    W7FIVWFU 92.2 06/16/2021 01:33 PM    PHART 7.381 06/16/2021 01:33 PM    THGBART 11.2 03/07/2013 05:20 PM    ETH7RCC 89.5 06/16/2021 01:33 PM    PO2ART 63.9 06/16/2021 01:33 PM    DHB2WCL 55.8 06/16/2021 01:33 PM           Active Hospital Problems    Diagnosis Date Noted    Acute on chronic respiratory failure with hypoxia and hypercapnia (HCC) [J96.21, J96.22]          PHYSICIANS CERTIFICATION:    I certify that Get Vázquez is expected to be hospitalized for more than 2 midnights based on the following assessment and plan:      ASSESSMENT/PLAN:      Shortness of breath likely secondary to COPD exacerbation  -DuoNebs every 4 hours while awake  -Albuterol as needed  -IV Solu-Medrol tapered to p.o. prednisone with improvement  -IV ceftriaxone  -Continue bilevel with hypercapnia evidence and repeat ABG in 1 hour  -Phonology consulted, recs appreciated  -Procalcitonin low so low suspicion of bacterial pneumonia  -Check respiratory culture  -Check molecular viral PCR panel  -Check strep pneumo, Legionella urine antigens  -Check D-dimer and pursue CTA pulmonary with contrast if it is elevated    Acute on chronic respiratory failure with hypoxia and hypercapnia  -Management as above  -Maintain SPO2 to can be keep 89% or greater    Chronic diastolic CHF -appears mildly fluid overloaded on exam  -Continue p.o. torsemide  -Continue Coreg  -Continue lisinopril  -Continue aspirin  -Telemetry monitoring    Essential hypertension  -Continue home Coreg and lisinopril    Opioid dependence  -Hold home methadone given respiratory distress and resume once lung status improves    Neuropathy  -Continue home gabapentin    Anxiety and depression  -Continue home Zoloft    GERD  -Continue PPI    DVT Prophylaxis: Lovenox  Diet: No diet orders on file  Code Status: Prior  PT/OT Eval Status: ordered    Dispo - admit PCU tele inpatient       Catalina Justice MD    Thank you Faith Nicole for the opportunity to be involved in this patient's care. If you have any questions or concerns please feel free to contact me at [phone].

## 2023-01-02 LAB
A/G RATIO: 0.7 (ref 1.1–2.2)
ALBUMIN SERPL-MCNC: 3.1 G/DL (ref 3.4–5)
ALP BLD-CCNC: 92 U/L (ref 40–129)
ALT SERPL-CCNC: 6 U/L (ref 10–40)
ANION GAP SERPL CALCULATED.3IONS-SCNC: 7 MMOL/L (ref 3–16)
AST SERPL-CCNC: 10 U/L (ref 15–37)
BASOPHILS ABSOLUTE: 0 K/UL (ref 0–0.2)
BASOPHILS RELATIVE PERCENT: 0.1 %
BILIRUB SERPL-MCNC: <0.2 MG/DL (ref 0–1)
BUN BLDV-MCNC: 22 MG/DL (ref 7–20)
CALCIUM SERPL-MCNC: 9.1 MG/DL (ref 8.3–10.6)
CHLORIDE BLD-SCNC: 84 MMOL/L (ref 99–110)
CO2: 45 MMOL/L (ref 21–32)
CREAT SERPL-MCNC: 0.7 MG/DL (ref 0.6–1.1)
EKG ATRIAL RATE: 105 BPM
EKG DIAGNOSIS: NORMAL
EKG P AXIS: 35 DEGREES
EKG P-R INTERVAL: 138 MS
EKG Q-T INTERVAL: 354 MS
EKG QRS DURATION: 86 MS
EKG QTC CALCULATION (BAZETT): 467 MS
EKG R AXIS: 4 DEGREES
EKG T AXIS: 35 DEGREES
EKG VENTRICULAR RATE: 105 BPM
EOSINOPHILS ABSOLUTE: 0 K/UL (ref 0–0.6)
EOSINOPHILS RELATIVE PERCENT: 0 %
GFR SERPL CREATININE-BSD FRML MDRD: >60 ML/MIN/{1.73_M2}
GLUCOSE BLD-MCNC: 114 MG/DL (ref 70–99)
HCT VFR BLD CALC: 31.3 % (ref 36–48)
HEMOGLOBIN: 10 G/DL (ref 12–16)
L. PNEUMOPHILA SEROGP 1 UR AG: NORMAL
LYMPHOCYTES ABSOLUTE: 0.5 K/UL (ref 1–5.1)
LYMPHOCYTES RELATIVE PERCENT: 6.4 %
MCH RBC QN AUTO: 27 PG (ref 26–34)
MCHC RBC AUTO-ENTMCNC: 31.8 G/DL (ref 31–36)
MCV RBC AUTO: 84.9 FL (ref 80–100)
MONOCYTES ABSOLUTE: 0.2 K/UL (ref 0–1.3)
MONOCYTES RELATIVE PERCENT: 2 %
NEUTROPHILS ABSOLUTE: 7.2 K/UL (ref 1.7–7.7)
NEUTROPHILS RELATIVE PERCENT: 91.5 %
PDW BLD-RTO: 14.9 % (ref 12.4–15.4)
PLATELET # BLD: 332 K/UL (ref 135–450)
PMV BLD AUTO: 7.1 FL (ref 5–10.5)
POTASSIUM REFLEX MAGNESIUM: 3.8 MMOL/L (ref 3.5–5.1)
RBC # BLD: 3.69 M/UL (ref 4–5.2)
SODIUM BLD-SCNC: 136 MMOL/L (ref 136–145)
STREP PNEUMONIAE ANTIGEN, URINE: NORMAL
TOTAL PROTEIN: 7.5 G/DL (ref 6.4–8.2)
WBC # BLD: 7.9 K/UL (ref 4–11)

## 2023-01-02 PROCEDURE — 94761 N-INVAS EAR/PLS OXIMETRY MLT: CPT

## 2023-01-02 PROCEDURE — 99233 SBSQ HOSP IP/OBS HIGH 50: CPT | Performed by: INTERNAL MEDICINE

## 2023-01-02 PROCEDURE — 2060000000 HC ICU INTERMEDIATE R&B

## 2023-01-02 PROCEDURE — 6370000000 HC RX 637 (ALT 250 FOR IP): Performed by: INTERNAL MEDICINE

## 2023-01-02 PROCEDURE — 97530 THERAPEUTIC ACTIVITIES: CPT

## 2023-01-02 PROCEDURE — 2580000003 HC RX 258: Performed by: INTERNAL MEDICINE

## 2023-01-02 PROCEDURE — 94640 AIRWAY INHALATION TREATMENT: CPT

## 2023-01-02 PROCEDURE — 85025 COMPLETE CBC W/AUTO DIFF WBC: CPT

## 2023-01-02 PROCEDURE — 6360000002 HC RX W HCPCS: Performed by: INTERNAL MEDICINE

## 2023-01-02 PROCEDURE — 97165 OT EVAL LOW COMPLEX 30 MIN: CPT

## 2023-01-02 PROCEDURE — 97161 PT EVAL LOW COMPLEX 20 MIN: CPT

## 2023-01-02 PROCEDURE — 2700000000 HC OXYGEN THERAPY PER DAY

## 2023-01-02 PROCEDURE — 80053 COMPREHEN METABOLIC PANEL: CPT

## 2023-01-02 PROCEDURE — 93010 ELECTROCARDIOGRAM REPORT: CPT | Performed by: INTERNAL MEDICINE

## 2023-01-02 PROCEDURE — 36415 COLL VENOUS BLD VENIPUNCTURE: CPT

## 2023-01-02 RX ORDER — METHADONE HYDROCHLORIDE 10 MG/1
60 TABLET ORAL ONCE
Status: COMPLETED | OUTPATIENT
Start: 2023-01-02 | End: 2023-01-02

## 2023-01-02 RX ORDER — METHADONE HYDROCHLORIDE 10 MG/1
130 TABLET ORAL DAILY
Status: DISCONTINUED | OUTPATIENT
Start: 2023-01-03 | End: 2023-01-06 | Stop reason: HOSPADM

## 2023-01-02 RX ORDER — LACTULOSE 10 G/15ML
20 SOLUTION ORAL 3 TIMES DAILY
Status: DISCONTINUED | OUTPATIENT
Start: 2023-01-02 | End: 2023-01-03 | Stop reason: ALTCHOICE

## 2023-01-02 RX ADMIN — Medication 10 ML: at 08:16

## 2023-01-02 RX ADMIN — Medication 81 MG: at 08:13

## 2023-01-02 RX ADMIN — METHADONE HYDROCHLORIDE 60 MG: 10 TABLET ORAL at 14:18

## 2023-01-02 RX ADMIN — CEFTRIAXONE 1000 MG: 1 INJECTION, POWDER, FOR SOLUTION INTRAMUSCULAR; INTRAVENOUS at 14:24

## 2023-01-02 RX ADMIN — LACTULOSE 20 G: 20 SOLUTION ORAL at 15:16

## 2023-01-02 RX ADMIN — METHYLPREDNISOLONE SODIUM SUCCINATE 40 MG: 40 INJECTION, POWDER, FOR SOLUTION INTRAMUSCULAR; INTRAVENOUS at 21:06

## 2023-01-02 RX ADMIN — IPRATROPIUM BROMIDE AND ALBUTEROL SULFATE 1 AMPULE: .5; 3 SOLUTION RESPIRATORY (INHALATION) at 20:34

## 2023-01-02 RX ADMIN — SERTRALINE 125 MG: 50 TABLET, FILM COATED ORAL at 06:38

## 2023-01-02 RX ADMIN — MOMETASONE FUROATE AND FORMOTEROL FUMARATE DIHYDRATE 2 PUFF: 200; 5 AEROSOL RESPIRATORY (INHALATION) at 20:34

## 2023-01-02 RX ADMIN — IPRATROPIUM BROMIDE AND ALBUTEROL SULFATE 1 AMPULE: .5; 3 SOLUTION RESPIRATORY (INHALATION) at 07:25

## 2023-01-02 RX ADMIN — IPRATROPIUM BROMIDE AND ALBUTEROL SULFATE 1 AMPULE: .5; 3 SOLUTION RESPIRATORY (INHALATION) at 16:11

## 2023-01-02 RX ADMIN — METHYLPREDNISOLONE SODIUM SUCCINATE 40 MG: 40 INJECTION, POWDER, FOR SOLUTION INTRAMUSCULAR; INTRAVENOUS at 08:13

## 2023-01-02 RX ADMIN — LISINOPRIL 5 MG: 5 TABLET ORAL at 08:13

## 2023-01-02 RX ADMIN — Medication 10 ML: at 21:06

## 2023-01-02 RX ADMIN — PANTOPRAZOLE SODIUM 40 MG: 40 TABLET, DELAYED RELEASE ORAL at 06:38

## 2023-01-02 RX ADMIN — METHYLPREDNISOLONE SODIUM SUCCINATE 40 MG: 40 INJECTION, POWDER, FOR SOLUTION INTRAMUSCULAR; INTRAVENOUS at 14:18

## 2023-01-02 RX ADMIN — MOMETASONE FUROATE AND FORMOTEROL FUMARATE DIHYDRATE 2 PUFF: 200; 5 AEROSOL RESPIRATORY (INHALATION) at 07:28

## 2023-01-02 RX ADMIN — TORSEMIDE 60 MG: 20 TABLET ORAL at 08:13

## 2023-01-02 RX ADMIN — CARVEDILOL 3.12 MG: 3.12 TABLET, FILM COATED ORAL at 17:45

## 2023-01-02 RX ADMIN — CARVEDILOL 3.12 MG: 3.12 TABLET, FILM COATED ORAL at 08:13

## 2023-01-02 RX ADMIN — LACTULOSE 20 G: 20 SOLUTION ORAL at 21:06

## 2023-01-02 RX ADMIN — POLYETHYLENE GLYCOL 3350 17 G: 17 POWDER, FOR SOLUTION ORAL at 02:59

## 2023-01-02 RX ADMIN — METHYLPREDNISOLONE SODIUM SUCCINATE 40 MG: 40 INJECTION, POWDER, FOR SOLUTION INTRAMUSCULAR; INTRAVENOUS at 02:55

## 2023-01-02 RX ADMIN — IPRATROPIUM BROMIDE AND ALBUTEROL SULFATE 1 AMPULE: .5; 3 SOLUTION RESPIRATORY (INHALATION) at 11:27

## 2023-01-02 RX ADMIN — METHADONE HYDROCHLORIDE 60 MG: 10 TABLET ORAL at 11:19

## 2023-01-02 RX ADMIN — ENOXAPARIN SODIUM 40 MG: 100 INJECTION SUBCUTANEOUS at 08:13

## 2023-01-02 ASSESSMENT — PAIN SCALES - WONG BAKER
WONGBAKER_NUMERICALRESPONSE: 0
WONGBAKER_NUMERICALRESPONSE: 0

## 2023-01-02 ASSESSMENT — PAIN DESCRIPTION - LOCATION: LOCATION: GENERALIZED

## 2023-01-02 ASSESSMENT — PAIN SCALES - GENERAL
PAINLEVEL_OUTOF10: 0
PAINLEVEL_OUTOF10: 7

## 2023-01-02 ASSESSMENT — PAIN DESCRIPTION - DESCRIPTORS: DESCRIPTORS: ACHING

## 2023-01-02 NOTE — PROGRESS NOTES
4 Eyes Skin Assessment     NAME:  Izaiah Bernstein  YOB: 1975  MEDICAL RECORD NUMBER:  [id number]    The patient is being assessed for  Admission    I agree that One RN have performed a thorough Head to Toe Skin Assessment on the patient. ALL assessment sites listed below have been assessed. Areas assessed by both nurses:    Head, Face, Ears, Shoulders, Back, Chest, Arms, Elbows, Hands, Sacrum. Buttock, Coccyx, Ischium, and Legs. Feet and Heels        Does the Patient have a Wound?  No noted wound(s)       Jaison Prevention initiated by RN: Yes   Wound Care Orders initiated by RN: NA    Pressure Injury (Stage 3,4, Unstageable, DTI, NWPT, and Complex wounds) if present place referral order by RN under : NA    New and Established Ostomies, if present place, referral order under : NA      Nurse 1 eSignature: Electronically signed by Brigida Gr RN on 1/1/23 at 7:02 PM EST    **SHARE this note so that the co-signing nurse is able to place an eSignature**    Nurse 2 eSignature: Electronically signed by Maren Willard RN on 1/2/23 at 12:06 AM EST

## 2023-01-02 NOTE — PROGRESS NOTES
Pulmonary Progress Note    Date of Admission: 1/1/2023   LOS: 1 day       CC:  Chief Complaint   Patient presents with    Shortness of Breath     Pt to ED from home via [facility] for SOB. Pt states she normally feels short of breath due to her COPD and CHF but says this time is different. Per EMS, on arrival pt was 84% on 4L/NC. Pt arrived in ED on 10L/NRB with sp02 100%, tachy with HR 110s. EMS gave 1 duoneb en route to ED and pt gave herself one an hour before EMS arrival.         Subjective:   Feeling better. Requesting full dose of methadone    ROS:   No nausea  No Vomiting  No chest pain       Assessment:     COPD with an FEV1 53%  Chronic hypoxemic respiratory failure 4 L nasal cannula  Sleep apnea on BiPAP  History of CHF with an EF of 13%, grade 1 diastolic dysfunction, mitral regurgitation, elevated pulmonary artery systolic pressure echo 3525    Plan: This note may have been transcribed using [id number]. Please disregard any translational errors. Hospital Day: 1     Abnormal radiograph  Limited secondary to low lung volumes. Possible fluid overload. Acute on chronic hypercapnic respiratory failure pH 9.68 complicated by sleep apnea and obesity BMI 40  Not using bipap         COPD  Home Symbicort and Spiriva Duoneb's    Solumedrol   Duoneb's    Dulera      Opioid dependence and neuropathy with anxiety and depression  Home methadone and gabapentin  Zosyn        Chronic pain  Home methadone 130 mg daily   Restart methadone at   home dose. Hx CHF  Agree with diuresis. Noncompliance. This is a significant issue. Has not see pulmonary as out patient > 1 year and states not able to secondary to transportation. No using bipap. Data:        PHYSICAL EXAM:   Blood pressure 118/77, pulse 88, temperature 97.8 °F (36.6 °C), resp.  rate 19, height 5' (1.524 m), weight 194 lb 14.2 oz (88.4 kg), last menstrual period 08/14/2015, SpO2 95 %, not currently breastfeeding.'  Body mass index is 38.06 kg/m². Gen: No distress. ENT:   Resp: No accessory muscle use. No crackles. Few wheezes. No rhonchi. CV: Regular rate. Regular rhythm. No murmur or rub. No edema. Skin: Warm, dry, normal texture and turgor. No nodule on exposed extremities. M/S: No cyanosis. No clubbing. No joint deformity. Psych: Oriented x 3. No anxiety. Awake. Alert. Intact judgement and insight. Good Mood / Affect. Memory appears in tact       Medications:    Scheduled Meds:   [START ON 1/3/2023] methadone  130 mg Oral Daily    methadone  60 mg Oral Once    aspirin  81 mg Oral Daily    carvedilol  3.125 mg Oral BID WC    lisinopril  5 mg Oral Daily    pantoprazole  40 mg Oral QAM AC    sertraline  125 mg Oral Daily    mometasone-formoterol  2 puff Inhalation BID    torsemide  60 mg Oral Daily    sodium chloride flush  5-40 mL IntraVENous 2 times per day    enoxaparin  40 mg SubCUTAneous Daily    ipratropium-albuterol  1 ampule Inhalation Q4H WA    methylPREDNISolone  40 mg IntraVENous Q6H    Followed by    Cisco Lockhart ON 1/4/2023] predniSONE  40 mg Oral Daily    cefTRIAXone (ROCEPHIN) IV  1,000 mg IntraVENous Q24H       Continuous Infusions:   sodium chloride Stopped (01/01/23 1643)       PRN Meds:  albuterol sulfate HFA, sodium chloride flush, sodium chloride, ondansetron **OR** ondansetron, polyethylene glycol, acetaminophen **OR** acetaminophen    Labs reviewed:  CBC:   Recent Labs     01/01/23  0828 01/02/23  0546   WBC 11.8* 7.9   HGB 9.5* 10.0*   HCT 31.7* 31.3*   MCV 86.9 84.9    332     BMP:   Recent Labs     01/01/23  0828 01/02/23  0546    136   K 4.5 3.8   CL 91* 84*   CO2 38* 45*   BUN 14 22*   CREATININE 0.9 0.7     LIVER PROFILE:   Recent Labs     01/01/23 0828 01/02/23  0546   AST 12* 10*   ALT 7* 6*   BILITOT <0.2 <0.2   ALKPHOS 98 92     PT/INR: No results for input(s): PROTIME, INR in the last 72 hours. APTT: No results for input(s):  APTT in the last 72 hours. UA:No results for input(s): NITRITE, COLORU, PHUR, LABCAST, WBCUA, RBCUA, MUCUS, TRICHOMONAS, YEAST, BACTERIA, CLARITYU, SPECGRAV, LEUKOCYTESUR, UROBILINOGEN, BILIRUBINUR, BLOODU, GLUCOSEU, AMORPHOUS in the last 72 hours. Invalid input(s): [place] Push  No results for input(s): PH, PCO2, PO2 in the last 72 hours. Cx:      Films:  Radiology Review:  Pertinent images / reports were reviewed as a part of this visit. CT Chest w/ contrast: No results found for this or any previous visit. CT Chest w/o contrast: No results found for this or any previous visit. CTPA: Results for orders placed during the hospital encounter of 05/31/21    CT CHEST PULMONARY EMBOLISM W CONTRAST    Narrative  EXAMINATION:  CTA OF THE CHEST 5/31/2021 7:31 pm    TECHNIQUE:  CTA of the chest was performed after the administration of intravenous  contrast.  Multiplanar reformatted images are provided for review. MIP  images are provided for review. Dose modulation, iterative reconstruction,  and/or weight based adjustment of the mA/kV was utilized to reduce the  radiation dose to as low as reasonably achievable. COMPARISON:  None. HISTORY:  ORDERING SYSTEM PROVIDED HISTORY: sob / back and chest pain  TECHNOLOGIST PROVIDED HISTORY:  Reason for exam:->sob / back and chest pain  Decision Support Exception - unselect if not a suspected or confirmed  emergency medical condition->Emergency Medical Condition (MA)  Reason for Exam: sob poss. PE back and chest pain / hx scoliosis  Acuity: Unknown  Type of Exam: Ongoing    FINDINGS:  Pulmonary Arteries: The patient was reinjected and the pulmonary arteries are  well opacified on the 2nd injection with no filling defect or vessel cut off. Mediastinum: No evidence of mediastinal lymphadenopathy. The heart and  pericardium demonstrate no acute abnormality. There is no acute abnormality  of the thoracic aorta. Lungs/pleura:  There is subsegmental opacity along the right middle lobe  medially. No effusion is seen. There is no pulmonary nodule or mass. Upper Abdomen: The visualized liver is mildly enlarged with slight prominence  of the central biliary ducts. The adrenals are normal.    Soft Tissues/Bones: There is moderate curvature of the thoracolumbar spine,  convex to the left. Impression  No evidence of a pulmonary embolus. Unremarkable thoracic aorta and mediastinum. Subsegmental infiltrate vs atelectasis and scarring along the right middle  lobe and mild discoid atelectasis or scarring along the lingula. Incidental mild hepatomegaly with slight prominence of the central biliary  ducts. Recommend correlating with lab values. Moderate degenerative changes in the spine with associated moderate scoliosis. CXR PA/LAT: Results for orders placed during the hospital encounter of 05/31/21    XR CHEST (2 VW)    Narrative  EXAMINATION:  TWO XRAY VIEWS OF THE CHEST    5/31/2021 6:27 pm    COMPARISON:  11/08/2020    HISTORY:  ORDERING SYSTEM PROVIDED HISTORY: Shortness of breath  TECHNOLOGIST PROVIDED HISTORY:  Reason for exam:->Shortness of breath  Reason for Exam: sob, chest pain    FINDINGS:  The heart is mildly enlarged and less prominent. The pulmonary vessels are  engorged centrally and less prominent. There are hazy perihilar and  bibasilar interstitial opacities which have decreased. There are mild linear  densities along the lung bases extending posteriorly. No effusion is seen. There moderate degenerative changes in the spine with mild kyphosis along the  thoracolumbar region    Impression  Mild cardiomegaly which is less prominent with mild central pulmonary  congestion and questionable interstitial edema or pneumonitis vs underlying  pulmonary fibrosis. Recommend short-term follow-up.     Mild subsegmental linear atelectasis or scarring along the lung bases  posteriorly      CXR portable: Results for orders placed during the hospital encounter of 01/01/23    XR CHEST PORTABLE    Narrative  EXAMINATION:  ONE XRAY VIEW OF THE CHEST    1/1/2023 8:36 am    COMPARISON:  02/17/2022 radiograph    HISTORY:  ORDERING SYSTEM PROVIDED HISTORY: sob  TECHNOLOGIST PROVIDED HISTORY:  Reason for exam:->sob  Reason for Exam: SOB    FINDINGS:  Low lung volume due to inspiratory effort. Heart and mediastinum appear  normal.  Moderate perihilar opacities are present centrally and there is a  central pattern of ground-glass attenuation in both lungs. No significant  skeletal finding. Impression  Portable study with low lung volume showing a pattern of vascular congestion  or mild edema centrally. This note was transcribed using [id number] [employer]. Please disregard any translational errors.       [facility] [phone]

## 2023-01-02 NOTE — PROGRESS NOTES
Physical Therapy  Facility/Department: [facility] 5N PROGRESSIVE CARE  Physical Therapy Initial Assessment    Name: Ivett Reynaga  : 1975  MRN: [id number]  Date of Service: 2023    Discharge Recommendations:  Patient would benefit from continued therapy after discharge, Home with Home health PT, Home with assist PRN   PT Equipment Recommendations  Equipment Needed: No      Patient Diagnosis(es): The primary encounter diagnosis was COPD exacerbation ([address] A diagnosis of Acute respiratory failure with hypoxia and hypercapnia (HCC) was also pertinent to this visit. Past Medical History:  has a past medical history of Arthritis, Blind right eye, CHF (congestive heart failure) ([address] Chronic respiratory failure with hypoxia and hypercapnia ([address] COPD (chronic obstructive pulmonary disease) ([address] GERD (gastroesophageal reflux disease), Hypertension, Movement disorder, Neuromuscular disorder ([address] On home O2, Pneumonia, and Sleep apnea. Past Surgical History:  has a past surgical history that includes Cholecystectomy; Tubal ligation; Abdomen surgery; eye surgery; and Dilatation, esophagus. Assessment   Body Structures, Functions, Activity Limitations Requiring Skilled Therapeutic Intervention: Decreased functional mobility ; Decreased endurance  Assessment: Pt is a 49 y.o. F. admitted  for COPD, chronic respiratory failure on 4 L. O2. Pt presents pleasant and agreeable to evaluation, currently on 6 L. O2, demonstrating functional LE strength, and able to ambulate short distances with walker, supervision. SPO2 did drop to mid-low 80s with standing activity, and she would benefit from continued therapy to improve her endurance. Anticipate safe return home with prior level of assist, use of walker/wc as needed, and home PT level 1. Ivett Reynaga scored a 20/24 on the AM-PAC short mobility form. If patient discharges prior to next session this note will serve as a discharge summary.   Please see below for the latest assessment towards goals. Specific Instructions for Next Treatment: Improve endurance  Therapy Prognosis: Guarded  Decision Making: Low Complexity  History: See below  Exam: Strength; ROM; Balance; Ambulation  Clinical Presentation: Stable  Barriers to Learning: Fatigue; SOB  Requires PT Follow-Up: Yes  Activity Tolerance  Activity Tolerance: Patient tolerated evaluation without incident     Plan   Physcial Therapy Plan  General Plan: 2-3 times per week  Specific Instructions for Next Treatment: Improve endurance  Current Treatment Recommendations: Balance training, Gait training, Stair training, Functional mobility training, Neuromuscular re-education, Transfer training, Safety education & training, Home exercise program, Patient/Caregiver education & training, Equipment evaluation, education, & procurement, Endurance training  Safety Devices  Type of Devices: All fall risk precautions in place, Call light within reach, Left in bed  Restraints  Restraints Initially in Place: No     Restrictions  Restrictions/Precautions  Restrictions/Precautions: Fall Risk     Subjective   General  Chart Reviewed: Yes  Additional Pertinent Hx: Anthony Eaton is a 49y.o. year old female with a history of  COPD who presents with      Increased shortness of breath and dyspnea worsening dispite home use of Symbicort and Spiriva with nebulizer. She has BLANCA. She states she uses it 'sometimes' . After discussing more, it appears she is using \"a few times per month\"      She is using methadone for chronic pain  Response To Previous Treatment: Not applicable  Referring Practitioner: Aliya Hoffman MD  Referral Date : 01/01/23  Diagnosis: COPD with an FEV1 53%; Chronic hypoxemic respiratory failure 4 L nasal cannula;  Sleep apnea on BiPAP; History of CHF with an EF of 40%  Follows Commands: Within Functional Limits  Subjective  Subjective: Pt on 6 L. O2.  Agreeable to evaluation.          Social/Functional History  Social/Functional History  Lives With: Spouse (cousin)  Type of Home: House  Home Layout: One level  Home Access: Ramped entrance  Bathroom Shower/Tub: Tub/Shower unit  Bathroom Toilet: Standard  Bathroom Equipment: Tub transfer bench  Home Equipment: Milon Reichmann, [employer], Rasheed Roses, Walker, rolling  ADL Assistance: ((I) with bathing and toileting; occasional assist with dressing)  Ambulation Assistance: Independent ((I) ambulating with walker household distances;  for community mobility)  Transfer Assistance: Independent  Active : No    Vision/Hearing  Vision  Vision: Impaired  Vision Exceptions: Wears glasses for reading  Hearing  Hearing: Within functional limits      Cognition   Orientation  Overall Orientation Status: Within Normal Limits     Objective     AROM RLE (degrees)  RLE AROM: WFL  AROM LLE (degrees)  LLE AROM : WFL  AROM RUE (degrees)  RUE AROM : WFL  AROM LUE (degrees)  LUE AROM : WFL    Strength RLE  Strength RLE: WFL  Strength LLE  Strength LLE: WFL  Strength RUE  Strength RUE: WFL  Strength LUE  Strength LUE: WFL       Bed mobility  Supine to Sit: Supervision  Sit to Supine: Supervision    Transfers  Sit to Stand: Supervision  Stand to Sit: Supervision    Ambulation  Surface: Level tile  Device: Rolling Walker  Assistance: Supervision  Quality of Gait: Fast pace, SOB, SPO2 in mid to low 80s with activity. Seated rest to rebound to 90s. Distance: 15' x 2    AM-PAC Score  AM-PAC Inpatient Mobility Raw Score : 20 (01/02/23 0905)  AM-PAC Inpatient T-Scale Score : 47.67 (01/02/23 0905)  Mobility Inpatient CMS 0-100% Score: 35.83 (01/02/23 0905)  Mobility Inpatient CMS G-Code Modifier : CJ (01/02/23 5185)    Goals  Short Term Goals  Time Frame for Short Term Goals: 2-3 days  Short Term Goal 1: Bed mobility (I)  Short Term Goal 2: Transfers (I)  Short Term Goal 3: Ambulation 48' with RW, (I)  Patient Goals   Patient Goals :  To return home     Education  Patient Education  Education Given To: Patient  Education Provided: Role of Therapy;Plan of Care; Fall Prevention Strategies  Education Method: Demonstration;Verbal  Education Outcome: Continued education needed      Therapy Time   Individual Concurrent Group Co-treatment   Time In 0830         Time Out 0900         Minutes 30         Timed Code Treatment Minutes: 15 Minutes   Low Complexity Evaluation    Geo Leavitt PT   Electronically signed by Geo Leavitt, CECILIA [id number] on 1/2/2023 at 9:12 AM

## 2023-01-02 NOTE — H&P
Hospital Medicine History & Physical      PCP: Jareth Davis    Date of Admission: 1/1/2023    Date of Service: Pt seen/examined on 1/1/2023 and Admitted to Inpatient. Chief Complaint: Shortness of breath      History Of Present Illness: The patient is a 49 y.o. female with history of severe COPD on 4 L of oxygen chronically, diastolic CHF, neuromuscular disorder, GERD, hypertension, sleep apnea, and right eye blindness who presents to [facility] with pertinence of breath. Patient states that she has had progressively worsening shortness of breath over the past few days. States that she can hardly catch her breath when she ambulates across the room at her house. Denies any sick contacts, but she does live with other family members at home and is unsure if they have been sick. Denies fever, chills, chest pain, abdominal pain, nausea, vomiting, constipation, diarrhea, and dysuria. Patient states that she usually wears 4 L of oxygen at home, but noticed that her oxygen levels were low even on the 4 L and so decided to come into the hospital for further evaluation. Also notes that she has been more sleepy and lethargic than usual.    Rapid flu was negative. Rapid COVID was negative. VBG showed a pH of 7.263 and PCO2 of 103. She was started on an on rebreather mask and  switched to BiPAP given her degree of hypercapnia.     Subjective  Patient feeling better  Seen in the presence of her  but did not start  Asking to be discharged    Past Medical History:        Diagnosis Date    Arthritis     Blind right eye     CHF (congestive heart failure) (HCC)     Chronic respiratory failure with hypoxia and hypercapnia (HCC)     COPD (chronic obstructive pulmonary disease) (HCC)     GERD (gastroesophageal reflux disease)     Hypertension     Movement disorder Neuromuscular disorder ([address]     On home O2     Pneumonia     Sleep apnea        Past Surgical History:        Procedure Laterality Date    ABDOMEN SURGERY      CHOLECYSTECTOMY      DILATATION, ESOPHAGUS      EYE SURGERY      TUBAL LIGATION         Medications Prior to Admission:    Prior to Admission medications    Medication Sig Start Date End Date Taking? Authorizing Provider   cloNIDine (CATAPRES) 0.1 MG tablet Take 0.1 mg by mouth 2 times daily   Yes Historical Provider, MD   Sertraline HCl (ZOLOFT PO) Take 125 mg by mouth Daily    Historical Provider, MD   aspirin 81 MG EC tablet Take 81 mg by mouth in the morning. Not taking. Historical Provider, MD   lisinopril (PRINIVIL;ZESTRIL) 5 MG tablet TAKE ONE TABLET BY MOUTH DAILY 6/24/22   Linda Fernandez MD   carvedilol (COREG) 3.125 MG tablet TAKE ONE TABLET BY MOUTH TWICE A DAY 6/24/22   Linda Fernandez MD   torsemide [facility]) 20 MG tablet Take 3 tablets by mouth daily 5/3/22   Linda Fernandez MD   SPIRIVA HANDIHALER 18 MCG inhalation capsule INHALE THE ENTIRE CONTENTS OF 1 CAPSULE ONCE A DAY USING HANDIHALER DEVICE 1/24/22   Keith Oquendo DO   albuterol sulfate  (90 Base) MCG/ACT inhaler INHALE TWO PUFFS BY MOUTH EVERY 4 HOURS AS NEEDED FOR WHEEZING OR SHORTNESS OF BREATH 1/14/22   Keith Oquendo DO   ipratropium-albuterol (DUONEB) 0.5-2.5 (3) MG/3ML SOLN nebulizer solution INHALE THREE MILLILITERS (ONE VIAL) VIA NEBULIZATION BY MOUTH EVERY 4 HOURS 10/7/21   JULIUS Foster CNP   folic acid (FOLVITE) 1 MG tablet Take 1 tablet by mouth daily  Patient not taking: Reported on 1/1/2023 6/18/21   Kalia Etienne MD   gabapentin (NEURONTIN) 100 MG capsule Take 1 capsule by mouth 3 times daily for 30 days. Patient taking differently: Take 100 mg by mouth 2 times daily.  6/17/21 9/12/22  Kalia Etienne MD   SYMBICORT 160-4.5 MCG/ACT AERO Inhale 2 puffs into the lungs 2 times daily 12/29/20   Keith Oquendo DO omeprazole (PRILOSEC) 40 MG delayed release capsule Take 40 mg by mouth every evening At 1800    Historical Provider, MD   Spacer/Aero-Holding Frank Sen 1 Device by Does not apply route daily as needed 6/30/15   JULIUS Larsen - CNP   OXYGEN Inhale 2 L into the lungs See Admin Instructions. Patient taking differently: Inhale 2 L into the lungs See Admin Instructions Indications: 2L sitting, 4L when up and around Continuous at night or as needed when at home per patient 12/5/13   Mary Chinchilla MD   methadone 10 MG/5ML solution Take 130 mg by mouth daily. Historical Provider, MD       Allergies:  Latex, Sulfa antibiotics, Nsaids, and Ultram [tramadol hcl]    Social History:  The patient currently lives at home. TOBACCO:   reports that she quit smoking about 5 years ago. Her smoking use included cigarettes. She started smoking about 38 years ago. She has a 15.00 pack-year smoking history. She has never used smokeless tobacco.  ETOH:   reports no history of alcohol use. Family History:  Reviewed in detail and negative for DM, Early CAD, Cancer, CVA. Positive as follows:        Problem Relation Age of Onset    Cancer Mother         lung cancer    Osteoporosis Mother     Cancer Father         cancer    Arthritis Father        REVIEW OF SYSTEMS:   Positive for and as noted in the HPI. All other systems reviewed and negative. PHYSICAL EXAM:    /77   Pulse 88   Temp 97.8 °F (36.6 °C)   Resp 19   Ht 5' (1.524 m)   Wt 194 lb 14.2 oz (88.4 kg)   LMP 08/14/2015   SpO2 95%   BMI 38.06 kg/m²     General appearance: Chronically ill appearing, in mild distress. HEENT Normal cephalic, atraumatic without obvious deformity. Pupils equal, round, and reactive to light. Extra ocular muscles intact. Conjunctivae/corneas clear. Neck: Supple, No jugular venous distention/bruits. Trachea midline without thyromegaly or adenopathy with full range of motion.   Lungs: Increased work of breathing, accessory muscle use, expiratory wheezes heard throughout. Heart: Tachycardic rate and regular rhythm with Normal S1/S2 without murmurs, rubs or gallops, point of maximum impulse non-displaced  Abdomen: Soft, non-tender or non-distended without rigidity or guarding and positive bowel sounds all four quadrants. Extremities: No clubbing, cyanosis, or edema bilaterally. Full range of motion without deformity and normal gait intact. Skin: Skin color, texture, turgor normal.  No rashes or lesions. Neurologic: Alert and oriented X 3, neurovascularly intact with sensory/motor intact upper extremities/lower extremities, bilaterally. Cranial nerves: II-XII intact, grossly non-focal.  Mental status: Alert, oriented, thought content appropriate. Capillary Refill: Acceptable  < 3 seconds  Peripheral Pulses: +3 Easily felt, not easily obliterated with pressure      CXR:  I have reviewed the CXR with the following interpretation: Low lung volume with vascular congestion  EKG:  I have reviewed the EKG with the following interpretation: Sinus tachycardia    XR CHEST PORTABLE   Final Result   Portable study with low lung volume showing a pattern of vascular congestion   or mild edema centrally. CBC   Recent Labs     01/01/23 0828 01/02/23 0546   WBC 11.8* 7.9   HGB 9.5* 10.0*   HCT 31.7* 31.3*    332        RENAL  Recent Labs     01/01/23 0828 01/02/23 0546    136   K 4.5 3.8   CL 91* 84*   CO2 38* 45*   BUN 14 22*   CREATININE 0.9 0.7       LFT'S  Recent Labs     01/01/23 0828 01/02/23  0546   AST 12* 10*   ALT 7* 6*   BILITOT <0.2 <0.2   ALKPHOS 98 92       COAG  No results for input(s): INR in the last 72 hours.   CARDIAC ENZYMES  Recent Labs     01/01/23 0828   TROPONINI <0.01         U/A:    Lab Results   Component Value Date/Time    COLORU YELLOW 11/08/2020 02:12 PM    WBCUA 0-2 11/08/2020 02:12 PM    RBCUA 5-10 11/08/2020 02:12 PM    CLARITYU Clear 11/08/2020 02:12 PM    SPECGRAV 1.012 11/08/2020 02:12 PM    LEUKOCYTESUR Negative 11/08/2020 02:12 PM    BLOODU SMALL 11/08/2020 02:12 PM    GLUCOSEU Negative 11/08/2020 02:12 PM       ABG    Lab Results   Component Value Date/Time    LQW7WTU 53.1 06/16/2021 01:33 PM    BEART 22.8 06/16/2021 01:33 PM    T5URJYEI 92.2 06/16/2021 01:33 PM    PHART 7.381 06/16/2021 01:33 PM    THGBART 11.2 03/07/2013 05:20 PM    GVT8MMV 89.5 06/16/2021 01:33 PM    PO2ART 63.9 06/16/2021 01:33 PM    TZT0DND 55.8 06/16/2021 01:33 PM           Active Hospital Problems    Diagnosis Date Noted    Acute on chronic respiratory failure with hypoxia and hypercapnia (HCC) [J96.21, J96.22]          PHYSICIANS CERTIFICATION:    I certify that Mary Maurice is expected to be hospitalized for more than 2 midnights based on the following assessment and plan:      ASSESSMENT/PLAN:      Shortness of breath likely secondary to COPD exacerbation  -cont DuoNebs every 4 hours while awake  -Albuterol as needed  -IV Solu-Medrol tapered to p.o. prednisone with improvement  -IV ceftriaxone  -Continue bilevel with hypercapnia evidence and repeat ABG in 1 hour  -Phonology consulted, recs appreciated  -Procalcitonin low so low suspicion of bacterial pneumonia  -FU respiratory culture/ viral PCR panel/strep pneumo/ Legionella urine antigens  -Check D-dimer and pursue CTA pulmonary with contrast if it is elevated    Acute on chronic respiratory failure with hypoxia and hypercapnia  -Management as above  -Maintain SPO2 to can be keep 89% or greater    Chronic diastolic CHF -appears mildly fluid overloaded on exam  -Continue p.o. torsemide  -Continue Coreg  -Continue lisinopril  -Continue aspirin  -Telemetry monitoring    Essential hypertension  -Continue home Coreg and lisinopril    Opioid dependence  -Hold home methadone given respiratory distress and resume once lung status improves    Neuropathy  -Continue home gabapentin    Anxiety and depression  -Continue home Zoloft    GERD  -Continue PPI    DVT Prophylaxis: Lovenox  Diet: ADULT DIET; Regular; Low Sodium (2 gm); 1500 ml  Code Status: Full Code  PT/OT Eval Status: ordered    Dispo - admit PCU tele inpatient. DC in am if better. Norma Diaz MD    Thank you Rufino Wells for the opportunity to be involved in this patient's care. If you have any questions or concerns please feel free to contact me at [phone].

## 2023-01-02 NOTE — ACP (ADVANCE CARE PLANNING)
Advance Care Planning     Advance Care Planning Inpatient Note  [facility] Department    Today's Date: 1/2/2023  Unit: [facility] PROGRESSIVE CARE    Received request from [employer]. Upon review of chart and communication with care team, request Health Care Provider's clarification of patient's decision making capacity. . Patient, Spouse, and Child/Children was/were present in the room during visit. Goals of ACP Conversation:  Discuss advance care planning documents    Health Care Decision Makers:       Primary Decision Maker: Arlyn Turner Spouse - [phone]    Secondary Decision Maker: Prema Estrada - Child - [phone]    Secondary Decision Maker: Matheus Bar - Child - [phone]  Summary:  Documented Next of Kin, per patient report    Advance Care Planning Documents (Patient Wishes):  None     Assessment:  Patient awake and alert, sitting in bed. Patient's , two daughters, and grandson are also present, making it difficult to have discussion with patient. Patient identified her  Charanjit Soliz as primary healthcare decision maker, followed by her two daughters, Jaki Henderson and Micky.       Interventions:  Provided education on documents for clarity and greater understanding  Discussed and provided education on state decision maker hierarchy  Reviewed but did not complete ACP document    Care Preferences Communicated:   No    Outcomes/Plan:  ACP Discussion: Completed    Electronically signed by Meliton Power [facility] on 1/2/2023 at 3:01 PM

## 2023-01-02 NOTE — PROGRESS NOTES
Occupational Therapy  Facility/Department: [address] PROGRESSIVE CARE  Occupational Therapy Initial Assessment and Tentative D/C      Name: Alessia Ramirez  : 1975  MRN: [id number]  Date of Service: 2023    Discharge Recommendations: Alessia Ramirez scored a 21/24 on the AM-PAC ADL Inpatient form. Current research shows that an AM-PAC score of 18 or greater is typically associated with a discharge to the patient's home setting. If patient discharges prior to next session this note will serve as a discharge summary. Please see below for the latest assessment towards goals. Home with assist PRN  OT Equipment Recommendations  Equipment Needed: No       Patient Diagnosis(es): The primary encounter diagnosis was COPD exacerbation ([address] A diagnosis of Acute respiratory failure with hypoxia and hypercapnia (HCC) was also pertinent to this visit. Past Medical History:  has a past medical history of Arthritis, Blind right eye, CHF (congestive heart failure) ([address] Chronic respiratory failure with hypoxia and hypercapnia ([address] COPD (chronic obstructive pulmonary disease) ([address] GERD (gastroesophageal reflux disease), Hypertension, Movement disorder, Neuromuscular disorder ([address] On home O2, Pneumonia, and Sleep apnea. Past Surgical History:  has a past surgical history that includes Cholecystectomy; Tubal ligation; Abdomen surgery; eye surgery; and Dilatation, esophagus. Assessment   Performance deficits / Impairments: Decreased functional mobility ; Decreased safe awareness;Decreased ADL status; Decreased balance;Decreased high-level IADLs;Decreased endurance;Decreased strength  Assessment: Alessia Ramirez is a 49 y.o. female who presents emergency department complaining of shortness of breath. Patient has history of COPD and typically requires 4 L of oxygen via nasal cannula at all times. Patient denies fevers, chills, or sweats. No cough, or congestion.   Patient has noted increasing shortness of breath over the last couple of days and upon arrival of EMS had O2 sats of 84% on 4 L nasal cannula. Patient took 1 nebulizer treatment at home and had a second 1 in route by EMS. PTA pt from home where pt was Ind with mobility and ADLs with use of 4WW. Pt currently requires supervision for mobility and transfers with use of RW. Pt on 6L O2 throughout with SpO2 decreasing into low/mid 80s and returns >90% with seated rest break and increased time. Anticipate pt needing up to supervision for ADLs. Pt will benefit from skilled OT services at this time. Pt will benefit from skilled OT services at this time. Anticipate pt safe to return home at time of D/C. Prognosis: Good  Decision Making: Low Complexity  Exam: see above  Assistance / Modification: RW  REQUIRES OT FOLLOW-UP: Yes  Activity Tolerance  Activity Tolerance: Patient Tolerated treatment well        Plan   Occupational Therapy Plan  Times Per Week: 2-3x  Current Treatment Recommendations: Strengthening, Balance training, Functional mobility training, Endurance training, Safety education & training, Patient/Caregiver education & training, Self-Care / ADL     Restrictions  Restrictions/Precautions  Restrictions/Precautions: Fall Risk    Subjective   General  Chart Reviewed: Yes  Patient assessed for rehabilitation services?: Yes  Additional Pertinent Hx: per ED note, Eli Sutherland is a 49 y.o. female who presents emergency department complaining of shortness of breath. Patient has history of COPD and typically requires 4 L of oxygen via nasal cannula at all times. Patient denies fevers, chills, or sweats. No cough, or congestion. Patient has noted increasing shortness of breath over the last couple of days and upon arrival of EMS had O2 sats of 84% on 4 L nasal cannula. Patient took 1 nebulizer treatment at home and had a second 1 in route by EMS.   Family / Caregiver Present: No  Referring Practitioner: Mello Anthony MD  Subjective  Subjective: Pt agreeable to OT evaluation. Pt reports no pain. Pt on 6L O2 throughout. Social/Functional History  Social/Functional History  Lives With: Spouse (cousin)  Type of Home: House  Home Layout: One level  Home Access: Ramped entrance  Bathroom Shower/Tub: Tub/Shower unit  Bathroom Toilet: Standard  Bathroom Equipment: Tub transfer bench  Home Equipment: Virginia Rave, [employer], Walker, rolling  ADL Assistance: ((I) with bathing and toileting; occasional assist with dressing)  Ambulation Assistance: Independent ((I) ambulating with walker household distances; wc for community mobility)  Transfer Assistance: Independent  Active : No       Objective   Heart Rate: 95  Heart Rate Source: Monitor  BP: 119/77  BP Location: Right upper arm  BP Method: Automatic  Patient Position: Semi fowlers  MAP (Calculated): 91  Resp: 13  SpO2: 90 %  O2 Device: High flow nasal cannula             Safety Devices  Type of Devices: All fall risk precautions in place;Call light within reach; Left in bed  Restraints  Restraints Initially in Place: No  Bed Mobility Training  Bed Mobility Training: Yes  Overall Level of Assistance: Independent  Supine to Sit: Independent  Sit to Supine: Independent  Scooting: Independent  Balance  Sitting: Intact  Standing: Impaired (RW)  Standing - Static: Good  Standing - Dynamic: Good;Fair  Transfer Training  Transfer Training: Yes  Overall Level of Assistance: Supervision (RW)  Sit to Stand: Supervision (RW)  Stand to Sit: Supervision  Gait  Overall Level of Assistance: Supervision (RW; no overt LOB; Pt's SpO2 decreasing into low/mid 80s and returns >90% with seated rest break and increased time)  Distance (ft): 100 Feet  Assistive Device: Walker, rolling     AROM: Within functional limits  PROM: Within functional limits  Strength:  Within functional limits  Coordination: Within functional limits  Tone: Normal  ADL  Feeding: Independent  Additional Comments: Anticipate pt needing up to supervision for ADLs based on ROM, strength, and balance     Activity Tolerance  Activity Tolerance: Patient tolerated evaluation without incident        Vision  Vision: Impaired  Vision Exceptions: Wears glasses for reading  Hearing  Hearing: Within functional limits  Orientation  Overall Orientation Status: Within Normal Limits                  Education Given To: Patient  Education Provided: Role of Therapy;Plan of Care;Energy Conservation;Transfer Training  Education Method: Demonstration;Verbal  Barriers to Learning: None  Education Outcome: Verbalized understanding;Demonstrated understanding        AM-PAC Score        AM-PAC Inpatient Daily Activity Raw Score: 21 (01/02/23 1026)  AM-PAC Inpatient ADL T-Scale Score : 44.27 (01/02/23 1026)  ADL Inpatient CMS 0-100% Score: 32.79 (01/02/23 1026)  ADL Inpatient CMS G-Code Modifier : Godwintae Warner (01/02/23 1026)    Tinneti Score       Goals  Short Term Goals  Time Frame for Short Term Goals: prior to D/C  Short Term Goal 1: complete functional mobility and transfers Mod Ind  Short Term Goal 2: complete bathing and dressing Mod Ind  Short Term Goal 3: complete toileting Mod Ind  Short Term Goal 4: complete grooming in stance at sink [address] Term Goals  Time Frame for Long Term Goals : STG=LTG  Patient Goals   Patient goals : return home       Therapy Time   Individual Concurrent Group Co-treatment   Time In 0955         Time Out 1021         Minutes 26         Timed Code Treatment Minutes: 11 Minutes (15 minute eval)       DILSHAD Garcia/CARMEN

## 2023-01-02 NOTE — PROGRESS NOTES
Patient admitted from ED. Patient on 6L n/c. Patient oriented to room, unit, call light. Tele monitor on, confirmed with [facility]. Patient asked to call for assist before getting out of bed. Continue to monitor.

## 2023-01-03 LAB
A/G RATIO: 0.8 (ref 1.1–2.2)
ALBUMIN SERPL-MCNC: 3.6 G/DL (ref 3.4–5)
ALP BLD-CCNC: 94 U/L (ref 40–129)
ALT SERPL-CCNC: 7 U/L (ref 10–40)
ANION GAP SERPL CALCULATED.3IONS-SCNC: 7 MMOL/L (ref 3–16)
AST SERPL-CCNC: 11 U/L (ref 15–37)
BASOPHILS ABSOLUTE: 0 K/UL (ref 0–0.2)
BASOPHILS RELATIVE PERCENT: 0.1 %
BILIRUB SERPL-MCNC: <0.2 MG/DL (ref 0–1)
BUN BLDV-MCNC: 36 MG/DL (ref 7–20)
CALCIUM SERPL-MCNC: 9.3 MG/DL (ref 8.3–10.6)
CHLORIDE BLD-SCNC: 81 MMOL/L (ref 99–110)
CO2: 43 MMOL/L (ref 21–32)
CREAT SERPL-MCNC: 1 MG/DL (ref 0.6–1.1)
EOSINOPHILS ABSOLUTE: 0 K/UL (ref 0–0.6)
EOSINOPHILS RELATIVE PERCENT: 0 %
GFR SERPL CREATININE-BSD FRML MDRD: >60 ML/MIN/{1.73_M2}
GLUCOSE BLD-MCNC: 110 MG/DL (ref 70–99)
HCT VFR BLD CALC: 32.7 % (ref 36–48)
HEMOGLOBIN: 10.3 G/DL (ref 12–16)
LYMPHOCYTES ABSOLUTE: 0.7 K/UL (ref 1–5.1)
LYMPHOCYTES RELATIVE PERCENT: 5.9 %
MCH RBC QN AUTO: 26.4 PG (ref 26–34)
MCHC RBC AUTO-ENTMCNC: 31.4 G/DL (ref 31–36)
MCV RBC AUTO: 84.2 FL (ref 80–100)
MONOCYTES ABSOLUTE: 0.3 K/UL (ref 0–1.3)
MONOCYTES RELATIVE PERCENT: 2.1 %
NEUTROPHILS ABSOLUTE: 11.4 K/UL (ref 1.7–7.7)
NEUTROPHILS RELATIVE PERCENT: 91.9 %
PDW BLD-RTO: 15.7 % (ref 12.4–15.4)
PLATELET # BLD: 389 K/UL (ref 135–450)
PMV BLD AUTO: 7.2 FL (ref 5–10.5)
POTASSIUM REFLEX MAGNESIUM: 3.6 MMOL/L (ref 3.5–5.1)
RBC # BLD: 3.88 M/UL (ref 4–5.2)
SODIUM BLD-SCNC: 131 MMOL/L (ref 136–145)
TOTAL PROTEIN: 8 G/DL (ref 6.4–8.2)
WBC # BLD: 12.4 K/UL (ref 4–11)

## 2023-01-03 PROCEDURE — 36415 COLL VENOUS BLD VENIPUNCTURE: CPT

## 2023-01-03 PROCEDURE — 2580000003 HC RX 258: Performed by: INTERNAL MEDICINE

## 2023-01-03 PROCEDURE — 99233 SBSQ HOSP IP/OBS HIGH 50: CPT | Performed by: INTERNAL MEDICINE

## 2023-01-03 PROCEDURE — 80053 COMPREHEN METABOLIC PANEL: CPT

## 2023-01-03 PROCEDURE — 2700000000 HC OXYGEN THERAPY PER DAY

## 2023-01-03 PROCEDURE — 85025 COMPLETE CBC W/AUTO DIFF WBC: CPT

## 2023-01-03 PROCEDURE — 6370000000 HC RX 637 (ALT 250 FOR IP): Performed by: INTERNAL MEDICINE

## 2023-01-03 PROCEDURE — 2060000000 HC ICU INTERMEDIATE R&B

## 2023-01-03 PROCEDURE — 94640 AIRWAY INHALATION TREATMENT: CPT

## 2023-01-03 PROCEDURE — 6360000002 HC RX W HCPCS: Performed by: INTERNAL MEDICINE

## 2023-01-03 PROCEDURE — 94760 N-INVAS EAR/PLS OXIMETRY 1: CPT

## 2023-01-03 RX ORDER — CALCIUM CARBONATE 200(500)MG
500 TABLET,CHEWABLE ORAL 3 TIMES DAILY PRN
Status: DISCONTINUED | OUTPATIENT
Start: 2023-01-03 | End: 2023-01-06 | Stop reason: HOSPADM

## 2023-01-03 RX ADMIN — IPRATROPIUM BROMIDE AND ALBUTEROL SULFATE 1 AMPULE: .5; 3 SOLUTION RESPIRATORY (INHALATION) at 16:45

## 2023-01-03 RX ADMIN — SERTRALINE 125 MG: 50 TABLET, FILM COATED ORAL at 05:17

## 2023-01-03 RX ADMIN — LISINOPRIL 5 MG: 5 TABLET ORAL at 08:49

## 2023-01-03 RX ADMIN — ENOXAPARIN SODIUM 40 MG: 100 INJECTION SUBCUTANEOUS at 08:50

## 2023-01-03 RX ADMIN — ONDANSETRON 4 MG: 2 INJECTION INTRAMUSCULAR; INTRAVENOUS at 05:51

## 2023-01-03 RX ADMIN — IPRATROPIUM BROMIDE AND ALBUTEROL SULFATE 1 AMPULE: .5; 3 SOLUTION RESPIRATORY (INHALATION) at 20:23

## 2023-01-03 RX ADMIN — METHYLPREDNISOLONE SODIUM SUCCINATE 40 MG: 40 INJECTION, POWDER, FOR SOLUTION INTRAMUSCULAR; INTRAVENOUS at 02:21

## 2023-01-03 RX ADMIN — Medication 81 MG: at 08:49

## 2023-01-03 RX ADMIN — MOMETASONE FUROATE AND FORMOTEROL FUMARATE DIHYDRATE 2 PUFF: 200; 5 AEROSOL RESPIRATORY (INHALATION) at 08:03

## 2023-01-03 RX ADMIN — Medication 10 ML: at 19:13

## 2023-01-03 RX ADMIN — PANTOPRAZOLE SODIUM 40 MG: 40 TABLET, DELAYED RELEASE ORAL at 05:17

## 2023-01-03 RX ADMIN — CEFTRIAXONE 1000 MG: 1 INJECTION, POWDER, FOR SOLUTION INTRAMUSCULAR; INTRAVENOUS at 15:58

## 2023-01-03 RX ADMIN — METHADONE HYDROCHLORIDE 130 MG: 10 TABLET ORAL at 08:50

## 2023-01-03 RX ADMIN — METHYLPREDNISOLONE SODIUM SUCCINATE 40 MG: 40 INJECTION, POWDER, FOR SOLUTION INTRAMUSCULAR; INTRAVENOUS at 08:50

## 2023-01-03 RX ADMIN — Medication 10 ML: at 08:50

## 2023-01-03 RX ADMIN — IPRATROPIUM BROMIDE AND ALBUTEROL SULFATE 1 AMPULE: .5; 3 SOLUTION RESPIRATORY (INHALATION) at 12:27

## 2023-01-03 RX ADMIN — MOMETASONE FUROATE AND FORMOTEROL FUMARATE DIHYDRATE 2 PUFF: 200; 5 AEROSOL RESPIRATORY (INHALATION) at 20:23

## 2023-01-03 RX ADMIN — IPRATROPIUM BROMIDE AND ALBUTEROL SULFATE 1 AMPULE: .5; 3 SOLUTION RESPIRATORY (INHALATION) at 08:02

## 2023-01-03 RX ADMIN — CARVEDILOL 3.12 MG: 3.12 TABLET, FILM COATED ORAL at 08:50

## 2023-01-03 RX ADMIN — CARVEDILOL 3.12 MG: 3.12 TABLET, FILM COATED ORAL at 16:00

## 2023-01-03 RX ADMIN — TORSEMIDE 60 MG: 20 TABLET ORAL at 08:49

## 2023-01-03 NOTE — PROGRESS NOTES
Hospitalist Progress Note      PCP: Abbey King    Date of Admission: 1/1/2023    Chief Complaint:   SOB    Hospital Course: The patient is a 49 y.o. female with history of severe COPD on 4 L of oxygen chronically, diastolic CHF, neuromuscular disorder, GERD, hypertension, sleep apnea, and right eye blindness who presents to [facility] with pertinence of breath. Patient states that she has had progressively worsening shortness of breath over the past few days. States that she can hardly catch her breath when she ambulates across the room at her house. Denies any sick contacts, but she does live with other family members at home and is unsure if they have been sick. Denies fever, chills, chest pain, abdominal pain, nausea, vomiting, constipation, diarrhea, and dysuria. Patient states that she usually wears 4 L of oxygen at home, but noticed that her oxygen levels were low even on the 4 L and so decided to come into the hospital for further evaluation. Also notes that she has been more sleepy and lethargic than usual.     Rapid flu was negative. Rapid COVID was negative. VBG showed a pH of 7.263 and PCO2 of 103. She was started on an on rebreather mask and  switched to BiPAP given her degree of hypercapnia.      Subjective:    Patient feeling better   Seen in the presence of her  but did not start   Asking to be discharged      Medications:  Reviewed    Infusion Medications    sodium chloride Stopped (01/01/23 6993)     Scheduled Medications    methadone  130 mg Oral Daily    aspirin  81 mg Oral Daily    carvedilol  3.125 mg Oral BID WC    lisinopril  5 mg Oral Daily    pantoprazole  40 mg Oral QAM AC    sertraline  125 mg Oral Daily    mometasone-formoterol  2 puff Inhalation BID    torsemide  60 mg Oral Daily    sodium chloride flush  5-40 mL IntraVENous 2 times per day    enoxaparin  40 mg SubCUTAneous Daily    ipratropium-albuterol  1 ampule Inhalation Q4H WA    [START ON 1/4/2023] predniSONE  40 mg Oral Daily    cefTRIAXone (ROCEPHIN) IV  1,000 mg IntraVENous Q24H     PRN Meds: calcium carbonate, albuterol sulfate HFA, sodium chloride flush, sodium chloride, ondansetron **OR** ondansetron, polyethylene glycol, acetaminophen **OR** acetaminophen      Intake/Output Summary (Last 24 hours) at 1/3/2023 1339  Last data filed at 1/3/2023 1243  Gross per 24 hour   Intake 600 ml   Output 900 ml   Net -300 ml         Physical Exam Performed:    /83   Pulse 87   Temp 97.8 °F (36.6 °C) (Oral)   Resp 13   Ht 5' (1.524 m)   Wt 194 lb 7.1 oz (88.2 kg)   LMP 08/14/2015   SpO2 91%   BMI 37.98 kg/m²     General appearance: Chronically ill appearing, in mild distress. HEENT Normal cephalic, atraumatic without obvious deformity. Pupils equal, round, and reactive to light. Extra ocular muscles intact. Conjunctivae/corneas clear. Neck: Supple, No jugular venous distention/bruits. Trachea midline without thyromegaly or adenopathy with full range of motion. Lungs: Increased work of breathing, accessory muscle use, expiratory wheezes heard throughout. Heart: Tachycardic rate and regular rhythm with Normal S1/S2 without murmurs, rubs or gallops, point of maximum impulse non-displaced  Abdomen: Soft, non-tender or non-distended without rigidity or guarding and positive bowel sounds all four quadrants. Extremities: No clubbing, cyanosis, or edema bilaterally. Full range of motion without deformity and normal gait intact. Skin: Skin color, texture, turgor normal.  No rashes or lesions. Neurologic: Alert and oriented X 3, neurovascularly intact with sensory/motor intact upper extremities/lower extremities, bilaterally. Cranial nerves: II-XII intact, grossly non-focal.  Mental status: Alert, oriented, thought content appropriate.   Capillary Refill: Acceptable  < 3 seconds  Peripheral Pulses: +3 Easily felt, not easily obliterated with pressure      Labs:   Recent Labs 01/01/23  0828 01/02/23  0546 01/03/23  0644   WBC 11.8* 7.9 12.4*   HGB 9.5* 10.0* 10.3*   HCT 31.7* 31.3* 32.7*    332 389       Recent Labs     01/01/23  0828 01/02/23  0546 01/03/23  0644    136 131*   K 4.5 3.8 3.6   CL 91* 84* 81*   CO2 38* 45* 43*   BUN 14 22* 36*   CREATININE 0.9 0.7 1.0   CALCIUM 8.9 9.1 9.3       Recent Labs     01/01/23  0828 01/02/23  0546 01/03/23  0644   AST 12* 10* 11*   ALT 7* 6* 7*   BILITOT <0.2 <0.2 <0.2   ALKPHOS 98 92 94       No results for input(s): INR in the last 72 hours. Recent Labs     01/01/23 0828   TROPONINI <0.01         Urinalysis:      Lab Results   Component Value Date/Time    NITRU Negative 11/08/2020 02:12 PM    WBCUA 0-2 11/08/2020 02:12 PM    RBCUA 5-10 11/08/2020 02:12 PM    BLOODU SMALL 11/08/2020 02:12 PM    SPECGRAV 1.012 11/08/2020 02:12 PM    GLUCOSEU Negative 11/08/2020 02:12 PM       Radiology:  XR CHEST PORTABLE   Final Result   Portable study with low lung volume showing a pattern of vascular congestion   or mild edema centrally. IP CONSULT TO PULMONOLOGY  IP CONSULT TO SPIRITUAL SERVICES    Assessment/Plan:  COPD exacerbation with SOB. Low procal. On 6L/min suppl oxygen today.  On 4L/min at baseline  -cont DuoNebs every 4 hours while awake  -Albuterol as needed  -IV Solu-Medrol   -switch to po prednisone at DC  -IV ceftriaxone  -appreciate pulmonology  -FU respiratory culture/ viral PCR panel/strep pneumo/ Legionella urine antigens    Acute on chronic respiratory failure with hypoxia and hypercapnia  -Management as above  -Maintain SPO2 to can be keep 89% or greater     Chronic diastolic CHF -mild fluid overload  -Cont on p.o. torsemide  -Continue Coreg  -Continue lisinopril  -Continue aspirin  -Telemetry monitoring     Essential hypertension  -Continue home Coreg and lisinopril     Opioid dependence  -Hold home methadone given respiratory distress and resume once lung status improves     Neuropathy  -Continue home gabapentin     Anxiety and depression  -Continue home Zoloft    GERD  -Continue PPI    DVT Prophylaxis: lovenox  Diet: ADULT DIET; Regular; Low Sodium (2 gm); 1500 ml  Code Status: Full Code  PT/OT Eval Status: ordered    Dispo - DC tomorrow am when weaned off oxygen a bit more.       Appropriate for A1 Discharge Unit: No      Essie Mortimer, MD

## 2023-01-03 NOTE — PLAN OF CARE
Problem: Discharge Planning  Goal: Discharge to home or other facility with appropriate resources  1/3/2023 1011 by Kaley Kilpatrick RN  Outcome: Progressing  1/3/2023 0308 by Madhav Prado RN  Outcome: Progressing     Problem: Pain  Goal: Verbalizes/displays adequate comfort level or baseline comfort level  1/3/2023 1011 by Kaley Kilpatrick RN  Outcome: Progressing  1/3/2023 0308 by Madhav Prado RN  Outcome: Progressing     Problem: Skin/Tissue Integrity  Goal: Absence of new skin breakdown  Description: 1. Monitor for areas of redness and/or skin breakdown  2. Assess vascular access sites hourly  3. Every 4-6 hours minimum:  Change oxygen saturation probe site  4. Every 4-6 hours:  If on nasal continuous positive airway pressure, respiratory therapy assess nares and determine need for appliance change or resting period.   Outcome: Progressing     Problem: Safety - Adult  Goal: Free from fall injury  1/3/2023 0308 by Madhav Prado RN  Outcome: Progressing

## 2023-01-03 NOTE — PROGRESS NOTES
Pulmonary Progress Note    Date of Admission: 1/1/2023   LOS: 2 days       CC:  Chief Complaint   Patient presents with    Shortness of Breath     Pt to ED from home via [facility] for SOB. Pt states she normally feels short of breath due to her COPD and CHF but says this time is different. Per EMS, on arrival pt was 84% on 4L/NC. Pt arrived in ED on 10L/NRB with sp02 100%, tachy with HR 110s. EMS gave 1 duoneb en route to ED and pt gave herself one an hour before EMS arrival.         Subjective:  Patient asking about d/c . Feeling sascha.r     ROS:   No nausea  No Vomiting  No chest pain       Assessment:     COPD with an FEV1 53%  Chronic hypoxemic respiratory failure 4 L nasal cannula  Sleep apnea on BiPAP  History of CHF with an EF of 37%, grade 1 diastolic dysfunction, mitral regurgitation, elevated pulmonary artery systolic pressure echo 4918    Plan: This note may have been transcribed using [id number]. Please disregard any translational errors. Hospital Day: 2     Abnormal radiograph  Limited secondary to low lung volumes. Possible fluid overload.  -3.7 L      Acute on chronic hypercapnic respiratory failure pH 0.34 complicated by sleep apnea and obesity BMI 40  Not using bipap, just like home use         COPD  Home Symbicort and Spiriva Duoneb's    Prednisone   Duoneb's    Dulera   Patient asking about d/c .  wheezing are improving on prednisone. Could d/c today. Likely tomorrow would be better. Pulmonary follow up would  be ideal but does not follow up  Bipap usage would be ideal but. .. Opioid dependence and neuropathy with anxiety and depression  Home methadone and gabapentin           Chronic pain  Home methadone 130 mg daily         Hx CHF  Agree with diuresis. Noncompliance. This is a significant issue. Has not see pulmonary as out patient > 1 year and states not able to secondary to transportation. No using bipap.             Data:        PHYSICAL EXAM: Blood pressure 121/85, pulse 82, temperature 98.2 °F (36.8 °C), temperature source Oral, resp. rate 15, height 5' (1.524 m), weight 194 lb 7.1 oz (88.2 kg), last menstrual period 08/14/2015, SpO2 90 %, not currently breastfeeding.'  Body mass index is 37.98 kg/m². Gen: No distress. ENT:   Resp: No accessory muscle use. No crackles. Few wheezes. No rhonchi. CV: Regular rate. Regular rhythm. No murmur or rub. No edema. Skin: Warm, dry, normal texture and turgor. No nodule on exposed extremities. M/S: No cyanosis. No clubbing. No joint deformity. Psych: Oriented x 3. No anxiety. Awake. Alert. Intact judgement and insight. Good Mood / Affect.   Memory appears in tact       Medications:    Scheduled Meds:   methadone  130 mg Oral Daily    aspirin  81 mg Oral Daily    carvedilol  3.125 mg Oral BID WC    lisinopril  5 mg Oral Daily    pantoprazole  40 mg Oral QAM AC    sertraline  125 mg Oral Daily    mometasone-formoterol  2 puff Inhalation BID    torsemide  60 mg Oral Daily    sodium chloride flush  5-40 mL IntraVENous 2 times per day    enoxaparin  40 mg SubCUTAneous Daily    ipratropium-albuterol  1 ampule Inhalation Q4H WA    [START ON 1/4/2023] predniSONE  40 mg Oral Daily    cefTRIAXone (ROCEPHIN) IV  1,000 mg IntraVENous Q24H       Continuous Infusions:   sodium chloride Stopped (01/01/23 1643)       PRN Meds:  calcium carbonate, albuterol sulfate HFA, sodium chloride flush, sodium chloride, ondansetron **OR** ondansetron, polyethylene glycol, acetaminophen **OR** acetaminophen    Labs reviewed:  CBC:   Recent Labs     01/01/23  0828 01/02/23  0546 01/03/23  0644   WBC 11.8* 7.9 12.4*   HGB 9.5* 10.0* 10.3*   HCT 31.7* 31.3* 32.7*   MCV 86.9 84.9 84.2    332 389       BMP:   Recent Labs     01/01/23  0828 01/02/23  0546 01/03/23  0644    136 131*   K 4.5 3.8 3.6   CL 91* 84* 81*   CO2 38* 45* 43*   BUN 14 22* 36*   CREATININE 0.9 0.7 1.0       LIVER PROFILE:   Recent Labs 01/01/23  0828 01/02/23  0546 01/03/23  0644   AST 12* 10* 11*   ALT 7* 6* 7*   BILITOT <0.2 <0.2 <0.2   ALKPHOS 98 92 94       PT/INR: No results for input(s): PROTIME, INR in the last 72 hours. APTT: No results for input(s): APTT in the last 72 hours. UA:No results for input(s): NITRITE, COLORU, PHUR, LABCAST, WBCUA, RBCUA, MUCUS, TRICHOMONAS, YEAST, BACTERIA, CLARITYU, SPECGRAV, LEUKOCYTESUR, UROBILINOGEN, BILIRUBINUR, BLOODU, GLUCOSEU, AMORPHOUS in the last 72 hours. Invalid input(s): Yue Naranjo  No results for input(s): PH, PCO2, PO2 in the last 72 hours. Cx:      Films:  Radiology Review:  Pertinent images / reports were reviewed as a part of this visit. CT Chest w/ contrast: No results found for this or any previous visit. CT Chest w/o contrast: No results found for this or any previous visit. CTPA: Results for orders placed during the hospital encounter of 05/31/21    CT CHEST PULMONARY EMBOLISM W CONTRAST    Narrative  EXAMINATION:  CTA OF THE CHEST 5/31/2021 7:31 pm    TECHNIQUE:  CTA of the chest was performed after the administration of intravenous  contrast.  Multiplanar reformatted images are provided for review. MIP  images are provided for review. Dose modulation, iterative reconstruction,  and/or weight based adjustment of the mA/kV was utilized to reduce the  radiation dose to as low as reasonably achievable. COMPARISON:  None. HISTORY:  ORDERING SYSTEM PROVIDED HISTORY: sob / back and chest pain  TECHNOLOGIST PROVIDED HISTORY:  Reason for exam:->sob / back and chest pain  Decision Support Exception - unselect if not a suspected or confirmed  emergency medical condition->Emergency Medical Condition (MA)  Reason for Exam: sob poss. PE back and chest pain / hx scoliosis  Acuity: Unknown  Type of Exam: Ongoing    FINDINGS:  Pulmonary Arteries:  The patient was reinjected and the pulmonary arteries are  well opacified on the 2nd injection with no filling defect or vessel cut off.    Mediastinum: No evidence of mediastinal lymphadenopathy. The heart and  pericardium demonstrate no acute abnormality. There is no acute abnormality  of the thoracic aorta. Lungs/pleura: There is subsegmental opacity along the right middle lobe  medially. No effusion is seen. There is no pulmonary nodule or mass. Upper Abdomen: The visualized liver is mildly enlarged with slight prominence  of the central biliary ducts. The adrenals are normal.    Soft Tissues/Bones: There is moderate curvature of the thoracolumbar spine,  convex to the left. Impression  No evidence of a pulmonary embolus. Unremarkable thoracic aorta and mediastinum. Subsegmental infiltrate vs atelectasis and scarring along the right middle  lobe and mild discoid atelectasis or scarring along the lingula. Incidental mild hepatomegaly with slight prominence of the central biliary  ducts. Recommend correlating with lab values. Moderate degenerative changes in the spine with associated moderate scoliosis. CXR PA/LAT: Results for orders placed during the hospital encounter of 05/31/21    XR CHEST (2 VW)    Narrative  EXAMINATION:  TWO XRAY VIEWS OF THE CHEST    5/31/2021 6:27 pm    COMPARISON:  11/08/2020    HISTORY:  ORDERING SYSTEM PROVIDED HISTORY: Shortness of breath  TECHNOLOGIST PROVIDED HISTORY:  Reason for exam:->Shortness of breath  Reason for Exam: sob, chest pain    FINDINGS:  The heart is mildly enlarged and less prominent. The pulmonary vessels are  engorged centrally and less prominent. There are hazy perihilar and  bibasilar interstitial opacities which have decreased. There are mild linear  densities along the lung bases extending posteriorly. No effusion is seen.   There moderate degenerative changes in the spine with mild kyphosis along the  thoracolumbar region    Impression  Mild cardiomegaly which is less prominent with mild central pulmonary  congestion and questionable interstitial edema or pneumonitis vs underlying  pulmonary fibrosis. Recommend short-term follow-up. Mild subsegmental linear atelectasis or scarring along the lung bases  posteriorly      CXR portable: Results for orders placed during the hospital encounter of 01/01/23    XR CHEST PORTABLE    Narrative  EXAMINATION:  ONE XRAY VIEW OF THE CHEST    1/1/2023 8:36 am    COMPARISON:  02/17/2022 radiograph    HISTORY:  ORDERING SYSTEM PROVIDED HISTORY: sob  TECHNOLOGIST PROVIDED HISTORY:  Reason for exam:->sob  Reason for Exam: SOB    FINDINGS:  Low lung volume due to inspiratory effort. Heart and mediastinum appear  normal.  Moderate perihilar opacities are present centrally and there is a  central pattern of ground-glass attenuation in both lungs. No significant  skeletal finding. Impression  Portable study with low lung volume showing a pattern of vascular congestion  or mild edema centrally. This note was transcribed using [id number]. Please disregard any translational errors.       [phone]

## 2023-01-03 NOTE — PROGRESS NOTES
Hospitalist Progress Note      PCP: Lavon Eller    Date of Admission: 1/1/2023    Chief Complaint:   SOB    Hospital Course: The patient is a 49 y.o. female with history of severe COPD on 4 L of oxygen chronically, diastolic CHF, neuromuscular disorder, GERD, hypertension, sleep apnea, and right eye blindness who presents to [facility] with pertinence of breath. Patient states that she has had progressively worsening shortness of breath over the past few days. States that she can hardly catch her breath when she ambulates across the room at her house. Denies any sick contacts, but she does live with other family members at home and is unsure if they have been sick. Denies fever, chills, chest pain, abdominal pain, nausea, vomiting, constipation, diarrhea, and dysuria. Patient states that she usually wears 4 L of oxygen at home, but noticed that her oxygen levels were low even on the 4 L and so decided to come into the hospital for further evaluation. Also notes that she has been more sleepy and lethargic than usual.     Rapid flu was negative. Rapid COVID was negative. VBG showed a pH of 7.263 and PCO2 of 103. She was started on an on rebreather mask and  switched to BiPAP given her degree of hypercapnia.      Subjective:    Patient feeling better  Seen in the presence of her  but did not start  Asking to be discharged      Medications:  Reviewed    Infusion Medications    sodium chloride Stopped (01/01/23 1643)     Scheduled Medications    methadone  130 mg Oral Daily    aspirin  81 mg Oral Daily    carvedilol  3.125 mg Oral BID WC    lisinopril  5 mg Oral Daily    pantoprazole  40 mg Oral QAM AC    sertraline  125 mg Oral Daily    mometasone-formoterol  2 puff Inhalation BID    torsemide  60 mg Oral Daily    sodium chloride flush  5-40 mL IntraVENous 2 times per day    enoxaparin  40 mg SubCUTAneous Daily    ipratropium-albuterol  1 ampule Inhalation Q4H WA    [START ON 1/4/2023] predniSONE  40 mg Oral Daily    cefTRIAXone (ROCEPHIN) IV  1,000 mg IntraVENous Q24H     PRN Meds: calcium carbonate, albuterol sulfate HFA, sodium chloride flush, sodium chloride, ondansetron **OR** ondansetron, polyethylene glycol, acetaminophen **OR** acetaminophen      Intake/Output Summary (Last 24 hours) at 1/3/2023 1331  Last data filed at 1/3/2023 1243  Gross per 24 hour   Intake 600 ml   Output 900 ml   Net -300 ml       Physical Exam Performed:    /83   Pulse 87   Temp 97.8 °F (36.6 °C) (Oral)   Resp 13   Ht 5' (1.524 m)   Wt 194 lb 7.1 oz (88.2 kg)   LMP 08/14/2015   SpO2 91%   BMI 37.98 kg/m²     General appearance: Chronically ill appearing, in mild distress. HEENT Normal cephalic, atraumatic without obvious deformity. Pupils equal, round, and reactive to light. Extra ocular muscles intact. Conjunctivae/corneas clear. Neck: Supple, No jugular venous distention/bruits. Trachea midline without thyromegaly or adenopathy with full range of motion. Lungs: Increased work of breathing, accessory muscle use, expiratory wheezes heard throughout. Heart: Tachycardic rate and regular rhythm with Normal S1/S2 without murmurs, rubs or gallops, point of maximum impulse non-displaced  Abdomen: Soft, non-tender or non-distended without rigidity or guarding and positive bowel sounds all four quadrants. Extremities: No clubbing, cyanosis, or edema bilaterally. Full range of motion without deformity and normal gait intact. Skin: Skin color, texture, turgor normal.  No rashes or lesions. Neurologic: Alert and oriented X 3, neurovascularly intact with sensory/motor intact upper extremities/lower extremities, bilaterally. Cranial nerves: II-XII intact, grossly non-focal.  Mental status: Alert, oriented, thought content appropriate.   Capillary Refill: Acceptable  < 3 seconds  Peripheral Pulses: +3 Easily felt, not easily obliterated with pressure      Labs:   Recent Labs     01/01/23  6168 01/02/23  0546 01/03/23  0644   WBC 11.8* 7.9 12.4*   HGB 9.5* 10.0* 10.3*   HCT 31.7* 31.3* 32.7*    332 389     Recent Labs     01/01/23  0828 01/02/23  0546 01/03/23  0644    136 131*   K 4.5 3.8 3.6   CL 91* 84* 81*   CO2 38* 45* 43*   BUN 14 22* 36*   CREATININE 0.9 0.7 1.0   CALCIUM 8.9 9.1 9.3     Recent Labs     01/01/23  0828 01/02/23  0546 01/03/23  0644   AST 12* 10* 11*   ALT 7* 6* 7*   BILITOT <0.2 <0.2 <0.2   ALKPHOS 98 92 94     No results for input(s): INR in the last 72 hours. Recent Labs     01/01/23 0828   TROPONINI <0.01       Urinalysis:      Lab Results   Component Value Date/Time    NITRU Negative 11/08/2020 02:12 PM    WBCUA 0-2 11/08/2020 02:12 PM    RBCUA 5-10 11/08/2020 02:12 PM    BLOODU SMALL 11/08/2020 02:12 PM    SPECGRAV 1.012 11/08/2020 02:12 PM    GLUCOSEU Negative 11/08/2020 02:12 PM       Radiology:  XR CHEST PORTABLE   Final Result   Portable study with low lung volume showing a pattern of vascular congestion   or mild edema centrally. IP CONSULT TO PULMONOLOGY  IP CONSULT TO SPIRITUAL SERVICES    Assessment/Plan:  COPD exacerbation with SOB. Low procal. On 6L/min suppl oxygen today.  On 4L/min at baseline  -cont DuoNebs every 4 hours while awake  -Albuterol as needed  -IV Solu-Medrol   -switch to po prednisone at DC  -IV ceftriaxone  -appreciate pulmonology  -FU respiratory culture/ viral PCR panel/strep pneumo/ Legionella urine antigens    Acute on chronic respiratory failure with hypoxia and hypercapnia  -Management as above  -Maintain SPO2 to can be keep 89% or greater     Chronic diastolic CHF -mild fluid overload  -Cont on p.o. torsemide  -Continue Coreg  -Continue lisinopril  -Continue aspirin  -Telemetry monitoring     Essential hypertension  -Continue home Coreg and lisinopril     Opioid dependence  -Hold home methadone given respiratory distress and resume once lung status improves     Neuropathy  -Continue home gabapentin     Anxiety and depression  -Continue home Zoloft    GERD  -Continue PPI    DVT Prophylaxis: lovenox  Diet: ADULT DIET; Regular; Low Sodium (2 gm); 1500 ml  Code Status: Full Code  PT/OT Eval Status: ordered    Dispo - DC tomorrow am when weaned off oxygen a bit more.       Appropriate for A1 Discharge Unit: Natalie Alexander MD

## 2023-01-03 NOTE — RT PROTOCOL NOTE
RT Inhaler-Nebulizer Bronchodilator Protocol Note    There is a bronchodilator order in the chart from a provider indicating to follow the RT Bronchodilator Protocol and there is an Initiate RT Inhaler-Nebulizer Bronchodilator Protocol order as well (see protocol at bottom of note). CXR Findings:  No results found. The findings from the last RT Protocol Assessment were as follows:   History Pulmonary Disease: Chronic pulmonary disease  Respiratory Pattern: Dyspnea on exertion or RR 21-25 bpm  Breath Sounds: Intermittent or unilateral wheezes  Cough: Strong, spontaneous, non-productive  Indication for Bronchodilator Therapy: On home bronchodilators  Bronchodilator Assessment Score: 8    Aerosolized bronchodilator medication orders have been revised according to the RT Inhaler-Nebulizer Bronchodilator Protocol below. Respiratory Therapist to perform RT Therapy Protocol Assessment initially then follow the protocol. Repeat RT Therapy Protocol Assessment PRN for score 0-3 or on second treatment, BID, and PRN for scores above 3. No Indications - adjust the frequency to every 6 hours PRN wheezing or bronchospasm, if no treatments needed after 48 hours then discontinue using Per Protocol order mode. If indication present, adjust the RT bronchodilator orders based on the Bronchodilator Assessment Score as indicated below. Use Inhaler orders unless patient has one or more of the following: on home nebulizer, not able to hold breath for 10 seconds, is not alert and oriented, cannot activate and use MDI correctly, or respiratory rate 25 breaths per minute or more, then use the equivalent nebulizer order(s) with same Frequency and PRN reasons based on the score. If a patient is on this medication at home then do not decrease Frequency below that used at home.     0-3 - enter or revise RT bronchodilator order(s) to equivalent RT Bronchodilator order with Frequency of every 4 hours PRN for wheezing or increased work of breathing using Per Protocol order mode. 4-6 - enter or revise RT Bronchodilator order(s) to two equivalent RT bronchodilator orders with one order with BID Frequency and one order with Frequency of every 4 hours PRN wheezing or increased work of breathing using Per Protocol order mode. 7-10 - enter or revise RT Bronchodilator order(s) to two equivalent RT bronchodilator orders with one order with TID Frequency and one order with Frequency of every 4 hours PRN wheezing or increased work of breathing using Per Protocol order mode. 11-13 - enter or revise RT Bronchodilator order(s) to one equivalent RT bronchodilator order with QID Frequency and an Albuterol order with Frequency of every 4 hours PRN wheezing or increased work of breathing using Per Protocol order mode. Greater than 13 - enter or revise RT Bronchodilator order(s) to one equivalent RT bronchodilator order with every 4 hours Frequency and an Albuterol order with Frequency of every 2 hours PRN wheezing or increased work of breathing using Per Protocol order mode. RT to enter RT Home Evaluation for COPD & MDI Assessment order using Per Protocol order mode.     Electronically signed by Regino Bosworth, RCP on 1/3/2023 at 11:16 AM

## 2023-01-03 NOTE — PROGRESS NOTES
Pt c/o constipation even after miralax and lactulose has been given. Pt requested enema. Romi Chew NP ordered prn soap suds enema. 150 ml of enema instilled pt felt urge to go during enema. Pt able to have formed bowel movement, reports relief.     Electronically signed by Jorge Alberto Trejo RN on 1/3/2023 at 12:40 AM

## 2023-01-03 NOTE — PROGRESS NOTES
Comprehensive Nutrition Assessment    Type and Reason for Visit:  Initial, Positive Nutrition Screen    Nutrition Recommendations/Plan:   Low Na/1500 ml fl res diet   Pt declined ONS     Malnutrition Assessment:  Malnutrition Status:  No malnutrition (01/03/23 1342)      Nutrition Assessment:    + Screen for MST 3. Pt with hx of COPD, diastolic CHF, neuromuscular disorder, GERD, hypertension, sleep apnea, and right eye blindness. Pt admitted with respiratory failure. Reports appetite issues are baseline, sometimes she just doesn't feel like eating much. Lunch untouched at baseline. Reports a remote hx of wt loss, noted current weights from past year fairly stable. Declined ONS. Denied any questions about current diet. Nutrition Related Findings:    Reviewed labs Wound Type: None       Current Nutrition Intake & Therapies:    Average Meal Intake: %, 0%     ADULT DIET; Regular; Low Sodium (2 gm); 1500 ml    Anthropometric Measures:  Height: 5' (152.4 cm)  Ideal Body Weight (IBW): 100 lbs (45 kg)       Current Body Weight: 194 lb (88 kg), 194 % IBW.     Current BMI (kg/m2): 37.9                          BMI Categories: Obese Class 2 (BMI 35.0 -39.9)    Estimated Daily Nutrient Needs:  Energy Requirements Based On: Kcal/kg  Weight Used for Energy Requirements: Current  Energy (kcal/day): 7048-5880 kcal (15-18 kcal/kg ABW)  Weight Used for Protein Requirements: Ideal  Protein (g/day): 90 gm (2 gm/kg ABW)  Method Used for Fluid Requirements: Other (Comment)  Fluid (ml/day): 1500 ml fl res    Nutrition Diagnosis:   Inadequate oral intake related to inadequate protein-energy intake as evidenced by poor intake prior to admission    Nutrition Interventions:   Food and/or Nutrient Delivery: Continue Current Diet  Nutrition Education/Counseling: No recommendation at this time  Coordination of Nutrition Care: Continue to monitor while inpatient       Goals:     Goals: PO intake 50% or greater, by next RD assessment       Nutrition Monitoring and Evaluation:   Behavioral-Environmental Outcomes: None Identified  Food/Nutrient Intake Outcomes: Food and Nutrient Intake  Physical Signs/Symptoms Outcomes: Biochemical Data, Nutrition Focused Physical Findings, Skin, Weight    Discharge Planning:    No discharge needs at this time     Killian Galaviz, [address] [place] [address],   Contact: [phone]

## 2023-01-03 NOTE — CARE COORDINATION
INITIAL CASE MANAGEMENT ASSESSMENT     Reviewed chart, met with patient to assess possible discharge needs. Explained Case Management role/services. SW interviewed patient via telephone today. Living Situation: Patient resides in a single family home with her , uncle and no pets. The home has a ramped entrance. Prior to medical admission she reports that she had no trouble getting in and out of the property. ADLs: Prior to medical admission she was reportedly independent. She stated that she doesn't anticipate any needs at discharge. DME: Prior to medical admission patient reports that she had a cane, manual wheelchair, wheelchair ramp, broken nebulizer (about four years old), walker and home oxygen already set up by [employer]. She was approved for a motorized wheelchair but it hasn't been delivered yet. She will need a new order for a nebulizer at discharge. Kamryn Dalton PT/OT: Home with assistance PRN. Active Services: Prior to medical admission patient reports that she had no active services in place. She stated that she doesn't anticipate any needs at discharge. Transportation: Prior to medical admission patient reports that she was an active , however, the family does the bulk of the driving. She stated that they will likely transport her home discharge. Medications: Patient receives medications from [facility] [place]. At this time there are no issues with access or affordability. PCP: Cleopatra Duke [phone] (phone) and [phone] (Fax) is her primary care physician. Her last appointment with this provider was a couple of months ago. PLAN/COMMENTS:   1) Pulmonology clearance and DME needs including a new nebulizer. 2) Discharge to home with family. SW provided contact information for patient or family to call with any questions. SW will follow and assist as needed. Respectfully submitted,     Ning ALICEA  [address] [facility]   [phone]     Electronically signed by MERRITT Eaton on 1/3/2023 at 8:45 AM

## 2023-01-04 LAB
A/G RATIO: 0.7 (ref 1.1–2.2)
ALBUMIN SERPL-MCNC: 2.9 G/DL (ref 3.4–5)
ALP BLD-CCNC: 73 U/L (ref 40–129)
ALT SERPL-CCNC: 23 U/L (ref 10–40)
ANION GAP SERPL CALCULATED.3IONS-SCNC: 8 MMOL/L (ref 3–16)
AST SERPL-CCNC: 34 U/L (ref 15–37)
BASOPHILS ABSOLUTE: 0 K/UL (ref 0–0.2)
BASOPHILS RELATIVE PERCENT: 0.1 %
BILIRUB SERPL-MCNC: <0.2 MG/DL (ref 0–1)
BUN BLDV-MCNC: 47 MG/DL (ref 7–20)
CALCIUM SERPL-MCNC: 8.4 MG/DL (ref 8.3–10.6)
CHLORIDE BLD-SCNC: 80 MMOL/L (ref 99–110)
CO2: 42 MMOL/L (ref 21–32)
CREAT SERPL-MCNC: 1.4 MG/DL (ref 0.6–1.1)
EOSINOPHILS ABSOLUTE: 0.1 K/UL (ref 0–0.6)
EOSINOPHILS RELATIVE PERCENT: 0.3 %
GFR SERPL CREATININE-BSD FRML MDRD: 46 ML/MIN/{1.73_M2}
GLUCOSE BLD-MCNC: 87 MG/DL (ref 70–99)
HCT VFR BLD CALC: 34.2 % (ref 36–48)
HEMOGLOBIN: 10.5 G/DL (ref 12–16)
LYMPHOCYTES ABSOLUTE: 3.4 K/UL (ref 1–5.1)
LYMPHOCYTES RELATIVE PERCENT: 21.3 %
MCH RBC QN AUTO: 26.2 PG (ref 26–34)
MCHC RBC AUTO-ENTMCNC: 30.7 G/DL (ref 31–36)
MCV RBC AUTO: 85.1 FL (ref 80–100)
MONOCYTES ABSOLUTE: 1.3 K/UL (ref 0–1.3)
MONOCYTES RELATIVE PERCENT: 8.2 %
NEUTROPHILS ABSOLUTE: 11.2 K/UL (ref 1.7–7.7)
NEUTROPHILS RELATIVE PERCENT: 70.1 %
PDW BLD-RTO: 15.2 % (ref 12.4–15.4)
PLATELET # BLD: 416 K/UL (ref 135–450)
PMV BLD AUTO: 7.7 FL (ref 5–10.5)
POTASSIUM REFLEX MAGNESIUM: 4.2 MMOL/L (ref 3.5–5.1)
RBC # BLD: 4.02 M/UL (ref 4–5.2)
REASON FOR REJECTION: NORMAL
REJECTED TEST: NORMAL
SODIUM BLD-SCNC: 130 MMOL/L (ref 136–145)
TOTAL PROTEIN: 7 G/DL (ref 6.4–8.2)
WBC # BLD: 16 K/UL (ref 4–11)

## 2023-01-04 PROCEDURE — 97116 GAIT TRAINING THERAPY: CPT | Performed by: PHYSICAL THERAPIST

## 2023-01-04 PROCEDURE — 6370000000 HC RX 637 (ALT 250 FOR IP): Performed by: INTERNAL MEDICINE

## 2023-01-04 PROCEDURE — 36415 COLL VENOUS BLD VENIPUNCTURE: CPT

## 2023-01-04 PROCEDURE — 97530 THERAPEUTIC ACTIVITIES: CPT

## 2023-01-04 PROCEDURE — 99233 SBSQ HOSP IP/OBS HIGH 50: CPT | Performed by: INTERNAL MEDICINE

## 2023-01-04 PROCEDURE — 80053 COMPREHEN METABOLIC PANEL: CPT

## 2023-01-04 PROCEDURE — 85025 COMPLETE CBC W/AUTO DIFF WBC: CPT

## 2023-01-04 PROCEDURE — 6360000002 HC RX W HCPCS: Performed by: INTERNAL MEDICINE

## 2023-01-04 PROCEDURE — 2700000000 HC OXYGEN THERAPY PER DAY

## 2023-01-04 PROCEDURE — 2580000003 HC RX 258: Performed by: INTERNAL MEDICINE

## 2023-01-04 PROCEDURE — 94761 N-INVAS EAR/PLS OXIMETRY MLT: CPT

## 2023-01-04 PROCEDURE — 2060000000 HC ICU INTERMEDIATE R&B

## 2023-01-04 PROCEDURE — 94640 AIRWAY INHALATION TREATMENT: CPT

## 2023-01-04 PROCEDURE — 97530 THERAPEUTIC ACTIVITIES: CPT | Performed by: PHYSICAL THERAPIST

## 2023-01-04 PROCEDURE — 97535 SELF CARE MNGMENT TRAINING: CPT

## 2023-01-04 RX ADMIN — SERTRALINE 125 MG: 50 TABLET, FILM COATED ORAL at 05:20

## 2023-01-04 RX ADMIN — PREDNISONE 40 MG: 20 TABLET ORAL at 08:17

## 2023-01-04 RX ADMIN — LISINOPRIL 5 MG: 5 TABLET ORAL at 08:17

## 2023-01-04 RX ADMIN — CEFTRIAXONE 1000 MG: 1 INJECTION, POWDER, FOR SOLUTION INTRAMUSCULAR; INTRAVENOUS at 15:45

## 2023-01-04 RX ADMIN — TORSEMIDE 60 MG: 20 TABLET ORAL at 08:17

## 2023-01-04 RX ADMIN — IPRATROPIUM BROMIDE AND ALBUTEROL SULFATE 1 AMPULE: .5; 3 SOLUTION RESPIRATORY (INHALATION) at 12:17

## 2023-01-04 RX ADMIN — MOMETASONE FUROATE AND FORMOTEROL FUMARATE DIHYDRATE 2 PUFF: 200; 5 AEROSOL RESPIRATORY (INHALATION) at 08:20

## 2023-01-04 RX ADMIN — MOMETASONE FUROATE AND FORMOTEROL FUMARATE DIHYDRATE 2 PUFF: 200; 5 AEROSOL RESPIRATORY (INHALATION) at 20:23

## 2023-01-04 RX ADMIN — Medication 10 ML: at 08:21

## 2023-01-04 RX ADMIN — PANTOPRAZOLE SODIUM 40 MG: 40 TABLET, DELAYED RELEASE ORAL at 05:20

## 2023-01-04 RX ADMIN — ENOXAPARIN SODIUM 40 MG: 100 INJECTION SUBCUTANEOUS at 08:16

## 2023-01-04 RX ADMIN — Medication 81 MG: at 08:17

## 2023-01-04 RX ADMIN — ONDANSETRON 4 MG: 2 INJECTION INTRAMUSCULAR; INTRAVENOUS at 05:35

## 2023-01-04 RX ADMIN — CARVEDILOL 3.12 MG: 3.12 TABLET, FILM COATED ORAL at 15:42

## 2023-01-04 RX ADMIN — CARVEDILOL 3.12 MG: 3.12 TABLET, FILM COATED ORAL at 08:17

## 2023-01-04 RX ADMIN — METHADONE HYDROCHLORIDE 130 MG: 10 TABLET ORAL at 08:17

## 2023-01-04 RX ADMIN — IPRATROPIUM BROMIDE AND ALBUTEROL SULFATE 1 AMPULE: .5; 3 SOLUTION RESPIRATORY (INHALATION) at 16:29

## 2023-01-04 RX ADMIN — IPRATROPIUM BROMIDE AND ALBUTEROL SULFATE 1 AMPULE: .5; 3 SOLUTION RESPIRATORY (INHALATION) at 20:23

## 2023-01-04 RX ADMIN — IPRATROPIUM BROMIDE AND ALBUTEROL SULFATE 1 AMPULE: .5; 3 SOLUTION RESPIRATORY (INHALATION) at 08:10

## 2023-01-04 RX ADMIN — Medication 10 ML: at 21:24

## 2023-01-04 NOTE — PROGRESS NOTES
Hospitalist Progress Note      PCP: Kassy Jones    Date of Admission: 1/1/2023      Subjective: Feels okay this morning, no chest pain or shortness of breath at rest denies nausea vomiting no dizziness or lightheadedness. Medications:  Reviewed    Infusion Medications    sodium chloride Stopped (01/01/23 1643)     Scheduled Medications    methadone  130 mg Oral Daily    aspirin  81 mg Oral Daily    carvedilol  3.125 mg Oral BID WC    lisinopril  5 mg Oral Daily    pantoprazole  40 mg Oral QAM AC    sertraline  125 mg Oral Daily    mometasone-formoterol  2 puff Inhalation BID    torsemide  60 mg Oral Daily    sodium chloride flush  5-40 mL IntraVENous 2 times per day    enoxaparin  40 mg SubCUTAneous Daily    ipratropium-albuterol  1 ampule Inhalation Q4H WA    predniSONE  40 mg Oral Daily    cefTRIAXone (ROCEPHIN) IV  1,000 mg IntraVENous Q24H     PRN Meds: calcium carbonate, albuterol sulfate HFA, sodium chloride flush, sodium chloride, ondansetron **OR** ondansetron, polyethylene glycol, acetaminophen **OR** acetaminophen      Intake/Output Summary (Last 24 hours) at 1/4/2023 1010  Last data filed at 1/4/2023 0810  Gross per 24 hour   Intake 590 ml   Output 1750 ml   Net -1160 ml       Physical Exam Performed:    /82   Pulse 83   Temp 98 °F (36.7 °C) (Oral)   Resp 15   Ht 5' (1.524 m)   Wt 194 lb 0.1 oz (88 kg)   LMP 08/14/2015   SpO2 98%   BMI 37.89 kg/m²     General appearance: No apparent distress  Neck: Supple  Respiratory: Scattered expiratory wheezes Rales/Wheezes/Rhonchi. Cardiovascular: Regular rate and rhythm with normal S1/S2 without murmurs, rubs or gallops. Abdomen: Soft, non-tender, non-distended  Musculoskeletal: No clubbing, cyanosis.   Bilateral lower extremity edema  Skin: Hyperpigmentation lower extremity  Neurologic: Moving her extremities  Psychiatric: Alert and oriented  Capillary Refill: Brisk, 3 seconds, normal   Peripheral Pulses: +2 palpable, equal bilaterally Labs:   Recent Labs     01/02/23  0546 01/03/23  0644 01/04/23  0606   WBC 7.9 12.4* 16.0*   HGB 10.0* 10.3* 10.5*   HCT 31.3* 32.7* 34.2*    389 416     Recent Labs     01/02/23  0546 01/03/23  0644 01/04/23  0504    131* 130*   K 3.8 3.6 4.2   CL 84* 81* 80*   CO2 45* 43* 42*   BUN 22* 36* 47*   CREATININE 0.7 1.0 1.4*   CALCIUM 9.1 9.3 8.4     Recent Labs     01/02/23  0546 01/03/23  0644 01/04/23  0504   AST 10* 11* 34   ALT 6* 7* 23   BILITOT <0.2 <0.2 <0.2   ALKPHOS 92 94 73     No results for input(s): INR in the last 72 hours. No results for input(s): Adonica Hoose in the last 72 hours. Urinalysis:      Lab Results   Component Value Date/Time    NITRU Negative 11/08/2020 02:12 PM    WBCUA 0-2 11/08/2020 02:12 PM    RBCUA 5-10 11/08/2020 02:12 PM    BLOODU SMALL 11/08/2020 02:12 PM    SPECGRAV 1.012 11/08/2020 02:12 PM    GLUCOSEU Negative 11/08/2020 02:12 PM       Radiology:  XR CHEST PORTABLE   Final Result   Portable study with low lung volume showing a pattern of vascular congestion   or mild edema centrally. IP CONSULT TO PULMONOLOGY  IP CONSULT TO SPIRITUAL SERVICES    Assessment/Plan:    Active Hospital Problems    Diagnosis     Acute on chronic respiratory failure with hypoxia and hypercapnia (HCC) [J96.21, J96.22]      Acute on chronic hypercapnic respiratory failure with complication of sleep apnea, and obesity, pulmonology consulted, apparently not using BiPAP appropriately. COPD with exacerbation, Symbicort, Spiriva DuoNeb and prednisone pulmonology following. Essential hypertension, p.o. medications  Opioid dependence and neuropathy on methadone and gabapentin follow-up as outpatient  Chronic pain as above  History of heart failure on diuretics, creatinine increasing, will hold     Diet: ADULT DIET; Regular;  Low Sodium (2 gm); 1500 ml  Code Status: Full Code      Reymundo Ann MD

## 2023-01-04 NOTE — PLAN OF CARE
Problem: Discharge Planning  Goal: Discharge to home or other facility with appropriate resources  Outcome: Progressing     Problem: Pain  Goal: Verbalizes/displays adequate comfort level or baseline comfort level  Outcome: Progressing     Problem: Skin/Tissue Integrity  Goal: Absence of new skin breakdown  Description: 1. Monitor for areas of redness and/or skin breakdown  2. Assess vascular access sites hourly  3. Every 4-6 hours minimum:  Change oxygen saturation probe site  4. Every 4-6 hours:  If on nasal continuous positive airway pressure, respiratory therapy assess nares and determine need for appliance change or resting period. Outcome: Progressing     Problem: Safety - Adult  Goal: Free from fall injury  Outcome: Progressing  Note: Fall precautions in place. Bed locked and in lowest position. Call light within reach.       Problem: ABCDS Injury Assessment  Goal: Absence of physical injury  Outcome: Progressing     Problem: Respiratory - Adult  Goal: Achieves optimal ventilation and oxygenation  Outcome: Progressing     Problem: Musculoskeletal - Adult  Goal: Return mobility to safest level of function  Outcome: Progressing     Problem: Chronic Conditions and Co-morbidities  Goal: Patient's chronic conditions and co-morbidity symptoms are monitored and maintained or improved  Outcome: Progressing     Problem: Nutrition Deficit:  Goal: Optimize nutritional status  Outcome: Progressing

## 2023-01-04 NOTE — PROGRESS NOTES
Pulmonary Progress Note    Date of Admission: 1/1/2023   LOS: 3 days       CC:  Chief Complaint   Patient presents with    Shortness of Breath     Pt to ED from home via [facility] for SOB. Pt states she normally feels short of breath due to her COPD and CHF but says this time is different. Per EMS, on arrival pt was 84% on 4L/NC. Pt arrived in ED on 10L/NRB with sp02 100%, tachy with HR 110s. EMS gave 1 duoneb en route to ED and pt gave herself one an hour before EMS arrival.         Subjective:  Patient asking about d/c . Feeling sascha.r     ROS:   No nausea  No Vomiting  No chest pain       Assessment:     COPD with an FEV1 53%  Chronic hypoxemic respiratory failure 4 L nasal cannula  Sleep apnea on BiPAP  History of CHF with an EF of 41%, grade 1 diastolic dysfunction, mitral regurgitation, elevated pulmonary artery systolic pressure echo 1597    Plan: This note may have been transcribed using [id number]. Please disregard any translational errors. Hospital Day: 3     Abnormal radiograph  Limited secondary to low lung volumes. Possible fluid overload.  -4.9 L      Acute on chronic hypercapnic respiratory failure pH 2.53 complicated by sleep apnea and obesity BMI 40  Not using bipap, just like home use         COPD  Home Symbicort and Spiriva Duoneb's    Prednisone   Duoneb's    Dulera   Patient asking about d/c .  wheezing are improving on prednisone. Could d/c today. Likely tomorrow would be better. Pulmonary follow up would  be ideal but does not follow up  Bipap usage would be ideal but. .. Opioid dependence and neuropathy with anxiety and depression  Home methadone and gabapentin           Chronic pain  Home methadone 130 mg daily         Hx CHF  Agree with diuresis. Noncompliance. This is a significant issue. Has not see pulmonary as out patient > 1 year and states not able to secondary to transportation.     No using bipap.    \"...cooler filled with mountain dew at bedside\"        Ok to d/c on on prednisone     Patient to call to get /fu with pulmonary. Will sign off at this time. Thanks for the consult. Please call with questions. Data:        PHYSICAL EXAM:   Blood pressure 121/82, pulse 83, temperature 98 °F (36.7 °C), temperature source Oral, resp. rate 15, height 5' (1.524 m), weight 194 lb 0.1 oz (88 kg), last menstrual period 08/14/2015, SpO2 98 %, not currently breastfeeding.'  Body mass index is 37.89 kg/m². Gen: No distress. ENT:   Resp: No accessory muscle use. No crackles. Few wheezes. No rhonchi. CV: Regular rate. Regular rhythm. No murmur or rub. No edema. Skin: Warm, dry, normal texture and turgor. No nodule on exposed extremities. M/S: No cyanosis. No clubbing. No joint deformity. Psych: Oriented x 3. No anxiety. Awake. Alert. Intact judgement and insight. Good Mood / Affect.   Memory appears in tact       Medications:    Scheduled Meds:   methadone  130 mg Oral Daily    aspirin  81 mg Oral Daily    carvedilol  3.125 mg Oral BID WC    lisinopril  5 mg Oral Daily    pantoprazole  40 mg Oral QAM AC    sertraline  125 mg Oral Daily    mometasone-formoterol  2 puff Inhalation BID    torsemide  60 mg Oral Daily    sodium chloride flush  5-40 mL IntraVENous 2 times per day    enoxaparin  40 mg SubCUTAneous Daily    ipratropium-albuterol  1 ampule Inhalation Q4H WA    predniSONE  40 mg Oral Daily    cefTRIAXone (ROCEPHIN) IV  1,000 mg IntraVENous Q24H       Continuous Infusions:   sodium chloride Stopped (01/01/23 1643)       PRN Meds:  calcium carbonate, albuterol sulfate HFA, sodium chloride flush, sodium chloride, ondansetron **OR** ondansetron, polyethylene glycol, acetaminophen **OR** acetaminophen    Labs reviewed:  CBC:   Recent Labs     01/02/23  0546 01/03/23  0644 01/04/23  0606   WBC 7.9 12.4* 16.0*   HGB 10.0* 10.3* 10.5*   HCT 31.3* 32.7* 34.2*   MCV 84.9 84.2 85.1    389 416       BMP:   Recent Labs     01/02/23  0546 01/03/23  0644 01/04/23  0504    131* 130*   K 3.8 3.6 4.2   CL 84* 81* 80*   CO2 45* 43* 42*   BUN 22* 36* 47*   CREATININE 0.7 1.0 1.4*       LIVER PROFILE:   Recent Labs     01/02/23  0546 01/03/23  0644 01/04/23  0504   AST 10* 11* 34   ALT 6* 7* 23   BILITOT <0.2 <0.2 <0.2   ALKPHOS 92 94 73       PT/INR: No results for input(s): PROTIME, INR in the last 72 hours. APTT: No results for input(s): APTT in the last 72 hours. UA:No results for input(s): NITRITE, COLORU, PHUR, LABCAST, WBCUA, RBCUA, MUCUS, TRICHOMONAS, YEAST, BACTERIA, CLARITYU, SPECGRAV, LEUKOCYTESUR, UROBILINOGEN, BILIRUBINUR, BLOODU, GLUCOSEU, AMORPHOUS in the last 72 hours. Invalid input(s): Lorella Poll  No results for input(s): PH, PCO2, PO2 in the last 72 hours. Cx:      Films:  Radiology Review:  Pertinent images / reports were reviewed as a part of this visit. CT Chest w/ contrast: No results found for this or any previous visit. CT Chest w/o contrast: No results found for this or any previous visit. CTPA: Results for orders placed during the hospital encounter of 05/31/21    CT CHEST PULMONARY EMBOLISM W CONTRAST    Narrative  EXAMINATION:  CTA OF THE CHEST 5/31/2021 7:31 pm    TECHNIQUE:  CTA of the chest was performed after the administration of intravenous  contrast.  Multiplanar reformatted images are provided for review. MIP  images are provided for review. Dose modulation, iterative reconstruction,  and/or weight based adjustment of the mA/kV was utilized to reduce the  radiation dose to as low as reasonably achievable. COMPARISON:  None. HISTORY:  ORDERING SYSTEM PROVIDED HISTORY: sob / back and chest pain  TECHNOLOGIST PROVIDED HISTORY:  Reason for exam:->sob / back and chest pain  Decision Support Exception - unselect if not a suspected or confirmed  emergency medical condition->Emergency Medical Condition (MA)  Reason for Exam: sob poss.  PE back and chest pain / hx scoliosis  Acuity: Unknown  Type of Exam: Ongoing    FINDINGS:  Pulmonary Arteries: The patient was reinjected and the pulmonary arteries are  well opacified on the 2nd injection with no filling defect or vessel cut off. Mediastinum: No evidence of mediastinal lymphadenopathy. The heart and  pericardium demonstrate no acute abnormality. There is no acute abnormality  of the thoracic aorta. Lungs/pleura: There is subsegmental opacity along the right middle lobe  medially. No effusion is seen. There is no pulmonary nodule or mass. Upper Abdomen: The visualized liver is mildly enlarged with slight prominence  of the central biliary ducts. The adrenals are normal.    Soft Tissues/Bones: There is moderate curvature of the thoracolumbar spine,  convex to the left. Impression  No evidence of a pulmonary embolus. Unremarkable thoracic aorta and mediastinum. Subsegmental infiltrate vs atelectasis and scarring along the right middle  lobe and mild discoid atelectasis or scarring along the lingula. Incidental mild hepatomegaly with slight prominence of the central biliary  ducts. Recommend correlating with lab values. Moderate degenerative changes in the spine with associated moderate scoliosis. CXR PA/LAT: Results for orders placed during the hospital encounter of 05/31/21    XR CHEST (2 VW)    Narrative  EXAMINATION:  TWO XRAY VIEWS OF THE CHEST    5/31/2021 6:27 pm    COMPARISON:  11/08/2020    HISTORY:  ORDERING SYSTEM PROVIDED HISTORY: Shortness of breath  TECHNOLOGIST PROVIDED HISTORY:  Reason for exam:->Shortness of breath  Reason for Exam: sob, chest pain    FINDINGS:  The heart is mildly enlarged and less prominent. The pulmonary vessels are  engorged centrally and less prominent. There are hazy perihilar and  bibasilar interstitial opacities which have decreased. There are mild linear  densities along the lung bases extending posteriorly. No effusion is seen.   There moderate degenerative changes in the spine with mild kyphosis along the  thoracolumbar region    Impression  Mild cardiomegaly which is less prominent with mild central pulmonary  congestion and questionable interstitial edema or pneumonitis vs underlying  pulmonary fibrosis. Recommend short-term follow-up. Mild subsegmental linear atelectasis or scarring along the lung bases  posteriorly      CXR portable: Results for orders placed during the hospital encounter of 01/01/23    XR CHEST PORTABLE    Narrative  EXAMINATION:  ONE XRAY VIEW OF THE CHEST    1/1/2023 8:36 am    COMPARISON:  02/17/2022 radiograph    HISTORY:  ORDERING SYSTEM PROVIDED HISTORY: sob  TECHNOLOGIST PROVIDED HISTORY:  Reason for exam:->sob  Reason for Exam: SOB    FINDINGS:  Low lung volume due to inspiratory effort. Heart and mediastinum appear  normal.  Moderate perihilar opacities are present centrally and there is a  central pattern of ground-glass attenuation in both lungs. No significant  skeletal finding. Impression  Portable study with low lung volume showing a pattern of vascular congestion  or mild edema centrally. This note was transcribed using [id number] [employer]. Please disregard any translational errors.       [facility] [phone]

## 2023-01-04 NOTE — PROGRESS NOTES
Occupational Therapy  Facility/Department: [facility] PROGRESSIVE CARE  Occupational Therapy Ashok Montes Note  This note to serve as discharge summary if pt d/c'd prior to next session    Name: Robert Louie  : 1975  MRN: [id number]  Date of Service: 2023    Discharge Recommendations:  Home with assist PRN     Patient Diagnosis(es): The primary encounter diagnosis was COPD exacerbation ([address] A diagnosis of Acute respiratory failure with hypoxia and hypercapnia (HCC) was also pertinent to this visit. Past Medical History:  has a past medical history of Arthritis, Blind right eye, CHF (congestive heart failure) ([address] Chronic respiratory failure with hypoxia and hypercapnia ([address] COPD (chronic obstructive pulmonary disease) ([address] GERD (gastroesophageal reflux disease), Hypertension, Movement disorder, Neuromuscular disorder ([address] On home O2, Pneumonia, and Sleep apnea. Past Surgical History:  has a past surgical history that includes Cholecystectomy; Tubal ligation; Abdomen surgery; eye surgery; and Dilatation, esophagus. Assessment   Performance deficits / Impairments: Decreased functional mobility ; Decreased safe awareness;Decreased ADL status; Decreased balance;Decreased high-level IADLs;Decreased endurance;Decreased strength  Assessment: Robert Louie is a 49 y.o. female who presents emergency department complaining of shortness of breath. Patient has history of COPD and typically requires 4 L of oxygen. PTA pt from home where pt was Ind with mobility and ADLs with use of 4WW. TODAY: Pt tolerted tx fairly well. Pt initially on 4L, yet desating at rest. RN increased 02 to 6L at start of tx. Pt desated briefly with activity, yet recovered to >90% with cues for pursed lip breathing, rest. Pt currently requires supervision for mobility and transfers with use of RW. Pt completing toileting needs Mod I and anticipate would require supervision for ADLs based on ROM, endurance observed.  Pt will benefit from skilled OT services at this time. Anticipate pt safe to return home at time of D/C with assist prn. Prognosis: Good  REQUIRES OT FOLLOW-UP: Yes  Activity Tolerance  Activity Tolerance: Treatment limited secondary to medical complications (free text); Patient Tolerated treatment well  Activity Tolerance Comments: Pt desating to 80's at rest, on 4L. RN increased 02 to 6L. Pt briefly desating to 87%, on 6L, with activity to quickly recovering to >90% with rest, cues for pursed lip breathing. Plan   Occupational Therapy Plan  Times Per Week: 2-3x  Current Treatment Recommendations: Strengthening, Balance training, Functional mobility training, Endurance training, Safety education & training, Patient/Caregiver education & training, Self-Care / ADL     Restrictions  Restrictions/Precautions  Restrictions/Precautions: Fall Risk  Position Activity Restriction  Other position/activity restrictions: 02 initially at 4L, yet desating at rest. RN increased 02 to 6L. Subjective   General  Chart Reviewed: Yes, Orders, Progress Notes, Labs  Patient assessed for rehabilitation services?: Yes  Additional Pertinent Hx: per ED note, Yuliya Mcelroy is a 49 y.o. female who presents emergency department complaining of shortness of breath. Patient has history of COPD and typically requires 4 L of oxygen via nasal cannula at all times. Patient denies fevers, chills, or sweats. No cough, or congestion. Patient has noted increasing shortness of breath over the last couple of days and upon arrival of EMS had O2 sats of 84% on 4 L nasal cannula. Patient took 1 nebulizer treatment at home and had a second 1 in route by EMS. Family / Caregiver Present: No  Referring Practitioner: Virginia Santos MD  Subjective  Subjective: Pt met BS, in bed. Pt agreeable to OT and requesting to use BSC. Pt initially on 4L, yet desating at rest. RN increased 02 to 6L at start of tx.      Social/Functional History  Social/Functional History  Lives With: Spouse (cousin)  Type of Home: House  Home Layout: One level  Home Access: Ramped entrance  Bathroom Shower/Tub: Tub/Shower unit  Bathroom Toilet: Standard  Bathroom Equipment: Tub transfer bench  Home Equipment: [address], Aggie Justino, Walker, rolling  ADL Assistance: ((I) with bathing and toileting; occasional assist with dressing)  Ambulation Assistance: Independent ((I) ambulating with walker household distances; wc for community mobility)  Transfer Assistance: Independent  Active : No       Objective    Safety Devices  Type of Devices: All fall risk precautions in place;Call light within reach; Left in chair;Nurse notified     Toilet Transfers  Toilet - Technique: Stand pivot  Equipment Used: Standard bedside commode  Toilet Transfer: Supervision  Toilet Transfers Comments: Pt stand-pivot Mod I bed to BSC. Pt stood to RW after using commode, to amb around bed, 10 ft, to recliner, Supervision, and managing 02 line. Pt briefly desating to 87% with activity to quickly recovering to >990% with rest, cues for pursed lip breathing. Wheelchair Bed Transfers  Wheelchair/Bed - Technique: Ambulating  Level of Asssistance: Supervision  Wheelchair Transfers Comments: RW     ADL  LE Dressing Skilled Clinical Factors: demonstrated ability to reach forward to straighten footies, and also crossed LE's to reach to feet.   Toileting: Modified independent   Toileting Skilled Clinical Factors: Pt uses BSC to urinate, managing hygiene w/o difficulty  Additional Comments: Anticipate pt needing up to supervision for ADLs based on ROM, strength, and balance        Bed mobility  Supine to Sit: Modified independent (HOB raised)  Sit to Supine: Unable to assess        Cognition  Overall Cognitive Status: WNL  Orientation  Overall Orientation Status: Within Normal Limits         Education Given To: Patient  Education Provided: Role of Therapy;Plan of Care;Energy Conservation;Transfer Training;ADL Adaptive Strategies  Education Method: Demonstration;Verbal  Barriers to Learning: None  Education Outcome: Verbalized understanding;Demonstrated understanding            AM-PAC Score        AM-PAC Inpatient Daily Activity Raw Score: 22 (01/04/23 1422)  AM-PAC Inpatient ADL T-Scale Score : 47.1 (01/04/23 1422)  ADL Inpatient CMS 0-100% Score: 25.8 (01/04/23 1422)  ADL Inpatient CMS G-Code Modifier : CJ (01/04/23 1422)       Goals  Short Term Goals  Time Frame for Short Term Goals: prior to D/C.  STATUS: goals ongoing, except where noted below  Short Term Goal 1: complete functional mobility and transfers Mod Ind  Short Term Goal 2: complete bathing and dressing Mod Ind  Short Term Goal 3: complete toileting Mod Ind-goal met  Short Term Goal 4: complete grooming in stance at sink Mod Ind  Long Term Goals  Time Frame for Long Term Goals : STG=LTG  Patient Goals   Patient goals : return home       Therapy Time   Individual Concurrent Group Co-treatment   Time In 1349         Time Out 1431         Minutes [address] SHULTZ/L,[id number]

## 2023-01-04 NOTE — PROGRESS NOTES
Physical Therapy  Facility/Department: [address] PROGRESSIVE CARE  Physical Therapy Daily Treatment Note  This note also serves as a D/C Summary in the event that this patient is discharged prior to the next therapy session. Please refer to last PT note for goal status, discharge recommendations and functional status. Name: Payal Bella  : 1975  MRN: [id number]  Date of Service: 2023    Discharge Recommendations:  Patient would benefit from continued therapy after discharge, Home with Home health PT, Home with assist PRN   PT Equipment Recommendations  Equipment Needed: No  Other: owns wh walker    Payal Bella scored a 21/24 on the AM-PAC short mobility form. Current research shows that an AM-PAC score of 18 or greater is typically associated with a discharge to the patient's home setting. Based on the patient's AM-PAC score and their current functional mobility deficits, it is recommended that the patient have 2-3 sessions per week of Physical Therapy at d/c to increase the patient's independence. At this time, this patient demonstrates the endurance and safety to discharge home with home therapy services and a follow up treatment frequency of 2-3x/wk. Please see assessment section for further patient specific details. Patient Diagnosis(es): The primary encounter diagnosis was COPD exacerbation ([address] A diagnosis of Acute respiratory failure with hypoxia and hypercapnia (HCC) was also pertinent to this visit. Past Medical History:  has a past medical history of Arthritis, Blind right eye, CHF (congestive heart failure) ([address] Chronic respiratory failure with hypoxia and hypercapnia ([address] COPD (chronic obstructive pulmonary disease) ([address] GERD (gastroesophageal reflux disease), Hypertension, Movement disorder, Neuromuscular disorder ([address] On home O2, Pneumonia, and Sleep apnea. Past Surgical History:  has a past surgical history that includes Cholecystectomy; Tubal ligation;  Abdomen surgery; eye surgery; and Dilatation, esophagus. Assessment   Body Structures, Functions, Activity Limitations Requiring Skilled Therapeutic Intervention: Decreased functional mobility ; Decreased endurance  Assessment: Pt is a 49 y.o. F. admitted 1/1 for COPD, chronic respiratory failure on 4 L O2 at baseline. Pt presents pleasant and agreeable to PT session at bedside, currently on 5 L. O2, demonstrating functional LE strength, and able to ambulate slightly longer distances with walker, supervision. SPO2 did drop to mid-high 80s with ambulation, and she would benefit from continued therapy to improve her endurance while she is on acute. Anticipate safe return home with prior level of assist, use of walker/wc as needed, and home PT level 1. Patient reports assistance is available at home if she needs it, but has been independent prior. Treatment Diagnosis: impaired functional mobility  Specific Instructions for Next Treatment: Improve endurance  Therapy Prognosis: Fair  Decision Making: Low Complexity  History: See below  Exam: Strength; ROM; Balance; Ambulation  Clinical Presentation: Stable  Barriers to Learning: Fatigue; SOB  Requires PT Follow-Up: Yes  Activity Tolerance  Activity Tolerance: Patient tolerated treatment well  Activity Tolerance Comments: improving tolerance for activities despite shortness of breath, on 5L O2 throughout, wears 4L at baseline at home prior     Plan   Physcial Therapy Plan  General Plan: 2-3 times per week  Specific Instructions for Next Treatment: Improve endurance  Current Treatment Recommendations: Balance training, Gait training, Stair training, Functional mobility training, Neuromuscular re-education, Transfer training, Safety education & training, Home exercise program, Patient/Caregiver education & training, Equipment evaluation, education, & procurement, Endurance training  Safety Devices  Type of Devices:  All fall risk precautions in place, Call light within reach, Left in bed  Restraints  Restraints Initially in Place: No     Restrictions  Restrictions/Precautions  Restrictions/Precautions: Fall Risk  Position Activity Restriction  Other position/activity restrictions: 02 at [facility]     Subjective   General  Chart Reviewed: Yes  Patient assessed for rehabilitation services?: Yes  Additional Pertinent Hx: Juwan Pierre is a 49y.o. year old female with a history of  COPD who presents with      Increased shortness of breath and dyspnea worsening dispite home use of Symbicort and Spiriva with nebulizer. She has BLANCA. She states she uses it 'sometimes' . After discussing more, it appears she is using \"a few times per month\"      She is using methadone for chronic pain  Response To Previous Treatment: Not applicable  Referring Practitioner: Hung Ramirez MD  Referral Date : 01/01/23  Diagnosis: COPD with an FEV1 53%; Chronic hypoxemic respiratory failure 4 L nasal cannula;  Sleep apnea on BiPAP; History of CHF with an EF of 40%  Follows Commands: Within Functional Limits  Subjective  Subjective: Pt on 5 L. O2.  Agreeable to PT session at bedside. Had bedside commode set up next to recliner and no chair alarm. No specific complaint of pain. Social/Functional History  Social/Functional History  Lives With: Spouse (cousin)  Type of Home: House  Home Layout: One level  Home Access: Ramped entrance  Bathroom Shower/Tub: Tub/Shower unit  Bathroom Toilet: Standard  Bathroom Equipment: Tub transfer bench  Home Equipment: Rosevelt Frisk, Merribeth Meuse, Walker, rolling  ADL Assistance:   ((I) with bathing and toileting; occasional assist with dressing)  Ambulation Assistance: Independent ((I) ambulating with walker household distances; wc for community mobility)  Transfer Assistance: Independent  Active : No      Objective                              Bed mobility  Sit to Supine: Modified independent  Bed Mobility Comments: short sit to long sit on bed and then asked PT to elevate head of bed to meet her in more upright position  Transfers  Sit to Stand: Supervision  Stand to Sit: Supervision  Ambulation  Surface: Level tile  Device: Rolling Walker  Assistance: Supervision  Quality of Gait: Fast pace, SOB, SPO2 in mid to high 80s with activity. Seated rest to rebound to 90s. Needed assist for multiple line management - IV, heart monitor with pulse ox, and O2 tubing. Distance: 76' pacing back and forth in room and then asked to use the toilet, 25'  Comments: toilet transfers with supervision, gown only so limited clothing management required     Balance  Posture: Fair (scoliosis at baseline)  Sitting - Static: Good  Sitting - Dynamic: Good  Standing - Static: Good;-  Standing - Dynamic: Good;-  Comments: leans R forearm on walker  and holds L with hand due to imbalance with scoliosis, stood to wash hands at sink with supervision             AM-PAC Score  AM-PAC Inpatient Mobility Raw Score : 21 (01/04/23 1721)  AM-PAC Inpatient T-Scale Score : 50.25 (01/04/23 1721)  Mobility Inpatient CMS 0-100% Score: 28.97 (01/04/23 1721)  Mobility Inpatient CMS G-Code Modifier : CJ (01/04/23 1721)            Goals  Short Term Goals  Time Frame for Short Term Goals: 2-3 days  Short Term Goal 1: Bed mobility (I) - met - 1/4/2023  Short Term Goal 2: Transfers (I)  Short Term Goal 3: Ambulation 48' with RW, (I)  Patient Goals   Patient Goals : To return home       Education  Patient Education  Education Given To: Patient  Education Provided: Role of Therapy;Plan of Care; Fall Prevention Strategies  Education Method: Demonstration;Verbal  Education Outcome: Continued education needed      Therapy Time   Individual Concurrent Group Co-treatment   Time In 2465         Time Out 2687         Minutes 38         Timed Code Treatment Minutes: [address], [address] [facility]

## 2023-01-04 NOTE — PROGRESS NOTES
RN received call from [facility] stating that pt's O2 sat was in the low 80's. RN went to pt's room and found pt to be sleeping. RN woke pt up- sats increased. RN continued to monitor and pt continued to desat when sleeping. Pt refusing to wear bipap. Electronically signed by Gilma Santiago RN on 1/4/23 at 10:01 AM EST    Pt continues to desat to low 80's while awake. O2 increased to 5L. Pt is now 88-90%. Pt states that is her baseline at home. Electronically signed by Gilma Santiago RN on 1/4/23 at 1:32 PM EST    Pt up to chair for short time with walker. Pt's Oxygen sats and requirement continue to vary. Pt continues to be non compliant with sodium and fluid restriction. Pt noted to have cheese coneys and french fries for dinner.    Electronically signed by Gilma Santiago RN on 1/4/23 at 6:23 PM EST

## 2023-01-04 NOTE — PROGRESS NOTES
Pt noted to have cooler filled with mountain dew at bedside. RN educated pt on purpose of fluid restriction. Pt verbalized understanding of 1500 ml fluid restriction and is agreeable to to keep track of amount she drinks and tell nursing staff for accurate I/O.     Electronically signed by Maria C Kenyon RN on 1/4/2023 at 6:24 AM

## 2023-01-05 LAB
A/G RATIO: 0.8 (ref 1.1–2.2)
ALBUMIN SERPL-MCNC: 3.3 G/DL (ref 3.4–5)
ALP BLD-CCNC: 79 U/L (ref 40–129)
ALT SERPL-CCNC: 25 U/L (ref 10–40)
ANION GAP SERPL CALCULATED.3IONS-SCNC: 8 MMOL/L (ref 3–16)
AST SERPL-CCNC: 19 U/L (ref 15–37)
BASOPHILS ABSOLUTE: 0 K/UL (ref 0–0.2)
BASOPHILS RELATIVE PERCENT: 0.3 %
BILIRUB SERPL-MCNC: <0.2 MG/DL (ref 0–1)
BILIRUBIN URINE: NEGATIVE
BLOOD, URINE: NEGATIVE
BUN BLDV-MCNC: 54 MG/DL (ref 7–20)
CALCIUM SERPL-MCNC: 8.8 MG/DL (ref 8.3–10.6)
CHLORIDE BLD-SCNC: 80 MMOL/L (ref 99–110)
CLARITY: CLEAR
CO2: 43 MMOL/L (ref 21–32)
COLOR: YELLOW
CREAT SERPL-MCNC: 1.7 MG/DL (ref 0.6–1.1)
EOSINOPHILS ABSOLUTE: 0.1 K/UL (ref 0–0.6)
EOSINOPHILS RELATIVE PERCENT: 1 %
GFR SERPL CREATININE-BSD FRML MDRD: 37 ML/MIN/{1.73_M2}
GLUCOSE BLD-MCNC: 111 MG/DL (ref 70–99)
GLUCOSE BLD-MCNC: 115 MG/DL (ref 70–99)
GLUCOSE BLD-MCNC: 140 MG/DL (ref 70–99)
GLUCOSE BLD-MCNC: 77 MG/DL (ref 70–99)
GLUCOSE URINE: NEGATIVE MG/DL
HCT VFR BLD CALC: 32.9 % (ref 36–48)
HEMOGLOBIN: 10.2 G/DL (ref 12–16)
KETONES, URINE: NEGATIVE MG/DL
LEUKOCYTE ESTERASE, URINE: NEGATIVE
LYMPHOCYTES ABSOLUTE: 3.1 K/UL (ref 1–5.1)
LYMPHOCYTES RELATIVE PERCENT: 22.9 %
MAGNESIUM: 2.1 MG/DL (ref 1.8–2.4)
MCH RBC QN AUTO: 26.1 PG (ref 26–34)
MCHC RBC AUTO-ENTMCNC: 31 G/DL (ref 31–36)
MCV RBC AUTO: 84 FL (ref 80–100)
MICROSCOPIC EXAMINATION: NORMAL
MONOCYTES ABSOLUTE: 1.2 K/UL (ref 0–1.3)
MONOCYTES RELATIVE PERCENT: 8.6 %
NEUTROPHILS ABSOLUTE: 9.1 K/UL (ref 1.7–7.7)
NEUTROPHILS RELATIVE PERCENT: 67.2 %
NITRITE, URINE: NEGATIVE
PDW BLD-RTO: 15.6 % (ref 12.4–15.4)
PERFORMED ON: ABNORMAL
PH UA: 6.5 (ref 5–8)
PLATELET # BLD: 371 K/UL (ref 135–450)
PMV BLD AUTO: 7.7 FL (ref 5–10.5)
POTASSIUM REFLEX MAGNESIUM: 3.5 MMOL/L (ref 3.5–5.1)
PROTEIN UA: NEGATIVE MG/DL
RBC # BLD: 3.91 M/UL (ref 4–5.2)
SODIUM BLD-SCNC: 131 MMOL/L (ref 136–145)
SPECIFIC GRAVITY UA: 1.01 (ref 1–1.03)
TOTAL PROTEIN: 7.2 G/DL (ref 6.4–8.2)
URINE TYPE: NORMAL
UROBILINOGEN, URINE: 0.2 E.U./DL
WBC # BLD: 13.6 K/UL (ref 4–11)

## 2023-01-05 PROCEDURE — 6370000000 HC RX 637 (ALT 250 FOR IP): Performed by: INTERNAL MEDICINE

## 2023-01-05 PROCEDURE — 2580000003 HC RX 258: Performed by: INTERNAL MEDICINE

## 2023-01-05 PROCEDURE — 94640 AIRWAY INHALATION TREATMENT: CPT

## 2023-01-05 PROCEDURE — 6360000002 HC RX W HCPCS: Performed by: INTERNAL MEDICINE

## 2023-01-05 PROCEDURE — 2700000000 HC OXYGEN THERAPY PER DAY

## 2023-01-05 PROCEDURE — 94760 N-INVAS EAR/PLS OXIMETRY 1: CPT

## 2023-01-05 PROCEDURE — 81003 URINALYSIS AUTO W/O SCOPE: CPT

## 2023-01-05 PROCEDURE — 83735 ASSAY OF MAGNESIUM: CPT

## 2023-01-05 PROCEDURE — 2060000000 HC ICU INTERMEDIATE R&B

## 2023-01-05 PROCEDURE — 80053 COMPREHEN METABOLIC PANEL: CPT

## 2023-01-05 PROCEDURE — 36415 COLL VENOUS BLD VENIPUNCTURE: CPT

## 2023-01-05 PROCEDURE — 85025 COMPLETE CBC W/AUTO DIFF WBC: CPT

## 2023-01-05 RX ORDER — SODIUM CHLORIDE 9 MG/ML
INJECTION, SOLUTION INTRAVENOUS CONTINUOUS
Status: ACTIVE | OUTPATIENT
Start: 2023-01-05 | End: 2023-01-06

## 2023-01-05 RX ADMIN — Medication 81 MG: at 09:49

## 2023-01-05 RX ADMIN — CEFTRIAXONE 1000 MG: 1 INJECTION, POWDER, FOR SOLUTION INTRAMUSCULAR; INTRAVENOUS at 15:41

## 2023-01-05 RX ADMIN — Medication 10 ML: at 19:58

## 2023-01-05 RX ADMIN — SODIUM CHLORIDE: 9 INJECTION, SOLUTION INTRAVENOUS at 13:16

## 2023-01-05 RX ADMIN — Medication 10 ML: at 09:50

## 2023-01-05 RX ADMIN — IPRATROPIUM BROMIDE AND ALBUTEROL SULFATE 1 AMPULE: .5; 3 SOLUTION RESPIRATORY (INHALATION) at 20:24

## 2023-01-05 RX ADMIN — CARVEDILOL 3.12 MG: 3.12 TABLET, FILM COATED ORAL at 17:08

## 2023-01-05 RX ADMIN — PANTOPRAZOLE SODIUM 40 MG: 40 TABLET, DELAYED RELEASE ORAL at 06:01

## 2023-01-05 RX ADMIN — ONDANSETRON 4 MG: 2 INJECTION INTRAMUSCULAR; INTRAVENOUS at 00:06

## 2023-01-05 RX ADMIN — IPRATROPIUM BROMIDE AND ALBUTEROL SULFATE 1 AMPULE: .5; 3 SOLUTION RESPIRATORY (INHALATION) at 16:57

## 2023-01-05 RX ADMIN — MOMETASONE FUROATE AND FORMOTEROL FUMARATE DIHYDRATE 2 PUFF: 200; 5 AEROSOL RESPIRATORY (INHALATION) at 20:24

## 2023-01-05 RX ADMIN — CARVEDILOL 3.12 MG: 3.12 TABLET, FILM COATED ORAL at 09:49

## 2023-01-05 RX ADMIN — LISINOPRIL 5 MG: 5 TABLET ORAL at 09:49

## 2023-01-05 RX ADMIN — MOMETASONE FUROATE AND FORMOTEROL FUMARATE DIHYDRATE 2 PUFF: 200; 5 AEROSOL RESPIRATORY (INHALATION) at 08:32

## 2023-01-05 RX ADMIN — SERTRALINE 125 MG: 50 TABLET, FILM COATED ORAL at 06:01

## 2023-01-05 RX ADMIN — IPRATROPIUM BROMIDE AND ALBUTEROL SULFATE 1 AMPULE: .5; 3 SOLUTION RESPIRATORY (INHALATION) at 08:32

## 2023-01-05 RX ADMIN — ACETAMINOPHEN 650 MG: 325 TABLET ORAL at 23:55

## 2023-01-05 RX ADMIN — ONDANSETRON 4 MG: 2 INJECTION INTRAMUSCULAR; INTRAVENOUS at 10:30

## 2023-01-05 RX ADMIN — ENOXAPARIN SODIUM 40 MG: 100 INJECTION SUBCUTANEOUS at 09:48

## 2023-01-05 RX ADMIN — METHADONE HYDROCHLORIDE 130 MG: 10 TABLET ORAL at 09:49

## 2023-01-05 RX ADMIN — PREDNISONE 40 MG: 20 TABLET ORAL at 09:49

## 2023-01-05 RX ADMIN — IPRATROPIUM BROMIDE AND ALBUTEROL SULFATE 1 AMPULE: .5; 3 SOLUTION RESPIRATORY (INHALATION) at 11:53

## 2023-01-05 ASSESSMENT — PAIN DESCRIPTION - ONSET: ONSET: ON-GOING

## 2023-01-05 ASSESSMENT — PAIN DESCRIPTION - FREQUENCY: FREQUENCY: CONTINUOUS

## 2023-01-05 ASSESSMENT — PAIN DESCRIPTION - LOCATION: LOCATION: HEAD

## 2023-01-05 ASSESSMENT — PAIN - FUNCTIONAL ASSESSMENT: PAIN_FUNCTIONAL_ASSESSMENT: PREVENTS OR INTERFERES SOME ACTIVE ACTIVITIES AND ADLS

## 2023-01-05 ASSESSMENT — PAIN DESCRIPTION - DESCRIPTORS: DESCRIPTORS: ACHING;DISCOMFORT

## 2023-01-05 ASSESSMENT — PAIN SCALES - GENERAL
PAINLEVEL_OUTOF10: 0
PAINLEVEL_OUTOF10: 8

## 2023-01-05 ASSESSMENT — PAIN DESCRIPTION - PAIN TYPE: TYPE: ACUTE PAIN

## 2023-01-05 NOTE — PLAN OF CARE
Problem: Discharge Planning  Goal: Discharge to home or other facility with appropriate resources  1/4/2023 2350 by Rivka Cr RN  Outcome: Progressing     Problem: Pain  Goal: Verbalizes/displays adequate comfort level or baseline comfort level  1/4/2023 2350 by Rivka Cr RN  Outcome: Progressing     Problem: Skin/Tissue Integrity  Goal: Absence of new skin breakdown  Description: 1. Monitor for areas of redness and/or skin breakdown  2. Assess vascular access sites hourly  3. Every 4-6 hours minimum:  Change oxygen saturation probe site  4. Every 4-6 hours:  If on nasal continuous positive airway pressure, respiratory therapy assess nares and determine need for appliance change or resting period.   1/4/2023 2350 by Rivka Cr RN  Outcome: Progressing     Problem: Safety - Adult  Goal: Free from fall injury  1/4/2023 2350 by Rivka Cr RN  Outcome: Progressing     Problem: ABCDS Injury Assessment  Goal: Absence of physical injury  1/4/2023 2350 by Rivka Cr RN  Outcome: Progressing     Problem: Musculoskeletal - Adult  Goal: Return mobility to safest level of function  1/4/2023 2350 by Rivka Cr RN  Outcome: Progressing     Problem: Chronic Conditions and Co-morbidities  Goal: Patient's chronic conditions and co-morbidity symptoms are monitored and maintained or improved  1/4/2023 2350 by Rivka Cr RN  Outcome: Progressing     Problem: Nutrition Deficit:  Goal: Optimize nutritional status  1/4/2023 2350 by Rivka Cr RN  Outcome: Progressing

## 2023-01-05 NOTE — PLAN OF CARE
Problem: Discharge Planning  Goal: Discharge to home or other facility with appropriate resources  Outcome: Progressing     Problem: Pain  Goal: Verbalizes/displays adequate comfort level or baseline comfort level  Outcome: Progressing     Problem: Skin/Tissue Integrity  Goal: Absence of new skin breakdown  Description: 1. Monitor for areas of redness and/or skin breakdown  2. Assess vascular access sites hourly  3. Every 4-6 hours minimum:  Change oxygen saturation probe site  4. Every 4-6 hours:  If on nasal continuous positive airway pressure, respiratory therapy assess nares and determine need for appliance change or resting period.   Outcome: Progressing     Problem: Safety - Adult  Goal: Free from fall injury  Outcome: Progressing     Problem: ABCDS Injury Assessment  Goal: Absence of physical injury  Outcome: Progressing     Problem: Respiratory - Adult  Goal: Achieves optimal ventilation and oxygenation  Outcome: Progressing     Problem: Musculoskeletal - Adult  Goal: Return mobility to safest level of function  Outcome: Progressing     Problem: Chronic Conditions and Co-morbidities  Goal: Patient's chronic conditions and co-morbidity symptoms are monitored and maintained or improved  Outcome: Progressing     Problem: Nutrition Deficit:  Goal: Optimize nutritional status  Outcome: Progressing

## 2023-01-05 NOTE — DISCHARGE INSTR - COC
Continuity of Care Form    Patient Name: Davina Guidry   :  1975  MRN:  [id number]    Admit date:  2023  Discharge date:  ***    Code Status Order: Full Code   Advance Directives:     Admitting Physician:  Vinay Greco MD  PCP: Ami Arroyo    Discharging Nurse: [facility] Unit/Room#: N4N-9491/2396-34  Discharging Unit Phone Number: ***    Emergency Contact:   Extended Emergency Contact Information  Primary Emergency Contact: Jeffrey Ashton  Address: [address] [place] [address] Phone: [phone]  Relation: Spouse  Secondary Emergency Contact: SeanPrema  Home Phone: [phone]  Mobile Phone: [phone]  Relation: Child  Preferred language: English   needed?  No    Past Surgical History:  Past Surgical History:   Procedure Laterality Date    ABDOMEN SURGERY      CHOLECYSTECTOMY      DILATATION, ESOPHAGUS      EYE SURGERY      TUBAL LIGATION         Immunization History:   Immunization History   Administered Date(s) Administered    Influenza Vaccine, unspecified formulation 2011    Influenza Virus Vaccine 2013    Influenza, AFLURIA (age 1 yrs+), FLUZONE, (age 10 mo+), MDV, 0.5mL 10/25/2018    Influenza, FLUARIX, FLULAVAL, FLUZONE (age 10 mo+) AND AFLURIA, (age 1 y+), PF, 0.5mL 2017, 2020    Pneumococcal Polysaccharide ([id number]) 2011, 2016       Active Problems:  Patient Active Problem List   Diagnosis Code    Sepsis ([facility] A41.9    Chronic respiratory failure with hypoxia and hypercapnia (HCC) J96.11, J96.12    Acute respiratory failure (HCC) J96.00    COPD exacerbation (HCC) J44.1    Respiratory acidosis E87.29    COPD, severe (HCC) J44.9    Acute on chronic respiratory failure with hypoxia and hypercapnia (HCC) J96.21, J96.22    Tobacco use Z72.0    Nonhealing ulcer of right lower leg with fat layer exposed ([facility] L97.912    Leg swelling M79.89    Localized edema R60.0    Acute on chronic systolic (congestive) heart failure ([facility]) I50.23    Pulmonary edema J81.1    Abnormal CXR R93.89    Acute on chronic diastolic HF (heart failure) ([facility]) J28.92    Acute metabolic encephalopathy F59.75    Methadone use F11.90    COPD with acute exacerbation ([facility]) J44.1    Morbid obesity with BMI of 40.0-44.9, adult ([facility]) E66.01, Z68.41    Heart failure ([facility]) I50.9    Chronic anemia D64.9    Chronic venous insufficiency I87.2    Thoracogenic scoliosis of thoracolumbar region M41.35    Low back pain M54.50       Isolation/Infection:   Isolation            No Isolation          Patient Infection Status       Infection Onset Added Last Indicated Last Indicated By Review Planned Expiration Resolved Resolved By    None active    Resolved    COVID-19 (Rule Out) 01/01/23 01/01/23 01/01/23 Respiratory Panel, Molecular, with COVID-19 (Restricted: peds pts or suitable admitted adults) (Ordered)   01/01/23 Rule-Out Test Resulted    COVID-19 (Rule Out) 01/01/23 01/01/23 01/01/23 COVID-19, Rapid (Ordered)   01/01/23 Rule-Out Test Resulted    COVID-19 (Rule Out) 01/01/23 01/01/23 01/01/23 COVID-19 & Influenza Combo (Ordered)   01/01/23 Rule-Out Test Canceled    COVID-19 (Rule Out) 06/12/21 06/12/21 06/13/21 COVID-19, Rapid (Ordered)   06/13/21 Rule-Out Test Resulted    COVID-19 (Rule Out) 11/08/20 11/08/20 11/08/20 COVID-19 (Ordered)   11/09/20 Rule-Out Test Resulted            Nurse Assessment:  Last Vital Signs: /75   Pulse 91   Temp 98.2 °F (36.8 °C) (Oral)   Resp 20   Ht 5' (1.524 m)   Wt 199 lb 1.2 oz (90.3 kg)   LMP 08/14/2015   SpO2 (!) 80%   BMI 38.88 kg/m²     Last documented pain score (0-10 scale): Pain Level: 7  Last Weight:   Wt Readings from Last 1 Encounters:   01/05/23 199 lb 1.2 oz (90.3 kg)     Mental Status:  {IP PT MENTAL STATUS:51466}    IV Access:  {[facility] IV ACCESS:761160431}    Nursing Mobility/ADLs:  Walking   {DOROTHEA MALHOTRA UVOP:397740971}  Transfer  {[facility] DME BXZV:253631683}  Bathing  {CHP DME JWJW:395790570}  Dressing  {CHP DME MWHT:554205286}  Toileting  {CHP DME AELN:910539000}  Feeding  {CHP DME QPVA:297954234}  Med Admin  {CHP DME ATPZ:387169373}  Med Delivery   { JAQUELIN MED Delivery:684055428}    Wound Care Documentation and Therapy:        Elimination:  Continence: Bowel: {YES / JACOB:38023}  Bladder: {YES / OB:81588}  Urinary Catheter: {Urinary Catheter:289408946}   Colostomy/Ileostomy/Ileal Conduit: {YES / TE:10321}       Date of Last BM: ***    Intake/Output Summary (Last 24 hours) at 2023 0941  Last data filed at 2023 0602  Gross per 24 hour   Intake 300 ml   Output 1000 ml   Net -700 ml     I/O last 3 completed shifts:   In: 1010 [P.O.:1010]  Out: 0848 [Urine:1650]    Safety Concerns:     [employer] Safety Concerns:884990670}    Impairments/Disabilities:      [employer] Impairments/Disabilities:841560379}    Nutrition Therapy:  Current Nutrition Therapy:   [employer] Diet List:548339411}    Routes of Feeding: {P DME Other Feedings:890039777}  Liquids: {Slp liquid thickness:65997}  Daily Fluid Restriction: {CHP DME Yes amt example:672246883}  Last Modified Barium Swallow with Video (Video Swallowing Test): {Done Not Done EHBT:590027827}    Treatments at the Time of Hospital Discharge:   Respiratory Treatments: ***  Oxygen Therapy:  {Therapy; copd oxygen:93160}  Ventilator:    { CC Vent TYWI:920923892}    Rehab Therapies: {THERAPEUTIC INTERVENTION:3436339945}  Weight Bearing Status/Restrictions: [employer] Weight Bearin}  Other Medical Equipment (for information only, NOT a DME order):  {EQUIPMENT:202499325}  Other Treatments: ***    Patient's personal belongings (please select all that are sent with patient):  {P DME Belongings:958144990}    RN SIGNATURE:  {Esignature:893474056}    CASE MANAGEMENT/SOCIAL WORK SECTION    Inpatient Status Date: ***    Readmission Risk Assessment Score:  Readmission Risk              Risk of Unplanned Readmission:  25 Discharging to Facility/ Agency   Name:   Address:  Phone:  Fax:    Dialysis Facility (if applicable)   Name:  Address:  Dialysis Schedule:  Phone:  Fax:    / signature: {Esignature:246008244}    PHYSICIAN SECTION    Prognosis: Good    Condition at Discharge: Stable    Rehab Potential (if transferring to Rehab): Good    Recommended Labs or Other Treatments After Discharge: PT, OT, follow-up with primary care physician in 1 week, follow-up with pulmonology in 2 weeks Dr. Gabe Gitelman. Physician Certification: I certify the above information and transfer of Rene Osorio  is necessary for the continuing treatment of the diagnosis listed and that she requires [address] for greater 30 days.      Update Admission H&P: No change in H&P    PHYSICIAN SIGNATURE:  Electronically signed by Guilherme Eason MD on 1/5/23 at 9:42 AM EST

## 2023-01-05 NOTE — PROGRESS NOTES
Hospitalist Progress Note      PCP: Kurtis Beaver    Date of Admission: 1/1/2023        Subjective: Feels okay, no chest pain or shortness of breath at rest no fever or chills      Medications:  Reviewed    Infusion Medications    sodium chloride Stopped (01/01/23 1643)     Scheduled Medications    methadone  130 mg Oral Daily    aspirin  81 mg Oral Daily    carvedilol  3.125 mg Oral BID WC    lisinopril  5 mg Oral Daily    pantoprazole  40 mg Oral QAM AC    sertraline  125 mg Oral Daily    mometasone-formoterol  2 puff Inhalation BID    [Held by provider] torsemide  60 mg Oral Daily    sodium chloride flush  5-40 mL IntraVENous 2 times per day    enoxaparin  40 mg SubCUTAneous Daily    ipratropium-albuterol  1 ampule Inhalation Q4H WA    predniSONE  40 mg Oral Daily    cefTRIAXone (ROCEPHIN) IV  1,000 mg IntraVENous Q24H     PRN Meds: calcium carbonate, albuterol sulfate HFA, sodium chloride flush, sodium chloride, ondansetron **OR** ondansetron, polyethylene glycol, acetaminophen **OR** acetaminophen      Intake/Output Summary (Last 24 hours) at 1/5/2023 1135  Last data filed at 1/5/2023 0602  Gross per 24 hour   Intake 300 ml   Output 1000 ml   Net -700 ml       Physical Exam Performed:    /82   Pulse 100   Temp 98.3 °F (36.8 °C) (Oral)   Resp 19   Ht 5' (1.524 m)   Wt 199 lb 1.2 oz (90.3 kg)   LMP 08/14/2015   SpO2 (!) 88%   BMI 38.88 kg/m²     General appearance: No apparent distress  Neck: Supple  Respiratory:  Normal respiratory effort. Clear to auscultation, bilaterally without Rales/Wheezes/Rhonchi. Cardiovascular: Regular rate and rhythm with normal S1/S2 without murmurs, rubs or gallops. Abdomen: Soft, non-tender  Musculoskeletal: No clubbing, cyanosis   Skin: Skin color, texture, turgor normal.  No rashes or lesions.   Neurologic: No focal weakness  Psychiatric: Alert and oriented  Capillary Refill: Brisk, 3 seconds, normal   Peripheral Pulses: +2 palpable, equal bilaterally Labs:   Recent Labs     01/03/23  0644 01/04/23  0606 01/05/23  0827   WBC 12.4* 16.0* 13.6*   HGB 10.3* 10.5* 10.2*   HCT 32.7* 34.2* 32.9*    416 371     Recent Labs     01/03/23  0644 01/04/23  0504 01/05/23  0828   * 130* 131*   K 3.6 4.2 3.5   CL 81* 80* 80*   CO2 43* 42* 43*   BUN 36* 47* 54*   CREATININE 1.0 1.4* 1.7*   CALCIUM 9.3 8.4 8.8     Recent Labs     01/03/23  0644 01/04/23  0504 01/05/23  0828   AST 11* 34 19   ALT 7* 23 25   BILITOT <0.2 <0.2 <0.2   ALKPHOS 94 73 79     No results for input(s): INR in the last 72 hours. No results for input(s): Radha Kaska in the last 72 hours. Urinalysis:      Lab Results   Component Value Date/Time    NITRU Negative 11/08/2020 02:12 PM    WBCUA 0-2 11/08/2020 02:12 PM    RBCUA 5-10 11/08/2020 02:12 PM    BLOODU SMALL 11/08/2020 02:12 PM    SPECGRAV 1.012 11/08/2020 02:12 PM    GLUCOSEU Negative 11/08/2020 02:12 PM       Radiology:  XR CHEST PORTABLE   Final Result   Portable study with low lung volume showing a pattern of vascular congestion   or mild edema centrally. IP CONSULT TO PULMONOLOGY  IP CONSULT TO SPIRITUAL SERVICES  IP CONSULT TO HOME CARE NEEDS    Assessment/Plan:    Active Hospital Problems    Diagnosis     Acute on chronic respiratory failure with hypoxia and hypercapnia (HCC) [J96.21, J96.22]      Acute on chronic hypercapnic respiratory failure with complication of sleep apnea, and obesity, pulmonology consulted, apparently not using BiPAP appropriately. COPD with exacerbation, Symbicort, Spiriva DuoNeb and prednisone pulmonology following.   Essential hypertension, p.o. medications  Opioid dependence and neuropathy on methadone and gabapentin follow-up as outpatient  Chronic pain as above  History of heart failure on diuretics, creatinine increasing  Acute kidney injury likely due to diuretics, on hold since yesterday creatinine still climbed up, will consult nephrology, I will also hold lisinopril today    Diet: ADULT DIET; Regular;  Low Sodium (2 gm); 1500 ml  Code Status: Full Code      Kendrick Justin MD

## 2023-01-05 NOTE — CONSULTS
[facility]  Phone: [phone]  Fax: [phone]  [email or web address]         Reason for Consult: SUSAN  Requesting Physician:      History Obtained From:  patient, electronic medical record    History of Present Ilness:  48 y/o WF with h/o COPD on 4 L Home O2,  chf admitted on 1/1/23 with worsening SOB   Denies fever chills cough Was noted to be hypoxic and hypercarbic Placed on Bipap  Has been on Lisinopril and torsemide but reports not taking Torsemide on a regular basis   Denies dysuria hematuria No recent use of NSAID's  Notes LE edema that has improved since admission   Noted to have increase in Cr from 0.7 to 1.4 mg and then to 1.7 mg today      Past Medical History:        Diagnosis Date    Arthritis     Blind right eye     CHF (congestive heart failure) (HCC)     Chronic respiratory failure with hypoxia and hypercapnia (HCC)     COPD (chronic obstructive pulmonary disease) (HCC)     GERD (gastroesophageal reflux disease)     Hypertension     Movement disorder     Neuromuscular disorder ([address]     On home O2     Pneumonia     Sleep apnea        Past Surgical History:        Procedure Laterality Date    ABDOMEN SURGERY      CHOLECYSTECTOMY      DILATATION, ESOPHAGUS      EYE SURGERY      TUBAL LIGATION         Home Medications:    No current facility-administered medications on file prior to encounter. Current Outpatient Medications on File Prior to Encounter   Medication Sig Dispense Refill    cloNIDine (CATAPRES) 0.1 MG tablet Take 0.1 mg by mouth 2 times daily      Sertraline HCl (ZOLOFT PO) Take 125 mg by mouth Daily      aspirin 81 MG EC tablet Take 81 mg by mouth in the morning. Not taking.       lisinopril (PRINIVIL;ZESTRIL) 5 MG tablet TAKE ONE TABLET BY MOUTH DAILY 90 tablet 3    carvedilol (COREG) 3.125 MG tablet TAKE ONE TABLET BY MOUTH TWICE A  tablet 3    torsemide (DEMADEX) 20 MG tablet Take 3 tablets by mouth daily 270 tablet 3    SPIRIVA HANDIHALER 18 MCG inhalation capsule INHALE THE ENTIRE CONTENTS OF 1 CAPSULE ONCE A DAY USING HANDIHALER DEVICE 30 capsule 0    albuterol sulfate  (90 Base) MCG/ACT inhaler INHALE TWO PUFFS BY MOUTH EVERY 4 HOURS AS NEEDED FOR WHEEZING OR SHORTNESS OF BREATH 8.5 g 11    ipratropium-albuterol (DUONEB) 0.5-2.5 (3) MG/3ML SOLN nebulizer solution INHALE THREE MILLILITERS (ONE VIAL) VIA NEBULIZATION BY MOUTH EVERY 4 HOURS 085 mL 5    folic acid (FOLVITE) 1 MG tablet Take 1 tablet by mouth daily (Patient not taking: Reported on 2023) 30 tablet 3    gabapentin (NEURONTIN) 100 MG capsule Take 1 capsule by mouth 3 times daily for 30 days. (Patient taking differently: Take 100 mg by mouth 2 times daily.) 90 capsule 0    SYMBICORT 160-4.5 MCG/ACT AERO Inhale 2 puffs into the lungs 2 times daily 1 Inhaler 11    omeprazole (PRILOSEC) 40 MG delayed release capsule Take 40 mg by mouth every evening At 1800      Spacer/Aero-Holding Chambers MARILOU 1 Device by Does not apply route daily as needed 1 Device 0    OXYGEN Inhale 2 L into the lungs See Admin Instructions. (Patient taking differently: Inhale 2 L into the lungs See Admin Instructions Indications: 2L sitting, 4L when up and around Continuous at night or as needed when at home per patient) 1 Can 0    methadone 10 MG/5ML solution Take 130 mg by mouth daily.           Allergies:  Latex, Sulfa antibiotics, Nsaids, and Ultram [tramadol hcl]    Social History:    Social History     Socioeconomic History    Marital status:      Spouse name: Not on file    Number of children: 4    Years of education: 9    Highest education level: Not on file   Occupational History    Occupation: disabled   Tobacco Use    Smoking status: Former     Packs/day: 0.50     Years: 30.00     Pack years: 15.00     Types: Cigarettes     Start date: 1984     Quit date: 2017     Years since quittin.0    Smokeless tobacco: Never    Tobacco comments:     o   Vaping Use    Vaping Use: Never used Substance and Sexual Activity    Alcohol use: No     Alcohol/week: 0.0 standard drinks    Drug use: No    Sexual activity: Not on file   Other Topics Concern    Not on file   Social History Narrative    Not on file     Social Determinants of Health     Financial Resource Strain: Not on file   Food Insecurity: Not on file   Transportation Needs: Not on file   Physical Activity: Not on file   Stress: Not on file   Social Connections: Not on file   Intimate Partner Violence: Not on file   Housing Stability: Not on file       Family History:   Family History   Problem Relation Age of Onset    Cancer Mother         lung cancer    Osteoporosis Mother     Cancer Father         cancer    Arthritis Father        Review of Systems:   Pertinent positives stated above in HPI. All other systems were reviewed and were negative.     Physical exam:   Constitutional:  VITALS:  /83   Pulse 85   Temp 98.4 °F (36.9 °C) (Oral)   Resp 12   Ht 5' (1.524 m)   Wt 199 lb 1.2 oz (90.3 kg)   LMP 08/14/2015   SpO2 92%   BMI 38.88 kg/m²   Gen: alert, awake, nad Obese  Skin: no rash, turgor wnl ch discoloration LE's noted  Heent:  eomi, mmm  Neck: no bruits or jvd noted, thyroid normal  Cardiovascular:  S1, S2 without m/r/g  Respiratory: few exp wheezes  Abdomen:  +bs, soft, nt, nd, no hepatosplenomegaly  Ext: trace lower extremity edema  Psychiatric: mood and affect appropriate; judgement and insight intact  Musculoskeletal:  Rom, muscular strength intact; digits, nails normal    Data/  CBC:   Lab Results   Component Value Date/Time    WBC 13.6 01/05/2023 08:27 AM    RBC 3.91 01/05/2023 08:27 AM    HGB 10.2 01/05/2023 08:27 AM    HCT 32.9 01/05/2023 08:27 AM    MCV 84.0 01/05/2023 08:27 AM    MCH 26.1 01/05/2023 08:27 AM    MCHC 31.0 01/05/2023 08:27 AM    RDW 15.6 01/05/2023 08:27 AM     01/05/2023 08:27 AM    MPV 7.7 01/05/2023 08:27 AM     BMP:    Lab Results   Component Value Date/Time     01/05/2023 08:28 AM    K 3.5 01/05/2023 08:28 AM    CL 80 01/05/2023 08:28 AM    CO2 43 01/05/2023 08:28 AM    BUN 54 01/05/2023 08:28 AM    LABALBU 3.3 01/05/2023 08:28 AM    CREATININE 1.7 01/05/2023 08:28 AM    CALCIUM 8.8 01/05/2023 08:28 AM    GFRAA >60 05/10/2022 12:26 PM    GFRAA >60 05/31/2013 07:30 PM    LABGLOM 37 01/05/2023 08:28 AM    GLUCOSE 77 01/05/2023 08:28 AM         Assessment/  1-SUSAN suspect mild over diuresis Has been on ACE-I as well Torsemide U/A pending Net 5 L negative since admission  2-COPD exacerbation  3-Diastolic CHF resolved  4 HTN controlled MILDLY LOW AT TIMES  5 Hyponatremia suspect volume depletion diuretic effect  6 Alkalosis secondary to COPD mild diuretic effect       Plan/  1-Agree with holding Torsemide and Lisinopril  2-Give gentle IVF's X 1 L  3-Check U/A  4 Monitor daily weights    Thank you for the consultation. Please do not hesitate to call with questions.     Cristian Chen MD, Jimbo Babb

## 2023-01-06 VITALS
DIASTOLIC BLOOD PRESSURE: 72 MMHG | BODY MASS INDEX: 39.34 KG/M2 | WEIGHT: 200.4 LBS | OXYGEN SATURATION: 91 % | TEMPERATURE: 98 F | HEART RATE: 81 BPM | HEIGHT: 60 IN | RESPIRATION RATE: 14 BRPM | SYSTOLIC BLOOD PRESSURE: 124 MMHG

## 2023-01-06 LAB
A/G RATIO: 0.9 (ref 1.1–2.2)
ALBUMIN SERPL-MCNC: 3.2 G/DL (ref 3.4–5)
ALP BLD-CCNC: 72 U/L (ref 40–129)
ALT SERPL-CCNC: 19 U/L (ref 10–40)
ANION GAP SERPL CALCULATED.3IONS-SCNC: 6 MMOL/L (ref 3–16)
AST SERPL-CCNC: 11 U/L (ref 15–37)
BASOPHILS ABSOLUTE: 0 K/UL (ref 0–0.2)
BASOPHILS RELATIVE PERCENT: 0.1 %
BILIRUB SERPL-MCNC: <0.2 MG/DL (ref 0–1)
BUN BLDV-MCNC: 39 MG/DL (ref 7–20)
CALCIUM SERPL-MCNC: 8.7 MG/DL (ref 8.3–10.6)
CHLORIDE BLD-SCNC: 89 MMOL/L (ref 99–110)
CO2: 41 MMOL/L (ref 21–32)
CREAT SERPL-MCNC: 1 MG/DL (ref 0.6–1.1)
EOSINOPHILS ABSOLUTE: 0.2 K/UL (ref 0–0.6)
EOSINOPHILS RELATIVE PERCENT: 1.5 %
GFR SERPL CREATININE-BSD FRML MDRD: >60 ML/MIN/{1.73_M2}
GLUCOSE BLD-MCNC: 92 MG/DL (ref 70–99)
HCT VFR BLD CALC: 30.1 % (ref 36–48)
HEMOGLOBIN: 9.3 G/DL (ref 12–16)
LYMPHOCYTES ABSOLUTE: 3 K/UL (ref 1–5.1)
LYMPHOCYTES RELATIVE PERCENT: 23.4 %
MAGNESIUM: 2.2 MG/DL (ref 1.8–2.4)
MCH RBC QN AUTO: 26.2 PG (ref 26–34)
MCHC RBC AUTO-ENTMCNC: 31 G/DL (ref 31–36)
MCV RBC AUTO: 84.7 FL (ref 80–100)
MONOCYTES ABSOLUTE: 1.5 K/UL (ref 0–1.3)
MONOCYTES RELATIVE PERCENT: 12 %
NEUTROPHILS ABSOLUTE: 8 K/UL (ref 1.7–7.7)
NEUTROPHILS RELATIVE PERCENT: 63 %
PDW BLD-RTO: 15.7 % (ref 12.4–15.4)
PLATELET # BLD: 310 K/UL (ref 135–450)
PMV BLD AUTO: 7.3 FL (ref 5–10.5)
POTASSIUM REFLEX MAGNESIUM: 3.2 MMOL/L (ref 3.5–5.1)
RBC # BLD: 3.56 M/UL (ref 4–5.2)
SODIUM BLD-SCNC: 136 MMOL/L (ref 136–145)
TOTAL PROTEIN: 6.7 G/DL (ref 6.4–8.2)
WBC # BLD: 12.8 K/UL (ref 4–11)

## 2023-01-06 PROCEDURE — 6370000000 HC RX 637 (ALT 250 FOR IP): Performed by: INTERNAL MEDICINE

## 2023-01-06 PROCEDURE — 36415 COLL VENOUS BLD VENIPUNCTURE: CPT

## 2023-01-06 PROCEDURE — 94640 AIRWAY INHALATION TREATMENT: CPT

## 2023-01-06 PROCEDURE — 6360000002 HC RX W HCPCS: Performed by: INTERNAL MEDICINE

## 2023-01-06 PROCEDURE — 85025 COMPLETE CBC W/AUTO DIFF WBC: CPT

## 2023-01-06 PROCEDURE — 2700000000 HC OXYGEN THERAPY PER DAY

## 2023-01-06 PROCEDURE — 94761 N-INVAS EAR/PLS OXIMETRY MLT: CPT

## 2023-01-06 PROCEDURE — 83735 ASSAY OF MAGNESIUM: CPT

## 2023-01-06 PROCEDURE — 2580000003 HC RX 258: Performed by: INTERNAL MEDICINE

## 2023-01-06 PROCEDURE — 80053 COMPREHEN METABOLIC PANEL: CPT

## 2023-01-06 RX ORDER — TORSEMIDE 20 MG/1
40 TABLET ORAL DAILY
Qty: 60 TABLET | Refills: 0 | Status: SHIPPED | OUTPATIENT
Start: 2023-01-06

## 2023-01-06 RX ORDER — PREDNISONE 20 MG/1
40 TABLET ORAL DAILY
Qty: 4 TABLET | Refills: 0 | Status: SHIPPED | OUTPATIENT
Start: 2023-01-07 | End: 2023-01-09

## 2023-01-06 RX ORDER — POTASSIUM CHLORIDE 750 MG/1
40 TABLET, FILM COATED, EXTENDED RELEASE ORAL ONCE
Status: COMPLETED | OUTPATIENT
Start: 2023-01-06 | End: 2023-01-06

## 2023-01-06 RX ADMIN — SERTRALINE 125 MG: 50 TABLET, FILM COATED ORAL at 06:18

## 2023-01-06 RX ADMIN — POTASSIUM CHLORIDE 40 MEQ: 750 TABLET, FILM COATED, EXTENDED RELEASE ORAL at 11:37

## 2023-01-06 RX ADMIN — Medication 81 MG: at 08:36

## 2023-01-06 RX ADMIN — ENOXAPARIN SODIUM 40 MG: 100 INJECTION SUBCUTANEOUS at 08:36

## 2023-01-06 RX ADMIN — CARVEDILOL 3.12 MG: 3.12 TABLET, FILM COATED ORAL at 08:36

## 2023-01-06 RX ADMIN — Medication 10 ML: at 08:36

## 2023-01-06 RX ADMIN — MOMETASONE FUROATE AND FORMOTEROL FUMARATE DIHYDRATE 2 PUFF: 200; 5 AEROSOL RESPIRATORY (INHALATION) at 08:26

## 2023-01-06 RX ADMIN — PREDNISONE 40 MG: 20 TABLET ORAL at 08:35

## 2023-01-06 RX ADMIN — PANTOPRAZOLE SODIUM 40 MG: 40 TABLET, DELAYED RELEASE ORAL at 06:18

## 2023-01-06 RX ADMIN — METHADONE HYDROCHLORIDE 130 MG: 10 TABLET ORAL at 08:35

## 2023-01-06 RX ADMIN — IPRATROPIUM BROMIDE AND ALBUTEROL SULFATE 1 AMPULE: .5; 3 SOLUTION RESPIRATORY (INHALATION) at 08:25

## 2023-01-06 NOTE — PLAN OF CARE
Problem: Discharge Planning  Goal: Discharge to home or other facility with appropriate resources  1/6/2023 1139 by Adilene Hendrix RN  Outcome: Completed  1/6/2023 0007 by Pepe Bolden RN  Outcome: Progressing     Problem: Pain  Goal: Verbalizes/displays adequate comfort level or baseline comfort level  1/6/2023 1139 by Adilene Hendrix RN  Outcome: Completed  1/6/2023 0007 by Pepe Bolden RN  Outcome: Progressing     Problem: Skin/Tissue Integrity  Goal: Absence of new skin breakdown  Description: 1. Monitor for areas of redness and/or skin breakdown  2. Assess vascular access sites hourly  3. Every 4-6 hours minimum:  Change oxygen saturation probe site  4. Every 4-6 hours:  If on nasal continuous positive airway pressure, respiratory therapy assess nares and determine need for appliance change or resting period.   1/6/2023 1139 by Adilene Hendrix RN  Outcome: Completed  1/6/2023 0007 by Pepe Bolden RN  Outcome: Progressing     Problem: Safety - Adult  Goal: Free from fall injury  1/6/2023 1139 by Adilene Hendrix RN  Outcome: Completed  1/6/2023 0007 by Pepe Bolden RN  Outcome: Progressing     Problem: ABCDS Injury Assessment  Goal: Absence of physical injury  1/6/2023 1139 by Adilene Hendrix RN  Outcome: Completed  1/6/2023 0007 by Pepe Bolden RN  Outcome: Progressing     Problem: Respiratory - Adult  Goal: Achieves optimal ventilation and oxygenation  1/6/2023 1139 by Adilene Hendrix RN  Outcome: Completed  1/6/2023 0007 by Pepe Bolden RN  Outcome: Progressing     Problem: Musculoskeletal - Adult  Goal: Return mobility to safest level of function  1/6/2023 1139 by Adilene Hendrix RN  Outcome: Completed  1/6/2023 0007 by Pepe Bolden RN  Outcome: Progressing     Problem: Chronic Conditions and Co-morbidities  Goal: Patient's chronic conditions and co-morbidity symptoms are monitored and maintained or improved  1/6/2023 1139 by Adilene Hendrix RN  Outcome: Completed  1/6/2023 0007 by Geoff Orta RN  Outcome: Progressing     Problem: Nutrition Deficit:  Goal: Optimize nutritional status  1/6/2023 1139 by Marcella Osborne RN  Outcome: Completed  1/6/2023 0007 by Geoff Orta RN  Outcome: Progressing

## 2023-01-06 NOTE — CARE COORDINATION
CASE MANAGEMENT DISCHARGE SUMMARY:    DISCHARGE DATE: 1/6/2023    DISCHARGED TO HOME     TRANSPORTATION: via family             TIME: TBD by patient and bedside RN              COMMENTS: Patient left hospital before case management able to meet with patient. Patient did not articulate any needs at discharge besides an urgency to get to her methadone clinic.     Electronically signed by Taty Anguiano RN on 1/6/2023 at 12:16 PM

## 2023-01-06 NOTE — PLAN OF CARE
Problem: Discharge Planning  Goal: Discharge to home or other facility with appropriate resources  1/6/2023 0007 by Apollo Danielle RN  Outcome: Progressing     Problem: Pain  Goal: Verbalizes/displays adequate comfort level or baseline comfort level  1/6/2023 0007 by Apollo Danielle RN  Outcome: Progressing     Problem: Skin/Tissue Integrity  Goal: Absence of new skin breakdown  Description: 1. Monitor for areas of redness and/or skin breakdown  2. Assess vascular access sites hourly  3. Every 4-6 hours minimum:  Change oxygen saturation probe site  4. Every 4-6 hours:  If on nasal continuous positive airway pressure, respiratory therapy assess nares and determine need for appliance change or resting period.   1/6/2023 0007 by Apollo Danielle RN  Outcome: Progressing     Problem: Respiratory - Adult  Goal: Achieves optimal ventilation and oxygenation  1/6/2023 0007 by Apollo Danielle RN  Outcome: Progressing     Problem: Chronic Conditions and Co-morbidities  Goal: Patient's chronic conditions and co-morbidity symptoms are monitored and maintained or improved  1/6/2023 0007 by Apollo Danielle RN  Outcome: Progressing     Problem: Nutrition Deficit:  Goal: Optimize nutritional status  1/6/2023 0007 by Apollo Danielle RN  Outcome: Progressing

## 2023-01-06 NOTE — PROGRESS NOTES
Written discharge instructions including diet, activity, weight monitoring, what to do if symptoms worsen and the importance of follow up care and need to call his/her physician for  an appointment were reviewed with the patient who verbalized understanding. These written instructions were given to the patient to take home and a copy was made for the medical record.  Electronically signed by Blas Stewart RN on 1/6/2023 at 11:42 AM

## 2023-01-06 NOTE — DISCHARGE SUMMARY
01/06/2023      Hospital Medicine Discharge Summary    Patient ID: Ivett Reynaga      Patient's PCP: Araceli Anderson    Admit Date: 1/1/2023     Discharge Date:   01/06/2023    Admitting Provider: Claudia Pearl MD     Discharge Provider: Rubio Burger MD     Discharge Diagnoses: Active Hospital Problems    Diagnosis     Acute on chronic respiratory failure with hypoxia and hypercapnia (HCC) [X51.63, J96.22]        The patient was seen and examined on day of discharge and this discharge summary is in conjunction with any daily progress note from day of discharge. Hospital Course:   From HPI:\"The patient is a 49 y.o. female with history of severe COPD on 4 L of oxygen chronically, diastolic CHF, neuromuscular disorder, GERD, hypertension, sleep apnea, and right eye blindness who presents to [facility] with pertinence of breath. Patient states that she has had progressively worsening shortness of breath over the past few days. States that she can hardly catch her breath when she ambulates across the room at her house. Denies any sick contacts, but she does live with other family members at home and is unsure if they have been sick. Denies fever, chills, chest pain, abdominal pain, nausea, vomiting, constipation, diarrhea, and dysuria. Patient states that she usually wears 4 L of oxygen at home, but noticed that her oxygen levels were low even on the 4 L and so decided to come into the hospital for further evaluation. Also notes that she has been more sleepy and lethargic than usual.     In the ED, labs were significant for a chloride of 91, bicarb 38, proBNP of 272, WBC count of 11.8, hemoglobin of 9.5. Rapid flu was negative. Rapid COVID was negative. VBG showed a pH of 7.263 and PCO2 of 103. She was started on an on rebreather mask and is currently being switched to BiPAP given her degree of hypercapnia. \"      Acute on chronic hypercapnic respiratory failure with complication of sleep apnea, and obesity, pulmonology consulted, feeling better, discharging home follow-up with pulmonology and primary care physician as outpatient  COPD with exacerbation, Symbicort, Spiriva DuoNeb and prednisone daily for 2 more days follow-up with pulmonology in 2 weeks  Essential hypertension, p.o. medications, blood pressure on the soft side so clonidine will not be restarted on discharge follow-up with primary care physician to reassign and recheck blood pressure. Opioid dependence and neuropathy on methadone and gabapentin follow-up as outpatient  Chronic pain as above  History of heart failure on diuretics, creatinine increasing  Acute kidney injury likely due to diuretics, discussed with nephrologist, improved creatinine decreased dose of torsemide on the okay to restart ACE      Physical Exam Performed:     /72   Pulse 81   Temp 98 °F (36.7 °C) (Oral)   Resp 14   Ht 5' (1.524 m)   Wt 200 lb 6.4 oz (90.9 kg)   LMP 08/14/2015   SpO2 91%   BMI 39.14 kg/m²       General appearance:  No apparent distress  HEENT:  Normal cephalic  Neck: Supple  Respiratory:  Normal respiratory effort. Clear to auscultation, bilaterally without Rales/Wheezes/Rhonchi. Cardiovascular:  Regular rate and rhythm with normal S1/S2 without murmurs, rubs or gallops. Abdomen: Soft, non-tender  Musculoskeletal:  No clubbing, cyanosis  Skin: Skin color, texture, turgor normal.  No rashes or lesions. Neurologic: No focal weak  Psychiatric:  Alert and oriented  Capillary Refill: Brisk,< 3 seconds   Peripheral Pulses: +2 palpable, equal bilaterally       Labs:  For convenience and continuity at follow-up the following most recent labs are provided:      CBC:    Lab Results   Component Value Date/Time    WBC 12.8 01/06/2023 05:39 AM    HGB 9.3 01/06/2023 05:39 AM    HCT 30.1 01/06/2023 05:39 AM     01/06/2023 05:39 AM       Renal:    Lab Results   Component Value Date/Time     01/06/2023 05:39 AM    K 3.2 01/06/2023 05:39 AM    CL 89 01/06/2023 05:39 AM    CO2 41 01/06/2023 05:39 AM    BUN 39 01/06/2023 05:39 AM    CREATININE 1.0 01/06/2023 05:39 AM    CALCIUM 8.7 01/06/2023 05:39 AM    PHOS 4.3 06/17/2021 05:48 AM         Significant Diagnostic Studies    Radiology:   XR CHEST PORTABLE   Final Result   Portable study with low lung volume showing a pattern of vascular congestion   or mild edema centrally. Consults:     IP CONSULT TO PULMONOLOGY  IP CONSULT TO SPIRITUAL SERVICES  IP CONSULT TO HOME CARE NEEDS  IP CONSULT TO NEPHROLOGY    Disposition:  HOME     Condition at Discharge: Stable    Discharge Instructions/Follow-up:  PCP, Pulmonology     Code Status:  Full Code     Activity: activity as tolerated    Diet: cardiac diet      Discharge Medications:     Current Discharge Medication List             Details   predniSONE (DELTASONE) 20 MG tablet Take 2 tablets by mouth daily for 2 days  Qty: 4 tablet, Refills: 0                Details   torsemide (DEMADEX) 20 MG tablet Take 2 tablets by mouth daily  Qty: 60 tablet, Refills: 0                Details   Sertraline HCl (ZOLOFT PO) Take 125 mg by mouth Daily      aspirin 81 MG EC tablet Take 81 mg by mouth in the morning. Not taking.       lisinopril (PRINIVIL;ZESTRIL) 5 MG tablet TAKE ONE TABLET BY MOUTH DAILY  Qty: 90 tablet, Refills: 3      carvedilol (COREG) 3.125 MG tablet TAKE ONE TABLET BY MOUTH TWICE A DAY  Qty: 180 tablet, Refills: 3      SPIRIVA HANDIHALER 18 MCG inhalation capsule INHALE THE ENTIRE CONTENTS OF 1 CAPSULE ONCE A DAY USING HANDIHALER DEVICE  Qty: 30 capsule, Refills: 0      albuterol sulfate  (90 Base) MCG/ACT inhaler INHALE TWO PUFFS BY MOUTH EVERY 4 HOURS AS NEEDED FOR WHEEZING OR SHORTNESS OF BREATH  Qty: 8.5 g, Refills: 11    Associated Diagnoses: COPD, severe (HCC)      ipratropium-albuterol (DUONEB) 0.5-2.5 (3) MG/3ML SOLN nebulizer solution INHALE THREE MILLILITERS (ONE VIAL) VIA NEBULIZATION BY MOUTH EVERY 4 HOURS  Qty: 360 mL, Refills: 5    Associated Diagnoses: COPD, severe (HCC)      folic acid (FOLVITE) 1 MG tablet Take 1 tablet by mouth daily  Qty: 30 tablet, Refills: 3      gabapentin (NEURONTIN) 100 MG capsule Take 1 capsule by mouth 3 times daily for 30 days. Qty: 90 capsule, Refills: 0      SYMBICORT 160-4.5 MCG/ACT AERO Inhale 2 puffs into the lungs 2 times daily  Qty: 1 Inhaler, Refills: 11    Associated Diagnoses: COPD, severe (HCC)      omeprazole (PRILOSEC) 40 MG delayed release capsule Take 40 mg by mouth every evening At 1800      Spacer/Aero-Holding Chambers MARILOU 1 Device by Does not apply route daily as needed  Qty: 1 Device, Refills: 0    Associated Diagnoses: COPD with acute exacerbation (HCC)      OXYGEN Inhale 2 L into the lungs See Admin Instructions. Qty: 1 Can, Refills: 0    Comments: Use  While ambulating      methadone 10 MG/5ML solution Take 130 mg by mouth daily. Time Spent on discharge: 45 minutes in the examination, evaluation, counseling and review of medications and discharge plan. Signed:    Danish Bernal MD   1/6/2023      Thank you Anna Seo for the opportunity to be involved in this patient's care. If you have any questions or concerns, please feel free to contact me at [phone].

## 2023-01-08 DIAGNOSIS — J44.9 COPD, SEVERE (HCC): ICD-10-CM

## 2023-01-09 RX ORDER — ALBUTEROL SULFATE 90 UG/1
AEROSOL, METERED RESPIRATORY (INHALATION)
Qty: 8.5 G | Refills: 11 | OUTPATIENT
Start: 2023-01-09

## 2023-02-11 DIAGNOSIS — J44.9 COPD, SEVERE (HCC): ICD-10-CM

## 2023-02-13 RX ORDER — ALBUTEROL SULFATE 90 UG/1
AEROSOL, METERED RESPIRATORY (INHALATION)
Qty: 8.5 G | Refills: 11 | Status: SHIPPED | OUTPATIENT
Start: 2023-02-13

## 2023-02-13 NOTE — TELEPHONE ENCOUNTER
Last appt: 04/08/2021  Next appt: No Future Appointment  Medication matches medication on Epic list      Call routed to Luis Enrique Clemente

## 2023-03-13 ENCOUNTER — TELEPHONE (OUTPATIENT)
Dept: CARDIOLOGY CLINIC | Age: 48
End: 2023-03-13

## 2023-03-13 NOTE — TELEPHONE ENCOUNTER
Pt called left message on F voicemail that their ride cancel on them and the are trying to find another ride, however they may need to cancel.       Pls advise thank you

## 2023-03-27 ENCOUNTER — OFFICE VISIT (OUTPATIENT)
Dept: CARDIOLOGY CLINIC | Age: 48
End: 2023-03-27
Payer: MEDICARE

## 2023-03-27 VITALS
HEIGHT: 60 IN | WEIGHT: 205 LBS | BODY MASS INDEX: 40.25 KG/M2 | HEART RATE: 95 BPM | SYSTOLIC BLOOD PRESSURE: 152 MMHG | OXYGEN SATURATION: 91 % | DIASTOLIC BLOOD PRESSURE: 86 MMHG

## 2023-03-27 DIAGNOSIS — J96.11 CHRONIC RESPIRATORY FAILURE WITH HYPOXIA AND HYPERCAPNIA (HCC): ICD-10-CM

## 2023-03-27 DIAGNOSIS — I50.32 CHRONIC DIASTOLIC HEART FAILURE (HCC): Primary | ICD-10-CM

## 2023-03-27 DIAGNOSIS — E66.01 MORBID OBESITY WITH BMI OF 40.0-44.9, ADULT (HCC): ICD-10-CM

## 2023-03-27 DIAGNOSIS — I10 ESSENTIAL HYPERTENSION: ICD-10-CM

## 2023-03-27 DIAGNOSIS — F11.90 METHADONE USE: ICD-10-CM

## 2023-03-27 DIAGNOSIS — J96.12 CHRONIC RESPIRATORY FAILURE WITH HYPOXIA AND HYPERCAPNIA (HCC): ICD-10-CM

## 2023-03-27 PROCEDURE — G8484 FLU IMMUNIZE NO ADMIN: HCPCS | Performed by: INTERNAL MEDICINE

## 2023-03-27 PROCEDURE — 3079F DIAST BP 80-89 MM HG: CPT | Performed by: INTERNAL MEDICINE

## 2023-03-27 PROCEDURE — 3077F SYST BP >= 140 MM HG: CPT | Performed by: INTERNAL MEDICINE

## 2023-03-27 PROCEDURE — G8417 CALC BMI ABV UP PARAM F/U: HCPCS | Performed by: INTERNAL MEDICINE

## 2023-03-27 PROCEDURE — G8427 DOCREV CUR MEDS BY ELIG CLIN: HCPCS | Performed by: INTERNAL MEDICINE

## 2023-03-27 PROCEDURE — 1036F TOBACCO NON-USER: CPT | Performed by: INTERNAL MEDICINE

## 2023-03-27 PROCEDURE — 99214 OFFICE O/P EST MOD 30 MIN: CPT | Performed by: INTERNAL MEDICINE

## 2023-03-27 NOTE — PROGRESS NOTES
by mouth 2 times daily.) 90 capsule 0    SYMBICORT 160-4.5 MCG/ACT AERO Inhale 2 puffs into the lungs 2 times daily 1 Inhaler 11    omeprazole (PRILOSEC) 40 MG delayed release capsule Take 40 mg by mouth every evening At 1800      Spacer/Aero-Holding Chambers MARILOU 1 Device by Does not apply route daily as needed 1 Device 0    OXYGEN Inhale 2 L into the lungs See Admin Instructions. (Patient taking differently: Inhale 2 L into the lungs See Admin Instructions Indications: 2L sitting, 4L when up and around Continuous at night or as needed when at home per patient) 1 Can 0    methadone 10 MG/5ML solution Take 130 mg by mouth daily. No current facility-administered medications for this visit. Review of Systems:  Constitutional: No unanticipated weight loss. There's been no change in energy level, sleep pattern, or activity level. No fevers, chills. Eyes: No visual changes or diplopia. No scleral icterus. ENT: No Headaches, hearing loss or vertigo. No mouth sores or sore throat. Cardiovascular: as reviewed in HPI  Respiratory: No cough or wheezing, no sputum production. No hemoptysis. Gastrointestinal: No abdominal pain, appetite loss, blood in stools. No change in bowel or bladder habits. Genitourinary: No dysuria, trouble voiding, or hematuria. Musculoskeletal:  No gait disturbance, no joint complaints. Integumentary: No rash or pruritis. Neurological: No headache, diplopia, change in muscle strength, numbness or tingling. Psychiatric: No anxiety or depression. Endocrine: No temperature intolerance. No excessive thirst, fluid intake, or urination. No tremor. Hematologic/Lymphatic: No abnormal bruising or bleeding, blood clots or swollen lymph nodes. Allergic/Immunologic: No nasal congestion or hives.     Physical Exam:   LMP 08/14/2015   Wt Readings from Last 3 Encounters:   01/06/23 200 lb 6.4 oz (90.9 kg)   10/13/22 198 lb (89.8 kg)   09/12/22 198 lb 6.4 oz (90 kg)     Constitutional: She
[phone]  Fax: [phone]    This note was scribed in the presence of Dr Almaz Madison MD by Vinay Colby RN. Physician Attestation: The scribes documentation has been prepared under my direction and personally reviewed by me in its entirety. I confirm that the note above accurately reflects all work, treatment, procedures, and medical decision making performed by me. All portions of the note including but not limited to the chief complaint, history of present illness, physical exam, assessment and plan/medical decision making were personally reviewed, edited, and updated on the day of the visit.

## 2023-05-16 ENCOUNTER — APPOINTMENT (OUTPATIENT)
Dept: GENERAL RADIOLOGY | Age: 48
DRG: 190 | End: 2023-05-16
Payer: MEDICARE

## 2023-05-16 ENCOUNTER — HOSPITAL ENCOUNTER (INPATIENT)
Age: 48
LOS: 2 days | Discharge: HOME | DRG: 190 | End: 2023-05-18
Attending: EMERGENCY MEDICINE | Admitting: STUDENT IN AN ORGANIZED HEALTH CARE EDUCATION/TRAINING PROGRAM
Payer: MEDICARE

## 2023-05-16 DIAGNOSIS — J44.1 ACUTE EXACERBATION OF CHRONIC OBSTRUCTIVE PULMONARY DISEASE (HCC): ICD-10-CM

## 2023-05-16 DIAGNOSIS — J96.01 ACUTE RESPIRATORY FAILURE WITH HYPOXIA AND HYPERCAPNIA (HCC): Primary | ICD-10-CM

## 2023-05-16 DIAGNOSIS — J96.02 ACUTE RESPIRATORY FAILURE WITH HYPOXIA AND HYPERCAPNIA (HCC): Primary | ICD-10-CM

## 2023-05-16 LAB
AMPHETAMINES UR QL SCN>1000 NG/ML: ABNORMAL
ANION GAP SERPL CALCULATED.3IONS-SCNC: 3 MMOL/L (ref 3–16)
BARBITURATES UR QL SCN>200 NG/ML: ABNORMAL
BASE EXCESS BLDV CALC-SCNC: 13.2 MMOL/L
BASE EXCESS BLDV CALC-SCNC: 13.6 MMOL/L
BASE EXCESS BLDV CALC-SCNC: 14.4 MMOL/L
BASE EXCESS BLDV CALC-SCNC: 17.2 MMOL/L
BASE EXCESS BLDV CALC-SCNC: 19.6 MMOL/L
BASOPHILS # BLD: 0.1 K/UL (ref 0–0.2)
BASOPHILS NFR BLD: 0.6 %
BENZODIAZ UR QL SCN>200 NG/ML: ABNORMAL
BUN SERPL-MCNC: 12 MG/DL (ref 7–20)
CALCIUM SERPL-MCNC: 9.2 MG/DL (ref 8.3–10.6)
CANNABINOIDS UR QL SCN>50 NG/ML: ABNORMAL
CHLORIDE SERPL-SCNC: 94 MMOL/L (ref 99–110)
CO2 BLDV-SCNC: 46 MMOL/L
CO2 BLDV-SCNC: 49 MMOL/L
CO2 BLDV-SCNC: 49 MMOL/L
CO2 BLDV-SCNC: 50 MMOL/L
CO2 BLDV-SCNC: 51 MMOL/L
CO2 SERPL-SCNC: 43 MMOL/L (ref 21–32)
COCAINE UR QL SCN: ABNORMAL
COHGB MFR BLDV: 1.9 %
COHGB MFR BLDV: 2 %
COHGB MFR BLDV: 2.1 %
COHGB MFR BLDV: 2.3 %
COHGB MFR BLDV: 2.3 %
CREAT SERPL-MCNC: 0.6 MG/DL (ref 0.6–1.1)
DEPRECATED RDW RBC AUTO: 16.4 % (ref 12.4–15.4)
DRUG SCREEN COMMENT UR-IMP: ABNORMAL
EKG ATRIAL RATE: 94 BPM
EKG DIAGNOSIS: NORMAL
EKG P AXIS: 44 DEGREES
EKG P-R INTERVAL: 148 MS
EKG Q-T INTERVAL: 362 MS
EKG QRS DURATION: 84 MS
EKG QTC CALCULATION (BAZETT): 452 MS
EKG R AXIS: 10 DEGREES
EKG T AXIS: 29 DEGREES
EKG VENTRICULAR RATE: 94 BPM
EOSINOPHIL # BLD: 0.2 K/UL (ref 0–0.6)
EOSINOPHIL NFR BLD: 2.1 %
FENTANYL SCREEN, URINE: ABNORMAL
FLUAV RNA UPPER RESP QL NAA+PROBE: NEGATIVE
FLUBV AG NPH QL: NEGATIVE
GFR SERPLBLD CREATININE-BSD FMLA CKD-EPI: >60 ML/MIN/{1.73_M2}
GLUCOSE SERPL-MCNC: 114 MG/DL (ref 70–99)
HCO3 BLDV-SCNC: 43 MMOL/L (ref 23–29)
HCO3 BLDV-SCNC: 45 MMOL/L (ref 23–29)
HCO3 BLDV-SCNC: 46 MMOL/L (ref 23–29)
HCO3 BLDV-SCNC: 47 MMOL/L (ref 23–29)
HCO3 BLDV-SCNC: 48 MMOL/L (ref 23–29)
HCT VFR BLD AUTO: 33.1 % (ref 36–48)
HGB BLD-MCNC: 9.9 G/DL (ref 12–16)
LACTATE BLDV-SCNC: 0.8 MMOL/L (ref 0.4–1.9)
LYMPHOCYTES # BLD: 1.1 K/UL (ref 1–5.1)
LYMPHOCYTES NFR BLD: 9.6 %
MCH RBC QN AUTO: 25.6 PG (ref 26–34)
MCHC RBC AUTO-ENTMCNC: 30 G/DL (ref 31–36)
MCV RBC AUTO: 85.2 FL (ref 80–100)
METHADONE UR QL SCN>300 NG/ML: POSITIVE
METHGB MFR BLDV: 0.4 %
METHGB MFR BLDV: 0.5 %
METHGB MFR BLDV: 0.6 %
MONOCYTES # BLD: 0.7 K/UL (ref 0–1.3)
MONOCYTES NFR BLD: 6 %
NEUTROPHILS # BLD: 9.4 K/UL (ref 1.7–7.7)
NEUTROPHILS NFR BLD: 81.7 %
NT-PROBNP SERPL-MCNC: 265 PG/ML (ref 0–124)
O2 THERAPY: ABNORMAL
OPIATES UR QL SCN>300 NG/ML: ABNORMAL
OXYCODONE UR QL SCN: ABNORMAL
PCO2 BLDV: 115 MMHG (ref 40–50)
PCO2 BLDV: 119 MMHG (ref 40–50)
PCO2 BLDV: 80.1 MMHG (ref 40–50)
PCO2 BLDV: 94.3 MMHG (ref 40–50)
PCO2 BLDV: 96.8 MMHG (ref 40–50)
PCP UR QL SCN>25 NG/ML: ABNORMAL
PH BLDV: 7.18 [PH] (ref 7.35–7.45)
PH BLDV: 7.21 [PH] (ref 7.35–7.45)
PH BLDV: 7.27 [PH] (ref 7.35–7.45)
PH BLDV: 7.29 [PH] (ref 7.35–7.45)
PH BLDV: 7.39 [PH] (ref 7.35–7.45)
PH UR STRIP: 7 [PH]
PLATELET # BLD AUTO: 322 K/UL (ref 135–450)
PMV BLD AUTO: 7.8 FL (ref 5–10.5)
PO2 BLDV: 158 MMHG
PO2 BLDV: 33 MMHG
PO2 BLDV: 39 MMHG
PO2 BLDV: 44 MMHG
PO2 BLDV: <30 MMHG
POTASSIUM SERPL-SCNC: 4.8 MMOL/L (ref 3.5–5.1)
RBC # BLD AUTO: 3.88 M/UL (ref 4–5.2)
SAO2 % BLDV: 100 %
SAO2 % BLDV: 50 %
SAO2 % BLDV: 54 %
SAO2 % BLDV: 65 %
SAO2 % BLDV: 80 %
SARS-COV-2 RDRP RESP QL NAA+PROBE: NOT DETECTED
SODIUM SERPL-SCNC: 140 MMOL/L (ref 136–145)
TROPONIN, HIGH SENSITIVITY: 15 NG/L (ref 0–14)
WBC # BLD AUTO: 11.5 K/UL (ref 4–11)

## 2023-05-16 PROCEDURE — 87804 INFLUENZA ASSAY W/OPTIC: CPT

## 2023-05-16 PROCEDURE — 36600 WITHDRAWAL OF ARTERIAL BLOOD: CPT

## 2023-05-16 PROCEDURE — 85025 COMPLETE CBC W/AUTO DIFF WBC: CPT

## 2023-05-16 PROCEDURE — 6370000000 HC RX 637 (ALT 250 FOR IP): Performed by: STUDENT IN AN ORGANIZED HEALTH CARE EDUCATION/TRAINING PROGRAM

## 2023-05-16 PROCEDURE — 96365 THER/PROPH/DIAG IV INF INIT: CPT

## 2023-05-16 PROCEDURE — 83880 ASSAY OF NATRIURETIC PEPTIDE: CPT

## 2023-05-16 PROCEDURE — 2580000003 HC RX 258: Performed by: STUDENT IN AN ORGANIZED HEALTH CARE EDUCATION/TRAINING PROGRAM

## 2023-05-16 PROCEDURE — 1200000000 HC SEMI PRIVATE

## 2023-05-16 PROCEDURE — 87635 SARS-COV-2 COVID-19 AMP PRB: CPT

## 2023-05-16 PROCEDURE — 94761 N-INVAS EAR/PLS OXIMETRY MLT: CPT

## 2023-05-16 PROCEDURE — 94640 AIRWAY INHALATION TREATMENT: CPT

## 2023-05-16 PROCEDURE — 99291 CRITICAL CARE FIRST HOUR: CPT | Performed by: INTERNAL MEDICINE

## 2023-05-16 PROCEDURE — 82803 BLOOD GASES ANY COMBINATION: CPT

## 2023-05-16 PROCEDURE — 36415 COLL VENOUS BLD VENIPUNCTURE: CPT

## 2023-05-16 PROCEDURE — 6370000000 HC RX 637 (ALT 250 FOR IP): Performed by: HOSPITALIST

## 2023-05-16 PROCEDURE — 80048 BASIC METABOLIC PNL TOTAL CA: CPT

## 2023-05-16 PROCEDURE — 6360000002 HC RX W HCPCS: Performed by: STUDENT IN AN ORGANIZED HEALTH CARE EDUCATION/TRAINING PROGRAM

## 2023-05-16 PROCEDURE — 93010 ELECTROCARDIOGRAM REPORT: CPT | Performed by: INTERNAL MEDICINE

## 2023-05-16 PROCEDURE — 71045 X-RAY EXAM CHEST 1 VIEW: CPT

## 2023-05-16 PROCEDURE — 93005 ELECTROCARDIOGRAM TRACING: CPT | Performed by: EMERGENCY MEDICINE

## 2023-05-16 PROCEDURE — 83605 ASSAY OF LACTIC ACID: CPT

## 2023-05-16 PROCEDURE — 51702 INSERT TEMP BLADDER CATH: CPT

## 2023-05-16 PROCEDURE — 87040 BLOOD CULTURE FOR BACTERIA: CPT

## 2023-05-16 PROCEDURE — APPNB15 APP NON BILLABLE TIME 0-15 MINS: Performed by: NURSE PRACTITIONER

## 2023-05-16 PROCEDURE — 6360000002 HC RX W HCPCS: Performed by: EMERGENCY MEDICINE

## 2023-05-16 PROCEDURE — 84484 ASSAY OF TROPONIN QUANT: CPT

## 2023-05-16 PROCEDURE — 80307 DRUG TEST PRSMV CHEM ANLYZR: CPT

## 2023-05-16 PROCEDURE — 96375 TX/PRO/DX INJ NEW DRUG ADDON: CPT

## 2023-05-16 PROCEDURE — 99285 EMERGENCY DEPT VISIT HI MDM: CPT

## 2023-05-16 PROCEDURE — 6370000000 HC RX 637 (ALT 250 FOR IP): Performed by: EMERGENCY MEDICINE

## 2023-05-16 PROCEDURE — 2700000000 HC OXYGEN THERAPY PER DAY

## 2023-05-16 PROCEDURE — 94660 CPAP INITIATION&MGMT: CPT

## 2023-05-16 RX ORDER — ONDANSETRON 2 MG/ML
4 INJECTION INTRAMUSCULAR; INTRAVENOUS EVERY 6 HOURS PRN
Status: DISCONTINUED | OUTPATIENT
Start: 2023-05-16 | End: 2023-05-18 | Stop reason: HOSPADM

## 2023-05-16 RX ORDER — FOLIC ACID 1 MG/1
1 TABLET ORAL DAILY
Status: DISCONTINUED | OUTPATIENT
Start: 2023-05-16 | End: 2023-05-18 | Stop reason: HOSPADM

## 2023-05-16 RX ORDER — ENOXAPARIN SODIUM 100 MG/ML
40 INJECTION SUBCUTANEOUS EVERY EVENING
Status: DISCONTINUED | OUTPATIENT
Start: 2023-05-17 | End: 2023-05-18 | Stop reason: HOSPADM

## 2023-05-16 RX ORDER — FUROSEMIDE 10 MG/ML
60 INJECTION INTRAMUSCULAR; INTRAVENOUS 2 TIMES DAILY
Status: DISCONTINUED | OUTPATIENT
Start: 2023-05-16 | End: 2023-05-17

## 2023-05-16 RX ORDER — SODIUM CHLORIDE 0.9 % (FLUSH) 0.9 %
5-40 SYRINGE (ML) INJECTION EVERY 12 HOURS SCHEDULED
Status: DISCONTINUED | OUTPATIENT
Start: 2023-05-16 | End: 2023-05-18 | Stop reason: HOSPADM

## 2023-05-16 RX ORDER — ACETAMINOPHEN 650 MG/1
650 SUPPOSITORY RECTAL EVERY 6 HOURS PRN
Status: DISCONTINUED | OUTPATIENT
Start: 2023-05-16 | End: 2023-05-18 | Stop reason: HOSPADM

## 2023-05-16 RX ORDER — PANTOPRAZOLE SODIUM 40 MG/1
40 TABLET, DELAYED RELEASE ORAL
Status: DISCONTINUED | OUTPATIENT
Start: 2023-05-17 | End: 2023-05-18 | Stop reason: HOSPADM

## 2023-05-16 RX ORDER — SODIUM CHLORIDE 0.9 % (FLUSH) 0.9 %
5-40 SYRINGE (ML) INJECTION PRN
Status: DISCONTINUED | OUTPATIENT
Start: 2023-05-16 | End: 2023-05-18 | Stop reason: HOSPADM

## 2023-05-16 RX ORDER — IPRATROPIUM BROMIDE AND ALBUTEROL SULFATE 2.5; .5 MG/3ML; MG/3ML
1 SOLUTION RESPIRATORY (INHALATION) ONCE
Status: COMPLETED | OUTPATIENT
Start: 2023-05-16 | End: 2023-05-16

## 2023-05-16 RX ORDER — ACETAMINOPHEN 325 MG/1
650 TABLET ORAL EVERY 6 HOURS PRN
Status: DISCONTINUED | OUTPATIENT
Start: 2023-05-16 | End: 2023-05-18 | Stop reason: HOSPADM

## 2023-05-16 RX ORDER — METHYLPREDNISOLONE SODIUM SUCCINATE 125 MG/2ML
125 INJECTION, POWDER, LYOPHILIZED, FOR SOLUTION INTRAMUSCULAR; INTRAVENOUS ONCE
Status: COMPLETED | OUTPATIENT
Start: 2023-05-16 | End: 2023-05-16

## 2023-05-16 RX ORDER — POTASSIUM CHLORIDE 7.45 MG/ML
10 INJECTION INTRAVENOUS PRN
Status: DISCONTINUED | OUTPATIENT
Start: 2023-05-16 | End: 2023-05-18 | Stop reason: HOSPADM

## 2023-05-16 RX ORDER — CARVEDILOL 3.12 MG/1
3.12 TABLET ORAL 2 TIMES DAILY WITH MEALS
Status: DISCONTINUED | OUTPATIENT
Start: 2023-05-16 | End: 2023-05-18 | Stop reason: HOSPADM

## 2023-05-16 RX ORDER — LEVOFLOXACIN 5 MG/ML
500 INJECTION, SOLUTION INTRAVENOUS ONCE
Status: COMPLETED | OUTPATIENT
Start: 2023-05-16 | End: 2023-05-16

## 2023-05-16 RX ORDER — METHYLPREDNISOLONE SODIUM SUCCINATE 125 MG/2ML
60 INJECTION, POWDER, LYOPHILIZED, FOR SOLUTION INTRAMUSCULAR; INTRAVENOUS EVERY 6 HOURS
Status: COMPLETED | OUTPATIENT
Start: 2023-05-16 | End: 2023-05-16

## 2023-05-16 RX ORDER — SODIUM CHLORIDE 9 MG/ML
INJECTION, SOLUTION INTRAVENOUS PRN
Status: DISCONTINUED | OUTPATIENT
Start: 2023-05-16 | End: 2023-05-18 | Stop reason: HOSPADM

## 2023-05-16 RX ORDER — ONDANSETRON 4 MG/1
4 TABLET, ORALLY DISINTEGRATING ORAL EVERY 8 HOURS PRN
Status: DISCONTINUED | OUTPATIENT
Start: 2023-05-16 | End: 2023-05-18 | Stop reason: HOSPADM

## 2023-05-16 RX ORDER — LEVOFLOXACIN 5 MG/ML
500 INJECTION, SOLUTION INTRAVENOUS EVERY 24 HOURS
Status: DISCONTINUED | OUTPATIENT
Start: 2023-05-17 | End: 2023-05-17

## 2023-05-16 RX ORDER — POLYETHYLENE GLYCOL 3350 17 G/17G
17 POWDER, FOR SOLUTION ORAL DAILY PRN
Status: DISCONTINUED | OUTPATIENT
Start: 2023-05-16 | End: 2023-05-18 | Stop reason: HOSPADM

## 2023-05-16 RX ORDER — IPRATROPIUM BROMIDE AND ALBUTEROL SULFATE 2.5; .5 MG/3ML; MG/3ML
1 SOLUTION RESPIRATORY (INHALATION)
Status: DISCONTINUED | OUTPATIENT
Start: 2023-05-16 | End: 2023-05-18 | Stop reason: HOSPADM

## 2023-05-16 RX ORDER — MAGNESIUM SULFATE IN WATER 40 MG/ML
2000 INJECTION, SOLUTION INTRAVENOUS PRN
Status: DISCONTINUED | OUTPATIENT
Start: 2023-05-16 | End: 2023-05-18 | Stop reason: HOSPADM

## 2023-05-16 RX ADMIN — IPRATROPIUM BROMIDE AND ALBUTEROL SULFATE 1 AMPULE: 2.5; .5 SOLUTION RESPIRATORY (INHALATION) at 04:52

## 2023-05-16 RX ADMIN — FUROSEMIDE 60 MG: 10 INJECTION, SOLUTION INTRAMUSCULAR; INTRAVENOUS at 07:39

## 2023-05-16 RX ADMIN — IPRATROPIUM BROMIDE AND ALBUTEROL SULFATE 1 AMPULE: 2.5; .5 SOLUTION RESPIRATORY (INHALATION) at 11:50

## 2023-05-16 RX ADMIN — FOLIC ACID 1 MG: 1 TABLET ORAL at 15:08

## 2023-05-16 RX ADMIN — METHYLPREDNISOLONE SODIUM SUCCINATE 125 MG: 125 INJECTION, POWDER, FOR SOLUTION INTRAMUSCULAR; INTRAVENOUS at 04:30

## 2023-05-16 RX ADMIN — IPRATROPIUM BROMIDE AND ALBUTEROL SULFATE 1 AMPULE: 2.5; .5 SOLUTION RESPIRATORY (INHALATION) at 17:11

## 2023-05-16 RX ADMIN — METHYLPREDNISOLONE SODIUM SUCCINATE 60 MG: 125 INJECTION, POWDER, FOR SOLUTION INTRAMUSCULAR; INTRAVENOUS at 10:00

## 2023-05-16 RX ADMIN — CARVEDILOL 3.12 MG: 3.12 TABLET, FILM COATED ORAL at 15:08

## 2023-05-16 RX ADMIN — IPRATROPIUM BROMIDE AND ALBUTEROL SULFATE 1 AMPULE: 2.5; .5 SOLUTION RESPIRATORY (INHALATION) at 07:56

## 2023-05-16 RX ADMIN — EMPAGLIFLOZIN 10 MG: 10 TABLET, FILM COATED ORAL at 22:16

## 2023-05-16 RX ADMIN — LEVOFLOXACIN 500 MG: 5 INJECTION, SOLUTION INTRAVENOUS at 04:38

## 2023-05-16 RX ADMIN — IPRATROPIUM BROMIDE AND ALBUTEROL SULFATE 1 AMPULE: 2.5; .5 SOLUTION RESPIRATORY (INHALATION) at 20:43

## 2023-05-16 RX ADMIN — METHYLPREDNISOLONE SODIUM SUCCINATE 60 MG: 125 INJECTION, POWDER, FOR SOLUTION INTRAMUSCULAR; INTRAVENOUS at 15:08

## 2023-05-16 RX ADMIN — FUROSEMIDE 60 MG: 10 INJECTION, SOLUTION INTRAMUSCULAR; INTRAVENOUS at 17:23

## 2023-05-16 RX ADMIN — SODIUM CHLORIDE, PRESERVATIVE FREE 10 ML: 5 INJECTION INTRAVENOUS at 19:39

## 2023-05-16 RX ADMIN — MOMETASONE FUROATE AND FORMOTEROL FUMARATE DIHYDRATE 2 PUFF: 100; 5 AEROSOL RESPIRATORY (INHALATION) at 13:34

## 2023-05-16 RX ADMIN — SODIUM CHLORIDE, PRESERVATIVE FREE 10 ML: 5 INJECTION INTRAVENOUS at 09:42

## 2023-05-16 RX ADMIN — ACETAMINOPHEN 650 MG: 325 TABLET ORAL at 15:12

## 2023-05-16 ASSESSMENT — PAIN SCALES - GENERAL
PAINLEVEL_OUTOF10: 7
PAINLEVEL_OUTOF10: 7
PAINLEVEL_OUTOF10: 9
PAINLEVEL_OUTOF10: 5

## 2023-05-16 ASSESSMENT — PAIN DESCRIPTION - ORIENTATION
ORIENTATION: RIGHT
ORIENTATION: RIGHT

## 2023-05-16 ASSESSMENT — PAIN DESCRIPTION - LOCATION
LOCATION: ARM
LOCATION: ARM

## 2023-05-16 NOTE — DISCHARGE INSTRUCTIONS
Wear your BiPAP at night and with naps as if your life depends on it. Talk to the addiction medicine for possible alternative to methadone. Extra Heart Failure sites:     https://TechniScan.com/   --- this is American Heart Association interactive Healthier Living with Heart Failure guidebook. Please click hyperlink or copy / paste link into search bar. Use your mouse to scroll through the pages. Lots of information about weight monitoring, diet tips, activity, meds, etc    HF Starkweather shayy  -- this is a free smart phone shayy available for iPhone and Android download. Use your phone to track sodium / fluid intake, zone tool symptom tracking, weights, medications, etc. Click on this hyperlink  HF Starkweather Shayy   for QR code for easy download. DASH (Dietary Approach to Stop Hypertension) diet --  SeekAlumni.no -- this diet is a flexible eating plan that promotes heart healthy eating style. Click on hyperlink or copy / paste link into search bar. Lots of low sodium recipes and tips.     CigarRepair.ca  -- more free recipes

## 2023-05-16 NOTE — PROGRESS NOTES
Medication Reconciliation    Source(s) of information:   [facility] records     Notes regarding home medications:   Verified home methadone regimen. Patient takes 130 mg of liquid daily. Last dose given 5/15/23.       MURPHY Doe [facility], PharmD, BCCCP

## 2023-05-16 NOTE — H&P
V2.0  History and Physical      Name:  Yoselyn Boucher /Age/Sex: 1975  (49 y.o. female)   MRN & CSN:  [id number] & [id number] Encounter Date/Time: 2023 1:12 PM EDT   Location:  [id number] [facility]CP: [facility] Day: 1    Assessment and Plan:   Yoselyn Boucher is a 49 y.o. female with a pmh of as documented below who presents with Acute hypoxemic respiratory failure ([address]    Acute on chronic hypoxic and hypercapnic respiratory failure multifactorial  -Normally on 4 L at home and CPAP. (COPD, obesity hypoventilation, methadone use)    -Weaning off BiPAP during the days,         Acute toxic metabolic encephalopathy  Secondary to CO2 narcosis  -Continue monitoring  Continue holding methadone today    Acute on chronic diastolic CHF  -Last known EF 40 to 45%   -Received IV diuresis with good result        Severe COPD with exacerbation  -Normally on 4 L at home  -Continue respiratory care protocol, bronchodilator and steroid therapy    Opioid dependence/previous drug abuse  -Currently on 130 mg of methadone  -Pharmacy to verify  -Holding pending improvement of respiratory failure  -Urine drug screen positive for methadone only. Essential hypertension  Continue antihypertensive agent, monitor blood pressure    Chronic anemia       Obesity, Complicating assessment and treatment. Placing patient at risk for multiple co-morbidities as well as early death and contributing to the patient's presentation. Disposition:   Current Living situation:  home   Expected Disposition: home   Estimated D/C:     Diet ADULT DIET;  Regular   DVT Prophylaxis [x] Lovenox, []  Heparin, [] SCDs, [] Ambulation,  [] Eliquis, [] Xarelto, [] Coumadin   Code Status Full Code         Personally reviewed Lab Studies and Imaging          EKG interpreted personally and results sinus rhythm, no gross, changes             History from:     patient    History of Present Illness:     Chief Complaint:    Iven Bras

## 2023-05-16 NOTE — ED NOTES
Report given to ICU RN to assume care. All questions and concerns answered.       Ana Luisa Dennison RN  05/16/23 1410

## 2023-05-16 NOTE — CONSULTS
REASON FOR CONSULTATION/CC: copd,       Consult at request of Ct Vora MD for      PCP: Osvaldo Purvis Pulmonologist:  Nova Meléndez      HISTORY OF PRESENT ILLNESS: Vernell Bishop is a 49y.o. year old female with a history of   who presents with      Patient was last admitted in January for acute on chronic hypercapnic respiratory failure. She has BiPAP and does not use this at home. She again presented with  acute hypercapnia      Some speech finding issues. Denies using BiPAP. States she did not have the proper supplies but, now she does. Falling asleep during exam.  Does not have BiPAP on now. This note may have been  transcribed using 13985 [employer]. Please disregard any translational errors. Assessment:     COPD with an FEV1 53%  Chronic hypoxemic respiratory failure 4 L nasal cannula  Sleep apnea on BiPAP  History of CHF with an EF of 59%, grade 1 diastolic dysfunction, mitral regurgitation, elevated pulmonary artery systolic pressure echo 4569    Plan:      Hospital Day 0     COPD exacerbation with acute on chronic hypercapnia  Has bipap but not compliant  Solumedrol q 6 . Wean tomorrow. Duoneb's    Levaquin for empiric treatment of pneumonia     Mental status  Decreased   Co2 increased  + methadone      Chronic hypoxemia  6 L NC  Baseline 4 L NC       Hx CHF  Lasix  Volume decreased -2.7 L           Nutrition  - Diet NPO Exceptions are: Ice Chips, Sips of Water with Meds       I spent 45 minutes of critical care time with this patient excluding any procedures. This note was transcribed using 50357 DxTerity. Please disregard any translational errors.     Thank you for the consult    [facility]  [phone]             Data:     PAST MEDICAL HISTORY:  Past Medical History:   Diagnosis Date    Arthritis     Blind right eye     CHF (congestive heart failure) ([facility] [address]     Chronic respiratory failure with

## 2023-05-16 NOTE — PROGRESS NOTES
pH improving, CO2 improving but still elevated. 5L NC on, will need bipap at night. A/O, up to chair most of the day. C/o pain. Pt asking for her Methadone, last dose was yesterday, Dr. Teresita Gil aware. UOP- 2850ml    Bedside shift report given to [facility].

## 2023-05-16 NOTE — ED TRIAGE NOTES
Pt presents to ED via [place] [address] [facility] from home with a c/o SOB that started tonight when the pt was woken up from her sleep with SOB. Per EMS, pt was 75% on pt's normal 4L/NC she wears at home. Pt was then put on 6L via NRB and pt O2 increased to 100% per EMS. Pt has hx of COPD, CHF, HTN, respiratory failure, and sleep apnea. Per EMS, pt was given 2 breathing tx en route. EMS stated pt felt relief after 2nd was completed. Pt is 92% on 6L/NC.

## 2023-05-16 NOTE — PROGRESS NOTES
Latest Reference Range & Units Most Recent   pH, Mainor 7.350 - 7.450  7.206 (L)  5/16/23 05:59   pCO2, Mainor 40.0 - 50.0 mmHg 115.0 (H)  5/16/23 05:59   pO2, Mainor Not Established mmHg 39  5/16/23 05:59   HCO3, Venous 23 - 29 mmol/L 46 (H)  5/16/23 05:59     VBG did get worse than previous VBG. Pt does have a bed on 5W. Charge nurse stated that floor would not take pt if ph <7.15. Perfect serve message sent to Luis Eduardo Gonzalez MD about the VBG result. Physician stated that there are no ICU beds at this time. Myself did increase Bipap settings. MD notified of bipap setting changes in message. Did ask physician if can check a VBG in a hour after the increased bipap settings. Still awaiting response with the VBG order.

## 2023-05-16 NOTE — PLAN OF CARE
Problem: Discharge Planning  Goal: Discharge to home or other facility with appropriate resources  Outcome: Progressing  Flowsheets (Taken 5/16/2023 414)  Discharge to home or other facility with appropriate resources:   Identify barriers to discharge with patient and caregiver   Arrange for needed discharge resources and transportation as appropriate   Identify discharge learning needs (meds, wound care, etc)   Arrange for interpreters to assist at discharge as needed   Refer to discharge planning if patient needs post-hospital services based on physician order or complex needs related to functional status, cognitive ability or social support system     Problem: Respiratory - Adult  Goal: Achieves optimal ventilation and oxygenation  Outcome: Progressing  Flowsheets (Taken 5/16/2023 0923)  Achieves optimal ventilation and oxygenation:   Assess for changes in respiratory status   Assess for changes in mentation and behavior   Position to facilitate oxygenation and minimize respiratory effort   Oxygen supplementation based on oxygen saturation or arterial blood gases   Initiate smoking cessation protocol as indicated   Encourage broncho-pulmonary hygiene including cough, deep breathe, incentive spirometry   Assess the need for suctioning and aspirate as needed   Assess and instruct to report shortness of breath or any respiratory difficulty   Respiratory therapy support as indicated     Problem: Cardiovascular - Adult  Goal: Maintains optimal cardiac output and hemodynamic stability  Outcome: Progressing  Flowsheets (Taken 5/16/2023 0923)  Maintains optimal cardiac output and hemodynamic stability:   Monitor blood pressure and heart rate   Monitor urine output and notify Licensed Independent Practitioner for values outside of normal range   Assess for signs of decreased cardiac output   Administer fluid and/or volume expanders as ordered   Administer vasoactive medications as ordered   For PPHN infants, administer

## 2023-05-16 NOTE — PROGRESS NOTES
Pt admitted to ICU. Bipap placed. Orders released. Zimmerman placed, IV Lasix given. C/o pain in arms and legs, pt states it's from her scoliosis. Resting in bed at this time, call light within reach. Pt instructed to call for assistance.

## 2023-05-16 NOTE — ED PROVIDER NOTES
[facility]  EMERGENCY DEPARTMENT ENCOUNTER      Pt Name: Margie Paulino  MRN: [id number]  [place] 1975  Date of evaluation: 5/16/2023  Provider: Leo Irby, [facility]       Chief Complaint   Patient presents with    Shortness of Breath         HISTORY OF PRESENT ILLNESS   (Location/Symptom, Timing/Onset, Context/Setting, Quality, Duration, Modifying Factors, Severity)  Note limiting factors. Margie Paulino is a 49 y.o. female who presents to the emergency department with complaint of shortness of breath for the last couple of days worsening tonight. She reports a cough with yellow productive sputum. She denies any fever chills nausea vomiting or diarrhea. She denies any chest pain heaviness pressure or tightness. She denies any abdominal pain back pain or flank pain. She denies any syncope or palpitations. She denies any leg swelling or leg pain. She denies any dysuria hematuria frequency or urgency. She does report a history of COPD. She wears oxygen 4 L per nasal cannula 24/7. Medical history is also significant for obstructive sleep apnea, congestive heart failure, chronic respiratory failure with hypercapnia, hypertension, movement disorder, and blindness in the right eye. She does take methadone 130 mg daily. Nursing Notes were reviewed. HPI        REVIEW OF SYSTEMS    (2-9 systems for level 4, 10 or more for level 5)       Constitutional: Negative for fever or chills. HENT: Negative for rhinorrhea and sore throat. Eyes: Negative for redness or drainage. Gastrointestinal: Negative for abdominal pain. Negative for vomiting or diarrhea. Neurological: Negative for headache. Genitourinary: Negative for flank pain. Negative for dysuria. Negative for hematuria. All systems are reviewed and are negative except for those listed above in the history of present illness and ROS.         PAST MEDICAL HISTORY     Past Medical History:   Diagnosis Date

## 2023-05-16 NOTE — PROGRESS NOTES
Pt is currently on 18/6 rate of 20 and 50%. Pt is still lethargic but will wake up and have a conversation. Pt now has woken up and is currently on her phone.  Tiffany Rojo MD did ordered a VBG in a hour

## 2023-05-16 NOTE — ED NOTES
Bettyve sent to Dr. Joan Duke regarding VBG results and PCU bed assigned. PerfectServe sent back from Dr. Joan Duke stating no ICU beds were available. PCU will not take pt d/t VBG pH result that is <7.25.      Lucho Noe RN  05/16/23 0638

## 2023-05-17 LAB
ANION GAP SERPL CALCULATED.3IONS-SCNC: 1 MMOL/L (ref 3–16)
BASOPHILS # BLD: 0 K/UL (ref 0–0.2)
BASOPHILS NFR BLD: 0.1 %
BUN SERPL-MCNC: 22 MG/DL (ref 7–20)
CALCIUM SERPL-MCNC: 9.2 MG/DL (ref 8.3–10.6)
CHLORIDE SERPL-SCNC: 84 MMOL/L (ref 99–110)
CO2 SERPL-SCNC: 48 MMOL/L (ref 21–32)
CREAT SERPL-MCNC: 0.6 MG/DL (ref 0.6–1.1)
DEPRECATED RDW RBC AUTO: 16.2 % (ref 12.4–15.4)
EOSINOPHIL # BLD: 0 K/UL (ref 0–0.6)
EOSINOPHIL NFR BLD: 0.1 %
GFR SERPLBLD CREATININE-BSD FMLA CKD-EPI: >60 ML/MIN/{1.73_M2}
GLUCOSE SERPL-MCNC: 101 MG/DL (ref 70–99)
HCT VFR BLD AUTO: 30.5 % (ref 36–48)
HGB BLD-MCNC: 9.3 G/DL (ref 12–16)
INR PPP: 0.92 (ref 0.84–1.16)
LYMPHOCYTES # BLD: 1 K/UL (ref 1–5.1)
LYMPHOCYTES NFR BLD: 6.2 %
MAGNESIUM SERPL-MCNC: 1.8 MG/DL (ref 1.8–2.4)
MCH RBC QN AUTO: 24.9 PG (ref 26–34)
MCHC RBC AUTO-ENTMCNC: 30.6 G/DL (ref 31–36)
MCV RBC AUTO: 81.4 FL (ref 80–100)
MONOCYTES # BLD: 0.8 K/UL (ref 0–1.3)
MONOCYTES NFR BLD: 5 %
NEUTROPHILS # BLD: 13.7 K/UL (ref 1.7–7.7)
NEUTROPHILS NFR BLD: 88.6 %
PHOSPHATE SERPL-MCNC: 2.8 MG/DL (ref 2.5–4.9)
PLATELET # BLD AUTO: 356 K/UL (ref 135–450)
PMV BLD AUTO: 7.8 FL (ref 5–10.5)
POTASSIUM SERPL-SCNC: 4 MMOL/L (ref 3.5–5.1)
PROTHROMBIN TIME: 12.3 SEC (ref 11.5–14.8)
RBC # BLD AUTO: 3.75 M/UL (ref 4–5.2)
SODIUM SERPL-SCNC: 133 MMOL/L (ref 136–145)
WBC # BLD AUTO: 15.5 K/UL (ref 4–11)

## 2023-05-17 PROCEDURE — 99233 SBSQ HOSP IP/OBS HIGH 50: CPT | Performed by: INTERNAL MEDICINE

## 2023-05-17 PROCEDURE — 85610 PROTHROMBIN TIME: CPT

## 2023-05-17 PROCEDURE — 36415 COLL VENOUS BLD VENIPUNCTURE: CPT

## 2023-05-17 PROCEDURE — 85025 COMPLETE CBC W/AUTO DIFF WBC: CPT

## 2023-05-17 PROCEDURE — 80048 BASIC METABOLIC PNL TOTAL CA: CPT

## 2023-05-17 PROCEDURE — 2060000000 HC ICU INTERMEDIATE R&B

## 2023-05-17 PROCEDURE — 6370000000 HC RX 637 (ALT 250 FOR IP): Performed by: STUDENT IN AN ORGANIZED HEALTH CARE EDUCATION/TRAINING PROGRAM

## 2023-05-17 PROCEDURE — 2580000003 HC RX 258: Performed by: STUDENT IN AN ORGANIZED HEALTH CARE EDUCATION/TRAINING PROGRAM

## 2023-05-17 PROCEDURE — 6370000000 HC RX 637 (ALT 250 FOR IP): Performed by: HOSPITALIST

## 2023-05-17 PROCEDURE — 6360000002 HC RX W HCPCS: Performed by: STUDENT IN AN ORGANIZED HEALTH CARE EDUCATION/TRAINING PROGRAM

## 2023-05-17 PROCEDURE — 84100 ASSAY OF PHOSPHORUS: CPT

## 2023-05-17 PROCEDURE — 83735 ASSAY OF MAGNESIUM: CPT

## 2023-05-17 PROCEDURE — 6370000000 HC RX 637 (ALT 250 FOR IP): Performed by: INTERNAL MEDICINE

## 2023-05-17 PROCEDURE — 94761 N-INVAS EAR/PLS OXIMETRY MLT: CPT

## 2023-05-17 PROCEDURE — 94640 AIRWAY INHALATION TREATMENT: CPT

## 2023-05-17 PROCEDURE — 6360000002 HC RX W HCPCS: Performed by: NURSE PRACTITIONER

## 2023-05-17 RX ORDER — DOXYCYCLINE HYCLATE 100 MG
100 TABLET ORAL EVERY 12 HOURS SCHEDULED
Status: DISCONTINUED | OUTPATIENT
Start: 2023-05-17 | End: 2023-05-18 | Stop reason: HOSPADM

## 2023-05-17 RX ORDER — TORSEMIDE 20 MG/1
40 TABLET ORAL DAILY
Status: DISCONTINUED | OUTPATIENT
Start: 2023-05-17 | End: 2023-05-18 | Stop reason: HOSPADM

## 2023-05-17 RX ORDER — MAGNESIUM HYDROXIDE/ALUMINUM HYDROXICE/SIMETHICONE 120; 1200; 1200 MG/30ML; MG/30ML; MG/30ML
30 SUSPENSION ORAL EVERY 6 HOURS PRN
Status: DISCONTINUED | OUTPATIENT
Start: 2023-05-17 | End: 2023-05-18 | Stop reason: HOSPADM

## 2023-05-17 RX ORDER — ASPIRIN 81 MG/1
81 TABLET ORAL DAILY
Status: DISCONTINUED | OUTPATIENT
Start: 2023-05-17 | End: 2023-05-18 | Stop reason: HOSPADM

## 2023-05-17 RX ORDER — MAGNESIUM SULFATE IN WATER 40 MG/ML
2000 INJECTION, SOLUTION INTRAVENOUS ONCE
Status: COMPLETED | OUTPATIENT
Start: 2023-05-17 | End: 2023-05-17

## 2023-05-17 RX ORDER — LISINOPRIL 5 MG/1
5 TABLET ORAL DAILY
Status: DISCONTINUED | OUTPATIENT
Start: 2023-05-17 | End: 2023-05-18 | Stop reason: HOSPADM

## 2023-05-17 RX ORDER — METHADONE HYDROCHLORIDE 10 MG/1
120 TABLET ORAL DAILY
Status: DISCONTINUED | OUTPATIENT
Start: 2023-05-17 | End: 2023-05-18 | Stop reason: HOSPADM

## 2023-05-17 RX ORDER — PREDNISONE 20 MG/1
40 TABLET ORAL DAILY
Status: DISCONTINUED | OUTPATIENT
Start: 2023-05-17 | End: 2023-05-18 | Stop reason: HOSPADM

## 2023-05-17 RX ADMIN — EMPAGLIFLOZIN 10 MG: 10 TABLET, FILM COATED ORAL at 07:56

## 2023-05-17 RX ADMIN — ALUMINUM HYDROXIDE, MAGNESIUM HYDROXIDE, AND SIMETHICONE 30 ML: 200; 200; 20 SUSPENSION ORAL at 17:59

## 2023-05-17 RX ADMIN — SODIUM CHLORIDE, PRESERVATIVE FREE 10 ML: 5 INJECTION INTRAVENOUS at 21:29

## 2023-05-17 RX ADMIN — ASPIRIN 81 MG: 81 TABLET, COATED ORAL at 13:13

## 2023-05-17 RX ADMIN — CARVEDILOL 3.12 MG: 3.12 TABLET, FILM COATED ORAL at 07:56

## 2023-05-17 RX ADMIN — IPRATROPIUM BROMIDE AND ALBUTEROL SULFATE 1 AMPULE: 2.5; .5 SOLUTION RESPIRATORY (INHALATION) at 11:31

## 2023-05-17 RX ADMIN — PANTOPRAZOLE SODIUM 40 MG: 40 TABLET, DELAYED RELEASE ORAL at 05:32

## 2023-05-17 RX ADMIN — IPRATROPIUM BROMIDE AND ALBUTEROL SULFATE 1 AMPULE: 2.5; .5 SOLUTION RESPIRATORY (INHALATION) at 20:16

## 2023-05-17 RX ADMIN — DOXYCYCLINE HYCLATE 100 MG: 100 TABLET, COATED ORAL at 21:28

## 2023-05-17 RX ADMIN — LISINOPRIL 5 MG: 5 TABLET ORAL at 13:13

## 2023-05-17 RX ADMIN — METHADONE HYDROCHLORIDE 120 MG: 10 TABLET ORAL at 11:50

## 2023-05-17 RX ADMIN — MOMETASONE FUROATE AND FORMOTEROL FUMARATE DIHYDRATE 2 PUFF: 100; 5 AEROSOL RESPIRATORY (INHALATION) at 20:16

## 2023-05-17 RX ADMIN — TORSEMIDE 40 MG: 20 TABLET ORAL at 13:13

## 2023-05-17 RX ADMIN — MOMETASONE FUROATE AND FORMOTEROL FUMARATE DIHYDRATE 2 PUFF: 100; 5 AEROSOL RESPIRATORY (INHALATION) at 07:39

## 2023-05-17 RX ADMIN — SERTRALINE 125 MG: 100 TABLET, FILM COATED ORAL at 16:57

## 2023-05-17 RX ADMIN — IPRATROPIUM BROMIDE AND ALBUTEROL SULFATE 1 AMPULE: 2.5; .5 SOLUTION RESPIRATORY (INHALATION) at 07:39

## 2023-05-17 RX ADMIN — SODIUM CHLORIDE, PRESERVATIVE FREE 10 ML: 5 INJECTION INTRAVENOUS at 07:57

## 2023-05-17 RX ADMIN — ENOXAPARIN SODIUM 40 MG: 100 INJECTION SUBCUTANEOUS at 16:40

## 2023-05-17 RX ADMIN — MAGNESIUM SULFATE HEPTAHYDRATE 2000 MG: 40 INJECTION, SOLUTION INTRAVENOUS at 11:56

## 2023-05-17 RX ADMIN — IPRATROPIUM BROMIDE AND ALBUTEROL SULFATE 1 AMPULE: 2.5; .5 SOLUTION RESPIRATORY (INHALATION) at 16:09

## 2023-05-17 RX ADMIN — FUROSEMIDE 60 MG: 10 INJECTION, SOLUTION INTRAMUSCULAR; INTRAVENOUS at 07:56

## 2023-05-17 RX ADMIN — CARVEDILOL 3.12 MG: 3.12 TABLET, FILM COATED ORAL at 16:40

## 2023-05-17 RX ADMIN — ACETAMINOPHEN 650 MG: 325 TABLET ORAL at 16:40

## 2023-05-17 RX ADMIN — FOLIC ACID 1 MG: 1 TABLET ORAL at 07:56

## 2023-05-17 RX ADMIN — LEVOFLOXACIN 500 MG: 5 INJECTION, SOLUTION INTRAVENOUS at 05:36

## 2023-05-17 RX ADMIN — PREDNISONE 40 MG: 20 TABLET ORAL at 14:55

## 2023-05-17 ASSESSMENT — PAIN DESCRIPTION - ORIENTATION
ORIENTATION: RIGHT
ORIENTATION: RIGHT
ORIENTATION: MID

## 2023-05-17 ASSESSMENT — PAIN DESCRIPTION - DESCRIPTORS
DESCRIPTORS: ACHING
DESCRIPTORS: DISCOMFORT
DESCRIPTORS: DISCOMFORT

## 2023-05-17 ASSESSMENT — PAIN DESCRIPTION - PAIN TYPE
TYPE: CHRONIC PAIN
TYPE: CHRONIC PAIN

## 2023-05-17 ASSESSMENT — PAIN SCALES - GENERAL
PAINLEVEL_OUTOF10: 0
PAINLEVEL_OUTOF10: 4
PAINLEVEL_OUTOF10: 3
PAINLEVEL_OUTOF10: 4
PAINLEVEL_OUTOF10: 0
PAINLEVEL_OUTOF10: 2
PAINLEVEL_OUTOF10: 1

## 2023-05-17 ASSESSMENT — PAIN - FUNCTIONAL ASSESSMENT: PAIN_FUNCTIONAL_ASSESSMENT: ACTIVITIES ARE NOT PREVENTED

## 2023-05-17 ASSESSMENT — PAIN DESCRIPTION - FREQUENCY: FREQUENCY: INTERMITTENT

## 2023-05-17 ASSESSMENT — PAIN DESCRIPTION - LOCATION
LOCATION: HEAD
LOCATION: ARM
LOCATION: ARM

## 2023-05-17 NOTE — PROGRESS NOTES
Patient refusing the use of HS BiPAP at this time.   Patient stated she may be ready around 3 am. Electronically signed by Yulia Silveira RCP on 5/17/2023 at 1:15 AM

## 2023-05-17 NOTE — PROGRESS NOTES
Pulmonary Progress Note    Date of Admission: 5/16/2023   LOS: 1 day       CC:  Chief Complaint   Patient presents with    Shortness of Breath        Subjective:  Improving breathing. ROS:   No nausea  No Vomiting  No chest pain       Assessment:     COPD with an FEV1 53%  Chronic hypoxemic respiratory failure 4 L nasal cannula  Sleep apnea on BiPAP  History of CHF with an EF of 41%, grade 1 diastolic dysfunction, mitral regurgitation, elevated pulmonary artery systolic pressure echo 7140       Plan: This note may have been transcribed using 69[address] [employer]ell[employer]. Please disregard any translational errors. Hospital Day: 1     COPD exacerbation with acute on chronic hypercapnia  Has bipap but not compliant  Solumedrol q 6 . Wean to prednisone  Duoneb's    Doxy  for empiric treatment of pneumonia      Mental status    + methadone. Improved. Decreasing methadone dose        Chronic hypoxemia  Wean O2 to sat >90%   Baseline 4 L NC         Hx CHF  Lasix  Volume decreased -4.1 L          Data:        PHYSICAL EXAM:   Blood pressure (!) 125/104, pulse 97, temperature 98.2 °F (36.8 °C), temperature source Oral, resp. rate 18, height 5' (1.524 m), last menstrual period 08/14/2015, SpO2 96 %, not currently breastfeeding.'  Body mass index is 40.04 kg/m². Gen: No distress. ENT:   Resp: No accessory muscle use. No crackles. No wheezes. No rhonchi. CV: Regular rate. Regular rhythm. No murmur or rub. No edema. Skin: Warm, dry, normal texture and turgor. No nodule on exposed extremities. M/S: No cyanosis. No clubbing. No joint deformity. Psych: Oriented x 3. No anxiety. Awake. Alert. Intact judgement and insight. Good Mood / Affect.   Memory appears in tact       Medications:    Scheduled Meds:   magnesium sulfate  2,000 mg IntraVENous Once    methadone  120 mg Oral Daily    ipratropium 0.5 mg-albuterol 2.5 mg  1 ampule Inhalation Q4H WA    levofloxacin  500 mg IntraVENous Q24H    sodium chloride

## 2023-05-17 NOTE — PROGRESS NOTES
V2.0    [facility] Progress Note      Name:  Therese Menjivar /Age/Sex: 1975  (49 y.o. female)   MRN & CSN:  [id number] & [id number] Encounter Date/Time: 2023 12:55 PM EDT   Location:  [id number] PCP: Misty Madsen     Attending:Darin Mills Junior, MD       Hospital Day: 2    Assessment and Recommendations   Therese Menjivar is a 49 y.o. female with pmh of as below who presents with Acute hypoxemic respiratory failure ([address]        Acute on chronic hypoxic and hypercapnic respiratory failure multifactorial, -Normally on 4 L at home and BiPAP)  (COPD, obesity hypoventilation, methadone use, nonadherence to BiPAP)     -Currently improved, back on 4 L of oxygen.  -Encouraged the use of BiPAP at night   -Continuous end-tidal CO2 monitor        Acute toxic metabolic encephalopathy  Secondary to CO2 narcosis  -Resolved continue monitoring with the restart of the lower dose of methadone. Acute on chronic diastolic CHF  -Last known EF 40 to 45% 2020  -Volume status at baseline, discontinue IV Lasix, start home dose torsemide, monitor electrolyte and renal function         Severe COPD with exacerbation  -Normally on 4 L at home  -Continue respiratory care protocol, bronchodilator and steroid therapy, change Levaquin to p.o. Doxy to avoid QTc prolongation    Opioid dependence/previous drug abuse  -Prescribed on 130 mg of methadone  -Discussed with the  methadone clinic. Will reduce the dose to 120  -Monitor overnight  -The importance with adherence to BiPAP at night discussed. Essential hypertension  Continue antihypertensive agent, monitor blood pressure, restart lisinopril     Chronic anemia         Obesity, Complicating assessment and treatment. Placing patient at risk for multiple co-morbidities as well as early death and contributing to the patient's presentation. Diet ADULT DIET;  Regular   DVT Prophylaxis [x] Lovenox, []  Heparin, [] SCDs, [] Ambulation,  [] Eliquis, [] Xarelto  []

## 2023-05-17 NOTE — PROGRESS NOTES
RN called into room by patient who is on bedside commode. Patient is tearful demanding her duvall catheter be taken out so that she can have a bowel movement. RN explained that duvall catheters do not prevent bowel movements. Pt still demanding duvall catheter be taken out anyways so she can have a bowel movement.  Duvall catheter removed per patient request.

## 2023-05-18 VITALS
DIASTOLIC BLOOD PRESSURE: 97 MMHG | OXYGEN SATURATION: 96 % | RESPIRATION RATE: 18 BRPM | SYSTOLIC BLOOD PRESSURE: 112 MMHG | WEIGHT: 195.11 LBS | TEMPERATURE: 99 F | BODY MASS INDEX: 38.31 KG/M2 | HEART RATE: 84 BPM | HEIGHT: 60 IN

## 2023-05-18 PROBLEM — G47.33 OSA (OBSTRUCTIVE SLEEP APNEA): Status: ACTIVE | Noted: 2023-05-18

## 2023-05-18 LAB
ANION GAP SERPL CALCULATED.3IONS-SCNC: 6 MMOL/L (ref 3–16)
BUN SERPL-MCNC: 28 MG/DL (ref 7–20)
CALCIUM SERPL-MCNC: 8.7 MG/DL (ref 8.3–10.6)
CHLORIDE SERPL-SCNC: 80 MMOL/L (ref 99–110)
CO2 SERPL-SCNC: 46 MMOL/L (ref 21–32)
CREAT SERPL-MCNC: 1 MG/DL (ref 0.6–1.1)
DEPRECATED RDW RBC AUTO: 16 % (ref 12.4–15.4)
GFR SERPLBLD CREATININE-BSD FMLA CKD-EPI: >60 ML/MIN/{1.73_M2}
GLUCOSE SERPL-MCNC: 103 MG/DL (ref 70–99)
HCT VFR BLD AUTO: 33.1 % (ref 36–48)
HGB BLD-MCNC: 10.4 G/DL (ref 12–16)
MCH RBC QN AUTO: 25.2 PG (ref 26–34)
MCHC RBC AUTO-ENTMCNC: 31.5 G/DL (ref 31–36)
MCV RBC AUTO: 80 FL (ref 80–100)
PLATELET # BLD AUTO: 431 K/UL (ref 135–450)
PMV BLD AUTO: 7.6 FL (ref 5–10.5)
POTASSIUM SERPL-SCNC: 3.5 MMOL/L (ref 3.5–5.1)
RBC # BLD AUTO: 4.14 M/UL (ref 4–5.2)
SODIUM SERPL-SCNC: 132 MMOL/L (ref 136–145)
WBC # BLD AUTO: 12.7 K/UL (ref 4–11)

## 2023-05-18 PROCEDURE — 94640 AIRWAY INHALATION TREATMENT: CPT

## 2023-05-18 PROCEDURE — 36415 COLL VENOUS BLD VENIPUNCTURE: CPT

## 2023-05-18 PROCEDURE — 99231 SBSQ HOSP IP/OBS SF/LOW 25: CPT | Performed by: NURSE PRACTITIONER

## 2023-05-18 PROCEDURE — 85027 COMPLETE CBC AUTOMATED: CPT

## 2023-05-18 PROCEDURE — 80048 BASIC METABOLIC PNL TOTAL CA: CPT

## 2023-05-18 PROCEDURE — 94761 N-INVAS EAR/PLS OXIMETRY MLT: CPT

## 2023-05-18 PROCEDURE — 6370000000 HC RX 637 (ALT 250 FOR IP): Performed by: HOSPITALIST

## 2023-05-18 PROCEDURE — 6370000000 HC RX 637 (ALT 250 FOR IP): Performed by: INTERNAL MEDICINE

## 2023-05-18 PROCEDURE — 6370000000 HC RX 637 (ALT 250 FOR IP): Performed by: STUDENT IN AN ORGANIZED HEALTH CARE EDUCATION/TRAINING PROGRAM

## 2023-05-18 PROCEDURE — 2700000000 HC OXYGEN THERAPY PER DAY

## 2023-05-18 PROCEDURE — 2580000003 HC RX 258: Performed by: STUDENT IN AN ORGANIZED HEALTH CARE EDUCATION/TRAINING PROGRAM

## 2023-05-18 RX ORDER — DOXYCYCLINE HYCLATE 100 MG
100 TABLET ORAL EVERY 12 HOURS SCHEDULED
Qty: 8 TABLET | Refills: 0 | Status: SHIPPED | OUTPATIENT
Start: 2023-05-18 | End: 2023-05-22

## 2023-05-18 RX ORDER — PREDNISONE 20 MG/1
40 TABLET ORAL DAILY
Qty: 8 TABLET | Refills: 0 | Status: SHIPPED | OUTPATIENT
Start: 2023-05-19 | End: 2023-05-23

## 2023-05-18 RX ADMIN — EMPAGLIFLOZIN 10 MG: 10 TABLET, FILM COATED ORAL at 08:59

## 2023-05-18 RX ADMIN — DOXYCYCLINE HYCLATE 100 MG: 100 TABLET, COATED ORAL at 08:59

## 2023-05-18 RX ADMIN — TORSEMIDE 40 MG: 20 TABLET ORAL at 08:59

## 2023-05-18 RX ADMIN — PREDNISONE 40 MG: 20 TABLET ORAL at 08:59

## 2023-05-18 RX ADMIN — ACETAMINOPHEN 650 MG: 325 TABLET ORAL at 04:45

## 2023-05-18 RX ADMIN — CARVEDILOL 3.12 MG: 3.12 TABLET, FILM COATED ORAL at 08:59

## 2023-05-18 RX ADMIN — METHADONE HYDROCHLORIDE 120 MG: 10 TABLET ORAL at 11:21

## 2023-05-18 RX ADMIN — PANTOPRAZOLE SODIUM 40 MG: 40 TABLET, DELAYED RELEASE ORAL at 05:39

## 2023-05-18 RX ADMIN — SODIUM CHLORIDE, PRESERVATIVE FREE 10 ML: 5 INJECTION INTRAVENOUS at 09:00

## 2023-05-18 RX ADMIN — IPRATROPIUM BROMIDE AND ALBUTEROL SULFATE 1 AMPULE: 2.5; .5 SOLUTION RESPIRATORY (INHALATION) at 08:16

## 2023-05-18 RX ADMIN — FOLIC ACID 1 MG: 1 TABLET ORAL at 08:59

## 2023-05-18 RX ADMIN — ACETAMINOPHEN 650 MG: 325 TABLET ORAL at 11:24

## 2023-05-18 RX ADMIN — ASPIRIN 81 MG: 81 TABLET, COATED ORAL at 08:59

## 2023-05-18 RX ADMIN — LISINOPRIL 5 MG: 5 TABLET ORAL at 08:59

## 2023-05-18 RX ADMIN — SERTRALINE 125 MG: 100 TABLET, FILM COATED ORAL at 08:57

## 2023-05-18 RX ADMIN — MOMETASONE FUROATE AND FORMOTEROL FUMARATE DIHYDRATE 2 PUFF: 100; 5 AEROSOL RESPIRATORY (INHALATION) at 08:16

## 2023-05-18 ASSESSMENT — PAIN SCALES - GENERAL
PAINLEVEL_OUTOF10: 6
PAINLEVEL_OUTOF10: 6
PAINLEVEL_OUTOF10: 0
PAINLEVEL_OUTOF10: 7

## 2023-05-18 ASSESSMENT — PAIN - FUNCTIONAL ASSESSMENT: PAIN_FUNCTIONAL_ASSESSMENT: ACTIVITIES ARE NOT PREVENTED

## 2023-05-18 ASSESSMENT — PAIN DESCRIPTION - LOCATION
LOCATION: HEAD

## 2023-05-18 ASSESSMENT — PAIN DESCRIPTION - DESCRIPTORS: DESCRIPTORS: ACHING

## 2023-05-18 ASSESSMENT — PAIN DESCRIPTION - ONSET: ONSET: ON-GOING

## 2023-05-18 ASSESSMENT — PAIN DESCRIPTION - ORIENTATION: ORIENTATION: RIGHT;LEFT

## 2023-05-18 ASSESSMENT — PAIN DESCRIPTION - FREQUENCY: FREQUENCY: INTERMITTENT

## 2023-05-18 ASSESSMENT — PAIN DESCRIPTION - PAIN TYPE: TYPE: ACUTE PAIN

## 2023-05-18 NOTE — CARE COORDINATION
[facility]    Referral received from  to follow for home care services. [facility] unable to accept payor at this time, will require alternate agency, no preference,  aware. Referral accepted by Jordan Schulte with [facility], will pull from [facility].        Reynaldo Krause RN, BSN CTN  [facility] [phone]

## 2023-05-18 NOTE — PROGRESS NOTES
Put Bipap on patient, patient instantly became claustrophobic and refused to wear any more.  Stated she wants the significant other to bring in home machine tomorrow

## 2023-05-18 NOTE — PROGRESS NOTES
PIV x2 removed, no s/s bleeding noted. Discharge instructions, follow up appts, and meds reviewed, questions asked and answered. Pt discharged to home via w/c. No signs of distress noted at time of discharge.

## 2023-05-18 NOTE — PLAN OF CARE
Problem: Discharge Planning  Goal: Discharge to home or other facility with appropriate resources  5/18/2023 1310 by Rashaun Rivas RN  Outcome: Completed  5/18/2023 1039 by Rashaun Rivas RN  Outcome: Progressing     Problem: Respiratory - Adult  Goal: Achieves optimal ventilation and oxygenation  5/18/2023 1310 by Rashaun Rivas RN  Outcome: Completed  5/18/2023 1039 by Rashaun Rivas RN  Outcome: Progressing     Problem: Cardiovascular - Adult  Goal: Maintains optimal cardiac output and hemodynamic stability  5/18/2023 1310 by Rashaun Rivas RN  Outcome: Completed  5/18/2023 1039 by Rashaun Rivas RN  Outcome: Progressing  Goal: Absence of cardiac dysrhythmias or at baseline  5/18/2023 1310 by Rashaun Rivas RN  Outcome: Completed  5/18/2023 1039 by Rashaun Rivas RN  Outcome: Progressing     Problem: Metabolic/Fluid and Electrolytes - Adult  Goal: Electrolytes maintained within normal limits  5/18/2023 1310 by Rashaun Rivas RN  Outcome: Completed  5/18/2023 1039 by Rashaun Rivas RN  Outcome: Progressing  Goal: Hemodynamic stability and optimal renal function maintained  5/18/2023 1310 by Rashaun Rivas RN  Outcome: Completed  5/18/2023 1039 by Rashaun Rivas RN  Outcome: Progressing     Problem: Pain  Goal: Verbalizes/displays adequate comfort level or baseline comfort level  5/18/2023 1310 by Rashaun Rivas RN  Outcome: Completed  5/18/2023 1039 by Rashaun Rivas RN  Outcome: Progressing     Problem: Skin/Tissue Integrity  Goal: Absence of new skin breakdown  Description: 1. Monitor for areas of redness and/or skin breakdown  2. Assess vascular access sites hourly  3. Every 4-6 hours minimum:  Change oxygen saturation probe site  4. Every 4-6 hours:  If on nasal continuous positive airway pressure, respiratory therapy assess nares and determine need for appliance change or resting period.   5/18/2023 1310 by Rashaun Rivas RN  Outcome: Completed  5/18/2023 1039 by Rashaun Rivas

## 2023-05-18 NOTE — CARE COORDINATION
Case Management Assessment  Initial Evaluation    Date/Time of Evaluation: 5/18/2023 11:29 AM  Assessment Completed by: Ashley Zaidi RN    If patient is discharged prior to next notation, then this note serves as note for discharge by case management. Patient Name: Nell Marquez                   YOB: 1975  Diagnosis: Acute exacerbation of chronic obstructive pulmonary disease ([facility] [address] [J44.1]  Acute respiratory failure with hypoxia and hypercapnia ([address] [J96.01, J96.02]  Acute hypoxemic respiratory failure ([facility] [address] [J96.01]                   Date / Time: 5/16/2023  2:25 AM    Patient Admission Status: Inpatient   Readmission Risk (Low < 19, Mod (19-27), High > 27): Readmission Risk Score: 15.9    Current PCP: Kassy Jonse  PCP verified by CM? Yes    Chart Reviewed: Yes      History Provided by: Patient  Patient Orientation: Alert and Oriented, Person, Place, Situation    Patient Cognition: Alert    Hospitalization in the last 30 days (Readmission):  No    If yes, Readmission Assessment in CM Navigator will be completed.     Advance Directives:      Code Status: Full Code   Patient's Primary Decision Maker is: Legal Next of Kin    Primary Decision MakerArhina Lang - Spouse - [phone]    Secondary Decision Maker: Prema Estrada - Child - [phone]    Secondary Decision Maker: Pawan Benites - Child - [phone]    Discharge Planning:    Patient lives with: Spouse/Significant Other Type of Home: House (ramped entrance)  Primary Care Giver: Self  Patient Support Systems include: Spouse/Significant Other, Children   Current Financial resources: Medicare, Medicaid  Current community resources: [facility] (goes to [facility] for Methadone)  Current services prior to admission: Other (Comment), Durable Medical Equipment (goes to Methadone clinic)            Current DME: Walker, Wheelchair, Bipap, Cane, Oxygen Therapy (Comment) (motorized wheelchair, 4L O2 through [address])            Type of

## 2023-05-18 NOTE — PROGRESS NOTES
Pulmonary/Critical Care Progress Note    CC:  Follow-up:  Acute on chronic hypoxemic respiratory failure with SPO2 <90% on room air with baseline 4L NC  COPD  BLANCA on bipap (noncompliant)  History of CHF    Subjective: On 4L NC (baseline)  Transferred out of ICU yesterday  Refused bipap overnight  Wants to go home    ROS  SOB improved  No fever  No chills    Intake/Output Summary (Last 24 hours) at 5/18/2023 0831  Last data filed at 5/18/2023 0542  Gross per 24 hour   Intake 2495 ml   Output 2150 ml   Net 345 ml         PHYSICAL EXAM:  Blood pressure 122/85, pulse 69, temperature 97.9 °F (36.6 °C), temperature source Oral, resp. rate 12, height 5' (1.524 m), weight 195 lb 1.7 oz (88.5 kg), last menstrual period 08/14/2015, SpO2 100 %, not currently breastfeeding.'  Gen: No distress. Well developed, well-nourished  Eyes: No scleral icterus. No conjunctival injection. ENT: External appearance of ears and nose normal.  Neck: Trachea midline. No obvious mass. No visible thyroid enlargement     Respiratory: No crackles. No wheezes. No rhonchi. No accessory muscle use  Cardiovascular: Regular rate. Regular rhythm. No murmur or rub. No edema  GI: Non-tender. Non-distended. No hernia. Bowel sounds present  Skin: Warm, dry, normal texture and turgor. No abnormalities on exposed extremities. Musculoskeletal: No cyanosis. No clubbing. No joint deformity. Neuro: Moves all four extremities.    Psychiatric:  Normal mood and affect; exhibits normal insight and judgement     Medications:    Scheduled Meds:   methadone  120 mg Oral Daily    lisinopril  5 mg Oral Daily    torsemide  40 mg Oral Daily    aspirin  81 mg Oral Daily    doxycycline hyclate  100 mg Oral 2 times per day    predniSONE  40 mg Oral Daily    sertraline  125 mg Oral Daily    ipratropium 0.5 mg-albuterol 2.5 mg  1 ampule Inhalation Q4H WA    sodium chloride flush  5-40 mL IntraVENous 2 times per day    enoxaparin  40 mg SubCUTAneous QPM    carvedilol  3.125

## 2023-05-18 NOTE — PROGRESS NOTES
Pt not ready for Bipap at this time. Trying to use the restroom. Stated that she will call when she is ready to go on the bipap.

## 2023-05-18 NOTE — PLAN OF CARE
Problem: Discharge Planning  Goal: Discharge to home or other facility with appropriate resources  Outcome: Progressing     Problem: Respiratory - Adult  Goal: Achieves optimal ventilation and oxygenation  Outcome: Progressing     Problem: Cardiovascular - Adult  Goal: Maintains optimal cardiac output and hemodynamic stability  Outcome: Progressing  Goal: Absence of cardiac dysrhythmias or at baseline  Outcome: Progressing     Problem: Metabolic/Fluid and Electrolytes - Adult  Goal: Electrolytes maintained within normal limits  Outcome: Progressing  Goal: Hemodynamic stability and optimal renal function maintained  Outcome: Progressing     Problem: Pain  Goal: Verbalizes/displays adequate comfort level or baseline comfort level  Outcome: Progressing     Problem: Skin/Tissue Integrity  Goal: Absence of new skin breakdown  Description: 1. Monitor for areas of redness and/or skin breakdown  2. Assess vascular access sites hourly  3. Every 4-6 hours minimum:  Change oxygen saturation probe site  4. Every 4-6 hours:  If on nasal continuous positive airway pressure, respiratory therapy assess nares and determine need for appliance change or resting period.   Outcome: Progressing     Problem: Safety - Adult  Goal: Free from fall injury  Outcome: Progressing

## 2023-05-18 NOTE — DISCHARGE SUMMARY
Component Value Date/Time    WBC 12.7 05/18/2023 10:19 AM    HGB 10.4 05/18/2023 10:19 AM    HCT 33.1 05/18/2023 10:19 AM     05/18/2023 10:19 AM       Renal:    Lab Results   Component Value Date/Time     05/18/2023 10:19 AM    K 3.5 05/18/2023 10:19 AM    K 4.0 05/17/2023 05:40 AM    CL 80 05/18/2023 10:19 AM    CO2 46 05/18/2023 10:19 AM    BUN 28 05/18/2023 10:19 AM    CREATININE 1.0 05/18/2023 10:19 AM    CALCIUM 8.7 05/18/2023 10:19 AM    PHOS 2.8 05/17/2023 05:40 AM         Significant Diagnostic Studies    Radiology:   XR CHEST PORTABLE   Final Result   Mild central vascular congestion. Time Spent on discharge is 55 in the examination, evaluation, counseling and review of medications and discharge plan. Thank you Hayde Marshall for the opportunity to be involved in this patient's care.          Electronically signed by Poornima Fang MD on 5/18/2023 at 12:28 PM

## 2023-05-18 NOTE — DISCHARGE INSTR - COC
Continuity of Care Form    Patient Name: Mazin Rod   :  1975  MRN:  [id number]    Admit date:  2023  Discharge date:  ***    Code Status Order: Full Code   Advance Directives:     Admitting Physician:  Monie Blandon MD  PCP: Mahendra Henson    Discharging Nurse: Elva Alexander RN  [address] Unit/Room#: E9S-1876/7783-40  Discharging Unit Phone Number: [phone]    Emergency Contact:   Extended Emergency Contact Information  Primary Emergency Contact: Jeffrey Ashton  Address: [address] [place] [address] Phone: [phone]  Mobile Phone: [phone]  Relation: Spouse  Secondary Emergency Contact: EstradaPrema  [place] Phone: [phone]  Mobile Phone: [phone]  Relation: Child  Preferred language: English   needed?  No    Past Surgical History:  Past Surgical History:   Procedure Laterality Date    ABDOMEN SURGERY      CHOLECYSTECTOMY      DILATATION, ESOPHAGUS      EYE SURGERY      TUBAL LIGATION         Immunization History:   Immunization History   Administered Date(s) Administered    Influenza Vaccine, unspecified formulation 2011    Influenza Virus Vaccine 2013    Influenza, AFLURIA (age 1 yrs+), FLUZONE, (age 10 mo+), MDV, 0.5mL 10/25/2018    Influenza, FLUARIX, FLULAVAL, FLUZONE (age 10 mo+) AND AFLURIA, (age 1 y+), PF, 0.5mL 2017, 2020    Pneumococcal, PPSV23, PNEUMOVAX 23, (age 2y+), SC/IM, 0.5mL 2011, 2016       Active Problems:  Patient Active Problem List   Diagnosis Code    Sepsis ([facility] [address] A41.9    Chronic respiratory failure with hypoxia and hypercapnia (HCC) J96.11, J96.12    Acute respiratory failure (HCC) J96.00    COPD exacerbation (HCC) J44.1    Respiratory acidosis E87.29    COPD, severe (HCC) J44.9    Acute on chronic respiratory failure with hypoxia and hypercapnia (HCC) J96.21, J96.22    Tobacco use Z72.0    Nonhealing ulcer of right lower leg with fat

## 2023-05-18 NOTE — ACP (ADVANCE CARE PLANNING)
Advance Care Planning     Advance Care Planning Activator (Inpatient)  Conversation Note      Date of ACP Conversation: 5/18/2023     Conversation Conducted with: Patient with Decision Making Capacity    ACP Activator: Vanesa Ferrell RN    Health Care Decision Maker:     Current Designated Health Care Decision Maker:     Primary Decision Maker: Alonso Louise - [phone]    Secondary Decision Maker: Prema Estrada - Child - [phone]    Secondary Decision Maker: Marcela Brunner Child - [phone]    Care Preferences    Ventilation: \"If you were in your present state of health and suddenly became very ill and were unable to breathe on your own, what would your preference be about the use of a ventilator (breathing machine) if it were available to you? \"      Would the patient desire the use of ventilator (breathing machine)?: yes    \"If your health worsens and it becomes clear that your chance of recovery is unlikely, what would your preference be about the use of a ventilator (breathing machine) if it were available to you? \"     Would the patient desire the use of ventilator (breathing machine)?: Yes      Resuscitation  \"CPR works best to restart the heart when there is a sudden event, like a heart attack, in someone who is otherwise healthy. Unfortunately, CPR does not typically restart the heart for people who have serious health conditions or who are very sick. \"    \"In the event your heart stopped as a result of an underlying serious health condition, would you want attempts to be made to restart your heart (answer \"yes\" for attempt to resuscitate) or would you prefer a natural death (answer \"no\" for do not attempt to resuscitate)? \" yes       [] Yes   [x] No   Educated Patient / Darreld Bosworth regarding differences between Advance Directives and portable DNR orders.     Length of ACP Conversation in minutes:  5 minutes    Conversation Outcomes:  ACP discussion completed    Follow-up plan:    []

## 2023-05-20 LAB
BACTERIA BLD CULT ORG #2: NORMAL
BACTERIA BLD CULT: NORMAL

## 2023-07-05 RX ORDER — LISINOPRIL 5 MG/1
TABLET ORAL
Qty: 90 TABLET | Refills: 3 | Status: SHIPPED | OUTPATIENT
Start: 2023-07-05

## 2023-07-05 RX ORDER — CARVEDILOL 3.12 MG/1
TABLET ORAL
Qty: 180 TABLET | Refills: 3 | Status: SHIPPED | OUTPATIENT
Start: 2023-07-05

## 2023-07-05 NOTE — TELEPHONE ENCOUNTER
Last OV: 3/27/23  Next OV: 9/26/23  Last refill: 6/24/22  #90 day supply for both with 3 refills  Most recent Labs: 5/18/23  Last EKG (if needed): 5/16/23

## 2023-07-13 ENCOUNTER — OFFICE VISIT (OUTPATIENT)
Dept: PULMONOLOGY | Age: 48
End: 2023-07-13
Payer: MEDICARE

## 2023-07-13 VITALS
BODY MASS INDEX: 40.32 KG/M2 | DIASTOLIC BLOOD PRESSURE: 86 MMHG | TEMPERATURE: 98 F | HEIGHT: 59 IN | HEART RATE: 99 BPM | RESPIRATION RATE: 21 BRPM | SYSTOLIC BLOOD PRESSURE: 136 MMHG | OXYGEN SATURATION: 96 % | WEIGHT: 200 LBS

## 2023-07-13 DIAGNOSIS — J44.9 COPD, SEVERE (HCC): Primary | ICD-10-CM

## 2023-07-13 DIAGNOSIS — J96.11 CHRONIC RESPIRATORY FAILURE WITH HYPOXIA AND HYPERCAPNIA (HCC): ICD-10-CM

## 2023-07-13 DIAGNOSIS — G47.33 OSA TREATED WITH BIPAP: ICD-10-CM

## 2023-07-13 DIAGNOSIS — J96.12 CHRONIC RESPIRATORY FAILURE WITH HYPOXIA AND HYPERCAPNIA (HCC): ICD-10-CM

## 2023-07-13 PROCEDURE — 99214 OFFICE O/P EST MOD 30 MIN: CPT | Performed by: INTERNAL MEDICINE

## 2023-07-13 PROCEDURE — G8427 DOCREV CUR MEDS BY ELIG CLIN: HCPCS | Performed by: INTERNAL MEDICINE

## 2023-07-13 PROCEDURE — G8417 CALC BMI ABV UP PARAM F/U: HCPCS | Performed by: INTERNAL MEDICINE

## 2023-07-13 PROCEDURE — 3023F SPIROM DOC REV: CPT | Performed by: INTERNAL MEDICINE

## 2023-07-13 PROCEDURE — 1036F TOBACCO NON-USER: CPT | Performed by: INTERNAL MEDICINE

## 2023-07-13 RX ORDER — IPRATROPIUM BROMIDE AND ALBUTEROL SULFATE 2.5; .5 MG/3ML; MG/3ML
1 SOLUTION RESPIRATORY (INHALATION) EVERY 4 HOURS
Qty: 360 ML | Refills: 5 | Status: SHIPPED | OUTPATIENT
Start: 2023-07-13

## 2023-07-13 RX ORDER — GABAPENTIN 600 MG/1
TABLET ORAL
COMMUNITY
Start: 2023-07-05

## 2023-07-13 NOTE — PROGRESS NOTES
Chief Complaint   Patient presents with    Follow-up    Shortness of Breath    Sleep Apnea       HPI  Here for CC of COPD, with last visit over 2 years ago and recent admission    She says she has been doing just ok. Struggling with bilevel machine as she feels her oxygen levels are dropping and she is being smothered. She is trying to use it. Using oxygen bleed in as well. On triple inhalers and not getting enough nebs to last her through the month. On spiriva and symbicort. Uses 4-6 liters of oxygen. Still on high dose of methadone as well. Pain limits her mobility and not able to do much. Also unable to do rehab due to transportation issues    Machine:  Patient is using a bilevel machine. Average usage is 0 hrs 47 min 00 sec. She is meeting 4 or more hours per night 0% of the time.   Average AHI is 0.3   Napping during the days and not sleeping through the night         Current Outpatient Medications   Medication Sig Dispense Refill    ipratropium 0.5 mg-albuterol 2.5 mg (DUONEB) 0.5-2.5 (3) MG/3ML SOLN nebulizer solution Inhale 3 mLs into the lungs every 4 hours 360 mL 5    lisinopril (PRINIVIL;ZESTRIL) 5 MG tablet TAKE ONE TABLET BY MOUTH DAILY 90 tablet 3    carvedilol (COREG) 3.125 MG tablet TAKE ONE TABLET BY MOUTH TWICE A  tablet 3    empagliflozin (JARDIANCE) 10 MG tablet Take 1 tablet by mouth daily 90 tablet 4    albuterol sulfate HFA (PROVENTIL;VENTOLIN;PROAIR) 108 (90 Base) MCG/ACT inhaler INHALE TWO PUFFS BY MOUTH EVERY 4 HOURS AS NEEDED FOR WHEEZING OR SHORTNESS OF BREATH 8.5 g 11    torsemide (DEMADEX) 20 MG tablet Take 2 tablets by mouth daily 60 tablet 0    Sertraline HCl (ZOLOFT PO) Take 125 mg by mouth Daily      aspirin 81 MG EC tablet Take 1 tablet by mouth daily Not taking      SPIRIVA HANDIHALER 18 MCG inhalation capsule INHALE THE ENTIRE CONTENTS OF 1 CAPSULE ONCE A DAY USING HANDIHALER DEVICE 30 capsule 0    ipratropium-albuterol (DUONEB) 0.5-2.5 (3) MG/3ML SOLN nebulizer

## 2023-07-13 NOTE — PATIENT INSTRUCTIONS
I made adjustments to the bpap. Need to use every time you sleep. If someone cannot wake you up, they need to put your mask on    Need to exercise every day.   Walking every day    Continue with inhalers and oxygen    Follow up in 4-6 months

## 2023-08-08 ENCOUNTER — TELEPHONE (OUTPATIENT)
Dept: PULMONOLOGY | Age: 48
End: 2023-08-08

## 2023-08-08 RX ORDER — IPRATROPIUM BROMIDE AND ALBUTEROL SULFATE 2.5; .5 MG/3ML; MG/3ML
1 SOLUTION RESPIRATORY (INHALATION) EVERY 4 HOURS
Qty: 360 ML | Refills: 5 | Status: SHIPPED | OUTPATIENT
Start: 2023-08-08

## 2023-08-18 ENCOUNTER — TELEPHONE (OUTPATIENT)
Dept: CARDIOLOGY CLINIC | Age: 48
End: 2023-08-18

## 2023-08-18 NOTE — TELEPHONE ENCOUNTER
Susie Myers from PT called to say that Jackelin Moreno the pt did almost 3 months of PT, she wanted to continue but the last 3 weeks she did not return calls or show up for appts so she was discharged today per non compliance.

## 2023-09-08 RX ORDER — TORSEMIDE 20 MG/1
TABLET ORAL
Qty: 270 TABLET | Refills: 1 | Status: SHIPPED | OUTPATIENT
Start: 2023-09-08

## 2023-12-03 ENCOUNTER — APPOINTMENT (OUTPATIENT)
Dept: GENERAL RADIOLOGY | Age: 48
DRG: 853 | End: 2023-12-03
Payer: MEDICARE

## 2023-12-03 ENCOUNTER — HOSPITAL ENCOUNTER (INPATIENT)
Age: 48
LOS: 5 days | Discharge: HOME | DRG: 853 | End: 2023-12-08
Attending: EMERGENCY MEDICINE | Admitting: INTERNAL MEDICINE
Payer: MEDICARE

## 2023-12-03 ENCOUNTER — APPOINTMENT (OUTPATIENT)
Dept: CT IMAGING | Age: 48
DRG: 853 | End: 2023-12-03
Payer: MEDICARE

## 2023-12-03 DIAGNOSIS — J96.01 ACUTE RESPIRATORY FAILURE WITH HYPOXIA AND HYPERCAPNIA (HCC): Primary | ICD-10-CM

## 2023-12-03 DIAGNOSIS — L03.115 CELLULITIS OF RIGHT LOWER EXTREMITY: ICD-10-CM

## 2023-12-03 DIAGNOSIS — J96.02 ACUTE RESPIRATORY FAILURE WITH HYPOXIA AND HYPERCAPNIA (HCC): Primary | ICD-10-CM

## 2023-12-03 DIAGNOSIS — J44.1 COPD EXACERBATION (HCC): ICD-10-CM

## 2023-12-03 DIAGNOSIS — J18.9 PNEUMONIA DUE TO INFECTIOUS ORGANISM, UNSPECIFIED LATERALITY, UNSPECIFIED PART OF LUNG: ICD-10-CM

## 2023-12-03 PROBLEM — R06.82 TACHYPNEA: Status: ACTIVE | Noted: 2023-12-03

## 2023-12-03 PROBLEM — R00.0 TACHYCARDIA: Status: ACTIVE | Noted: 2023-12-03

## 2023-12-03 LAB
ALBUMIN SERPL-MCNC: 3 G/DL (ref 3.4–5)
ALBUMIN/GLOB SERPL: 0.6 {RATIO} (ref 1.1–2.2)
ALP SERPL-CCNC: 134 U/L (ref 40–129)
ALT SERPL-CCNC: 14 U/L (ref 10–40)
AMPHETAMINES UR QL SCN>1000 NG/ML: ABNORMAL
ANION GAP SERPL CALCULATED.3IONS-SCNC: 1 MMOL/L (ref 3–16)
AST SERPL-CCNC: 21 U/L (ref 15–37)
BACTERIA URNS QL MICRO: NORMAL /HPF
BARBITURATES UR QL SCN>200 NG/ML: ABNORMAL
BASE EXCESS BLDV CALC-SCNC: 15.2 MMOL/L
BASE EXCESS BLDV CALC-SCNC: 16.9 MMOL/L
BASOPHILS # BLD: 0 K/UL (ref 0–0.2)
BASOPHILS NFR BLD: 0.4 %
BENZODIAZ UR QL SCN>200 NG/ML: ABNORMAL
BILIRUB SERPL-MCNC: <0.2 MG/DL (ref 0–1)
BILIRUB UR QL STRIP.AUTO: NEGATIVE
BUN SERPL-MCNC: 15 MG/DL (ref 7–20)
CALCIUM SERPL-MCNC: 8.5 MG/DL (ref 8.3–10.6)
CANNABINOIDS UR QL SCN>50 NG/ML: ABNORMAL
CHLORIDE SERPL-SCNC: 96 MMOL/L (ref 99–110)
CLARITY UR: CLEAR
CO2 BLDV-SCNC: 44 MMOL/L
CO2 BLDV-SCNC: 49 MMOL/L
CO2 SERPL-SCNC: 44 MMOL/L (ref 21–32)
COCAINE UR QL SCN: ABNORMAL
COHGB MFR BLDV: 2.1 %
COHGB MFR BLDV: 2.7 %
COHGB MFR BLDV: 3.2 %
COLOR UR: YELLOW
CREAT SERPL-MCNC: 0.5 MG/DL (ref 0.6–1.1)
DEPRECATED RDW RBC AUTO: 18.1 % (ref 12.4–15.4)
DRUG SCREEN COMMENT UR-IMP: ABNORMAL
EOSINOPHIL # BLD: 0.2 K/UL (ref 0–0.6)
EOSINOPHIL NFR BLD: 1.4 %
EPI CELLS #/AREA URNS AUTO: 1 /HPF (ref 0–5)
FENTANYL SCREEN, URINE: ABNORMAL
FLUAV RNA UPPER RESP QL NAA+PROBE: NEGATIVE
FLUBV AG NPH QL: NEGATIVE
GFR SERPLBLD CREATININE-BSD FMLA CKD-EPI: >60 ML/MIN/{1.73_M2}
GLUCOSE BLD-MCNC: 200 MG/DL (ref 70–99)
GLUCOSE SERPL-MCNC: 139 MG/DL (ref 70–99)
GLUCOSE UR STRIP.AUTO-MCNC: NEGATIVE MG/DL
HCO3 BLDV-SCNC: 43 MMOL/L (ref 23–29)
HCO3 BLDV-SCNC: 46 MMOL/L (ref 23–29)
HCT VFR BLD AUTO: 31.9 % (ref 36–48)
HGB BLD-MCNC: 9.5 G/DL (ref 12–16)
HGB UR QL STRIP.AUTO: NEGATIVE
HYALINE CASTS #/AREA URNS AUTO: 2 /LPF (ref 0–8)
KETONES UR STRIP.AUTO-MCNC: NEGATIVE MG/DL
LACTATE BLDV-SCNC: 0.7 MMOL/L (ref 0.4–1.9)
LEUKOCYTE ESTERASE UR QL STRIP.AUTO: NEGATIVE
LYMPHOCYTES # BLD: 1.1 K/UL (ref 1–5.1)
LYMPHOCYTES NFR BLD: 9.2 %
MCH RBC QN AUTO: 26 PG (ref 26–34)
MCHC RBC AUTO-ENTMCNC: 29.9 G/DL (ref 31–36)
MCV RBC AUTO: 86.9 FL (ref 80–100)
METHADONE UR QL SCN>300 NG/ML: POSITIVE
METHGB MFR BLDV: 0 %
METHGB MFR BLDV: 0.2 %
METHGB MFR BLDV: 0.4 %
MONOCYTES # BLD: 0.8 K/UL (ref 0–1.3)
MONOCYTES NFR BLD: 6.9 %
NEUTROPHILS # BLD: 9.7 K/UL (ref 1.7–7.7)
NEUTROPHILS NFR BLD: 82.1 %
NITRITE UR QL STRIP.AUTO: NEGATIVE
NT-PROBNP SERPL-MCNC: 2109 PG/ML (ref 0–124)
O2 THERAPY: ABNORMAL
OPIATES UR QL SCN>300 NG/ML: ABNORMAL
OXYCODONE UR QL SCN: POSITIVE
PCO2 BLDV: 104 MMHG (ref 40–50)
PCO2 BLDV: 57.5 MMHG (ref 40–50)
PCO2 BLDV: >120 MMHG (ref 40–50)
PCP UR QL SCN>25 NG/ML: ABNORMAL
PERFORMED ON: ABNORMAL
PH BLDV: 7.1 [PH] (ref 7.35–7.45)
PH BLDV: 7.25 [PH] (ref 7.35–7.45)
PH BLDV: 7.48 [PH] (ref 7.35–7.45)
PH UR STRIP.AUTO: 6 [PH] (ref 5–8)
PH UR STRIP: 6.5 [PH]
PLATELET # BLD AUTO: 434 K/UL (ref 135–450)
PMV BLD AUTO: 7.2 FL (ref 5–10.5)
PO2 BLDV: 36 MMHG
PO2 BLDV: 37 MMHG
PO2 BLDV: 51 MMHG
POTASSIUM SERPL-SCNC: 4.7 MMOL/L (ref 3.5–5.1)
PROCALCITONIN SERPL IA-MCNC: 0.05 NG/ML (ref 0–0.15)
PROT SERPL-MCNC: 8.2 G/DL (ref 6.4–8.2)
PROT UR STRIP.AUTO-MCNC: 100 MG/DL
RBC # BLD AUTO: 3.67 M/UL (ref 4–5.2)
RBC CLUMPS #/AREA URNS AUTO: 2 /HPF (ref 0–4)
SAO2 % BLDV: 58 %
SAO2 % BLDV: 67 %
SAO2 % BLDV: 90 %
SARS-COV-2 RDRP RESP QL NAA+PROBE: NOT DETECTED
SODIUM SERPL-SCNC: 141 MMOL/L (ref 136–145)
SP GR UR STRIP.AUTO: 1.02 (ref 1–1.03)
TROPONIN, HIGH SENSITIVITY: 25 NG/L (ref 0–14)
UA COMPLETE W REFLEX CULTURE PNL UR: ABNORMAL
UA DIPSTICK W REFLEX MICRO PNL UR: YES
URN SPEC COLLECT METH UR: ABNORMAL
UROBILINOGEN UR STRIP-ACNC: 1 E.U./DL
WBC # BLD AUTO: 11.8 K/UL (ref 4–11)
WBC #/AREA URNS AUTO: 1 /HPF (ref 0–5)

## 2023-12-03 PROCEDURE — 2000000000 HC ICU R&B

## 2023-12-03 PROCEDURE — 6360000002 HC RX W HCPCS: Performed by: EMERGENCY MEDICINE

## 2023-12-03 PROCEDURE — 82803 BLOOD GASES ANY COMBINATION: CPT

## 2023-12-03 PROCEDURE — 6360000002 HC RX W HCPCS: Performed by: INTERNAL MEDICINE

## 2023-12-03 PROCEDURE — 87150 DNA/RNA AMPLIFIED PROBE: CPT

## 2023-12-03 PROCEDURE — 6370000000 HC RX 637 (ALT 250 FOR IP): Performed by: EMERGENCY MEDICINE

## 2023-12-03 PROCEDURE — 81001 URINALYSIS AUTO W/SCOPE: CPT

## 2023-12-03 PROCEDURE — 94640 AIRWAY INHALATION TREATMENT: CPT

## 2023-12-03 PROCEDURE — 87633 RESP VIRUS 12-25 TARGETS: CPT

## 2023-12-03 PROCEDURE — 96375 TX/PRO/DX INJ NEW DRUG ADDON: CPT

## 2023-12-03 PROCEDURE — 6360000002 HC RX W HCPCS

## 2023-12-03 PROCEDURE — 0BH17EZ INSERTION OF ENDOTRACHEAL AIRWAY INTO TRACHEA, VIA NATURAL OR ARTIFICIAL OPENING: ICD-10-PCS | Performed by: INTERNAL MEDICINE

## 2023-12-03 PROCEDURE — 94660 CPAP INITIATION&MGMT: CPT

## 2023-12-03 PROCEDURE — 87635 SARS-COV-2 COVID-19 AMP PRB: CPT

## 2023-12-03 PROCEDURE — 70450 CT HEAD/BRAIN W/O DYE: CPT

## 2023-12-03 PROCEDURE — 0JCN0ZZ EXTIRPATION OF MATTER FROM RIGHT LOWER LEG SUBCUTANEOUS TISSUE AND FASCIA, OPEN APPROACH: ICD-10-PCS | Performed by: SURGERY

## 2023-12-03 PROCEDURE — 71045 X-RAY EXAM CHEST 1 VIEW: CPT

## 2023-12-03 PROCEDURE — 87040 BLOOD CULTURE FOR BACTERIA: CPT

## 2023-12-03 PROCEDURE — 84145 PROCALCITONIN (PCT): CPT

## 2023-12-03 PROCEDURE — 87641 MR-STAPH DNA AMP PROBE: CPT

## 2023-12-03 PROCEDURE — 83880 ASSAY OF NATRIURETIC PEPTIDE: CPT

## 2023-12-03 PROCEDURE — 84484 ASSAY OF TROPONIN QUANT: CPT

## 2023-12-03 PROCEDURE — 94002 VENT MGMT INPAT INIT DAY: CPT

## 2023-12-03 PROCEDURE — 85025 COMPLETE CBC W/AUTO DIFF WBC: CPT

## 2023-12-03 PROCEDURE — 96367 TX/PROPH/DG ADDL SEQ IV INF: CPT

## 2023-12-03 PROCEDURE — 80053 COMPREHEN METABOLIC PANEL: CPT

## 2023-12-03 PROCEDURE — 2580000003 HC RX 258: Performed by: EMERGENCY MEDICINE

## 2023-12-03 PROCEDURE — 2700000000 HC OXYGEN THERAPY PER DAY

## 2023-12-03 PROCEDURE — 87077 CULTURE AEROBIC IDENTIFY: CPT

## 2023-12-03 PROCEDURE — 80307 DRUG TEST PRSMV CHEM ANLYZR: CPT

## 2023-12-03 PROCEDURE — 87070 CULTURE OTHR SPECIMN AEROBIC: CPT

## 2023-12-03 PROCEDURE — 2580000003 HC RX 258: Performed by: INTERNAL MEDICINE

## 2023-12-03 PROCEDURE — 94761 N-INVAS EAR/PLS OXIMETRY MLT: CPT

## 2023-12-03 PROCEDURE — 83605 ASSAY OF LACTIC ACID: CPT

## 2023-12-03 PROCEDURE — 96365 THER/PROPH/DIAG IV INF INIT: CPT

## 2023-12-03 PROCEDURE — 93005 ELECTROCARDIOGRAM TRACING: CPT | Performed by: EMERGENCY MEDICINE

## 2023-12-03 PROCEDURE — 87205 SMEAR GRAM STAIN: CPT

## 2023-12-03 PROCEDURE — 2500000003 HC RX 250 WO HCPCS: Performed by: EMERGENCY MEDICINE

## 2023-12-03 PROCEDURE — 87449 NOS EACH ORGANISM AG IA: CPT

## 2023-12-03 PROCEDURE — 87804 INFLUENZA ASSAY W/OPTIC: CPT

## 2023-12-03 PROCEDURE — 87186 SC STD MICRODIL/AGAR DIL: CPT

## 2023-12-03 PROCEDURE — 99285 EMERGENCY DEPT VISIT HI MDM: CPT

## 2023-12-03 PROCEDURE — 5A1945Z RESPIRATORY VENTILATION, 24-96 CONSECUTIVE HOURS: ICD-10-PCS | Performed by: INTERNAL MEDICINE

## 2023-12-03 PROCEDURE — 36415 COLL VENOUS BLD VENIPUNCTURE: CPT

## 2023-12-03 PROCEDURE — 31500 INSERT EMERGENCY AIRWAY: CPT

## 2023-12-03 PROCEDURE — 51702 INSERT TEMP BLADDER CATH: CPT

## 2023-12-03 PROCEDURE — 5A09457 ASSISTANCE WITH RESPIRATORY VENTILATION, 24-96 CONSECUTIVE HOURS, CONTINUOUS POSITIVE AIRWAY PRESSURE: ICD-10-PCS | Performed by: INTERNAL MEDICINE

## 2023-12-03 PROCEDURE — 71260 CT THORAX DX C+: CPT

## 2023-12-03 PROCEDURE — 89220 SPUTUM SPECIMEN COLLECTION: CPT

## 2023-12-03 PROCEDURE — 6360000004 HC RX CONTRAST MEDICATION: Performed by: EMERGENCY MEDICINE

## 2023-12-03 RX ORDER — SODIUM CHLORIDE 0.9 % (FLUSH) 0.9 %
10 SYRINGE (ML) INJECTION PRN
Status: DISCONTINUED | OUTPATIENT
Start: 2023-12-03 | End: 2023-12-08 | Stop reason: HOSPADM

## 2023-12-03 RX ORDER — ACETAMINOPHEN 325 MG/1
650 TABLET ORAL EVERY 4 HOURS PRN
Status: DISCONTINUED | OUTPATIENT
Start: 2023-12-03 | End: 2023-12-06

## 2023-12-03 RX ORDER — ONDANSETRON 2 MG/ML
4 INJECTION INTRAMUSCULAR; INTRAVENOUS EVERY 4 HOURS PRN
Status: DISCONTINUED | OUTPATIENT
Start: 2023-12-03 | End: 2023-12-08 | Stop reason: HOSPADM

## 2023-12-03 RX ORDER — MIDAZOLAM HYDROCHLORIDE 1 MG/ML
1-10 INJECTION, SOLUTION INTRAVENOUS CONTINUOUS
Status: DISCONTINUED | OUTPATIENT
Start: 2023-12-03 | End: 2023-12-04

## 2023-12-03 RX ORDER — POTASSIUM CHLORIDE 7.45 MG/ML
10 INJECTION INTRAVENOUS PRN
Status: DISCONTINUED | OUTPATIENT
Start: 2023-12-03 | End: 2023-12-08 | Stop reason: HOSPADM

## 2023-12-03 RX ORDER — 0.9 % SODIUM CHLORIDE 0.9 %
30 INTRAVENOUS SOLUTION INTRAVENOUS ONCE
Status: COMPLETED | OUTPATIENT
Start: 2023-12-03 | End: 2023-12-03

## 2023-12-03 RX ORDER — ROCURONIUM BROMIDE 10 MG/ML
50 INJECTION, SOLUTION INTRAVENOUS ONCE
Status: COMPLETED | OUTPATIENT
Start: 2023-12-03 | End: 2023-12-03

## 2023-12-03 RX ORDER — MIDAZOLAM HYDROCHLORIDE 1 MG/ML
INJECTION INTRAMUSCULAR; INTRAVENOUS
Status: COMPLETED
Start: 2023-12-03 | End: 2023-12-03

## 2023-12-03 RX ORDER — MIDAZOLAM HYDROCHLORIDE 1 MG/ML
5 INJECTION INTRAMUSCULAR; INTRAVENOUS ONCE
Status: COMPLETED | OUTPATIENT
Start: 2023-12-03 | End: 2023-12-03

## 2023-12-03 RX ORDER — POTASSIUM CHLORIDE 20 MEQ/1
40 TABLET, EXTENDED RELEASE ORAL PRN
Status: DISCONTINUED | OUTPATIENT
Start: 2023-12-03 | End: 2023-12-08 | Stop reason: HOSPADM

## 2023-12-03 RX ORDER — MAGNESIUM SULFATE IN WATER 40 MG/ML
2000 INJECTION, SOLUTION INTRAVENOUS PRN
Status: DISCONTINUED | OUTPATIENT
Start: 2023-12-03 | End: 2023-12-08 | Stop reason: HOSPADM

## 2023-12-03 RX ORDER — ACETAMINOPHEN 650 MG/1
650 SUPPOSITORY RECTAL EVERY 4 HOURS PRN
Status: DISCONTINUED | OUTPATIENT
Start: 2023-12-03 | End: 2023-12-06

## 2023-12-03 RX ORDER — MAGNESIUM SULFATE IN WATER 40 MG/ML
2000 INJECTION, SOLUTION INTRAVENOUS ONCE
Status: COMPLETED | OUTPATIENT
Start: 2023-12-03 | End: 2023-12-03

## 2023-12-03 RX ORDER — METHYLPREDNISOLONE SODIUM SUCCINATE 40 MG/ML
40 INJECTION, POWDER, LYOPHILIZED, FOR SOLUTION INTRAMUSCULAR; INTRAVENOUS EVERY 6 HOURS
Status: DISCONTINUED | OUTPATIENT
Start: 2023-12-03 | End: 2023-12-07

## 2023-12-03 RX ORDER — METHYLPREDNISOLONE SODIUM SUCCINATE 125 MG/2ML
125 INJECTION, POWDER, LYOPHILIZED, FOR SOLUTION INTRAMUSCULAR; INTRAVENOUS ONCE
Status: COMPLETED | OUTPATIENT
Start: 2023-12-03 | End: 2023-12-03

## 2023-12-03 RX ORDER — IPRATROPIUM BROMIDE AND ALBUTEROL SULFATE 2.5; .5 MG/3ML; MG/3ML
1 SOLUTION RESPIRATORY (INHALATION) ONCE
Status: COMPLETED | OUTPATIENT
Start: 2023-12-03 | End: 2023-12-03

## 2023-12-03 RX ORDER — ETOMIDATE 2 MG/ML
15 INJECTION INTRAVENOUS ONCE
Status: COMPLETED | OUTPATIENT
Start: 2023-12-03 | End: 2023-12-03

## 2023-12-03 RX ORDER — POLYETHYLENE GLYCOL 3350 17 G/17G
17 POWDER, FOR SOLUTION ORAL DAILY PRN
Status: DISCONTINUED | OUTPATIENT
Start: 2023-12-03 | End: 2023-12-08 | Stop reason: HOSPADM

## 2023-12-03 RX ORDER — SODIUM CHLORIDE 9 MG/ML
INJECTION, SOLUTION INTRAVENOUS PRN
Status: DISCONTINUED | OUTPATIENT
Start: 2023-12-03 | End: 2023-12-08 | Stop reason: HOSPADM

## 2023-12-03 RX ORDER — ENOXAPARIN SODIUM 100 MG/ML
40 INJECTION SUBCUTANEOUS DAILY
Status: DISCONTINUED | OUTPATIENT
Start: 2023-12-04 | End: 2023-12-08 | Stop reason: HOSPADM

## 2023-12-03 RX ORDER — SODIUM CHLORIDE 0.9 % (FLUSH) 0.9 %
10 SYRINGE (ML) INJECTION EVERY 12 HOURS SCHEDULED
Status: DISCONTINUED | OUTPATIENT
Start: 2023-12-03 | End: 2023-12-08 | Stop reason: HOSPADM

## 2023-12-03 RX ORDER — MIDAZOLAM HYDROCHLORIDE 1 MG/ML
2 INJECTION INTRAMUSCULAR; INTRAVENOUS ONCE
Status: COMPLETED | OUTPATIENT
Start: 2023-12-03 | End: 2023-12-03

## 2023-12-03 RX ORDER — ALBUTEROL SULFATE 2.5 MG/3ML
5 SOLUTION RESPIRATORY (INHALATION) ONCE
Status: COMPLETED | OUTPATIENT
Start: 2023-12-03 | End: 2023-12-03

## 2023-12-03 RX ADMIN — MIDAZOLAM 5 MG: 1 INJECTION INTRAMUSCULAR; INTRAVENOUS at 09:35

## 2023-12-03 RX ADMIN — METHYLPREDNISOLONE SODIUM SUCCINATE 40 MG: 40 INJECTION INTRAMUSCULAR; INTRAVENOUS at 17:03

## 2023-12-03 RX ADMIN — ROCURONIUM BROMIDE 50 MG: 10 INJECTION, SOLUTION INTRAVENOUS at 08:39

## 2023-12-03 RX ADMIN — MIDAZOLAM HYDROCHLORIDE 5 MG: 1 INJECTION INTRAMUSCULAR; INTRAVENOUS at 09:35

## 2023-12-03 RX ADMIN — CEFEPIME 2000 MG: 2 INJECTION, POWDER, FOR SOLUTION INTRAVENOUS at 18:27

## 2023-12-03 RX ADMIN — MIDAZOLAM HYDROCHLORIDE 2 MG/HR: 1 INJECTION, SOLUTION INTRAVENOUS at 08:49

## 2023-12-03 RX ADMIN — IOPAMIDOL 75 ML: 755 INJECTION, SOLUTION INTRAVENOUS at 10:26

## 2023-12-03 RX ADMIN — ETOMIDATE 15 MG: 2 INJECTION, SOLUTION INTRAVENOUS at 08:38

## 2023-12-03 RX ADMIN — SODIUM CHLORIDE 1464 ML: 9 INJECTION, SOLUTION INTRAVENOUS at 13:09

## 2023-12-03 RX ADMIN — MIDAZOLAM 2 MG: 1 INJECTION INTRAMUSCULAR; INTRAVENOUS at 10:40

## 2023-12-03 RX ADMIN — SODIUM CHLORIDE, PRESERVATIVE FREE 10 ML: 5 INJECTION INTRAVENOUS at 20:57

## 2023-12-03 RX ADMIN — CEFEPIME 2000 MG: 2 INJECTION, POWDER, FOR SOLUTION INTRAVENOUS at 09:37

## 2023-12-03 RX ADMIN — ALBUTEROL SULFATE 5 MG: 2.5 SOLUTION RESPIRATORY (INHALATION) at 07:42

## 2023-12-03 RX ADMIN — VANCOMYCIN HYDROCHLORIDE 1500 MG: 1.5 INJECTION, POWDER, LYOPHILIZED, FOR SOLUTION INTRAVENOUS at 11:22

## 2023-12-03 RX ADMIN — MIDAZOLAM HYDROCHLORIDE 2 MG: 1 INJECTION INTRAMUSCULAR; INTRAVENOUS at 10:40

## 2023-12-03 RX ADMIN — MIDAZOLAM HYDROCHLORIDE 8 MG/HR: 1 INJECTION, SOLUTION INTRAVENOUS at 17:04

## 2023-12-03 RX ADMIN — VANCOMYCIN HYDROCHLORIDE 1000 MG: 1 INJECTION, POWDER, LYOPHILIZED, FOR SOLUTION INTRAVENOUS at 20:44

## 2023-12-03 RX ADMIN — MAGNESIUM SULFATE HEPTAHYDRATE 2000 MG: 40 INJECTION, SOLUTION INTRAVENOUS at 07:59

## 2023-12-03 RX ADMIN — IPRATROPIUM BROMIDE AND ALBUTEROL SULFATE 1 DOSE: 2.5; .5 SOLUTION RESPIRATORY (INHALATION) at 07:42

## 2023-12-03 RX ADMIN — METHYLPREDNISOLONE SODIUM SUCCINATE 125 MG: 125 INJECTION INTRAMUSCULAR; INTRAVENOUS at 07:55

## 2023-12-03 ASSESSMENT — PULMONARY FUNCTION TESTS
PIF_VALUE: 28
PIF_VALUE: 28
PIF_VALUE: 36
PIF_VALUE: 37
PIF_VALUE: 30
PIF_VALUE: 28
PIF_VALUE: 28
PIF_VALUE: 36
PIF_VALUE: 28
PIF_VALUE: 32
PIF_VALUE: 28
PIF_VALUE: 36
PIF_VALUE: 28
PIF_VALUE: 35
PIF_VALUE: 35
PIF_VALUE: 36
PIF_VALUE: 36
PIF_VALUE: 35
PIF_VALUE: 37
PIF_VALUE: 27
PIF_VALUE: 30
PIF_VALUE: 36
PIF_VALUE: 35
PIF_VALUE: 38
PIF_VALUE: 37
PIF_VALUE: 35
PIF_VALUE: 32
PIF_VALUE: 35
PIF_VALUE: 37
PIF_VALUE: 36
PIF_VALUE: 28
PIF_VALUE: 28
PIF_VALUE: 35
PIF_VALUE: 37
PIF_VALUE: 28
PIF_VALUE: 35
PIF_VALUE: 26
PIF_VALUE: 29
PIF_VALUE: 37
PIF_VALUE: 36
PIF_VALUE: 36
PIF_VALUE: 35
PIF_VALUE: 28
PIF_VALUE: 28
PIF_VALUE: 26
PIF_VALUE: 28
PIF_VALUE: 35
PIF_VALUE: 36
PIF_VALUE: 37
PIF_VALUE: 28
PIF_VALUE: 35
PIF_VALUE: 32
PIF_VALUE: 36
PIF_VALUE: 28
PIF_VALUE: 36
PIF_VALUE: 28
PIF_VALUE: 36
PIF_VALUE: 36
PIF_VALUE: 28
PIF_VALUE: 31
PIF_VALUE: 36
PIF_VALUE: 35
PIF_VALUE: 35
PIF_VALUE: 31
PIF_VALUE: 36
PIF_VALUE: 35
PIF_VALUE: 28
PIF_VALUE: 28
PIF_VALUE: 29
PIF_VALUE: 29
PIF_VALUE: 37
PIF_VALUE: 37
PIF_VALUE: 28
PIF_VALUE: 31
PIF_VALUE: 29
PIF_VALUE: 35
PIF_VALUE: 37
PIF_VALUE: 28
PIF_VALUE: 28
PIF_VALUE: 36
PIF_VALUE: 28
PIF_VALUE: 36
PIF_VALUE: 33
PIF_VALUE: 37
PIF_VALUE: 35

## 2023-12-03 ASSESSMENT — PAIN - FUNCTIONAL ASSESSMENT: PAIN_FUNCTIONAL_ASSESSMENT: 0-10

## 2023-12-03 ASSESSMENT — PAIN DESCRIPTION - FREQUENCY: FREQUENCY: CONTINUOUS

## 2023-12-03 ASSESSMENT — PAIN SCALES - GENERAL: PAINLEVEL_OUTOF10: 8

## 2023-12-03 ASSESSMENT — PAIN DESCRIPTION - DESCRIPTORS: DESCRIPTORS: DISCOMFORT;THROBBING

## 2023-12-03 ASSESSMENT — PAIN DESCRIPTION - PAIN TYPE: TYPE: ACUTE PAIN

## 2023-12-03 ASSESSMENT — PAIN DESCRIPTION - ORIENTATION: ORIENTATION: RIGHT;LOWER

## 2023-12-03 ASSESSMENT — PAIN DESCRIPTION - LOCATION: LOCATION: LEG

## 2023-12-03 ASSESSMENT — LIFESTYLE VARIABLES: HOW OFTEN DO YOU HAVE A DRINK CONTAINING ALCOHOL: NEVER

## 2023-12-03 ASSESSMENT — PAIN DESCRIPTION - ONSET: ONSET: ON-GOING

## 2023-12-03 NOTE — ED NOTES
Pt more obtunded since being on bipap, only responsive to painful stimulation. Pt to be intubated for airway protection.      Stacy Chanel RN  12/03/23 [id number]

## 2023-12-03 NOTE — ED PROVIDER NOTES
[facility]  eMERGENCY dEPARTMENT eNCOUnter      Pt Name: Tammy Haney  MRN: [id number]  [facility] 1975  Date of evaluation: 12/3/2023  Provider: Evgeny Silverman MD    CHIEF COMPLAINT       Chief Complaint   Patient presents with    Shortness of Breath     Pt from home pt was \"somnolent\" per EMS upon their arrival, [address] in the 80's. HX of asthma, COPD, CHF. Pt wears 4L NC at home, pt on cpap en route per EMS. CRITICAL CARE TIME   Total Critical Care time was a minimum of 45 minutes, excluding separately reportable procedures. There was a high probability of clinically significant/life threatening deterioration in the patient's condition which required my urgent intervention. Critical care time includes my initial evaluation, ongoing bedside care and reassessment, review of old records, review of laboratory, EKG, chest x-ray, CT scan, consultation with hospitalist for admission. No procedure time was included. HISTORY OF PRESENT ILLNESS  (Location/Symptom, Timing/Onset, Context/Setting, Quality, Duration, Modifying Factors, Severity.)   History From: Patient / EMS  Limitations to history : None    Tammy Haney is a 51 y.o. female who presents to the emergency department via [place] from home with difficulty breathing that developed during the night. Patient has a history of COPD, CHF. She is on 2 L of oxygen. Developed difficulty breathing during the night. Denies chest pain. EMS reports oxygen saturations in the 80s. They placed her on CPAP. They gave her 1 DuoNeb in route. They states she was very lethargic prior to the [facility] but was more alert afterwards. Patient is in respiratory distress on arrival.  CPAP is in place. She is easily aroused and answers questions. Denies chest pain. Admits to being on oxygen at home. Nursing Notes were reviewed and I agree. SCREENINGS        Bunny Coma Scale  Eye Opening:  To pulmonary arteries is suboptimal   secondary to patient motion on the examination. Bilateral pulmonary infiltrates. Small bilateral pleural effusions with associated adjacent atelectasis. Mediastinal adenopathy which is likely reactive in nature. Acute appearing fracture of the posterolateral right 4th rib. Additional findings noted above. CT HEAD WO CONTRAST   Final Result   Unremarkable CT of the brain. Paranasal sinus disease. XR CHEST PORTABLE   Final Result   Interval placement of endotracheal and nasogastric tubes      Stable interstitial opacities         XR CHEST PORTABLE   Preliminary Result   1. Mild pulmonary vascular congestion with a small right pleural effusion. 2. Mild right basilar atelectasis.                ED BEDSIDE ULTRASOUND:   Performed by ED Physician - none    LABS:  Labs Reviewed   CBC WITH AUTO DIFFERENTIAL - Abnormal; Notable for the following components:       Result Value    WBC 11.8 (*)     RBC 3.67 (*)     Hemoglobin 9.5 (*)     Hematocrit 31.9 (*)     MCHC 29.9 (*)     RDW 18.1 (*)     Neutrophils Absolute 9.7 (*)     All other components within normal limits   COMPREHENSIVE METABOLIC PANEL - Abnormal; Notable for the following components:    Chloride 96 (*)     CO2 44 (*)     Anion Gap 1 (*)     Glucose 139 (*)     Creatinine 0.5 (*)     Albumin 3.0 (*)     Albumin/Globulin Ratio 0.6 (*)     Alkaline Phosphatase 134 (*)     All other components within normal limits   TROPONIN - Abnormal; Notable for the following components:    Troponin, High Sensitivity 25 (*)     All other components within normal limits   BRAIN NATRIURETIC PEPTIDE - Abnormal; Notable for the following components:    Pro-BNP 2,109 (*)     All other components within normal limits   BLOOD GAS, VENOUS - Abnormal; Notable for the following components:    pH, Mainor 7.098 (*)     pCO2, Mainor >120.0 (*)     All other components within normal limits    Narrative:     Barron Randolph

## 2023-12-03 NOTE — PROGRESS NOTES
4 Eyes Skin Assessment     NAME:  Ric Peters  YOB: 1975  MEDICAL RECORD NUMBER:  [id number]    The patient is being assessed for  Admission    I agree that at least one RN has performed a thorough Head to Toe Skin Assessment on the patient. ALL assessment sites listed below have been assessed. Areas assessed by both nurses:    Head, Face, Ears, Shoulders, Back, Chest, Arms, Elbows, Hands, Sacrum. Buttock, Coccyx, Ischium, Legs. Feet and Heels, and Under Medical Devices         Does the Patient have a Wound? Yes wound(s) were present on assessment.  LDA wound assessment was Initiated and completed by RN       Jaison Prevention initiated by RN: Yes  Wound Care Orders initiated by RN: Yes    Pressure Injury (Stage 3,4, Unstageable, DTI, NWPT, and Complex wounds) if present, place Wound referral order by RN under : Yes    New Ostomies, if present place, Ostomy referral order under : No     Nurse 1 eSignature: Electronically signed by Elmo Juarez RN on 12/3/23 at 6:40 PM EST    **SHARE this note so that the co-signing nurse can place an eSignature**    Nurse 2 eSignature: {Esignature:[id number]} Paramedian Forehead Flap Text: A decision was made to reconstruct the defect utilizing an interpolation axial flap and a staged reconstruction.  A telfa template was made of the defect.  This telfa template was then used to outline the paramedian forehead pedicle flap.  The donor area for the pedicle flap was then injected with anesthesia.  The flap was excised through the skin and subcutaneous tissue down to the layer of the underlying musculature.  The pedicle flap was carefully excised within this deep plane to maintain its blood supply.  The edges of the donor site were undermined.   The donor site was closed in a primary fashion.  The pedicle was then rotated into position and sutured.  Once the tube was sutured into place, adequate blood supply was confirmed with blanching and refill.  The pedicle was then wrapped with xeroform gauze and dressed appropriately with a telfa and gauze bandage to ensure continued blood supply and protect the attached pedicle.

## 2023-12-03 NOTE — CONSULTS
Clinical Pharmacy Note  Vancomycin Consult    Pharmacy consult received for one-time dose of vancomycin in the Emergency Department per Dr. Kenneth Alejo. Ht Readings from Last 1 Encounters:   07/13/23 1.499 m (4' 11\")        Wt Readings from Last 1 Encounters:   12/03/23 88 kg (194 lb 0.1 oz)         Assessment/Plan:  Vancomycin 1500mg x 1 in ED. If vancomycin is to continue on admission and pharmacy is to manage dosing, please re-consult with admission orders.         Harinder Padilla, [address] [place] [address], 12/3/2023 9:19 AM

## 2023-12-03 NOTE — H&P
Hospital Medicine  History and Physical    PCP: Dulce Maria You  Patient Name: Martha Amaya    Information for this report comes from multiple sources including the emergency room providers, the patient (when able to provide information), and from family/friends when at bedside     Date of Service: Pt seen/examined on 12/3/23 and admitted to Inpatient with expected LOS greater than two midnights due to medical therapy    CHIEF COMPLAINT:  Pt c/o shortness of breath. EMS reports that she was somnolent upon their arrival with an [address] in the 80's  HISTORY OF PRESENT ILLNESS: Patient can not provide information at this time as she is intubated and sedated, and information for this report is derived from conversation with the emergency room physician and review of the records. Pt is an 51y.o. year-old female with a history that includes chronic combined systolic and diastolic CHF (congestive heart failure), severe COPD and chronic respiratory failure with hypoxia and hypercapnia requiring supplemental oxygen at 4 lpm via nasal catheter continuously at baseline. EMS was called due to increased shortness of breath. On their arrival Pt was found to be somnolent upon their arrival with an [address] in the 80's. They transported Pt on CPAP. In the emergency room she was placed on BiPAP. She became more obtunded and eventually required intubation. On evaluation she was found to have bilateral infiltrates, sepsis and a a COPD exacerbation. She was started on broad-spectrum antibiotics. Pt unable to provide associated signs and symptoms at this time due to being intubated/sedated.  She is being admitted for further evaluation and treatment      Past Medical History:        Diagnosis Date    Arthritis     Blind right eye     CHF (congestive heart failure) (HCC)     Chronic respiratory failure with hypoxia and hypercapnia (HCC)     COPD (chronic obstructive pulmonary disease) (HCC)     GERD (gastroesophageal reflux disease) Pt has a high probability of imminent or life-threatening deterioration requiring close monitoring, and highly complex decision-making and/or interventions of high intensity to assess, manipulate, and support his critical organ systems to prevent the his inevitable decline which would occur if left untreated. A total critical care time 50 minutes was used. This includes but not limited to examining patient, collaborating with other physicians, monitoring vital signs, telemetry, continuous pulse oximetry, and clinical response to IV medications; documentation time, review and interpretation of laboratory and radiological data, review of nursing notes and old record review. This time excludes any time that may have been spent performing procedures for life threatening organ failure.       Florencio Munoz MD

## 2023-12-04 LAB
ANION GAP SERPL CALCULATED.3IONS-SCNC: 2 MMOL/L (ref 3–16)
APPEARANCE BRONCH: ABNORMAL
BASE EXCESS BLDV CALC-SCNC: 12.5 MMOL/L
BASE EXCESS BLDV CALC-SCNC: 13.2 MMOL/L
BASE EXCESS BLDV CALC-SCNC: 14 MMOL/L
BASOPHILS # BLD: 0 K/UL (ref 0–0.2)
BASOPHILS NFR BLD: 0.1 %
BUN SERPL-MCNC: 19 MG/DL (ref 7–20)
CALCIUM SERPL-MCNC: 8.3 MG/DL (ref 8.3–10.6)
CHLORIDE SERPL-SCNC: 98 MMOL/L (ref 99–110)
CLOT SPEC QL: ABNORMAL
CO2 BLDV-SCNC: 41 MMOL/L
CO2 BLDV-SCNC: 42 MMOL/L
CO2 BLDV-SCNC: 43 MMOL/L
CO2 SERPL-SCNC: 38 MMOL/L (ref 21–32)
COHGB MFR BLDV: 1.6 %
COHGB MFR BLDV: 1.6 %
COHGB MFR BLDV: 1.7 %
COLOR BRONCH: ABNORMAL
CREAT SERPL-MCNC: <0.5 MG/DL (ref 0.6–1.1)
DEPRECATED RDW RBC AUTO: 17.9 % (ref 12.4–15.4)
EKG ATRIAL RATE: 92 BPM
EKG DIAGNOSIS: NORMAL
EKG P AXIS: 58 DEGREES
EKG P-R INTERVAL: 138 MS
EKG Q-T INTERVAL: 376 MS
EKG QRS DURATION: 92 MS
EKG QTC CALCULATION (BAZETT): 464 MS
EKG R AXIS: 37 DEGREES
EKG T AXIS: 72 DEGREES
EKG VENTRICULAR RATE: 92 BPM
EOSINOPHIL # BLD: 0 K/UL (ref 0–0.6)
EOSINOPHIL NFR BLD: 0 %
GFR SERPLBLD CREATININE-BSD FMLA CKD-EPI: >60 ML/MIN/{1.73_M2}
GLUCOSE BLD-MCNC: 83 MG/DL (ref 70–99)
GLUCOSE BLD-MCNC: 95 MG/DL (ref 70–99)
GLUCOSE BLD-MCNC: 96 MG/DL (ref 70–99)
GLUCOSE SERPL-MCNC: 88 MG/DL (ref 70–99)
HCO3 BLDV-SCNC: 39 MMOL/L (ref 23–29)
HCO3 BLDV-SCNC: 40 MMOL/L (ref 23–29)
HCO3 BLDV-SCNC: 41 MMOL/L (ref 23–29)
HCT VFR BLD AUTO: 28.7 % (ref 36–48)
HGB BLD-MCNC: 8.8 G/DL (ref 12–16)
LYMPHOCYTES # BLD: 0.8 K/UL (ref 1–5.1)
LYMPHOCYTES NFR BLD: 5.6 %
LYMPHOCYTES NFR BRONCH MANUAL: 2 % (ref 5–10)
MACROPHAGES NFR BRONCH MANUAL: 9 % (ref 90–95)
MAGNESIUM SERPL-MCNC: 2.3 MG/DL (ref 1.8–2.4)
MCH RBC QN AUTO: 25.5 PG (ref 26–34)
MCHC RBC AUTO-ENTMCNC: 30.8 G/DL (ref 31–36)
MCV RBC AUTO: 82.9 FL (ref 80–100)
METHGB MFR BLDV: 0.1 %
METHGB MFR BLDV: 0.3 %
METHGB MFR BLDV: 0.8 %
MONOCYTES # BLD: 0.4 K/UL (ref 0–1.3)
MONOCYTES NFR BLD: 2.6 %
MRSA DNA SPEC QL NAA+PROBE: NORMAL
NEUTROPHILS # BLD: 13 K/UL (ref 1.7–7.7)
NEUTROPHILS NFR BLD: 91.7 %
NEUTROPHILS NFR BRONCH MANUAL: 89 % (ref 5–10)
NUC CELL # BRONCH MANUAL: 1967 /CUMM
O2 CT VFR BLDV CALC: 11 ML/DL
O2 THERAPY: ABNORMAL
ORGANISM: ABNORMAL
PATH REV: YES
PCO2 BLDV: 58.2 MMHG (ref 40–50)
PCO2 BLDV: 67 MMHG (ref 40–50)
PCO2 BLDV: 71.3 MMHG (ref 40–50)
PERFORMED ON: NORMAL
PH BLDV: 7.35 [PH] (ref 7.35–7.45)
PH BLDV: 7.39 [PH] (ref 7.35–7.45)
PH BLDV: 7.43 [PH] (ref 7.35–7.45)
PHOSPHATE SERPL-MCNC: 2.9 MG/DL (ref 2.5–4.9)
PLATELET # BLD AUTO: 496 K/UL (ref 135–450)
PMV BLD AUTO: 7.7 FL (ref 5–10.5)
PNEUMONIA PANEL MOLECULAR: ABNORMAL
PO2 BLDV: 37 MMHG
PO2 BLDV: 50 MMHG
PO2 BLDV: 58 MMHG
POTASSIUM SERPL-SCNC: 4.9 MMOL/L (ref 3.5–5.1)
RBC # BLD AUTO: 3.46 M/UL (ref 4–5.2)
RBC # FLD MANUAL: ABNORMAL /CUMM
REPORT: NORMAL
SAO2 % BLDV: 68 %
SAO2 % BLDV: 83 %
SAO2 % BLDV: 89 %
SODIUM SERPL-SCNC: 138 MMOL/L (ref 136–145)
TOTAL CELLS COUNTED BRONCH: 100
VANCOMYCIN SERPL-MCNC: 27.2 UG/ML
WBC # BLD AUTO: 14.2 K/UL (ref 4–11)

## 2023-12-04 PROCEDURE — 2000000000 HC ICU R&B

## 2023-12-04 PROCEDURE — 85025 COMPLETE CBC W/AUTO DIFF WBC: CPT

## 2023-12-04 PROCEDURE — 31624 DX BRONCHOSCOPE/LAVAGE: CPT | Performed by: INTERNAL MEDICINE

## 2023-12-04 PROCEDURE — 0B9F8ZX DRAINAGE OF RIGHT LOWER LUNG LOBE, VIA NATURAL OR ARTIFICIAL OPENING ENDOSCOPIC, DIAGNOSTIC: ICD-10-PCS | Performed by: INTERNAL MEDICINE

## 2023-12-04 PROCEDURE — 6370000000 HC RX 637 (ALT 250 FOR IP): Performed by: NURSE PRACTITIONER

## 2023-12-04 PROCEDURE — 2500000003 HC RX 250 WO HCPCS: Performed by: NURSE PRACTITIONER

## 2023-12-04 PROCEDURE — 99221 1ST HOSP IP/OBS SF/LOW 40: CPT | Performed by: SURGERY

## 2023-12-04 PROCEDURE — 6360000002 HC RX W HCPCS: Performed by: INTERNAL MEDICINE

## 2023-12-04 PROCEDURE — 2580000003 HC RX 258: Performed by: INTERNAL MEDICINE

## 2023-12-04 PROCEDURE — 87070 CULTURE OTHR SPECIMN AEROBIC: CPT

## 2023-12-04 PROCEDURE — 99152 MOD SED SAME PHYS/QHP 5/>YRS: CPT | Performed by: INTERNAL MEDICINE

## 2023-12-04 PROCEDURE — 87205 SMEAR GRAM STAIN: CPT

## 2023-12-04 PROCEDURE — 89051 BODY FLUID CELL COUNT: CPT

## 2023-12-04 PROCEDURE — 80202 ASSAY OF VANCOMYCIN: CPT

## 2023-12-04 PROCEDURE — 82803 BLOOD GASES ANY COMBINATION: CPT

## 2023-12-04 PROCEDURE — 94640 AIRWAY INHALATION TREATMENT: CPT

## 2023-12-04 PROCEDURE — 6360000002 HC RX W HCPCS: Performed by: NURSE PRACTITIONER

## 2023-12-04 PROCEDURE — 80048 BASIC METABOLIC PNL TOTAL CA: CPT

## 2023-12-04 PROCEDURE — 84100 ASSAY OF PHOSPHORUS: CPT

## 2023-12-04 PROCEDURE — 83735 ASSAY OF MAGNESIUM: CPT

## 2023-12-04 PROCEDURE — 2700000000 HC OXYGEN THERAPY PER DAY

## 2023-12-04 PROCEDURE — 87040 BLOOD CULTURE FOR BACTERIA: CPT

## 2023-12-04 PROCEDURE — 94761 N-INVAS EAR/PLS OXIMETRY MLT: CPT

## 2023-12-04 PROCEDURE — 2580000003 HC RX 258: Performed by: NURSE PRACTITIONER

## 2023-12-04 PROCEDURE — 93010 ELECTROCARDIOGRAM REPORT: CPT | Performed by: INTERNAL MEDICINE

## 2023-12-04 PROCEDURE — 2580000003 HC RX 258

## 2023-12-04 PROCEDURE — 6360000002 HC RX W HCPCS

## 2023-12-04 PROCEDURE — 99291 CRITICAL CARE FIRST HOUR: CPT | Performed by: INTERNAL MEDICINE

## 2023-12-04 PROCEDURE — 2500000003 HC RX 250 WO HCPCS

## 2023-12-04 PROCEDURE — 87633 RESP VIRUS 12-25 TARGETS: CPT

## 2023-12-04 PROCEDURE — 36415 COLL VENOUS BLD VENIPUNCTURE: CPT

## 2023-12-04 RX ORDER — MIDAZOLAM HYDROCHLORIDE 1 MG/ML
INJECTION INTRAMUSCULAR; INTRAVENOUS
Status: COMPLETED
Start: 2023-12-04 | End: 2023-12-04

## 2023-12-04 RX ORDER — IPRATROPIUM BROMIDE AND ALBUTEROL SULFATE 2.5; .5 MG/3ML; MG/3ML
1 SOLUTION RESPIRATORY (INHALATION)
Status: DISCONTINUED | OUTPATIENT
Start: 2023-12-04 | End: 2023-12-08

## 2023-12-04 RX ORDER — LIDOCAINE HYDROCHLORIDE 10 MG/ML
INJECTION, SOLUTION EPIDURAL; INFILTRATION; INTRACAUDAL; PERINEURAL
Status: COMPLETED
Start: 2023-12-04 | End: 2023-12-04

## 2023-12-04 RX ORDER — METHADONE HYDROCHLORIDE 10 MG/ML
120 CONCENTRATE ORAL DAILY
COMMUNITY

## 2023-12-04 RX ORDER — CLONIDINE HYDROCHLORIDE 0.1 MG/1
0.1 TABLET ORAL 2 TIMES DAILY
COMMUNITY

## 2023-12-04 RX ORDER — DEXMEDETOMIDINE HYDROCHLORIDE 4 UG/ML
.1-1.5 INJECTION, SOLUTION INTRAVENOUS CONTINUOUS
Status: DISCONTINUED | OUTPATIENT
Start: 2023-12-04 | End: 2023-12-08

## 2023-12-04 RX ORDER — FENTANYL CITRATE 50 UG/ML
INJECTION, SOLUTION INTRAMUSCULAR; INTRAVENOUS
Status: COMPLETED
Start: 2023-12-04 | End: 2023-12-04

## 2023-12-04 RX ORDER — METHADONE HYDROCHLORIDE 10 MG/1
120 TABLET ORAL DAILY
Status: DISCONTINUED | OUTPATIENT
Start: 2023-12-04 | End: 2023-12-08 | Stop reason: HOSPADM

## 2023-12-04 RX ORDER — WATER 10 ML/10ML
INJECTION INTRAMUSCULAR; INTRAVENOUS; SUBCUTANEOUS
Status: COMPLETED
Start: 2023-12-04 | End: 2023-12-04

## 2023-12-04 RX ADMIN — METHYLPREDNISOLONE SODIUM SUCCINATE 40 MG: 40 INJECTION INTRAMUSCULAR; INTRAVENOUS at 23:32

## 2023-12-04 RX ADMIN — PIPERACILLIN AND TAZOBACTAM 3375 MG: 3; .375 INJECTION, POWDER, LYOPHILIZED, FOR SOLUTION INTRAVENOUS at 23:37

## 2023-12-04 RX ADMIN — METHYLPREDNISOLONE SODIUM SUCCINATE 40 MG: 40 INJECTION INTRAMUSCULAR; INTRAVENOUS at 01:23

## 2023-12-04 RX ADMIN — SODIUM CHLORIDE, PRESERVATIVE FREE 10 ML: 5 INJECTION INTRAVENOUS at 21:35

## 2023-12-04 RX ADMIN — METHADONE HYDROCHLORIDE 120 MG: 10 TABLET ORAL at 10:51

## 2023-12-04 RX ADMIN — FENTANYL CITRATE 50 MCG: 50 INJECTION INTRAMUSCULAR; INTRAVENOUS at 10:50

## 2023-12-04 RX ADMIN — WATER 10 ML: 1 INJECTION INTRAMUSCULAR; INTRAVENOUS; SUBCUTANEOUS at 01:23

## 2023-12-04 RX ADMIN — ENOXAPARIN SODIUM 40 MG: 100 INJECTION SUBCUTANEOUS at 10:00

## 2023-12-04 RX ADMIN — VANCOMYCIN HYDROCHLORIDE 1000 MG: 1 INJECTION, POWDER, LYOPHILIZED, FOR SOLUTION INTRAVENOUS at 05:13

## 2023-12-04 RX ADMIN — IPRATROPIUM BROMIDE AND ALBUTEROL SULFATE 1 DOSE: 2.5; .5 SOLUTION RESPIRATORY (INHALATION) at 16:40

## 2023-12-04 RX ADMIN — MOMETASONE FUROATE AND FORMOTEROL FUMARATE DIHYDRATE 2 PUFF: 200; 5 AEROSOL RESPIRATORY (INHALATION) at 20:30

## 2023-12-04 RX ADMIN — PIPERACILLIN AND TAZOBACTAM 3375 MG: 3; .375 INJECTION, POWDER, LYOPHILIZED, FOR SOLUTION INTRAVENOUS at 15:31

## 2023-12-04 RX ADMIN — METHYLPREDNISOLONE SODIUM SUCCINATE 40 MG: 40 INJECTION INTRAMUSCULAR; INTRAVENOUS at 11:45

## 2023-12-04 RX ADMIN — DEXMEDETOMIDINE HYDROCHLORIDE 0.8 MCG/KG/HR: 4 INJECTION, SOLUTION INTRAVENOUS at 23:28

## 2023-12-04 RX ADMIN — CEFEPIME 2000 MG: 2 INJECTION, POWDER, FOR SOLUTION INTRAVENOUS at 01:24

## 2023-12-04 RX ADMIN — MOMETASONE FUROATE AND FORMOTEROL FUMARATE DIHYDRATE 2 PUFF: 200; 5 AEROSOL RESPIRATORY (INHALATION) at 12:17

## 2023-12-04 RX ADMIN — LIDOCAINE HYDROCHLORIDE 5 ML: 10 INJECTION, SOLUTION EPIDURAL; INFILTRATION; INTRACAUDAL; PERINEURAL at 10:46

## 2023-12-04 RX ADMIN — Medication 20 MG: at 21:35

## 2023-12-04 RX ADMIN — DEXMEDETOMIDINE HYDROCHLORIDE 0.2 MCG/KG/HR: 4 INJECTION, SOLUTION INTRAVENOUS at 11:44

## 2023-12-04 RX ADMIN — IPRATROPIUM BROMIDE AND ALBUTEROL SULFATE 1 DOSE: 2.5; .5 SOLUTION RESPIRATORY (INHALATION) at 20:30

## 2023-12-04 RX ADMIN — MIDAZOLAM HYDROCHLORIDE 8 MG/HR: 1 INJECTION, SOLUTION INTRAVENOUS at 04:55

## 2023-12-04 RX ADMIN — SODIUM CHLORIDE, PRESERVATIVE FREE 10 ML: 5 INJECTION INTRAVENOUS at 10:02

## 2023-12-04 RX ADMIN — Medication 20 MG: at 10:00

## 2023-12-04 RX ADMIN — METHYLPREDNISOLONE SODIUM SUCCINATE 40 MG: 40 INJECTION INTRAMUSCULAR; INTRAVENOUS at 18:10

## 2023-12-04 RX ADMIN — MIDAZOLAM 2 MG: 1 INJECTION INTRAMUSCULAR; INTRAVENOUS at 10:51

## 2023-12-04 RX ADMIN — IPRATROPIUM BROMIDE AND ALBUTEROL SULFATE 1 DOSE: 2.5; .5 SOLUTION RESPIRATORY (INHALATION) at 12:17

## 2023-12-04 RX ADMIN — PIPERACILLIN AND TAZOBACTAM 4500 MG: 4; .5 INJECTION, POWDER, LYOPHILIZED, FOR SOLUTION INTRAVENOUS at 10:00

## 2023-12-04 RX ADMIN — METHYLPREDNISOLONE SODIUM SUCCINATE 40 MG: 40 INJECTION INTRAMUSCULAR; INTRAVENOUS at 05:13

## 2023-12-04 ASSESSMENT — PULMONARY FUNCTION TESTS
PIF_VALUE: 23
PIF_VALUE: 12
PIF_VALUE: 27
PIF_VALUE: 21
PIF_VALUE: 21
PIF_VALUE: 29
PIF_VALUE: 12
PIF_VALUE: 30
PIF_VALUE: 32
PIF_VALUE: 23
PIF_VALUE: 23
PIF_VALUE: 11
PIF_VALUE: 31
PIF_VALUE: 23
PIF_VALUE: 31
PIF_VALUE: 12
PIF_VALUE: 27
PIF_VALUE: 17
PIF_VALUE: 32
PIF_VALUE: 26
PIF_VALUE: 12
PIF_VALUE: 17
PIF_VALUE: 26
PIF_VALUE: 12
PIF_VALUE: 24
PIF_VALUE: 26
PIF_VALUE: 30
PIF_VALUE: 35
PIF_VALUE: 27
PIF_VALUE: 33
PIF_VALUE: 21
PIF_VALUE: 21
PIF_VALUE: 25
PIF_VALUE: 11
PIF_VALUE: 31
PIF_VALUE: 21
PIF_VALUE: 33
PIF_VALUE: 35
PIF_VALUE: 31
PIF_VALUE: 36
PIF_VALUE: 31
PIF_VALUE: 21
PIF_VALUE: 29
PIF_VALUE: 40
PIF_VALUE: 12
PIF_VALUE: 31

## 2023-12-04 ASSESSMENT — PAIN DESCRIPTION - LOCATION: LOCATION: OTHER (COMMENT)

## 2023-12-04 ASSESSMENT — PAIN SCALES - GENERAL
PAINLEVEL_OUTOF10: 0
PAINLEVEL_OUTOF10: 2
PAINLEVEL_OUTOF10: 0

## 2023-12-04 NOTE — CONSULTS
Comprehensive Nutrition Assessment    Type and Reason for Visit:  Initial, Consult    Nutrition Recommendations/Plan:   Nutrition Support, If TF to be started recommend following regimen:  Initiate Peptide-Based  Vital AF 1.2 @ 10mL/hr  As tolerated, increase by 10 mL/hr q 6 hours until goal of 55 mL/hr  Maintenance water flush of 30 ml every 4 hours or per provider     Malnutrition Assessment:  Malnutrition Status:  No malnutrition (12/04/23 1448)    Context:  Chronic Illness       Nutrition Assessment:    Consult for TF order and management. Pt w/ acute on chronic respiratory failure. Mild wt loss pta. Pt intubated 12/3, no propofol. Pt w/ ogt in place, nutrition related labs reviewed. Recommend TF regimen above. Nutrition Related Findings:    95 glucose, Wound Type: Venous Stasis       Current Nutrition Intake & Therapies:    Average Meal Intake: NPO  Average Supplements Intake: NPO  Diet NPO  Current Tube Feeding (TF) Orders:  Goal TF & Flush Orders Provides: Vital AF 1.2 @ 55 ml/hr (Calculated over 20 hrs provides 1100 ml total volume, 1320 kcals, 83 g protein, 892 ml free water)    Anthropometric Measures:  Height: 149.9 cm (4' 11.02\")  Ideal Body Weight (IBW): 95 lbs (43 kg)       Current Body Weight: 86 kg (189 lb 9.5 oz), 199.6 % IBW.     Current BMI (kg/m2): 38.3                               Estimated Daily Nutrient Needs:  Energy Requirements Based On: Kcal/kg  Weight Used for Energy Requirements: Current  Energy (kcal/day): 1290 - 1548 (15 - 18 kcal/kg)  Weight Used for Protein Requirements: Ideal  Protein (g/day): 86 (2g/kg IBW)     Fluid (ml/day): per provider while in ICU    Nutrition Diagnosis:   Inadequate oral intake related to impaired respiratory function as evidenced by intubation    Nutrition Interventions:   Food and/or Nutrient Delivery: Start Tube Feeding  Nutrition Education/Counseling: Education not indicated  Coordination of Nutrition Care: Continue to monitor while inpatient

## 2023-12-04 NOTE — PROGRESS NOTES
55 mg versed wasted per [employer] signed by Lebron Milligan RN on 12/4/2023 at 2:57 PM    Electronically signed by Abdon Rahman RN on 12/4/2023 at 3:07 PM

## 2023-12-04 NOTE — PLAN OF CARE
Problem: Safety - Medical Restraint  Goal: Remains free of injury from restraints (Restraint for Interference with Medical Device)  Description: INTERVENTIONS:  1. Determine that other, less restrictive measures have been tried or would not be effective before applying the restraint  2. Evaluate the patient's condition at the time of restraint application  3. Inform patient/family regarding the reason for restraint  4.  Q2H: Monitor safety, psychosocial status, comfort, nutrition and hydration  Outcome: Progressing  Flowsheets (Taken 12/4/2023 1012)  Remains free of injury from restraints (restraint for interference with medical device): Determine that other, less restrictive measures have been tried or would not be effective before applying the restraint     Problem: Discharge Planning  Goal: Discharge to home or other facility with appropriate resources  Outcome: Progressing     Problem: Pain  Goal: Verbalizes/displays adequate comfort level or baseline comfort level  Outcome: Progressing  Flowsheets  Taken 12/4/2023 1200  Verbalizes/displays adequate comfort level or baseline comfort level: Encourage patient to monitor pain and request assistance  Taken 12/4/2023 0800  Verbalizes/displays adequate comfort level or baseline comfort level: Encourage patient to monitor pain and request assistance     Problem: Safety - Adult  Goal: Free from fall injury  Outcome: Progressing     Problem: Chronic Conditions and Co-morbidities  Goal: Patient's chronic conditions and co-morbidity symptoms are monitored and maintained or improved  Outcome: Progressing

## 2023-12-04 NOTE — CONSULTS
[facility]  Consultation Note    Chief Complaint / Reason for Consultation  BLE venous stasis, possible RLE abscess    History of Present Illness  Patient is a 51 y.o. female presenting with severe COPD exacerbation, respiratory failure, and pneumonia. Patient intubated due to respiratory failure. She was found to have bilateral lower extremity venous stasis dermatitis. Patient has MSSA bacteremia and there is a fluctuant area of her right lateral leg that medicine is concerned is an abscess. We were consulted for evaluation. History obtained via chart review due to patient being intubated. She is otherwise hemodynamically stable in the ICU. Patient does awaken on palpation of her legs due to presumed tenderness.     Review of Systems  Review of Systems   Unable to perform ROS: Intubated        Past Medical History:   Diagnosis Date    Arthritis     Blind right eye     CHF (congestive heart failure) (HCC)     Chronic respiratory failure with hypoxia and hypercapnia (HCC)     COPD (chronic obstructive pulmonary disease) (HCC)     GERD (gastroesophageal reflux disease)     Hypertension     Movement disorder     Neuromuscular disorder ([address])     On home O2     Pneumonia     Sleep apnea        Past Surgical History:   Procedure Laterality Date    ABDOMEN SURGERY      CHOLECYSTECTOMY      DILATATION, ESOPHAGUS      EYE SURGERY      TUBAL LIGATION         Allergies   Allergen Reactions    Latex      Rash developed    Sulfa Antibiotics Rash     Throat swelling    Nsaids Other (See Comments)     ulcer    Ultram [Tramadol Hcl] Swelling and Rash       Social History     Socioeconomic History    Marital status:      Spouse name: Not on file    Number of children: 4    Years of education: 9    Highest education level: Not on file   Occupational History    Occupation: disabled   Tobacco Use    Smoking status: Former     Packs/day: 0.50     Years: 30.00     Additional pack years: 0.00

## 2023-12-04 NOTE — PROGRESS NOTES
13.0 (H) 1.7 - 7.7 K/uL    Lymphocytes Absolute 0.8 (L) 1.0 - 5.1 K/uL    Monocytes Absolute 0.4 0.0 - 1.3 K/uL    Eosinophils Absolute 0.0 0.0 - 0.6 K/uL    Basophils Absolute 0.0 0.0 - 0.2 K/uL   Basic Metabolic Panel w/ Reflex to MG    Collection Time: 12/04/23  4:46 AM   Result Value Ref Range    Sodium 138 136 - 145 mmol/L    Potassium reflex Magnesium 4.9 3.5 - 5.1 mmol/L    Chloride 98 (L) 99 - 110 mmol/L    CO2 38 (H) 21 - 32 mmol/L    Anion Gap 2 (L) 3 - 16    Glucose 88 70 - 99 mg/dL    BUN 19 7 - 20 mg/dL    Creatinine <0.5 (L) 0.6 - 1.1 mg/dL    Est, Glom Filt Rate >60 >60    Calcium 8.3 8.3 - 10.6 mg/dL   Blood Gas, Venous    Collection Time: 12/04/23  4:46 AM   Result Value Ref Range    pH, Mainor 7.395 7.350 - 7.450    pCO2, Mainor 67.0 (H) 40.0 - 50.0 mmHg    pO2, Mainor 50 Not Established mmHg    HCO3, Venous 41 (H) 23 - 29 mmol/L    Base Excess, Mainor 14.0 Not Established mmol/L    O2 Sat, Mainor 83 Not Established %    Carboxyhemoglobin 1.7 %    MetHgb, Mainor 0.3 <1.5 %    TC02 (Calc), Mainor 43 Not Established mmol/L    O2 Content, Mainor 11 Not Established mL/dL    O2 Therapy Unknown    POCT Glucose    Collection Time: 12/04/23  7:57 AM   Result Value Ref Range    POC Glucose 83 70 - 99 mg/dl    Performed on ACCU-CHEK    Vancomycin Level, Random    Collection Time: 12/04/23  9:37 AM   Result Value Ref Range    Vancomycin Rm 27.2 ug/mL   Magnesium    Collection Time: 12/04/23  9:37 AM   Result Value Ref Range    Magnesium 2.30 1.80 - 2.40 mg/dL   Phosphorus    Collection Time: 12/04/23  9:37 AM   Result Value Ref Range    Phosphorus 2.9 2.5 - 4.9 mg/dL   Pneumonia Panel, Molecular    Collection Time: 12/04/23 11:18 AM    Specimen: BAL- Bronch. Lavage   Result Value Ref Range    Organism Moraxella catarrhalis BY DNA (A)     Pneumonia Panel Molecular       100,000 copies/mL  See additional report for complete Pneumonia panel.       Organism Human Rhinovirus/Enterovirus by PCR (A)     Pneumonia Panel Molecular pneumothorax. 1. Mild pulmonary vascular congestion with a small right pleural effusion. 2. Mild right basilar atelectasis. XR CHEST PORTABLE    Result Date: 12/3/2023  EXAMINATION: ONE XRAY VIEW OF THE CHEST 12/3/2023 8:58 am COMPARISON: December 3, 2023 at 8 a.m. HISTORY: ORDERING SYSTEM PROVIDED HISTORY: post intubation TECHNOLOGIST PROVIDED HISTORY: Reason for exam:->post intubation Reason for Exam: post intubation/NG placement FINDINGS: Endotracheal tube is 3.9 cm above the anupama. Nasogastric tube terminates over the proximal to mid stomach. Cardiomediastinal silhouette is unchanged there is no significant change in interstitial opacities in the left greater than right lung. No pleural effusion or pneumothorax. No acute osseous abnormality.      Interval placement of endotracheal and nasogastric tubes Stable interstitial opacities       Electronically signed by Lesa Satrr MD on 12/4/2023 at 4:26 PM

## 2023-12-04 NOTE — PROGRESS NOTES
4 Eyes Skin Assessment     NAME:  Ric Peters  YOB: 1975  MEDICAL RECORD NUMBER:  [id number]    The patient is being assessed for  Shift Handoff    I agree that at least one RN has performed a thorough Head to Toe Skin Assessment on the patient. ALL assessment sites listed below have been assessed. Areas assessed by both nurses:    Head, Face, Ears, Shoulders, Back, Chest, Arms, Elbows, Hands, Sacrum. Buttock, Coccyx, Ischium, and Legs. Feet and Heels        Does the Patient have a Wound? Yes wound(s) were present on assessment.  LDA wound assessment was Initiated and completed by RN       Jaison Prevention initiated by RN: Yes  Wound Care Orders initiated by RN: No    Pressure Injury (Stage 3,4, Unstageable, DTI, NWPT, and Complex wounds) if present, place Wound referral order by RN under : Yes    New Ostomies, if present place, Ostomy referral order under : No     Nurse 1 eSignature: Electronically signed by Vaenssa Farooq RN on 12/4/23 at 5:50 PM EST    **SHARE this note so that the co-signing nurse can place an eSignature**    Nurse 2 eSignature: {Esignature:[id number]}

## 2023-12-04 NOTE — PROCEDURES
Bronchoscopy Procedure Note    Date of Operation: 12/4/2023    Surgeon: Armando Blount MD    Anesthesia: Versed 2 mg Fentanyl 50 mcg    Operation: Flexible fiberoptic bronchoscopy, diagnostic / therapeutic    Estimated Blood Loss: Minimal    ASA class: 3  Mallampati: N/A  Complications: None    Indications and History:  The patient is a 51 y.o. female with respiratory failure. The risks, benefits, complications, treatment options and expected outcomes were discussed with the patient's POA. The possibilities of reaction to medication, pulmonary aspiration, perforation of a viscus(Pneumothorax), bleeding, respiratory failure and failure to diagnose a condition and creating a complication requiring transfusion or operation were discussed with the POA. Description of Procedure: The patient was  identified as 51 y.o.  and the procedure verified as Flexible Fiberoptic Bronchoscopy. A Time Out was held and the above information confirmed. the patient was positioned supine and the bronchoscope was passed through the ETT . The scope was then passed into the trachea. 5cc  1% lidocaine was used topically within the airway. Careful inspection of the tracheal lumen was accomplished. The scope was sequentially passed into all airways.        Endobronchial findings:    Trachea: Normal mucosa  Tanvi: Normal mucosa  Right main bronchus: Normal mucosa  Right upper lobe bronchus including anterior, apical and posterior until the fourth generation bronchi: Normal mucosa  Right intermedius bronchus: Normal mucosa  Right middle lobe: Normal mucosa  Right lower lobe superior segment uptill 3rd generation bronchi: Normal mucosa  Left main bronchus: Normal mucosa  Left upper lobe including Lingula, anterior segment, and apico-posterior segment through the 4th generation bronchi: Normal mucosa  Left lower lobe basilar and superior segments uptill 4th generation bronchi: Normal mucosa    Specimens:    BAL of the right lower lobe

## 2023-12-05 ENCOUNTER — APPOINTMENT (OUTPATIENT)
Dept: CT IMAGING | Age: 48
DRG: 853 | End: 2023-12-05
Payer: MEDICARE

## 2023-12-05 LAB
ANION GAP SERPL CALCULATED.3IONS-SCNC: 6 MMOL/L (ref 3–16)
BACTERIA SPEC RESP CULT: ABNORMAL
BACTERIA SPEC RESP CULT: ABNORMAL
BASE EXCESS BLDV CALC-SCNC: 10 MMOL/L
BASE EXCESS BLDV CALC-SCNC: 11.7 MMOL/L
BASE EXCESS BLDV CALC-SCNC: 13.4 MMOL/L
BASE EXCESS BLDV CALC-SCNC: 13.4 MMOL/L
BASOPHILS # BLD: 0 K/UL (ref 0–0.2)
BASOPHILS NFR BLD: 0.3 %
BUN SERPL-MCNC: 32 MG/DL (ref 7–20)
CALCIUM SERPL-MCNC: 8.5 MG/DL (ref 8.3–10.6)
CHLORIDE SERPL-SCNC: 97 MMOL/L (ref 99–110)
CO2 BLDV-SCNC: 39 MMOL/L
CO2 BLDV-SCNC: 40 MMOL/L
CO2 SERPL-SCNC: 33 MMOL/L (ref 21–32)
COHGB MFR BLDV: 1.1 %
COHGB MFR BLDV: 1.1 %
COHGB MFR BLDV: 1.4 %
COHGB MFR BLDV: 1.5 %
CREAT SERPL-MCNC: 0.7 MG/DL (ref 0.6–1.1)
DEPRECATED RDW RBC AUTO: 18 % (ref 12.4–15.4)
EOSINOPHIL # BLD: 0 K/UL (ref 0–0.6)
EOSINOPHIL NFR BLD: 0 %
GFR SERPLBLD CREATININE-BSD FMLA CKD-EPI: >60 ML/MIN/{1.73_M2}
GLUCOSE BLD-MCNC: 120 MG/DL (ref 70–99)
GLUCOSE BLD-MCNC: 134 MG/DL (ref 70–99)
GLUCOSE BLD-MCNC: 137 MG/DL (ref 70–99)
GLUCOSE SERPL-MCNC: 127 MG/DL (ref 70–99)
GRAM STN SPEC: ABNORMAL
HCO3 BLDV-SCNC: 37 MMOL/L (ref 23–29)
HCO3 BLDV-SCNC: 38 MMOL/L (ref 23–29)
HCT VFR BLD AUTO: 26.7 % (ref 36–48)
HGB BLD-MCNC: 8.7 G/DL (ref 12–16)
LEGIONELLA AG UR QL: NORMAL
LYMPHOCYTES # BLD: 0.6 K/UL (ref 1–5.1)
LYMPHOCYTES NFR BLD: 7.1 %
MAGNESIUM SERPL-MCNC: 2.4 MG/DL (ref 1.8–2.4)
MCH RBC QN AUTO: 26.4 PG (ref 26–34)
MCHC RBC AUTO-ENTMCNC: 32.6 G/DL (ref 31–36)
MCV RBC AUTO: 81 FL (ref 80–100)
METHGB MFR BLDV: 0.3 %
METHGB MFR BLDV: 0.3 %
METHGB MFR BLDV: 0.4 %
METHGB MFR BLDV: 0.4 %
MONOCYTES # BLD: 0.3 K/UL (ref 0–1.3)
MONOCYTES NFR BLD: 3.4 %
NEUTROPHILS # BLD: 7 K/UL (ref 1.7–7.7)
NEUTROPHILS NFR BLD: 89.2 %
O2 THERAPY: ABNORMAL
ORGANISM: ABNORMAL
PATH CONSULT FLUID: NORMAL
PCO2 BLDV: 49.3 MMHG (ref 40–50)
PCO2 BLDV: 51.9 MMHG (ref 40–50)
PCO2 BLDV: 60.7 MMHG (ref 40–50)
PCO2 BLDV: 62.6 MMHG (ref 40–50)
PERFORMED ON: ABNORMAL
PH BLDV: 7.39 [PH] (ref 7.35–7.45)
PH BLDV: 7.4 [PH] (ref 7.35–7.45)
PH BLDV: 7.48 [PH] (ref 7.35–7.45)
PH BLDV: 7.5 [PH] (ref 7.35–7.45)
PHOSPHATE SERPL-MCNC: 2.3 MG/DL (ref 2.5–4.9)
PLATELET # BLD AUTO: 461 K/UL (ref 135–450)
PMV BLD AUTO: 8.1 FL (ref 5–10.5)
PO2 BLDV: 57 MMHG
PO2 BLDV: 69 MMHG
PO2 BLDV: 79 MMHG
PO2 BLDV: 81 MMHG
POTASSIUM SERPL-SCNC: 4.5 MMOL/L (ref 3.5–5.1)
POTASSIUM SERPL-SCNC: 5.4 MMOL/L (ref 3.5–5.1)
RBC # BLD AUTO: 3.3 M/UL (ref 4–5.2)
S PNEUM AG UR QL: NORMAL
SAO2 % BLDV: 91 %
SAO2 % BLDV: 93 %
SAO2 % BLDV: 95 %
SAO2 % BLDV: 96 %
SODIUM SERPL-SCNC: 136 MMOL/L (ref 136–145)
WBC # BLD AUTO: 7.8 K/UL (ref 4–11)

## 2023-12-05 PROCEDURE — 2000000000 HC ICU R&B

## 2023-12-05 PROCEDURE — 6360000002 HC RX W HCPCS: Performed by: NURSE PRACTITIONER

## 2023-12-05 PROCEDURE — 73701 CT LOWER EXTREMITY W/DYE: CPT

## 2023-12-05 PROCEDURE — 94761 N-INVAS EAR/PLS OXIMETRY MLT: CPT

## 2023-12-05 PROCEDURE — 6370000000 HC RX 637 (ALT 250 FOR IP): Performed by: INTERNAL MEDICINE

## 2023-12-05 PROCEDURE — 87205 SMEAR GRAM STAIN: CPT

## 2023-12-05 PROCEDURE — 94660 CPAP INITIATION&MGMT: CPT

## 2023-12-05 PROCEDURE — 2580000003 HC RX 258: Performed by: INTERNAL MEDICINE

## 2023-12-05 PROCEDURE — 2580000003 HC RX 258: Performed by: NURSE PRACTITIONER

## 2023-12-05 PROCEDURE — 36415 COLL VENOUS BLD VENIPUNCTURE: CPT

## 2023-12-05 PROCEDURE — 99291 CRITICAL CARE FIRST HOUR: CPT | Performed by: INTERNAL MEDICINE

## 2023-12-05 PROCEDURE — 6370000000 HC RX 637 (ALT 250 FOR IP): Performed by: NURSE PRACTITIONER

## 2023-12-05 PROCEDURE — 87070 CULTURE OTHR SPECIMN AEROBIC: CPT

## 2023-12-05 PROCEDURE — 2580000003 HC RX 258

## 2023-12-05 PROCEDURE — 94003 VENT MGMT INPAT SUBQ DAY: CPT

## 2023-12-05 PROCEDURE — 2700000000 HC OXYGEN THERAPY PER DAY

## 2023-12-05 PROCEDURE — 6360000002 HC RX W HCPCS: Performed by: INTERNAL MEDICINE

## 2023-12-05 PROCEDURE — 80048 BASIC METABOLIC PNL TOTAL CA: CPT

## 2023-12-05 PROCEDURE — 84100 ASSAY OF PHOSPHORUS: CPT

## 2023-12-05 PROCEDURE — 94640 AIRWAY INHALATION TREATMENT: CPT

## 2023-12-05 PROCEDURE — 82803 BLOOD GASES ANY COMBINATION: CPT

## 2023-12-05 PROCEDURE — 6360000004 HC RX CONTRAST MEDICATION: Performed by: SURGERY

## 2023-12-05 PROCEDURE — 10140 I&D HMTMA SEROMA/FLUID COLLJ: CPT | Performed by: SURGERY

## 2023-12-05 PROCEDURE — 83735 ASSAY OF MAGNESIUM: CPT

## 2023-12-05 PROCEDURE — 84132 ASSAY OF SERUM POTASSIUM: CPT

## 2023-12-05 PROCEDURE — 85025 COMPLETE CBC W/AUTO DIFF WBC: CPT

## 2023-12-05 PROCEDURE — 2500000003 HC RX 250 WO HCPCS: Performed by: NURSE PRACTITIONER

## 2023-12-05 RX ORDER — TORSEMIDE 20 MG/1
20 TABLET ORAL DAILY
Status: DISCONTINUED | OUTPATIENT
Start: 2023-12-05 | End: 2023-12-08 | Stop reason: HOSPADM

## 2023-12-05 RX ORDER — WATER 10 ML/10ML
INJECTION INTRAMUSCULAR; INTRAVENOUS; SUBCUTANEOUS
Status: COMPLETED
Start: 2023-12-05 | End: 2023-12-05

## 2023-12-05 RX ORDER — MIDAZOLAM HYDROCHLORIDE 1 MG/ML
2 INJECTION INTRAMUSCULAR; INTRAVENOUS EVERY 4 HOURS PRN
Status: COMPLETED | OUTPATIENT
Start: 2023-12-05 | End: 2023-12-05

## 2023-12-05 RX ORDER — LIDOCAINE HYDROCHLORIDE 10 MG/ML
30 INJECTION, SOLUTION EPIDURAL; INFILTRATION; INTRACAUDAL; PERINEURAL
Status: DISPENSED | OUTPATIENT
Start: 2023-12-05 | End: 2023-12-06

## 2023-12-05 RX ORDER — LISINOPRIL 5 MG/1
5 TABLET ORAL DAILY
Status: DISCONTINUED | OUTPATIENT
Start: 2023-12-05 | End: 2023-12-08 | Stop reason: HOSPADM

## 2023-12-05 RX ORDER — CARVEDILOL 3.12 MG/1
3.12 TABLET ORAL 2 TIMES DAILY
Status: DISCONTINUED | OUTPATIENT
Start: 2023-12-05 | End: 2023-12-08 | Stop reason: HOSPADM

## 2023-12-05 RX ORDER — FENTANYL CITRATE 50 UG/ML
25 INJECTION, SOLUTION INTRAMUSCULAR; INTRAVENOUS
Status: DISPENSED | OUTPATIENT
Start: 2023-12-05 | End: 2023-12-06

## 2023-12-05 RX ORDER — SODIUM CHLORIDE 9 MG/ML
INJECTION, SOLUTION INTRAVENOUS CONTINUOUS
Status: ACTIVE | OUTPATIENT
Start: 2023-12-05 | End: 2023-12-05

## 2023-12-05 RX ADMIN — METHYLPREDNISOLONE SODIUM SUCCINATE 40 MG: 40 INJECTION INTRAMUSCULAR; INTRAVENOUS at 06:00

## 2023-12-05 RX ADMIN — IPRATROPIUM BROMIDE AND ALBUTEROL SULFATE 1 DOSE: 2.5; .5 SOLUTION RESPIRATORY (INHALATION) at 04:50

## 2023-12-05 RX ADMIN — DEXMEDETOMIDINE HYDROCHLORIDE 0.9 MCG/KG/HR: 4 INJECTION, SOLUTION INTRAVENOUS at 02:55

## 2023-12-05 RX ADMIN — MOMETASONE FUROATE AND FORMOTEROL FUMARATE DIHYDRATE 2 PUFF: 200; 5 AEROSOL RESPIRATORY (INHALATION) at 08:52

## 2023-12-05 RX ADMIN — WATER 10 ML: 1 INJECTION INTRAMUSCULAR; INTRAVENOUS; SUBCUTANEOUS at 12:32

## 2023-12-05 RX ADMIN — METHYLPREDNISOLONE SODIUM SUCCINATE 40 MG: 40 INJECTION INTRAMUSCULAR; INTRAVENOUS at 17:43

## 2023-12-05 RX ADMIN — METHYLPREDNISOLONE SODIUM SUCCINATE 40 MG: 40 INJECTION INTRAMUSCULAR; INTRAVENOUS at 12:32

## 2023-12-05 RX ADMIN — MOMETASONE FUROATE AND FORMOTEROL FUMARATE DIHYDRATE 2 PUFF: 200; 5 AEROSOL RESPIRATORY (INHALATION) at 20:13

## 2023-12-05 RX ADMIN — MIDAZOLAM 2 MG: 1 INJECTION INTRAMUSCULAR; INTRAVENOUS at 01:27

## 2023-12-05 RX ADMIN — Medication 20 MG: at 20:53

## 2023-12-05 RX ADMIN — SODIUM CHLORIDE: 9 INJECTION, SOLUTION INTRAVENOUS at 08:49

## 2023-12-05 RX ADMIN — DEXMEDETOMIDINE HYDROCHLORIDE 1.2 MCG/KG/HR: 4 INJECTION, SOLUTION INTRAVENOUS at 10:35

## 2023-12-05 RX ADMIN — Medication 20 MG: at 08:16

## 2023-12-05 RX ADMIN — METHADONE HYDROCHLORIDE 120 MG: 10 TABLET ORAL at 08:15

## 2023-12-05 RX ADMIN — ACETAMINOPHEN 650 MG: 325 TABLET ORAL at 22:58

## 2023-12-05 RX ADMIN — SODIUM CHLORIDE, PRESERVATIVE FREE 10 ML: 5 INJECTION INTRAVENOUS at 08:15

## 2023-12-05 RX ADMIN — IPRATROPIUM BROMIDE AND ALBUTEROL SULFATE 1 DOSE: 2.5; .5 SOLUTION RESPIRATORY (INHALATION) at 12:11

## 2023-12-05 RX ADMIN — IPRATROPIUM BROMIDE AND ALBUTEROL SULFATE 1 DOSE: 2.5; .5 SOLUTION RESPIRATORY (INHALATION) at 15:50

## 2023-12-05 RX ADMIN — IPRATROPIUM BROMIDE AND ALBUTEROL SULFATE 1 DOSE: 2.5; .5 SOLUTION RESPIRATORY (INHALATION) at 00:22

## 2023-12-05 RX ADMIN — TORSEMIDE 20 MG: 20 TABLET ORAL at 13:00

## 2023-12-05 RX ADMIN — IOPAMIDOL 75 ML: 755 INJECTION, SOLUTION INTRAVENOUS at 09:16

## 2023-12-05 RX ADMIN — SODIUM CHLORIDE, PRESERVATIVE FREE 10 ML: 5 INJECTION INTRAVENOUS at 20:56

## 2023-12-05 RX ADMIN — MIDAZOLAM 2 MG: 1 INJECTION INTRAMUSCULAR; INTRAVENOUS at 07:57

## 2023-12-05 RX ADMIN — WATER: 1 INJECTION INTRAMUSCULAR; INTRAVENOUS; SUBCUTANEOUS at 17:44

## 2023-12-05 RX ADMIN — ENOXAPARIN SODIUM 40 MG: 100 INJECTION SUBCUTANEOUS at 08:14

## 2023-12-05 RX ADMIN — IPRATROPIUM BROMIDE AND ALBUTEROL SULFATE 1 DOSE: 2.5; .5 SOLUTION RESPIRATORY (INHALATION) at 08:52

## 2023-12-05 RX ADMIN — DEXMEDETOMIDINE HYDROCHLORIDE 0.9 MCG/KG/HR: 4 INJECTION, SOLUTION INTRAVENOUS at 07:11

## 2023-12-05 RX ADMIN — CARVEDILOL 3.12 MG: 3.12 TABLET, FILM COATED ORAL at 13:00

## 2023-12-05 RX ADMIN — CARVEDILOL 3.12 MG: 3.12 TABLET, FILM COATED ORAL at 20:50

## 2023-12-05 RX ADMIN — DEXMEDETOMIDINE HYDROCHLORIDE 0.8 MCG/KG/HR: 4 INJECTION, SOLUTION INTRAVENOUS at 15:22

## 2023-12-05 RX ADMIN — PIPERACILLIN AND TAZOBACTAM 3375 MG: 3; .375 INJECTION, POWDER, LYOPHILIZED, FOR SOLUTION INTRAVENOUS at 08:35

## 2023-12-05 RX ADMIN — LISINOPRIL 5 MG: 5 TABLET ORAL at 13:00

## 2023-12-05 RX ADMIN — IPRATROPIUM BROMIDE AND ALBUTEROL SULFATE 1 DOSE: 2.5; .5 SOLUTION RESPIRATORY (INHALATION) at 20:13

## 2023-12-05 RX ADMIN — PIPERACILLIN AND TAZOBACTAM 3375 MG: 3; .375 INJECTION, POWDER, LYOPHILIZED, FOR SOLUTION INTRAVENOUS at 17:45

## 2023-12-05 RX ADMIN — SODIUM PHOSPHATE, MONOBASIC, MONOHYDRATE AND SODIUM PHOSPHATE, DIBASIC, ANHYDROUS 15 MMOL: 142; 276 INJECTION, SOLUTION INTRAVENOUS at 12:55

## 2023-12-05 ASSESSMENT — PAIN SCALES - GENERAL
PAINLEVEL_OUTOF10: 0
PAINLEVEL_OUTOF10: 0
PAINLEVEL_OUTOF10: 7
PAINLEVEL_OUTOF10: 0
PAINLEVEL_OUTOF10: 8
PAINLEVEL_OUTOF10: 0

## 2023-12-05 ASSESSMENT — PULMONARY FUNCTION TESTS
PIF_VALUE: 12
PIF_VALUE: 22
PIF_VALUE: 21
PIF_VALUE: 21
PIF_VALUE: 28
PIF_VALUE: 28
PIF_VALUE: 37
PIF_VALUE: 20
PIF_VALUE: 26
PIF_VALUE: 26
PIF_VALUE: 21
PIF_VALUE: 36
PIF_VALUE: 26
PIF_VALUE: 18
PIF_VALUE: 18

## 2023-12-05 ASSESSMENT — PAIN DESCRIPTION - DESCRIPTORS: DESCRIPTORS: DISCOMFORT

## 2023-12-05 ASSESSMENT — PAIN DESCRIPTION - ORIENTATION: ORIENTATION: RIGHT

## 2023-12-05 ASSESSMENT — PAIN DESCRIPTION - LOCATION: LOCATION: HEAD;LEG

## 2023-12-05 NOTE — PROGRESS NOTES
lobe segmental and subsegmental pulmonary arteries is suboptimal secondary to patient motion on the examination. Bilateral pulmonary infiltrates. Small bilateral pleural effusions with associated adjacent atelectasis. Mediastinal adenopathy which is likely reactive in nature. Acute appearing fracture of the posterolateral right 4th rib. Additional findings noted above. CT HEAD WO CONTRAST    Result Date: 12/3/2023  EXAMINATION: CT OF THE HEAD WITHOUT CONTRAST  12/3/2023 10:27 am TECHNIQUE: CT of the head was performed without the administration of intravenous contrast. Automated exposure control, iterative reconstruction, and/or weight based adjustment of the mA/kV was utilized to reduce the radiation dose to as low as reasonably achievable. COMPARISON: None. HISTORY: ORDERING SYSTEM PROVIDED HISTORY: ams TECHNOLOGIST PROVIDED HISTORY: Reason for exam:->ams Has a \"code stroke\" or \"stroke alert\" been called? ->No Decision Support Exception - unselect if not a suspected or confirmed emergency medical condition->Emergency Medical Condition (MA) Is the patient pregnant?->No Reason for Exam: SOB, HX of asthma, COPD, CHF. Pt wears 4L NC at home, pt on cpap en route per EMS., AMS FINDINGS: BRAIN/VENTRICLES: There is no acute intracranial hemorrhage, mass effect or midline shift. No abnormal extra-axial fluid collection. The gray-white differentiation is maintained without evidence of an acute infarct. There is no evidence of hydrocephalus. ORBITS: The visualized portion of the orbits demonstrate no acute abnormality. SINUSES: Mucosal thickening involving the ethmoid air cells and left sphenoid air cell. Mucosal polyps or mucous retention cysts within the maxillary sinuses. SOFT TISSUES/SKULL:  No acute abnormality of the visualized skull or soft tissues. Unremarkable CT of the brain. Paranasal sinus disease.      XR CHEST PORTABLE    Result Date: 12/3/2023  EXAMINATION: ONE XRAY VIEW OF THE CHEST 12/3/2023 7:51 am COMPARISON: 05/16/2023, 01/01/2023 HISTORY: ORDERING SYSTEM PROVIDED HISTORY: sob TECHNOLOGIST PROVIDED HISTORY: Reason for exam:->sob Reason for Exam: sob FINDINGS: A single frontal view of the chest was performed. There is no acute skeletal abnormality. The heart size is stable, and at the upper limits of normal. The mediastinal contours are within normal limits. There is mild pulmonary vascular congestion. There is a small right pleural effusion. There is mild right basilar atelectasis. The lungs are otherwise clear. There is no evidence of a pneumothorax. 1. Mild pulmonary vascular congestion with a small right pleural effusion. 2. Mild right basilar atelectasis. XR CHEST PORTABLE    Result Date: 12/3/2023  EXAMINATION: ONE XRAY VIEW OF THE CHEST 12/3/2023 8:58 am COMPARISON: December 3, 2023 at 8 a.m. HISTORY: ORDERING SYSTEM PROVIDED HISTORY: post intubation TECHNOLOGIST PROVIDED HISTORY: Reason for exam:->post intubation Reason for Exam: post intubation/NG placement FINDINGS: Endotracheal tube is 3.9 cm above the anupama. Nasogastric tube terminates over the proximal to mid stomach. Cardiomediastinal silhouette is unchanged there is no significant change in interstitial opacities in the left greater than right lung. No pleural effusion or pneumothorax. No acute osseous abnormality.      Interval placement of endotracheal and nasogastric tubes Stable interstitial opacities       Electronically signed by Conchis Alvarado MD on 12/5/2023 at 7:23 AM

## 2023-12-05 NOTE — PROGRESS NOTES
Pt self extubated at 10:50. Found at 83% SpO2 on RA while thrashing in bed. Immediately placed on NRB mask at 15 L/m, SpO2 noted at 93% on NRB. Verbal order for BiPAP obtained from critical care NP. Pt placed on BiPAP at 11:02 (16/8,16,50%), restraints removed, pt tolerating mask decently, SpO2 currently 94% on BiPAP.

## 2023-12-05 NOTE — PROGRESS NOTES
[facility]  Progress Note    12/5/2023 11:27 AM    Chief Complaint/Reason for Consult: BLE Venous Stasis and RLE Abscess    Subjective: Pt seen resting in bed, recently self extubated herself, BiPap in place, seen for RLE Abscess, family previously gave written consent for bedside I&D of abscess by Dr. Yen Heredia Signs:  Vitals:    12/05/23 0800 12/05/23 0852 12/05/23 0853 12/05/23 1108   BP: (!) 160/103      Pulse: (!) 101 93 95 (!) 108   Resp: 19 23 19 19   Temp:       TempSrc:       SpO2: 94% 95% 95% 95%   Weight:       Height:           I/O:    Intake/Output Summary (Last 24 hours) at 12/5/2023 1127  Last data filed at 12/5/2023 1025  Gross per 24 hour   Intake 432.07 ml   Output 457 ml   Net -24.93 ml       Physical Exam:   General: no apparent distress, alert, afebrile  Chest/Lungs: non labored breathing no tachypnea, retractions or cyanosis  Cardiac: Heart regular rate and rhythm  Extremities:   -RLE - Lateral Calf with area concerning for abscess, skin changes noted, some surrounding superficial ulcerations also noted, chronic hemosiderin changes noted to lower leg, abscess incised by Dr. Leroy Gasca, 4 cm incision made, old blood hematoma noted and evacuated, saline moistened gauze tucked into wound, covered with ABD, wrapped with Kerlix and ACE from toes to Knee    Pulses:  -R DP: +2/4 palpable    Labs:   Lab Results   Component Value Date/Time     12/05/2023 04:48 AM    K 5.4 12/05/2023 04:48 AM    CL 97 12/05/2023 04:48 AM    CO2 33 12/05/2023 04:48 AM    BUN 32 12/05/2023 04:48 AM    CREATININE 0.7 12/05/2023 04:48 AM    GFRAA >60 05/10/2022 12:26 PM    GFRAA >60 05/31/2013 07:30 PM    LABGLOM >60 12/05/2023 04:48 AM    GLUCOSE 127 12/05/2023 04:48 AM    PHOS 2.3 12/05/2023 04:48 AM    MG 2.40 12/05/2023 04:48 AM    CALCIUM 8.5 12/05/2023 04:48 AM     Lab Results   Component Value Date/Time    WBC 7.8 12/05/2023 04:49 AM    RBC 3.30 12/05/2023 04:49 AM    HGB 8.7 hematoma noted, packed with saline moistened gauze, covered with ABD and wrapped with Kerlix/ACE   -Culture obtained   -Respiratory Failure - per ICU Team    All pertinent information and plan of care discussed with Dr. Tammi Schrader. Thank you for allowing to us to participate in the care of Moose Garcia      Electronically signed by JULIUS Haynes Ace - CNP on 12/5/2023 at 11:27 AM   Patient evaluated at the same time in conjunction with the ACP or resident of which I performed greater than 50% of the history, physical exam, and/or medical decision making:    Pt seen and examined. for DIAGNOSIS OF bulging mass right lateral calf background of pneumonia positive blood cultures agree with JULIUS Haynes Ace's note. Labs reviewed. Vitals   Vitals:    12/05/23 1000 12/05/23 1100 12/05/23 1108 12/05/23 1200   BP: (!) 161/106 (!) 168/96  (!) 158/109   Pulse: 94 (!) 113 (!) 108 90   Resp: 23 25 19 17   Temp:       TempSrc:       SpO2: 93% 91% 95% 97%   Weight:       Height:          Patient has signs and symptoms of chronic venous insufficiency with a bulge on the right lateral calf with some blistering skin necrosis over it. CAT scan reviewed about a 6 x 1.6 X 3.7cm mass in the subcutaneous tissue differential include abscess versus hematoma. Patient had been found down and would not be able to know if she had trauma to this area. We had written permission from the daughter to proceed. Patient had just extubated herself although the plan was to extubate her later in the day. Incidentally the CAT scan showed a large right Baker's cyst as well. Using 10 cc of 1% lidocaine local anesthetic after prepping with ChloraPrep we made a 4 cm incision directly over this lateral mass made the incision vertical.  I could see a hematoma bulging from underneath still took a culture just in case.   Then with sterile gloves expressed as much of the hematoma out manually this was uncomfortable for the patient despite the

## 2023-12-05 NOTE — PLAN OF CARE
Problem: Discharge Planning  Goal: Discharge to home or other facility with appropriate resources  Outcome: Progressing  Flowsheets (Taken 12/5/2023 1525)  Discharge to home or other facility with appropriate resources:   Identify barriers to discharge with patient and caregiver   Identify discharge learning needs (meds, wound care, etc)   Refer to discharge planning if patient needs post-hospital services based on physician order or complex needs related to functional status, cognitive ability or social support system   Arrange for needed discharge resources and transportation as appropriate     Problem: Safety - Adult  Goal: Free from fall injury  Outcome: Progressing  Flowsheets (Taken 12/5/2023 1525)  Free From Fall Injury: Instruct family/caregiver on patient safety     Problem: Chronic Conditions and Co-morbidities  Goal: Patient's chronic conditions and co-morbidity symptoms are monitored and maintained or improved  Outcome: Progressing  Flowsheets (Taken 12/5/2023 1525)  Care Plan - Patient's Chronic Conditions and Co-Morbidity Symptoms are Monitored and Maintained or Improved:   Monitor and assess patient's chronic conditions and comorbid symptoms for stability, deterioration, or improvement   Collaborate with multidisciplinary team to address chronic and comorbid conditions and prevent exacerbation or deterioration   Update acute care plan with appropriate goals if chronic or comorbid symptoms are exacerbated and prevent overall improvement and discharge     Problem: Skin/Tissue Integrity  Goal: Absence of new skin breakdown  Description: 1. Monitor for areas of redness and/or skin breakdown  2. Assess vascular access sites hourly  3. Every 4-6 hours minimum:  Change oxygen saturation probe site  4. Every 4-6 hours:  If on nasal continuous positive airway pressure, respiratory therapy assess nares and determine need for appliance change or resting period.   Outcome: Progressing     Problem:

## 2023-12-06 LAB
ANION GAP SERPL CALCULATED.3IONS-SCNC: 3 MMOL/L (ref 3–16)
BACTERIA BLD CULT: ABNORMAL
BACTERIA BLD CULT: ABNORMAL
BACTERIA SPEC RESP CULT: NORMAL
BASE EXCESS BLDV CALC-SCNC: 14 MMOL/L
BASE EXCESS BLDV CALC-SCNC: 14.7 MMOL/L
BASE EXCESS BLDV CALC-SCNC: 14.9 MMOL/L
BASOPHILS # BLD: 0 K/UL (ref 0–0.2)
BASOPHILS NFR BLD: 0.1 %
BUN SERPL-MCNC: 32 MG/DL (ref 7–20)
CALCIUM SERPL-MCNC: 8.3 MG/DL (ref 8.3–10.6)
CHLORIDE SERPL-SCNC: 93 MMOL/L (ref 99–110)
CO2 BLDV-SCNC: 44 MMOL/L
CO2 BLDV-SCNC: 46 MMOL/L
CO2 BLDV-SCNC: 46 MMOL/L
CO2 SERPL-SCNC: 40 MMOL/L (ref 21–32)
COHGB MFR BLDV: 0.8 %
COHGB MFR BLDV: 0.8 %
COHGB MFR BLDV: 1.5 %
CREAT SERPL-MCNC: 0.8 MG/DL (ref 0.6–1.1)
DEPRECATED RDW RBC AUTO: 18 % (ref 12.4–15.4)
EOSINOPHIL # BLD: 0 K/UL (ref 0–0.6)
EOSINOPHIL NFR BLD: 0 %
GFR SERPLBLD CREATININE-BSD FMLA CKD-EPI: >60 ML/MIN/{1.73_M2}
GLUCOSE BLD-MCNC: 112 MG/DL (ref 70–99)
GLUCOSE SERPL-MCNC: 97 MG/DL (ref 70–99)
GRAM STN SPEC: NORMAL
HCO3 BLDV-SCNC: 42 MMOL/L (ref 23–29)
HCO3 BLDV-SCNC: 43 MMOL/L (ref 23–29)
HCO3 BLDV-SCNC: 43 MMOL/L (ref 23–29)
HCT VFR BLD AUTO: 29.8 % (ref 36–48)
HGB BLD-MCNC: 9.6 G/DL (ref 12–16)
LYMPHOCYTES # BLD: 0.7 K/UL (ref 1–5.1)
LYMPHOCYTES NFR BLD: 5.7 %
MAGNESIUM SERPL-MCNC: 2.1 MG/DL (ref 1.8–2.4)
MCH RBC QN AUTO: 26.4 PG (ref 26–34)
MCHC RBC AUTO-ENTMCNC: 32.3 G/DL (ref 31–36)
MCV RBC AUTO: 81.7 FL (ref 80–100)
METHGB MFR BLDV: 0.5 %
METHGB MFR BLDV: 0.5 %
METHGB MFR BLDV: 0.6 %
MONOCYTES # BLD: 0.3 K/UL (ref 0–1.3)
MONOCYTES NFR BLD: 2.3 %
NEUTROPHILS # BLD: 10.6 K/UL (ref 1.7–7.7)
NEUTROPHILS NFR BLD: 91.9 %
O2 THERAPY: ABNORMAL
ORGANISM: ABNORMAL
ORGANISM: ABNORMAL
PCO2 BLDV: 69.6 MMHG (ref 40–50)
PCO2 BLDV: 76.8 MMHG (ref 40–50)
PCO2 BLDV: 80.6 MMHG (ref 40–50)
PERFORMED ON: ABNORMAL
PH BLDV: 7.34 [PH] (ref 7.35–7.45)
PH BLDV: 7.36 [PH] (ref 7.35–7.45)
PH BLDV: 7.38 [PH] (ref 7.35–7.45)
PHOSPHATE SERPL-MCNC: 4 MG/DL (ref 2.5–4.9)
PLATELET # BLD AUTO: 488 K/UL (ref 135–450)
PMV BLD AUTO: 7.6 FL (ref 5–10.5)
PO2 BLDV: 53 MMHG
PO2 BLDV: <30 MMHG
PO2 BLDV: <30 MMHG
POTASSIUM SERPL-SCNC: 5.1 MMOL/L (ref 3.5–5.1)
POTASSIUM SERPL-SCNC: 5.1 MMOL/L (ref 3.5–5.1)
RBC # BLD AUTO: 3.65 M/UL (ref 4–5.2)
SAO2 % BLDV: 85 %
SAO2 % BLDV: 9 %
SAO2 % BLDV: 9 %
SODIUM SERPL-SCNC: 136 MMOL/L (ref 136–145)
WBC # BLD AUTO: 11.5 K/UL (ref 4–11)

## 2023-12-06 PROCEDURE — 6370000000 HC RX 637 (ALT 250 FOR IP): Performed by: INTERNAL MEDICINE

## 2023-12-06 PROCEDURE — 6360000002 HC RX W HCPCS: Performed by: NURSE PRACTITIONER

## 2023-12-06 PROCEDURE — 36415 COLL VENOUS BLD VENIPUNCTURE: CPT

## 2023-12-06 PROCEDURE — 2500000003 HC RX 250 WO HCPCS: Performed by: NURSE PRACTITIONER

## 2023-12-06 PROCEDURE — 99232 SBSQ HOSP IP/OBS MODERATE 35: CPT | Performed by: INTERNAL MEDICINE

## 2023-12-06 PROCEDURE — 2580000003 HC RX 258

## 2023-12-06 PROCEDURE — 6360000002 HC RX W HCPCS: Performed by: INTERNAL MEDICINE

## 2023-12-06 PROCEDURE — 94760 N-INVAS EAR/PLS OXIMETRY 1: CPT

## 2023-12-06 PROCEDURE — 85025 COMPLETE CBC W/AUTO DIFF WBC: CPT

## 2023-12-06 PROCEDURE — 6370000000 HC RX 637 (ALT 250 FOR IP): Performed by: SURGERY

## 2023-12-06 PROCEDURE — APPNB30 APP NON BILLABLE TIME 0-30 MINS: Performed by: NURSE PRACTITIONER

## 2023-12-06 PROCEDURE — 80048 BASIC METABOLIC PNL TOTAL CA: CPT

## 2023-12-06 PROCEDURE — 93306 TTE W/DOPPLER COMPLETE: CPT

## 2023-12-06 PROCEDURE — 6370000000 HC RX 637 (ALT 250 FOR IP): Performed by: NURSE PRACTITIONER

## 2023-12-06 PROCEDURE — 2580000003 HC RX 258: Performed by: INTERNAL MEDICINE

## 2023-12-06 PROCEDURE — 82803 BLOOD GASES ANY COMBINATION: CPT

## 2023-12-06 PROCEDURE — 83735 ASSAY OF MAGNESIUM: CPT

## 2023-12-06 PROCEDURE — 84100 ASSAY OF PHOSPHORUS: CPT

## 2023-12-06 PROCEDURE — 2580000003 HC RX 258: Performed by: NURSE PRACTITIONER

## 2023-12-06 PROCEDURE — 1200000000 HC SEMI PRIVATE

## 2023-12-06 PROCEDURE — 92610 EVALUATE SWALLOWING FUNCTION: CPT

## 2023-12-06 PROCEDURE — 2700000000 HC OXYGEN THERAPY PER DAY

## 2023-12-06 PROCEDURE — 94640 AIRWAY INHALATION TREATMENT: CPT

## 2023-12-06 RX ORDER — HYDROCODONE BITARTRATE AND ACETAMINOPHEN 5; 325 MG/1; MG/1
2 TABLET ORAL DAILY PRN
Status: DISCONTINUED | OUTPATIENT
Start: 2023-12-06 | End: 2023-12-08 | Stop reason: HOSPADM

## 2023-12-06 RX ORDER — ACETAMINOPHEN 650 MG/1
650 SUPPOSITORY RECTAL EVERY 4 HOURS PRN
Status: DISCONTINUED | OUTPATIENT
Start: 2023-12-06 | End: 2023-12-08 | Stop reason: HOSPADM

## 2023-12-06 RX ORDER — WATER 10 ML/10ML
INJECTION INTRAMUSCULAR; INTRAVENOUS; SUBCUTANEOUS
Status: COMPLETED
Start: 2023-12-06 | End: 2023-12-06

## 2023-12-06 RX ORDER — ACETAMINOPHEN 325 MG/1
650 TABLET ORAL EVERY 4 HOURS PRN
Status: DISCONTINUED | OUTPATIENT
Start: 2023-12-06 | End: 2023-12-08 | Stop reason: HOSPADM

## 2023-12-06 RX ADMIN — Medication 20 MG: at 22:13

## 2023-12-06 RX ADMIN — HYDROCODONE BITARTRATE AND ACETAMINOPHEN 2 TABLET: 5; 325 TABLET ORAL at 15:23

## 2023-12-06 RX ADMIN — ACETAMINOPHEN 650 MG: 325 TABLET ORAL at 07:52

## 2023-12-06 RX ADMIN — PIPERACILLIN AND TAZOBACTAM 3375 MG: 3; .375 INJECTION, POWDER, LYOPHILIZED, FOR SOLUTION INTRAVENOUS at 17:59

## 2023-12-06 RX ADMIN — ENOXAPARIN SODIUM 40 MG: 100 INJECTION SUBCUTANEOUS at 07:51

## 2023-12-06 RX ADMIN — MOMETASONE FUROATE AND FORMOTEROL FUMARATE DIHYDRATE 2 PUFF: 200; 5 AEROSOL RESPIRATORY (INHALATION) at 08:06

## 2023-12-06 RX ADMIN — WATER 10 ML: 1 INJECTION INTRAMUSCULAR; INTRAVENOUS; SUBCUTANEOUS at 15:17

## 2023-12-06 RX ADMIN — IPRATROPIUM BROMIDE AND ALBUTEROL SULFATE 1 DOSE: 2.5; .5 SOLUTION RESPIRATORY (INHALATION) at 11:23

## 2023-12-06 RX ADMIN — IPRATROPIUM BROMIDE AND ALBUTEROL SULFATE 1 DOSE: 2.5; .5 SOLUTION RESPIRATORY (INHALATION) at 08:05

## 2023-12-06 RX ADMIN — CARVEDILOL 3.12 MG: 3.12 TABLET, FILM COATED ORAL at 22:13

## 2023-12-06 RX ADMIN — PIPERACILLIN AND TAZOBACTAM 3375 MG: 3; .375 INJECTION, POWDER, LYOPHILIZED, FOR SOLUTION INTRAVENOUS at 08:46

## 2023-12-06 RX ADMIN — IPRATROPIUM BROMIDE AND ALBUTEROL SULFATE 1 DOSE: 2.5; .5 SOLUTION RESPIRATORY (INHALATION) at 17:53

## 2023-12-06 RX ADMIN — ACETAMINOPHEN 650 MG: 325 TABLET ORAL at 22:13

## 2023-12-06 RX ADMIN — IPRATROPIUM BROMIDE AND ALBUTEROL SULFATE 1 DOSE: 2.5; .5 SOLUTION RESPIRATORY (INHALATION) at 04:02

## 2023-12-06 RX ADMIN — METHYLPREDNISOLONE SODIUM SUCCINATE 40 MG: 40 INJECTION INTRAMUSCULAR; INTRAVENOUS at 06:15

## 2023-12-06 RX ADMIN — IPRATROPIUM BROMIDE AND ALBUTEROL SULFATE 1 DOSE: 2.5; .5 SOLUTION RESPIRATORY (INHALATION) at 20:48

## 2023-12-06 RX ADMIN — CARVEDILOL 3.12 MG: 3.12 TABLET, FILM COATED ORAL at 07:52

## 2023-12-06 RX ADMIN — Medication 20 MG: at 08:39

## 2023-12-06 RX ADMIN — METHYLPREDNISOLONE SODIUM SUCCINATE 40 MG: 40 INJECTION INTRAMUSCULAR; INTRAVENOUS at 01:07

## 2023-12-06 RX ADMIN — MOMETASONE FUROATE AND FORMOTEROL FUMARATE DIHYDRATE 2 PUFF: 200; 5 AEROSOL RESPIRATORY (INHALATION) at 20:48

## 2023-12-06 RX ADMIN — PIPERACILLIN AND TAZOBACTAM 3375 MG: 3; .375 INJECTION, POWDER, LYOPHILIZED, FOR SOLUTION INTRAVENOUS at 01:11

## 2023-12-06 RX ADMIN — SODIUM CHLORIDE, PRESERVATIVE FREE 10 ML: 5 INJECTION INTRAVENOUS at 08:39

## 2023-12-06 RX ADMIN — METHYLPREDNISOLONE SODIUM SUCCINATE 40 MG: 40 INJECTION INTRAMUSCULAR; INTRAVENOUS at 22:21

## 2023-12-06 RX ADMIN — LISINOPRIL 5 MG: 5 TABLET ORAL at 07:51

## 2023-12-06 RX ADMIN — TORSEMIDE 20 MG: 20 TABLET ORAL at 07:51

## 2023-12-06 RX ADMIN — METHADONE HYDROCHLORIDE 120 MG: 10 TABLET ORAL at 07:51

## 2023-12-06 RX ADMIN — WATER 10 ML: 1 INJECTION INTRAMUSCULAR; INTRAVENOUS; SUBCUTANEOUS at 08:40

## 2023-12-06 RX ADMIN — METHYLPREDNISOLONE SODIUM SUCCINATE 40 MG: 40 INJECTION INTRAMUSCULAR; INTRAVENOUS at 15:17

## 2023-12-06 RX ADMIN — IPRATROPIUM BROMIDE AND ALBUTEROL SULFATE 1 DOSE: 2.5; .5 SOLUTION RESPIRATORY (INHALATION) at 00:25

## 2023-12-06 ASSESSMENT — PAIN SCALES - GENERAL
PAINLEVEL_OUTOF10: 7
PAINLEVEL_OUTOF10: 8
PAINLEVEL_OUTOF10: 0
PAINLEVEL_OUTOF10: 9
PAINLEVEL_OUTOF10: 8

## 2023-12-06 ASSESSMENT — PAIN - FUNCTIONAL ASSESSMENT
PAIN_FUNCTIONAL_ASSESSMENT: PREVENTS OR INTERFERES SOME ACTIVE ACTIVITIES AND ADLS
PAIN_FUNCTIONAL_ASSESSMENT: ACTIVITIES ARE NOT PREVENTED
PAIN_FUNCTIONAL_ASSESSMENT: PREVENTS OR INTERFERES SOME ACTIVE ACTIVITIES AND ADLS
PAIN_FUNCTIONAL_ASSESSMENT: ACTIVITIES ARE NOT PREVENTED
PAIN_FUNCTIONAL_ASSESSMENT: ACTIVITIES ARE NOT PREVENTED

## 2023-12-06 ASSESSMENT — PAIN DESCRIPTION - LOCATION
LOCATION: LEG

## 2023-12-06 ASSESSMENT — PAIN DESCRIPTION - FREQUENCY
FREQUENCY: CONTINUOUS

## 2023-12-06 ASSESSMENT — PAIN DESCRIPTION - ORIENTATION
ORIENTATION: RIGHT

## 2023-12-06 ASSESSMENT — PAIN DESCRIPTION - ONSET
ONSET: ON-GOING

## 2023-12-06 ASSESSMENT — PAIN DESCRIPTION - PAIN TYPE
TYPE: ACUTE PAIN

## 2023-12-06 ASSESSMENT — PAIN DESCRIPTION - DESCRIPTORS
DESCRIPTORS: DISCOMFORT
DESCRIPTORS: BURNING
DESCRIPTORS: DISCOMFORT
DESCRIPTORS: OTHER (COMMENT)
DESCRIPTORS: DISCOMFORT

## 2023-12-06 NOTE — PLAN OF CARE
Problem: Safety - Medical Restraint  Goal: Remains free of injury from restraints (Restraint for Interference with Medical Device)  Description: INTERVENTIONS:  1. Determine that other, less restrictive measures have been tried or would not be effective before applying the restraint  2. Evaluate the patient's condition at the time of restraint application  3. Inform patient/family regarding the reason for restraint  4. Q2H: Monitor safety, psychosocial status, comfort, nutrition and hydration  Outcome: Progressing     Problem: Discharge Planning  Goal: Discharge to home or other facility with appropriate resources  12/5/2023 2206 by Rod Leyden, RN  Outcome: Progressing     Problem: Pain  Goal: Verbalizes/displays adequate comfort level or baseline comfort level  Outcome: Progressing     Problem: Safety - Adult  Goal: Free from fall injury  12/5/2023 2206 by Rod Leyden, RN  Outcome: Progressing     Problem: Chronic Conditions and Co-morbidities  Goal: Patient's chronic conditions and co-morbidity symptoms are monitored and maintained or improved  12/5/2023 2206 by Rod Leyden, RN  Outcome: Progressing     Problem: Nutrition Deficit:  Goal: Optimize nutritional status  Outcome: Progressing     Problem: Skin/Tissue Integrity  Goal: Absence of new skin breakdown  Description: 1. Monitor for areas of redness and/or skin breakdown  2. Assess vascular access sites hourly  3. Every 4-6 hours minimum:  Change oxygen saturation probe site  4. Every 4-6 hours:  If on nasal continuous positive airway pressure, respiratory therapy assess nares and determine need for appliance change or resting period.   12/5/2023 2206 by Rod Leyden, RN  Outcome: Progressing     Problem: ABCDS Injury Assessment  Goal: Absence of physical injury  Outcome: Progressing     Problem: Respiratory - Adult  Goal: Achieves optimal ventilation and oxygenation  12/5/2023 2206 by Rod Leyden, RN  Outcome: Progressing

## 2023-12-06 NOTE — PROGRESS NOTES
Report called to 5W RN, Nila Rowland. All questions answered. Patient and patient's  and daughter both updated on transfer and plan of care. Patient and family agreeable to transfer. Transported via wheelchair with all belongings.     Electronically signed by Florence Carter RN on 12/6/2023 at 11:07 AM

## 2023-12-07 LAB
ANION GAP SERPL CALCULATED.3IONS-SCNC: 6 MMOL/L (ref 3–16)
BACTERIA BLD CULT ORG #2: NORMAL
BASOPHILS # BLD: 0 K/UL (ref 0–0.2)
BASOPHILS NFR BLD: 0.1 %
BUN SERPL-MCNC: 37 MG/DL (ref 7–20)
CALCIUM SERPL-MCNC: 7.9 MG/DL (ref 8.3–10.6)
CHLORIDE SERPL-SCNC: 92 MMOL/L (ref 99–110)
CO2 SERPL-SCNC: 37 MMOL/L (ref 21–32)
CREAT SERPL-MCNC: 0.9 MG/DL (ref 0.6–1.1)
DEPRECATED RDW RBC AUTO: 18.8 % (ref 12.4–15.4)
EOSINOPHIL # BLD: 0 K/UL (ref 0–0.6)
EOSINOPHIL NFR BLD: 0 %
GFR SERPLBLD CREATININE-BSD FMLA CKD-EPI: >60 ML/MIN/{1.73_M2}
GLUCOSE SERPL-MCNC: 92 MG/DL (ref 70–99)
HCT VFR BLD AUTO: 29.4 % (ref 36–48)
HGB BLD-MCNC: 9.3 G/DL (ref 12–16)
LYMPHOCYTES # BLD: 0.7 K/UL (ref 1–5.1)
LYMPHOCYTES NFR BLD: 7.1 %
MAGNESIUM SERPL-MCNC: 2.4 MG/DL (ref 1.8–2.4)
MCH RBC QN AUTO: 26.6 PG (ref 26–34)
MCHC RBC AUTO-ENTMCNC: 31.4 G/DL (ref 31–36)
MCV RBC AUTO: 84.6 FL (ref 80–100)
MONOCYTES # BLD: 0.4 K/UL (ref 0–1.3)
MONOCYTES NFR BLD: 4.2 %
NEUTROPHILS # BLD: 8.6 K/UL (ref 1.7–7.7)
NEUTROPHILS NFR BLD: 88.6 %
PHOSPHATE SERPL-MCNC: 3.9 MG/DL (ref 2.5–4.9)
PLATELET # BLD AUTO: 469 K/UL (ref 135–450)
PMV BLD AUTO: 7.7 FL (ref 5–10.5)
POTASSIUM SERPL-SCNC: 4.1 MMOL/L (ref 3.5–5.1)
POTASSIUM SERPL-SCNC: 4.1 MMOL/L (ref 3.5–5.1)
RBC # BLD AUTO: 3.48 M/UL (ref 4–5.2)
SODIUM SERPL-SCNC: 135 MMOL/L (ref 136–145)
WBC # BLD AUTO: 9.7 K/UL (ref 4–11)

## 2023-12-07 PROCEDURE — 97162 PT EVAL MOD COMPLEX 30 MIN: CPT | Performed by: PHYSICAL THERAPIST

## 2023-12-07 PROCEDURE — 36415 COLL VENOUS BLD VENIPUNCTURE: CPT

## 2023-12-07 PROCEDURE — 80048 BASIC METABOLIC PNL TOTAL CA: CPT

## 2023-12-07 PROCEDURE — 2580000003 HC RX 258: Performed by: INTERNAL MEDICINE

## 2023-12-07 PROCEDURE — 6370000000 HC RX 637 (ALT 250 FOR IP): Performed by: NURSE PRACTITIONER

## 2023-12-07 PROCEDURE — 83735 ASSAY OF MAGNESIUM: CPT

## 2023-12-07 PROCEDURE — 94761 N-INVAS EAR/PLS OXIMETRY MLT: CPT

## 2023-12-07 PROCEDURE — 99232 SBSQ HOSP IP/OBS MODERATE 35: CPT | Performed by: SURGERY

## 2023-12-07 PROCEDURE — 51798 US URINE CAPACITY MEASURE: CPT

## 2023-12-07 PROCEDURE — 2700000000 HC OXYGEN THERAPY PER DAY

## 2023-12-07 PROCEDURE — 1200000000 HC SEMI PRIVATE

## 2023-12-07 PROCEDURE — 97530 THERAPEUTIC ACTIVITIES: CPT

## 2023-12-07 PROCEDURE — 99232 SBSQ HOSP IP/OBS MODERATE 35: CPT | Performed by: INTERNAL MEDICINE

## 2023-12-07 PROCEDURE — 85025 COMPLETE CBC W/AUTO DIFF WBC: CPT

## 2023-12-07 PROCEDURE — 6360000002 HC RX W HCPCS: Performed by: NURSE PRACTITIONER

## 2023-12-07 PROCEDURE — APPNB30 APP NON BILLABLE TIME 0-30 MINS: Performed by: NURSE PRACTITIONER

## 2023-12-07 PROCEDURE — 6370000000 HC RX 637 (ALT 250 FOR IP): Performed by: INTERNAL MEDICINE

## 2023-12-07 PROCEDURE — 84100 ASSAY OF PHOSPHORUS: CPT

## 2023-12-07 PROCEDURE — 2500000003 HC RX 250 WO HCPCS: Performed by: NURSE PRACTITIONER

## 2023-12-07 PROCEDURE — 6360000002 HC RX W HCPCS: Performed by: INTERNAL MEDICINE

## 2023-12-07 PROCEDURE — 2580000003 HC RX 258

## 2023-12-07 PROCEDURE — 2580000003 HC RX 258: Performed by: NURSE PRACTITIONER

## 2023-12-07 PROCEDURE — A4216 STERILE WATER/SALINE, 10 ML: HCPCS | Performed by: NURSE PRACTITIONER

## 2023-12-07 PROCEDURE — 97116 GAIT TRAINING THERAPY: CPT | Performed by: PHYSICAL THERAPIST

## 2023-12-07 PROCEDURE — 97166 OT EVAL MOD COMPLEX 45 MIN: CPT

## 2023-12-07 PROCEDURE — 94640 AIRWAY INHALATION TREATMENT: CPT

## 2023-12-07 PROCEDURE — 92526 ORAL FUNCTION THERAPY: CPT

## 2023-12-07 PROCEDURE — 97530 THERAPEUTIC ACTIVITIES: CPT | Performed by: PHYSICAL THERAPIST

## 2023-12-07 RX ORDER — GUAIFENESIN 600 MG/1
600 TABLET, EXTENDED RELEASE ORAL 2 TIMES DAILY
Status: DISCONTINUED | OUTPATIENT
Start: 2023-12-07 | End: 2023-12-08 | Stop reason: HOSPADM

## 2023-12-07 RX ORDER — LIDOCAINE 4 G/G
1 PATCH TOPICAL DAILY
Status: DISCONTINUED | OUTPATIENT
Start: 2023-12-07 | End: 2023-12-08 | Stop reason: HOSPADM

## 2023-12-07 RX ORDER — METHYLPREDNISOLONE SODIUM SUCCINATE 40 MG/ML
40 INJECTION, POWDER, LYOPHILIZED, FOR SOLUTION INTRAMUSCULAR; INTRAVENOUS EVERY 12 HOURS
Status: DISCONTINUED | OUTPATIENT
Start: 2023-12-08 | End: 2023-12-08

## 2023-12-07 RX ORDER — WATER 10 ML/10ML
INJECTION INTRAMUSCULAR; INTRAVENOUS; SUBCUTANEOUS
Status: COMPLETED
Start: 2023-12-07 | End: 2023-12-07

## 2023-12-07 RX ADMIN — Medication 20 MG: at 20:44

## 2023-12-07 RX ADMIN — IPRATROPIUM BROMIDE AND ALBUTEROL SULFATE 1 DOSE: 2.5; .5 SOLUTION RESPIRATORY (INHALATION) at 12:43

## 2023-12-07 RX ADMIN — SERTRALINE 125 MG: 100 TABLET, FILM COATED ORAL at 10:55

## 2023-12-07 RX ADMIN — PIPERACILLIN AND TAZOBACTAM 3375 MG: 3; .375 INJECTION, POWDER, LYOPHILIZED, FOR SOLUTION INTRAVENOUS at 00:01

## 2023-12-07 RX ADMIN — SODIUM CHLORIDE, PRESERVATIVE FREE 10 ML: 5 INJECTION INTRAVENOUS at 11:04

## 2023-12-07 RX ADMIN — PIPERACILLIN AND TAZOBACTAM 3375 MG: 3; .375 INJECTION, POWDER, LYOPHILIZED, FOR SOLUTION INTRAVENOUS at 20:41

## 2023-12-07 RX ADMIN — MOMETASONE FUROATE AND FORMOTEROL FUMARATE DIHYDRATE 2 PUFF: 200; 5 AEROSOL RESPIRATORY (INHALATION) at 20:17

## 2023-12-07 RX ADMIN — METHYLPREDNISOLONE SODIUM SUCCINATE 40 MG: 40 INJECTION INTRAMUSCULAR; INTRAVENOUS at 10:58

## 2023-12-07 RX ADMIN — IPRATROPIUM BROMIDE AND ALBUTEROL SULFATE 1 DOSE: 2.5; .5 SOLUTION RESPIRATORY (INHALATION) at 08:59

## 2023-12-07 RX ADMIN — CARVEDILOL 3.12 MG: 3.12 TABLET, FILM COATED ORAL at 20:44

## 2023-12-07 RX ADMIN — PIPERACILLIN AND TAZOBACTAM 3375 MG: 3; .375 INJECTION, POWDER, LYOPHILIZED, FOR SOLUTION INTRAVENOUS at 11:19

## 2023-12-07 RX ADMIN — MOMETASONE FUROATE AND FORMOTEROL FUMARATE DIHYDRATE 2 PUFF: 200; 5 AEROSOL RESPIRATORY (INHALATION) at 09:05

## 2023-12-07 RX ADMIN — IPRATROPIUM BROMIDE AND ALBUTEROL SULFATE 1 DOSE: 2.5; .5 SOLUTION RESPIRATORY (INHALATION) at 20:17

## 2023-12-07 RX ADMIN — METHADONE HYDROCHLORIDE 120 MG: 10 TABLET ORAL at 10:55

## 2023-12-07 RX ADMIN — CARVEDILOL 3.12 MG: 3.12 TABLET, FILM COATED ORAL at 10:55

## 2023-12-07 RX ADMIN — Medication 20 MG: at 10:56

## 2023-12-07 RX ADMIN — ACETAMINOPHEN 650 MG: 325 TABLET ORAL at 03:28

## 2023-12-07 RX ADMIN — IPRATROPIUM BROMIDE AND ALBUTEROL SULFATE 1 DOSE: 2.5; .5 SOLUTION RESPIRATORY (INHALATION) at 16:20

## 2023-12-07 RX ADMIN — HYDROCODONE BITARTRATE AND ACETAMINOPHEN 2 TABLET: 5; 325 TABLET ORAL at 11:16

## 2023-12-07 RX ADMIN — SODIUM CHLORIDE, PRESERVATIVE FREE 10 ML: 5 INJECTION INTRAVENOUS at 20:44

## 2023-12-07 RX ADMIN — WATER 1 ML: 1 INJECTION INTRAMUSCULAR; INTRAVENOUS; SUBCUTANEOUS at 11:02

## 2023-12-07 RX ADMIN — GUAIFENESIN 600 MG: 600 TABLET ORAL at 10:55

## 2023-12-07 RX ADMIN — TORSEMIDE 20 MG: 20 TABLET ORAL at 10:55

## 2023-12-07 RX ADMIN — ENOXAPARIN SODIUM 40 MG: 100 INJECTION SUBCUTANEOUS at 11:00

## 2023-12-07 RX ADMIN — LISINOPRIL 5 MG: 5 TABLET ORAL at 10:55

## 2023-12-07 RX ADMIN — METHYLPREDNISOLONE SODIUM SUCCINATE 40 MG: 40 INJECTION INTRAMUSCULAR; INTRAVENOUS at 03:28

## 2023-12-07 RX ADMIN — IPRATROPIUM BROMIDE AND ALBUTEROL SULFATE 1 DOSE: 2.5; .5 SOLUTION RESPIRATORY (INHALATION) at 03:18

## 2023-12-07 RX ADMIN — GUAIFENESIN 600 MG: 600 TABLET ORAL at 20:46

## 2023-12-07 RX ADMIN — IPRATROPIUM BROMIDE AND ALBUTEROL SULFATE 1 DOSE: 2.5; .5 SOLUTION RESPIRATORY (INHALATION) at 00:53

## 2023-12-07 ASSESSMENT — PAIN DESCRIPTION - LOCATION
LOCATION: LEG
LOCATION: LEG

## 2023-12-07 ASSESSMENT — PAIN DESCRIPTION - PAIN TYPE: TYPE: ACUTE PAIN

## 2023-12-07 ASSESSMENT — PAIN DESCRIPTION - ORIENTATION
ORIENTATION: RIGHT
ORIENTATION: RIGHT

## 2023-12-07 ASSESSMENT — PAIN - FUNCTIONAL ASSESSMENT: PAIN_FUNCTIONAL_ASSESSMENT: PREVENTS OR INTERFERES SOME ACTIVE ACTIVITIES AND ADLS

## 2023-12-07 ASSESSMENT — PAIN SCALES - GENERAL
PAINLEVEL_OUTOF10: 7
PAINLEVEL_OUTOF10: 0
PAINLEVEL_OUTOF10: 8

## 2023-12-07 ASSESSMENT — PAIN DESCRIPTION - DESCRIPTORS: DESCRIPTORS: ACHING

## 2023-12-07 NOTE — PROGRESS NOTES
Occupational Therapy  Facility/Department: [facility]  Occupational Therapy Initial Assessment    Name: Jamaal De Souza  : 1975  MRN: [id number]  Date of Service: 2023    Discharge Recommendations:  24 hour supervision or assist, Patient would benefit from continued therapy after discharge, 2-3 sessions per week     Jamaal De Souza scored a 17/24 on the AM-PAC ADL Inpatient form. Current research shows that an AM-PAC score of 18 or greater is typically associated with a discharge to the patient's home setting. Based on the patient's AM-PAC score, and their current ADL deficits, it is recommended that the patient have 2-3 sessions per week of Occupational Therapy at d/c to increase the patient's independence. At this time, this patient demonstrates the endurance and safety to discharge home with home OT (home vs OP services) and a follow up treatment frequency of 2-3x/wk. Please see assessment section for further patient specific details. If patient discharges prior to next session this note will serve as a discharge summary. Please see below for the latest assessment towards goals. Patient Diagnosis(es): The primary encounter diagnosis was Acute respiratory failure with hypoxia and hypercapnia ([address]). Diagnoses of COPD exacerbation ([address]), Pneumonia due to infectious organism, unspecified laterality, unspecified part of lung, and Cellulitis of right lower extremity were also pertinent to this visit. Past Medical History:  has a past medical history of Arthritis, Blind right eye, CHF (congestive heart failure) ([address]), Chronic respiratory failure with hypoxia and hypercapnia ([address]), COPD (chronic obstructive pulmonary disease) ([address]), GERD (gastroesophageal reflux disease), Hypertension, Movement disorder, Neuromuscular disorder ([address]), On home O2, Pneumonia, and Sleep apnea. Past Surgical History:  has a past surgical history that includes Cholecystectomy; Tubal ligation;  Abdomen Electronically signed by SHARA Nuñez on 12/7/2023 at 11:07 AM

## 2023-12-07 NOTE — DISCHARGE INSTR - COC
Continuity of Care Form    Patient Name: Evangelina Huber   :  1975  MRN:  [id number]    Admit date:  12/3/2023  Discharge date:  2023    Code Status Order: Full Code   Advance Directives:     Admitting Physician:  Drea Hayes MD  PCP: Andrea Martinez    Discharging Nurse: [facility] Unit/Room#: Q0I-4224/1729-60  Discharging Unit Phone Number: [phone]    Emergency Contact:   Extended Emergency Contact Information  Primary Emergency Contact: Jeffrey Ashton  Address: [address] [place] [address] [address] [address] Phone: [phone]  Mobile Phone: [phone]  Relation: Spouse  Secondary Emergency Contact: Karlie Estradasity  Home Phone: [phone]  Mobile Phone: [phone]  Relation: Child  Preferred language: English   needed?  No    Past Surgical History:  Past Surgical History:   Procedure Laterality Date    ABDOMEN SURGERY      CHOLECYSTECTOMY      DILATATION, ESOPHAGUS      EYE SURGERY      TUBAL LIGATION         Immunization History:   Immunization History   Administered Date(s) Administered    Influenza Vaccine, unspecified formulation 2011    Influenza Virus Vaccine 2013    Influenza, AFLURIA (age 1 yrs+), FLUZONE, (age 10 mo+), MDV, 0.5mL 10/25/2018    Influenza, FLUARIX, FLULAVAL, FLUZONE (age 10 mo+) AND AFLURIA, (age 1 y+), PF, 0.5mL 2017, 2020    Pneumococcal, PPSV23, PNEUMOVAX 23, (age 2y+), SC/IM, 0.5mL 2011, 2016       Active Problems:  Patient Active Problem List   Diagnosis Code    Sepsis ([address]) A41.9    Chronic respiratory failure with hypoxia and hypercapnia (HCC) J96.11, J96.12    Acute respiratory failure (HCC) J96.00    COPD exacerbation (HCC) J44.1    Respiratory acidosis E87.29    COPD, severe (HCC) J44.9    Acute on chronic respiratory failure with hypoxia and hypercapnia (HCC) J96.21, J96.22    Tobacco use Z72.0    Nonhealing ulcer of right lower leg with fat

## 2023-12-07 NOTE — PLAN OF CARE
Problem: Safety - Medical Restraint  Goal: Remains free of injury from restraints (Restraint for Interference with Medical Device)  Description: INTERVENTIONS:  1. Determine that other, less restrictive measures have been tried or would not be effective before applying the restraint  2. Evaluate the patient's condition at the time of restraint application  3. Inform patient/family regarding the reason for restraint  4. Q2H: Monitor safety, psychosocial status, comfort, nutrition and hydration  Outcome: Progressing     Problem: Discharge Planning  Goal: Discharge to home or other facility with appropriate resources  Outcome: Progressing  Flowsheets (Taken 12/6/2023 1217 by Raymond Granado, RN)  Discharge to home or other facility with appropriate resources: Identify barriers to discharge with patient and caregiver     Problem: Pain  Goal: Verbalizes/displays adequate comfort level or baseline comfort level  Outcome: Progressing     Problem: Safety - Adult  Goal: Free from fall injury  Outcome: Progressing     Problem: Chronic Conditions and Co-morbidities  Goal: Patient's chronic conditions and co-morbidity symptoms are monitored and maintained or improved  Outcome: Progressing  Flowsheets (Taken 12/6/2023 1217 by Raymond Granado RN)  Care Plan - Patient's Chronic Conditions and Co-Morbidity Symptoms are Monitored and Maintained or Improved: Monitor and assess patient's chronic conditions and comorbid symptoms for stability, deterioration, or improvement     Problem: Nutrition Deficit:  Goal: Optimize nutritional status  Outcome: Progressing     Problem: Skin/Tissue Integrity  Goal: Absence of new skin breakdown  Description: 1. Monitor for areas of redness and/or skin breakdown  2. Assess vascular access sites hourly  3. Every 4-6 hours minimum:  Change oxygen saturation probe site  4.   Every 4-6 hours:  If on nasal continuous positive airway pressure, respiratory therapy assess nares and determine need for

## 2023-12-07 NOTE — PROGRESS NOTES
V2.0  [facility] Daily Progress Note      Name:  Roopa Mosquera /Age/Sex: 1975  (51 y.o. female)   MRN & CSN:  [id number] & [id number] Encounter Date/Time: 2023 4:26 PM EST    Location:  [id number] PCP: Juju Banuelos Day: 5    Assessment and Plan:   Roopa Mosquera is a 51 y.o. female with medical history significant for chronic combined systolic and diastolic heart failure, severe COPD, chronic respiratory failure with hypoxia and hypercapnia who was admitted on 12/3/2023 with with acute on chronic respiratory failure with hypoxia and hypercapnia s/p intubation in the ER, pneumonia and COPD exacerbation. Assessment/Plan :   1. Acute on chronic respiratory failure with hypoxia and hypercapnia due to pneumonia and COPD exacerbation s/p intubation on 12/3/2023 and self extubation on 2023. She was hypoxic in the 80s on arrival to the ER. At baseline she is on 4 L of oxygen. Currently on 6 L of oxygen. Had bronchoscopy with BAL on 2023. Critical care are following and appreciate assistance. 2.  Acute exacerbation of COPD. On systemic steroid, nebs and antibiotics. 3.  Sepsis due to pneumonia and right leg cellulitis with tachycardia, leukocytosis and tachypnea. Blood culture MSSA. TTE without any vegetation. Monitor CBC for resolution of leukocytosis. Continue broad-spectrum antibiotic for now and will transition to oral once wound culture is resulted. 4.  Chronic combined systolic and diastolic heart failure. TTE on 2020 with EF of 40 to 45% and grade 2 diastolic dysfunction. Restarted home medication. 5.  Right leg cellulitis with possible abscess. Continue broad-spectrum antibiotics. Had incision and drainage on 2023 by vascular surgery. Follow-up wound culture. Appreciate assistance. 6.  Right lower lobe pneumonia l likely aspiration. Pneumonia panel is positive for Moraxella catarrhalis and enterorhinovirus.   Continue droplet

## 2023-12-07 NOTE — PLAN OF CARE
Problem: Safety - Medical Restraint  Goal: Remains free of injury from restraints (Restraint for Interference with Medical Device)  Description: INTERVENTIONS:  1. Determine that other, less restrictive measures have been tried or would not be effective before applying the restraint  2. Evaluate the patient's condition at the time of restraint application  3. Inform patient/family regarding the reason for restraint  4. Q2H: Monitor safety, psychosocial status, comfort, nutrition and hydration  Outcome: Progressing     Problem: Discharge Planning  Goal: Discharge to home or other facility with appropriate resources  Outcome: Progressing     Problem: Pain  Goal: Verbalizes/displays adequate comfort level or baseline comfort level  Outcome: Progressing     Problem: Safety - Adult  Goal: Free from fall injury  Outcome: Progressing     Problem: Chronic Conditions and Co-morbidities  Goal: Patient's chronic conditions and co-morbidity symptoms are monitored and maintained or improved  Outcome: Progressing     Problem: Nutrition Deficit:  Goal: Optimize nutritional status  Outcome: Progressing     Problem: Skin/Tissue Integrity  Goal: Absence of new skin breakdown  Description: 1. Monitor for areas of redness and/or skin breakdown  2. Assess vascular access sites hourly  3. Every 4-6 hours minimum:  Change oxygen saturation probe site  4. Every 4-6 hours:  If on nasal continuous positive airway pressure, respiratory therapy assess nares and determine need for appliance change or resting period.   Outcome: Progressing     Problem: ABCDS Injury Assessment  Goal: Absence of physical injury  Outcome: Progressing     Problem: Respiratory - Adult  Goal: Achieves optimal ventilation and oxygenation  Outcome: Progressing     Problem: Neurosensory - Adult  Goal: Achieves stable or improved neurological status  Outcome: Progressing     Problem: Neurosensory - Adult  Goal: Absence of seizures  Outcome: Progressing     Problem: Neurosensory - Adult  Goal: Remains free of injury related to seizures activity  Outcome: Progressing     Problem: Neurosensory - Adult  Goal: Achieves maximal functionality and self care  Outcome: Progressing     Problem: Cardiovascular - Adult  Goal: Maintains optimal cardiac output and hemodynamic stability  Outcome: Progressing     Problem: Cardiovascular - Adult  Goal: Absence of cardiac dysrhythmias or at baseline  Outcome: Progressing     Problem: Skin/Tissue Integrity - Adult  Goal: Skin integrity remains intact  Outcome: Progressing     Problem: Skin/Tissue Integrity - Adult  Goal: Incisions, wounds, or drain sites healing without S/S of infection  Outcome: Progressing     Problem: Skin/Tissue Integrity - Adult  Goal: Oral mucous membranes remain intact  Outcome: Progressing     Problem: Musculoskeletal - Adult  Goal: Return mobility to safest level of function  Outcome: Progressing     Problem: Musculoskeletal - Adult  Goal: Maintain proper alignment of affected body part  Outcome: Progressing     Problem: Musculoskeletal - Adult  Goal: Return ADL status to a safe level of function  Outcome: Progressing     Problem: Gastrointestinal - Adult  Goal: Minimal or absence of nausea and vomiting  Outcome: Progressing     Problem: Gastrointestinal - Adult  Goal: Maintains or returns to baseline bowel function  Outcome: Progressing     Problem: Gastrointestinal - Adult  Goal: Maintains adequate nutritional intake  Outcome: Progressing     Problem: Gastrointestinal - Adult  Goal: Establish and maintain optimal ostomy function  Outcome: Progressing     Problem: Genitourinary - Adult  Goal: Absence of urinary retention  Outcome: Progressing     Problem: Genitourinary - Adult  Goal: Urinary catheter remains patent  Outcome: Progressing     Problem: Metabolic/Fluid and Electrolytes - Adult  Goal: Electrolytes maintained within normal limits  Outcome: Progressing     Problem: Metabolic/Fluid and Electrolytes -

## 2023-12-07 NOTE — PROGRESS NOTES
[facility]   Speech Therapy  Daily Dysphagia Treatment Note/DISCHARGE        Janna Rodriguez  AGE: 51 y.o. GENDER: female  : 1975  [id number]  EPISODE DATE:  12/3/2023  Patient Active Problem List   Diagnosis    Sepsis ([address])    Chronic respiratory failure with hypoxia and hypercapnia (HCC)    Acute respiratory failure (HCC)    COPD exacerbation (HCC)    Respiratory acidosis    COPD, severe (HCC)    Acute on chronic respiratory failure with hypoxia and hypercapnia (HCC)    Tobacco use    Nonhealing ulcer of right lower leg with fat layer exposed ([address])    Leg swelling    Localized edema    Acute on chronic systolic (congestive) heart failure (HCC)    Pulmonary edema    Abnormal CXR    Chronic combined systolic and diastolic CHF (congestive heart failure) ([address])    Acute metabolic encephalopathy    Methadone use    Acute exacerbation of chronic obstructive pulmonary disease ([address])    Morbid obesity with BMI of 40.0-44.9, adult ([address])    Heart failure (HCC)    Chronic anemia    Chronic venous insufficiency    Thoracogenic scoliosis of thoracolumbar region    Low back pain    Acute hypoxemic respiratory failure (HCC)    BLANCA (obstructive sleep apnea)    Pneumonia due to infectious organism    Cellulitis of right lower extremity    Tachycardia    Tachypnea     Allergies   Allergen Reactions    Latex      Rash developed    Sulfa Antibiotics Rash     Throat swelling    Nsaids Other (See Comments)     ulcer    Ultram [Tramadol Hcl] Swelling and Rash     Treatment Diagnosis: Dysphagia       Chart review:   H&P:   Patient can not provide information at this time as she is intubated and sedated, and information for this report is derived from conversation with the emergency room physician and review of the records.   Pt is an 51y.o. year-old female with a history that includes chronic combined systolic and diastolic CHF (congestive heart failure), severe COPD and chronic respiratory failure with hypoxia

## 2023-12-07 NOTE — PROGRESS NOTES
Physical Therapy  Facility/Department: [facility] 2O PROGRESSIVE CARE  Physical Therapy Initial Assessment    Name: Viky Coe  : 1975  MRN: [id number]  Date of Service: 2023    Discharge Recommendations:  Home with assist PRN, Home with Home health PT   PT Equipment Recommendations  Equipment Needed: No      Viky Coe scored a 18/24 on the AM-PAC short mobility form. Current research shows that an AM-PAC score of 18 or greater is typically associated with a discharge to the patient's home setting. Based on the patient's AM-PAC score and their current functional mobility deficits, it is recommended that the patient have 2-3 sessions per week of Physical Therapy at d/c to increase the patient's independence. At this time, this patient demonstrates the endurance and safety to discharge home with Home PT and a follow up treatment frequency of 2-3x/wk. Please see assessment section for further patient specific details. If patient discharges prior to next session this note will serve as a discharge summary. Please see below for the latest assessment towards goals. Patient Diagnosis(es): The primary encounter diagnosis was Acute respiratory failure with hypoxia and hypercapnia ([address]). Diagnoses of COPD exacerbation ([address]), Pneumonia due to infectious organism, unspecified laterality, unspecified part of lung, and Cellulitis of right lower extremity were also pertinent to this visit. Past Medical History:  has a past medical history of Arthritis, Blind right eye, CHF (congestive heart failure) ([address]), Chronic respiratory failure with hypoxia and hypercapnia ([address]), COPD (chronic obstructive pulmonary disease) ([address]), GERD (gastroesophageal reflux disease), Hypertension, Movement disorder, Neuromuscular disorder ([address]), On home O2, Pneumonia, and Sleep apnea. Past Surgical History:  has a past surgical history that includes Cholecystectomy; Tubal ligation;  Abdomen surgery; eye surgery; and Dilatation,

## 2023-12-08 VITALS
SYSTOLIC BLOOD PRESSURE: 151 MMHG | BODY MASS INDEX: 37.07 KG/M2 | WEIGHT: 183.86 LBS | DIASTOLIC BLOOD PRESSURE: 88 MMHG | HEIGHT: 59 IN | TEMPERATURE: 98.5 F | OXYGEN SATURATION: 95 % | HEART RATE: 84 BPM | RESPIRATION RATE: 15 BRPM

## 2023-12-08 LAB
ANION GAP SERPL CALCULATED.3IONS-SCNC: 0 MMOL/L (ref 3–16)
BACTERIA BLD CULT ORG #2: NORMAL
BACTERIA BLD CULT: NORMAL
BACTERIA SPEC AEROBE CULT: NORMAL
BASOPHILS # BLD: 0 K/UL (ref 0–0.2)
BASOPHILS NFR BLD: 0.2 %
BUN SERPL-MCNC: 41 MG/DL (ref 7–20)
CALCIUM SERPL-MCNC: 8.1 MG/DL (ref 8.3–10.6)
CHLORIDE SERPL-SCNC: 97 MMOL/L (ref 99–110)
CO2 SERPL-SCNC: 43 MMOL/L (ref 21–32)
CREAT SERPL-MCNC: 1 MG/DL (ref 0.6–1.1)
DEPRECATED RDW RBC AUTO: 18.7 % (ref 12.4–15.4)
EOSINOPHIL # BLD: 0 K/UL (ref 0–0.6)
EOSINOPHIL NFR BLD: 0.1 %
GFR SERPLBLD CREATININE-BSD FMLA CKD-EPI: >60 ML/MIN/{1.73_M2}
GLUCOSE SERPL-MCNC: 102 MG/DL (ref 70–99)
GRAM STN SPEC: NORMAL
HCT VFR BLD AUTO: 25.8 % (ref 36–48)
HGB BLD-MCNC: 8.5 G/DL (ref 12–16)
LYMPHOCYTES # BLD: 1 K/UL (ref 1–5.1)
LYMPHOCYTES NFR BLD: 9.8 %
MAGNESIUM SERPL-MCNC: 2.2 MG/DL (ref 1.8–2.4)
MCH RBC QN AUTO: 27.5 PG (ref 26–34)
MCHC RBC AUTO-ENTMCNC: 32.8 G/DL (ref 31–36)
MCV RBC AUTO: 83.8 FL (ref 80–100)
MONOCYTES # BLD: 1 K/UL (ref 0–1.3)
MONOCYTES NFR BLD: 9.5 %
NEUTROPHILS # BLD: 8.5 K/UL (ref 1.7–7.7)
NEUTROPHILS NFR BLD: 80.4 %
PHOSPHATE SERPL-MCNC: 3.8 MG/DL (ref 2.5–4.9)
PLATELET # BLD AUTO: 364 K/UL (ref 135–450)
PMV BLD AUTO: 7.5 FL (ref 5–10.5)
POTASSIUM SERPL-SCNC: 3.8 MMOL/L (ref 3.5–5.1)
POTASSIUM SERPL-SCNC: 3.8 MMOL/L (ref 3.5–5.1)
RBC # BLD AUTO: 3.08 M/UL (ref 4–5.2)
SODIUM SERPL-SCNC: 140 MMOL/L (ref 136–145)
WBC # BLD AUTO: 10.6 K/UL (ref 4–11)

## 2023-12-08 PROCEDURE — 99024 POSTOP FOLLOW-UP VISIT: CPT | Performed by: SURGERY

## 2023-12-08 PROCEDURE — 94760 N-INVAS EAR/PLS OXIMETRY 1: CPT

## 2023-12-08 PROCEDURE — 97530 THERAPEUTIC ACTIVITIES: CPT

## 2023-12-08 PROCEDURE — 36415 COLL VENOUS BLD VENIPUNCTURE: CPT

## 2023-12-08 PROCEDURE — 6370000000 HC RX 637 (ALT 250 FOR IP): Performed by: INTERNAL MEDICINE

## 2023-12-08 PROCEDURE — 2580000003 HC RX 258: Performed by: INTERNAL MEDICINE

## 2023-12-08 PROCEDURE — 6360000002 HC RX W HCPCS: Performed by: NURSE PRACTITIONER

## 2023-12-08 PROCEDURE — 6370000000 HC RX 637 (ALT 250 FOR IP): Performed by: NURSE PRACTITIONER

## 2023-12-08 PROCEDURE — 6360000002 HC RX W HCPCS: Performed by: INTERNAL MEDICINE

## 2023-12-08 PROCEDURE — 80048 BASIC METABOLIC PNL TOTAL CA: CPT

## 2023-12-08 PROCEDURE — 84100 ASSAY OF PHOSPHORUS: CPT

## 2023-12-08 PROCEDURE — 2500000003 HC RX 250 WO HCPCS: Performed by: NURSE PRACTITIONER

## 2023-12-08 PROCEDURE — 2700000000 HC OXYGEN THERAPY PER DAY

## 2023-12-08 PROCEDURE — 99232 SBSQ HOSP IP/OBS MODERATE 35: CPT | Performed by: INTERNAL MEDICINE

## 2023-12-08 PROCEDURE — 85025 COMPLETE CBC W/AUTO DIFF WBC: CPT

## 2023-12-08 PROCEDURE — 83735 ASSAY OF MAGNESIUM: CPT

## 2023-12-08 PROCEDURE — 94640 AIRWAY INHALATION TREATMENT: CPT

## 2023-12-08 PROCEDURE — 97116 GAIT TRAINING THERAPY: CPT

## 2023-12-08 PROCEDURE — 2580000003 HC RX 258: Performed by: NURSE PRACTITIONER

## 2023-12-08 PROCEDURE — APPNB30 APP NON BILLABLE TIME 0-30 MINS: Performed by: NURSE PRACTITIONER

## 2023-12-08 RX ORDER — AMOXICILLIN AND CLAVULANATE POTASSIUM 875; 125 MG/1; MG/1
1 TABLET, FILM COATED ORAL EVERY 12 HOURS SCHEDULED
Qty: 14 TABLET | Refills: 0 | Status: SHIPPED | OUTPATIENT
Start: 2023-12-08 | End: 2023-12-15

## 2023-12-08 RX ORDER — PREDNISONE 10 MG/1
10 TABLET ORAL DAILY
Status: DISCONTINUED | OUTPATIENT
Start: 2023-12-14 | End: 2023-12-08 | Stop reason: HOSPADM

## 2023-12-08 RX ORDER — PREDNISONE 20 MG/1
20 TABLET ORAL DAILY
Qty: 2 TABLET | Refills: 0 | Status: SHIPPED | OUTPATIENT
Start: 2023-12-09 | End: 2023-12-11

## 2023-12-08 RX ORDER — FAMOTIDINE 20 MG/1
20 TABLET, FILM COATED ORAL 2 TIMES DAILY
Status: DISCONTINUED | OUTPATIENT
Start: 2023-12-08 | End: 2023-12-08 | Stop reason: HOSPADM

## 2023-12-08 RX ORDER — LIDOCAINE 4 G/G
1 PATCH TOPICAL DAILY
Qty: 10 PATCH | Refills: 0 | Status: SHIPPED | OUTPATIENT
Start: 2023-12-09

## 2023-12-08 RX ORDER — PREDNISONE 20 MG/1
20 TABLET ORAL DAILY
Status: DISCONTINUED | OUTPATIENT
Start: 2023-12-08 | End: 2023-12-08 | Stop reason: HOSPADM

## 2023-12-08 RX ORDER — PREDNISONE 5 MG/1
5 TABLET ORAL DAILY
Status: DISCONTINUED | OUTPATIENT
Start: 2023-12-17 | End: 2023-12-08 | Stop reason: HOSPADM

## 2023-12-08 RX ORDER — IPRATROPIUM BROMIDE AND ALBUTEROL SULFATE 2.5; .5 MG/3ML; MG/3ML
1 SOLUTION RESPIRATORY (INHALATION)
Status: DISCONTINUED | OUTPATIENT
Start: 2023-12-08 | End: 2023-12-08 | Stop reason: HOSPADM

## 2023-12-08 RX ORDER — PREDNISONE 5 MG/1
15 TABLET ORAL DAILY
Qty: 9 TABLET | Refills: 0 | Status: SHIPPED | OUTPATIENT
Start: 2023-12-11 | End: 2023-12-14

## 2023-12-08 RX ORDER — AMOXICILLIN AND CLAVULANATE POTASSIUM 875; 125 MG/1; MG/1
1 TABLET, FILM COATED ORAL EVERY 12 HOURS SCHEDULED
Status: DISCONTINUED | OUTPATIENT
Start: 2023-12-08 | End: 2023-12-08 | Stop reason: HOSPADM

## 2023-12-08 RX ORDER — PREDNISONE 5 MG/1
5 TABLET ORAL DAILY
Qty: 3 TABLET | Refills: 0 | Status: SHIPPED | OUTPATIENT
Start: 2023-12-17 | End: 2023-12-20

## 2023-12-08 RX ORDER — PREDNISONE 10 MG/1
10 TABLET ORAL DAILY
Qty: 3 TABLET | Refills: 0 | Status: SHIPPED | OUTPATIENT
Start: 2023-12-14 | End: 2023-12-17

## 2023-12-08 RX ADMIN — PIPERACILLIN AND TAZOBACTAM 3375 MG: 3; .375 INJECTION, POWDER, LYOPHILIZED, FOR SOLUTION INTRAVENOUS at 03:02

## 2023-12-08 RX ADMIN — SERTRALINE 125 MG: 100 TABLET, FILM COATED ORAL at 03:08

## 2023-12-08 RX ADMIN — METHADONE HYDROCHLORIDE 120 MG: 10 TABLET ORAL at 09:08

## 2023-12-08 RX ADMIN — SODIUM CHLORIDE, PRESERVATIVE FREE 10 ML: 5 INJECTION INTRAVENOUS at 09:10

## 2023-12-08 RX ADMIN — Medication 20 MG: at 09:09

## 2023-12-08 RX ADMIN — MOMETASONE FUROATE AND FORMOTEROL FUMARATE DIHYDRATE 2 PUFF: 200; 5 AEROSOL RESPIRATORY (INHALATION) at 08:32

## 2023-12-08 RX ADMIN — GUAIFENESIN 600 MG: 600 TABLET ORAL at 09:09

## 2023-12-08 RX ADMIN — LISINOPRIL 5 MG: 5 TABLET ORAL at 09:07

## 2023-12-08 RX ADMIN — METHYLPREDNISOLONE SODIUM SUCCINATE 40 MG: 40 INJECTION INTRAMUSCULAR; INTRAVENOUS at 03:01

## 2023-12-08 RX ADMIN — AMOXICILLIN AND CLAVULANATE POTASSIUM 1 TABLET: 875; 125 TABLET, FILM COATED ORAL at 11:07

## 2023-12-08 RX ADMIN — HYDROCODONE BITARTRATE AND ACETAMINOPHEN 2 TABLET: 5; 325 TABLET ORAL at 09:09

## 2023-12-08 RX ADMIN — CARVEDILOL 3.12 MG: 3.12 TABLET, FILM COATED ORAL at 09:08

## 2023-12-08 RX ADMIN — IPRATROPIUM BROMIDE AND ALBUTEROL SULFATE 1 DOSE: .5; 3 SOLUTION RESPIRATORY (INHALATION) at 08:31

## 2023-12-08 RX ADMIN — PREDNISONE 20 MG: 20 TABLET ORAL at 11:07

## 2023-12-08 RX ADMIN — IPRATROPIUM BROMIDE AND ALBUTEROL SULFATE 1 DOSE: 2.5; .5 SOLUTION RESPIRATORY (INHALATION) at 01:23

## 2023-12-08 RX ADMIN — IPRATROPIUM BROMIDE AND ALBUTEROL SULFATE 1 DOSE: .5; 3 SOLUTION RESPIRATORY (INHALATION) at 12:40

## 2023-12-08 RX ADMIN — ENOXAPARIN SODIUM 40 MG: 100 INJECTION SUBCUTANEOUS at 09:07

## 2023-12-08 RX ADMIN — TORSEMIDE 20 MG: 20 TABLET ORAL at 09:09

## 2023-12-08 ASSESSMENT — PAIN DESCRIPTION - LOCATION: LOCATION: LEG;SHOULDER

## 2023-12-08 ASSESSMENT — PAIN DESCRIPTION - ORIENTATION: ORIENTATION: RIGHT;LEFT

## 2023-12-08 ASSESSMENT — PAIN DESCRIPTION - ONSET: ONSET: ON-GOING

## 2023-12-08 ASSESSMENT — PAIN DESCRIPTION - FREQUENCY: FREQUENCY: CONTINUOUS

## 2023-12-08 ASSESSMENT — PAIN SCALES - GENERAL
PAINLEVEL_OUTOF10: 0
PAINLEVEL_OUTOF10: 8
PAINLEVEL_OUTOF10: 2

## 2023-12-08 ASSESSMENT — PAIN DESCRIPTION - PAIN TYPE: TYPE: ACUTE PAIN;CHRONIC PAIN

## 2023-12-08 ASSESSMENT — PAIN DESCRIPTION - DESCRIPTORS: DESCRIPTORS: ACHING

## 2023-12-08 NOTE — CARE COORDINATION
Case Management Discharge Note          Date / Time of Note: 12/8/2023 12:17 PM                  Patient Name: Sean Ceron   YOB: 1975  Diagnosis: COPD exacerbation ([address] [facility]) [J44.1]  Cellulitis of right lower extremity [L03.115]  Acute respiratory failure with hypoxia and hypercapnia ([address] ) [J96.01, J96.02]  Pneumonia due to infectious organism, unspecified laterality, unspecified part of lung [J18.9]   Date / Time: 12/3/2023  7:34 AM    Financial:  Payor: [facility] MEDICARE / Plan: aRchel Jordan COMPLETE / Product Type: *No Product type* /      Pharmacy:    [facility] [id number] - [facility], [address] [place] [address] [phone] - F [phone]   [address]  Phone: [phone] Fax: [phone] [address], [address] [phone] - F [phone]  [address] [facility] [address]  Phone: [phone] Fax: [phone]      Assistance purchasing medications?: Potential Assistance Purchasing Medications: No  Assistance provided by Case Management: None at this time    DISCHARGE Disposition: Home with [address] [place] [address] Street:  [address] [place] [address] ordered at discharge: Yes  [address]:  [address] [place] [address]  Phone: [phone]  Fax: [phone]  Orders faxed: will pull from [address] [place] [address] and Respiratory Equipment:  Oxygen needed at discharge?: Yes  [address]: [address] [facility]  Phone: [phone]  Portable tank available for discharge?: Yes    Transportation:  Transportation PLAN for discharge: family   Mode of Transport: Private Car  Time of Transport: TBD    IMM Completed:   Yes, Case management has presented and reviewed IMM letter #2. IMM Letter date given[de-identified] 12/08/23  IMM Letter time given[de-identified] 6438. Patient and/or family/POA verbalized understanding of their medicare rights and appeal process if needed.  Patient and/or family/POA has signed, initialed and placed the date and
Wound care consulted for RLE wound possible I&D. Vascular consulted and managing care. Please defer to vascular for all wound care orders at this time. Will not continue to follow. Please re-consult if needed.   Electronically signed by Bunny Fleming RN [address] [facility] on 12/5/2023 at 10:54 AM
therapies when stable. )    PT AM-PAC:   /24  OT AM-PAC:   /24    Family can provide assistance at DC: Yes  Would you like Case Management to discuss the discharge plan with any other family members/significant others, and if so, who? Yes (family if needed.)  Plans to Return to Present Housing: Unknown at present  Other Identified Issues/Barriers to RETURNING to current housing: None noted at this time. Potential Assistance needed at discharge: [address], Home Care            Potential DME:  None noted at this time. Patient expects to discharge to: House  Plan for transportation at discharge: Family    Financial  Payor: Jamin Khan / Plan: Sirena Veliz / Product Type: *No Product type* /     Does insurance require precert for SNF: Yes    Potential assistance Purchasing Medications: No  Meds-to-Beds request: [facility] [id number] - [address] [phone] [facility] [phone]  [facility] [address]  [address] [facility]  Phone: [phone] Fax: [phone]    [address], [address] [phone] [facility] [phone]  [address] [facility] [address]  Phone: [phone] Fax: [phone]      Notes:    Factors facilitating achievement of predicted outcomes: Family support, Motivated, Pleasant, Has needed Durable Medical Equipment at home, and Knowledge about rehab    Barriers to discharge: None noted at this time. Additional Case Management Notes:   1) Waiting on pulmonology clearance and IVAB recommendations.    2) Home with home care ([facility]) vs SNF placement (will need a freedom of choice list.     The Plan for Transition of Care is related to the following treatment goals of COPD exacerbation ([address]) [J44.1]  Cellulitis of right lower extremity [L03.115]  Acute respiratory failure with hypoxia and hypercapnia (HCC) [J96.01, J96.02]  Pneumonia due to infectious organism,

## 2023-12-08 NOTE — PROGRESS NOTES
Physical Therapy  Facility/Department: [address] PROGRESSIVE CARE  Physical Therapy Daily Progress Note    Name: Cresencio Black  : 1975  MRN: [id number]  Date of Service: 2023    Discharge Recommendations:  Home with assist PRN, Home with Home health PT    Crseencio Black scored a 22/24 on the AM-PAC short mobility form. Current research shows that an AM-PAC score of 18 or greater is typically associated with a discharge to the patient's home setting. Based on the patient's AM-PAC score and their current functional mobility deficits, it is recommended that the patient have 2-3 sessions per week of Physical Therapy at d/c to increase the patient's independence. At this time, this patient demonstrates the endurance and safety to discharge home with home services and a follow up treatment frequency of 2-3x/wk. Please see assessment section for further patient specific details. If patient discharges prior to next session this note will serve as a discharge summary. Please see below for the latest assessment towards goals. Patient Diagnosis(es): The primary encounter diagnosis was Acute respiratory failure with hypoxia and hypercapnia ([address]). Diagnoses of COPD exacerbation ([address]), Pneumonia due to infectious organism, unspecified laterality, unspecified part of lung, and Cellulitis of right lower extremity were also pertinent to this visit. Past Medical History:  has a past medical history of Arthritis, Blind right eye, CHF (congestive heart failure) ([address]), Chronic respiratory failure with hypoxia and hypercapnia ([address]), COPD (chronic obstructive pulmonary disease) ([address]), GERD (gastroesophageal reflux disease), Hypertension, Movement disorder, Neuromuscular disorder ([address]), On home O2, Pneumonia, and Sleep apnea. Past Surgical History:  has a past surgical history that includes Cholecystectomy; Tubal ligation; Abdomen surgery; eye surgery; and Dilatation, esophagus.     Assessment   Body Structures, Functions, Activity Limitations Requiring Skilled Therapeutic Intervention: Decreased functional mobility   Assessment: pt is a 50 yo female who was admitted to [address] [place] [address] on 12/3/2023 with with acute on chronic respiratory failure with hypoxia and hypercapnia s/p intubation in the ER, pneumonia and COPD exacerbation. Pt at baseline is on 4 liters O2 and lives with family who assist as needed. Pt at baseline uses a rollator in home and w/c in community; pt appears slightly below her baseline due to breathing status and low endurance; pt will be safe to return home with family assist and home PT. Today pt performed all mobility with SBA to S and wh walker, SOB but pt reports she is breathing better and moving better then she was before she came into the hospital.  Activity Tolerance  Activity Tolerance: Patient tolerated treatment well     Plan   Physical Therapy Plan  General Plan: 3-5 times per week  Current Treatment Recommendations: Functional mobility training, Transfer training, Balance training, Gait training, Endurance training  Safety Devices  Type of Devices: All fall risk precautions in place, Left in chair, Chair alarm in place, Gait belt, Nurse notified, Patient at risk for falls, Call light within reach  Restraints  Restraints Initially in Place: No     Restrictions  Restrictions/Precautions  Restrictions/Precautions: Fall Risk  Position Activity Restriction  Other position/activity restrictions: 5L O2     Subjective   General  Chart Reviewed: Yes  Additional Pertinent Hx: per H&P note: Zuleika Najera is a 51 y.o. female with medical history significant for chronic combined systolic and diastolic heart failure, severe COPD, chronic respiratory failure with hypoxia and hypercapnia who was admitted on 12/3/2023 with with acute on chronic respiratory failure with hypoxia and hypercapnia s/p intubation in the ER, pneumonia and COPD exacerbation. \"  Response To Previous Treatment: Patient with no complaints from

## 2023-12-08 NOTE — PLAN OF CARE
Problem: Safety - Medical Restraint  Goal: Remains free of injury from restraints (Restraint for Interference with Medical Device)  Description: INTERVENTIONS:  1. Determine that other, less restrictive measures have been tried or would not be effective before applying the restraint  2. Evaluate the patient's condition at the time of restraint application  3. Inform patient/family regarding the reason for restraint  4.  Q2H: Monitor safety, psychosocial status, comfort, nutrition and hydration  12/8/2023 1513 by Bhumi Mccauley RN  Outcome: Completed  12/8/2023 0141 by Bella Garcia RN  Outcome: Progressing  Flowsheets (Taken 12/4/2023 2000 by Mathew Diaz RN)  Remains free of injury from restraints (restraint for interference with medical device): Determine that other, less restrictive measures have been tried or would not be effective before applying the restraint

## 2023-12-08 NOTE — PROGRESS NOTES
Comprehensive Nutrition Assessment    Type and Reason for Visit:  Reassess    Nutrition Recommendations/Plan:   Continue regular diet     Malnutrition Assessment:  Malnutrition Status:  No malnutrition (12/04/23 1448)    Context:  Chronic Illness       Nutrition Assessment: Follwo up. Pt extubated and diet advanced to regular on 12/6. Pt with goo dintake since diet advancement, finishing meals mostly. Nutrition related labs reviewed, current diet order appropriate. No nutrition concerns at this time, no nutrition intervention needed. Nutrition Related Findings:    102 glucose, 12/7 BM Wound Type: Venous Stasis       Current Nutrition Intake & Therapies:    Average Meal Intake: NPO  Average Supplements Intake: NPO  ADULT DIET; Regular    Anthropometric Measures:  Height: 149.9 cm (4' 11.02\")  Ideal Body Weight (IBW): 95 lbs (43 kg)       Current Body Weight: 86 kg (189 lb 9.5 oz), 199.6 % IBW.     Current BMI (kg/m2): 38.3                               Estimated Daily Nutrient Needs:  Energy Requirements Based On: Kcal/kg  Weight Used for Energy Requirements: Current  Energy (kcal/day): 1290 - 1548 (15 - 18 kcal/kg)  Weight Used for Protein Requirements: Ideal  Protein (g/day): 86 (2g/kg IBW)     Fluid (ml/day): per provider while in ICU    Nutrition Diagnosis:   No nutrition diagnosis at this time     Nutrition Interventions:   Food and/or Nutrient Delivery: Continue Current Diet  Nutrition Education/Counseling: Education not indicated  Coordination of Nutrition Care: Continue to monitor while inpatient       Goals:     Goals: Meet at least 75% of estimated needs, by next RD assessment       Nutrition Monitoring and Evaluation:   Behavioral-Environmental Outcomes: None Identified  Food/Nutrient Intake Outcomes: Food and Nutrient Intake  Physical Signs/Symptoms Outcomes: Biochemical Data, Nutrition Focused Physical Findings, Weight, GI Status    Discharge Planning:    No discharge needs at this time     Ravi Scott

## 2023-12-08 NOTE — PROGRESS NOTES
PERRL. Anicteric sclera. No conjunctival injection. ENT: No discharge. Posterior oropharynx clear. External appearance of ears and nose normal.  Neck: Trachea midline. No mass   Resp:  severely diminished with bilateral diffuse wheezing   CV: Regular rate. Regular rhythm. No murmur or rub. Trace edema. GI: Soft, Non-tender. Non-distended. +BS  Skin: Warm, dry, w/o erythema. Lymph: No cervical or supraclavicular LAD. M/S: No cyanosis. No clubbing. Neuro:  CN 2-12 tested, no focal neurologic deficit. Moves all extremities  Psych: Awake and alert, Oriented x 3. Judgement and insight appropriate. Mood stable. Laboratory findings:-    CBC:   Recent Labs     12/08/23 0447   WBC 10.6   HGB 8.5*        BMP:    Recent Labs     12/06/23  0445 12/07/23  0438 12/08/23  0448    135* 140   K 5.1  5.1 4.1  4.1 3.8  3.8   CL 93* 92* 97*   CO2 40* 37* 43*   BUN 32* 37* 41*   CREATININE 0.8 0.9 1.0   GLUCOSE 97 92 102*     S. Calcium:  Recent Labs     12/08/23  0448   CALCIUM 8.1*       S. Magnesium:  Recent Labs     12/08/23  0448   MG 2.20     S. Phosphorus:  Recent Labs     12/08/23  0448   PHOS 3.8     S. Glucose:  Recent Labs     12/05/23  1157 12/05/23  1541 12/06/23  1253   POCGLU 134* 120* 112*           Radiology Review:  Pertinent images / reports were reviewed as a part of this visit.         Familia Frankel MD, MDON.            12/8/2023, 10:27 AM

## 2023-12-08 NOTE — PROGRESS NOTES
Pt reports chronic ford shoulder pain and right leg pain. Rates pain 8/10-pt medicated with prn pain med as ordered. Pt reports her breathing ins better than yesterday, currently on 4lnc, maintains SPO2 94%, dyspnea with exertions. Pt sitting up in the chair. Chair alarm active. Call light and item need in reach.  Electronically signed by Maria Esther Rausch RN on 12/8/2023 at 1:42 PM

## 2023-12-08 NOTE — DISCHARGE SUMMARY
123).  Calcified subcarinal lymph node. Thyroid gland is unremarkable in appearance. Cardiac chambers are unremarkable in appearance. No pericardial effusion or pericardial thickening. Lungs/pleura: Bilateral pulmonary infiltrates. Small bilateral pleural effusions with associated adjacent atelectasis. No pneumothorax. No suspicious pulmonary nodularity. Central airways are patent. Endotracheal tube extends to the mid trachea. This is approximately 2.4 cm from the anupama. Upper Abdomen: Calcified granulomas in the liver. Remainder of the imaged upper abdominal organs are unremarkable in appearance. Soft Tissues/Bones: Spondylosis of the thoracic spine. Remote right-sided rib fractures. Acute fracture of the right posterolateral 4th rib (series 2 image 148). Remote left-sided rib fractures. Indeterminate mild compression of the T12 vertebral body. Negative for findings of a pulmonary embolus. Evaluation of the lingular and left lower lobe segmental and subsegmental pulmonary arteries is suboptimal secondary to patient motion on the examination. Bilateral pulmonary infiltrates. Small bilateral pleural effusions with associated adjacent atelectasis. Mediastinal adenopathy which is likely reactive in nature. Acute appearing fracture of the posterolateral right 4th rib. Additional findings noted above. CT HEAD WO CONTRAST    Result Date: 12/3/2023  EXAMINATION: CT OF THE HEAD WITHOUT CONTRAST  12/3/2023 10:27 am TECHNIQUE: CT of the head was performed without the administration of intravenous contrast. Automated exposure control, iterative reconstruction, and/or weight based adjustment of the mA/kV was utilized to reduce the radiation dose to as low as reasonably achievable. COMPARISON: None. HISTORY: ORDERING SYSTEM PROVIDED HISTORY: ams TECHNOLOGIST PROVIDED HISTORY: Reason for exam:->ams Has a \"code stroke\" or \"stroke alert\" been called? ->No Decision Support Exception - unselect if not a 12/04/2023 11:44 AM     No Growth to date. Any change in status will be called. Lab Results   Component Value Date/Time    BLOODCULT2  12/04/2023 11:50 AM     No Growth to date. Any change in status will be called.      Organism:   Lab Results   Component Value Date/Time    ORG Moraxella catarrhalis BY DNA 12/04/2023 11:18 AM    ORG Human Rhinovirus/Enterovirus by PCR 12/04/2023 11:18 AM       Time Spent Discharging patient 32 minutes    Electronically signed by Yolanda Fulton MD on 12/8/2023 at 12:16 PM

## 2023-12-08 NOTE — PROGRESS NOTES
ipratropium 0.5 mg-albuterol 2.5 mg  1 Dose Inhalation TID RT    amoxicillin-clavulanate  1 tablet Oral 2 times per day    predniSONE  20 mg Oral Daily    Followed by    Mara Hampton ON 12/11/2023] predniSONE  15 mg Oral Daily    Followed by    Mara Hampton ON 12/14/2023] predniSONE  10 mg Oral Daily    Followed by    Mara Hampton ON 12/17/2023] predniSONE  5 mg Oral Daily    sertraline  125 mg Oral Daily    guaiFENesin  600 mg Oral BID    lidocaine  1 patch TransDERmal Daily    carvedilol  3.125 mg Oral BID    lisinopril  5 mg Oral Daily    torsemide  20 mg Oral Daily    methadone  120 mg Oral Daily    mometasone-formoterol  2 puff Inhalation BID RT    famotidine (PEPCID) injection  20 mg IntraVENous BID    sodium chloride flush  10 mL IntraVENous 2 times per day    enoxaparin  40 mg SubCUTAneous Daily     Continuous Infusions:    dexmedetomidine Stopped (12/06/23 0242)    sodium chloride         Imaging:  CT Right Tib/Fib - 12/5/2023  Ovoid high attenuation versus enhancing focus within the subcutaneous fat/dermis of the lateral mid leg measuring 5.9 x 1.4 x 3.7 cm. No gas internal to the collection. No discrete thickened/enhancing rim. Differential considerations include complex fluid collection such as abscess or hematoma. Mass lesion cannot be excluded based on this exam.  MRI with and without contrast would help differentiate between fluid collection and enhancing soft tissue mass. Complex-appearing large Baker's cyst with some internal septations and  somewhat thickened rim. Mild nonspecific subcutaneous edema. No acute bony abnormality. No CT evidence for osteomyelitis. Degenerative changes to partially imaged right knee along with knee joint  effusion. Mild degenerative changes to the hindfoot. This result has not been signed.  Information might be incomplete      Assessment:   -Right Lateral Leg Abscess - by exam and per CT  -Right Lateral Shin incised on 12/5/2023 by Dr. Castillo Vallejo and with old

## 2023-12-08 NOTE — PROGRESS NOTES
Physician Progress Note      Gisselle Klein  [facility] #:                  [id number]  :                       1975  ADMIT DATE:       12/3/2023 7:34 AM  DISCH DATE:  RESPONDING  PROVIDER #:        James Cao MD          QUERY TEXT:    Patient admitted with sepsis, cellulitis rle  . Per pn  note dated     documentation of I&D. To accurately reflect the procedure performed please further specify the depth   of tissue incised and drained: The medical record reflects the following:  Risk Factors: rt lateral leg abscess/hematoma  Clinical Indicators: Documentation per procedure on  of  we made a 4 cm   incision directly over this lateral mass made the incision vertical.  I could   see a hematoma bulging from underneath still took a culture just in case. Then with sterile gloves expressed as much of the hematoma out manually this   was uncomfortable for the patient despite the local.  Then used the blunt end   of a forceps to scoop most the remaining blood out of the wound. The   undermining was about a centimeter further lateral posterior and 2 and half   centimeters anterior medial, then packed a sterile   Treatment: Vascular consult, I&D    Thank you, Anna Castano RN CDS CCDS  [email or web address]  Options provided:  -- Skin only  -- Subcutaneous tissue  -- Fascia  -- Muscle  -- Other - I will add my own diagnosis  -- Disagree - Not applicable / Not valid  -- Disagree - Clinically unable to determine / Unknown  -- Refer to Clinical Documentation Reviewer    PROVIDER RESPONSE TEXT:    Addendum to  procedure note The depth of the drainage to rt lateral calf   was down to and including subcutaneous tissue. Query created by:  Alessandro Castano on 2023 8:20 AM      Electronically signed by:  James Cao MD 2023 10:42 AM

## 2023-12-08 NOTE — PLAN OF CARE
Problem: Safety - Medical Restraint  Goal: Remains free of injury from restraints (Restraint for Interference with Medical Device)  Description: INTERVENTIONS:  1. Determine that other, less restrictive measures have been tried or would not be effective before applying the restraint  2. Evaluate the patient's condition at the time of restraint application  3. Inform patient/family regarding the reason for restraint  4. Q2H: Monitor safety, psychosocial status, comfort, nutrition and hydration  12/8/2023 0141 by Makayla Gunderson RN  Outcome: Progressing  Flowsheets (Taken 12/4/2023 2000 by Jeff Holt RN)  Remains free of injury from restraints (restraint for interference with medical device): Determine that other, less restrictive measures have been tried or would not be effective before applying the restraint     Problem: Discharge Planning  Goal: Discharge to home or other facility with appropriate resources  12/8/2023 1342 by Kait Lora RN  Outcome: Progressing  12/8/2023 0141 by Makayla Gunderson RN  Outcome: Progressing  Flowsheets (Taken 12/6/2023 1217 by Sergey Reese RN)  Discharge to home or other facility with appropriate resources: Identify barriers to discharge with patient and caregiver     Problem: Pain  Goal: Verbalizes/displays adequate comfort level or baseline comfort level  12/8/2023 1342 by Kait Lora RN  Outcome: Progressing  Flowsheets (Taken 12/8/2023 1342)  Verbalizes/displays adequate comfort level or baseline comfort level:   Encourage patient to monitor pain and request assistance   Assess pain using appropriate pain scale   Administer analgesics based on type and severity of pain and evaluate response   Implement non-pharmacological measures as appropriate and evaluate response  Note: Pt able to express presence and absence of pain using numerical pain scale. Pt pain is managed by PRN analgesics as ordered by MD. Pain reassess after each interventions.  Will 12/8/2023 1101  Skin Integrity Remains Intact: Monitor for areas of redness and/or skin breakdown  Taken 12/8/2023 0915  Skin Integrity Remains Intact:   Monitor for areas of redness and/or skin breakdown   Assess vascular access sites hourly  Note: Skin assessment performed each shift per protocol. Pt encouraged to reposition often. Will continue to monitor and assess for skin breakdown.      12/8/2023 0141 by Robbie Renee RN  Outcome: Progressing  Flowsheets  Taken 12/8/2023 0141  Skin Integrity Remains Intact:   Monitor for areas of redness and/or skin breakdown   Assess vascular access sites hourly  Taken 12/7/2023 2300  Skin Integrity Remains Intact: Monitor for areas of redness and/or skin breakdown  Goal: Incisions, wounds, or drain sites healing without S/S of infection  12/8/2023 1342 by Neisha Lawson RN  Outcome: Progressing  Flowsheets (Taken 12/8/2023 1101)  Incisions, Wounds, or Drain Sites Healing Without Sign and Symptoms of Infection: TWICE DAILY: Assess and document skin integrity  12/8/2023 0141 by Robbie Renee RN  Outcome: Progressing  Flowsheets (Taken 12/8/2023 0141)  Incisions, Wounds, or Drain Sites Healing Without Sign and Symptoms of Infection:   TWICE DAILY: Assess and document skin integrity   TWICE DAILY: Assess and document dressing/incision, wound bed, drain sites and surrounding tissue  Goal: Oral mucous membranes remain intact  12/8/2023 1342 by Neisha Lawson RN  Outcome: Progressing  Flowsheets (Taken 12/8/2023 1101)  Oral Mucous Membranes Remain Intact: Assess oral mucosa and hygiene practices  12/8/2023 0141 by Robbie Renee RN  Outcome: Progressing  Flowsheets (Taken 12/8/2023 0141)  Oral Mucous Membranes Remain Intact: Assess oral mucosa and hygiene practices     Problem: Skin/Tissue Integrity - Adult  Goal: Incisions, wounds, or drain sites healing without S/S of infection  12/8/2023 1342 by Neisha Lawson RN  Outcome: Progressing  Flowsheets

## 2023-12-08 NOTE — PROGRESS NOTES
All verbal and written discharge instructions given to the pt at discharge regarding new meds and follow up appointment. Pt verbalized understandings and denies other needs at discharge. Pt discharge to home with her daughter with all her belongings.  Electronically signed by Keith Alston RN on 12/8/2023 at 4:02 PM

## 2024-01-01 ENCOUNTER — HOSPITAL ENCOUNTER (INPATIENT)
Age: 49
LOS: 5 days | Discharge: HOME | End: 2024-01-06
Attending: EMERGENCY MEDICINE | Admitting: SURGERY
Payer: MEDICARE

## 2024-01-01 ENCOUNTER — APPOINTMENT (OUTPATIENT)
Dept: GENERAL RADIOLOGY | Age: 49
End: 2024-01-01
Payer: MEDICARE

## 2024-01-01 DIAGNOSIS — J96.22 ACUTE ON CHRONIC RESPIRATORY FAILURE WITH HYPOXIA AND HYPERCAPNIA (HCC): Primary | ICD-10-CM

## 2024-01-01 DIAGNOSIS — J96.21 ACUTE ON CHRONIC RESPIRATORY FAILURE WITH HYPOXIA AND HYPERCAPNIA (HCC): Primary | ICD-10-CM

## 2024-01-01 PROBLEM — J12.82 PNEUMONIA DUE TO COVID-19 VIRUS: Status: ACTIVE | Noted: 2024-01-01

## 2024-01-01 PROBLEM — U07.1 PNEUMONIA DUE TO COVID-19 VIRUS: Status: ACTIVE | Noted: 2024-01-01

## 2024-01-01 LAB
ALBUMIN SERPL-MCNC: 3.4 G/DL (ref 3.4–5)
ALP SERPL-CCNC: 121 U/L (ref 40–129)
ALT SERPL-CCNC: 13 U/L (ref 10–40)
ANION GAP SERPL CALCULATED.3IONS-SCNC: 4 MMOL/L (ref 3–16)
AST SERPL-CCNC: 14 U/L (ref 15–37)
BACTERIA URNS QL MICRO: ABNORMAL /HPF
BASE EXCESS BLDV CALC-SCNC: 17.4 MMOL/L
BASOPHILS # BLD: 0.1 K/UL (ref 0–0.2)
BASOPHILS NFR BLD: 0.3 %
BILIRUB DIRECT SERPL-MCNC: <0.2 MG/DL (ref 0–0.3)
BILIRUB INDIRECT SERPL-MCNC: ABNORMAL MG/DL (ref 0–1)
BILIRUB SERPL-MCNC: 0.3 MG/DL (ref 0–1)
BILIRUB UR QL STRIP.AUTO: NEGATIVE
BUN SERPL-MCNC: 13 MG/DL (ref 7–20)
CALCIUM SERPL-MCNC: 8.9 MG/DL (ref 8.3–10.6)
CHLORIDE SERPL-SCNC: 96 MMOL/L (ref 99–110)
CLARITY UR: CLEAR
CO2 BLDV-SCNC: 13 MMOL/L
CO2 BLDV-SCNC: 47 MMOL/L
CO2 SERPL-SCNC: 42 MMOL/L (ref 21–32)
COHGB MFR BLDV: 1.9 %
COHGB MFR BLDV: 2.4 %
COLOR UR: YELLOW
CREAT SERPL-MCNC: <0.5 MG/DL (ref 0.6–1.1)
CRP SERPL-MCNC: 164.8 MG/L (ref 0–5.1)
DEPRECATED RDW RBC AUTO: 17.3 % (ref 12.4–15.4)
EKG ATRIAL RATE: 107 BPM
EKG DIAGNOSIS: NORMAL
EKG P AXIS: 51 DEGREES
EKG P-R INTERVAL: 130 MS
EKG Q-T INTERVAL: 382 MS
EKG QRS DURATION: 94 MS
EKG QTC CALCULATION (BAZETT): 509 MS
EKG R AXIS: 30 DEGREES
EKG T AXIS: -46 DEGREES
EKG VENTRICULAR RATE: 107 BPM
EOSINOPHIL # BLD: 0 K/UL (ref 0–0.6)
EOSINOPHIL NFR BLD: 0.1 %
EPI CELLS #/AREA URNS AUTO: 1 /HPF (ref 0–5)
FLUAV RNA UPPER RESP QL NAA+PROBE: NEGATIVE
FLUBV AG NPH QL: NEGATIVE
GFR SERPLBLD CREATININE-BSD FMLA CKD-EPI: >60 ML/MIN/{1.73_M2}
GLUCOSE BLD-MCNC: 108 MG/DL (ref 70–99)
GLUCOSE BLD-MCNC: 128 MG/DL (ref 70–99)
GLUCOSE BLD-MCNC: 132 MG/DL (ref 70–99)
GLUCOSE BLD-MCNC: 190 MG/DL (ref 70–99)
GLUCOSE SERPL-MCNC: 149 MG/DL (ref 70–99)
GLUCOSE UR STRIP.AUTO-MCNC: 250 MG/DL
HCO3 BLDV-SCNC: 45 MMOL/L (ref 23–29)
HCT VFR BLD AUTO: 30 % (ref 36–48)
HGB BLD-MCNC: 9.2 G/DL (ref 12–16)
HGB UR QL STRIP.AUTO: ABNORMAL
HYALINE CASTS #/AREA URNS AUTO: 9 /LPF (ref 0–8)
HYALINE CASTS: PRESENT
KETONES UR STRIP.AUTO-MCNC: NEGATIVE MG/DL
LACTATE BLDV-SCNC: 0.7 MMOL/L (ref 0.4–2)
LEUKOCYTE ESTERASE UR QL STRIP.AUTO: NEGATIVE
LYMPHOCYTES # BLD: 0.6 K/UL (ref 1–5.1)
LYMPHOCYTES NFR BLD: 3.2 %
MCH RBC QN AUTO: 26.8 PG (ref 26–34)
MCHC RBC AUTO-ENTMCNC: 30.6 G/DL (ref 31–36)
MCV RBC AUTO: 87.7 FL (ref 80–100)
METHGB MFR BLDV: 0.4 %
METHGB MFR BLDV: 0.6 %
MONOCYTES # BLD: 1 K/UL (ref 0–1.3)
MONOCYTES NFR BLD: 5.2 %
NEUTROPHILS # BLD: 18.3 K/UL (ref 1.7–7.7)
NEUTROPHILS NFR BLD: 91.2 %
NITRITE UR QL STRIP.AUTO: NEGATIVE
NT-PROBNP SERPL-MCNC: 2133 PG/ML (ref 0–124)
O2 THERAPY: ABNORMAL
O2 THERAPY: ABNORMAL
ORGANISM: ABNORMAL
ORGANISM: ABNORMAL
PCO2 BLDV: 73.7 MMHG (ref 40–50)
PCO2 BLDV: >120 MMHG (ref 40–50)
PERFORMED ON: ABNORMAL
PH BLDV: 7.08 [PH] (ref 7.35–7.45)
PH BLDV: 7.39 [PH] (ref 7.35–7.45)
PH UR STRIP.AUTO: 6 [PH] (ref 5–8)
PLATELET # BLD AUTO: 355 K/UL (ref 135–450)
PMV BLD AUTO: 7.7 FL (ref 5–10.5)
PNEUMONIA PANEL MOLECULAR: ABNORMAL
PNEUMONIA PANEL MOLECULAR: ABNORMAL
PO2 BLDV: 39 MMHG
PO2 BLDV: 98 MMHG
POTASSIUM SERPL-SCNC: 4.3 MMOL/L (ref 3.5–5.1)
PROCALCITONIN SERPL IA-MCNC: 0.14 NG/ML (ref 0–0.15)
PROT SERPL-MCNC: 7.3 G/DL (ref 6.4–8.2)
PROT UR STRIP.AUTO-MCNC: ABNORMAL MG/DL
RBC # BLD AUTO: 3.42 M/UL (ref 4–5.2)
RBC CLUMPS #/AREA URNS AUTO: 22 /HPF (ref 0–4)
REPORT: NORMAL
SAO2 % BLDV: 80 %
SAO2 % BLDV: 96 %
SARS-COV-2 RDRP RESP QL NAA+PROBE: DETECTED
SODIUM SERPL-SCNC: 142 MMOL/L (ref 136–145)
SP GR UR STRIP.AUTO: 1.02 (ref 1–1.03)
TROPONIN, HIGH SENSITIVITY: 16 NG/L (ref 0–14)
UA COMPLETE W REFLEX CULTURE PNL UR: ABNORMAL
UA DIPSTICK W REFLEX MICRO PNL UR: YES
URN SPEC COLLECT METH UR: ABNORMAL
UROBILINOGEN UR STRIP-ACNC: 1 E.U./DL
WBC # BLD AUTO: 20.1 K/UL (ref 4–11)
WBC #/AREA URNS AUTO: 1 /HPF (ref 0–5)

## 2024-01-01 PROCEDURE — 93005 ELECTROCARDIOGRAM TRACING: CPT | Performed by: EMERGENCY MEDICINE

## 2024-01-01 PROCEDURE — 6370000000 HC RX 637 (ALT 250 FOR IP): Performed by: INTERNAL MEDICINE

## 2024-01-01 PROCEDURE — 80048 BASIC METABOLIC PNL TOTAL CA: CPT

## 2024-01-01 PROCEDURE — 96368 THER/DIAG CONCURRENT INF: CPT

## 2024-01-01 PROCEDURE — 87070 CULTURE OTHR SPECIMN AEROBIC: CPT

## 2024-01-01 PROCEDURE — 71045 X-RAY EXAM CHEST 1 VIEW: CPT

## 2024-01-01 PROCEDURE — 02HV33Z INSERTION OF INFUSION DEVICE INTO SUPERIOR VENA CAVA, PERCUTANEOUS APPROACH: ICD-10-PCS | Performed by: INTERNAL MEDICINE

## 2024-01-01 PROCEDURE — 6360000002 HC RX W HCPCS

## 2024-01-01 PROCEDURE — 87040 BLOOD CULTURE FOR BACTERIA: CPT

## 2024-01-01 PROCEDURE — 80076 HEPATIC FUNCTION PANEL: CPT

## 2024-01-01 PROCEDURE — 87150 DNA/RNA AMPLIFIED PROBE: CPT

## 2024-01-01 PROCEDURE — 0B9D8ZX DRAINAGE OF RIGHT MIDDLE LUNG LOBE, VIA NATURAL OR ARTIFICIAL OPENING ENDOSCOPIC, DIAGNOSTIC: ICD-10-PCS | Performed by: INTERNAL MEDICINE

## 2024-01-01 PROCEDURE — 81001 URINALYSIS AUTO W/SCOPE: CPT

## 2024-01-01 PROCEDURE — 87641 MR-STAPH DNA AMP PROBE: CPT

## 2024-01-01 PROCEDURE — 2500000003 HC RX 250 WO HCPCS

## 2024-01-01 PROCEDURE — 36415 COLL VENOUS BLD VENIPUNCTURE: CPT

## 2024-01-01 PROCEDURE — 93010 ELECTROCARDIOGRAM REPORT: CPT | Performed by: INTERNAL MEDICINE

## 2024-01-01 PROCEDURE — 31645 BRNCHSC W/THER ASPIR 1ST: CPT | Performed by: INTERNAL MEDICINE

## 2024-01-01 PROCEDURE — 87635 SARS-COV-2 COVID-19 AMP PRB: CPT

## 2024-01-01 PROCEDURE — 6360000002 HC RX W HCPCS: Performed by: SURGERY

## 2024-01-01 PROCEDURE — 85025 COMPLETE CBC W/AUTO DIFF WBC: CPT

## 2024-01-01 PROCEDURE — 87185 SC STD ENZYME DETCJ PER NZM: CPT

## 2024-01-01 PROCEDURE — 94640 AIRWAY INHALATION TREATMENT: CPT

## 2024-01-01 PROCEDURE — 31622 DX BRONCHOSCOPE/WASH: CPT

## 2024-01-01 PROCEDURE — 83605 ASSAY OF LACTIC ACID: CPT

## 2024-01-01 PROCEDURE — 6360000002 HC RX W HCPCS: Performed by: INTERNAL MEDICINE

## 2024-01-01 PROCEDURE — 2500000003 HC RX 250 WO HCPCS: Performed by: INTERNAL MEDICINE

## 2024-01-01 PROCEDURE — 31500 INSERT EMERGENCY AIRWAY: CPT

## 2024-01-01 PROCEDURE — 5A1945Z RESPIRATORY VENTILATION, 24-96 CONSECUTIVE HOURS: ICD-10-PCS | Performed by: INTERNAL MEDICINE

## 2024-01-01 PROCEDURE — 2500000003 HC RX 250 WO HCPCS: Performed by: EMERGENCY MEDICINE

## 2024-01-01 PROCEDURE — 2580000003 HC RX 258: Performed by: INTERNAL MEDICINE

## 2024-01-01 PROCEDURE — 87077 CULTURE AEROBIC IDENTIFY: CPT

## 2024-01-01 PROCEDURE — 96374 THER/PROPH/DIAG INJ IV PUSH: CPT

## 2024-01-01 PROCEDURE — 2580000003 HC RX 258: Performed by: SURGERY

## 2024-01-01 PROCEDURE — 2000000000 HC ICU R&B

## 2024-01-01 PROCEDURE — 87205 SMEAR GRAM STAIN: CPT

## 2024-01-01 PROCEDURE — 31624 DX BRONCHOSCOPE/LAVAGE: CPT | Performed by: INTERNAL MEDICINE

## 2024-01-01 PROCEDURE — 83880 ASSAY OF NATRIURETIC PEPTIDE: CPT

## 2024-01-01 PROCEDURE — 2700000000 HC OXYGEN THERAPY PER DAY

## 2024-01-01 PROCEDURE — 84484 ASSAY OF TROPONIN QUANT: CPT

## 2024-01-01 PROCEDURE — 2580000003 HC RX 258: Performed by: EMERGENCY MEDICINE

## 2024-01-01 PROCEDURE — 82803 BLOOD GASES ANY COMBINATION: CPT

## 2024-01-01 PROCEDURE — 51702 INSERT TEMP BLADDER CATH: CPT

## 2024-01-01 PROCEDURE — 96375 TX/PRO/DX INJ NEW DRUG ADDON: CPT

## 2024-01-01 PROCEDURE — 87633 RESP VIRUS 12-25 TARGETS: CPT

## 2024-01-01 PROCEDURE — 86140 C-REACTIVE PROTEIN: CPT

## 2024-01-01 PROCEDURE — 0BC38ZZ EXTIRPATION OF MATTER FROM RIGHT MAIN BRONCHUS, VIA NATURAL OR ARTIFICIAL OPENING ENDOSCOPIC: ICD-10-PCS | Performed by: INTERNAL MEDICINE

## 2024-01-01 PROCEDURE — 84145 PROCALCITONIN (PCT): CPT

## 2024-01-01 PROCEDURE — 99291 CRITICAL CARE FIRST HOUR: CPT

## 2024-01-01 PROCEDURE — 94002 VENT MGMT INPAT INIT DAY: CPT

## 2024-01-01 PROCEDURE — 96365 THER/PROPH/DIAG IV INF INIT: CPT

## 2024-01-01 PROCEDURE — 3E033XZ INTRODUCTION OF VASOPRESSOR INTO PERIPHERAL VEIN, PERCUTANEOUS APPROACH: ICD-10-PCS | Performed by: INTERNAL MEDICINE

## 2024-01-01 PROCEDURE — 99152 MOD SED SAME PHYS/QHP 5/>YRS: CPT | Performed by: INTERNAL MEDICINE

## 2024-01-01 PROCEDURE — 6360000002 HC RX W HCPCS: Performed by: EMERGENCY MEDICINE

## 2024-01-01 PROCEDURE — 0BC78ZZ EXTIRPATION OF MATTER FROM LEFT MAIN BRONCHUS, VIA NATURAL OR ARTIFICIAL OPENING ENDOSCOPIC: ICD-10-PCS | Performed by: INTERNAL MEDICINE

## 2024-01-01 PROCEDURE — 94761 N-INVAS EAR/PLS OXIMETRY MLT: CPT

## 2024-01-01 PROCEDURE — 36556 INSERT NON-TUNNEL CV CATH: CPT

## 2024-01-01 PROCEDURE — 6370000000 HC RX 637 (ALT 250 FOR IP): Performed by: SURGERY

## 2024-01-01 PROCEDURE — 87804 INFLUENZA ASSAY W/OPTIC: CPT

## 2024-01-01 PROCEDURE — 99291 CRITICAL CARE FIRST HOUR: CPT | Performed by: INTERNAL MEDICINE

## 2024-01-01 PROCEDURE — 0BH17EZ INSERTION OF ENDOTRACHEAL AIRWAY INTO TRACHEA, VIA NATURAL OR ARTIFICIAL OPENING: ICD-10-PCS | Performed by: INTERNAL MEDICINE

## 2024-01-01 PROCEDURE — XW033H5 INTRODUCTION OF TOCILIZUMAB INTO PERIPHERAL VEIN, PERCUTANEOUS APPROACH, NEW TECHNOLOGY GROUP 5: ICD-10-PCS | Performed by: SURGERY

## 2024-01-01 RX ORDER — IPRATROPIUM BROMIDE AND ALBUTEROL SULFATE 2.5; .5 MG/3ML; MG/3ML
1 SOLUTION RESPIRATORY (INHALATION)
Status: DISCONTINUED | OUTPATIENT
Start: 2024-01-01 | End: 2024-01-04

## 2024-01-01 RX ORDER — DEXAMETHASONE 6 MG/1
6 TABLET ORAL DAILY
Status: DISCONTINUED | OUTPATIENT
Start: 2024-01-01 | End: 2024-01-01

## 2024-01-01 RX ORDER — NOREPINEPHRINE BITARTRATE 0.06 MG/ML
1-100 INJECTION, SOLUTION INTRAVENOUS CONTINUOUS
Status: DISCONTINUED | OUTPATIENT
Start: 2024-01-01 | End: 2024-01-02

## 2024-01-01 RX ORDER — DEXAMETHASONE SODIUM PHOSPHATE 10 MG/ML
10 INJECTION, SOLUTION INTRAMUSCULAR; INTRAVENOUS EVERY 12 HOURS
Status: DISCONTINUED | OUTPATIENT
Start: 2024-01-01 | End: 2024-01-04

## 2024-01-01 RX ORDER — FENTANYL CITRATE 50 UG/ML
INJECTION, SOLUTION INTRAMUSCULAR; INTRAVENOUS
Status: COMPLETED
Start: 2024-01-01 | End: 2024-01-01

## 2024-01-01 RX ORDER — VITAMIN B COMPLEX
2000 TABLET ORAL DAILY
Status: DISCONTINUED | OUTPATIENT
Start: 2024-01-01 | End: 2024-01-06 | Stop reason: HOSPADM

## 2024-01-01 RX ORDER — INSULIN LISPRO 100 [IU]/ML
0-4 INJECTION, SOLUTION INTRAVENOUS; SUBCUTANEOUS EVERY 4 HOURS
Status: DISCONTINUED | OUTPATIENT
Start: 2024-01-01 | End: 2024-01-06 | Stop reason: HOSPADM

## 2024-01-01 RX ORDER — LIDOCAINE HYDROCHLORIDE 10 MG/ML
INJECTION, SOLUTION EPIDURAL; INFILTRATION; INTRACAUDAL; PERINEURAL
Status: COMPLETED
Start: 2024-01-01 | End: 2024-01-01

## 2024-01-01 RX ORDER — SODIUM CHLORIDE 9 MG/ML
INJECTION, SOLUTION INTRAVENOUS PRN
Status: DISCONTINUED | OUTPATIENT
Start: 2024-01-01 | End: 2024-01-06 | Stop reason: HOSPADM

## 2024-01-01 RX ORDER — ENOXAPARIN SODIUM 100 MG/ML
40 INJECTION SUBCUTANEOUS DAILY
Status: DISCONTINUED | OUTPATIENT
Start: 2024-01-01 | End: 2024-01-06 | Stop reason: HOSPADM

## 2024-01-01 RX ORDER — MIDAZOLAM HYDROCHLORIDE 1 MG/ML
INJECTION INTRAMUSCULAR; INTRAVENOUS
Status: COMPLETED
Start: 2024-01-01 | End: 2024-01-01

## 2024-01-01 RX ORDER — INSULIN LISPRO 100 [IU]/ML
0-4 INJECTION, SOLUTION INTRAVENOUS; SUBCUTANEOUS NIGHTLY
Status: DISCONTINUED | OUTPATIENT
Start: 2024-01-01 | End: 2024-01-01

## 2024-01-01 RX ORDER — DEXTROSE MONOHYDRATE 100 MG/ML
INJECTION, SOLUTION INTRAVENOUS CONTINUOUS PRN
Status: DISCONTINUED | OUTPATIENT
Start: 2024-01-01 | End: 2024-01-06 | Stop reason: HOSPADM

## 2024-01-01 RX ORDER — SODIUM CHLORIDE 0.9 % (FLUSH) 0.9 %
5-40 SYRINGE (ML) INJECTION PRN
Status: DISCONTINUED | OUTPATIENT
Start: 2024-01-01 | End: 2024-01-06 | Stop reason: HOSPADM

## 2024-01-01 RX ORDER — METHADONE HYDROCHLORIDE 10 MG/1
120 TABLET ORAL DAILY
Status: DISCONTINUED | OUTPATIENT
Start: 2024-01-01 | End: 2024-01-06 | Stop reason: HOSPADM

## 2024-01-01 RX ORDER — POLYETHYLENE GLYCOL 3350 17 G/17G
17 POWDER, FOR SOLUTION ORAL DAILY PRN
Status: DISCONTINUED | OUTPATIENT
Start: 2024-01-01 | End: 2024-01-06 | Stop reason: HOSPADM

## 2024-01-01 RX ORDER — ONDANSETRON 2 MG/ML
4 INJECTION INTRAMUSCULAR; INTRAVENOUS EVERY 6 HOURS PRN
Status: DISCONTINUED | OUTPATIENT
Start: 2024-01-01 | End: 2024-01-06 | Stop reason: HOSPADM

## 2024-01-01 RX ORDER — PROPOFOL 10 MG/ML
5-50 INJECTION, EMULSION INTRAVENOUS CONTINUOUS
Status: DISCONTINUED | OUTPATIENT
Start: 2024-01-01 | End: 2024-01-02

## 2024-01-01 RX ORDER — ETOMIDATE 2 MG/ML
INJECTION INTRAVENOUS DAILY PRN
Status: COMPLETED | OUTPATIENT
Start: 2024-01-01 | End: 2024-01-01

## 2024-01-01 RX ORDER — FENTANYL CITRATE 50 UG/ML
50 INJECTION, SOLUTION INTRAMUSCULAR; INTRAVENOUS ONCE
Status: COMPLETED | OUTPATIENT
Start: 2024-01-01 | End: 2024-01-01

## 2024-01-01 RX ORDER — FUROSEMIDE 10 MG/ML
40 INJECTION INTRAMUSCULAR; INTRAVENOUS ONCE
Status: COMPLETED | OUTPATIENT
Start: 2024-01-01 | End: 2024-01-01

## 2024-01-01 RX ORDER — INSULIN LISPRO 100 [IU]/ML
0-4 INJECTION, SOLUTION INTRAVENOUS; SUBCUTANEOUS
Status: DISCONTINUED | OUTPATIENT
Start: 2024-01-01 | End: 2024-01-01

## 2024-01-01 RX ORDER — FUROSEMIDE 10 MG/ML
40 INJECTION INTRAMUSCULAR; INTRAVENOUS DAILY
Status: COMPLETED | OUTPATIENT
Start: 2024-01-01 | End: 2024-01-02

## 2024-01-01 RX ORDER — ROCURONIUM BROMIDE 10 MG/ML
INJECTION, SOLUTION INTRAVENOUS DAILY PRN
Status: COMPLETED | OUTPATIENT
Start: 2024-01-01 | End: 2024-01-01

## 2024-01-01 RX ORDER — FUROSEMIDE 10 MG/ML
20 INJECTION INTRAMUSCULAR; INTRAVENOUS ONCE
Status: DISCONTINUED | OUTPATIENT
Start: 2024-01-01 | End: 2024-01-01

## 2024-01-01 RX ORDER — ACETAMINOPHEN 325 MG/1
650 TABLET ORAL EVERY 6 HOURS PRN
Status: DISCONTINUED | OUTPATIENT
Start: 2024-01-01 | End: 2024-01-06 | Stop reason: HOSPADM

## 2024-01-01 RX ORDER — FUROSEMIDE 10 MG/ML
60 INJECTION INTRAMUSCULAR; INTRAVENOUS ONCE
Status: DISCONTINUED | OUTPATIENT
Start: 2024-01-01 | End: 2024-01-01

## 2024-01-01 RX ORDER — METHYLPREDNISOLONE SODIUM SUCCINATE 125 MG/2ML
125 INJECTION, POWDER, LYOPHILIZED, FOR SOLUTION INTRAMUSCULAR; INTRAVENOUS ONCE
Status: COMPLETED | OUTPATIENT
Start: 2024-01-01 | End: 2024-01-01

## 2024-01-01 RX ORDER — ONDANSETRON 4 MG/1
4 TABLET, ORALLY DISINTEGRATING ORAL EVERY 8 HOURS PRN
Status: DISCONTINUED | OUTPATIENT
Start: 2024-01-01 | End: 2024-01-06 | Stop reason: HOSPADM

## 2024-01-01 RX ORDER — ACETAMINOPHEN 650 MG/1
650 SUPPOSITORY RECTAL EVERY 6 HOURS PRN
Status: DISCONTINUED | OUTPATIENT
Start: 2024-01-01 | End: 2024-01-06 | Stop reason: HOSPADM

## 2024-01-01 RX ORDER — SODIUM CHLORIDE 0.9 % (FLUSH) 0.9 %
5-40 SYRINGE (ML) INJECTION EVERY 12 HOURS SCHEDULED
Status: DISCONTINUED | OUTPATIENT
Start: 2024-01-01 | End: 2024-01-06 | Stop reason: HOSPADM

## 2024-01-01 RX ADMIN — FENTANYL CITRATE 50 MCG: 50 INJECTION INTRAMUSCULAR; INTRAVENOUS at 07:34

## 2024-01-01 RX ADMIN — IPRATROPIUM BROMIDE AND ALBUTEROL SULFATE 1 DOSE: .5; 3 SOLUTION RESPIRATORY (INHALATION) at 11:00

## 2024-01-01 RX ADMIN — METHADONE HYDROCHLORIDE 120 MG: 10 TABLET ORAL at 12:48

## 2024-01-01 RX ADMIN — PROPOFOL 50 MCG/KG/MIN: 10 INJECTION, EMULSION INTRAVENOUS at 18:46

## 2024-01-01 RX ADMIN — ROCURONIUM BROMIDE 100 MG: 10 INJECTION INTRAVENOUS at 05:40

## 2024-01-01 RX ADMIN — IPRATROPIUM BROMIDE AND ALBUTEROL SULFATE 1 DOSE: .5; 3 SOLUTION RESPIRATORY (INHALATION) at 19:54

## 2024-01-01 RX ADMIN — CEFEPIME 2000 MG: 2 INJECTION, POWDER, FOR SOLUTION INTRAVENOUS at 22:53

## 2024-01-01 RX ADMIN — TOCILIZUMAB 600 MG: 20 INJECTION, SOLUTION, CONCENTRATE INTRAVENOUS at 11:18

## 2024-01-01 RX ADMIN — FUROSEMIDE 40 MG: 10 INJECTION, SOLUTION INTRAMUSCULAR; INTRAVENOUS at 06:31

## 2024-01-01 RX ADMIN — ENOXAPARIN SODIUM 40 MG: 100 INJECTION SUBCUTANEOUS at 09:43

## 2024-01-01 RX ADMIN — Medication 2000 UNITS: at 10:03

## 2024-01-01 RX ADMIN — Medication 20 MG: at 19:36

## 2024-01-01 RX ADMIN — METHYLPREDNISOLONE SODIUM SUCCINATE 125 MG: 125 INJECTION INTRAMUSCULAR; INTRAVENOUS at 06:07

## 2024-01-01 RX ADMIN — MOMETASONE FUROATE AND FORMOTEROL FUMARATE DIHYDRATE 2 PUFF: 200; 5 AEROSOL RESPIRATORY (INHALATION) at 08:56

## 2024-01-01 RX ADMIN — FUROSEMIDE 40 MG: 10 INJECTION, SOLUTION INTRAMUSCULAR; INTRAVENOUS at 15:48

## 2024-01-01 RX ADMIN — CEFEPIME 2000 MG: 2 INJECTION, POWDER, FOR SOLUTION INTRAVENOUS at 14:05

## 2024-01-01 RX ADMIN — LIDOCAINE HYDROCHLORIDE 5 ML: 10 INJECTION, SOLUTION EPIDURAL; INFILTRATION; INTRACAUDAL; PERINEURAL at 10:48

## 2024-01-01 RX ADMIN — VANCOMYCIN HYDROCHLORIDE 1750 MG: 1 INJECTION, POWDER, LYOPHILIZED, FOR SOLUTION INTRAVENOUS at 09:48

## 2024-01-01 RX ADMIN — DEXAMETHASONE SODIUM PHOSPHATE 10 MG: 10 INJECTION, SOLUTION INTRAMUSCULAR; INTRAVENOUS at 09:43

## 2024-01-01 RX ADMIN — FENTANYL CITRATE 100 MCG: 50 INJECTION INTRAMUSCULAR; INTRAVENOUS at 10:51

## 2024-01-01 RX ADMIN — IPRATROPIUM BROMIDE AND ALBUTEROL SULFATE 1 DOSE: .5; 3 SOLUTION RESPIRATORY (INHALATION) at 08:56

## 2024-01-01 RX ADMIN — PROPOFOL 50 MCG/KG/MIN: 10 INJECTION, EMULSION INTRAVENOUS at 12:54

## 2024-01-01 RX ADMIN — Medication 20 MG: at 09:43

## 2024-01-01 RX ADMIN — DEXAMETHASONE SODIUM PHOSPHATE 10 MG: 10 INJECTION, SOLUTION INTRAMUSCULAR; INTRAVENOUS at 19:36

## 2024-01-01 RX ADMIN — IPRATROPIUM BROMIDE AND ALBUTEROL SULFATE 1 DOSE: .5; 3 SOLUTION RESPIRATORY (INHALATION) at 17:05

## 2024-01-01 RX ADMIN — MOMETASONE FUROATE AND FORMOTEROL FUMARATE DIHYDRATE 2 PUFF: 200; 5 AEROSOL RESPIRATORY (INHALATION) at 19:54

## 2024-01-01 RX ADMIN — ETOMIDATE 20 MG: 2 INJECTION INTRAVENOUS at 05:39

## 2024-01-01 RX ADMIN — Medication 10 ML: at 09:49

## 2024-01-01 RX ADMIN — MIDAZOLAM 4 MG: 1 INJECTION INTRAMUSCULAR; INTRAVENOUS at 10:49

## 2024-01-01 RX ADMIN — Medication 10 ML: at 19:37

## 2024-01-01 RX ADMIN — PIPERACILLIN AND TAZOBACTAM 3375 MG: 3; .375 INJECTION, POWDER, FOR SOLUTION INTRAVENOUS at 06:12

## 2024-01-01 RX ADMIN — Medication 5 MCG/MIN: at 06:30

## 2024-01-01 RX ADMIN — ACETAMINOPHEN 650 MG: 325 TABLET ORAL at 15:48

## 2024-01-01 RX ADMIN — PROPOFOL 20 MCG/KG/MIN: 10 INJECTION, EMULSION INTRAVENOUS at 06:05

## 2024-01-01 RX ADMIN — IPRATROPIUM BROMIDE AND ALBUTEROL SULFATE 1 DOSE: .5; 3 SOLUTION RESPIRATORY (INHALATION) at 23:36

## 2024-01-01 ASSESSMENT — PULMONARY FUNCTION TESTS
PIF_VALUE: 27
PIF_VALUE: 27
PIF_VALUE: 25
PIF_VALUE: 29
PIF_VALUE: 29
PIF_VALUE: 28
PIF_VALUE: 27
PIF_VALUE: 26
PIF_VALUE: 37
PIF_VALUE: 27
PIF_VALUE: 27
PIF_VALUE: 30
PIF_VALUE: 27
PIF_VALUE: 28
PIF_VALUE: 44
PIF_VALUE: 23
PIF_VALUE: 26
PIF_VALUE: 26
PIF_VALUE: 29
PIF_VALUE: 26
PIF_VALUE: 27
PIF_VALUE: 26
PIF_VALUE: 43
PIF_VALUE: 28
PIF_VALUE: 27
PIF_VALUE: 26
PIF_VALUE: 28
PIF_VALUE: 38
PIF_VALUE: 27
PIF_VALUE: 38
PIF_VALUE: 29
PIF_VALUE: 27
PIF_VALUE: 28
PIF_VALUE: 38
PIF_VALUE: 27
PIF_VALUE: 25
PIF_VALUE: 40
PIF_VALUE: 27
PIF_VALUE: 26
PIF_VALUE: 28
PIF_VALUE: 28
PIF_VALUE: 26
PIF_VALUE: 27
PIF_VALUE: 36

## 2024-01-01 ASSESSMENT — PAIN SCALES - GENERAL
PAINLEVEL_OUTOF10: 0

## 2024-01-01 NOTE — PROGRESS NOTES
Per MD, ETT retracted 2 more cm. New lip line 21 cm. Peak pressure alarms still persisting, but tidal volumes improved.     01/01/24 0630   ETT    Placement Date/Time: 01/01/24 0559   Present on Admission/Arrival: No  Placed By: In ED  Placement Verified By: Chest X-ray;Colorimetric ETCO2 device  Preoxygenation: Yes  Mask Ventilation: Ventilated by mask (1)  Technique: Direct laryngoscopy  Airwa...   Secured At 21 cm   Measured From Lips   ETT Placement Center   Secured By Commercial tube flores   Site Assessment Dry     Electronically signed by Ceasar Whitaker RCP on 1/1/2024 at 6:31 AM

## 2024-01-01 NOTE — ED NOTES
Pt is biting ETT. EDMD aware and verbal orders to titrated propofol drip to 30 were given. Fentanyl bolus ordered at this time.

## 2024-01-01 NOTE — PLAN OF CARE
COVID, vented for hypercapnic failure. Chronically hypoxic requiring 4 liters NC.     Pharmacy consulted for toci, f/u CRP and LFTs.    Patient signed out to Dr. Jolley.

## 2024-01-01 NOTE — H&P
V2.0  History and Physical      Name:  Clarissa Estrada /Age/Sex: 1975  (48 y.o. female)   MRN & CSN:  [id number] & [id number] Encounter Date/Time: 2024 7:11 AM EST   Location:  [facility]14 PCP: [facility], [place] Day: 1    Assessment and Plan:   Clarissa Estrada is a 48 y.o. female with a pmh of COPD on 4 L O2 at baseline who presents with Pneumonia due to COVID-19 virus    Hospital Problems             Last Modified POA    * (Principal) Pneumonia due to COVID-19 virus 2024 Yes       Plan:  Acute on chronic respiratory failure with hypoxia and hypercapnia   COPD exacerbation  -Steroids and nebulizers  -Patient intubated, Vent Mode: AC/PRVC / Resp Rate (Set): 24 bpm / Vt (Set, mL): 0 mL / FiO2 : 100 % / PEEP 5  -Admitted to ICU  -Intensivist consulted  Sepsis with hypotension  -Hypothermic to 93.7, tachycardic, and tachypneic  -Patient did not receive sepsis bolus due to pulmonary edema on chest x-ray  -On levo  -On empiric antibiotics  -Blood cultures drawn.  Check pneumonia panel  -Check Pro-Zack  Acute heart failure with mildly reduced ejection fraction  -Echo 2023 with LVEF 45 to 50%, mild diffuse hypokinesis, G2 DD  -proBNP , CXR with pulmonary edema  -IV Lasix 40 mg twice daily  -Strict I/O,  -Monitor creatinine and electrolytes while on diuresis  COVID-19 infection  -Like precipitating COPD exacerbation  -Pharmacy consulted for potential tocilizumab treatment      Disposition:   Current Living situation: Home  Expected Disposition: TBD  Estimated D/C: 5 to 6 days    Diet Diet NPO    DVT Prophylaxis [x] Lovenox, []  Heparin, [] SCDs, [] Ambulation,  [] Eliquis, [] Xarelto, [] Coumadin   Code Status Prior   Surrogate Decision Maker/ POA Unable to discuss with patient due to intubated     Personally reviewed Lab Studies and Imaging     EKG interpreted personally and results       History from:     electronic medical record    History of Present Illness:     Chief Complaint: Shortness  surgery; eye surgery; and Dilatation, esophagus.  Allergies:   Allergies   Allergen Reactions   • Latex      Rash developed   • Sulfa Antibiotics Rash     Throat swelling   • Nsaids Other (See Comments)     ulcer   • Ultram [Tramadol Hcl] Swelling and Rash     Fam HX:  family history includes Arthritis in her father; Cancer in her father and mother; Osteoporosis in her mother.  Soc HX:   Social History     Socioeconomic History   • Marital status:      Spouse name: None   • Number of children: 4   • Years of education: 9   • Highest education level: None   Occupational History   • Occupation: disabled   Tobacco Use   • Smoking status: Former     Current packs/day: 0.00     Average packs/day: 0.5 packs/day for 33.7 years (16.8 ttl pk-yrs)     Types: Cigarettes     Start date: 1984     Quit date: 2017     Years since quittin.0   • Smokeless tobacco: Never   • Tobacco comments:     o   Vaping Use   • Vaping Use: Never used   Substance and Sexual Activity   • Alcohol use: No     Alcohol/week: 0.0 standard drinks of alcohol   • Drug use: No   • Sexual activity: Not Currently     Social Determinants of Health     Food Insecurity: Patient Declined (2023)    Hunger Vital Sign    • Worried About Running Out of Food in the Last Year: Patient declined    • Ran Out of Food in the Last Year: Patient declined   Transportation Needs: Patient Declined (2023)    PRAPARE - Transportation    • Lack of Transportation (Medical): Patient declined    • Lack of Transportation (Non-Medical): Patient declined   Housing Stability: Unknown (2023)    Housing Stability Vital Sign    • Unable to Pay for Housing in the Last Year: Patient refused    • Number of Places Lived in the Last Year: 1    • Unstable Housing in the Last Year: Patient refused       Medications:   Medications:    Infusions:   • propofol 20 mcg/kg/min (24)   • norepinephrine 5 mcg/min (24)     PRN Meds:      Labs

## 2024-01-01 NOTE — ED TRIAGE NOTES
Per EMS pt comes from home. EMS states that pt c/o SOB. Pt has hx of COPD, wearing 4 L O2 at baseline. EMS states that upon arrival, pt O2 sat at 68% on 4 L O2. EMS states that pt has had a productive cough with green sputum for the past couple days. Pt arrives to ED unresponsive and slumped over.

## 2024-01-01 NOTE — PROGRESS NOTES
Medication Reconciliation    List of medications patient is currently taking is INCOMPLETE    Patient intubated/sedated    Medication list updated based on dispense history / EMR    I personally verified patient's Methadone dose with [facility] in December 2023 - Methadone dose is 120 mg po daily - d/w Dr. Hooks - will continue Methadone at this time    Beltran Jolly RPH, PharmD, BCPS  1/1/2024 11:15 AM

## 2024-01-01 NOTE — PROGRESS NOTES
4 Eyes Skin Assessment     NAME:  Clarissa Estrada  YOB: 1975  MEDICAL RECORD NUMBER:  [id number]    The patient is being assessed for  Shift Handoff    I agree that at least one RN has performed a thorough Head to Toe Skin Assessment on the patient. ALL assessment sites listed below have been assessed.      Areas assessed by both nurses:    Head, Face, Ears, Shoulders, Back, Chest, Arms, Elbows, Hands, Sacrum. Buttock, Coccyx, Ischium, and Legs. Feet and Heels        Does the Patient have a Wound? Yes wound(s) were present on assessment. LDA wound assessment was Initiated and completed by RN       Jaison Prevention initiated by RN: Yes  Wound Care Orders initiated by RN: No    Pressure Injury (Stage 3,4, Unstageable, DTI, NWPT, and Complex wounds) if present, place Wound referral order by RN under : Yes    New Ostomies, if present place, Ostomy referral order under : No     Nurse 1 eSignature: Electronically signed by Jossy Treviño RN on 1/1/24 at 6:54 PM EST    **SHARE this note so that the co-signing nurse can place an eSignature**    Nurse 2 eSignature: Electronically signed by Svetlana Dumont RN on 1/1/24 at 7:29 PM EST

## 2024-01-01 NOTE — CONSULTS
Pulmonary Consult Note     Patient's name:  Clarissa Estrada  Medical Record Number: [id number]  Patient's account/billing number: [id number]  Patient's YOB: 1975  Age: 48 y.o.  Date of Admission: 1/1/2024  5:35 AM  Date of Consult: 1/1/2024      Primary Care Physician: Jocelyn Wolfe      Code Status: Full Code    Reason for consult: Acute respiratory failure with hypoxia and hypercapnia    Assessment and Plan     Acute on chronic respiratory with hypoxia and hypercapnia  COVID pneumonia with superimposed bacterial infection  Multifocal pneumonia  COPD  Severe sepsis      Plan:  Ventilator support, settings reviewed and adjusted, check ABG.  Broad-spectrum antibiotic coverage, cefepime and vancomycin  Dexamethasone  Actemra  Bronchodilators  GI DVT prophylaxis  Check C-reactive protein and procalcitonin  Follow-up respiratory cultures  Due to the immediate potential for life-threatening deterioration due to above, I spent 35 minutes providing critical care.  This time is excluding time spent performing procedures.  This note was transcribed using Dragon Dictation software. Please disregard any translational errors.        HISTORY OF PRESENT ILLNESS:   Mr./Ms. Clarissa Estrada is a 48 y.o. lady with past medical history stated below significant for COPD, chronic respiratory with hypoxia on 4 L, presented to the hospital with progressively worsening shortness of breath cough productive green sputum, found hypoxic on arrival as well as hypercapnic for that she was intubated and placed on mechanical ventilation.    Chest x-ray with vascular congestion no clear consolidation        Past Medical History:        Diagnosis Date    Arthritis     Blind right eye     CHF (congestive heart failure) (HCC)     Chronic respiratory failure with hypoxia and hypercapnia (HCC)     COPD (chronic obstructive pulmonary disease) (HCC)     GERD (gastroesophageal reflux disease)

## 2024-01-01 NOTE — PROGRESS NOTES
4 Eyes Skin Assessment     NAME:  Clarissa Estrada  YOB: 1975  MEDICAL RECORD NUMBER:  [id number]    The patient is being assessed for  Admission    I agree that at least one RN has performed a thorough Head to Toe Skin Assessment on the patient. ALL assessment sites listed below have been assessed.      Areas assessed by both nurses:    Head, Face, Ears, Shoulders, Back, Chest, Arms, Elbows, Hands, Sacrum. Buttock, Coccyx, Ischium, and Legs. Feet and Heels        Does the Patient have a Wound? Yes wound(s) were present on assessment. LDA wound assessment was Initiated and completed by RN       Jaison Prevention initiated by RN: Yes  Wound Care Orders initiated by RN: No    Pressure Injury (Stage 3,4, Unstageable, DTI, NWPT, and Complex wounds) if present, place Wound referral order by RN under : No    New Ostomies, if present place, Ostomy referral order under : No     Nurse 1 eSignature: Electronically signed by Jossy Treviño RN on 1/1/24 at 1:59 PM EST    **SHARE this note so that the co-signing nurse can place an eSignature**    Nurse 2 eSignature: Electronically signed by Luisana De Luna RN on 1/1/24 at 1:59 PM EST

## 2024-01-01 NOTE — ED NOTES
She was admitted awaiting transfer to ICU. Intubated for respiratory failure by my colleague who was working previous shift.      I noted that she was dyssynchronous with vent and biting ETT. BP generous at 144/100.  Asked RN to up-titrate sedation and fentanyl bolus ordered.      Cierra Rae MD  01/01/24 0754

## 2024-01-01 NOTE — PROGRESS NOTES
Assisted MD with intubation. Initially ETT 7.5, 25 cm at lip. Delivered volumes lower than set. Peak pressure alarming. CXR shows distal tip in right mainstem. ETT retracted 2 cm. New lip line 23 cm. Volumes still shy of target and peak pressures still present.    Electronically signed by Ceasar Whitaker RCP on 1/1/2024 at 6:14 AM

## 2024-01-01 NOTE — PLAN OF CARE
Problem: Discharge Planning  Goal: Discharge to home or other facility with appropriate resources  Outcome: Progressing  Flowsheets (Taken 1/1/2024 0900)  Discharge to home or other facility with appropriate resources: Identify barriers to discharge with patient and caregiver     Problem: Pain  Goal: Verbalizes/displays adequate comfort level or baseline comfort level  Outcome: Progressing  Flowsheets  Taken 1/1/2024 1300  Verbalizes/displays adequate comfort level or baseline comfort level: Encourage patient to monitor pain and request assistance  Taken 1/1/2024 0830  Verbalizes/displays adequate comfort level or baseline comfort level: Encourage patient to monitor pain and request assistance     Problem: Safety - Adult  Goal: Free from fall injury  Outcome: Progressing     Problem: Safety - Medical Restraint  Goal: Remains free of injury from restraints (Restraint for Interference with Medical Device)  Description: INTERVENTIONS:  1. Determine that other, less restrictive measures have been tried or would not be effective before applying the restraint  2. Evaluate the patient's condition at the time of restraint application  3. Inform patient/family regarding the reason for restraint  4. Q2H: Monitor safety, psychosocial status, comfort, nutrition and hydration  Outcome: Progressing  Flowsheets  Taken 1/1/2024 1340  Remains free of injury from restraints (restraint for interference with medical device): Determine that other, less restrictive measures have been tried or would not be effective before applying the restraint  Taken 1/1/2024 1000  Remains free of injury from restraints (restraint for interference with medical device): Determine that other, less restrictive measures have been tried or would not be effective before applying the restraint     Problem: Skin/Tissue Integrity  Goal: Absence of new skin breakdown  Description: 1.  Monitor for areas of redness and/or skin breakdown  2.  Assess vascular

## 2024-01-01 NOTE — ED PROVIDER NOTES
I PERSONALLY SAW THE PATIENT AND PERFORMED A SUBSTANTIVE PORTION OF THE VISIT INCLUDING ALL ASPECTS OF THE MEDICAL DECISION MAKING PROCESS.    [facility] EMERGENCY DEPARTMENT  EMERGENCY DEPARTMENT ENCOUNTER      Pt Name: Clarissa Estrada  MRN: [id number]  Birthdate 1975  Date of evaluation: 1/1/2024  Provider: Beltran Piedra MD    CHIEF COMPLAINT       Chief Complaint   Patient presents with    Shortness of Breath     Per EMS pt comes from home. EMS states that pt c/o SOB. Pt has hx of COPD, wearing 4 L O2 at baseline. EMS states that upon arrival, pt O2 sat at 68% on 4 L O2. EMS states that pt has had a productive cough with green sputum for the past couple days. Pt arrives to ED unresponsive and slumped over.        HISTORY OF PRESENT ILLNESS    Clarissa Estrada is a 48 y.o. female who presents to the emergency department with shortness of breath.  Patient presents with shortness of breath from home.  History of COPD.  Wears 4 L at baseline.  On arrival patient on nonrebreather saturating in the mid 60s.  Altered mental status.  Green productive cough.  No trauma per EMS.  History otherwise limited as patient is in severe respiratory distress on arrival to the emergency room today.    Nursing Notes were reviewed. Including nursing noted for FM, Surgical History, Past Medical History, Social History, vitals, and allergies; agree with all.     REVIEW OF SYSTEMS       Review of Systems    Except as noted above the remainder of the review of systems was reviewed and negative.     PAST MEDICAL HISTORY     Past Medical History:   Diagnosis Date    Arthritis     Blind right eye     CHF (congestive heart failure) (HCC)     Chronic respiratory failure with hypoxia and hypercapnia (HCC)     COPD (chronic obstructive pulmonary disease) (HCC)     GERD (gastroesophageal reflux disease)     Hypertension     Movement disorder     Neuromuscular disorder (HCC)     On home O2     Pneumonia     Sleep apnea   usage, Drug usage reviewed by myself, no pertinent Hx)- No Pertinent Hx     Old records were reviewed by me     MDM 48-year-old with shortness of breath.  COPD and CHF exacerbation.  Unclear if patient has pneumonia as well.  Blood cultures were obtained.  Unable to protect airway.  Intubated for airway protection secondary hypoxia and hypercapnia on arrival.  Central line placed for transient hypotension.  Levophed started.  Nebs, steroids, antibiotics initiated.  COVID-positive.  Lasix given for diuresis of pulmonary edema.  Admission to the ICU for further inpatient evaluation.  Discussed with hospitalist for admission.    Orders Placed This Encounter   Medications    etomidate (AMIDATE) injection    rocuronium (ZEMURON) injection    methylPREDNISolone sodium succ (SOLU-MEDROL) injection 125 mg    propofol infusion     Order Specific Question:   Titrate Infusion?     Answer:   Yes     Order Specific Question:   Initial Infusion Dose:     Answer:   20 mcg/kg/min     Order Specific Question:   Goal of Therapy:     Answer:   RASS of 0 to -1     Order Specific Question:   Contact Provider if:     Answer:   New onset HR less than 50 bpm     Order Specific Question:   Contact Provider if:     Answer:   New onset SBP less than 90 mmHg     Order Specific Question:   Contact Provider if:     Answer:   Patient is receiving maximum dose and is not achieving the goal of therapy     Order Specific Question:   Contact Provider if:     Answer:   Triglycerides greater than 500 mg/dL    piperacillin-tazobactam (ZOSYN) 3,375 mg in sodium chloride 0.9 % 50 mL IVPB (mini-bag)     Order Specific Question:   Antimicrobial Indications     Answer:   Aspiration Pneumonia     I PERSONALLY SAW THE PATIENT AND PERFORMED A SUBSTANTIVE PORTION OF THE VISIT INCLUDING ALL ASPECTS OF THE MEDICAL DECISION MAKING PROCESS.    The primary clinician of record Beltran Piedra     CRITICAL CARE TIME   Total Critical Caretime was 99 minutes, excluding

## 2024-01-01 NOTE — PROCEDURES
Bronchoscopy Inpatient Procedure Note    Date of Procedure: 1/1/2024      Anesthesia: Conscious Sedation. Total Dose: Versed - 2 mg  Fentanyl - 100 mcg  ASA 3  Mallampati: Intubated  Sedation: 30 min    Procedure: Therapeutic flexible fiberoptic bronchoscopy with right middle lobe lavage    Estimated Blood Loss: Minimal    Complications: None      Consent to Procedure  The risks, benefits, complications, treatment options and expected outcomes were discussed with the patient and/or POA  The possibilities of reaction to medication, pulmonary aspiration, perforation of a viscus, bleeding, failure to diagnose a condition and creating a complication requiring transfusion or operation were discussed with the patient who freely signed the consent.      Description of Procedure   Clarissa was monitored  standard endoscopy monitoring devices. Clarissa Estrada and the procedure were verified as Flexible Fiberoptic Bronchoscopy.  A Time Out was held and the above information confirmed.     The patient was placed in the appropriate position. The patient was sedated.     The bronchoscope was then passed through ET tube lidocaine 1% solution 2 ml at a time was applied topically to airways. After careful inspection of the tracheal, the bronchoscope was sequentially passed into all segments of the left and right endobronchial trees to the second and/or third divisions.    Endobronchial findings  Moderately inflamed airways bilaterally with copious amount of purulent secretions and mucous plugs that was suctioned and sent for culture sensitivity.  No endobronchial lesions.  Lavage using 100 cc of saline obtained from the right middle lobe sent for pneumonia panel with cultures sent

## 2024-01-02 ENCOUNTER — APPOINTMENT (OUTPATIENT)
Dept: GENERAL RADIOLOGY | Age: 49
End: 2024-01-02
Payer: MEDICARE

## 2024-01-02 LAB
25(OH)D3 SERPL-MCNC: 23.8 NG/ML
ALBUMIN SERPL-MCNC: 2.9 G/DL (ref 3.4–5)
ALBUMIN/GLOB SERPL: 0.9 {RATIO} (ref 1.1–2.2)
ALP SERPL-CCNC: 94 U/L (ref 40–129)
ALT SERPL-CCNC: 9 U/L (ref 10–40)
ANION GAP SERPL CALCULATED.3IONS-SCNC: 5 MMOL/L (ref 3–16)
AST SERPL-CCNC: 10 U/L (ref 15–37)
BASE EXCESS BLDV CALC-SCNC: 24.2 MMOL/L
BASOPHILS # BLD: 0 K/UL (ref 0–0.2)
BASOPHILS NFR BLD: 0 %
BILIRUB SERPL-MCNC: 0.3 MG/DL (ref 0–1)
BUN SERPL-MCNC: 23 MG/DL (ref 7–20)
CALCIUM SERPL-MCNC: 8.7 MG/DL (ref 8.3–10.6)
CHLORIDE SERPL-SCNC: 89 MMOL/L (ref 99–110)
CO2 BLDV-SCNC: 53 MMOL/L
CO2 SERPL-SCNC: 45 MMOL/L (ref 21–32)
COHGB MFR BLDV: 1.4 %
CREAT SERPL-MCNC: 0.7 MG/DL (ref 0.6–1.1)
CRP SERPL-MCNC: 183.3 MG/L (ref 0–5.1)
DEPRECATED RDW RBC AUTO: 17.4 % (ref 12.4–15.4)
EOSINOPHIL # BLD: 0 K/UL (ref 0–0.6)
EOSINOPHIL NFR BLD: 0 %
GFR SERPLBLD CREATININE-BSD FMLA CKD-EPI: >60 ML/MIN/{1.73_M2}
GLUCOSE BLD-MCNC: 104 MG/DL (ref 70–99)
GLUCOSE BLD-MCNC: 105 MG/DL (ref 70–99)
GLUCOSE BLD-MCNC: 107 MG/DL (ref 70–99)
GLUCOSE BLD-MCNC: 109 MG/DL (ref 70–99)
GLUCOSE BLD-MCNC: 117 MG/DL (ref 70–99)
GLUCOSE BLD-MCNC: 118 MG/DL (ref 70–99)
GLUCOSE BLD-MCNC: 124 MG/DL (ref 70–99)
GLUCOSE SERPL-MCNC: 110 MG/DL (ref 70–99)
HCO3 BLDV-SCNC: 51 MMOL/L (ref 23–29)
HCT VFR BLD AUTO: 25.6 % (ref 36–48)
HGB BLD-MCNC: 8.2 G/DL (ref 12–16)
LYMPHOCYTES # BLD: 0.7 K/UL (ref 1–5.1)
LYMPHOCYTES NFR BLD: 5.6 %
MAGNESIUM SERPL-MCNC: 1.7 MG/DL (ref 1.8–2.4)
MCH RBC QN AUTO: 26.9 PG (ref 26–34)
MCHC RBC AUTO-ENTMCNC: 32.2 G/DL (ref 31–36)
MCV RBC AUTO: 83.6 FL (ref 80–100)
METHGB MFR BLDV: 0.2 %
MONOCYTES # BLD: 0.3 K/UL (ref 0–1.3)
MONOCYTES NFR BLD: 2.4 %
MRSA DNA SPEC QL NAA+PROBE: NORMAL
NEUTROPHILS # BLD: 11.6 K/UL (ref 1.7–7.7)
NEUTROPHILS NFR BLD: 92 %
O2 THERAPY: ABNORMAL
PCO2 BLDV: 65.9 MMHG (ref 40–50)
PERFORMED ON: ABNORMAL
PH BLDV: 7.5 [PH] (ref 7.35–7.45)
PLATELET # BLD AUTO: 332 K/UL (ref 135–450)
PMV BLD AUTO: 7.4 FL (ref 5–10.5)
PO2 BLDV: 41 MMHG
POTASSIUM SERPL-SCNC: 3.2 MMOL/L (ref 3.5–5.1)
PROCALCITONIN SERPL IA-MCNC: 0.28 NG/ML (ref 0–0.15)
PROT SERPL-MCNC: 6.2 G/DL (ref 6.4–8.2)
RBC # BLD AUTO: 3.06 M/UL (ref 4–5.2)
REPORT: NORMAL
SAO2 % BLDV: 84 %
SODIUM SERPL-SCNC: 139 MMOL/L (ref 136–145)
TRIGL SERPL-MCNC: 87 MG/DL (ref 0–150)
WBC # BLD AUTO: 12.6 K/UL (ref 4–11)

## 2024-01-02 PROCEDURE — C1751 CATH, INF, PER/CENT/MIDLINE: HCPCS

## 2024-01-02 PROCEDURE — 94760 N-INVAS EAR/PLS OXIMETRY 1: CPT

## 2024-01-02 PROCEDURE — 86140 C-REACTIVE PROTEIN: CPT

## 2024-01-02 PROCEDURE — 83735 ASSAY OF MAGNESIUM: CPT

## 2024-01-02 PROCEDURE — 6360000002 HC RX W HCPCS: Performed by: HOSPITALIST

## 2024-01-02 PROCEDURE — 36569 INSJ PICC 5 YR+ W/O IMAGING: CPT

## 2024-01-02 PROCEDURE — 02HV33Z INSERTION OF INFUSION DEVICE INTO SUPERIOR VENA CAVA, PERCUTANEOUS APPROACH: ICD-10-PCS | Performed by: INTERNAL MEDICINE

## 2024-01-02 PROCEDURE — 6360000002 HC RX W HCPCS: Performed by: NURSE PRACTITIONER

## 2024-01-02 PROCEDURE — 85025 COMPLETE CBC W/AUTO DIFF WBC: CPT

## 2024-01-02 PROCEDURE — 6370000000 HC RX 637 (ALT 250 FOR IP): Performed by: HOSPITALIST

## 2024-01-02 PROCEDURE — 6360000002 HC RX W HCPCS: Performed by: SURGERY

## 2024-01-02 PROCEDURE — 6360000002 HC RX W HCPCS: Performed by: INTERNAL MEDICINE

## 2024-01-02 PROCEDURE — 71045 X-RAY EXAM CHEST 1 VIEW: CPT

## 2024-01-02 PROCEDURE — 84145 PROCALCITONIN (PCT): CPT

## 2024-01-02 PROCEDURE — 2700000000 HC OXYGEN THERAPY PER DAY

## 2024-01-02 PROCEDURE — 2580000003 HC RX 258: Performed by: SURGERY

## 2024-01-02 PROCEDURE — 82803 BLOOD GASES ANY COMBINATION: CPT

## 2024-01-02 PROCEDURE — 6370000000 HC RX 637 (ALT 250 FOR IP): Performed by: SURGERY

## 2024-01-02 PROCEDURE — 76937 US GUIDE VASCULAR ACCESS: CPT

## 2024-01-02 PROCEDURE — 2500000003 HC RX 250 WO HCPCS: Performed by: NURSE PRACTITIONER

## 2024-01-02 PROCEDURE — 6370000000 HC RX 637 (ALT 250 FOR IP): Performed by: INTERNAL MEDICINE

## 2024-01-02 PROCEDURE — 94003 VENT MGMT INPAT SUBQ DAY: CPT

## 2024-01-02 PROCEDURE — 84478 ASSAY OF TRIGLYCERIDES: CPT

## 2024-01-02 PROCEDURE — 94640 AIRWAY INHALATION TREATMENT: CPT

## 2024-01-02 PROCEDURE — 2000000000 HC ICU R&B

## 2024-01-02 PROCEDURE — 99291 CRITICAL CARE FIRST HOUR: CPT | Performed by: INTERNAL MEDICINE

## 2024-01-02 PROCEDURE — 36592 COLLECT BLOOD FROM PICC: CPT

## 2024-01-02 PROCEDURE — 2580000003 HC RX 258: Performed by: INTERNAL MEDICINE

## 2024-01-02 PROCEDURE — 80053 COMPREHEN METABOLIC PANEL: CPT

## 2024-01-02 PROCEDURE — 82306 VITAMIN D 25 HYDROXY: CPT

## 2024-01-02 PROCEDURE — 2500000003 HC RX 250 WO HCPCS: Performed by: INTERNAL MEDICINE

## 2024-01-02 PROCEDURE — APPNB15 APP NON BILLABLE TIME 0-15 MINS: Performed by: NURSE PRACTITIONER

## 2024-01-02 RX ORDER — MIDAZOLAM HYDROCHLORIDE 1 MG/ML
2 INJECTION INTRAMUSCULAR; INTRAVENOUS ONCE
Status: COMPLETED | OUTPATIENT
Start: 2024-01-02 | End: 2024-01-02

## 2024-01-02 RX ORDER — PROPOFOL 10 MG/ML
5-50 INJECTION, EMULSION INTRAVENOUS CONTINUOUS
Status: DISCONTINUED | OUTPATIENT
Start: 2024-01-02 | End: 2024-01-03

## 2024-01-02 RX ORDER — SODIUM CHLORIDE 0.9 % (FLUSH) 0.9 %
5-40 SYRINGE (ML) INJECTION EVERY 12 HOURS SCHEDULED
Status: DISCONTINUED | OUTPATIENT
Start: 2024-01-02 | End: 2024-01-02

## 2024-01-02 RX ORDER — SODIUM CHLORIDE 0.9 % (FLUSH) 0.9 %
5-40 SYRINGE (ML) INJECTION PRN
Status: DISCONTINUED | OUTPATIENT
Start: 2024-01-02 | End: 2024-01-06 | Stop reason: HOSPADM

## 2024-01-02 RX ORDER — POTASSIUM CHLORIDE 29.8 MG/ML
20 INJECTION INTRAVENOUS ONCE
Status: COMPLETED | OUTPATIENT
Start: 2024-01-02 | End: 2024-01-02

## 2024-01-02 RX ORDER — LIDOCAINE HYDROCHLORIDE 10 MG/ML
5 INJECTION, SOLUTION EPIDURAL; INFILTRATION; INTRACAUDAL; PERINEURAL ONCE
Status: COMPLETED | OUTPATIENT
Start: 2024-01-02 | End: 2024-01-02

## 2024-01-02 RX ORDER — MAGNESIUM SULFATE IN WATER 40 MG/ML
2000 INJECTION, SOLUTION INTRAVENOUS ONCE
Status: COMPLETED | OUTPATIENT
Start: 2024-01-02 | End: 2024-01-02

## 2024-01-02 RX ORDER — MIDAZOLAM HYDROCHLORIDE 1 MG/ML
1-10 INJECTION, SOLUTION INTRAVENOUS CONTINUOUS
Status: DISCONTINUED | OUTPATIENT
Start: 2024-01-02 | End: 2024-01-03

## 2024-01-02 RX ORDER — SODIUM CHLORIDE 9 MG/ML
25 INJECTION, SOLUTION INTRAVENOUS PRN
Status: DISCONTINUED | OUTPATIENT
Start: 2024-01-02 | End: 2024-01-06 | Stop reason: HOSPADM

## 2024-01-02 RX ADMIN — IPRATROPIUM BROMIDE AND ALBUTEROL SULFATE 1 DOSE: .5; 3 SOLUTION RESPIRATORY (INHALATION) at 15:51

## 2024-01-02 RX ADMIN — IPRATROPIUM BROMIDE AND ALBUTEROL SULFATE 1 DOSE: .5; 3 SOLUTION RESPIRATORY (INHALATION) at 12:00

## 2024-01-02 RX ADMIN — MIDAZOLAM HYDROCHLORIDE 2 MG/HR: 1 INJECTION, SOLUTION INTRAVENOUS at 02:06

## 2024-01-02 RX ADMIN — Medication 2000 UNITS: at 08:29

## 2024-01-02 RX ADMIN — MIDAZOLAM 2 MG: 1 INJECTION INTRAMUSCULAR; INTRAVENOUS at 02:01

## 2024-01-02 RX ADMIN — IPRATROPIUM BROMIDE AND ALBUTEROL SULFATE 1 DOSE: .5; 3 SOLUTION RESPIRATORY (INHALATION) at 20:40

## 2024-01-02 RX ADMIN — MOMETASONE FUROATE AND FORMOTEROL FUMARATE DIHYDRATE 2 PUFF: 200; 5 AEROSOL RESPIRATORY (INHALATION) at 20:40

## 2024-01-02 RX ADMIN — IPRATROPIUM BROMIDE AND ALBUTEROL SULFATE 1 DOSE: .5; 3 SOLUTION RESPIRATORY (INHALATION) at 07:57

## 2024-01-02 RX ADMIN — DEXAMETHASONE SODIUM PHOSPHATE 10 MG: 10 INJECTION, SOLUTION INTRAMUSCULAR; INTRAVENOUS at 08:28

## 2024-01-02 RX ADMIN — IPRATROPIUM BROMIDE AND ALBUTEROL SULFATE 1 DOSE: .5; 3 SOLUTION RESPIRATORY (INHALATION) at 03:41

## 2024-01-02 RX ADMIN — DEXAMETHASONE SODIUM PHOSPHATE 10 MG: 10 INJECTION, SOLUTION INTRAMUSCULAR; INTRAVENOUS at 19:50

## 2024-01-02 RX ADMIN — PROPOFOL 50 MCG/KG/MIN: 10 INJECTION, EMULSION INTRAVENOUS at 04:49

## 2024-01-02 RX ADMIN — PROPOFOL 50 MCG/KG/MIN: 10 INJECTION, EMULSION INTRAVENOUS at 01:01

## 2024-01-02 RX ADMIN — Medication 10 ML: at 19:50

## 2024-01-02 RX ADMIN — POTASSIUM BICARBONATE 40 MEQ: 782 TABLET, EFFERVESCENT ORAL at 06:22

## 2024-01-02 RX ADMIN — Medication 20 MG: at 08:28

## 2024-01-02 RX ADMIN — ENOXAPARIN SODIUM 40 MG: 100 INJECTION SUBCUTANEOUS at 08:28

## 2024-01-02 RX ADMIN — FUROSEMIDE 40 MG: 10 INJECTION, SOLUTION INTRAMUSCULAR; INTRAVENOUS at 08:29

## 2024-01-02 RX ADMIN — POTASSIUM CHLORIDE 20 MEQ: 29.8 INJECTION, SOLUTION INTRAVENOUS at 10:06

## 2024-01-02 RX ADMIN — CEFEPIME 2000 MG: 2 INJECTION, POWDER, FOR SOLUTION INTRAVENOUS at 05:41

## 2024-01-02 RX ADMIN — PROPOFOL 40 MCG/KG/MIN: 10 INJECTION, EMULSION INTRAVENOUS at 10:04

## 2024-01-02 RX ADMIN — PROPOFOL 40 MCG/KG/MIN: 10 INJECTION, EMULSION INTRAVENOUS at 14:53

## 2024-01-02 RX ADMIN — Medication 20 MG: at 19:50

## 2024-01-02 RX ADMIN — PROPOFOL 35 MCG/KG/MIN: 10 INJECTION, EMULSION INTRAVENOUS at 18:44

## 2024-01-02 RX ADMIN — LIDOCAINE HYDROCHLORIDE 5 ML: 10 INJECTION, SOLUTION EPIDURAL; INFILTRATION; INTRACAUDAL; PERINEURAL at 11:00

## 2024-01-02 RX ADMIN — MAGNESIUM SULFATE HEPTAHYDRATE 2000 MG: 40 INJECTION, SOLUTION INTRAVENOUS at 06:22

## 2024-01-02 RX ADMIN — METHADONE HYDROCHLORIDE 120 MG: 10 TABLET ORAL at 08:29

## 2024-01-02 RX ADMIN — PROPOFOL 45 MCG/KG/MIN: 10 INJECTION, EMULSION INTRAVENOUS at 23:46

## 2024-01-02 RX ADMIN — CEFTRIAXONE SODIUM 2000 MG: 2 INJECTION, POWDER, FOR SOLUTION INTRAMUSCULAR; INTRAVENOUS at 12:45

## 2024-01-02 RX ADMIN — MOMETASONE FUROATE AND FORMOTEROL FUMARATE DIHYDRATE 2 PUFF: 200; 5 AEROSOL RESPIRATORY (INHALATION) at 07:57

## 2024-01-02 RX ADMIN — Medication 10 ML: at 08:29

## 2024-01-02 ASSESSMENT — PULMONARY FUNCTION TESTS
PIF_VALUE: 11
PIF_VALUE: 9
PIF_VALUE: 11
PIF_VALUE: 23
PIF_VALUE: 25
PIF_VALUE: 25
PIF_VALUE: 17
PIF_VALUE: 14
PIF_VALUE: 17
PIF_VALUE: 11
PIF_VALUE: 24
PIF_VALUE: 12
PIF_VALUE: 24
PIF_VALUE: 11
PIF_VALUE: 21
PIF_VALUE: 23
PIF_VALUE: 18
PIF_VALUE: 22
PIF_VALUE: 21
PIF_VALUE: 9
PIF_VALUE: 12
PIF_VALUE: 10
PIF_VALUE: 22
PIF_VALUE: 20
PIF_VALUE: 19
PIF_VALUE: 20
PIF_VALUE: 16
PIF_VALUE: 21
PIF_VALUE: 14
PIF_VALUE: 22
PIF_VALUE: 13
PIF_VALUE: 9
PIF_VALUE: 8
PIF_VALUE: 15
PIF_VALUE: 25
PIF_VALUE: 13
PIF_VALUE: 25
PIF_VALUE: 19
PIF_VALUE: 19
PIF_VALUE: 12
PIF_VALUE: 9
PIF_VALUE: 14

## 2024-01-02 ASSESSMENT — PAIN SCALES - GENERAL
PAINLEVEL_OUTOF10: 0

## 2024-01-02 NOTE — PROGRESS NOTES
Order for PICC line per Jessika Yanez. Pre procedure and timeout done with pt's RN.    Successful insertion of a triple lumen PICC line into pt's right basilic vein. No issues gaining access or advancing guidewire/introducer/PICC line. PICC tip terminates in the SVC according to [employer] 3CG tip confirmation system. PICC was seen dropping into SVC with tip tracking technology and discernable peaked p waves were noted without negative deflection. A printout will be in pt's chart.     PICC is cut at 38 cm and out externally 0 cm. All lumens flush without resistance and draw back brisk blood return.    PICC site CDI with hemostasis maintained and a biopatch applied to site. Pt instructed to stay in bed and keep arm flat and still for 30 minutes  to promote hemostasis.     Jossy MCGARRY given handoff report

## 2024-01-02 NOTE — PROGRESS NOTES
4 Eyes Skin Assessment     NAME:  Clarissa Estrada  YOB: 1975  MEDICAL RECORD NUMBER:  [id number]    The patient is being assessed for  Shift Handoff    I agree that at least one RN has performed a thorough Head to Toe Skin Assessment on the patient. ALL assessment sites listed below have been assessed.      Areas assessed by both nurses:    Head, Face, Ears, Shoulders, Back, Chest, Arms, Elbows, Hands, Sacrum. Buttock, Coccyx, Ischium, Legs. Feet and Heels, and Under Medical Devices         Does the Patient have a Wound? Yes wound(s) were present on assessment. LDA wound assessment was Initiated and completed by RN       Jaison Prevention initiated by RN: Yes  Wound Care Orders initiated by RN: Yes    Pressure Injury (Stage 3,4, Unstageable, DTI, NWPT, and Complex wounds) if present, place Wound referral order by RN under : Yes    New Ostomies, if present place, Ostomy referral order under : No     Nurse 1 eSignature: Electronically signed by Svetlana Dumont RN on 1/2/24 at 6:16 AM EST    **SHARE this note so that the co-signing nurse can place an eSignature**    Nurse 2 eSignature: Electronically signed by Jossy Treviño RN on 1/2/24 at 7:53 AM EST

## 2024-01-02 NOTE — PROGRESS NOTES
Pulmonary Progress Note    Date of Admission: 1/1/2024   LOS: 1 day       CC:  Chief Complaint   Patient presents with    Shortness of Breath     Per EMS pt comes from home. EMS states that pt c/o SOB. Pt has hx of COPD, wearing 4 L O2 at baseline. EMS states that upon arrival, pt O2 sat at 68% on 4 L O2. EMS states that pt has had a productive cough with green sputum for the past couple days. Pt arrives to ED unresponsive and slumped over.         Subjective:  Sedated    ROS:          Assessment:          Plan:     This note may have been transcribed using Dragon Dictation software. Please disregard any translational errors.       Hospital Day: 1     COPD exacerbation   Dexamethasone  Duoneb's    Dulera     Sepsis with shock, h flu    Change to CTX.     Chronic hypercapnia with chronic hypoxemia   Not using bilevel at home  6 L NC at home     Mechanical Vent   Propofol . Versed.   pH corrected.   VBG today.    EtCO2 40.  Change to Ac VC 14, 260 (6 mL/kg)     ID   COVID 19 , H Flu  S/p acetrema       Hx CHF  Lasix        Prophylaxis       Nutrition  - Diet NPO  -   Intake/Output Summary (Last 24 hours) at 1/2/2024 0935  Last data filed at 1/2/2024 0815  Gross per 24 hour   Intake 1087.24 ml   Output 2160 ml   Net -1072.76 ml       Mobility       Access  Arterial      PICC          CVC       CVC Triple Lumen 01/01/24 Right Femoral (Active)   $ Central line insertion $ Yes 01/01/24 0551   Central Line Being Utilized No 01/02/24 0815   Criteria for Appropriate Use Hemodynamically unstable, requiring monitoring lines, vasopressors, or volume resuscitation 01/02/24 0815   Site Assessment Clean, dry & intact 01/02/24 0815   Phlebitis Assessment No symptoms 01/02/24 0815   Infiltration Assessment 0 01/02/24 0815   Color/Movement/Sensation Capillary refill less than 3 sec 01/02/24 0815   Proximal Lumen Color/Status White;Infusing;Capped 01/02/24 0815   Medial Lumen Status Blue;Capped;Infusing 01/02/24 0815   Distal Lumen  infiltrates.    Small bilateral pleural effusions with associated adjacent atelectasis.    Mediastinal adenopathy which is likely reactive in nature.    Acute appearing fracture of the posterolateral right 4th rib.    Additional findings noted above.      CXR PA/LAT: Results for orders placed during the hospital encounter of 05/31/21    XR CHEST (2 VW)    Narrative  EXAMINATION:  TWO XRAY VIEWS OF THE CHEST    5/31/2021 6:27 pm    COMPARISON:  11/08/2020    HISTORY:  ORDERING SYSTEM PROVIDED HISTORY: Shortness of breath  TECHNOLOGIST PROVIDED HISTORY:  Reason for exam:->Shortness of breath  Reason for Exam: sob, chest pain    FINDINGS:  The heart is mildly enlarged and less prominent.  The pulmonary vessels are  engorged centrally and less prominent.  There are hazy perihilar and  bibasilar interstitial opacities which have decreased.  There are mild linear  densities along the lung bases extending posteriorly.  No effusion is seen.  There moderate degenerative changes in the spine with mild kyphosis along the  thoracolumbar region    Impression  Mild cardiomegaly which is less prominent with mild central pulmonary  congestion and questionable interstitial edema or pneumonitis vs underlying  pulmonary fibrosis.  Recommend short-term follow-up.    Mild subsegmental linear atelectasis or scarring along the lung bases  posteriorly      CXR portable: Results for orders placed during the hospital encounter of 01/01/24    XR CHEST PORTABLE    Narrative  EXAMINATION:  ONE XRAY VIEW OF THE CHEST    1/1/2024 5:42 am    COMPARISON:  12/03/2023    HISTORY:  ORDERING SYSTEM PROVIDED HISTORY: sob  TECHNOLOGIST PROVIDED HISTORY:  Reason for exam:->sob  Reason for Exam: intubation    FINDINGS:  The endotracheal tube is low lying 1.3 cm above the anupama and directed  towards the right mainstem bronchus.  The enteric tube terminates at the  level the body of the stomach.  Vascular congestion is noted.  No  pneumothorax or

## 2024-01-02 NOTE — PROGRESS NOTES
4 Eyes Skin Assessment     NAME:  Clarissa Estrada  YOB: 1975  MEDICAL RECORD NUMBER:  [id number]    The patient is being assessed for  Shift Handoff    I agree that at least one RN has performed a thorough Head to Toe Skin Assessment on the patient. ALL assessment sites listed below have been assessed.      Areas assessed by both nurses:    Head, Face, Ears, Shoulders, Back, Chest, Arms, Elbows, Hands, Sacrum. Buttock, Coccyx, Ischium, and Legs. Feet and Heels        Does the Patient have a Wound? yes       Jaison Prevention initiated by RN: Yes  Wound Care Orders initiated by RN: Yes    Pressure Injury (Stage 3,4, Unstageable, DTI, NWPT, and Complex wounds) if present, place Wound referral order by RN under : Yes    New Ostomies, if present place, Ostomy referral order under : No     Nurse 1 eSignature: Electronically signed by Jossy Treviño RN on 1/2/24 at 6:54 PM EST    **SHARE this note so that the co-signing nurse can place an eSignature**    Nurse 2 eSignature: Electronically signed by Svetlana Dumont RN on 1/2/24 at 8:22 PM EST

## 2024-01-02 NOTE — PLAN OF CARE
Problem: Discharge Planning  Goal: Discharge to home or other facility with appropriate resources  1/2/2024 1208 by Jossy Treviño RN  Outcome: Progressing  Flowsheets (Taken 1/2/2024 0815)  Discharge to home or other facility with appropriate resources: Identify barriers to discharge with patient and caregiver  1/2/2024 0336 by Svetlana Dumont RN  Outcome: Progressing  Flowsheets (Taken 1/1/2024 2000)  Discharge to home or other facility with appropriate resources:   Identify barriers to discharge with patient and caregiver   Arrange for needed discharge resources and transportation as appropriate   Identify discharge learning needs (meds, wound care, etc)   Arrange for interpreters to assist at discharge as needed   Refer to discharge planning if patient needs post-hospital services based on physician order or complex needs related to functional status, cognitive ability or social support system     Problem: Pain  Goal: Verbalizes/displays adequate comfort level or baseline comfort level  1/2/2024 1208 by Jossy Treviño RN  Outcome: Progressing  Flowsheets (Taken 1/2/2024 0815)  Verbalizes/displays adequate comfort level or baseline comfort level:   Encourage patient to monitor pain and request assistance   Assess pain using appropriate pain scale  1/2/2024 0336 by Svetlana Dumont RN  Outcome: Progressing  Flowsheets (Taken 1/1/2024 2000)  Verbalizes/displays adequate comfort level or baseline comfort level:   Assess pain using appropriate pain scale   Administer analgesics based on type and severity of pain and evaluate response   Implement non-pharmacological measures as appropriate and evaluate response   Consider cultural and social influences on pain and pain management   Notify Licensed Independent Practitioner if interventions unsuccessful or patient reports new pain     Problem: Safety - Adult  Goal: Free from fall injury  1/2/2024 1208 by Jossy Treviño, RN  Outcome: Progressing  1/2/2024  Progressing  1/2/2024 0336 by Svetlana Dumont, RN  Outcome: Progressing     Problem: ABCDS Injury Assessment  Goal: Absence of physical injury  1/2/2024 1208 by Jossy Treviño, RN  Outcome: Progressing  1/2/2024 0336 by Svetlana Dumont, RN  Outcome: Progressing  Flowsheets (Taken 1/2/2024 0336)  Absence of Physical Injury: Implement safety measures based on patient assessment     Problem: Chronic Conditions and Co-morbidities  Goal: Patient's chronic conditions and co-morbidity symptoms are monitored and maintained or improved  Outcome: Progressing  Flowsheets (Taken 1/2/2024 0815)  Care Plan - Patient's Chronic Conditions and Co-Morbidity Symptoms are Monitored and Maintained or Improved: Monitor and assess patient's chronic conditions and comorbid symptoms for stability, deterioration, or improvement

## 2024-01-02 NOTE — DISCHARGE INSTRUCTIONS
Additional sites of heart failure:     https://[employer].com/publication/?q=769756  --- this is the American Heart Association's interactive guide to healthier living with heart failure .  Click on the hyperlink or copy/paste the link into the search bar. Use your mouse to scroll through the pages.  Lots of information on weight management, tips on diets, activity, medications, etc    HF [place] Shayy: This is a free smartphone shayy available for download on iPhone and Android.  Use your phone to track sodium/fluid intake, track zone tool symptoms, weights, medications, etc. Click on this HF [place] Shayy     hyperlink to get a QR code for easy download--.search for it in your shayy store                                          DASH (Dietary Approach to Stop Hypertension)  Diet - https://www.nhlbi.nih.gov/education/dash-eating-plan - this diet is a flexible eating plan that promotes a heart-healthy eating style.  Click on the hyperlink or copy/paste the link into the search bar.  Lots of low-sodium recipes and tips.    https://www.[employer]/recipes -- more free recipes

## 2024-01-02 NOTE — CARE COORDINATION
[facility] Nurse  Consult Note       NAME:  Clarissa Estrada  MEDICAL RECORD NUMBER:  [id number]  AGE: 48 y.o.   GENDER: female  : 1975  TODAY'S DATE:  2024    Subjective   Reason for WOCN Evaluation and Assessment: venous ulcer R frazier      Clarissa Estrada is a 48 y.o. female referred by:   [] Physician  [x] Nursing  [] Other:     Wound History: Right Lateral Frazier incised on 2023 by Dr. Sullivan and with old hematoma. D/c , re-admitted  on vent in ICU.   Current Wound Care Treatment:  n/a    Patient Goal of Care:  [x] Wound Healing  [] Odor Control  [] Palliative Care  [] Pain Control   [] Other:         PAST MEDICAL HISTORY        Diagnosis Date    Arthritis     Blind right eye     CHF (congestive heart failure) (HCC)     Chronic respiratory failure with hypoxia and hypercapnia (HCC)     COPD (chronic obstructive pulmonary disease) (HCC)     GERD (gastroesophageal reflux disease)     Hypertension     Movement disorder     Neuromuscular disorder (HCC)     On home O2     Pneumonia     Sleep apnea        PAST SURGICAL HISTORY    Past Surgical History:   Procedure Laterality Date    ABDOMEN SURGERY      CHOLECYSTECTOMY      DILATATION, ESOPHAGUS      EYE SURGERY      TUBAL LIGATION         FAMILY HISTORY    Family History   Problem Relation Age of Onset    Cancer Mother         lung cancer    Osteoporosis Mother     Cancer Father         cancer    Arthritis Father        SOCIAL HISTORY    Social History     Tobacco Use    Smoking status: Former     Current packs/day: 0.00     Average packs/day: 0.5 packs/day for 33.7 years (16.8 ttl pk-yrs)     Types: Cigarettes     Start date: 1984     Quit date: 2017     Years since quittin.0    Smokeless tobacco: Never    Tobacco comments:     o   Vaping Use    Vaping Use: Never used   Substance Use Topics    Alcohol use: No     Alcohol/week: 0.0 standard drinks of alcohol    Drug use: No       ALLERGIES    Allergies    Allergen Reactions    Latex      Rash developed    Sulfa Antibiotics Rash     Throat swelling    Nsaids Other (See Comments)     ulcer    Ultram [Tramadol Hcl] Swelling and Rash       MEDICATIONS    No current facility-administered medications on file prior to encounter.     Current Outpatient Medications on File Prior to Encounter   Medication Sig Dispense Refill    lidocaine 4 % external patch Place 1 patch onto the skin daily 10 patch 0    methadone (DOLOPHINE) 10 MG/ML solution Take 12 mLs by mouth daily.      cloNIDine (CATAPRES) 0.1 MG tablet Take 1 tablet by mouth 2 times daily      torsemide (DEMADEX) 20 MG tablet TAKE THREE TABLETS BY MOUTH DAILY 270 tablet 1    ipratropium 0.5 mg-albuterol 2.5 mg (DUONEB) 0.5-2.5 (3) MG/3ML SOLN nebulizer solution Inhale 3 mLs into the lungs every 4 hours 360 mL 5    lisinopril (PRINIVIL;ZESTRIL) 5 MG tablet TAKE ONE TABLET BY MOUTH DAILY 90 tablet 3    carvedilol (COREG) 3.125 MG tablet TAKE ONE TABLET BY MOUTH TWICE A  tablet 3    empagliflozin (JARDIANCE) 10 MG tablet Take 1 tablet by mouth daily 90 tablet 4    albuterol sulfate HFA (PROVENTIL;VENTOLIN;PROAIR) 108 (90 Base) MCG/ACT inhaler INHALE TWO PUFFS BY MOUTH EVERY 4 HOURS AS NEEDED FOR WHEEZING OR SHORTNESS OF BREATH 8.5 g 11    Sertraline HCl (ZOLOFT PO) Take 125 mg by mouth Daily      aspirin 81 MG EC tablet Take 1 tablet by mouth daily Not taking      SPIRIVA HANDIHALER 18 MCG inhalation capsule INHALE THE ENTIRE CONTENTS OF 1 CAPSULE ONCE A DAY USING HANDIHALER DEVICE 30 capsule 0    SYMBICORT 160-4.5 MCG/ACT AERO Inhale 2 puffs into the lungs 2 times daily 1 Inhaler 11    omeprazole (PRILOSEC) 40 MG delayed release capsule Take 1 capsule by mouth every evening At 1800      Spacer/Aero-Holding Chambers MARILOU 1 Device by Does not apply route daily as needed 1 Device 0    OXYGEN Inhale 2 L into the lungs See Admin Instructions. 1 Can 0       Objective    /68   Pulse 82   Temp 99.8 °F (37.7 °C)

## 2024-01-02 NOTE — PLAN OF CARE
Problem: Discharge Planning  Goal: Discharge to home or other facility with appropriate resources  1/2/2024 0336 by Svetlana Dumont RN  Outcome: Progressing  Flowsheets (Taken 1/1/2024 2000)  Discharge to home or other facility with appropriate resources:   Identify barriers to discharge with patient and caregiver   Arrange for needed discharge resources and transportation as appropriate   Identify discharge learning needs (meds, wound care, etc)   Arrange for interpreters to assist at discharge as needed   Refer to discharge planning if patient needs post-hospital services based on physician order or complex needs related to functional status, cognitive ability or social support system  1/1/2024 1446 by Jossy Treviño RN  Outcome: Progressing  Flowsheets (Taken 1/1/2024 0900)  Discharge to home or other facility with appropriate resources: Identify barriers to discharge with patient and caregiver     Problem: Pain  Goal: Verbalizes/displays adequate comfort level or baseline comfort level  1/2/2024 0336 by Svetlana Dumont RN  Outcome: Progressing  Flowsheets (Taken 1/1/2024 2000)  Verbalizes/displays adequate comfort level or baseline comfort level:   Assess pain using appropriate pain scale   Administer analgesics based on type and severity of pain and evaluate response   Implement non-pharmacological measures as appropriate and evaluate response   Consider cultural and social influences on pain and pain management   Notify Licensed Independent Practitioner if interventions unsuccessful or patient reports new pain  1/1/2024 1446 by Jossy Treviño, RN  Outcome: Progressing  Flowsheets  Taken 1/1/2024 1300  Verbalizes/displays adequate comfort level or baseline comfort level: Encourage patient to monitor pain and request assistance  Taken 1/1/2024 0830  Verbalizes/displays adequate comfort level or baseline comfort level: Encourage patient to monitor pain and request assistance     Problem: Safety -

## 2024-01-02 NOTE — CONSULTS
Comprehensive Nutrition Assessment    Type and Reason for Visit:  Initial, Consult    Nutrition Recommendations/Plan:   Nutrition Support:  Initiate Peptide-Based  Vital AF 1.2 @ 10mL/hr  As tolerated, increase by 10 mL/hr q 6 hours until goal of 40 mL/hr  Maintenance water flush of 30 ml every 4 hours or per provider     Malnutrition Assessment:  Malnutrition Status:  No malnutrition (01/02/24 1453)    Context:  Acute Illness       Nutrition Assessment:    Consult for TF order and management. Pt intubated 1/1. Sedated on 19.8 ml/hr propofol providing 523 kcals/day. No recent hx of significant wt loss. Ogt in place. Nutrition related labs reviewed. TF recs above.    Nutrition Related Findings:    104 glucose, Wound Type: None       Current Nutrition Intake & Therapies:    Average Meal Intake: NPO  Average Supplements Intake: NPO  Diet NPO  Current Tube Feeding (TF) Orders:  Goal TF & Flush Orders Provides: Vital AF 1.2 @ 40 ml/hr (Calculated over 20 hrs provides 800 ml total volume, 960 kcal, 60 g protein, 649 ml free water)    Anthropometric Measures:  Height: 149.9 cm (4' 11\")  Ideal Body Weight (IBW): 95 lbs (43 kg)       Current Body Weight:  ,   IBW.    Current BMI (kg/m2):                                 Estimated Daily Nutrient Needs:  Energy Requirements Based On: Kcal/kg  Weight Used for Energy Requirements: Current  Energy (kcal/day): 1236 - 1481 (15 - 18 kcal/kg)  Weight Used for Protein Requirements: Ideal  Protein (g/day): 86 (2g/kg IBW)     Fluid (ml/day): per provider while in ICU    Nutrition Diagnosis:   Inadequate oral intake related to impaired respiratory function as evidenced by intubation    Nutrition Interventions:   Food and/or Nutrient Delivery: Start Tube Feeding  Nutrition Education/Counseling: Education not indicated  Coordination of Nutrition Care: Continue to monitor while inpatient       Goals:     Goals: Meet at least 75% of estimated needs, by next RD assessment, Initiate nutrition

## 2024-01-02 NOTE — PROGRESS NOTES
V2.[place]    [facility] [place]      Name:  Clarissa Estrada /Age/Sex: 1975  (48 y.o. female)   MRN & CSN:  [id number] & [id number] Encounter Date/Time: 2024 7:55 AM EST   Location:  [id number] PCP: Jocelyn Wolfe     Attending:Cachorro Allen MD       Hospital Day: 2    Assessment and Recommendations   Clarissa Estrada is a 48 y.o. female who presents with Pneumonia due to COVID-19 virus      Plan:   Acute on chronic respiratory failure with hypoxia and hypercapnia multifactorial due to COPD exacerbation and potentially COVID infection critical care team consulted on steroids nebulizer intubated ventilated critical care team consulted discussed with critical care  Severe sepsis, present on admission, due to pneumonia, patient positive for pulmonary edema on admission, started on Levophed empiric antibiotics started blood cultures drawn and pending pneumonia panel ordered and pending  Acute on chronic congestive heart failure with reduced ejection fraction  COVID-19 pneumonia with superimposed bacterial pneumonia, critical care team following on steroids, IV cefepime and vancomycin as stated above, apparently patient received Actemra  On chronic methadone      Diet Diet NPO   DVT Prophylaxis [] Lovenox, []  Heparin, [] SCDs, [] Ambulation,  [] Eliquis, [] Xarelto  [] Coumadin   Code Status Full Code             Personally reviewed Lab Studies and Imaging     Discussed management of the case with critical care team   Telemetry strip reviewed no ST elevation      Drugs that require monitoring for toxicity include steroids and the method of monitoring was blood sugar    Medical Decision Making:  The following items were considered in medical decision making:  Discussion of patient care with other providers  Reviewed clinical lab tests  Reviewed radiology tests  Reviewed other diagnostic tests/interventions  Independent review of radiologic images  Microbiology cultures and other micro tests reviewed

## 2024-01-03 ENCOUNTER — APPOINTMENT (OUTPATIENT)
Dept: GENERAL RADIOLOGY | Age: 49
End: 2024-01-03
Payer: MEDICARE

## 2024-01-03 LAB
ANION GAP SERPL CALCULATED.3IONS-SCNC: 7 MMOL/L (ref 3–16)
BACTERIA SPEC RESP CULT: ABNORMAL
BASE EXCESS BLDA CALC-SCNC: 19.9 MMOL/L (ref -3–3)
BASOPHILS # BLD: 0 K/UL (ref 0–0.2)
BASOPHILS NFR BLD: 0.1 %
BUN SERPL-MCNC: 34 MG/DL (ref 7–20)
CALCIUM SERPL-MCNC: 8.7 MG/DL (ref 8.3–10.6)
CHLORIDE SERPL-SCNC: 87 MMOL/L (ref 99–110)
CO2 BLDA-SCNC: 49.5 MMOL/L
CO2 SERPL-SCNC: 43 MMOL/L (ref 21–32)
COHGB MFR BLDA: 1.3 % (ref 0–1.5)
CREAT SERPL-MCNC: 0.8 MG/DL (ref 0.6–1.1)
CRP SERPL-MCNC: 84.2 MG/L (ref 0–5.1)
DEPRECATED RDW RBC AUTO: 17.8 % (ref 12.4–15.4)
EOSINOPHIL # BLD: 0 K/UL (ref 0–0.6)
EOSINOPHIL NFR BLD: 0 %
GFR SERPLBLD CREATININE-BSD FMLA CKD-EPI: >60 ML/MIN/{1.73_M2}
GLUCOSE BLD-MCNC: 103 MG/DL (ref 70–99)
GLUCOSE BLD-MCNC: 119 MG/DL (ref 70–99)
GLUCOSE BLD-MCNC: 120 MG/DL (ref 70–99)
GLUCOSE BLD-MCNC: 122 MG/DL (ref 70–99)
GLUCOSE BLD-MCNC: 132 MG/DL (ref 70–99)
GLUCOSE SERPL-MCNC: 114 MG/DL (ref 70–99)
GRAM STN SPEC: ABNORMAL
GRAM STN SPEC: ABNORMAL
HCO3 BLDA-SCNC: 47.2 MMOL/L (ref 21–29)
HCT VFR BLD AUTO: 25.4 % (ref 36–48)
HGB BLD-MCNC: 8.4 G/DL (ref 12–16)
HGB BLDA-MCNC: 8.5 G/DL (ref 12–16)
LYMPHOCYTES # BLD: 0.7 K/UL (ref 1–5.1)
LYMPHOCYTES NFR BLD: 5.3 %
MAGNESIUM SERPL-MCNC: 2.5 MG/DL (ref 1.8–2.4)
MCH RBC QN AUTO: 27.5 PG (ref 26–34)
MCHC RBC AUTO-ENTMCNC: 32.9 G/DL (ref 31–36)
MCV RBC AUTO: 83.4 FL (ref 80–100)
METHGB MFR BLDA: 0.3 %
MONOCYTES # BLD: 0.3 K/UL (ref 0–1.3)
MONOCYTES NFR BLD: 2.3 %
NEUTROPHILS # BLD: 11.7 K/UL (ref 1.7–7.7)
NEUTROPHILS NFR BLD: 92.3 %
O2 THERAPY: ABNORMAL
ORGANISM: ABNORMAL
ORGANISM: ABNORMAL
PCO2 BLDA: 74.5 MMHG (ref 35–45)
PERFORMED ON: ABNORMAL
PH BLDA: 7.41 [PH] (ref 7.35–7.45)
PHOSPHATE SERPL-MCNC: 3.7 MG/DL (ref 2.5–4.9)
PLATELET # BLD AUTO: 399 K/UL (ref 135–450)
PMV BLD AUTO: 7.7 FL (ref 5–10.5)
PO2 BLDA: 74.9 MMHG (ref 75–108)
POTASSIUM SERPL-SCNC: 3.5 MMOL/L (ref 3.5–5.1)
PROCALCITONIN SERPL IA-MCNC: 0.23 NG/ML (ref 0–0.15)
RBC # BLD AUTO: 3.04 M/UL (ref 4–5.2)
SAO2 % BLDA: 94.8 %
SODIUM SERPL-SCNC: 137 MMOL/L (ref 136–145)
TRIGL SERPL-MCNC: 557 MG/DL (ref 0–150)
WBC # BLD AUTO: 12.7 K/UL (ref 4–11)

## 2024-01-03 PROCEDURE — 6360000002 HC RX W HCPCS: Performed by: INTERNAL MEDICINE

## 2024-01-03 PROCEDURE — 94640 AIRWAY INHALATION TREATMENT: CPT

## 2024-01-03 PROCEDURE — 2580000003 HC RX 258: Performed by: NURSE PRACTITIONER

## 2024-01-03 PROCEDURE — 83735 ASSAY OF MAGNESIUM: CPT

## 2024-01-03 PROCEDURE — 2000000000 HC ICU R&B

## 2024-01-03 PROCEDURE — 84478 ASSAY OF TRIGLYCERIDES: CPT

## 2024-01-03 PROCEDURE — 2580000003 HC RX 258: Performed by: SURGERY

## 2024-01-03 PROCEDURE — 2580000003 HC RX 258: Performed by: INTERNAL MEDICINE

## 2024-01-03 PROCEDURE — 84100 ASSAY OF PHOSPHORUS: CPT

## 2024-01-03 PROCEDURE — 6370000000 HC RX 637 (ALT 250 FOR IP): Performed by: INTERNAL MEDICINE

## 2024-01-03 PROCEDURE — 2500000003 HC RX 250 WO HCPCS: Performed by: INTERNAL MEDICINE

## 2024-01-03 PROCEDURE — 80048 BASIC METABOLIC PNL TOTAL CA: CPT

## 2024-01-03 PROCEDURE — 71045 X-RAY EXAM CHEST 1 VIEW: CPT

## 2024-01-03 PROCEDURE — 6360000002 HC RX W HCPCS: Performed by: SURGERY

## 2024-01-03 PROCEDURE — 82803 BLOOD GASES ANY COMBINATION: CPT

## 2024-01-03 PROCEDURE — 94003 VENT MGMT INPAT SUBQ DAY: CPT

## 2024-01-03 PROCEDURE — 99291 CRITICAL CARE FIRST HOUR: CPT | Performed by: INTERNAL MEDICINE

## 2024-01-03 PROCEDURE — 84145 PROCALCITONIN (PCT): CPT

## 2024-01-03 PROCEDURE — 94761 N-INVAS EAR/PLS OXIMETRY MLT: CPT

## 2024-01-03 PROCEDURE — 2700000000 HC OXYGEN THERAPY PER DAY

## 2024-01-03 PROCEDURE — 6370000000 HC RX 637 (ALT 250 FOR IP): Performed by: SURGERY

## 2024-01-03 PROCEDURE — 86140 C-REACTIVE PROTEIN: CPT

## 2024-01-03 PROCEDURE — 6360000002 HC RX W HCPCS: Performed by: NURSE PRACTITIONER

## 2024-01-03 PROCEDURE — 85025 COMPLETE CBC W/AUTO DIFF WBC: CPT

## 2024-01-03 PROCEDURE — APPNB15 APP NON BILLABLE TIME 0-15 MINS: Performed by: NURSE PRACTITIONER

## 2024-01-03 RX ORDER — SODIUM CHLORIDE, SODIUM LACTATE, POTASSIUM CHLORIDE, CALCIUM CHLORIDE 600; 310; 30; 20 MG/100ML; MG/100ML; MG/100ML; MG/100ML
INJECTION, SOLUTION INTRAVENOUS CONTINUOUS
Status: DISCONTINUED | OUTPATIENT
Start: 2024-01-03 | End: 2024-01-04

## 2024-01-03 RX ADMIN — PROPOFOL 40 MCG/KG/MIN: 10 INJECTION, EMULSION INTRAVENOUS at 04:18

## 2024-01-03 RX ADMIN — IPRATROPIUM BROMIDE AND ALBUTEROL SULFATE 1 DOSE: .5; 3 SOLUTION RESPIRATORY (INHALATION) at 04:10

## 2024-01-03 RX ADMIN — IPRATROPIUM BROMIDE AND ALBUTEROL SULFATE 1 DOSE: .5; 3 SOLUTION RESPIRATORY (INHALATION) at 08:14

## 2024-01-03 RX ADMIN — IPRATROPIUM BROMIDE AND ALBUTEROL SULFATE 1 DOSE: .5; 3 SOLUTION RESPIRATORY (INHALATION) at 19:24

## 2024-01-03 RX ADMIN — IPRATROPIUM BROMIDE AND ALBUTEROL SULFATE 1 DOSE: .5; 3 SOLUTION RESPIRATORY (INHALATION) at 11:55

## 2024-01-03 RX ADMIN — METHADONE HYDROCHLORIDE 120 MG: 10 TABLET ORAL at 09:14

## 2024-01-03 RX ADMIN — MOMETASONE FUROATE AND FORMOTEROL FUMARATE DIHYDRATE 2 PUFF: 200; 5 AEROSOL RESPIRATORY (INHALATION) at 08:14

## 2024-01-03 RX ADMIN — DEXAMETHASONE SODIUM PHOSPHATE 10 MG: 10 INJECTION, SOLUTION INTRAMUSCULAR; INTRAVENOUS at 09:14

## 2024-01-03 RX ADMIN — DEXAMETHASONE SODIUM PHOSPHATE 10 MG: 10 INJECTION, SOLUTION INTRAMUSCULAR; INTRAVENOUS at 20:12

## 2024-01-03 RX ADMIN — MIDAZOLAM HYDROCHLORIDE 5 MG/HR: 1 INJECTION, SOLUTION INTRAVENOUS at 03:10

## 2024-01-03 RX ADMIN — IPRATROPIUM BROMIDE AND ALBUTEROL SULFATE 1 DOSE: .5; 3 SOLUTION RESPIRATORY (INHALATION) at 00:46

## 2024-01-03 RX ADMIN — SODIUM CHLORIDE, POTASSIUM CHLORIDE, SODIUM LACTATE AND CALCIUM CHLORIDE: 600; 310; 30; 20 INJECTION, SOLUTION INTRAVENOUS at 11:58

## 2024-01-03 RX ADMIN — MOMETASONE FUROATE AND FORMOTEROL FUMARATE DIHYDRATE 2 PUFF: 200; 5 AEROSOL RESPIRATORY (INHALATION) at 19:24

## 2024-01-03 RX ADMIN — Medication 10 ML: at 20:12

## 2024-01-03 RX ADMIN — Medication 2000 UNITS: at 09:15

## 2024-01-03 RX ADMIN — CEFTRIAXONE SODIUM 2000 MG: 2 INJECTION, POWDER, FOR SOLUTION INTRAMUSCULAR; INTRAVENOUS at 14:12

## 2024-01-03 RX ADMIN — Medication 20 MG: at 09:14

## 2024-01-03 RX ADMIN — Medication 20 MG: at 20:12

## 2024-01-03 RX ADMIN — MICONAZOLE NITRATE: 2 POWDER TOPICAL at 20:13

## 2024-01-03 RX ADMIN — PROPOFOL 30 MCG/KG/MIN: 10 INJECTION, EMULSION INTRAVENOUS at 09:14

## 2024-01-03 RX ADMIN — Medication 10 ML: at 09:15

## 2024-01-03 RX ADMIN — ENOXAPARIN SODIUM 40 MG: 100 INJECTION SUBCUTANEOUS at 09:14

## 2024-01-03 RX ADMIN — IPRATROPIUM BROMIDE AND ALBUTEROL SULFATE 1 DOSE: .5; 3 SOLUTION RESPIRATORY (INHALATION) at 16:37

## 2024-01-03 ASSESSMENT — PULMONARY FUNCTION TESTS
PIF_VALUE: 11
PIF_VALUE: 14
PIF_VALUE: 18
PIF_VALUE: 13
PIF_VALUE: 15
PIF_VALUE: 16
PIF_VALUE: 14
PIF_VALUE: 19
PIF_VALUE: 10
PIF_VALUE: 16
PIF_VALUE: 13
PIF_VALUE: 17
PIF_VALUE: 16
PIF_VALUE: 11
PIF_VALUE: 12
PIF_VALUE: 9
PIF_VALUE: 13
PIF_VALUE: 17
PIF_VALUE: 14
PIF_VALUE: 12
PIF_VALUE: 17
PIF_VALUE: 24
PIF_VALUE: 13
PIF_VALUE: 12
PIF_VALUE: 11
PIF_VALUE: 11
PIF_VALUE: 16
PIF_VALUE: 14
PIF_VALUE: 11
PIF_VALUE: 11
PIF_VALUE: 14
PIF_VALUE: 13
PIF_VALUE: 9
PIF_VALUE: 13
PIF_VALUE: 11
PIF_VALUE: 11
PIF_VALUE: 13
PIF_VALUE: 15
PIF_VALUE: 17
PIF_VALUE: 12
PIF_VALUE: 13
PIF_VALUE: 12
PIF_VALUE: 16
PIF_VALUE: 14
PIF_VALUE: 16
PIF_VALUE: 15
PIF_VALUE: 11
PIF_VALUE: 12
PIF_VALUE: 23
PIF_VALUE: 18
PIF_VALUE: 23
PIF_VALUE: 17
PIF_VALUE: 12
PIF_VALUE: 11
PIF_VALUE: 17

## 2024-01-03 ASSESSMENT — PAIN SCALES - GENERAL
PAINLEVEL_OUTOF10: 0

## 2024-01-03 NOTE — PLAN OF CARE
Problem: Discharge Planning  Goal: Discharge to home or other facility with appropriate resources  Outcome: Progressing  Flowsheets (Taken 1/3/2024 0900)  Discharge to home or other facility with appropriate resources:   Identify barriers to discharge with patient and caregiver   Arrange for needed discharge resources and transportation as appropriate   Identify discharge learning needs (meds, wound care, etc)   Refer to discharge planning if patient needs post-hospital services based on physician order or complex needs related to functional status, cognitive ability or social support system     Problem: Pain  Goal: Verbalizes/displays adequate comfort level or baseline comfort level  Outcome: Progressing  Flowsheets (Taken 1/3/2024 0900)  Verbalizes/displays adequate comfort level or baseline comfort level:   Encourage patient to monitor pain and request assistance   Assess pain using appropriate pain scale   Administer analgesics based on type and severity of pain and evaluate response   Implement non-pharmacological measures as appropriate and evaluate response   Consider cultural and social influences on pain and pain management   Notify Licensed Independent Practitioner if interventions unsuccessful or patient reports new pain     Problem: Safety - Adult  Goal: Free from fall injury  Outcome: Progressing  Flowsheets (Taken 1/2/2024 0336 by Svetlana Dumont RN)  Free From Fall Injury: Instruct family/caregiver on patient safety     Problem: Safety - Medical Restraint  Goal: Remains free of injury from restraints (Restraint for Interference with Medical Device)  Description: INTERVENTIONS:  1. Determine that other, less restrictive measures have been tried or would not be effective before applying the restraint  2. Evaluate the patient's condition at the time of restraint application  3. Inform patient/family regarding the reason for restraint  4. Q2H: Monitor safety, psychosocial status, comfort, nutrition and

## 2024-01-03 NOTE — PROGRESS NOTES
4 Eyes Skin Assessment     NAME:  Clarissa Estrada  YOB: 1975  MEDICAL RECORD NUMBER:  [id number]    The patient is being assessed for  Shift Handoff    I agree that at least one RN has performed a thorough Head to Toe Skin Assessment on the patient. ALL assessment sites listed below have been assessed.      Areas assessed by both nurses:    Head, Face, Ears, Shoulders, Back, Chest, Arms, Elbows, Hands, Sacrum. Buttock, Coccyx, Ischium, Legs. Feet and Heels, and Under Medical Devices         Does the Patient have a Wound? Yes wound(s) were present on assessment. LDA wound assessment was Initiated and completed by RN       Jaison Prevention initiated by RN: Yes  Wound Care Orders initiated by RN: Yes    Pressure Injury (Stage 3,4, Unstageable, DTI, NWPT, and Complex wounds) if present, place Wound referral order by RN under : No    New Ostomies, if present place, Ostomy referral order under : No     Nurse 1 eSignature: Electronically signed by Hunter Hutchinson RN on 1/3/24 at 6:07 PM EST    **SHARE this note so that the co-signing nurse can place an eSignature**    Nurse 2 eSignature: {Esignature:[id number]}

## 2024-01-03 NOTE — PROGRESS NOTES
Pt moved to specialty bed. At this time PICC line patent, OGT 55 at lip line, ETT 21 at lip line (baseline). Pt SpO2 at 98%. Pt tolerated fairly well. Attempted to reach for the tube. Restraints reapplied. Bed in low position, bed alarm on.

## 2024-01-03 NOTE — PROGRESS NOTES
V2.0    [facility] Progress Note      Name:  Clarissa Estrada /Age/Sex: 1975  (48 y.o. female)   MRN & CSN:  [id number] & [id number] Encounter Date/Time: 1/3/2024 9:22 AM EST   Location:  [id number] PCP: Jocelyn Wolfe     Attending:Cachorro Allen MD       Hospital Day: 3    Assessment and Recommendations   Clarissa Estrada is a 48 y.o. female who presents with Pneumonia due to COVID-19 virus      Plan:     Acute on chronic respiratory failure with hypoxia and hypercapnia multifactorial due to COPD exacerbation and potentially COVID infection critical care team consulted on steroids nebulizer intubated ventilated   Severe sepsis, present on admission, due to pneumonia, patient positive for pulmonary edema on admission, started on Levophed empiric antibiotics started blood cultures positive for Staphylococcus epidermidis which is contaminant and sputum culture positive for haemophilus influenza, pneumonia panel positive for haemophilus influenza and rhinovirus.  Antibiotics changed to ceftriaxone  Acute on chronic congestive heart failure with reduced ejection fraction  COVID-19 pneumonia with superimposed bacterial pneumonia, critical care team following on steroids, IV cefepime and vancomycin changed to ceftriaxone based on sputum culture, sputum culture positive for haemophilus influenza.  On chronic methadone  Anemia appears chronic, no signs of bleeding hemoglobin 8.4 this morning follow-up with outpatient  Positive blood culture with Staphylococcus epidermidis likely contaminant      Diet Diet NPO  ADULT TUBE FEEDING; Orogastric; Peptide Based; Continuous; 10; Yes; 10; Q 6 hours; 40; 30; Q 4 hours   DVT Prophylaxis [] Lovenox, []  Heparin, [] SCDs, [] Ambulation,  [] Eliquis, [] Xarelto  [] Coumadin   Code Status Full Code             Personally reviewed Lab Studies and Imaging     Discussed management of the case with critical care team    Telemetry strip reviewed no ST elevation.  Sinus

## 2024-01-03 NOTE — PROGRESS NOTES
4 Eyes Skin Assessment     NAME:  Clarissa Estrada  YOB: 1975  MEDICAL RECORD NUMBER:  [id number]    The patient is being assessed for  Shift Handoff    I agree that at least one RN has performed a thorough Head to Toe Skin Assessment on the patient. ALL assessment sites listed below have been assessed.      Areas assessed by both nurses:    Head, Face, Ears, Shoulders, Back, Chest, Arms, Elbows, Hands, Sacrum. Buttock, Coccyx, Ischium, Legs. Feet and Heels, and Under Medical Devices         Does the Patient have a Wound? Yes wound(s) were present on assessment. LDA wound assessment was Initiated and completed by RN       Jaison Prevention initiated by RN: Yes  Wound Care Orders initiated by RN: Yes    Pressure Injury (Stage 3,4, Unstageable, DTI, NWPT, and Complex wounds) if present, place Wound referral order by RN under : Yes    New Ostomies, if present place, Ostomy referral order under : No     Nurse 1 eSignature: Electronically signed by Svetlana Dumont RN on 1/3/24 at 5:01 AM EST    **SHARE this note so that the co-signing nurse can place an eSignature**    Nurse 2 eSignature: Electronically signed by Hunter Hutchinson RN on 1/3/24 at 7:54 AM EST

## 2024-01-03 NOTE — PROGRESS NOTES
Blue;Capped;Infusing 01/02/24 0815   Distal Lumen Color/Status Brown;Infusing;Capped 01/02/24 0815   Line Care Connections checked and tightened;Cap changed 01/02/24 0815   Alcohol Cap Used Yes 01/02/24 0815   Date of Last Dressing Change 01/01/24 01/02/24 0815   Dressing Type Transparent w/CHG gel 01/02/24 0815   Dressing Status Clean, dry & intact 01/02/24 0815   Dressing Intervention New 01/01/24 1000   Number of days: 1                   I spent  34 minutes of critical care time with this patient excluding any procedures.     Data:        PHYSICAL EXAM:   Blood pressure 116/71, pulse 80, temperature 99.5 °F (37.5 °C), temperature source Rectal, resp. rate 12, height 1.499 m (4' 11\"), weight 80 kg (176 lb 5.9 oz), last menstrual period 08/14/2015, SpO2 95 %, not currently breastfeeding.'  Body mass index is 35.62 kg/m².   Gen: No distress.    ENT:   Resp: No accessory muscle use. No crackles. +  wheezes, improved. No rhonchi.    CV: Regular rate. Regular rhythm. No murmur or rub. No edema.   Skin: Warm, dry, normal texture and turgor. No nodule on exposed extremities.   M/S: No cyanosis. No clubbing. No joint deformity.  Psych: sedated      Medications:    Scheduled Meds:   cefTRIAXone (ROCEPHIN) IV  2,000 mg IntraVENous Q24H    sodium chloride flush  5-40 mL IntraVENous 2 times per day    enoxaparin  40 mg SubCUTAneous Daily    Vitamin D  2,000 Units Oral Daily    ipratropium 0.5 mg-albuterol 2.5 mg  1 Dose Inhalation Q4H RT    insulin lispro  0-4 Units SubCUTAneous Q4H    dexAMETHasone  10 mg IntraVENous Q12H    famotidine (PEPCID) injection  20 mg IntraVENous BID    mometasone-formoterol  2 puff Inhalation BID RT    methadone  120 mg Oral Daily       Continuous Infusions:   midazolam 3 mg/hr (01/03/24 0647)    sodium chloride      propofol 30 mcg/kg/min (01/03/24 0914)    sodium chloride      dextrose         PRN Meds:  sodium chloride flush, sodium chloride, sodium chloride flush, sodium chloride, ondansetron  reasonably achievable.    COMPARISON:  May 31, 2021    HISTORY:  ORDERING SYSTEM PROVIDED HISTORY: sob  TECHNOLOGIST PROVIDED HISTORY:  Reason for exam:->sob  Decision Support Exception - unselect if not a suspected or confirmed  emergency medical condition->Emergency Medical Condition (MA)  Reason for Exam: SOB, HX of asthma, COPD, CHF.  Pt wears 4L NC at home, pt on  cpap en route per EMS., AMS    FINDINGS:  Pulmonary Arteries: Evaluation of multiple segmental and subsegmental  pulmonary arteries is suboptimal secondary to poor opacification with  contrast.  No evidence of intraluminal filling defect to suggest pulmonary  embolism.    Mediastinum: Enlarged anterior paratracheal lymph node which measures 1.4 cm  in short axis (series 2, image 147).  Enlarged precarinal lymph node which  measures 1.9 cm in short axis with associated punctate calcification (series  2, image 123).  Calcified subcarinal lymph node.  Thyroid gland is  unremarkable in appearance.    Cardiac chambers are unremarkable in appearance.  No pericardial effusion or  pericardial thickening.    Lungs/pleura: Bilateral pulmonary infiltrates.  Small bilateral pleural  effusions with associated adjacent atelectasis.  No pneumothorax.  No  suspicious pulmonary nodularity.  Central airways are patent.  Endotracheal  tube extends to the mid trachea.  This is approximately 2.4 cm from the  anupama.    Upper Abdomen: Calcified granulomas in the liver.  Remainder of the imaged  upper abdominal organs are unremarkable in appearance.    Soft Tissues/Bones: Spondylosis of the thoracic spine.  Remote right-sided  rib fractures.  Acute fracture of the right posterolateral 4th rib (series 2  image 148).  Remote left-sided rib fractures.  Indeterminate mild compression  of the T12 vertebral body.    Impression  Negative for findings of a pulmonary embolus.  Evaluation of the lingular and  left lower lobe segmental and subsegmental pulmonary arteries is

## 2024-01-03 NOTE — PROGRESS NOTES
Bedside rounding with Dr. Martinez. Plan of care discussed. Orders for 20 lasix BID and restart Gabapentin. See MAR. All sedation stopped per verbal orders from Dr. Martinez to attempt SBT/SAT.    Electronically signed by Hunter Hutchinson RN on 1/3/2024 at 1:46 PM

## 2024-01-03 NOTE — PLAN OF CARE
Problem: Discharge Planning  Goal: Discharge to home or other facility with appropriate resources  1/3/2024 0015 by Svetlana Dumont RN  Outcome: Progressing  Flowsheets (Taken 1/2/2024 1951)  Discharge to home or other facility with appropriate resources:   Identify barriers to discharge with patient and caregiver   Arrange for needed discharge resources and transportation as appropriate   Identify discharge learning needs (meds, wound care, etc)   Arrange for interpreters to assist at discharge as needed   Refer to discharge planning if patient needs post-hospital services based on physician order or complex needs related to functional status, cognitive ability or social support system  1/2/2024 1208 by Jossy Treviño RN  Outcome: Progressing  Flowsheets (Taken 1/2/2024 0815)  Discharge to home or other facility with appropriate resources: Identify barriers to discharge with patient and caregiver     Problem: Pain  Goal: Verbalizes/displays adequate comfort level or baseline comfort level  1/3/2024 0015 by Svetlana Dumont RN  Outcome: Progressing  Flowsheets  Taken 1/2/2024 2000 by Svetlana Dumont RN  Verbalizes/displays adequate comfort level or baseline comfort level:   Assess pain using appropriate pain scale   Administer analgesics based on type and severity of pain and evaluate response   Implement non-pharmacological measures as appropriate and evaluate response   Consider cultural and social influences on pain and pain management   Notify Licensed Independent Practitioner if interventions unsuccessful or patient reports new pain  Taken 1/2/2024 1630 by Jossy Treviño RN  Verbalizes/displays adequate comfort level or baseline comfort level:   Assess pain using appropriate pain scale   Encourage patient to monitor pain and request assistance  1/2/2024 1208 by Jossy Treviño, RN  Outcome: Progressing  Flowsheets (Taken 1/2/2024 0815)  Verbalizes/displays adequate comfort level or baseline comfort

## 2024-01-03 NOTE — PROGRESS NOTES
Patient placed on SBT at 1159 a.m. Patient has been on SBT/SAT since. Pt has been frequently coughing and attempting to pull out ETT tube despite being restrained. This RN had to go to MRI with another pt. When this RN arrived back to unit, assessment completed on this patient. OGT noted to be out to the 30 andrea. OGT supposed to be at 60. TF stopped, OGT re-advanced to 60 andrea and STAT x-ray placed.     Electronically signed by Hunter Hutchinson RN on 1/3/2024 at 1:49 PM

## 2024-01-04 LAB
ANION GAP SERPL CALCULATED.3IONS-SCNC: 4 MMOL/L (ref 3–16)
BACTERIA BLD CULT: ABNORMAL
BASOPHILS # BLD: 0 K/UL (ref 0–0.2)
BASOPHILS NFR BLD: 0.1 %
BUN SERPL-MCNC: 35 MG/DL (ref 7–20)
CALCIUM SERPL-MCNC: 8.3 MG/DL (ref 8.3–10.6)
CHLORIDE SERPL-SCNC: 92 MMOL/L (ref 99–110)
CO2 SERPL-SCNC: 43 MMOL/L (ref 21–32)
CREAT SERPL-MCNC: 0.6 MG/DL (ref 0.6–1.1)
DEPRECATED RDW RBC AUTO: 17.6 % (ref 12.4–15.4)
EOSINOPHIL # BLD: 0 K/UL (ref 0–0.6)
EOSINOPHIL NFR BLD: 0 %
GFR SERPLBLD CREATININE-BSD FMLA CKD-EPI: >60 ML/MIN/{1.73_M2}
GLUCOSE BLD-MCNC: 105 MG/DL (ref 70–99)
GLUCOSE BLD-MCNC: 110 MG/DL (ref 70–99)
GLUCOSE BLD-MCNC: 118 MG/DL (ref 70–99)
GLUCOSE BLD-MCNC: 134 MG/DL (ref 70–99)
GLUCOSE BLD-MCNC: 179 MG/DL (ref 70–99)
GLUCOSE SERPL-MCNC: 173 MG/DL (ref 70–99)
HCT VFR BLD AUTO: 28.4 % (ref 36–48)
HGB BLD-MCNC: 9 G/DL (ref 12–16)
LYMPHOCYTES # BLD: 0.7 K/UL (ref 1–5.1)
LYMPHOCYTES NFR BLD: 6.2 %
MAGNESIUM SERPL-MCNC: 2.2 MG/DL (ref 1.8–2.4)
MCH RBC QN AUTO: 26.7 PG (ref 26–34)
MCHC RBC AUTO-ENTMCNC: 31.8 G/DL (ref 31–36)
MCV RBC AUTO: 84 FL (ref 80–100)
MONOCYTES # BLD: 0.5 K/UL (ref 0–1.3)
MONOCYTES NFR BLD: 3.9 %
NEUTROPHILS # BLD: 10.3 K/UL (ref 1.7–7.7)
NEUTROPHILS NFR BLD: 89.8 %
ORGANISM: ABNORMAL
PERFORMED ON: ABNORMAL
PHOSPHATE SERPL-MCNC: 2.5 MG/DL (ref 2.5–4.9)
PLATELET # BLD AUTO: 355 K/UL (ref 135–450)
PMV BLD AUTO: 7.4 FL (ref 5–10.5)
POTASSIUM SERPL-SCNC: 3.9 MMOL/L (ref 3.5–5.1)
RBC # BLD AUTO: 3.38 M/UL (ref 4–5.2)
SODIUM SERPL-SCNC: 139 MMOL/L (ref 136–145)
TRIGL SERPL-MCNC: 69 MG/DL (ref 0–150)
WBC # BLD AUTO: 11.5 K/UL (ref 4–11)

## 2024-01-04 PROCEDURE — 36592 COLLECT BLOOD FROM PICC: CPT

## 2024-01-04 PROCEDURE — 80048 BASIC METABOLIC PNL TOTAL CA: CPT

## 2024-01-04 PROCEDURE — APPNB15 APP NON BILLABLE TIME 0-15 MINS: Performed by: NURSE PRACTITIONER

## 2024-01-04 PROCEDURE — 6370000000 HC RX 637 (ALT 250 FOR IP): Performed by: INTERNAL MEDICINE

## 2024-01-04 PROCEDURE — 99233 SBSQ HOSP IP/OBS HIGH 50: CPT | Performed by: INTERNAL MEDICINE

## 2024-01-04 PROCEDURE — 97530 THERAPEUTIC ACTIVITIES: CPT | Performed by: PHYSICAL THERAPIST

## 2024-01-04 PROCEDURE — 84478 ASSAY OF TRIGLYCERIDES: CPT

## 2024-01-04 PROCEDURE — 97116 GAIT TRAINING THERAPY: CPT | Performed by: PHYSICAL THERAPIST

## 2024-01-04 PROCEDURE — 84100 ASSAY OF PHOSPHORUS: CPT

## 2024-01-04 PROCEDURE — 6370000000 HC RX 637 (ALT 250 FOR IP): Performed by: NURSE PRACTITIONER

## 2024-01-04 PROCEDURE — 94761 N-INVAS EAR/PLS OXIMETRY MLT: CPT

## 2024-01-04 PROCEDURE — 6360000002 HC RX W HCPCS: Performed by: SURGERY

## 2024-01-04 PROCEDURE — 97166 OT EVAL MOD COMPLEX 45 MIN: CPT

## 2024-01-04 PROCEDURE — 1200000000 HC SEMI PRIVATE

## 2024-01-04 PROCEDURE — 2580000003 HC RX 258: Performed by: SURGERY

## 2024-01-04 PROCEDURE — 97530 THERAPEUTIC ACTIVITIES: CPT

## 2024-01-04 PROCEDURE — 97162 PT EVAL MOD COMPLEX 30 MIN: CPT | Performed by: PHYSICAL THERAPIST

## 2024-01-04 PROCEDURE — 97535 SELF CARE MNGMENT TRAINING: CPT

## 2024-01-04 PROCEDURE — 6370000000 HC RX 637 (ALT 250 FOR IP): Performed by: SURGERY

## 2024-01-04 PROCEDURE — 85025 COMPLETE CBC W/AUTO DIFF WBC: CPT

## 2024-01-04 PROCEDURE — 2700000000 HC OXYGEN THERAPY PER DAY

## 2024-01-04 PROCEDURE — 2580000003 HC RX 258: Performed by: NURSE PRACTITIONER

## 2024-01-04 PROCEDURE — 83735 ASSAY OF MAGNESIUM: CPT

## 2024-01-04 PROCEDURE — 94640 AIRWAY INHALATION TREATMENT: CPT

## 2024-01-04 RX ORDER — DEXAMETHASONE 4 MG/1
6 TABLET ORAL DAILY
Status: DISCONTINUED | OUTPATIENT
Start: 2024-01-04 | End: 2024-01-06 | Stop reason: HOSPADM

## 2024-01-04 RX ORDER — ALBUTEROL SULFATE 90 UG/1
2 AEROSOL, METERED RESPIRATORY (INHALATION) EVERY 4 HOURS
Status: DISCONTINUED | OUTPATIENT
Start: 2024-01-04 | End: 2024-01-06 | Stop reason: HOSPADM

## 2024-01-04 RX ORDER — PANTOPRAZOLE SODIUM 40 MG/1
40 TABLET, DELAYED RELEASE ORAL
Status: DISCONTINUED | OUTPATIENT
Start: 2024-01-04 | End: 2024-01-06 | Stop reason: HOSPADM

## 2024-01-04 RX ORDER — ALBUTEROL SULFATE 90 UG/1
2 AEROSOL, METERED RESPIRATORY (INHALATION)
Status: DISCONTINUED | OUTPATIENT
Start: 2024-01-04 | End: 2024-01-04

## 2024-01-04 RX ORDER — GUAIFENESIN 600 MG/1
600 TABLET, EXTENDED RELEASE ORAL 2 TIMES DAILY
Status: DISCONTINUED | OUTPATIENT
Start: 2024-01-04 | End: 2024-01-06 | Stop reason: HOSPADM

## 2024-01-04 RX ORDER — AMOXICILLIN AND CLAVULANATE POTASSIUM 875; 125 MG/1; MG/1
1 TABLET, FILM COATED ORAL EVERY 12 HOURS SCHEDULED
Status: DISCONTINUED | OUTPATIENT
Start: 2024-01-04 | End: 2024-01-06 | Stop reason: HOSPADM

## 2024-01-04 RX ADMIN — ALBUTEROL SULFATE 2 PUFF: 90 AEROSOL, METERED RESPIRATORY (INHALATION) at 05:20

## 2024-01-04 RX ADMIN — IPRATROPIUM BROMIDE 2 PUFF: 17 AEROSOL, METERED RESPIRATORY (INHALATION) at 23:56

## 2024-01-04 RX ADMIN — ENOXAPARIN SODIUM 40 MG: 100 INJECTION SUBCUTANEOUS at 08:52

## 2024-01-04 RX ADMIN — MICONAZOLE NITRATE: 2 POWDER TOPICAL at 08:52

## 2024-01-04 RX ADMIN — AMOXICILLIN AND CLAVULANATE POTASSIUM 1 TABLET: 875; 125 TABLET, FILM COATED ORAL at 21:36

## 2024-01-04 RX ADMIN — ALBUTEROL SULFATE 2 PUFF: 90 AEROSOL, METERED RESPIRATORY (INHALATION) at 23:56

## 2024-01-04 RX ADMIN — DEXAMETHASONE 6 MG: 6 TABLET ORAL at 08:52

## 2024-01-04 RX ADMIN — IPRATROPIUM BROMIDE 2 PUFF: 17 AEROSOL, METERED RESPIRATORY (INHALATION) at 08:45

## 2024-01-04 RX ADMIN — ALBUTEROL SULFATE 2 PUFF: 90 AEROSOL, METERED RESPIRATORY (INHALATION) at 20:09

## 2024-01-04 RX ADMIN — IPRATROPIUM BROMIDE 2 PUFF: 17 AEROSOL, METERED RESPIRATORY (INHALATION) at 20:09

## 2024-01-04 RX ADMIN — Medication 10 ML: at 21:39

## 2024-01-04 RX ADMIN — AMOXICILLIN AND CLAVULANATE POTASSIUM 1 TABLET: 875; 125 TABLET, FILM COATED ORAL at 08:52

## 2024-01-04 RX ADMIN — ACETAMINOPHEN 650 MG: 325 TABLET ORAL at 04:43

## 2024-01-04 RX ADMIN — Medication 10 ML: at 08:52

## 2024-01-04 RX ADMIN — IPRATROPIUM BROMIDE AND ALBUTEROL SULFATE 1 DOSE: .5; 3 SOLUTION RESPIRATORY (INHALATION) at 00:21

## 2024-01-04 RX ADMIN — ALBUTEROL SULFATE 2 PUFF: 90 AEROSOL, METERED RESPIRATORY (INHALATION) at 15:17

## 2024-01-04 RX ADMIN — PANTOPRAZOLE SODIUM 40 MG: 40 TABLET, DELAYED RELEASE ORAL at 08:54

## 2024-01-04 RX ADMIN — Medication 2000 UNITS: at 08:52

## 2024-01-04 RX ADMIN — ALBUTEROL SULFATE 2 PUFF: 90 AEROSOL, METERED RESPIRATORY (INHALATION) at 08:45

## 2024-01-04 RX ADMIN — MOMETASONE FUROATE AND FORMOTEROL FUMARATE DIHYDRATE 2 PUFF: 200; 5 AEROSOL RESPIRATORY (INHALATION) at 20:09

## 2024-01-04 RX ADMIN — MICONAZOLE NITRATE: 2 POWDER TOPICAL at 21:41

## 2024-01-04 RX ADMIN — IPRATROPIUM BROMIDE 2 PUFF: 17 AEROSOL, METERED RESPIRATORY (INHALATION) at 15:17

## 2024-01-04 RX ADMIN — MOMETASONE FUROATE AND FORMOTEROL FUMARATE DIHYDRATE 2 PUFF: 200; 5 AEROSOL RESPIRATORY (INHALATION) at 08:45

## 2024-01-04 RX ADMIN — IPRATROPIUM BROMIDE 2 PUFF: 17 AEROSOL, METERED RESPIRATORY (INHALATION) at 05:21

## 2024-01-04 RX ADMIN — ALBUTEROL SULFATE 2 PUFF: 90 AEROSOL, METERED RESPIRATORY (INHALATION) at 12:03

## 2024-01-04 RX ADMIN — METHADONE HYDROCHLORIDE 120 MG: 10 TABLET ORAL at 08:52

## 2024-01-04 RX ADMIN — IPRATROPIUM BROMIDE 2 PUFF: 17 AEROSOL, METERED RESPIRATORY (INHALATION) at 12:01

## 2024-01-04 RX ADMIN — SODIUM CHLORIDE, POTASSIUM CHLORIDE, SODIUM LACTATE AND CALCIUM CHLORIDE: 600; 310; 30; 20 INJECTION, SOLUTION INTRAVENOUS at 02:02

## 2024-01-04 RX ADMIN — GUAIFENESIN 600 MG: 600 TABLET ORAL at 21:36

## 2024-01-04 ASSESSMENT — PAIN SCALES - GENERAL
PAINLEVEL_OUTOF10: 3
PAINLEVEL_OUTOF10: 0

## 2024-01-04 NOTE — PROGRESS NOTES
4 Eyes Skin Assessment     NAME:  Clarissa Estrada  YOB: 1975  MEDICAL RECORD NUMBER:  [id number]    The patient is being assessed for  Admission to PCU from ICU  Areas to right shin, abdominal Pannus, under bilateral breast reddness, and slight reddness to buttocks noted and addressed.    I agree that at least one RN has performed a thorough Head to Toe Skin Assessment on the patient. ALL assessment sites listed below have been assessed.      Areas assessed by both nurses:    Head, Face, Ears, Shoulders, Back, Chest, Arms, Elbows, Hands, Sacrum. Buttock, Coccyx, Ischium, Legs. Feet and Heels, and Under Medical Devices         Does the Patient have a Wound? Yes wound(s) were present on assessment. LDA wound assessment was Initiated and completed by RN       Jaison Prevention initiated by RN: Yes  Wound Care Orders initiated by RN: Yes-already in place from ICU    Pressure Injury (Stage 3,4, Unstageable, DTI, NWPT, and Complex wounds) if present, place Wound referral order by RN under : Yes- already being seen by wound care    New Ostomies, if present place, Ostomy referral order under : No     Nurse 1 eSignature: Electronically signed by Farhana Curtis RN on 1/4/24 at 11:33 AM EST    **SHARE this note so that the co-signing nurse can place an eSignature**    Nurse 2 eSignature: {Esignature:[id number]}

## 2024-01-04 NOTE — PROGRESS NOTES
Patient arrived to room 5110 from ICU. Pt tolerated well. Pt placed on 6 L of o2 per patients baseline. Respiratory made aware that patient is on floor.  came to room to see patient. Skin assessment done and head to toe. Areas to right shin, abdominal folds, and slight reddness to buttocks noted. All areas addressed per protocol.   Electronically signed by Farhana Curtis RN on 1/4/2024 at 11:32 AM

## 2024-01-04 NOTE — PROGRESS NOTES
Pulmonary Progress Note    Date of Admission: 1/1/2024   LOS: 3 days       CC:  Chief Complaint   Patient presents with    Shortness of Breath     Per EMS pt comes from home. EMS states that pt c/o SOB. Pt has hx of COPD, wearing 4 L O2 at baseline. EMS states that upon arrival, pt O2 sat at 68% on 4 L O2. EMS states that pt has had a productive cough with green sputum for the past couple days. Pt arrives to ED unresponsive and slumped over.         Subjective:  Extubated yesterday     ROS:          Assessment:          Plan:     This note may have been transcribed using Dragon Dictation software. Please disregard any translational errors.       Hospital Day: 3     COPD exacerbation   Dexamethasone  Duoneb's    Dulera        Chronic hypercapnia with chronic hypoxemia   Not using bilevel at home  6 L NC at home          ID   COVID 19 , H Flu  S/p acetrema   CTX       Hx CHF         Prophylaxis       Nutrition  - ADULT TUBE FEEDING; Orogastric; Peptide Based; Continuous; 10; Yes; 10; Q 6 hours; 40; 30; Q 4 hours  ADULT DIET; Regular; Low Fat/Low Chol/High Fiber/2 gm Na  -   Intake/Output Summary (Last 24 hours) at 1/4/2024 0816  Last data filed at 1/4/2024 0600  Gross per 24 hour   Intake 1441.72 ml   Output 880 ml   Net 561.72 ml         Mobility       Access  Arterial      PICC         PICC Triple Lumen 01/02/24 Right Basilic (Active)   Central Line Being Utilized Yes 01/04/24 0600   Criteria for Appropriate Use Limited/no vessel suitable for conventional peripheral access 01/04/24 0600   Site Assessment Clean, dry & intact 01/04/24 0600   Phlebitis Assessment No symptoms 01/04/24 0600   Infiltration Assessment 0 01/04/24 0600   Extremity Circumference (cm) 32 cm 01/04/24 0600   External Catheter Length (cm) 0 cm 01/04/24 0600   Proximal Lumen Color/Status Purple;Infusing;Capped 01/04/24 0600   Medial Lumen Status Red;Capped;Normal saline locked 01/04/24 0600   Distal Lumen Color/Status White;Capped;Normal saline

## 2024-01-04 NOTE — PROGRESS NOTES
Physical Therapy  Facility/Department: [address] PROGRESSIVE CARE  Physical Therapy Initial Assessment    Name: Clarissa Estrada  : 1975  MRN: [id number]  Date of Service: 2024    Discharge Recommendations:  Home with assist PRN, Home with Home health PT   PT Equipment Recommendations  Equipment Needed: No      Clarissa Estrada scored a 18/24 on the AM-PAC short mobility form. Current research shows that an AM-PAC score of 18 or greater is typically associated with a discharge to the patient's home setting. Based on the patient's AM-PAC score and their current functional mobility deficits, it is recommended that the patient have 2-3 sessions per week of Physical Therapy at d/c to increase the patient's independence.  At this time, this patient demonstrates the endurance and safety to discharge home with Home PT and a follow up treatment frequency of 2-3x/wk.  Please see assessment section for further patient specific details.    If patient discharges prior to next session this note will serve as a discharge summary.  Please see below for the latest assessment towards goals.       Patient Diagnosis(es): The encounter diagnosis was Acute on chronic respiratory failure with hypoxia and hypercapnia (HCC).  Past Medical History:  has a past medical history of Arthritis, Blind right eye, CHF (congestive heart failure) (HCC), Chronic respiratory failure with hypoxia and hypercapnia (HCC), COPD (chronic obstructive pulmonary disease) (HCC), GERD (gastroesophageal reflux disease), Hypertension, Movement disorder, Neuromuscular disorder (HCC), On home O2, Pneumonia, and Sleep apnea.  Past Surgical History:  has a past surgical history that includes Cholecystectomy; Tubal ligation; Abdomen surgery; eye surgery; and Dilatation, esophagus.    Assessment   Body Structures, Functions, Activity Limitations Requiring Skilled Therapeutic Intervention: Decreased functional mobility   Assessment: pt is a 47 yo female who was adm to  using bedrails?: A Little  How much help is needed moving to and from a bed to a chair?: A Little  How much help is needed standing up from a chair using your arms?: A Little  How much help is needed walking in hospital room?: A Little  How much help is needed climbing 3-5 steps with a railing?: A Lot  AM-PAC Inpatient Mobility Raw Score : 18  AM-PAC Inpatient T-Scale Score : 43.63  Mobility Inpatient CMS 0-100% Score: 46.58  Mobility Inpatient CMS G-Code Modifier : CK         Goals  Short Term Goals  Time Frame for Short Term Goals: by discharge  Short Term Goal 1: transfers sup  Short Term Goal 2: amb 25-50' in room with RW SBA  Patient Goals   Patient Goals : to return home       Education  Patient Education  Education Given To: Patient  Education Provided: Role of Therapy  Education Provided Comments: reviewed call light and not getting up without assist  Education Method: Verbal;Demonstration  Education Outcome: Verbalized understanding      Therapy Time   Individual Concurrent Group Co-treatment   Time In 1115         Time Out 1159         Minutes 44                 JAYDA MENDOZA PT     Electronically signed by JAYDA MENDOZA PT on 1/4/2024 at 12:00 PM

## 2024-01-04 NOTE — PROGRESS NOTES
Patient having productive cough with thick mucous. Pt requesting \"cough medicine\". Secure Message sent to Dr. Allen. No new orders at this time.    Electronically signed by Hunter Hutchinson RN on 1/4/2024 at 10:18 AM

## 2024-01-04 NOTE — PROGRESS NOTES
Reminded pt to have her family bring in her cpap/bipap machine with her circuit and mask today  She said she will definitely do that and they will bring it in

## 2024-01-04 NOTE — PROGRESS NOTES
Unable to assess  Scooting: Stand by assistance  Bed Mobility Comments: HOB elevated; pt seated in recliner at end of session  Transfers  Sit to stand: Contact guard assistance  Stand to sit: Contact guard assistance  Transfer Comments: EOB>recliner with CGA and use of RW; VCs for hand placement to complete safe transfers  Vision  Vision: Impaired  Vision Exceptions: Wears glasses at all times  Hearing  Hearing: Within functional limits  Cognition  Overall Cognitive Status: WFL  Orientation  Overall Orientation Status: Within Functional Limits               Static Sitting Balance Exercises: SUP/SBA seated EOB in prep for fxl tx and seated on toilet to void  Dynamic Sitting Balance Exercises: SUP/SBA to complete pericare seated on toilet  Static Standing Balance Exercises: CGA in stance at sink to complete grooming x2 minutes  Education Given To: Patient  Education Provided: Role of Therapy;Plan of Care;Equipment;Energy Conservation;Transfer Training  Education Method: Verbal;Demonstration  Barriers to Learning: None  Education Outcome: Verbalized understanding;Demonstrated understanding;Continued education needed                    AM-PAC - ADL  AM-PAC Daily Activity - Inpatient   How much help is needed for putting on and taking off regular lower body clothing?: A Lot  How much help is needed for bathing (which includes washing, rinsing, drying)?: A Lot  How much help is needed for toileting (which includes using toilet, bedpan, or urinal)?: A Lot  How much help is needed for putting on and taking off regular upper body clothing?: A Little  How much help is needed for taking care of personal grooming?: A Little  How much help for eating meals?: None  AM-PAC Inpatient Daily Activity Raw Score: 16  AM-PAC Inpatient ADL T-Scale Score : 35.96  ADL Inpatient CMS 0-100% Score: 53.32  ADL Inpatient CMS G-Code Modifier : CK    Goals  Short Term Goals  Time Frame for Short Term Goals: prior to d/c  Short Term Goal 1: mod I

## 2024-01-04 NOTE — PROGRESS NOTES
Bedside shift handoff completed with Jazmín RANKIN RN. All updates provided and questions answered at this time.     Electronically signed by Hunter Hutchinson RN on 1/4/2024 at 7:27 AM

## 2024-01-05 LAB
ANION GAP SERPL CALCULATED.3IONS-SCNC: 1 MMOL/L (ref 3–16)
BACTERIA BLD CULT ORG #2: NORMAL
BASOPHILS # BLD: 0 K/UL (ref 0–0.2)
BASOPHILS NFR BLD: 0.3 %
BUN SERPL-MCNC: 19 MG/DL (ref 7–20)
CALCIUM SERPL-MCNC: 6 MG/DL (ref 8.3–10.6)
CHLORIDE SERPL-SCNC: 106 MMOL/L (ref 99–110)
CO2 SERPL-SCNC: 34 MMOL/L (ref 21–32)
CREAT SERPL-MCNC: <0.5 MG/DL (ref 0.6–1.1)
DEPRECATED RDW RBC AUTO: 17.7 % (ref 12.4–15.4)
EOSINOPHIL # BLD: 0.1 K/UL (ref 0–0.6)
EOSINOPHIL NFR BLD: 0.8 %
GFR SERPLBLD CREATININE-BSD FMLA CKD-EPI: >60 ML/MIN/{1.73_M2}
GLUCOSE BLD-MCNC: 100 MG/DL (ref 70–99)
GLUCOSE BLD-MCNC: 111 MG/DL (ref 70–99)
GLUCOSE BLD-MCNC: 118 MG/DL (ref 70–99)
GLUCOSE BLD-MCNC: 123 MG/DL (ref 70–99)
GLUCOSE BLD-MCNC: 125 MG/DL (ref 70–99)
GLUCOSE BLD-MCNC: 157 MG/DL (ref 70–99)
GLUCOSE SERPL-MCNC: 61 MG/DL (ref 70–99)
HCT VFR BLD AUTO: 30.8 % (ref 36–48)
HGB BLD-MCNC: 9.5 G/DL (ref 12–16)
LYMPHOCYTES # BLD: 3 K/UL (ref 1–5.1)
LYMPHOCYTES NFR BLD: 22.8 %
MAGNESIUM SERPL-MCNC: 1.5 MG/DL (ref 1.8–2.4)
MCH RBC QN AUTO: 25.7 PG (ref 26–34)
MCHC RBC AUTO-ENTMCNC: 30.8 G/DL (ref 31–36)
MCV RBC AUTO: 83.3 FL (ref 80–100)
MONOCYTES # BLD: 1.3 K/UL (ref 0–1.3)
MONOCYTES NFR BLD: 10 %
NEUTROPHILS # BLD: 8.8 K/UL (ref 1.7–7.7)
NEUTROPHILS NFR BLD: 66.1 %
PERFORMED ON: ABNORMAL
PHOSPHATE SERPL-MCNC: 2 MG/DL (ref 2.5–4.9)
PLATELET # BLD AUTO: 430 K/UL (ref 135–450)
PMV BLD AUTO: 7.1 FL (ref 5–10.5)
POTASSIUM SERPL-SCNC: 3.1 MMOL/L (ref 3.5–5.1)
RBC # BLD AUTO: 3.7 M/UL (ref 4–5.2)
SODIUM SERPL-SCNC: 141 MMOL/L (ref 136–145)
TRIGL SERPL-MCNC: 106 MG/DL (ref 0–150)
WBC # BLD AUTO: 13.3 K/UL (ref 4–11)

## 2024-01-05 PROCEDURE — 99232 SBSQ HOSP IP/OBS MODERATE 35: CPT | Performed by: INTERNAL MEDICINE

## 2024-01-05 PROCEDURE — 85025 COMPLETE CBC W/AUTO DIFF WBC: CPT

## 2024-01-05 PROCEDURE — 2700000000 HC OXYGEN THERAPY PER DAY

## 2024-01-05 PROCEDURE — 6370000000 HC RX 637 (ALT 250 FOR IP): Performed by: INTERNAL MEDICINE

## 2024-01-05 PROCEDURE — 83735 ASSAY OF MAGNESIUM: CPT

## 2024-01-05 PROCEDURE — 2580000003 HC RX 258: Performed by: SURGERY

## 2024-01-05 PROCEDURE — 6360000002 HC RX W HCPCS: Performed by: INTERNAL MEDICINE

## 2024-01-05 PROCEDURE — 6370000000 HC RX 637 (ALT 250 FOR IP): Performed by: SURGERY

## 2024-01-05 PROCEDURE — 6360000002 HC RX W HCPCS: Performed by: SURGERY

## 2024-01-05 PROCEDURE — 84478 ASSAY OF TRIGLYCERIDES: CPT

## 2024-01-05 PROCEDURE — 6370000000 HC RX 637 (ALT 250 FOR IP): Performed by: NURSE PRACTITIONER

## 2024-01-05 PROCEDURE — 84100 ASSAY OF PHOSPHORUS: CPT

## 2024-01-05 PROCEDURE — 94761 N-INVAS EAR/PLS OXIMETRY MLT: CPT

## 2024-01-05 PROCEDURE — 80048 BASIC METABOLIC PNL TOTAL CA: CPT

## 2024-01-05 PROCEDURE — 1200000000 HC SEMI PRIVATE

## 2024-01-05 PROCEDURE — 94640 AIRWAY INHALATION TREATMENT: CPT

## 2024-01-05 RX ORDER — POTASSIUM CHLORIDE 20 MEQ/1
40 TABLET, EXTENDED RELEASE ORAL ONCE
Status: COMPLETED | OUTPATIENT
Start: 2024-01-05 | End: 2024-01-05

## 2024-01-05 RX ORDER — MAGNESIUM SULFATE 1 G/100ML
1000 INJECTION INTRAVENOUS ONCE
Status: COMPLETED | OUTPATIENT
Start: 2024-01-05 | End: 2024-01-05

## 2024-01-05 RX ORDER — MAGNESIUM SULFATE IN WATER 40 MG/ML
2000 INJECTION, SOLUTION INTRAVENOUS ONCE
Status: COMPLETED | OUTPATIENT
Start: 2024-01-05 | End: 2024-01-05

## 2024-01-05 RX ADMIN — MICONAZOLE NITRATE: 2 POWDER TOPICAL at 09:21

## 2024-01-05 RX ADMIN — ALBUTEROL SULFATE 2 PUFF: 90 AEROSOL, METERED RESPIRATORY (INHALATION) at 20:25

## 2024-01-05 RX ADMIN — ALBUTEROL SULFATE 2 PUFF: 90 AEROSOL, METERED RESPIRATORY (INHALATION) at 08:27

## 2024-01-05 RX ADMIN — IPRATROPIUM BROMIDE 2 PUFF: 17 AEROSOL, METERED RESPIRATORY (INHALATION) at 04:01

## 2024-01-05 RX ADMIN — Medication 2000 UNITS: at 09:16

## 2024-01-05 RX ADMIN — IPRATROPIUM BROMIDE 2 PUFF: 17 AEROSOL, METERED RESPIRATORY (INHALATION) at 23:06

## 2024-01-05 RX ADMIN — MAGNESIUM SULFATE HEPTAHYDRATE 1000 MG: 1 INJECTION, SOLUTION INTRAVENOUS at 12:48

## 2024-01-05 RX ADMIN — PANTOPRAZOLE SODIUM 40 MG: 40 TABLET, DELAYED RELEASE ORAL at 06:08

## 2024-01-05 RX ADMIN — GUAIFENESIN 600 MG: 600 TABLET ORAL at 20:41

## 2024-01-05 RX ADMIN — ENOXAPARIN SODIUM 40 MG: 100 INJECTION SUBCUTANEOUS at 09:17

## 2024-01-05 RX ADMIN — IPRATROPIUM BROMIDE 2 PUFF: 17 AEROSOL, METERED RESPIRATORY (INHALATION) at 20:25

## 2024-01-05 RX ADMIN — ALBUTEROL SULFATE 2 PUFF: 90 AEROSOL, METERED RESPIRATORY (INHALATION) at 11:40

## 2024-01-05 RX ADMIN — ALBUTEROL SULFATE 2 PUFF: 90 AEROSOL, METERED RESPIRATORY (INHALATION) at 23:06

## 2024-01-05 RX ADMIN — ALBUTEROL SULFATE 2 PUFF: 90 AEROSOL, METERED RESPIRATORY (INHALATION) at 04:01

## 2024-01-05 RX ADMIN — IPRATROPIUM BROMIDE 2 PUFF: 17 AEROSOL, METERED RESPIRATORY (INHALATION) at 15:52

## 2024-01-05 RX ADMIN — DEXAMETHASONE 6 MG: 6 TABLET ORAL at 09:16

## 2024-01-05 RX ADMIN — MOMETASONE FUROATE AND FORMOTEROL FUMARATE DIHYDRATE 2 PUFF: 200; 5 AEROSOL RESPIRATORY (INHALATION) at 08:31

## 2024-01-05 RX ADMIN — IPRATROPIUM BROMIDE 2 PUFF: 17 AEROSOL, METERED RESPIRATORY (INHALATION) at 11:42

## 2024-01-05 RX ADMIN — METHADONE HYDROCHLORIDE 120 MG: 10 TABLET ORAL at 09:16

## 2024-01-05 RX ADMIN — MOMETASONE FUROATE AND FORMOTEROL FUMARATE DIHYDRATE 2 PUFF: 200; 5 AEROSOL RESPIRATORY (INHALATION) at 20:24

## 2024-01-05 RX ADMIN — MAGNESIUM SULFATE HEPTAHYDRATE 2000 MG: 40 INJECTION, SOLUTION INTRAVENOUS at 09:34

## 2024-01-05 RX ADMIN — AMOXICILLIN AND CLAVULANATE POTASSIUM 1 TABLET: 875; 125 TABLET, FILM COATED ORAL at 20:41

## 2024-01-05 RX ADMIN — ALBUTEROL SULFATE 2 PUFF: 90 AEROSOL, METERED RESPIRATORY (INHALATION) at 15:52

## 2024-01-05 RX ADMIN — IPRATROPIUM BROMIDE 2 PUFF: 17 AEROSOL, METERED RESPIRATORY (INHALATION) at 08:29

## 2024-01-05 RX ADMIN — AMOXICILLIN AND CLAVULANATE POTASSIUM 1 TABLET: 875; 125 TABLET, FILM COATED ORAL at 09:17

## 2024-01-05 RX ADMIN — GUAIFENESIN 600 MG: 600 TABLET ORAL at 09:17

## 2024-01-05 RX ADMIN — Medication 10 ML: at 20:42

## 2024-01-05 RX ADMIN — POTASSIUM CHLORIDE 40 MEQ: 1500 TABLET, EXTENDED RELEASE ORAL at 09:16

## 2024-01-05 RX ADMIN — Medication 10 ML: at 09:20

## 2024-01-05 NOTE — PLAN OF CARE
Monitor blood glucose as ordered   Assess for signs and symptoms of hyperglycemia and hypoglycemia   Administer ordered medications to maintain glucose within target range

## 2024-01-05 NOTE — FLOWSHEET NOTE
01/04/24 2115   Vital Signs   Temp 98.3 °F (36.8 °C)   Temp Source Oral   Pulse (!) 110   Heart Rate Source Monitor   Respirations 16   BP (!) 148/96   MAP (Calculated) 113   BP Location Left upper arm   BP Method Automatic   Patient Position Semi fowlers   Pain Assessment   Pain Assessment None - Denies Pain   Care Plan - Pain Goals   Verbalizes/displays adequate comfort level or baseline comfort level Encourage patient to monitor pain and request assistance   Opioid-Induced Sedation   POSS Score 1   Oxygen Therapy   SpO2 100 %   Pulse Oximetry Type Continuous   Pulse Oximeter Device Mode Continuous   Pulse Oximeter Device Location Left;Finger   O2 Device Nasal cannula   Skin Assessment Clean, dry, & intact     Pt resting comfortably in bed. Bed alarm on, call light within reach. Will continue to monitor.

## 2024-01-05 NOTE — CARE COORDINATION
Readmission Assessment  Number of Days since last admission?: 8-30 days  Previous Disposition: Home with Home Health  Who is being Interviewed: Patient  What was the patient's/caregiver's perception as to why they think they needed to return back to the hospital?: Other (Comment) (sympmtoms worsen)  Did you visit your Primary Care Physician after you left the hospital, before you returned this time?: No  Why weren't you able to visit your PCP?: Did not have an appointment  Did you see a specialist, such as Cardiac, Pulmonary, Orthopedic Physician, etc. after you left the hospital?: No  Who advised the patient to return to the hospital?: Self-referral  Does the patient report anything that got in the way of taking their medications?: No  In our efforts to provide the best possible care to you and others like you, can you think of anything that we could have done to help you after you left the hospital the first time, so that you might not have needed to return so soon?: Other (Comment) (sympms worsen)       Case Management Assessment  Initial Evaluation    Date/Time of Evaluation: 1/5/2024 10:15 AM  Assessment Completed by: EVIN Diamond    If patient is discharged prior to next notation, then this note serves as note for discharge by case management.    Patient Name: Clarissa Estrada                   YOB: 1975  Diagnosis: Acute on chronic respiratory failure with hypoxia and hypercapnia (HCC) [J96.21, J96.22]  Pneumonia due to COVID-19 virus [U07.1, J12.82]                   Date / Time: 1/1/2024  5:35 AM    Patient Admission Status: Inpatient   Readmission Risk (Low < 19, Mod (19-27), High > 27): Readmission Risk Score: 21    Current PCP: Jocelyn Wolfe  PCP verified by CM? Yes    Chart Reviewed: Yes      History Provided by: Patient  Patient Orientation: Alert and Oriented, Person, Place    Patient Cognition: Alert    Hospitalization in the last 30 days (Readmission):  Yes    If yes,

## 2024-01-05 NOTE — FLOWSHEET NOTE
01/05/24 0354   Vital Signs   Temp 97.9 °F (36.6 °C)   Temp Source Oral   Pulse (!) 103   Heart Rate Source Monitor   Respirations 17   /76   MAP (Calculated) 92   MAP (mmHg) 92   BP Location Right Arm   Oxygen Therapy   SpO2 93 %     Pt resting comfortably in bed. Bed alarm on, call light within reach. Will continue to monitor.

## 2024-01-05 NOTE — PROGRESS NOTES
Pt to have  bring in home cpap unit. Pt has not tolerated V60 in the past and would like to wait to have her unit at bedside. Will continue to monitor.

## 2024-01-05 NOTE — DISCHARGE INSTR - COC
Continuity of Care Form    Patient Name: Clarissa Estrada   :  1975  MRN:  [id number]    Admit date:  2024  Discharge date:  ***    Code Status Order: Full Code   Advance Directives:     Admitting Physician:  Ga Nick MD  PCP: Jocelyn Wolfe    Discharging Nurse: ***  Discharging Hospital Unit/Room#: K4G-6692/5110-01  Discharging Unit Phone Number: ***    Emergency Contact:   Extended Emergency Contact Information  Primary Emergency Contact: Jeffrey Ashton  Address: [address] [place] [address]  Home Phone: [phone]  Mobile Phone: [phone]  Relation: Spouse  Secondary Emergency Contact: Prema Estrada  Home Phone: [phone]  Mobile Phone: [phone]  Relation: Child  Preferred language: English   needed? No    Past Surgical History:  Past Surgical History:   Procedure Laterality Date    ABDOMEN SURGERY      CHOLECYSTECTOMY      DILATATION, ESOPHAGUS      EYE SURGERY      TUBAL LIGATION         Immunization History:   Immunization History   Administered Date(s) Administered    Influenza Vaccine, unspecified formulation 2011    Influenza Virus Vaccine 2013    Influenza, AFLURIA (age 3 yrs+), FLUZONE, (age 6 mo+), MDV, 0.5mL 10/25/2018    Influenza, FLUARIX, FLULAVAL, FLUZONE (age 6 mo+) AND AFLURIA, (age 3 y+), PF, 0.5mL 2017, 2020    Pneumococcal, PPSV23, PNEUMOVAX 23, (age 2y+), SC/IM, 0.5mL 2011, 2016       Active Problems:  Patient Active Problem List   Diagnosis Code    Sepsis ([facility]) A41.9    Chronic respiratory failure with hypoxia and hypercapnia ([facility]) J96.11, J96.12    Acute respiratory failure ([facility]) J96.00    COPD exacerbation ([facility]) J44.1    Respiratory acidosis E87.29    COPD, severe ([facility]) J44.9    Acute on chronic respiratory failure with hypoxia and hypercapnia ([facility]) J96.21, J96.22    Tobacco use Z72.0    Nonhealing ulcer of right lower leg with fat layer exposed ([facility])

## 2024-01-05 NOTE — PROGRESS NOTES
Pulmonary Consult Note     Patient's name:  Clarissa Estrada  Medical Record Number: [id number]  Patient's account/billing number: [id number]  Patient's YOB: 1975  Age: 48 y.o.  Date of Admission: 1/1/2024  5:35 AM  Date of Consult: 1/5/2024      Primary Care Physician: Jocelyn Wolfe      Code Status: Full Code    Reason for consult: Acute respiratory failure with hypoxia and hypercapnia    Assessment and Plan     Acute on chronic respiratory with hypoxia and hypercapnia requiring intubation s/p extubation on 4 L   COVID pneumonia with superimposed bacterial infection, H influenza   Multifocal pneumonia  COPD  Severe sepsis      Plan:  Continue dexa and augmentin as scheduled to finish the course  Continue oxygen she is at her home level  Ok to d/c from pulmonary stand point  Follow up with Dr. Love         HISTORY OF PRESENT ILLNESS:   Mr./Ms. Clarissa Estrada is a 48 y.o. lady with past medical history stated below significant for COPD, chronic respiratory with hypoxia on 4 L, presented to the hospital with progressively worsening shortness of breath cough productive green sputum, found hypoxic on arrival as well as hypercapnic for that she was intubated and placed on mechanical ventilation.    Chest x-ray with vascular congestion no clear consolidation            REVIEW OF SYSTEMS:  Review of Systems -   Sob  Cough and sputum much better  No fever  No chest pain         Physical Exam:    Vitals: /76   Pulse (!) 111   Temp 97.9 °F (36.6 °C) (Oral)   Resp 25   Ht 1.499 m (4' 11\")   Wt 81 kg (178 lb 9.2 oz)   LMP 08/14/2015   SpO2 98%   BMI 36.07 kg/m²     Last Body weight:   Wt Readings from Last 3 Encounters:   01/05/24 81 kg (178 lb 9.2 oz)   12/08/23 83.4 kg (183 lb 13.8 oz)   07/13/23 90.7 kg (200 lb)       Body Mass Index : Body mass index is 36.07 kg/m².      Intake and Output summary:   Intake/Output Summary (Last 24 hours) at  1/5/2024 0845  Last data filed at 1/5/2024 0607  Gross per 24 hour   Intake 600 ml   Output 500 ml   Net 100 ml       Physical Examination:     PHYSICAL EXAM:    Gen: NAD   Eyes: PERRL. Anicteric sclera. No conjunctival injection.   ENT: No discharge. Posterior oropharynx clear. External appearance of ears and nose normal.  Neck: Trachea midline. No mass, no lymphadenopathy    Resp: diminished with no active wheezing or rhonchi   CV: Regular rate. Regular rhythm. No murmur or rub. No edema.   GI: Soft, Non-tender. Non-distended. +BS  Skin: Warm, dry, w/o erythema.   Lymph: No cervical or supraclavicular LAD.   M/S: No cyanosis. No clubbing.    Neuro: AAO X 3, no AD      Laboratory findings:-    CBC:   Recent Labs     01/05/24  0502   WBC 13.3*   HGB 9.5*          BMP:    Recent Labs     01/03/24  0305 01/04/24  0440 01/05/24  0502    139 141   K 3.5 3.9 3.1*   CL 87* 92* 106   CO2 43* 43* 34*   BUN 34* 35* 19   CREATININE 0.8 0.6 <0.5*   GLUCOSE 114* 173* 61*       S. Calcium:  Recent Labs     01/05/24  0502   CALCIUM 6.0*         Pancreatic functions:  No results for input(s): \"LACTA\", \"AMYLASE\" in the last 72 hours.    S. Lactic Acid:   No results for input(s): \"LACTA\" in the last 72 hours.      Radiology Review:  Pertinent images / reports were reviewed as a part of this visit.    CT Chest w/ contrast: No results found for this or any previous visit.      CT Chest w/o contrast: No results found for this or any previous visit.      CTPA: Results for orders placed during the hospital encounter of 12/03/23    CT CHEST PULMONARY EMBOLISM W CONTRAST    Narrative  EXAMINATION:  CTA OF THE CHEST 12/3/2023 9:09 am    TECHNIQUE:  CTA of the chest was performed after the administration of intravenous  contrast.  Multiplanar reformatted images are provided for review.  MIP  images are provided for review. Automated exposure control, iterative  reconstruction, and/or weight based adjustment of the mA/kV was

## 2024-01-05 NOTE — FLOWSHEET NOTE
01/04/24 2345   Vital Signs   Temp 98.7 °F (37.1 °C)   Temp Source Oral   Pulse (!) 111   Heart Rate Source Monitor   Respirations 16   BP (!) 162/100  (pt ambultated to bathroom)   MAP (Calculated) 121   BP Location Left upper arm   BP Method Automatic   Patient Position Semi jeffwlers   Pain Assessment   Pain Assessment None - Denies Pain   Care Plan - Pain Goals   Verbalizes/displays adequate comfort level or baseline comfort level Encourage patient to monitor pain and request assistance   Oxygen Therapy   SpO2 100 %   Pulse Oximetry Type Continuous   Pulse Oximeter Device Mode Continuous   Pulse Oximeter Device Location Left;Finger   O2 Device Nasal cannula   Skin Assessment Clean, dry, & intact     Pt ambulated to bathroom. Pt now resting comfortably in bed. Bed alarm on, call light within reach. Will continue to monitor.

## 2024-01-05 NOTE — PROGRESS NOTES
V2.0    [facility]      Name:  Clarissa Estrada /Age/Sex: 1975  (48 y.o. female)   MRN & CSN:  [id number] & [id number] Encounter Date/Time: 2024 8:25 AM EST   Location:  [id number] PCP: Jocelyn Wolfe     Attending:Cachorro Allen MD       Hospital Day: 5    Assessment and Recommendations   Clarissa Estrada is a 48 y.o. female who presents with Pneumonia due to COVID-19 virus      Plan:     Acute on chronic respiratory failure with hypoxia and hypercapnia multifactorial due to COPD exacerbation and potentially COVID infection critical care team consulted on steroids nebulizer extubated 2 days ago and transferred to progressive care unit overall continue to do well and improving  Severe sepsis, present on admission, due to pneumonia, patient positive for pulmonary edema on admission, started on Levophed empiric antibiotics started blood cultures positive for Staphylococcus epidermidis which is contaminant and sputum culture positive for haemophilus influenza, pneumonia panel positive for haemophilus influenza and rhinovirus.  Antibiotics changed to ceftriaxone then to p.o. Augmentin after extubation continues to do well.  Acute on chronic congestive heart failure with reduced ejection fraction  COVID-19 pneumonia with superimposed bacterial pneumonia, critical care team following on steroids, IV cefepime and vancomycin changed to ceftriaxone based on sputum culture, received Actemra.  On chronic methadone  Anemia appears chronic, no signs of bleeding hemoglobin 8.4 this morning follow-up with outpatient  Positive blood culture with Staphylococcus epidermidis likely contaminant  Hypokalemia replace with p.o. supplement  Hypomagnesemia replace with IV supplement    Diet ADULT DIET; Regular; Low Fat/Low Chol/High Fiber/2 gm Na   DVT Prophylaxis [] Lovenox, []  Heparin, [] SCDs, [] Ambulation,  [] Eliquis, [] Xarelto  [] Coumadin   Code Status Full Code             Personally reviewed Lab

## 2024-01-06 VITALS
HEIGHT: 59 IN | WEIGHT: 181.44 LBS | OXYGEN SATURATION: 95 % | BODY MASS INDEX: 36.58 KG/M2 | RESPIRATION RATE: 21 BRPM | HEART RATE: 120 BPM | SYSTOLIC BLOOD PRESSURE: 140 MMHG | TEMPERATURE: 98.7 F | DIASTOLIC BLOOD PRESSURE: 87 MMHG

## 2024-01-06 LAB
ANION GAP SERPL CALCULATED.3IONS-SCNC: 4 MMOL/L (ref 3–16)
BASOPHILS # BLD: 0 K/UL (ref 0–0.2)
BASOPHILS NFR BLD: 0 %
BUN SERPL-MCNC: 21 MG/DL (ref 7–20)
CALCIUM SERPL-MCNC: 8.5 MG/DL (ref 8.3–10.6)
CHLORIDE SERPL-SCNC: 91 MMOL/L (ref 99–110)
CO2 SERPL-SCNC: 40 MMOL/L (ref 21–32)
CREAT SERPL-MCNC: 0.6 MG/DL (ref 0.6–1.1)
DEPRECATED RDW RBC AUTO: 17.9 % (ref 12.4–15.4)
EOSINOPHIL # BLD: 0.2 K/UL (ref 0–0.6)
EOSINOPHIL NFR BLD: 1 %
GFR SERPLBLD CREATININE-BSD FMLA CKD-EPI: >60 ML/MIN/{1.73_M2}
GLUCOSE BLD-MCNC: 137 MG/DL (ref 70–99)
GLUCOSE BLD-MCNC: 89 MG/DL (ref 70–99)
GLUCOSE SERPL-MCNC: 79 MG/DL (ref 70–99)
HCT VFR BLD AUTO: 31.4 % (ref 36–48)
HGB BLD-MCNC: 9.9 G/DL (ref 12–16)
LYMPHOCYTES # BLD: 4.8 K/UL (ref 1–5.1)
LYMPHOCYTES NFR BLD: 31 %
MAGNESIUM SERPL-MCNC: 2.3 MG/DL (ref 1.8–2.4)
MCH RBC QN AUTO: 26.4 PG (ref 26–34)
MCHC RBC AUTO-ENTMCNC: 31.4 G/DL (ref 31–36)
MCV RBC AUTO: 84.1 FL (ref 80–100)
METAMYELOCYTES NFR BLD MANUAL: 2 %
MONOCYTES # BLD: 0.5 K/UL (ref 0–1.3)
MONOCYTES NFR BLD: 3 %
MYELOCYTES NFR BLD MANUAL: 1 %
NEUTROPHILS # BLD: 9.7 K/UL (ref 1.7–7.7)
NEUTROPHILS NFR BLD: 61 %
PERFORMED ON: ABNORMAL
PERFORMED ON: NORMAL
PHOSPHATE SERPL-MCNC: 3.3 MG/DL (ref 2.5–4.9)
PLATELET # BLD AUTO: 346 K/UL (ref 135–450)
PLATELET BLD QL SMEAR: ADEQUATE
PMV BLD AUTO: 7.2 FL (ref 5–10.5)
POTASSIUM SERPL-SCNC: 4 MMOL/L (ref 3.5–5.1)
RBC # BLD AUTO: 3.74 M/UL (ref 4–5.2)
SLIDE REVIEW: ABNORMAL
SODIUM SERPL-SCNC: 135 MMOL/L (ref 136–145)
TRIGL SERPL-MCNC: 147 MG/DL (ref 0–150)
VARIANT LYMPHS NFR BLD MANUAL: 1 % (ref 0–6)
WBC # BLD AUTO: 15.1 K/UL (ref 4–11)

## 2024-01-06 PROCEDURE — 6360000002 HC RX W HCPCS: Performed by: SURGERY

## 2024-01-06 PROCEDURE — 94761 N-INVAS EAR/PLS OXIMETRY MLT: CPT

## 2024-01-06 PROCEDURE — 6370000000 HC RX 637 (ALT 250 FOR IP): Performed by: INTERNAL MEDICINE

## 2024-01-06 PROCEDURE — 6360000002 HC RX W HCPCS: Performed by: INTERNAL MEDICINE

## 2024-01-06 PROCEDURE — 36415 COLL VENOUS BLD VENIPUNCTURE: CPT

## 2024-01-06 PROCEDURE — 5A09357 ASSISTANCE WITH RESPIRATORY VENTILATION, LESS THAN 24 CONSECUTIVE HOURS, CONTINUOUS POSITIVE AIRWAY PRESSURE: ICD-10-PCS | Performed by: INTERNAL MEDICINE

## 2024-01-06 PROCEDURE — 85025 COMPLETE CBC W/AUTO DIFF WBC: CPT

## 2024-01-06 PROCEDURE — 2700000000 HC OXYGEN THERAPY PER DAY

## 2024-01-06 PROCEDURE — 94660 CPAP INITIATION&MGMT: CPT

## 2024-01-06 PROCEDURE — 6370000000 HC RX 637 (ALT 250 FOR IP): Performed by: SURGERY

## 2024-01-06 PROCEDURE — 6370000000 HC RX 637 (ALT 250 FOR IP): Performed by: NURSE PRACTITIONER

## 2024-01-06 PROCEDURE — 84478 ASSAY OF TRIGLYCERIDES: CPT

## 2024-01-06 PROCEDURE — 83735 ASSAY OF MAGNESIUM: CPT

## 2024-01-06 PROCEDURE — 2580000003 HC RX 258: Performed by: SURGERY

## 2024-01-06 PROCEDURE — 94640 AIRWAY INHALATION TREATMENT: CPT

## 2024-01-06 PROCEDURE — 80048 BASIC METABOLIC PNL TOTAL CA: CPT

## 2024-01-06 PROCEDURE — 84100 ASSAY OF PHOSPHORUS: CPT

## 2024-01-06 RX ORDER — DEXAMETHASONE 6 MG/1
6 TABLET ORAL DAILY
Qty: 4 TABLET | Refills: 0 | Status: SHIPPED | OUTPATIENT
Start: 2024-01-07 | End: 2024-01-11

## 2024-01-06 RX ORDER — VITAMIN B COMPLEX
1000 TABLET ORAL DAILY
Qty: 30 TABLET | Refills: 0 | Status: SHIPPED | OUTPATIENT
Start: 2024-01-07

## 2024-01-06 RX ORDER — AMOXICILLIN AND CLAVULANATE POTASSIUM 875; 125 MG/1; MG/1
1 TABLET, FILM COATED ORAL EVERY 12 HOURS SCHEDULED
Qty: 3 TABLET | Refills: 0 | Status: SHIPPED | OUTPATIENT
Start: 2024-01-06 | End: 2024-01-08

## 2024-01-06 RX ADMIN — MOMETASONE FUROATE AND FORMOTEROL FUMARATE DIHYDRATE 2 PUFF: 200; 5 AEROSOL RESPIRATORY (INHALATION) at 08:04

## 2024-01-06 RX ADMIN — ENOXAPARIN SODIUM 40 MG: 100 INJECTION SUBCUTANEOUS at 08:21

## 2024-01-06 RX ADMIN — IPRATROPIUM BROMIDE 2 PUFF: 17 AEROSOL, METERED RESPIRATORY (INHALATION) at 11:39

## 2024-01-06 RX ADMIN — PANTOPRAZOLE SODIUM 40 MG: 40 TABLET, DELAYED RELEASE ORAL at 05:44

## 2024-01-06 RX ADMIN — GUAIFENESIN 600 MG: 600 TABLET ORAL at 08:21

## 2024-01-06 RX ADMIN — DEXAMETHASONE 6 MG: 6 TABLET ORAL at 08:22

## 2024-01-06 RX ADMIN — IPRATROPIUM BROMIDE 2 PUFF: 17 AEROSOL, METERED RESPIRATORY (INHALATION) at 04:04

## 2024-01-06 RX ADMIN — METHADONE HYDROCHLORIDE 120 MG: 10 TABLET ORAL at 08:22

## 2024-01-06 RX ADMIN — MICONAZOLE NITRATE: 2 POWDER TOPICAL at 08:29

## 2024-01-06 RX ADMIN — Medication 10 ML: at 08:23

## 2024-01-06 RX ADMIN — ALBUTEROL SULFATE 2 PUFF: 90 AEROSOL, METERED RESPIRATORY (INHALATION) at 04:04

## 2024-01-06 RX ADMIN — AMOXICILLIN AND CLAVULANATE POTASSIUM 1 TABLET: 875; 125 TABLET, FILM COATED ORAL at 08:21

## 2024-01-06 RX ADMIN — ALBUTEROL SULFATE 2 PUFF: 90 AEROSOL, METERED RESPIRATORY (INHALATION) at 11:39

## 2024-01-06 RX ADMIN — IPRATROPIUM BROMIDE 2 PUFF: 17 AEROSOL, METERED RESPIRATORY (INHALATION) at 08:02

## 2024-01-06 RX ADMIN — Medication 2000 UNITS: at 08:23

## 2024-01-06 RX ADMIN — ALBUTEROL SULFATE 2 PUFF: 90 AEROSOL, METERED RESPIRATORY (INHALATION) at 08:03

## 2024-01-06 ASSESSMENT — PAIN - FUNCTIONAL ASSESSMENT: PAIN_FUNCTIONAL_ASSESSMENT: ACTIVITIES ARE NOT PREVENTED

## 2024-01-06 ASSESSMENT — PAIN SCALES - GENERAL: PAINLEVEL_OUTOF10: 0

## 2024-01-06 NOTE — PLAN OF CARE
Problem: Discharge Planning  Goal: Discharge to home or other facility with appropriate resources  1/6/2024 1444 by Farhana Curtis RN  Outcome: Completed  1/6/2024 1017 by Farhana Curtis RN  Outcome: Progressing  1/6/2024 1016 by Farhana Curtis RN  Outcome: Progressing     Problem: Pain  Goal: Verbalizes/displays adequate comfort level or baseline comfort level  1/6/2024 1444 by Farhana Curtis RN  Outcome: Completed  1/6/2024 1017 by Farhana Curtis RN  Outcome: Progressing  1/6/2024 1016 by Farhana Curtis RN  Outcome: Progressing     Problem: Safety - Adult  Goal: Free from fall injury  1/6/2024 1444 by Farhana Curtis RN  Outcome: Completed  1/6/2024 1017 by Farhana Curtis RN  Outcome: Progressing  1/6/2024 1016 by Farhana Curtis RN  Outcome: Progressing     Problem: Safety - Medical Restraint  Goal: Remains free of injury from restraints (Restraint for Interference with Medical Device)  Description: INTERVENTIONS:  1. Determine that other, less restrictive measures have been tried or would not be effective before applying the restraint  2. Evaluate the patient's condition at the time of restraint application  3. Inform patient/family regarding the reason for restraint  4. Q2H: Monitor safety, psychosocial status, comfort, nutrition and hydration  1/6/2024 1444 by Farhana Curtis RN  Outcome: Completed  1/6/2024 1017 by Farhana Curtis RN  Outcome: Progressing  1/6/2024 1016 by Farhana Curtis RN  Outcome: Progressing     Problem: Skin/Tissue Integrity  Goal: Absence of new skin breakdown  Description: 1.  Monitor for areas of redness and/or skin breakdown  2.  Assess vascular access sites hourly  3.  Every 4-6 hours minimum:  Change oxygen saturation probe site  4.  Every 4-6 hours:  If on nasal continuous positive airway pressure, respiratory therapy assess nares and determine need for appliance change or resting period.  1/6/2024 1444 by Farhana Curtis RN  Outcome:  Completed  1/6/2024 1017 by Farhana Curtis RN  Outcome: Progressing  1/6/2024 1016 by Farhana Curtis RN  Outcome: Progressing     Problem: ABCDS Injury Assessment  Goal: Absence of physical injury  1/6/2024 1444 by Farhana Curtis RN  Outcome: Completed  1/6/2024 1017 by Farhana Curtis RN  Outcome: Progressing  1/6/2024 1016 by Farhana Curtis RN  Outcome: Progressing     Problem: Chronic Conditions and Co-morbidities  Goal: Patient's chronic conditions and co-morbidity symptoms are monitored and maintained or improved  1/6/2024 1444 by Farhana Curtis RN  Outcome: Completed  1/6/2024 1017 by Farhana Curtis RN  Outcome: Progressing  1/6/2024 1016 by Farhana Curtis RN  Outcome: Progressing     Problem: Respiratory - Adult  Goal: Achieves optimal ventilation and oxygenation  1/6/2024 1444 by Farhana Curtis RN  Outcome: Completed  1/6/2024 1017 by Farhana Curtis RN  Outcome: Progressing  1/6/2024 1016 by Farhana Curtis RN  Outcome: Progressing     Problem: Cardiovascular - Adult  Goal: Absence of cardiac dysrhythmias or at baseline  1/6/2024 1444 by Farhana Curtis RN  Outcome: Completed  1/6/2024 1017 by Farhana Curtis RN  Outcome: Progressing  1/6/2024 1016 by Farhana Curtis RN  Outcome: Progressing     Problem: Metabolic/Fluid and Electrolytes - Adult  Goal: Electrolytes maintained within normal limits  1/6/2024 1444 by Farhana Curtis RN  Outcome: Completed  1/6/2024 1017 by Farhana Curtis RN  Outcome: Progressing  1/6/2024 1016 by Farhana Curtis RN  Outcome: Progressing  Goal: Hemodynamic stability and optimal renal function maintained  1/6/2024 1444 by Farhana Curtis RN  Outcome: Completed  1/6/2024 1017 by Farhana Curtis RN  Outcome: Progressing  1/6/2024 1016 by Farhana Curtis RN  Outcome: Progressing  Goal: Glucose maintained within prescribed range  1/6/2024 1444 by Farhana Curtis RN  Outcome: Completed  1/6/2024 1017 by Farhana Curtis RN  Outcome:

## 2024-01-06 NOTE — PLAN OF CARE
Progressing  1/6/2024 1016 by Farhana Curtis RN  Outcome: Progressing  1/5/2024 2055 by Clarissa Seo RN  Outcome: Progressing     Problem: ABCDS Injury Assessment  Goal: Absence of physical injury  1/6/2024 1017 by Farhana Curtis RN  Outcome: Progressing  1/6/2024 1016 by Farhana Curtis RN  Outcome: Progressing  1/5/2024 2055 by Clarissa Seo RN  Outcome: Progressing     Problem: Chronic Conditions and Co-morbidities  Goal: Patient's chronic conditions and co-morbidity symptoms are monitored and maintained or improved  1/6/2024 1017 by Farhana Curtis RN  Outcome: Progressing  1/6/2024 1016 by Farhana Curtis RN  Outcome: Progressing  1/5/2024 2055 by Clarissa Seo RN  Outcome: Progressing     Problem: Respiratory - Adult  Goal: Achieves optimal ventilation and oxygenation  1/6/2024 1017 by Farhana Curtis RN  Outcome: Progressing  1/6/2024 1016 by Farhana Curtis RN  Outcome: Progressing  1/5/2024 2055 by Clarissa Seo RN  Outcome: Progressing  Flowsheets (Taken 1/5/2024 0911 by Hailee Leon RN)  Achieves optimal ventilation and oxygenation: Assess for changes in respiratory status     Problem: Cardiovascular - Adult  Goal: Absence of cardiac dysrhythmias or at baseline  1/6/2024 1017 by Farhana Curtis RN  Outcome: Progressing  1/6/2024 1016 by Farhana Curtis RN  Outcome: Progressing  1/5/2024 2055 by Clarissa Seo RN  Outcome: Progressing  Flowsheets (Taken 1/5/2024 0911 by Hailee Leon RN)  Absence of cardiac dysrhythmias or at baseline: Monitor cardiac rate and rhythm     Problem: Metabolic/Fluid and Electrolytes - Adult  Goal: Electrolytes maintained within normal limits  1/6/2024 1017 by Farhana Curtis RN  Outcome: Progressing  1/6/2024 1016 by Farhana Curtis RN  Outcome: Progressing  1/5/2024 2055 by Miokovic, Clarissa, RN  Outcome: Progressing  Goal: Hemodynamic stability and optimal renal function maintained  1/6/2024 1017 by Farhana Curtis,  RN  Outcome: Progressing  1/6/2024 1016 by Farhana Curtis RN  Outcome: Progressing  1/5/2024 2055 by Clarissa Seo RN  Outcome: Progressing  Goal: Glucose maintained within prescribed range  1/6/2024 1017 by Farhana Curtis RN  Outcome: Progressing  1/6/2024 1016 by Farhana Curtis RN  Outcome: Progressing  1/5/2024 2055 by Clarissa Seo RN  Outcome: Progressing

## 2024-01-06 NOTE — DISCHARGE SUMMARY
01/06/2024 09:02 AM    HGB 9.9 01/06/2024 09:02 AM    HCT 31.4 01/06/2024 09:02 AM     01/06/2024 09:02 AM       Renal:    Lab Results   Component Value Date/Time     01/06/2024 09:02 AM    K 4.0 01/06/2024 09:02 AM    CL 91 01/06/2024 09:02 AM    CO2 40 01/06/2024 09:02 AM    BUN 21 01/06/2024 09:02 AM    CREATININE 0.6 01/06/2024 09:02 AM    CALCIUM 8.5 01/06/2024 09:02 AM    PHOS 3.3 01/06/2024 09:02 AM         Significant Diagnostic Studies    Radiology:   XR CHEST PORTABLE   Final Result   Improved aeration of the right lung.  Scattered airspace disease remains      Lines and tubes as above         XR CHEST PORTABLE   Final Result   1.  Endotracheal tube and enteric tubes appear properly placed.      2.  Possible trace right pleural effusion         XR CHEST PORTABLE   Final Result   1. Endotracheal tube is low lying 1.3 cm above the anupama and directed   towards the right mainstem bronchus. Recommend retraction by approximately 2   cm.   2. Vascular congestion.                Consults:     IP CONSULT TO PHARMACY  IP CONSULT TO CRITICAL CARE  IP CONSULT TO DIETITIAN    Disposition: Home    Condition at Discharge: Stable    Discharge Instructions/Follow-up: PCP pulmonology    Code Status:  Full Code     Activity: activity as tolerated    Diet: Cardiac      Discharge Medications:     Current Discharge Medication List             Details   dexAMETHasone (DECADRON) 6 MG tablet Take 1 tablet by mouth daily for 4 doses  Qty: 4 tablet, Refills: 0      miconazole (MICOTIN) 2 % powder Apply topically 2 times daily.  Qty: 45 g, Refills: 0      amoxicillin-clavulanate (AUGMENTIN) 875-125 MG per tablet Take 1 tablet by mouth every 12 hours for 3 doses  Qty: 3 tablet, Refills: 0      Vitamin D (CHOLECALCIFEROL) 25 MCG (1000 UT) TABS tablet Take 1 tablet by mouth daily  Qty: 30 tablet, Refills: 0    Comments: Labeling may look different.  25 mcg=1000 Units. Please double check dosages.                Details  examination, evaluation, counseling and review of medications and discharge plan.      Signed:    Cachorro Allen MD   1/6/2024      Thank you Jocelyn Wolfe for the opportunity to be involved in this patient's care. If you have any questions or concerns, please feel free to contact me at [phone].    Comment: Please note this report has been produced using speech recognition software and may contain errors related to that system including errors in grammar, punctuation, and spelling, as well as words and phrases that may be inappropriate. If there are any questions or concerns, please feel free to contact the dictating provider for clarification.

## 2024-01-06 NOTE — PLAN OF CARE
Problem: Discharge Planning  Goal: Discharge to home or other facility with appropriate resources  Outcome: Progressing     Problem: Pain  Goal: Verbalizes/displays adequate comfort level or baseline comfort level  Outcome: Progressing     Problem: Safety - Adult  Goal: Free from fall injury  Outcome: Progressing     Problem: Safety - Medical Restraint  Goal: Remains free of injury from restraints (Restraint for Interference with Medical Device)  Description: INTERVENTIONS:  1. Determine that other, less restrictive measures have been tried or would not be effective before applying the restraint  2. Evaluate the patient's condition at the time of restraint application  3. Inform patient/family regarding the reason for restraint  4. Q2H: Monitor safety, psychosocial status, comfort, nutrition and hydration  Outcome: Progressing     Problem: Skin/Tissue Integrity  Goal: Absence of new skin breakdown  Description: 1.  Monitor for areas of redness and/or skin breakdown  2.  Assess vascular access sites hourly  3.  Every 4-6 hours minimum:  Change oxygen saturation probe site  4.  Every 4-6 hours:  If on nasal continuous positive airway pressure, respiratory therapy assess nares and determine need for appliance change or resting period.  Outcome: Progressing     Problem: ABCDS Injury Assessment  Goal: Absence of physical injury  Outcome: Progressing     Problem: Chronic Conditions and Co-morbidities  Goal: Patient's chronic conditions and co-morbidity symptoms are monitored and maintained or improved  Outcome: Progressing     Problem: Respiratory - Adult  Goal: Achieves optimal ventilation and oxygenation  Outcome: Progressing  Flowsheets (Taken 1/5/2024 0911 by Hailee Leon RN)  Achieves optimal ventilation and oxygenation: Assess for changes in respiratory status     Problem: Cardiovascular - Adult  Goal: Absence of cardiac dysrhythmias or at baseline  Outcome: Progressing  Flowsheets (Taken 1/5/2024 0911 by  Hailee Leon RN)  Absence of cardiac dysrhythmias or at baseline: Monitor cardiac rate and rhythm     Problem: Metabolic/Fluid and Electrolytes - Adult  Goal: Electrolytes maintained within normal limits  Outcome: Progressing  Goal: Hemodynamic stability and optimal renal function maintained  Outcome: Progressing  Goal: Glucose maintained within prescribed range  Outcome: Progressing

## 2024-01-06 NOTE — CARE COORDINATION
CASE MANAGEMENT DISCHARGE SUMMARY:    DISCHARGE DATE: 1/6/24    DISCHARGED TO: home with [facility]                Discharging to Facility/ Agency   Name: [facility]  Address:  [address], [place] [id number]   Phone:  [phone]  Fax:  [phone]      TRANSPORTATION: family             TIME: will arrange with bedside RN        Referral made to [facility], agency will pull needed all orders and info from [facility] and will start care on Monday 1-8-23.  Electronically signed by EVIN Agustin on 1/6/2024 at 2:46 PM   [phone]

## 2024-01-06 NOTE — PROGRESS NOTES
Pt discharged. Picc line dressing from removal remains clean and dry.  in to take patient home, with pts portable o2 tank. All discharge instructions reviewed with patient and . All questions answered. Pt is aware that she needs to call on Monday and schedule a follow up appt with Dr Love and the patients PCP. Wound to leg dressed and blue lotion given to patient to take home. Portable o2 tank on and pt placed her own o2 tubing and turned her o2 tank by self. RN ensured tank is working properly prior to discharging patient. Pt assisted by this RN to vehicle without complications with all belongings and dc paperwork.   Electronically signed by Farhana Curtis RN on 1/6/2024 at 3:59 PM

## 2024-01-06 NOTE — PROGRESS NOTES
Comprehensive Nutrition Assessment    Type and Reason for Visit:  Reassess    Nutrition Recommendations/Plan:   Continue heart healthy diet as tolerated  Offer Ensure high protein bid (low calorie, high protein)  Will monitor nutritional adequacy, nutrition-related labs, weights, BMs, and clinical progress      Malnutrition Assessment:  Malnutrition Status:  No malnutrition (01/02/24 1453)    Context:  Acute Illness       Nutrition Assessment:    Follow up:  Patient now on 5 [place], currently on 4 liters nasal cannula.   On oral diet since 1/3/24.   Po intake mostly % meals.  Weight has trended down about 6% in the past 2 weeks per EMR.  will offer Ensure high protein (low calorie, high protein) bid and monitor tolerance.    Nutrition Related Findings:    last BM on 1/4, lethargic Wound Type: None       Current Nutrition Intake & Therapies:    Average Meal Intake: %  Average Supplements Intake: Unable to assess  ADULT DIET; Regular; Low Fat/Low Chol/High Fiber/2 gm Na    Anthropometric Measures:  Height: 149.9 cm (4' 11\")  Ideal Body Weight (IBW): 95 lbs (43 kg)       Current Body Weight: 82.3 kg (181 lb 7 oz),   IBW. Weight Source: Standing Scale  Current BMI (kg/m2): 36.6                          BMI Categories: Obese Class 2 (BMI 35.0 -39.9)    Estimated Daily Nutrient Needs:  Energy Requirements Based On: Kcal/kg  Weight Used for Energy Requirements: Current  Energy (kcal/day): 1236 - 1481 (15 - 18 kcal/kg)  Weight Used for Protein Requirements: Ideal  Protein (g/day): 86 (2g/kg IBW)     Fluid (ml/day): per provider while in ICU    Nutrition Diagnosis:   Increased nutrient needs related to increase demand for energy/nutrients, impaired respiratory function as evidenced by  (recent intubation, current condition)    Nutrition Interventions:   Food and/or Nutrient Delivery: Continue Current Diet  Nutrition Education/Counseling: Education not indicated  Coordination of Nutrition Care: Continue to monitor

## 2024-01-19 NOTE — ED PROVIDER NOTES
Preventive Health Recommendations  Male Ages 50 - 64    Yearly exam:             See your health care provider every year in order to  o   Review health changes.   o   Discuss preventive care.    o   Review your medicines if your doctor has prescribed any.   Cholesterol screening  Diabetes screening  You are overdue for a screening colonoscopy.  Please call [facility] to get this scheduled in the next several months  PSA annually for prostate cancer screening  You should be tested each year for STDs (sexually transmitted diseases), if you re at risk.     Shots: Get a flu shot each year. Get a tetanus shot every 10 years.  I would recommend getting the new RSV vaccine completed especially with the  that is run in your home.  You should also get a shingles vaccine called Shingrix completed.  These can be scheduled at your pharmacy.    Nutrition:  Eat at least 5 servings of fruits and vegetables daily.   Eat whole-grain bread, whole-wheat pasta and brown rice instead of white grains and rice.   Get adequate Calcium and Vitamin D.     Lifestyle  Exercise for at least 150 minutes a week (30 minutes a day, 5 days a week). This will help you control your weight and prevent disease.   Limit alcohol to one drink per day.   No smoking.   Wear sunscreen to prevent skin cancer.  Consider seeing a dermatologist every 1 to 2 years for a total-body skin exam.  See your dentist every six months for an exam and cleaning.   See your eye doctor every 1 to 2 years.     [facility]Y DE[facility]     Pt Name: Brendan Silva   MRN: [id number]   [place] 1975   Date of evaluation: 1/1/2023   Provider: Breanna Peng DO   PCP: Orestes Wang   Note Started: 8:15 AM EST 1/1/23     CHIEF COMPLAINT       Chief Complaint   Patient presents with    Shortness of Breath     Pt to ED from home via [facility] for SOB. Pt states she normally feels short of breath due to her COPD and CHF but says this time is different. Per EMS, on arrival pt was 84% on 4L/NC. Pt arrived in ED on 10L/NRB with sp02 100%, tachy with HR 110s. EMS gave 1 duoneb en route to ED and pt gave herself one an hour before EMS arrival.         HISTORY OF PRESENT ILLNESS: 1 or more Elements     History from : Patient     Limitations to history : None     Brendan Silva is a 49 y.o. female who presents emergency department complaining of shortness of breath. Patient has history of COPD and typically requires 4 L of oxygen via nasal cannula at all times. Patient denies fevers, chills, or sweats. No cough, or congestion. Patient has noted increasing shortness of breath over the last couple of days and upon arrival of EMS had O2 sats of 84% on 4 L nasal cannula. Patient took 1 nebulizer treatment at home and had a second 1 in route by EMS. Nursing Notes were all reviewed and agreed with or any disagreements were addressed in the HPI. REVIEW OF SYSTEMS :    Review of Systems     Positives and Pertinent negatives as per HPI. SURGICAL HISTORY   Past Surgical History:   Procedure Laterality Date    ABDOMEN SURGERY      CHOLECYSTECTOMY      DILATATION, ESOPHAGUS      EYE SURGERY      TUBAL LIGATION        CURRENTMEDICATIONS     Previous Medications    ALBUTEROL SULFATE  (90 BASE) MCG/ACT INHALER    INHALE TWO PUFFS BY MOUTH EVERY 4 HOURS AS NEEDED FOR WHEEZING OR SHORTNESS OF BREATH    ASPIRIN 81 MG EC TABLET    Take 81 mg by mouth in the morning. Not taking. CARVEDILOL (COREG) 3.125 MG TABLET    TAKE ONE TABLET BY MOUTH TWICE A DAY    FOLIC ACID (FOLVITE) 1 MG TABLET    Take 1 tablet by mouth daily    GABAPENTIN (NEURONTIN) 100 MG CAPSULE    Take 1 capsule by mouth 3 times daily for 30 days. IPRATROPIUM-ALBUTEROL (DUONEB) 0.5-2.5 (3) MG/3ML SOLN NEBULIZER SOLUTION    INHALE THREE MILLILITERS (ONE VIAL) VIA NEBULIZATION BY MOUTH EVERY 4 HOURS    LISINOPRIL (PRINIVIL;ZESTRIL) 5 MG TABLET    TAKE ONE TABLET BY MOUTH DAILY    METHADONE 10 MG/5ML SOLUTION    Take 130 mg by mouth daily. OMEPRAZOLE (PRILOSEC) 40 MG DELAYED RELEASE CAPSULE    Take 40 mg by mouth every evening At 1800    OXYGEN    Inhale 2 L into the lungs See Admin Instructions. SERTRALINE HCL (ZOLOFT PO)    Take 125 mg by mouth Daily    SPACER/AERO-HOLDING CHAMBERS MARILOU    1 Device by Does not apply route daily as needed    SPIRIVA HANDIHALER 18 MCG INHALATION CAPSULE    INHALE THE ENTIRE CONTENTS OF 1 CAPSULE ONCE A DAY USING HANDIHALER DEVICE    SYMBICORT 160-4.5 MCG/ACT AERO    Inhale 2 puffs into the lungs 2 times daily    TORSEMIDE (DEMADEX) 20 MG TABLET    Take 3 tablets by mouth daily      ALLERGIES     Latex, Sulfa antibiotics, Nsaids, and Ultram [tramadol hcl]   PAST MEDICAL HISTORY    has a past medical history of Arthritis, Blind right eye, CHF (congestive heart failure) ([facility]), Chronic respiratory failure with hypoxia and hypercapnia ([address] COPD (chronic obstructive pulmonary disease) ([facility]), GERD (gastroesophageal reflux disease), Hypertension, Movement disorder, Neuromuscular disorder ([facility] [address] On home O2, Pneumonia, and Sleep apnea.     FAMILYHISTORY     Family History   Problem Relation Age of Onset    Cancer Mother         lung cancer    Osteoporosis Mother     Cancer Father         cancer    Arthritis Father       SOCIAL HISTORY     Social History     Tobacco Use    Smoking status: Former     Packs/day: 0.50     Years: 30.00     Pack years: 15.00     Types: Cigarettes Start date: 1984     Quit date: 2017     Years since quittin.0    Smokeless tobacco: Never    Tobacco comments:     o   Vaping Use    Vaping Use: Never used   Substance Use Topics    Alcohol use: No     Alcohol/week: 0.0 standard drinks    Drug use: No        SCREENINGS          Bunny Coma Scale  Eye Opening: Spontaneous  Best Verbal Response: Oriented  Best Motor Response: Obeys commands  Bunny Coma Scale Score: 15                CIWA Assessment  BP: (!) 130/91  Heart Rate: (!) 107             PHYSICAL EXAM  1 or more Elements   ED Triage Vitals [23 0808]   BP Temp Temp Source Heart Rate Resp SpO2 Height Weight   (!) 130/91 99.1 °F (37.3 °C) Axillary (!) 107 20 100 % 5' (1.524 m) 207 lb (93.9 kg)        Physical Exam   General: No acute respiratory distress. Heart: Tachycardic. Normal S1-S2. Lungs: Diffuse rhonchi without wheezing or rales. Increased work of breathing. O2 sats stable on 10 L nonrebreather mask.:  Soft. Extremities: No significant swelling or edema. DIAGNOSTIC RESULTS   LABS:   Labs Reviewed   CBC WITH AUTO DIFFERENTIAL   COMPREHENSIVE METABOLIC PANEL   PROCALCITONIN   LACTATE, SEPSIS   LACTATE, SEPSIS   LACTATE, SEPSIS   LACTATE, SEPSIS   LACTATE, SEPSIS   LACTATE, SEPSIS   TROPONIN   BRAIN NATRIURETIC PEPTIDE   BLOOD GAS, VENOUS   LACTATE, SEPSIS      When ordered only abnormal lab results are displayed. All other labs were within normal range or not returned as of this dictation. EKG:     Sinus tachycardia with a rate of 105. Normal axis. Normal intervals and durations. Inferior/anterior Q waves noted. These are unchanged when compared to previous EKG on 5/10/2022.     RADIOLOGY:    Non-plain film images such as CT, Ultrasound and MRI are read by the radiologist. Plain radiographic images are visualized and preliminarily interpreted by the ED Provider with the below findings:     Interpretation per the Radiologist below, if available at the time of this note:   XR CHEST PORTABLE   Final Result   Portable study with low lung volume showing a pattern of vascular congestion   or mild edema centrally. EMERGENCY DEPARTMENT COURSE and DIFFERENTIAL DIAGNOSIS/MDM:     Patient presents to the emergency department complaining of shortness of breath. Patient has known history of COPD. Patient is hypoxic on her baseline oxygen upon arrival.  Patient was evaluated for COVID, flu, pneumonia, and CHF. Imaging is concerning for possible vascular congestion, however, BNP is relatively low. Patient's COVID and flu are negative. Patient was provided steroids, nebulizer treatments, and BiPAP therapy. Vitals:    Vitals:    01/01/23 0808   BP: (!) 130/91   Pulse: (!) 107   Resp: 20   Temp: 99.1 °F (37.3 °C)   TempSrc: Axillary   SpO2: 100%   Weight: 207 lb (93.9 kg)   Height: 5' (1.524 m)        Patient was given the following medications:     Medications   ipratropium-albuterol (DUONEB) nebulizer solution 2 ampule (has no administration in time range)   methylPREDNISolone sodium (SOLU-MEDROL) injection 125 mg (has no administration in time range)           Is this patient to be included in the SEP-1 Core Measure due to severe sepsis or septic shock? No   Exclusion criteria - the patient is NOT to be included for SEP-1 Core Measure due to: Infection is not suspected        CONSULTS: Hospitalist.  Patient will be admitted. Chronic Conditions: CHF, COPD, chronic respiratory failure  Records Reviewed : Outpatient Labs & Studies      Today's labs were reviewed. BMP appears to be improved. Disposition Considerations (include 1 Tests not done, Shared Decision Making, Pt Expectation of Test or Tx.): Will be admitted for respiratory failure. I am the primary physician of Record. FINAL IMPRESSION    1. COPD exacerbation ([address]    2.  Acute respiratory failure with hypoxia and hypercapnia (HCC)           DISPOSITION/PLAN   DISPOSITION         PATIENT REFERRED TO:   No follow-up provider specified.      DISCHARGE MEDICATIONS:   New Prescriptions    No medications on file        DISCONTINUED MEDICATIONS:   Discontinued Medications    No medications on file              (Please note that portions of this note were completed with a voice recognition program.  Efforts were made to edit the dictations but occasionally words are mis-transcribed.)     Calderon Bell DO (electronically signed)        Calderon Bell DO  01/01/23 9939

## 2024-01-22 ENCOUNTER — TELEPHONE (OUTPATIENT)
Dept: PULMONOLOGY | Age: 49
End: 2024-01-22

## 2024-01-22 NOTE — TELEPHONE ENCOUNTER
Rochelle (RN) called and believe she will benefit from cardio pulm Rehab and want your input or advise on this.

## 2024-01-22 NOTE — TELEPHONE ENCOUNTER
Rochelle with [facility] called because Nurse Practitioner Triston Layton did a home visit with this patient and believes she would benefit from some cardio pulm rehab. She has been in the hospital with respiratory failure twice and has had to be intubated.

## 2024-01-23 NOTE — TELEPHONE ENCOUNTER
She needs to be seen in office.  I think transportation was the limiting factor with rehab in the past.

## 2024-01-23 NOTE — TELEPHONE ENCOUNTER
Called [facility]ARR[facility] [phone] and LVM advising that Dr. Love is suggesting an office visit regarding CPR.

## 2024-02-01 ENCOUNTER — TELEPHONE (OUTPATIENT)
Dept: PULMONOLOGY | Age: 49
End: 2024-02-01

## 2024-02-01 NOTE — TELEPHONE ENCOUNTER
Luciano Layton NP with [facility]  seen Patient on 1/18/24 post hosp visit    She has has to 2 episodes Acute resp failure requiring intubation within 3 months and they are wanting to know if Cardiopulmonary rehab would be appropriate. Was discharged from  [facility] On 1/6/24  and does not have an appt to follow up with Dr. Love.     She No longer has services with [facility] if you have any questions you can call [phone]

## 2024-02-26 ENCOUNTER — TELEPHONE (OUTPATIENT)
Dept: PULMONOLOGY | Age: 49
End: 2024-02-26

## 2024-03-15 DIAGNOSIS — J44.9 COPD, SEVERE (HCC): ICD-10-CM

## 2024-03-15 RX ORDER — TORSEMIDE 20 MG/1
TABLET ORAL
Qty: 270 TABLET | Refills: 1 | Status: SHIPPED | OUTPATIENT
Start: 2024-03-15

## 2024-03-15 NOTE — TELEPHONE ENCOUNTER
Last O/V:  3/27/23  Next O/V: 24  Last Refill: 23  Last Labs:  BMP:  24  Last EK24    Must keep appt for more refills.

## 2024-03-26 ENCOUNTER — TELEPHONE (OUTPATIENT)
Dept: PULMONOLOGY | Age: 49
End: 2024-03-26

## 2024-03-26 RX ORDER — ALBUTEROL SULFATE 90 UG/1
AEROSOL, METERED RESPIRATORY (INHALATION)
Qty: 8.5 G | Refills: 11 | Status: SHIPPED | OUTPATIENT
Start: 2024-03-26

## 2024-03-26 NOTE — TELEPHONE ENCOUNTER
Omar with [facility] came chun office and stated that patient is claustrophobic and is requesting nasal mask . RT did a care check on her and she was falling a sleep while he was talking to her. Dr. Love stated she need to come in and schedule appt. No answer when I called.

## 2024-04-11 ENCOUNTER — OFFICE VISIT (OUTPATIENT)
Dept: PULMONOLOGY | Age: 49
End: 2024-04-11
Payer: MEDICARE

## 2024-04-11 VITALS
OXYGEN SATURATION: 98 % | TEMPERATURE: 98.2 F | HEIGHT: 59 IN | HEART RATE: 109 BPM | RESPIRATION RATE: 18 BRPM | SYSTOLIC BLOOD PRESSURE: 116 MMHG | BODY MASS INDEX: 35.76 KG/M2 | DIASTOLIC BLOOD PRESSURE: 70 MMHG | WEIGHT: 177.4 LBS

## 2024-04-11 DIAGNOSIS — J44.9 COPD, SEVERE (HCC): Primary | ICD-10-CM

## 2024-04-11 DIAGNOSIS — J96.12 CHRONIC RESPIRATORY FAILURE WITH HYPOXIA AND HYPERCAPNIA (HCC): ICD-10-CM

## 2024-04-11 DIAGNOSIS — J96.11 CHRONIC RESPIRATORY FAILURE WITH HYPOXIA AND HYPERCAPNIA (HCC): ICD-10-CM

## 2024-04-11 DIAGNOSIS — G47.33 OSA TREATED WITH BIPAP: ICD-10-CM

## 2024-04-11 PROCEDURE — 3023F SPIROM DOC REV: CPT | Performed by: INTERNAL MEDICINE

## 2024-04-11 PROCEDURE — 99214 OFFICE O/P EST MOD 30 MIN: CPT | Performed by: INTERNAL MEDICINE

## 2024-04-11 PROCEDURE — G8417 CALC BMI ABV UP PARAM F/U: HCPCS | Performed by: INTERNAL MEDICINE

## 2024-04-11 PROCEDURE — 1036F TOBACCO NON-USER: CPT | Performed by: INTERNAL MEDICINE

## 2024-04-11 PROCEDURE — G8427 DOCREV CUR MEDS BY ELIG CLIN: HCPCS | Performed by: INTERNAL MEDICINE

## 2024-04-11 RX ORDER — IPRATROPIUM BROMIDE AND ALBUTEROL SULFATE 2.5; .5 MG/3ML; MG/3ML
1 SOLUTION RESPIRATORY (INHALATION) EVERY 4 HOURS
Qty: 360 ML | Refills: 11 | Status: SHIPPED | OUTPATIENT
Start: 2024-04-11

## 2024-04-11 ASSESSMENT — SLEEP AND FATIGUE QUESTIONNAIRES
HOW LIKELY ARE YOU TO NOD OFF OR FALL ASLEEP WHEN YOU ARE A PASSENGER IN A CAR FOR AN HOUR WITHOUT A BREAK: SLIGHT CHANCE OF DOZING
HOW LIKELY ARE YOU TO NOD OFF OR FALL ASLEEP IN A CAR, WHILE STOPPED FOR A FEW MINUTES IN TRAFFIC: WOULD NEVER DOZE
HOW LIKELY ARE YOU TO NOD OFF OR FALL ASLEEP WHILE SITTING INACTIVE IN A PUBLIC PLACE: SLIGHT CHANCE OF DOZING
HOW LIKELY ARE YOU TO NOD OFF OR FALL ASLEEP WHILE LYING DOWN TO REST IN THE AFTERNOON WHEN CIRCUMSTANCES PERMIT: MODERATE CHANCE OF DOZING
HOW LIKELY ARE YOU TO NOD OFF OR FALL ASLEEP WHILE WATCHING TV: SLIGHT CHANCE OF DOZING
HOW LIKELY ARE YOU TO NOD OFF OR FALL ASLEEP WHILE SITTING QUIETLY AFTER LUNCH WITHOUT ALCOHOL: SLIGHT CHANCE OF DOZING
ESS TOTAL SCORE: 9
HOW LIKELY ARE YOU TO NOD OFF OR FALL ASLEEP WHILE SITTING AND TALKING TO SOMEONE: SLIGHT CHANCE OF DOZING
HOW LIKELY ARE YOU TO NOD OFF OR FALL ASLEEP WHILE SITTING AND READING: MODERATE CHANCE OF DOZING

## 2024-04-11 NOTE — PROGRESS NOTES
Chief Complaint   Patient presents with    Sleep Apnea       HPI  Here for CC of COPD, BLANCA    Last visit was 7/23.  Since then two admissions to the hospital   Last admission in January she was intubated due to H influenzea and Rhinovirus   She seems to be doing better overall.  Hard time using her bilevel machine due to claustrophobia.  Would like a nasal pillow mask if able too.  Noticing lower oxygen levels with sleeping while on bilevel and oxygen at 4 liters.  Use of oxygen during the day is 4-6 liters.  Needs refills on nebs.  Uses symbicort, spiriva and albuterol.  Has lost nearly 100 lbs.  Hard to get around due to back and knee pain.  Does not do much.  Daughter is present         Current Outpatient Medications   Medication Sig Dispense Refill    albuterol sulfate HFA (PROVENTIL;VENTOLIN;PROAIR) 108 (90 Base) MCG/ACT inhaler INHALE 2 PUFFS BY MOUTH EVERY 4 HOURS AS NEEDED FOR WHEEZING OR FOR SHORTNESS OF BREATH 8.5 g 11    torsemide (DEMADEX) 20 MG tablet TAKE THREE TABLETS BY MOUTH DAILY 270 tablet 1    miconazole (MICOTIN) 2 % powder Apply topically 2 times daily. 45 g 0    Vitamin D (CHOLECALCIFEROL) 25 MCG (1000 UT) TABS tablet Take 1 tablet by mouth daily 30 tablet 0    lidocaine 4 % external patch Place 1 patch onto the skin daily 10 patch 0    methadone (DOLOPHINE) 10 MG/ML solution Take 12 mLs by mouth daily.      cloNIDine (CATAPRES) 0.1 MG tablet Take 1 tablet by mouth 2 times daily      ipratropium 0.5 mg-albuterol 2.5 mg (DUONEB) 0.5-2.5 (3) MG/3ML SOLN nebulizer solution Inhale 3 mLs into the lungs every 4 hours 360 mL 5    lisinopril (PRINIVIL;ZESTRIL) 5 MG tablet TAKE ONE TABLET BY MOUTH DAILY 90 tablet 3    carvedilol (COREG) 3.125 MG tablet TAKE ONE TABLET BY MOUTH TWICE A  tablet 3    empagliflozin (JARDIANCE) 10 MG tablet Take 1 tablet by mouth daily 90 tablet 4    Sertraline HCl (ZOLOFT PO) Take 125 mg by mouth Daily      aspirin 81 MG EC tablet Take 1 tablet by mouth daily Not

## 2024-04-11 NOTE — PROGRESS NOTES
Clarissa Estrada         : 1975  [] [facility]     [] [facility]      [] [facility]     [][facility]    [] [facility]  [] [facility]  [] [facility]   [] Retail Medical services [x] Other: [facility]  Diagnosis: [x] BLANCA (G47.33) [] CSA (G47.31) [] Apnea (G47.30)   Length of Need: [] 12 Months [x] 99 Months [] Other:    Machine (HALLEY!):  [] ResMed AirSense     Auto [] Other:     []  CPAP () [] Bilevel ()   Mode: [] Auto [] Spontaneous    Mode: [] Auto [] Spontaneous                          Comfort Settings:     If the order for CPAP  - Ramp Pressure: 5 cmH2O                                        - Ramp time: 15 min                                     -  Flex/EPR - 3 full time  If the order is for BiLevel:                                    - For ResMed Bilevel (TiMax-4 sec   TiMin- 0.2 sec)     Humidifier: [x] Heated ()        [x] Water chamber replacement ()/ 1 per 6 months        Mask:  Please always start with the mask the patient used during the titraion   [x] Nasal () /1 per 3 months [] Full Face () /1 per 3 months   [x] Patient choice -Size and fit mask [] Patient Choice - Size and fit mask   [] Dispense:  [] Dispense:    [x] Headgear () / 1 per 6 months [] Headgear () / 1 per 3 months   [] Replacement Nasal Cushion ()/2 per month [] Interface Replacement ()/1 per month   [x] Replacement Nasal Pillows ()/2 per month  Recommend Dreamware nasal pillows         Tubing: [] Heated ()/1 per 3 months    [] Standard ()/1 per 3 months [] Other:           Filters: [x] Non-disposable ()/1 per 6 months     [x] Ultra-Fine, Disposable ()/2 per month        Miscellaneous: [x] Chin Strap ()/ 1 per 6 months [] O2 bleed-in:       LPM   [] Oximetry on CPAP/Bilevel []  Other:          Start Order Date: 24    MEDICAL JUSTIFICATION:  I, the undersigned, certify that the above prescribed supplies are medically necessary for this patient’s

## 2024-04-11 NOTE — PATIENT INSTRUCTIONS
Continue with inhalers    I will order a nasal pillows  for bpap machine    I will send in order for breathing treatments    Follow up in 3 months

## 2024-04-12 ENCOUNTER — TELEPHONE (OUTPATIENT)
Dept: PULMONOLOGY | Age: 49
End: 2024-04-12

## 2024-04-12 RX ORDER — IPRATROPIUM BROMIDE AND ALBUTEROL SULFATE 2.5; .5 MG/3ML; MG/3ML
SOLUTION RESPIRATORY (INHALATION)
Qty: 180 EACH | Refills: 11 | OUTPATIENT
Start: 2024-04-12

## 2024-04-12 NOTE — TELEPHONE ENCOUNTER
Omar Estrada called in and stated that Medicare is requiring sleep study to cover the nasal pillow. I'm not showing any recent or updated sleep study. Please advise.

## 2024-04-12 NOTE — TELEPHONE ENCOUNTER
The patient has been notified of this information and all questions answered. Gave pt. Info to [employer] regarding the nasal pillow.

## 2024-04-25 NOTE — PROGRESS NOTES
clots or swollen lymph nodes.  Allergic/Immunologic: No nasal congestion or hives.    Physical Exam:   /70   Pulse 94   Ht 1.499 m (4' 11.02\")   Wt 79.8 kg (176 lb)   LMP 08/14/2015   SpO2 96%   BMI 35.53 kg/m²   Wt Readings from Last 3 Encounters:   04/26/24 79.8 kg (176 lb)   04/11/24 80.5 kg (177 lb 6.4 oz)   01/06/24 82.3 kg (181 lb 7 oz)     Constitutional: She is an obese female who is oriented to person, place, and time. In no acute distress but appears older than stated age. Chronically ill-appearing. In a wheelchair. Wearing supplemental O2  Head: Normocephalic and atraumatic. Blind right eye with dense cataract. No scleral icterus.  Neck: Neck supple. No JVP or carotid bruit appreciated. No mass and no thyromegaly present. No lymphadenopathy present.  Cardiovascular: Normal rate. Normal heart sounds. Exam reveals no gallop and no friction rub. No murmur heard.  Pulmonary/Chest: Effort normal. No respiratory distress. She has no rhonchi or rales.  Diffuse bilateral expiratory wheezes.   Abdominal: Soft, non-tender. Bowel sounds are normal. She exhibits no organomegaly, mass or bruit.   Extremities: 1-2+ BLE edema. No cyanosis or clubbing. Pulses are 2+ radial/carotid bilaterally.  Neurological: No gross cranial nerve deficit. Coordination normal.   Skin: Skin is warm and dry. There is no new appearing rash or diaphoresis.   Psychiatric: She has a normal mood and affect. Her speech is normal and behavior is normal.     Lab Review:      Lab Results   Component Value Date/Time    TRIG 147 01/06/2024 09:02 AM      Lab Results   Component Value Date/Time     01/06/2024 09:02 AM     Lab Results   Component Value Date/Time    HGB 9.9 01/06/2024 09:02 AM    HCT 31.4 01/06/2024 09:02 AM     Lab Results   Component Value Date/Time     01/06/2024 09:02 AM     Lab Results   Component Value Date/Time    K 4.0 01/06/2024 09:02 AM     Lab Results   Component Value Date/Time    BUN 21 01/06/2024

## 2024-04-26 ENCOUNTER — OFFICE VISIT (OUTPATIENT)
Dept: CARDIOLOGY CLINIC | Age: 49
End: 2024-04-26
Payer: MEDICARE

## 2024-04-26 VITALS
SYSTOLIC BLOOD PRESSURE: 104 MMHG | HEIGHT: 59 IN | OXYGEN SATURATION: 96 % | HEART RATE: 94 BPM | WEIGHT: 176 LBS | BODY MASS INDEX: 35.48 KG/M2 | DIASTOLIC BLOOD PRESSURE: 70 MMHG

## 2024-04-26 DIAGNOSIS — I50.32 CHRONIC DIASTOLIC HEART FAILURE (HCC): Primary | ICD-10-CM

## 2024-04-26 DIAGNOSIS — I10 ESSENTIAL HYPERTENSION: ICD-10-CM

## 2024-04-26 DIAGNOSIS — J96.12 CHRONIC RESPIRATORY FAILURE WITH HYPOXIA AND HYPERCAPNIA (HCC): ICD-10-CM

## 2024-04-26 DIAGNOSIS — J96.11 CHRONIC RESPIRATORY FAILURE WITH HYPOXIA AND HYPERCAPNIA (HCC): ICD-10-CM

## 2024-04-26 PROCEDURE — 3074F SYST BP LT 130 MM HG: CPT | Performed by: INTERNAL MEDICINE

## 2024-04-26 PROCEDURE — 3078F DIAST BP <80 MM HG: CPT | Performed by: INTERNAL MEDICINE

## 2024-04-26 PROCEDURE — 1036F TOBACCO NON-USER: CPT | Performed by: INTERNAL MEDICINE

## 2024-04-26 PROCEDURE — G8427 DOCREV CUR MEDS BY ELIG CLIN: HCPCS | Performed by: INTERNAL MEDICINE

## 2024-04-26 PROCEDURE — 99214 OFFICE O/P EST MOD 30 MIN: CPT | Performed by: INTERNAL MEDICINE

## 2024-04-26 PROCEDURE — G8417 CALC BMI ABV UP PARAM F/U: HCPCS | Performed by: INTERNAL MEDICINE

## 2024-05-28 ENCOUNTER — TELEPHONE (OUTPATIENT)
Dept: PULMONOLOGY | Age: 49
End: 2024-05-28

## 2024-05-28 DIAGNOSIS — J44.1 COPD EXACERBATION (HCC): Primary | ICD-10-CM

## 2024-05-28 RX ORDER — PREDNISONE 10 MG/1
TABLET ORAL
Qty: 30 TABLET | Refills: 0 | Status: SHIPPED | OUTPATIENT
Start: 2024-05-28

## 2024-05-28 NOTE — TELEPHONE ENCOUNTER
Complains of cough and SOB  Duration 24 hours now  Cough with sputum production? No  Color n/a  Fever? No   Any other Symptoms? Runny nose, back hurts  Any current treatment tried? Ibuprofen  Using inhalers? Yes do they help? Yes  Pharmacy? [facility]    She feels really bad and is asking for prednisone.

## 2024-06-05 ENCOUNTER — APPOINTMENT (OUTPATIENT)
Dept: GENERAL RADIOLOGY | Age: 49
DRG: 189 | End: 2024-06-05
Payer: MEDICARE

## 2024-06-05 ENCOUNTER — HOSPITAL ENCOUNTER (INPATIENT)
Age: 49
LOS: 4 days | Discharge: HOME | DRG: 189 | End: 2024-06-09
Attending: EMERGENCY MEDICINE | Admitting: HOSPITALIST
Payer: MEDICARE

## 2024-06-05 DIAGNOSIS — J96.02 ACUTE RESPIRATORY FAILURE WITH HYPOXIA AND HYPERCAPNIA (HCC): Primary | ICD-10-CM

## 2024-06-05 DIAGNOSIS — J96.01 ACUTE RESPIRATORY FAILURE WITH HYPOXIA AND HYPERCAPNIA (HCC): Primary | ICD-10-CM

## 2024-06-05 DIAGNOSIS — J44.1 COPD EXACERBATION (HCC): ICD-10-CM

## 2024-06-05 LAB
ALBUMIN SERPL-MCNC: 3.7 G/DL (ref 3.4–5)
ALBUMIN/GLOB SERPL: 0.8 {RATIO} (ref 1.1–2.2)
ALP SERPL-CCNC: 115 U/L (ref 40–129)
ALT SERPL-CCNC: 11 U/L (ref 10–40)
ANION GAP SERPL CALCULATED.3IONS-SCNC: 4 MMOL/L (ref 3–16)
AST SERPL-CCNC: 17 U/L (ref 15–37)
BASE EXCESS BLDV CALC-SCNC: 3.2 MMOL/L
BASE EXCESS BLDV CALC-SCNC: 3.8 MMOL/L
BASE EXCESS BLDV CALC-SCNC: 5.5 MMOL/L
BASOPHILS # BLD: 0 K/UL (ref 0–0.2)
BASOPHILS NFR BLD: 0.4 %
BILIRUB SERPL-MCNC: <0.2 MG/DL (ref 0–1)
BUN SERPL-MCNC: 34 MG/DL (ref 7–20)
CALCIUM SERPL-MCNC: 9.1 MG/DL (ref 8.3–10.6)
CHLORIDE SERPL-SCNC: 97 MMOL/L (ref 99–110)
CO2 BLDV-SCNC: 38 MMOL/L
CO2 BLDV-SCNC: 39 MMOL/L
CO2 BLDV-SCNC: 39 MMOL/L
CO2 SERPL-SCNC: 34 MMOL/L (ref 21–32)
COHGB MFR BLDV: 2 %
CREAT SERPL-MCNC: 0.6 MG/DL (ref 0.6–1.1)
DEPRECATED RDW RBC AUTO: 16.2 % (ref 12.4–15.4)
EOSINOPHIL # BLD: 0.1 K/UL (ref 0–0.6)
EOSINOPHIL NFR BLD: 0.8 %
FLUAV RNA UPPER RESP QL NAA+PROBE: NEGATIVE
FLUBV AG NPH QL: NEGATIVE
GFR SERPLBLD CREATININE-BSD FMLA CKD-EPI: >90 ML/MIN/{1.73_M2}
GLUCOSE SERPL-MCNC: 88 MG/DL (ref 70–99)
HCO3 BLDV-SCNC: 35 MMOL/L (ref 23–29)
HCO3 BLDV-SCNC: 35 MMOL/L (ref 23–29)
HCO3 BLDV-SCNC: 36 MMOL/L (ref 23–29)
HCT VFR BLD AUTO: 30.4 % (ref 36–48)
HGB BLD-MCNC: 9.6 G/DL (ref 12–16)
LACTATE BLDV-SCNC: 0.3 MMOL/L (ref 0.4–1.9)
LYMPHOCYTES # BLD: 1.6 K/UL (ref 1–5.1)
LYMPHOCYTES NFR BLD: 14.7 %
MCH RBC QN AUTO: 26.7 PG (ref 26–34)
MCHC RBC AUTO-ENTMCNC: 31.7 G/DL (ref 31–36)
MCV RBC AUTO: 84.4 FL (ref 80–100)
METHGB MFR BLDV: 0.2 %
METHGB MFR BLDV: 0.6 %
METHGB MFR BLDV: 0.6 %
MONOCYTES # BLD: 0.7 K/UL (ref 0–1.3)
MONOCYTES NFR BLD: 6.5 %
NEUTROPHILS # BLD: 8.6 K/UL (ref 1.7–7.7)
NEUTROPHILS NFR BLD: 77.6 %
NT-PROBNP SERPL-MCNC: 1382 PG/ML (ref 0–124)
O2 THERAPY: ABNORMAL
PCO2 BLDV: 107 MMHG (ref 40–50)
PCO2 BLDV: 115 MMHG (ref 40–50)
PCO2 BLDV: 90.5 MMHG (ref 40–50)
PH BLDV: 7.1 [PH] (ref 7.35–7.45)
PH BLDV: 7.12 [PH] (ref 7.35–7.45)
PH BLDV: 7.2 [PH] (ref 7.35–7.45)
PLATELET # BLD AUTO: 234 K/UL (ref 135–450)
PMV BLD AUTO: 8.1 FL (ref 5–10.5)
PO2 BLDV: 115 MMHG
PO2 BLDV: 37 MMHG
PO2 BLDV: <30 MMHG
POTASSIUM SERPL-SCNC: 4.6 MMOL/L (ref 3.5–5.1)
PROCALCITONIN SERPL IA-MCNC: 0.07 NG/ML (ref 0–0.15)
PROT SERPL-MCNC: 8.1 G/DL (ref 6.4–8.2)
RBC # BLD AUTO: 3.6 M/UL (ref 4–5.2)
SAO2 % BLDV: 53 %
SAO2 % BLDV: 64 %
SAO2 % BLDV: 98 %
SARS-COV-2 RDRP RESP QL NAA+PROBE: NOT DETECTED
SODIUM SERPL-SCNC: 135 MMOL/L (ref 136–145)
TROPONIN, HIGH SENSITIVITY: 23 NG/L (ref 0–14)
TROPONIN, HIGH SENSITIVITY: 25 NG/L (ref 0–14)
WBC # BLD AUTO: 11.1 K/UL (ref 4–11)

## 2024-06-05 PROCEDURE — 6360000002 HC RX W HCPCS: Performed by: EMERGENCY MEDICINE

## 2024-06-05 PROCEDURE — 87804 INFLUENZA ASSAY W/OPTIC: CPT

## 2024-06-05 PROCEDURE — 6370000000 HC RX 637 (ALT 250 FOR IP): Performed by: HOSPITALIST

## 2024-06-05 PROCEDURE — 2580000003 HC RX 258

## 2024-06-05 PROCEDURE — 99285 EMERGENCY DEPT VISIT HI MDM: CPT

## 2024-06-05 PROCEDURE — 80053 COMPREHEN METABOLIC PANEL: CPT

## 2024-06-05 PROCEDURE — 2700000000 HC OXYGEN THERAPY PER DAY

## 2024-06-05 PROCEDURE — 84484 ASSAY OF TROPONIN QUANT: CPT

## 2024-06-05 PROCEDURE — 6370000000 HC RX 637 (ALT 250 FOR IP): Performed by: EMERGENCY MEDICINE

## 2024-06-05 PROCEDURE — 36415 COLL VENOUS BLD VENIPUNCTURE: CPT

## 2024-06-05 PROCEDURE — 83605 ASSAY OF LACTIC ACID: CPT

## 2024-06-05 PROCEDURE — 84145 PROCALCITONIN (PCT): CPT

## 2024-06-05 PROCEDURE — 94640 AIRWAY INHALATION TREATMENT: CPT

## 2024-06-05 PROCEDURE — 85025 COMPLETE CBC W/AUTO DIFF WBC: CPT

## 2024-06-05 PROCEDURE — 94761 N-INVAS EAR/PLS OXIMETRY MLT: CPT

## 2024-06-05 PROCEDURE — 94660 CPAP INITIATION&MGMT: CPT

## 2024-06-05 PROCEDURE — 93005 ELECTROCARDIOGRAM TRACING: CPT | Performed by: EMERGENCY MEDICINE

## 2024-06-05 PROCEDURE — 87635 SARS-COV-2 COVID-19 AMP PRB: CPT

## 2024-06-05 PROCEDURE — 96374 THER/PROPH/DIAG INJ IV PUSH: CPT

## 2024-06-05 PROCEDURE — 2000000000 HC ICU R&B

## 2024-06-05 PROCEDURE — 2580000003 HC RX 258: Performed by: HOSPITALIST

## 2024-06-05 PROCEDURE — 5A09457 ASSISTANCE WITH RESPIRATORY VENTILATION, 24-96 CONSECUTIVE HOURS, CONTINUOUS POSITIVE AIRWAY PRESSURE: ICD-10-PCS | Performed by: STUDENT IN AN ORGANIZED HEALTH CARE EDUCATION/TRAINING PROGRAM

## 2024-06-05 PROCEDURE — 71045 X-RAY EXAM CHEST 1 VIEW: CPT

## 2024-06-05 PROCEDURE — 83880 ASSAY OF NATRIURETIC PEPTIDE: CPT

## 2024-06-05 PROCEDURE — 6360000002 HC RX W HCPCS: Performed by: HOSPITALIST

## 2024-06-05 PROCEDURE — 82803 BLOOD GASES ANY COMBINATION: CPT

## 2024-06-05 RX ORDER — POLYETHYLENE GLYCOL 3350 17 G/17G
17 POWDER, FOR SOLUTION ORAL DAILY PRN
Status: DISCONTINUED | OUTPATIENT
Start: 2024-06-05 | End: 2024-06-09 | Stop reason: HOSPADM

## 2024-06-05 RX ORDER — PREDNISONE 20 MG/1
40 TABLET ORAL DAILY
Status: DISCONTINUED | OUTPATIENT
Start: 2024-06-08 | End: 2024-06-09 | Stop reason: HOSPADM

## 2024-06-05 RX ORDER — ACETAMINOPHEN 325 MG/1
650 TABLET ORAL EVERY 6 HOURS PRN
Status: DISCONTINUED | OUTPATIENT
Start: 2024-06-05 | End: 2024-06-09 | Stop reason: HOSPADM

## 2024-06-05 RX ORDER — ACETAMINOPHEN 650 MG/1
650 SUPPOSITORY RECTAL EVERY 6 HOURS PRN
Status: DISCONTINUED | OUTPATIENT
Start: 2024-06-05 | End: 2024-06-09 | Stop reason: HOSPADM

## 2024-06-05 RX ORDER — ENOXAPARIN SODIUM 100 MG/ML
40 INJECTION SUBCUTANEOUS DAILY
Status: DISCONTINUED | OUTPATIENT
Start: 2024-06-05 | End: 2024-06-09 | Stop reason: HOSPADM

## 2024-06-05 RX ORDER — SODIUM CHLORIDE 0.9 % (FLUSH) 0.9 %
5-40 SYRINGE (ML) INJECTION PRN
Status: DISCONTINUED | OUTPATIENT
Start: 2024-06-05 | End: 2024-06-09 | Stop reason: HOSPADM

## 2024-06-05 RX ORDER — WATER 10 ML/10ML
INJECTION INTRAMUSCULAR; INTRAVENOUS; SUBCUTANEOUS
Status: COMPLETED
Start: 2024-06-05 | End: 2024-06-05

## 2024-06-05 RX ORDER — ALBUTEROL SULFATE 2.5 MG/3ML
5 SOLUTION RESPIRATORY (INHALATION) ONCE
Status: COMPLETED | OUTPATIENT
Start: 2024-06-05 | End: 2024-06-05

## 2024-06-05 RX ORDER — IPRATROPIUM BROMIDE AND ALBUTEROL SULFATE 2.5; .5 MG/3ML; MG/3ML
1 SOLUTION RESPIRATORY (INHALATION) ONCE
Status: COMPLETED | OUTPATIENT
Start: 2024-06-05 | End: 2024-06-05

## 2024-06-05 RX ORDER — LEVOFLOXACIN 5 MG/ML
500 INJECTION, SOLUTION INTRAVENOUS EVERY 24 HOURS
Status: DISCONTINUED | OUTPATIENT
Start: 2024-06-05 | End: 2024-06-07

## 2024-06-05 RX ORDER — METHYLPREDNISOLONE SODIUM SUCCINATE 40 MG/ML
40 INJECTION, POWDER, LYOPHILIZED, FOR SOLUTION INTRAMUSCULAR; INTRAVENOUS EVERY 6 HOURS
Status: COMPLETED | OUTPATIENT
Start: 2024-06-05 | End: 2024-06-07

## 2024-06-05 RX ORDER — SODIUM CHLORIDE 0.9 % (FLUSH) 0.9 %
5-40 SYRINGE (ML) INJECTION EVERY 12 HOURS SCHEDULED
Status: DISCONTINUED | OUTPATIENT
Start: 2024-06-05 | End: 2024-06-09 | Stop reason: HOSPADM

## 2024-06-05 RX ORDER — SODIUM CHLORIDE 9 MG/ML
INJECTION, SOLUTION INTRAVENOUS PRN
Status: DISCONTINUED | OUTPATIENT
Start: 2024-06-05 | End: 2024-06-09 | Stop reason: HOSPADM

## 2024-06-05 RX ORDER — METHYLPREDNISOLONE SODIUM SUCCINATE 125 MG/2ML
125 INJECTION, POWDER, LYOPHILIZED, FOR SOLUTION INTRAMUSCULAR; INTRAVENOUS ONCE
Status: COMPLETED | OUTPATIENT
Start: 2024-06-05 | End: 2024-06-05

## 2024-06-05 RX ORDER — ONDANSETRON 2 MG/ML
4 INJECTION INTRAMUSCULAR; INTRAVENOUS EVERY 6 HOURS PRN
Status: DISCONTINUED | OUTPATIENT
Start: 2024-06-05 | End: 2024-06-09 | Stop reason: HOSPADM

## 2024-06-05 RX ORDER — ONDANSETRON 4 MG/1
4 TABLET, ORALLY DISINTEGRATING ORAL EVERY 8 HOURS PRN
Status: DISCONTINUED | OUTPATIENT
Start: 2024-06-05 | End: 2024-06-09 | Stop reason: HOSPADM

## 2024-06-05 RX ORDER — IPRATROPIUM BROMIDE AND ALBUTEROL SULFATE 2.5; .5 MG/3ML; MG/3ML
1 SOLUTION RESPIRATORY (INHALATION)
Status: DISCONTINUED | OUTPATIENT
Start: 2024-06-05 | End: 2024-06-09 | Stop reason: HOSPADM

## 2024-06-05 RX ADMIN — SODIUM CHLORIDE: 9 INJECTION, SOLUTION INTRAVENOUS at 21:49

## 2024-06-05 RX ADMIN — METHYLPREDNISOLONE SODIUM SUCCINATE 125 MG: 125 INJECTION INTRAMUSCULAR; INTRAVENOUS at 16:06

## 2024-06-05 RX ADMIN — LEVOFLOXACIN 500 MG: 5 INJECTION, SOLUTION INTRAVENOUS at 21:50

## 2024-06-05 RX ADMIN — SODIUM CHLORIDE, PRESERVATIVE FREE 10 ML: 5 INJECTION INTRAVENOUS at 19:02

## 2024-06-05 RX ADMIN — METHYLPREDNISOLONE SODIUM SUCCINATE 40 MG: 40 INJECTION INTRAMUSCULAR; INTRAVENOUS at 21:45

## 2024-06-05 RX ADMIN — WATER 10 ML: 1 INJECTION INTRAMUSCULAR; INTRAVENOUS; SUBCUTANEOUS at 21:47

## 2024-06-05 RX ADMIN — IPRATROPIUM BROMIDE AND ALBUTEROL SULFATE 1 DOSE: 2.5; .5 SOLUTION RESPIRATORY (INHALATION) at 15:25

## 2024-06-05 RX ADMIN — ALBUTEROL SULFATE 5 MG: 2.5 SOLUTION RESPIRATORY (INHALATION) at 15:25

## 2024-06-05 RX ADMIN — WATER 2 ML: 1 INJECTION INTRAMUSCULAR; INTRAVENOUS; SUBCUTANEOUS at 16:07

## 2024-06-05 RX ADMIN — IPRATROPIUM BROMIDE AND ALBUTEROL SULFATE 1 DOSE: 2.5; .5 SOLUTION RESPIRATORY (INHALATION) at 20:48

## 2024-06-05 RX ADMIN — ALBUTEROL SULFATE 5 MG: 2.5 SOLUTION RESPIRATORY (INHALATION) at 16:54

## 2024-06-05 ASSESSMENT — LIFESTYLE VARIABLES
HOW OFTEN DO YOU HAVE A DRINK CONTAINING ALCOHOL: NEVER
HOW MANY STANDARD DRINKS CONTAINING ALCOHOL DO YOU HAVE ON A TYPICAL DAY: PATIENT DOES NOT DRINK

## 2024-06-05 ASSESSMENT — PAIN - FUNCTIONAL ASSESSMENT: PAIN_FUNCTIONAL_ASSESSMENT: NONE - DENIES PAIN

## 2024-06-05 NOTE — ED TRIAGE NOTES
Pt into ER from home via EMS with c/c SOB, per EMS pt was 88% O2 sat on 5 LPM O2 upon arrival to ER.  Pt into ER on NRB with 99% O2 sat.  EMS gave a duo neb tx.  Pt with Hx of COPD.

## 2024-06-05 NOTE — H&P
Hospital Medicine History & Physical      PCP: Jocelyn Wolfe APRN - NP    Date of Admission: 6/5/2024    Date of Service: Pt seen/examined on 6/5/24 and Admitted to Inpatient with expected LOS greater than two midnights due to medical therapy.     Chief Complaint: Shortness of breath, low oxygen saturation      History Of Present Illness:      48 y.o. female with history of COPD, BLANCA, chronic respiratory failure hypoxia and hypercapnia on home O2 on BiPAP at bedtime who presented to [facility]  with shortness of breath and hypoxia...  History is obtained from ED staff as patient is currently lethargic.  Patient apparently arrived from home via EMS with complaints of shortness of breath, her oxygen saturations at 88% on 5 L nasal cannula upon arrival to the ED..  She was placed on nonrebreather mask and then BiPAP  Initial BP was 117/57, pulse 103, temperature 98.8, respirations 21,  VBG initially showed a pH of 7.096, pCO2 115,... She was placed on BiPAP  Chest x-ray showed no acute infiltrates    Past Medical History:          Diagnosis Date    Arthritis     Blind right eye     CHF (congestive heart failure) (HCC)     Chronic respiratory failure with hypoxia and hypercapnia (HCC)     COPD (chronic obstructive pulmonary disease) (HCC)     GERD (gastroesophageal reflux disease)     Hypertension     Movement disorder     Neuromuscular disorder (HCC)     On home O2     Pneumonia     Sleep apnea        Past Surgical History:          Procedure Laterality Date    ABDOMEN SURGERY      CHOLECYSTECTOMY      DILATATION, ESOPHAGUS      EYE SURGERY      TUBAL LIGATION         Medications Prior to Admission:      Prior to Admission medications    Medication Sig Start Date End Date Taking? Authorizing Provider   predniSONE (DELTASONE) 10 MG tablet 40mg for 3days 30mg for 3days 20mg for 3days 10mg for 3days 5/28/24   Roland Love,    ipratropium 0.5 mg-albuterol 2.5 mg (DUONEB) 0.5-2.5 (3) MG/3ML SOLN  Please note this report has been produced using speech recognition software and may contain errors related to that system including errors in grammar, punctuation, and spelling, as well as words and phrases that may be inappropriate. If there are any questions or concerns, please feel free to contact the dictating provider for clarification.

## 2024-06-05 NOTE — ED PROVIDER NOTES
[facility] EMERGENCY DEPARTMENT  eMERGENCY dEPARTMENT eNCOUnter      Pt Name: Clarissa Estrada  MRN: [id number]  Birthdate 1975  Date of evaluation: 6/5/2024  Provider: PJ ZHONG MD    CHIEF COMPLAINT       Chief Complaint   Patient presents with    Shortness of Breath     SOB, per EMS pt was 88% O2 sat on 5 LPM O2 upon arrival to ER.  Pt into ER on NRB with 99% O2 sat.  EMS gave a duo neb tx.  Pt with Hx of COPD.           CRITICAL CARE TIME   Total Critical Care time was a minimum of 45 minutes, excluding separately reportable procedures.  There was a high probability of clinically significant/life threatening deterioration in the patient's condition which required my urgent intervention.  Critical care time includes my initial evaluation, ongoing bedside care and reassessment, review of old records, review of laboratory, EKG, chest x-ray, consultation with hospitalist for admission.  No procedure time was included      HISTORY OF PRESENT ILLNESS  (Location/Symptom, Timing/Onset, Context/Setting, Quality, Duration, Modifying Factors, Severity.)   History From: EMS / Patient  Limitations to history : None    Clarissa Estrada is a 48 y.o. female who presents to the emergency department with increased difficulty breathing.  Patient was transported by EMS.  Patient has a history of COPD and congestive heart failure.  She is chronically on 3 L of oxygen.  She called EMS for worsening shortness of breath.  Reportedly had a saturation of 88% on 5 L.  She denies chest pain.  She denies fever.  She admits to a cough.  She was given a DuoNeb and placed on nonrebreather at 100% FiO2 by EMS.      Nursing Notes were reviewed and I agree.      SCREENINGS        Bunny Coma Scale  Eye Opening: Spontaneous  Best Verbal Response: Oriented  Best Motor Response: Obeys commands  [place] Coma Scale Score: 15                CIWA Assessment  BP: 122/69  Pulse: 97           REVIEW OF SYSTEMS    (2-9 systems

## 2024-06-05 NOTE — PROGRESS NOTES
4 Eyes Skin Assessment     NAME:  Clarissa Estrada  YOB: 1975  MEDICAL RECORD NUMBER:  [id number]    The patient is being assessed for  Shift Handoff    I agree that at least one RN has performed a thorough Head to Toe Skin Assessment on the patient. ALL assessment sites listed below have been assessed.      Areas assessed by both nurses:    Head, Face, Ears, Shoulders, Back, Chest, Arms, Elbows, Hands, Sacrum. Buttock, Coccyx, Ischium, Legs. Feet and Heels, and Under Medical Devices         Does the Patient have a Wound? Yes wound(s) were present on assessment. LDA wound assessment was Initiated and completed by RN       Jaison Prevention initiated by RN: No  Wound Care Orders initiated by RN: No    Pressure Injury (Stage 3,4, Unstageable, DTI, NWPT, and Complex wounds) if present, place Wound referral order by RN under : No    New Ostomies, if present place, Ostomy referral order under : No     Nurse 1 eSignature: Electronically signed by Emory Dawson RN on 6/5/24 at 7:38 PM EDT    **SHARE this note so that the co-signing nurse can place an eSignature**    Nurse 2 eSignature: Electronically signed by Larissa Alexandra RN on 6/5/24 at 7:40 PM EDT

## 2024-06-05 NOTE — PROGRESS NOTES
NAME:  Clarissa Estrada  YOB: 1975  MEDICAL RECORD NUMBER:  [id number]    Shift Summary: Patient transferred to unit, lethargic and alert to self. Wound noted on R leg, patient on bipap, purewick applied    Family updated: No    Rhythm: Normal Sinus Rhythm     Most recent vitals:   Visit Vitals  BP (!) 155/99   Pulse (!) 106   Temp 97.7 °F (36.5 °C) (Axillary)   Resp 20   Ht 1.499 m (4' 11\")   Wt 76.2 kg (167 lb 15.9 oz)   SpO2 94%   BMI 33.93 kg/m²           No data found.    No data found.      Respiratory support needed (if any):  - BiPAP   IPAP: (S) 18 cmH20, CPAP/EPAP: 6 cmH2O continuous    Admission weight Weight - Scale: 78.8 kg (173 lb 11.6 oz) (06/05/24 1505)    Today's weight    Wt Readings from Last 1 Encounters:   06/05/24 76.2 kg (167 lb 15.9 oz)        Duvall need assessed each shift: N/A - no duvall present  UOP >30ml/hr: YES  Last documented BM (in last 48 hrs):  No data found.             Restraints (in use currently or dc'd in last 12 hrs): No    Order current and documentation up to date? No    Lines/Drains reviewed @ bedside.         Drip rates at handoff:    sodium chloride         Lab Data:   CBC:   Recent Labs     06/05/24  1519   WBC 11.1*   HGB 9.6*   HCT 30.4*   MCV 84.4        BMP:    Recent Labs     06/05/24  1519   *   K 4.6   CO2 34*   BUN 34*   CREATININE 0.6     LIVR:   Recent Labs     06/05/24  1519   AST 17   ALT 11     PT/INR: No results for input(s): \"PROT\", \"INR\" in the last 72 hours.  APTT: No results for input(s): \"APTT\" in the last 72 hours.  ABG: No results for input(s): \"PHART\", \"[id number]\", \"PO2ART\" in the last 72 hours.    Any consults during the shift? No    Any signed and held orders to be released?  No        4 Eyes Skin Assessment       The patient is being assessed for  Shift Handoff    I agree that at least one RN has performed a thorough Head to Toe Skin Assessment on the patient. ALL assessment sites listed below have been assessed.      Areas

## 2024-06-06 LAB
ANION GAP SERPL CALCULATED.3IONS-SCNC: 5 MMOL/L (ref 3–16)
BASE EXCESS BLDA CALC-SCNC: 8.4 MMOL/L (ref -3–3)
BASE EXCESS BLDV CALC-SCNC: 7.8 MMOL/L
BASE EXCESS BLDV CALC-SCNC: 8.1 MMOL/L
BASOPHILS # BLD: 0 K/UL (ref 0–0.2)
BASOPHILS NFR BLD: 0.1 %
BUN SERPL-MCNC: 26 MG/DL (ref 7–20)
CALCIUM SERPL-MCNC: 9.4 MG/DL (ref 8.3–10.6)
CHLORIDE SERPL-SCNC: 97 MMOL/L (ref 99–110)
CO2 BLDA-SCNC: 39.6 MMOL/L
CO2 BLDV-SCNC: 40 MMOL/L
CO2 BLDV-SCNC: 40 MMOL/L
CO2 SERPL-SCNC: 33 MMOL/L (ref 21–32)
COHGB MFR BLDA: 1.6 % (ref 0–1.5)
COHGB MFR BLDV: 1.5 %
COHGB MFR BLDV: 1.6 %
CREAT SERPL-MCNC: <0.5 MG/DL (ref 0.6–1.1)
DEPRECATED RDW RBC AUTO: 16.4 % (ref 12.4–15.4)
EKG ATRIAL RATE: 100 BPM
EKG DIAGNOSIS: NORMAL
EKG P AXIS: 44 DEGREES
EKG P-R INTERVAL: 134 MS
EKG Q-T INTERVAL: 332 MS
EKG QRS DURATION: 88 MS
EKG QTC CALCULATION (BAZETT): 428 MS
EKG R AXIS: 19 DEGREES
EKG T AXIS: 40 DEGREES
EKG VENTRICULAR RATE: 100 BPM
EOSINOPHIL # BLD: 0 K/UL (ref 0–0.6)
EOSINOPHIL NFR BLD: 0.1 %
GFR SERPLBLD CREATININE-BSD FMLA CKD-EPI: >90 ML/MIN/{1.73_M2}
GLUCOSE SERPL-MCNC: 141 MG/DL (ref 70–99)
HCO3 BLDA-SCNC: 37.1 MMOL/L (ref 21–29)
HCO3 BLDV-SCNC: 37 MMOL/L (ref 23–29)
HCO3 BLDV-SCNC: 37 MMOL/L (ref 23–29)
HCT VFR BLD AUTO: 27.7 % (ref 36–48)
HGB BLD-MCNC: 8.9 G/DL (ref 12–16)
HGB BLDA-MCNC: 9.1 G/DL (ref 12–16)
LYMPHOCYTES # BLD: 0.4 K/UL (ref 1–5.1)
LYMPHOCYTES NFR BLD: 10.6 %
MAGNESIUM SERPL-MCNC: 2 MG/DL (ref 1.8–2.4)
MCH RBC QN AUTO: 26.4 PG (ref 26–34)
MCHC RBC AUTO-ENTMCNC: 32 G/DL (ref 31–36)
MCV RBC AUTO: 82.5 FL (ref 80–100)
METHGB MFR BLDA: 0.7 %
METHGB MFR BLDV: 0.6 %
METHGB MFR BLDV: 0.6 %
MONOCYTES # BLD: 0.1 K/UL (ref 0–1.3)
MONOCYTES NFR BLD: 1.8 %
NEUTROPHILS # BLD: 3.3 K/UL (ref 1.7–7.7)
NEUTROPHILS NFR BLD: 87.4 %
O2 THERAPY: ABNORMAL
PCO2 BLDA: 81 MMHG (ref 35–45)
PCO2 BLDV: 85.4 MMHG (ref 40–50)
PCO2 BLDV: 88.3 MMHG (ref 40–50)
PH BLDA: 7.27 [PH] (ref 7.35–7.45)
PH BLDV: 7.23 [PH] (ref 7.35–7.45)
PH BLDV: 7.25 [PH] (ref 7.35–7.45)
PHOSPHATE SERPL-MCNC: 1.3 MG/DL (ref 2.5–4.9)
PLATELET # BLD AUTO: 240 K/UL (ref 135–450)
PMV BLD AUTO: 7.9 FL (ref 5–10.5)
PO2 BLDA: 57.4 MMHG (ref 75–108)
PO2 BLDV: 53 MMHG
PO2 BLDV: 57 MMHG
POTASSIUM SERPL-SCNC: 4.1 MMOL/L (ref 3.5–5.1)
RBC # BLD AUTO: 3.36 M/UL (ref 4–5.2)
SAO2 % BLDA: 89 %
SAO2 % BLDV: 85 %
SAO2 % BLDV: 88 %
SODIUM SERPL-SCNC: 135 MMOL/L (ref 136–145)
WBC # BLD AUTO: 3.8 K/UL (ref 4–11)

## 2024-06-06 PROCEDURE — 2500000003 HC RX 250 WO HCPCS: Performed by: NURSE PRACTITIONER

## 2024-06-06 PROCEDURE — 6360000002 HC RX W HCPCS: Performed by: HOSPITALIST

## 2024-06-06 PROCEDURE — APPNB15 APP NON BILLABLE TIME 0-15 MINS: Performed by: NURSE PRACTITIONER

## 2024-06-06 PROCEDURE — 82803 BLOOD GASES ANY COMBINATION: CPT

## 2024-06-06 PROCEDURE — 83735 ASSAY OF MAGNESIUM: CPT

## 2024-06-06 PROCEDURE — 36415 COLL VENOUS BLD VENIPUNCTURE: CPT

## 2024-06-06 PROCEDURE — 94761 N-INVAS EAR/PLS OXIMETRY MLT: CPT

## 2024-06-06 PROCEDURE — 99291 CRITICAL CARE FIRST HOUR: CPT | Performed by: INTERNAL MEDICINE

## 2024-06-06 PROCEDURE — 94660 CPAP INITIATION&MGMT: CPT

## 2024-06-06 PROCEDURE — 94640 AIRWAY INHALATION TREATMENT: CPT

## 2024-06-06 PROCEDURE — 2580000003 HC RX 258: Performed by: NURSE PRACTITIONER

## 2024-06-06 PROCEDURE — 5A0935A ASSISTANCE WITH RESPIRATORY VENTILATION, LESS THAN 24 CONSECUTIVE HOURS, HIGH NASAL FLOW/VELOCITY: ICD-10-PCS | Performed by: STUDENT IN AN ORGANIZED HEALTH CARE EDUCATION/TRAINING PROGRAM

## 2024-06-06 PROCEDURE — 84100 ASSAY OF PHOSPHORUS: CPT

## 2024-06-06 PROCEDURE — 36600 WITHDRAWAL OF ARTERIAL BLOOD: CPT

## 2024-06-06 PROCEDURE — 2700000000 HC OXYGEN THERAPY PER DAY

## 2024-06-06 PROCEDURE — 2580000003 HC RX 258: Performed by: HOSPITALIST

## 2024-06-06 PROCEDURE — 6370000000 HC RX 637 (ALT 250 FOR IP): Performed by: HOSPITALIST

## 2024-06-06 PROCEDURE — 80048 BASIC METABOLIC PNL TOTAL CA: CPT

## 2024-06-06 PROCEDURE — 2000000000 HC ICU R&B

## 2024-06-06 PROCEDURE — 93010 ELECTROCARDIOGRAM REPORT: CPT | Performed by: INTERNAL MEDICINE

## 2024-06-06 PROCEDURE — 6370000000 HC RX 637 (ALT 250 FOR IP): Performed by: INTERNAL MEDICINE

## 2024-06-06 PROCEDURE — 85025 COMPLETE CBC W/AUTO DIFF WBC: CPT

## 2024-06-06 PROCEDURE — 2580000003 HC RX 258

## 2024-06-06 RX ORDER — WATER 10 ML/10ML
INJECTION INTRAMUSCULAR; INTRAVENOUS; SUBCUTANEOUS
Status: COMPLETED
Start: 2024-06-06 | End: 2024-06-06

## 2024-06-06 RX ORDER — METHADONE HYDROCHLORIDE 10 MG/1
110 TABLET ORAL DAILY
Status: DISCONTINUED | OUTPATIENT
Start: 2024-06-06 | End: 2024-06-09 | Stop reason: HOSPADM

## 2024-06-06 RX ADMIN — METHYLPREDNISOLONE SODIUM SUCCINATE 40 MG: 40 INJECTION INTRAMUSCULAR; INTRAVENOUS at 20:26

## 2024-06-06 RX ADMIN — IPRATROPIUM BROMIDE AND ALBUTEROL SULFATE 1 DOSE: 2.5; .5 SOLUTION RESPIRATORY (INHALATION) at 16:22

## 2024-06-06 RX ADMIN — LEVOFLOXACIN 500 MG: 5 INJECTION, SOLUTION INTRAVENOUS at 20:30

## 2024-06-06 RX ADMIN — ONDANSETRON 4 MG: 4 TABLET, ORALLY DISINTEGRATING ORAL at 03:39

## 2024-06-06 RX ADMIN — METHYLPREDNISOLONE SODIUM SUCCINATE 40 MG: 40 INJECTION INTRAMUSCULAR; INTRAVENOUS at 03:41

## 2024-06-06 RX ADMIN — METHYLPREDNISOLONE SODIUM SUCCINATE 40 MG: 40 INJECTION INTRAMUSCULAR; INTRAVENOUS at 09:30

## 2024-06-06 RX ADMIN — IPRATROPIUM BROMIDE AND ALBUTEROL SULFATE 1 DOSE: 2.5; .5 SOLUTION RESPIRATORY (INHALATION) at 04:35

## 2024-06-06 RX ADMIN — METHYLPREDNISOLONE SODIUM SUCCINATE 40 MG: 40 INJECTION INTRAMUSCULAR; INTRAVENOUS at 15:46

## 2024-06-06 RX ADMIN — SODIUM CHLORIDE, PRESERVATIVE FREE 10 ML: 5 INJECTION INTRAVENOUS at 20:26

## 2024-06-06 RX ADMIN — SODIUM CHLORIDE, PRESERVATIVE FREE 10 ML: 5 INJECTION INTRAVENOUS at 09:34

## 2024-06-06 RX ADMIN — WATER 1 ML: 1 INJECTION INTRAMUSCULAR; INTRAVENOUS; SUBCUTANEOUS at 15:46

## 2024-06-06 RX ADMIN — WATER 1 ML: 1 INJECTION INTRAMUSCULAR; INTRAVENOUS; SUBCUTANEOUS at 09:30

## 2024-06-06 RX ADMIN — ACETAMINOPHEN 325MG 650 MG: 325 TABLET ORAL at 10:37

## 2024-06-06 RX ADMIN — IPRATROPIUM BROMIDE AND ALBUTEROL SULFATE 1 DOSE: 2.5; .5 SOLUTION RESPIRATORY (INHALATION) at 19:54

## 2024-06-06 RX ADMIN — IPRATROPIUM BROMIDE AND ALBUTEROL SULFATE 1 DOSE: 2.5; .5 SOLUTION RESPIRATORY (INHALATION) at 00:23

## 2024-06-06 RX ADMIN — IPRATROPIUM BROMIDE AND ALBUTEROL SULFATE 1 DOSE: 2.5; .5 SOLUTION RESPIRATORY (INHALATION) at 11:38

## 2024-06-06 RX ADMIN — IPRATROPIUM BROMIDE AND ALBUTEROL SULFATE 1 DOSE: 2.5; .5 SOLUTION RESPIRATORY (INHALATION) at 07:53

## 2024-06-06 RX ADMIN — POTASSIUM PHOSPHATE, MONOBASIC POTASSIUM PHOSPHATE, DIBASIC 20 MMOL: 224; 236 INJECTION, SOLUTION, CONCENTRATE INTRAVENOUS at 10:33

## 2024-06-06 RX ADMIN — WATER 1 ML: 1 INJECTION INTRAMUSCULAR; INTRAVENOUS; SUBCUTANEOUS at 21:41

## 2024-06-06 RX ADMIN — ENOXAPARIN SODIUM 40 MG: 100 INJECTION SUBCUTANEOUS at 09:30

## 2024-06-06 RX ADMIN — METHADONE HYDROCHLORIDE 110 MG: 10 TABLET ORAL at 09:40

## 2024-06-06 ASSESSMENT — PAIN SCALES - GENERAL
PAINLEVEL_OUTOF10: 0
PAINLEVEL_OUTOF10: 7
PAINLEVEL_OUTOF10: 4
PAINLEVEL_OUTOF10: 0
PAINLEVEL_OUTOF10: 0

## 2024-06-06 ASSESSMENT — PAIN DESCRIPTION - DESCRIPTORS: DESCRIPTORS: ACHING

## 2024-06-06 NOTE — RT PROTOCOL NOTE
RT Nebulizer Bronchodilator Protocol Note    There is a bronchodilator order in the chart from a provider indicating to follow the RT Bronchodilator Protocol and there is an “Initiate RT Bronchodilator Protocol” order as well (see protocol at bottom of note).    CXR Findings:  XR CHEST PORTABLE    Result Date: 6/5/2024  No acute cardiopulmonary process.       The findings from the last RT Protocol Assessment were as follows:  Smoking:  quit 2017   Respiratory Pattern:  regular   Breath Sounds:  diminished  Cough:  none  Indication for Bronchodilator Therapy:  COPD  Bronchodilator Assessment Score:      Aerosolized bronchodilator medication orders have been revised according to the RT Nebulizer Bronchodilator Protocol below.    Respiratory Therapist to perform RT Therapy Protocol Assessment initially then follow the protocol.  Repeat RT Therapy Protocol Assessment PRN for score 0-3 or on second treatment, BID, and PRN for scores above 3.    No Indications - adjust the frequency to every 6 hours PRN wheezing or bronchospasm, if no treatments needed after 48 hours then discontinue using Per Protocol order mode.     If indication present, adjust the RT bronchodilator orders based on the Bronchodilator Assessment Score as indicated below.  If a patient is on this medication at home then do not decrease Frequency below that used at home.    0-3 - enter or revise RT bronchodilator order(s) to equivalent RT Bronchodilator order with Frequency of every 4 hours PRN for wheezing or increased work of breathing using Per Protocol order mode.       4-6 - enter or revise RT Bronchodilator order(s) to two equivalent RT bronchodilator orders with one order with BID Frequency and one order with Frequency of every 4 hours PRN wheezing or increased work of breathing using Per Protocol order mode.         7-10 - enter or revise RT Bronchodilator order(s) to two equivalent RT bronchodilator orders with one order with TID Frequency and

## 2024-06-06 NOTE — PROGRESS NOTES
[place]    [facility] [place]      Name:  Clarissa Estrada /Age/Sex: 1975  (48 y.o. female)   MRN & CSN:  [id number] & [id number] Encounter Date/Time: 2024 8:57 AM EDT   Location:  [id number] PCP: Jocelyn Wolfe APRN - NP     Attending:Althea Regan MD       Hospital Day: 2      HPI :   Chief Complaint: shortness of breath, hypoxia.    Clarissa Estrada is a 48 y.o. female who presents with  history of COPD, BLANCA, chronic respiratory failure hypoxia and hypercapnia on home O2 on BiPAP at bedtime who presented to [facility]  with shortness of breath and hypoxia...  History is obtained from ED staff as patient is currently lethargic.  Patient apparently arrived from home via EMS with complaints of shortness of breath, her oxygen saturations at 88% on 5 L nasal cannula upon arrival to the ED..  She was placed on nonrebreather mask and then BiPAP  Initial BP was 117/57, pulse 103, temperature 98.8, respirations 21,  VBG initially showed a pH of 7.096, pCO2 115,... She was placed on BiPAP      Subjective:   Continues to report being short of breath.   Declining using BiPAP, stating that she is better able to tolerate her own home BiPAP ( which family will bring in later today ).       Review of Systems:      Pertinent positives and negatives discussed in HPI    Objective:     Intake/Output Summary (Last 24 hours) at 2024 0727  Last data filed at 2024 0341  Gross per 24 hour   Intake 108.83 ml   Output --   Net 108.83 ml      Vitals:   Vitals:    24 0430 24 0441 24 0500 24 0600   BP:   (!) 144/98 114/73   Pulse:  95 (!) 107 85   Resp:  16 24 16   Temp:       TempSrc:       SpO2: (!) 88% 97% 90% 96%   Weight:    76.2 kg (167 lb 15.9 oz)   Height:             Physical Exam:      Physical Exam Performed:    /73   Pulse 85   Temp 98.9 °F (37.2 °C) (Axillary)   Resp 16   Ht 1.499 m (4' 11\")   Wt 76.2 kg (167 lb 15.9 oz)   LMP 2015   SpO2 96%   BMI 33.93  kg/m²     General appearance: ill appearing female patient.   HEENT: Pupils equal, round, and reactive to light. Conjunctivae/corneas clear.  Neck: Supple, with full range of motion. No jugular venous distention. Trachea midline.  Respiratory:  wheezing noted bilaterally.   Cardiovascular: Regular rate and rhythm with normal S1/S2 without murmurs, rubs or gallops.  Abdomen: Soft, non-tender, non-distended with normal bowel sounds.  Musculoskeletal: No clubbing, cyanosis or edema bilaterally.  Full range of motion without deformity.  Neurologic:  Neurovascularly intact without any focal sensory/motor deficits. Cranial nerves: II-XII intact, grossly non-focal.  Psychiatric: Alert and oriented, thought content appropriate, normal insight  Capillary Refill: Brisk, 3 seconds, normal   Peripheral Pulses: +2 palpable, equal bilaterally       Medications:   Medications:   • sodium chloride flush  5-40 mL IntraVENous 2 times per day   • enoxaparin  40 mg SubCUTAneous Daily   • methylPREDNISolone  40 mg IntraVENous Q6H    Followed by   • [START ON 6/8/2024] predniSONE  40 mg Oral Daily   • ipratropium 0.5 mg-albuterol 2.5 mg  1 Dose Inhalation Q4H RT   • levofloxacin  500 mg IntraVENous Q24H      Infusions:   • sodium chloride Stopped (06/06/24 0046)     PRN Meds: sodium chloride flush, 5-40 mL, PRN  sodium chloride, , PRN  ondansetron, 4 mg, Q8H PRN   Or  ondansetron, 4 mg, Q6H PRN  polyethylene glycol, 17 g, Daily PRN  acetaminophen, 650 mg, Q6H PRN   Or  acetaminophen, 650 mg, Q6H PRN          Labs and Imaging   CT ABDOMEN PELVIS W IV CONTRAST Additional Contrast? None    Result Date: 6/8/2023  EXAMINATION: CT OF THE ABDOMEN AND PELVIS WITH CONTRAST 6/8/2023 7:52 pm TECHNIQUE: CT of the abdomen and pelvis was performed with the administration of intravenous contrast. Multiplanar reformatted images are provided for review. Automated exposure control, iterative reconstruction, and/or weight based adjustment of the mA/kV was  Labs     06/05/24  1519 06/06/24  0403   * 135*   K 4.6 4.1   CL 97* 97*   CO2 34* 33*   BUN 34* 26*   CREATININE 0.6 <0.5*   GLUCOSE 88 141*     Hepatic:   Recent Labs     06/05/24  1519   AST 17   ALT 11   BILITOT <0.2   ALKPHOS 115     Lipids:   Lab Results   Component Value Date/Time    TRIG 147 01/06/2024 09:02 AM     Hemoglobin A1C: No results found for: \"LABA1C\"  TSH: No results found for: \"TSH\"  Troponin: No results found for: \"TROPONINT\"  Lactic Acid: No results for input(s): \"LACTA\" in the last 72 hours.  BNP:   Recent Labs     06/05/24  1519   PROBNP 1,382*     UA:  Lab Results   Component Value Date/Time    NITRU Negative 01/01/2024 04:25 PM    COLORU Yellow 01/01/2024 04:25 PM    PHUR 6.0 01/01/2024 04:25 PM    WBCUA 1 01/01/2024 04:25 PM    RBCUA 22 01/01/2024 04:25 PM    BACTERIA None Seen 01/01/2024 04:25 PM    CLARITYU Clear 01/01/2024 04:25 PM    LEUKOCYTESUR Negative 01/01/2024 04:25 PM    UROBILINOGEN 1.0 01/01/2024 04:25 PM    BILIRUBINUR Negative 01/01/2024 04:25 PM    BLOODU SMALL 01/01/2024 04:25 PM    GLUCOSEU 250 01/01/2024 04:25 PM    KETUA Negative 01/01/2024 04:25 PM     Urine Cultures: No results found for: \"LABURIN\"  Blood Cultures:   Lab Results   Component Value Date/Time    BC See additional report for complete BCID panel. 01/01/2024 06:21 AM    BC  01/01/2024 06:21 AM     POSITIVE for  This organism was isolated in one set.  Susceptibility testing is not routinely done as this  organism frequently represents skin contamination.  Additional testing can be ordered by calling the  Microbiology Department.  Isolated one of two sets      BC  01/01/2024 06:21 AM     POSITIVE for  This organism was isolated in one set.  Susceptibility testing is not routinely done as this  organism frequently represents skin contamination.  Additional testing can be ordered by calling the  Microbiology Department.  Second colony type  Isolated one of two sets       Lab Results   Component Value

## 2024-06-06 NOTE — PROGRESS NOTES
This RN to pt's room to give methadone 110 mg. Pt stated that she did not want to take all 110mg at this time. Pt took 20mg.    90mg of methadone wasted in [facility] per [facility] protocol.     Electronically signed by Martha Nunez RN on 6/6/2024 at 10:26 AM      Electronically signed by Ru Huggins RN on 6/6/2024 at 10:16 AM

## 2024-06-06 NOTE — PROGRESS NOTES
Provider Belgica Nguyen, NP notified of pts refusal to wear bipap as well as not adhering to NPO order

## 2024-06-06 NOTE — PROGRESS NOTES
Pt arrived on unit via stretcher from ED to ICU bed 2105 on non rebreather then transitioned to bipap. Pt asked this RN if bipap can come off, this RN educated pt on why pt should remain on bipap, pt was agreeable and verbalized understanding.    VSS. Belongings including purse with cell phone was placed in pt closet in room along with her clothes she arrived in. Pt was oriented to room bed in low locked position with bed alarm on.

## 2024-06-06 NOTE — PROGRESS NOTES
Came into room pt wearing 4L NC asleep. Asked pt if she would go back on bipap and she was agreeable then started making request to put off being put on. Placed on and pt lasted 3 minutes before she stated it was doing something to her ears and took off her face refusing. She stated she was calling her daughter to bring in her own machine.

## 2024-06-06 NOTE — PLAN OF CARE
Problem: Discharge Planning  Goal: Discharge to home or other facility with appropriate resources  Outcome: Progressing  Flowsheets (Taken 6/6/2024 5078)  Discharge to home or other facility with appropriate resources:   Identify barriers to discharge with patient and caregiver   Arrange for needed discharge resources and transportation as appropriate     Problem: Safety - Adult  Goal: Free from fall injury  Outcome: Progressing     Problem: Skin/Tissue Integrity  Goal: Absence of new skin breakdown  Description: 1.  Monitor for areas of redness and/or skin breakdown  2.  Assess vascular access sites hourly  3.  Every 4-6 hours minimum:  Change oxygen saturation probe site  4.  Every 4-6 hours:  If on nasal continuous positive airway pressure, respiratory therapy assess nares and determine need for appliance change or resting period.  Outcome: Progressing     Problem: ABCDS Injury Assessment  Goal: Absence of physical injury  Outcome: Progressing

## 2024-06-06 NOTE — PROGRESS NOTES
Updated pts daughter Janine on condition and plan. Informed her that the goal is to have her remain on bipap. Monitor vbgs until Co2 is between 60-80

## 2024-06-06 NOTE — RT PROTOCOL NOTE
RT Inhaler-Nebulizer Bronchodilator Protocol Note    There is a bronchodilator order in the chart from a provider indicating to follow the RT Bronchodilator Protocol and there is an “Initiate RT Inhaler-Nebulizer Bronchodilator Protocol” order as well (see protocol at bottom of note).    CXR Findings:  XR CHEST PORTABLE    Result Date: 6/5/2024  No acute cardiopulmonary process.       The findings from the last RT Protocol Assessment were as follows:   History Pulmonary Disease: Chronic pulmonary disease  Respiratory Pattern: Regular pattern and RR 12-20 bpm  Breath Sounds: Slightly diminished and/or crackles  Cough: Strong, spontaneous, non-productive  Indication for Bronchodilator Therapy: On home bronchodilators, Decreased or absent breath sounds  Bronchodilator Assessment Score: 4    Aerosolized bronchodilator medication orders have been revised according to the RT Inhaler-Nebulizer Bronchodilator Protocol below.    Respiratory Therapist to perform RT Therapy Protocol Assessment initially then follow the protocol.  Repeat RT Therapy Protocol Assessment PRN for score 0-3 or on second treatment, BID, and PRN for scores above 3.    No Indications - adjust the frequency to every 6 hours PRN wheezing or bronchospasm, if no treatments needed after 48 hours then discontinue using Per Protocol order mode.     If indication present, adjust the RT bronchodilator orders based on the Bronchodilator Assessment Score as indicated below.  Use Inhaler orders unless patient has one or more of the following: on home nebulizer, not able to hold breath for 10 seconds, is not alert and oriented, cannot activate and use MDI correctly, or respiratory rate 25 breaths per minute or more, then use the equivalent nebulizer order(s) with same Frequency and PRN reasons based on the score.  If a patient is on this medication at home then do not decrease Frequency below that used at home.    0-3 - enter or revise RT bronchodilator order(s) to  equivalent RT Bronchodilator order with Frequency of every 4 hours PRN for wheezing or increased work of breathing using Per Protocol order mode.        4-6 - enter or revise RT Bronchodilator order(s) to two equivalent RT bronchodilator orders with one order with BID Frequency and one order with Frequency of every 4 hours PRN wheezing or increased work of breathing using Per Protocol order mode.        7-10 - enter or revise RT Bronchodilator order(s) to two equivalent RT bronchodilator orders with one order with TID Frequency and one order with Frequency of every 4 hours PRN wheezing or increased work of breathing using Per Protocol order mode.       11-13 - enter or revise RT Bronchodilator order(s) to one equivalent RT bronchodilator order with QID Frequency and an Albuterol order with Frequency of every 4 hours PRN wheezing or increased work of breathing using Per Protocol order mode.      Greater than 13 - enter or revise RT Bronchodilator order(s) to one equivalent RT bronchodilator order with every 4 hours Frequency and an Albuterol order with Frequency of every 2 hours PRN wheezing or increased work of breathing using Per Protocol order mode.     RT to enter RT Home Evaluation for COPD & MDI Assessment order using Per Protocol order mode.    Electronically signed by Robyn Yousif RCP on 6/6/2024 at 1:13 AM

## 2024-06-06 NOTE — PROGRESS NOTES
NAME:  Clarissa Estrada  YOB: 1975  MEDICAL RECORD NUMBER:  [id number]    Shift Summary: Pt very argumentative and non compliant. After multiple education moments Pt insisted to remove bipap. RN placed on 4L NC. After ABG pt sat in mid 80s and sustained. Placed on bipap. ABG result co2 81 pH 7.27     Family updated: Yes:  Janine, daughter    Rhythm: Normal Sinus Rhythm     Most recent vitals:   Visit Vitals  BP (!) 146/132   Pulse 95   Temp 98.9 °F (37.2 °C) (Axillary)   Resp 16   Ht 1.499 m (4' 11\")   Wt 76.2 kg (167 lb 15.9 oz)   SpO2 97%   BMI 33.93 kg/m²           No data found.    No data found.      Respiratory support needed (if any):  - BiPAP   IPAP: 18 cmH20, CPAP/EPAP: 6 cmH2O continuous    Admission weight Weight - Scale: 78.8 kg (173 lb 11.6 oz) (06/05/24 1505)    Today's weight    Wt Readings from Last 1 Encounters:   06/05/24 76.2 kg (167 lb 15.9 oz)        Duvall need assessed each shift: N/A - no duvall present  UOP >30ml/hr: YES  Last documented BM (in last 48 hrs):  No data found.             Restraints (in use currently or dc'd in last 12 hrs): No    Order current and documentation up to date? No    Lines/Drains reviewed @ bedside.  Peripheral IV 06/05/24 Left Antecubital (Active)   Number of days: 1         Drip rates at handoff:    sodium chloride Stopped (06/06/24 0046)       Lab Data:   CBC:   Recent Labs     06/05/24  1519 06/06/24  0403   WBC 11.1* 3.8*   HGB 9.6* 8.9*   HCT 30.4* 27.7*   MCV 84.4 82.5    240     BMP:    Recent Labs     06/05/24  1519 06/06/24  0403   * 135*   K 4.6 4.1   CO2 34* 33*   BUN 34* 26*   CREATININE 0.6 <0.5*     LIVR:   Recent Labs     06/05/24  1519   AST 17   ALT 11     PT/INR: No results for input(s): \"PROT\", \"INR\" in the last 72 hours.  APTT: No results for input(s): \"APTT\" in the last 72 hours.  ABG:   Recent Labs     06/06/24  0430   PHART 7.270*   AMC4SOL 81.0*   PO2ART 57.4*       Any consults during the shift? No    Any signed and  held orders to be released?  No        4 Eyes Skin Assessment       The patient is being assessed for  Shift Handoff    I agree that at least one RN has performed a thorough Head to Toe Skin Assessment on the patient. ALL assessment sites listed below have been assessed.      Areas assessed by both nurses: Head, Face, Ears, Shoulders, Back, Chest, Arms, Elbows, Hands, Sacrum. Buttock, Coccyx, Ischium, and Legs. Feet and Heels        Does the Patient have a Wound? Yes wound(s) were present on assessment. LDA wound assessment was Initiated and completed by RN    Wound Care Orders initiated by RN: No       Jaison Prevention initiated by RN: Yes    Pressure Injury (Stage 3,4, Unstageable, DTI, NWPT, and Complex wounds) if present, place Wound referral order by RN under : No    New Ostomies, if present place, Ostomy referral order under : No     Nurse 1 eSignature: Electronically signed by Larissa Alexandra RN on 6/6/24 at 6:39 AM EDT    **SHARE this note so that the co-signing nurse can place an eSignature**    Nurse 2 eSignature: Electronically signed by Martha Nunez RN on 6/6/24 at 10:25 AM EDT

## 2024-06-06 NOTE — PROGRESS NOTES
Pt demanded this RN remove bipap. This RN educated pt on benefits and why it should remain on. Pt insisted and stated we were torturing her. This RN educated Pt on doctors orders and reminded pt benefits of wearing bipap. This RN removed bipap for pt after setting up nasal canula at 4lpm per pts instruction of what she wears at home. Pt also insisted she needed ice or something to drink after this RN educated her that the doctor has her nothing by mouth status. Pt insisted this RN provided a cup of ice.

## 2024-06-06 NOTE — PROGRESS NOTES
NAME:  Clarissa Estrada  YOB: 1975  MEDICAL RECORD NUMBER:  [id number]    Shift Summary: Pt refusing to wear bipap for most of shift. This RN encouraged pt along with respiratory therapy to wear bipap as much as possible to improve ABG/VBG lab results. Pt states she feels better and is breathing better.     Family updated: Yes    Rhythm: Normal Sinus Rhythm and sinus tachycardia    Most recent vitals:   Visit Vitals  /78   Pulse 97   Temp 98.1 °F (36.7 °C) (Axillary)   Resp 21   Ht 1.499 m (4' 11\")   Wt 76.2 kg (167 lb 15.9 oz)   SpO2 97%   BMI 33.93 kg/m²           No data found.    No data found.      Respiratory support needed (if any):  - BiPAP   IPAP: 18 cmH20, CPAP/EPAP: 6 cmH2O vs. 5L HFNC    Admission weight Weight - Scale: 78.8 kg (173 lb 11.6 oz) (06/05/24 1505)    Today's weight    Wt Readings from Last 1 Encounters:   06/06/24 76.2 kg (167 lb 15.9 oz)        Duvall need assessed each shift: N/A - no duvall present  UOP >30ml/hr: YES  Last documented BM (in last 48 hrs):  No data found.             Restraints (in use currently or dc'd in last 12 hrs): No      Lines/Drains reviewed @ bedside.  Peripheral IV 06/05/24 Left Antecubital (Active)   Number of days: 1         Drip rates at handoff:    sodium chloride Stopped (06/06/24 0046)       Lab Data:   CBC:   Recent Labs     06/05/24  1519 06/06/24  0403   WBC 11.1* 3.8*   HGB 9.6* 8.9*   HCT 30.4* 27.7*   MCV 84.4 82.5    240     BMP:    Recent Labs     06/05/24  1519 06/06/24  0403   * 135*   K 4.6 4.1   CO2 34* 33*   BUN 34* 26*   CREATININE 0.6 <0.5*     LIVR:   Recent Labs     06/05/24  1519   AST 17   ALT 11     PT/INR: No results for input(s): \"PROT\", \"INR\" in the last 72 hours.  APTT: No results for input(s): \"APTT\" in the last 72 hours.  ABG:   Recent Labs     06/06/24  0430   PHART 7.270*   [id number] 81.0*   PO2ART 57.4*       Any consults during the shift? No    Any signed and held orders to be released?  No        4

## 2024-06-06 NOTE — CONSULTS
Pulmonary Critical Care Consult Note     Patient's name:  Clarissa Estrada  Medical Record Number: [id number]  Patient's account/billing number: [id number]  Patient's YOB: 1975  Age: 48 y.o.  Date of Admission: 6/5/2024  2:58 PM  Date of Consult: 6/6/2024      Primary Care Physician: Jocelyn Wolfe APRN - NP      Code Status: Full Code    Reason for consult: Acute respiratory failure with hypoxia and hypercapnia    Assessment and Plan     Acute on chronic respiratory with hypoxia and hypercapnia  Metabolic encephalopathy, acute   Chronic systolic and diastolic heart failure  COPD with acute exacerbation  Severe sepsis      Plan:  AVAPS, intermittently during the day and continuous @ night, check VBG in couple of hours   Systemic steroid  Levaquin x 5 days  Resume methadone  Aspiration precautions  Bronchodilators  GI DVT prophylaxis  Follow-up respiratory cultures  Due to the immediate potential for life-threatening deterioration due to above, I spent 31 minutes providing critical care.  This time is excluding time spent performing procedures.  This note was transcribed using Dragon Dictation software. Please disregard any translational errors.        HISTORY OF PRESENT ILLNESS:   Mr./MsKari Estrada is a 48 y.o. lady with past medical history stated below significant for COPD, chronic respiratory with hypoxia on 4 L, neuromuscular disorder, extraparenchymal restrictive lung defect, presented to the hospital with progressively worsening shortness of breath cough productive green sputum, found hypoxic on arrival as well as hypercapnic for that she was placed on BiPAP and admitted to ICU  Flue and COVID-negative  Chest x-ray with vascular congestion no clear consolidation        Past Medical History:        Diagnosis Date    Arthritis     Blind right eye     CHF (congestive heart failure) (HCC)     Chronic respiratory failure with hypoxia and  suboptimal  secondary to patient motion on the examination.    Bilateral pulmonary infiltrates.    Small bilateral pleural effusions with associated adjacent atelectasis.    Mediastinal adenopathy which is likely reactive in nature.    Acute appearing fracture of the posterolateral right 4th rib.    Additional findings noted above.        CXR portable: Results for orders placed during the hospital encounter of 01/01/24    XR CHEST PORTABLE    Narrative  EXAMINATION:  ONE XRAY VIEW OF THE CHEST    1/1/2024 5:42 am    COMPARISON:  12/03/2023    HISTORY:  ORDERING SYSTEM PROVIDED HISTORY: sob  TECHNOLOGIST PROVIDED HISTORY:  Reason for exam:->sob  Reason for Exam: intubation    FINDINGS:  The endotracheal tube is low lying 1.3 cm above the anupama and directed  towards the right mainstem bronchus.  The enteric tube terminates at the  level the body of the stomach.  Vascular congestion is noted.  No  pneumothorax or significant effusion appreciated.  No acute osseous  abnormality identified.    Impression  1. Endotracheal tube is low lying 1.3 cm above the anupama and directed  towards the right mainstem bronchus. Recommend retraction by approximately 2  cm.  2. Vascular congestion.      Lenora Soto MD, M.D.            6/6/2024, 10:24 AM

## 2024-06-07 LAB
ANION GAP SERPL CALCULATED.3IONS-SCNC: 8 MMOL/L (ref 3–16)
BASOPHILS # BLD: 0 K/UL (ref 0–0.2)
BASOPHILS NFR BLD: 0 %
BUN SERPL-MCNC: 24 MG/DL (ref 7–20)
CALCIUM SERPL-MCNC: 9.3 MG/DL (ref 8.3–10.6)
CHLORIDE SERPL-SCNC: 98 MMOL/L (ref 99–110)
CO2 SERPL-SCNC: 33 MMOL/L (ref 21–32)
CREAT SERPL-MCNC: 0.6 MG/DL (ref 0.6–1.1)
DEPRECATED RDW RBC AUTO: 16.7 % (ref 12.4–15.4)
EOSINOPHIL # BLD: 0 K/UL (ref 0–0.6)
EOSINOPHIL NFR BLD: 0 %
GFR SERPLBLD CREATININE-BSD FMLA CKD-EPI: >90 ML/MIN/{1.73_M2}
GLUCOSE SERPL-MCNC: 148 MG/DL (ref 70–99)
HCT VFR BLD AUTO: 27 % (ref 36–48)
HGB BLD-MCNC: 8.7 G/DL (ref 12–16)
LYMPHOCYTES # BLD: 0.5 K/UL (ref 1–5.1)
LYMPHOCYTES NFR BLD: 7.4 %
MAGNESIUM SERPL-MCNC: 1.9 MG/DL (ref 1.8–2.4)
MCH RBC QN AUTO: 26.5 PG (ref 26–34)
MCHC RBC AUTO-ENTMCNC: 32.1 G/DL (ref 31–36)
MCV RBC AUTO: 82.3 FL (ref 80–100)
MONOCYTES # BLD: 0.4 K/UL (ref 0–1.3)
MONOCYTES NFR BLD: 5.9 %
NEUTROPHILS # BLD: 6.3 K/UL (ref 1.7–7.7)
NEUTROPHILS NFR BLD: 86.7 %
PHOSPHATE SERPL-MCNC: 1 MG/DL (ref 2.5–4.9)
PLATELET # BLD AUTO: 289 K/UL (ref 135–450)
PMV BLD AUTO: 7.9 FL (ref 5–10.5)
POTASSIUM SERPL-SCNC: 4.4 MMOL/L (ref 3.5–5.1)
RBC # BLD AUTO: 3.27 M/UL (ref 4–5.2)
SODIUM SERPL-SCNC: 139 MMOL/L (ref 136–145)
WBC # BLD AUTO: 7.3 K/UL (ref 4–11)

## 2024-06-07 PROCEDURE — 6370000000 HC RX 637 (ALT 250 FOR IP): Performed by: HOSPITALIST

## 2024-06-07 PROCEDURE — 83735 ASSAY OF MAGNESIUM: CPT

## 2024-06-07 PROCEDURE — 94761 N-INVAS EAR/PLS OXIMETRY MLT: CPT

## 2024-06-07 PROCEDURE — 2700000000 HC OXYGEN THERAPY PER DAY

## 2024-06-07 PROCEDURE — 85025 COMPLETE CBC W/AUTO DIFF WBC: CPT

## 2024-06-07 PROCEDURE — 2000000000 HC ICU R&B

## 2024-06-07 PROCEDURE — 84100 ASSAY OF PHOSPHORUS: CPT

## 2024-06-07 PROCEDURE — 99291 CRITICAL CARE FIRST HOUR: CPT | Performed by: INTERNAL MEDICINE

## 2024-06-07 PROCEDURE — 80048 BASIC METABOLIC PNL TOTAL CA: CPT

## 2024-06-07 PROCEDURE — 6370000000 HC RX 637 (ALT 250 FOR IP): Performed by: STUDENT IN AN ORGANIZED HEALTH CARE EDUCATION/TRAINING PROGRAM

## 2024-06-07 PROCEDURE — 6370000000 HC RX 637 (ALT 250 FOR IP): Performed by: INTERNAL MEDICINE

## 2024-06-07 PROCEDURE — 2580000003 HC RX 258: Performed by: HOSPITALIST

## 2024-06-07 PROCEDURE — 2580000003 HC RX 258: Performed by: INTERNAL MEDICINE

## 2024-06-07 PROCEDURE — 94660 CPAP INITIATION&MGMT: CPT

## 2024-06-07 PROCEDURE — 2580000003 HC RX 258

## 2024-06-07 PROCEDURE — 6370000000 HC RX 637 (ALT 250 FOR IP): Performed by: NURSE PRACTITIONER

## 2024-06-07 PROCEDURE — 94640 AIRWAY INHALATION TREATMENT: CPT

## 2024-06-07 PROCEDURE — 2500000003 HC RX 250 WO HCPCS: Performed by: INTERNAL MEDICINE

## 2024-06-07 PROCEDURE — 36415 COLL VENOUS BLD VENIPUNCTURE: CPT

## 2024-06-07 PROCEDURE — 6360000002 HC RX W HCPCS: Performed by: HOSPITALIST

## 2024-06-07 RX ORDER — IBUPROFEN 800 MG/1
800 TABLET ORAL EVERY 8 HOURS PRN
Status: ON HOLD | COMMUNITY
End: 2024-06-09

## 2024-06-07 RX ORDER — LISINOPRIL 5 MG/1
5 TABLET ORAL DAILY
Status: DISCONTINUED | OUTPATIENT
Start: 2024-06-07 | End: 2024-06-09 | Stop reason: HOSPADM

## 2024-06-07 RX ORDER — CARVEDILOL 3.12 MG/1
3.12 TABLET ORAL 2 TIMES DAILY
Status: DISCONTINUED | OUTPATIENT
Start: 2024-06-07 | End: 2024-06-09 | Stop reason: HOSPADM

## 2024-06-07 RX ORDER — LEVOFLOXACIN 500 MG/1
500 TABLET, FILM COATED ORAL DAILY
Status: COMPLETED | OUTPATIENT
Start: 2024-06-07 | End: 2024-06-09

## 2024-06-07 RX ORDER — CALCIUM CARBONATE 500 MG/1
500 TABLET, CHEWABLE ORAL 3 TIMES DAILY PRN
Status: DISCONTINUED | OUTPATIENT
Start: 2024-06-07 | End: 2024-06-09 | Stop reason: HOSPADM

## 2024-06-07 RX ORDER — WATER 10 ML/10ML
INJECTION INTRAMUSCULAR; INTRAVENOUS; SUBCUTANEOUS
Status: COMPLETED
Start: 2024-06-07 | End: 2024-06-07

## 2024-06-07 RX ADMIN — IPRATROPIUM BROMIDE AND ALBUTEROL SULFATE 1 DOSE: 2.5; .5 SOLUTION RESPIRATORY (INHALATION) at 19:25

## 2024-06-07 RX ADMIN — ACETAMINOPHEN 325MG 650 MG: 325 TABLET ORAL at 12:10

## 2024-06-07 RX ADMIN — ENOXAPARIN SODIUM 40 MG: 100 INJECTION SUBCUTANEOUS at 09:31

## 2024-06-07 RX ADMIN — METHADONE HYDROCHLORIDE 50 MG: 10 TABLET ORAL at 09:32

## 2024-06-07 RX ADMIN — CARVEDILOL 3.12 MG: 3.12 TABLET, FILM COATED ORAL at 19:25

## 2024-06-07 RX ADMIN — POTASSIUM PHOSPHATE, MONOBASIC POTASSIUM PHOSPHATE, DIBASIC 20 MMOL: 224; 236 INJECTION, SOLUTION, CONCENTRATE INTRAVENOUS at 09:56

## 2024-06-07 RX ADMIN — METHYLPREDNISOLONE SODIUM SUCCINATE 40 MG: 40 INJECTION INTRAMUSCULAR; INTRAVENOUS at 09:18

## 2024-06-07 RX ADMIN — POTASSIUM PHOSPHATE, MONOBASIC POTASSIUM PHOSPHATE, DIBASIC 20 MMOL: 224; 236 INJECTION, SOLUTION, CONCENTRATE INTRAVENOUS at 15:55

## 2024-06-07 RX ADMIN — METHYLPREDNISOLONE SODIUM SUCCINATE 40 MG: 40 INJECTION INTRAMUSCULAR; INTRAVENOUS at 02:56

## 2024-06-07 RX ADMIN — LISINOPRIL 5 MG: 5 TABLET ORAL at 13:20

## 2024-06-07 RX ADMIN — LEVOFLOXACIN 500 MG: 500 TABLET, FILM COATED ORAL at 09:30

## 2024-06-07 RX ADMIN — IPRATROPIUM BROMIDE AND ALBUTEROL SULFATE 1 DOSE: 2.5; .5 SOLUTION RESPIRATORY (INHALATION) at 01:11

## 2024-06-07 RX ADMIN — METHYLPREDNISOLONE SODIUM SUCCINATE 40 MG: 40 INJECTION INTRAMUSCULAR; INTRAVENOUS at 14:24

## 2024-06-07 RX ADMIN — ANTACID TABLETS 500 MG: 500 TABLET, CHEWABLE ORAL at 02:56

## 2024-06-07 RX ADMIN — SODIUM CHLORIDE, PRESERVATIVE FREE 10 ML: 5 INJECTION INTRAVENOUS at 09:20

## 2024-06-07 RX ADMIN — ACETAMINOPHEN 325MG 650 MG: 325 TABLET ORAL at 19:25

## 2024-06-07 RX ADMIN — IPRATROPIUM BROMIDE AND ALBUTEROL SULFATE 1 DOSE: 2.5; .5 SOLUTION RESPIRATORY (INHALATION) at 04:56

## 2024-06-07 RX ADMIN — SODIUM CHLORIDE, PRESERVATIVE FREE 10 ML: 5 INJECTION INTRAVENOUS at 19:27

## 2024-06-07 RX ADMIN — WATER 10 ML: 1 INJECTION INTRAMUSCULAR; INTRAVENOUS; SUBCUTANEOUS at 09:18

## 2024-06-07 RX ADMIN — IPRATROPIUM BROMIDE AND ALBUTEROL SULFATE 1 DOSE: 2.5; .5 SOLUTION RESPIRATORY (INHALATION) at 07:33

## 2024-06-07 RX ADMIN — SERTRALINE 125 MG: 100 TABLET, FILM COATED ORAL at 09:40

## 2024-06-07 RX ADMIN — IPRATROPIUM BROMIDE AND ALBUTEROL SULFATE 1 DOSE: 2.5; .5 SOLUTION RESPIRATORY (INHALATION) at 11:27

## 2024-06-07 RX ADMIN — IPRATROPIUM BROMIDE AND ALBUTEROL SULFATE 1 DOSE: 2.5; .5 SOLUTION RESPIRATORY (INHALATION) at 16:54

## 2024-06-07 RX ADMIN — CARVEDILOL 3.12 MG: 3.12 TABLET, FILM COATED ORAL at 13:20

## 2024-06-07 ASSESSMENT — PAIN - FUNCTIONAL ASSESSMENT
PAIN_FUNCTIONAL_ASSESSMENT: ACTIVITIES ARE NOT PREVENTED
PAIN_FUNCTIONAL_ASSESSMENT: ACTIVITIES ARE NOT PREVENTED

## 2024-06-07 ASSESSMENT — PAIN DESCRIPTION - DESCRIPTORS
DESCRIPTORS: ACHING;SORE
DESCRIPTORS: ACHING

## 2024-06-07 ASSESSMENT — PAIN DESCRIPTION - ORIENTATION
ORIENTATION: RIGHT
ORIENTATION: ANTERIOR

## 2024-06-07 ASSESSMENT — PAIN SCALES - GENERAL
PAINLEVEL_OUTOF10: 8
PAINLEVEL_OUTOF10: 6
PAINLEVEL_OUTOF10: 8

## 2024-06-07 ASSESSMENT — PAIN DESCRIPTION - FREQUENCY: FREQUENCY: INTERMITTENT

## 2024-06-07 ASSESSMENT — PAIN DESCRIPTION - ONSET: ONSET: GRADUAL

## 2024-06-07 ASSESSMENT — PAIN DESCRIPTION - LOCATION
LOCATION: NECK;BACK
LOCATION: HEAD

## 2024-06-07 ASSESSMENT — PAIN DESCRIPTION - PAIN TYPE: TYPE: ACUTE PAIN

## 2024-06-07 NOTE — PROGRESS NOTES
Medication Reconciliation    List of medications patient is currently taking is complete.     Source of information: 1. Conversation with patient at bedside                                      2. EPIC records           Notes regarding home medications:   Patient has clonidine filled at home but has not taken recently. Sertraline dose of 125 mg confirmed. Patient requesting refills of inhalers, ibuprofen, and sertraline 25 mg tablets.    Gus Yap, PharmD Candidate 2025 6/7/2024 9:34 AM

## 2024-06-07 NOTE — PLAN OF CARE
Problem: Discharge Planning  Goal: Discharge to home or other facility with appropriate resources  6/6/2024 2102 by Nora Whatley RN  Outcome: Progressing  Flowsheets (Taken 6/6/2024 2000)  Discharge to home or other facility with appropriate resources:   Identify barriers to discharge with patient and caregiver   Arrange for needed discharge resources and transportation as appropriate   Identify discharge learning needs (meds, wound care, etc)  6/6/2024 1252 by Martha Nunez RN  Outcome: Progressing  Flowsheets (Taken 6/6/2024 0845)  Discharge to home or other facility with appropriate resources:   Identify barriers to discharge with patient and caregiver   Arrange for needed discharge resources and transportation as appropriate     Problem: Safety - Adult  Goal: Free from fall injury  6/6/2024 2102 by Nora Whatley RN  Outcome: Progressing  Flowsheets (Taken 6/6/2024 2102)  Free From Fall Injury: Instruct family/caregiver on patient safety  6/6/2024 1252 by Martha Nunez RN  Outcome: Progressing     Problem: Skin/Tissue Integrity  Goal: Absence of new skin breakdown  Description: 1.  Monitor for areas of redness and/or skin breakdown  2.  Assess vascular access sites hourly  3.  Every 4-6 hours minimum:  Change oxygen saturation probe site  4.  Every 4-6 hours:  If on nasal continuous positive airway pressure, respiratory therapy assess nares and determine need for appliance change or resting period.  6/6/2024 2102 by Nora Whatley RN  Outcome: Progressing  6/6/2024 1252 by Martha Nunez RN  Outcome: Progressing     Problem: ABCDS Injury Assessment  Goal: Absence of physical injury  6/6/2024 2102 by Nora Whatley RN  Outcome: Progressing  Flowsheets (Taken 6/6/2024 2102)  Absence of Physical Injury: Implement safety measures based on patient assessment  6/6/2024 1252 by Martha Nunez RN  Outcome: Progressing     Problem: Chronic Conditions and Co-morbidities  Goal: Patient's chronic conditions and

## 2024-06-07 NOTE — PROGRESS NOTES
Patient refusing BiPAP again at this time.  Patient keeps telling me to come back in 1 hour.  I told patient I will return at 2 am but she really needs to go on at that time.  Electronically signed by Maria Antonia Gutierrez RCP on 6/7/2024 at 1:21 AM

## 2024-06-07 NOTE — PROGRESS NOTES
Late entry:    Patient only wanting 50 mg of scheduled 110mg Methadone. Returned 60 mg (6 tablets) of Methadone to [facility] with charge RN, WILLOW Collins RN.    Electronically signed by Lakshmi Gaston RN on 6/7/2024 at 1:07 PM     Electronically signed by Rosa Collins RN on 6/7/2024 at 1:08 PM

## 2024-06-07 NOTE — PROGRESS NOTES
Attempted to place patient on BiPAP again and patient got very tearful and started swinging her head.  Patient asked to be removed from machine.  Patient placed back on 5L HFNC. Electronically signed by Maria Antonia Gutierrez RCP on 6/7/2024 at 2:23 AM

## 2024-06-07 NOTE — PROGRESS NOTES
Patient BiPAP from 1000 until 1210 when removed for lunch. Patient informed that BiPAP would be placed back on about 1300 after lunch.  arrived at bedside to visit. At 1305, RN entered room to reapply BiPAP and patient eating food that  had brought. At 1500, patient was talking on phone when RN again tried to put patient back on BiPAP. At 1545, RN told patient that it was time to go back on BiPAP. Patient asked for a cup of ice, small amount of ice given to patient, she set it on the bedside table. RN informed patient that after her reassessment is completed that she would be placed back on BiPAP. At 1555, RN placed patient back on BiPAP. At 1556, patient was attempting to undo velcro straps to remove BiPAP. RN informed patient that she has been off the BiPAP for nearly 4 hours.  returned to bedside at ~1540, patient signalled call light asking to wear her home CPAP unit. RT at bedside and explained the difference between the settings on our BiPAP and her home CPAP unit. Patient agreeable to continue wearing BiPAP.     Electronically signed by Lakshmi Gaston RN on 6/7/2024 at 5:05 PM

## 2024-06-07 NOTE — PROGRESS NOTES
Pulmonary Critical Care Progress Note     Patient's name:  Clarissa Estrada  Medical Record Number: [id number]  Patient's account/billing number: [id number]  Patient's YOB: 1975  Age: 48 y.o.  Date of Admission: 6/5/2024  2:58 PM  Date of Consult: 6/7/2024      Primary Care Physician: Jocelyn Wolfe APRN - NP      Code Status: Full Code    Reason for consult: Acute respiratory failure with hypoxia and hypercapnia    Assessment and Plan     Acute on chronic respiratory with hypoxia and hypercapnia  Metabolic encephalopathy, acute   Chronic systolic and diastolic heart failure  COPD with acute exacerbation  Severe sepsis      Plan:  AVAPS, intermittently during the day and continuous @ night, encouraged to use her home unit, not compliant, \"it is suffocating her\" with her multiple sedatives and narcotics this is likely to be recurrent problem with non compliance   Systemic steroid  Levaquin x 5 days  methadone  Aspiration precautions  Bronchodilators  GI DVT prophylaxis  Due to the immediate potential for life-threatening deterioration due to above , I spent 31 minutes providing critical care.  This time is excluding time spent performing procedures.  This note was transcribed using Dragon Dictation software. Please disregard any translational errors.            HISTORY OF PRESENT ILLNESS:   Mr./Ms. Clarissa Estrada is a 48 y.o. lady with past medical history stated below significant for COPD, chronic respiratory with hypoxia on 4 L, neuromuscular disorder, extraparenchymal restrictive lung defect, presented to the hospital with progressively worsening shortness of breath cough productive green sputum, found hypoxic on arrival as well as hypercapnic for that she was placed on BiPAP and admitted to ICU  Flue and COVID-negative  Chest x-ray with vascular congestion no clear consolidation    REVIEW OF SYSTEMS:  Review of Systems -   No chest pain  Sob, cough,  intravenous  contrast.  Multiplanar reformatted images are provided for review.  MIP  images are provided for review. Automated exposure control, iterative  reconstruction, and/or weight based adjustment of the mA/kV was utilized to  reduce the radiation dose to as low as reasonably achievable.    COMPARISON:  May 31, 2021    HISTORY:  ORDERING SYSTEM PROVIDED HISTORY: sob  TECHNOLOGIST PROVIDED HISTORY:  Reason for exam:->sob  Decision Support Exception - unselect if not a suspected or confirmed  emergency medical condition->Emergency Medical Condition (MA)  Reason for Exam: SOB, HX of asthma, COPD, CHF.  Pt wears 4L NC at home, pt on  cpap en route per EMS., AMS    FINDINGS:  Pulmonary Arteries: Evaluation of multiple segmental and subsegmental  pulmonary arteries is suboptimal secondary to poor opacification with  contrast.  No evidence of intraluminal filling defect to suggest pulmonary  embolism.    Mediastinum: Enlarged anterior paratracheal lymph node which measures 1.4 cm  in short axis (series 2, image 147).  Enlarged precarinal lymph node which  measures 1.9 cm in short axis with associated punctate calcification (series  2, image 123).  Calcified subcarinal lymph node.  Thyroid gland is  unremarkable in appearance.    Cardiac chambers are unremarkable in appearance.  No pericardial effusion or  pericardial thickening.    Lungs/pleura: Bilateral pulmonary infiltrates.  Small bilateral pleural  effusions with associated adjacent atelectasis.  No pneumothorax.  No  suspicious pulmonary nodularity.  Central airways are patent.  Endotracheal  tube extends to the mid trachea.  This is approximately 2.4 cm from the  anupama.    Upper Abdomen: Calcified granulomas in the liver.  Remainder of the imaged  upper abdominal organs are unremarkable in appearance.    Soft Tissues/Bones: Spondylosis of the thoracic spine.  Remote right-sided  rib fractures.  Acute fracture of the right posterolateral 4th rib (series

## 2024-06-07 NOTE — PROGRESS NOTES
NAME:  Clarissa Estrada  YOB: 1975  MEDICAL RECORD NUMBER:  [id number]    Shift Summary: Pt agreed to wear BiPAP all day and night. VBG ordered for 6/8 AM. Up to BSC. Wants to be discharged.    Family updated: Yes:   at bedside    Rhythm: Normal Sinus Rhythm     Most recent vitals:   Visit Vitals  BP (!) 177/118   Pulse 91   Temp 98.4 °F (36.9 °C) (Oral)   Resp 22   Ht 1.499 m (4' 11\")   Wt 75.9 kg (167 lb 5.3 oz)   SpO2 100%   BMI 33.80 kg/m²           No data found.    No data found.      Respiratory support needed (if any):  - O2 - HFNC 5 lpm or - BiPAP   IPAP: 18 cmH20, CPAP/EPAP: 6 cmH2O  agreed to wear all day except mealtime and overnight.     Admission weight Weight - Scale: 78.8 kg (173 lb 11.6 oz) (06/05/24 1505)    Today's weight    Wt Readings from Last 1 Encounters:   06/07/24 75.9 kg (167 lb 5.3 oz)        Duvall need assessed each shift: N/A - no duvall present  UOP >30ml/hr: NO - mixed with stool so unable to measure  Last documented BM (in last 48 hrs):  Patient Vitals for the past 48 hrs:   Last BM (including prior to admit) Stool Occurrence   06/07/24 0956 -- 1   06/07/24 1000 06/07/24 --   06/07/24 1540 -- 1                Restraints (in use currently or dc'd in last 12 hrs): No    Order current and documentation up to date? No    Lines/Drains reviewed @ bedside.  Peripheral IV 06/05/24 Left Antecubital (Active)   Number of days: 2         Drip rates at handoff:    sodium chloride Stopped (06/06/24 0046)       Lab Data:   CBC:   Recent Labs     06/06/24  0403 06/07/24 0329   WBC 3.8* 7.3   HGB 8.9* 8.7*   HCT 27.7* 27.0*   MCV 82.5 82.3    289     BMP:    Recent Labs     06/06/24  0403 06/07/24  0329   * 139   K 4.1 4.4   CO2 33* 33*   BUN 26* 24*   CREATININE <0.5* 0.6     LIVR:   Recent Labs     06/05/24  1519   AST 17   ALT 11     PT/INR: No results for input(s): \"PROT\", \"INR\" in the last 72 hours.  APTT: No results for input(s): \"APTT\" in the last 72  hours.  ABG:   Recent Labs     06/06/24  0430   PHART 7.270*   LNM5QUR 81.0*   PO2ART 57.4*       Any consults during the shift? No    Any signed and held orders to be released?  No        4 Eyes Skin Assessment       The patient is being assessed for  Shift Handoff    I agree that at least one RN has performed a thorough Head to Toe Skin Assessment on the patient. ALL assessment sites listed below have been assessed.      Areas assessed by both nurses: Head, Face, Ears, Shoulders, Back, Chest, Arms, Elbows, Hands, Sacrum. Buttock, Coccyx, Ischium, Legs. Feet and Heels, and Under Medical Devices         Does the Patient have a Wound? Yes wounds were present on assessment. LDA wound assessment was Initiated and completed by RN    Wound Care Orders initiated by RN: No       Jaison Prevention initiated by RN: Yes    Pressure Injury (Stage 3,4, Unstageable, DTI, NWPT, and Complex wounds) if present, place Wound referral order by RN under : No    New Ostomies, if present place, Ostomy referral order under : No     Nurse 1 eSignature: Electronically signed by Lakshmi Gaston RN on 6/7/24 at 6:28 PM EDT    **SHARE this note so that the co-signing nurse can place an eSignature**    Nurse 2 eSignature: Electronically signed by Lindy Hamm RN on 6/7/24 at 10:00 PM EDT

## 2024-06-07 NOTE — PROGRESS NOTES
Removed BiPAP @ 2100 placed on 5L HFNC. Pt currently refusing BiPAP. Still on 5L HFNC.    Electronically signed by Nora Whatley RN on 6/7/2024 at 1:22 AM

## 2024-06-07 NOTE — PROGRESS NOTES
NAME:  Clarissa Estrada  YOB: 1975  MEDICAL RECORD NUMBER:  [id number]    Shift Summary: Pt wore BiPap until about 2100- removed and refused putting it back on. Educated pt on why the BiPAP was needed. Pt continued to refuse.     Family updated: Yes:  DaughterZelalem Mehta    Rhythm: Normal Sinus Rhythm     Most recent vitals:   Visit Vitals  BP (!) 166/102   Pulse 94   Temp 98.2 °F (36.8 °C) (Oral)   Resp 13   Ht 1.499 m (4' 11\")   Wt 75.9 kg (167 lb 5.3 oz)   SpO2 96%   BMI 33.80 kg/m²           No data found.    No data found.      Respiratory support needed (if any):  - O2 - HFNC 5 lpm    Admission weight Weight - Scale: 78.8 kg (173 lb 11.6 oz) (06/05/24 1505)    Today's weight    Wt Readings from Last 1 Encounters:   06/07/24 75.9 kg (167 lb 5.3 oz)        Duvall need assessed each shift: N/A - no duvall present  UOP >30ml/hr: YES  Last documented BM (in last 48 hrs):  No data found.             Restraints (in use currently or dc'd in last 12 hrs): No    Order current and documentation up to date? No    Lines/Drains reviewed @ bedside.  Peripheral IV 06/05/24 Left Antecubital (Active)   Number of days: 2         Drip rates at handoff:    sodium chloride Stopped (06/06/24 0046)       Lab Data:   CBC:   Recent Labs     06/06/24  0403 06/07/24  0329   WBC 3.8* 7.3   HGB 8.9* 8.7*   HCT 27.7* 27.0*   MCV 82.5 82.3    289     BMP:    Recent Labs     06/06/24  0403 06/07/24  0329   * 139   K 4.1 4.4   CO2 33* 33*   BUN 26* 24*   CREATININE <0.5* 0.6     LIVR:   Recent Labs     06/05/24  1519   AST 17   ALT 11     PT/INR: No results for input(s): \"PROT\", \"INR\" in the last 72 hours.  APTT: No results for input(s): \"APTT\" in the last 72 hours.  ABG:   Recent Labs     06/06/24  0430   PHART 7.270*   YPZ5EPY 81.0*   PO2ART 57.4*       Any consults during the shift? No    Any signed and held orders to be released?  No        4 Eyes Skin Assessment       The patient is being assessed for  Shift

## 2024-06-07 NOTE — PROGRESS NOTES
V2.0    [facility] Progress Note      Name:  Clarissa Estrada /Age/Sex: 1975  (48 y.o. female)   MRN & CSN:  [id number] & [id number] Encounter Date/Time: 2024 8:57 AM EDT   Location:  [id number]-4503 PCP: Jocelyn Wolfe APRN - NP     Attending:Althea Regan MD       Hospital Day: 3      HPI :   Chief Complaint: shortness of breath, hypoxia.    Clarissa Estrada is a 48 y.o. female who presents with  history of COPD, BLANCA, chronic respiratory failure hypoxia and hypercapnia on home O2 on BiPAP at bedtime who presented to [facility]  with shortness of breath and hypoxia...  History is obtained from ED staff as patient is currently lethargic.  Patient apparently arrived from home via EMS with complaints of shortness of breath, her oxygen saturations at 88% on 5 L nasal cannula upon arrival to the ED..  She was placed on nonrebreather mask and then BiPAP  Initial BP was 117/57, pulse 103, temperature 98.8, respirations 21,  VBG initially showed a pH of 7.096, pCO2 115,... She was placed on BiPAP      Subjective:   Only used BiPAP for 3.5 hours in the last 24 hours despite multiple attempts at encouragement.   Patient denies any complaints to me todat, eager for DC.   Home BiPAP will be brought it by family today ( hopefully that will help with compliance).  Patient is alert and oriented this morning.   Review of Systems:      Pertinent positives and negatives discussed in HPI    Objective:     Intake/Output Summary (Last 24 hours) at 2024 0951  Last data filed at 2024 0200  Gross per 24 hour   Intake 2271.17 ml   Output 1175 ml   Net 1096.17 ml        Vitals:   Vitals:    24 0500 24 0600 24 0700 24 0734   BP: (!) 168/99 (!) 166/102 (!) 158/97    Pulse: 88 94 80 90   Resp: 13 13 15 16   Temp:       TempSrc:       SpO2: 100% 96% 99% 100%   Weight:       Height:             Physical Exam:      Physical Exam Performed:    BP (!) 158/97   Pulse 90   Temp 98.2 °F (36.8 °C) (Oral)    ordered by calling the  Microbiology Department.  Isolated one of two sets      BC  01/01/2024 06:21 AM     POSITIVE for  This organism was isolated in one set.  Susceptibility testing is not routinely done as this  organism frequently represents skin contamination.  Additional testing can be ordered by calling the  Microbiology Department.  Second colony type  Isolated one of two sets       Lab Results   Component Value Date/Time    BLOODCULT2 No Growth after 4 days of incubation. 01/01/2024 06:21 AM     Organism:   Lab Results   Component Value Date/Time    ORG Haemophilus influenzae 01/01/2024 01:00 PM    ORG Haemophilus influenzae 01/01/2024 01:00 PM    ORG Haemophilus influenzae DNA Detected 01/01/2024 01:00 PM    ORG Human Rhinovirus/Enterovirus by PCR 01/01/2024 01:00 PM         Assessment and Recommendations   Clarissa Estrada is a 48 y.o. female who presents with shortness of breath.      Acute on chronic respiratory failure with hypoxia and hypercapnia  Severe COPD acute exacerbation  Patient presented to emergency with altered mental status and shortness of breath, was found to be in acute COPD exacerbation, with acute hypercapnia, initially ABG with pH of 7.20 and CO2 of 90.5.  Patient initially agreeable on BiPAP however now requesting her home machine which family will bring in.  Latest ABG with pH of 7.23 and CO2 of 88.3.    Did not tolerate BiPAP in the last 24 hours.   Plan.  -Appreciate pulmonology input.  -Continue on DuoNebs every 4 hours.  -IV Levaquin 500 mg daily.  -Methylprednisone 40 mg every 6 hours (to be tapered down to prednisone 40 mg to complete total 5 days.  -VBG tomorrow morning       Acute metabolic encephalopathy due to CO2 narcosis   Lethargic but arousable on presentation   More alert this morning, declining BiPAP and requesting her home machine.        Obstructive sleep apnea-  -continue BiPAP at bedtime     Obesity with BMI 35     Chronic pain/opiate dependence--  on

## 2024-06-08 LAB
ANION GAP SERPL CALCULATED.3IONS-SCNC: 6 MMOL/L (ref 3–16)
BASE EXCESS BLDV CALC-SCNC: 8.5 MMOL/L
BASOPHILS # BLD: 0 K/UL (ref 0–0.2)
BASOPHILS NFR BLD: 0.3 %
BUN SERPL-MCNC: 35 MG/DL (ref 7–20)
CALCIUM SERPL-MCNC: 9.3 MG/DL (ref 8.3–10.6)
CHLORIDE SERPL-SCNC: 101 MMOL/L (ref 99–110)
CO2 BLDV-SCNC: 42 MMOL/L
CO2 SERPL-SCNC: 33 MMOL/L (ref 21–32)
COHGB MFR BLDV: 0.8 %
CREAT SERPL-MCNC: 0.9 MG/DL (ref 0.6–1.1)
DEPRECATED RDW RBC AUTO: 16.7 % (ref 12.4–15.4)
EOSINOPHIL # BLD: 0 K/UL (ref 0–0.6)
EOSINOPHIL NFR BLD: 0.1 %
GFR SERPLBLD CREATININE-BSD FMLA CKD-EPI: 78 ML/MIN/{1.73_M2}
GLUCOSE SERPL-MCNC: 73 MG/DL (ref 70–99)
HCO3 BLDV-SCNC: 39 MMOL/L (ref 23–29)
HCT VFR BLD AUTO: 28.6 % (ref 36–48)
HGB BLD-MCNC: 9 G/DL (ref 12–16)
LYMPHOCYTES # BLD: 2.1 K/UL (ref 1–5.1)
LYMPHOCYTES NFR BLD: 19.1 %
MAGNESIUM SERPL-MCNC: 1.9 MG/DL (ref 1.8–2.4)
MCH RBC QN AUTO: 26 PG (ref 26–34)
MCHC RBC AUTO-ENTMCNC: 31.3 G/DL (ref 31–36)
MCV RBC AUTO: 83 FL (ref 80–100)
METHGB MFR BLDV: 0.5 %
MONOCYTES # BLD: 0.9 K/UL (ref 0–1.3)
MONOCYTES NFR BLD: 8.6 %
NEUTROPHILS # BLD: 7.9 K/UL (ref 1.7–7.7)
NEUTROPHILS NFR BLD: 71.9 %
O2 THERAPY: ABNORMAL
PCO2 BLDV: 98.5 MMHG (ref 40–50)
PH BLDV: 7.21 [PH] (ref 7.35–7.45)
PHOSPHATE SERPL-MCNC: 3.1 MG/DL (ref 2.5–4.9)
PLATELET # BLD AUTO: 299 K/UL (ref 135–450)
PMV BLD AUTO: 7.6 FL (ref 5–10.5)
PO2 BLDV: <30 MMHG
POTASSIUM SERPL-SCNC: 5.6 MMOL/L (ref 3.5–5.1)
RBC # BLD AUTO: 3.45 M/UL (ref 4–5.2)
SAO2 % BLDV: 11 %
SODIUM SERPL-SCNC: 140 MMOL/L (ref 136–145)
WBC # BLD AUTO: 11 K/UL (ref 4–11)

## 2024-06-08 PROCEDURE — 80048 BASIC METABOLIC PNL TOTAL CA: CPT

## 2024-06-08 PROCEDURE — 6370000000 HC RX 637 (ALT 250 FOR IP): Performed by: HOSPITALIST

## 2024-06-08 PROCEDURE — 6360000002 HC RX W HCPCS: Performed by: HOSPITALIST

## 2024-06-08 PROCEDURE — 84100 ASSAY OF PHOSPHORUS: CPT

## 2024-06-08 PROCEDURE — 36415 COLL VENOUS BLD VENIPUNCTURE: CPT

## 2024-06-08 PROCEDURE — 2000000000 HC ICU R&B

## 2024-06-08 PROCEDURE — 6370000000 HC RX 637 (ALT 250 FOR IP): Performed by: STUDENT IN AN ORGANIZED HEALTH CARE EDUCATION/TRAINING PROGRAM

## 2024-06-08 PROCEDURE — 2580000003 HC RX 258: Performed by: HOSPITALIST

## 2024-06-08 PROCEDURE — 85025 COMPLETE CBC W/AUTO DIFF WBC: CPT

## 2024-06-08 PROCEDURE — 6370000000 HC RX 637 (ALT 250 FOR IP): Performed by: NURSE PRACTITIONER

## 2024-06-08 PROCEDURE — 99291 CRITICAL CARE FIRST HOUR: CPT | Performed by: INTERNAL MEDICINE

## 2024-06-08 PROCEDURE — 94640 AIRWAY INHALATION TREATMENT: CPT

## 2024-06-08 PROCEDURE — 6370000000 HC RX 637 (ALT 250 FOR IP): Performed by: INTERNAL MEDICINE

## 2024-06-08 PROCEDURE — 94761 N-INVAS EAR/PLS OXIMETRY MLT: CPT

## 2024-06-08 PROCEDURE — 94660 CPAP INITIATION&MGMT: CPT

## 2024-06-08 PROCEDURE — 2700000000 HC OXYGEN THERAPY PER DAY

## 2024-06-08 PROCEDURE — 83735 ASSAY OF MAGNESIUM: CPT

## 2024-06-08 PROCEDURE — 82803 BLOOD GASES ANY COMBINATION: CPT

## 2024-06-08 RX ORDER — OXYCODONE HYDROCHLORIDE AND ACETAMINOPHEN 5; 325 MG/1; MG/1
1 TABLET ORAL ONCE
Status: COMPLETED | OUTPATIENT
Start: 2024-06-08 | End: 2024-06-08

## 2024-06-08 RX ADMIN — ACETAMINOPHEN 325MG 650 MG: 325 TABLET ORAL at 17:59

## 2024-06-08 RX ADMIN — LISINOPRIL 5 MG: 5 TABLET ORAL at 08:49

## 2024-06-08 RX ADMIN — IPRATROPIUM BROMIDE AND ALBUTEROL SULFATE 1 DOSE: 2.5; .5 SOLUTION RESPIRATORY (INHALATION) at 11:50

## 2024-06-08 RX ADMIN — SODIUM CHLORIDE, PRESERVATIVE FREE 10 ML: 5 INJECTION INTRAVENOUS at 08:53

## 2024-06-08 RX ADMIN — SERTRALINE 125 MG: 100 TABLET, FILM COATED ORAL at 08:48

## 2024-06-08 RX ADMIN — IPRATROPIUM BROMIDE AND ALBUTEROL SULFATE 1 DOSE: 2.5; .5 SOLUTION RESPIRATORY (INHALATION) at 00:15

## 2024-06-08 RX ADMIN — IPRATROPIUM BROMIDE AND ALBUTEROL SULFATE 1 DOSE: 2.5; .5 SOLUTION RESPIRATORY (INHALATION) at 08:12

## 2024-06-08 RX ADMIN — IPRATROPIUM BROMIDE AND ALBUTEROL SULFATE 1 DOSE: 2.5; .5 SOLUTION RESPIRATORY (INHALATION) at 16:11

## 2024-06-08 RX ADMIN — LEVOFLOXACIN 500 MG: 500 TABLET, FILM COATED ORAL at 08:49

## 2024-06-08 RX ADMIN — METHADONE HYDROCHLORIDE 50 MG: 10 TABLET ORAL at 08:49

## 2024-06-08 RX ADMIN — OXYCODONE HYDROCHLORIDE AND ACETAMINOPHEN 1 TABLET: 5; 325 TABLET ORAL at 20:26

## 2024-06-08 RX ADMIN — CARVEDILOL 3.12 MG: 3.12 TABLET, FILM COATED ORAL at 20:26

## 2024-06-08 RX ADMIN — IPRATROPIUM BROMIDE AND ALBUTEROL SULFATE 1 DOSE: 2.5; .5 SOLUTION RESPIRATORY (INHALATION) at 19:44

## 2024-06-08 RX ADMIN — ANTACID TABLETS 500 MG: 500 TABLET, CHEWABLE ORAL at 02:37

## 2024-06-08 RX ADMIN — ENOXAPARIN SODIUM 40 MG: 100 INJECTION SUBCUTANEOUS at 08:49

## 2024-06-08 RX ADMIN — CARVEDILOL 3.12 MG: 3.12 TABLET, FILM COATED ORAL at 08:49

## 2024-06-08 RX ADMIN — ACETAMINOPHEN 325MG 650 MG: 325 TABLET ORAL at 08:53

## 2024-06-08 RX ADMIN — IPRATROPIUM BROMIDE AND ALBUTEROL SULFATE 1 DOSE: 2.5; .5 SOLUTION RESPIRATORY (INHALATION) at 03:06

## 2024-06-08 RX ADMIN — PREDNISONE 40 MG: 20 TABLET ORAL at 08:49

## 2024-06-08 RX ADMIN — OXYCODONE HYDROCHLORIDE AND ACETAMINOPHEN 1 TABLET: 5; 325 TABLET ORAL at 02:37

## 2024-06-08 RX ADMIN — SODIUM CHLORIDE, PRESERVATIVE FREE 10 ML: 5 INJECTION INTRAVENOUS at 20:28

## 2024-06-08 ASSESSMENT — PAIN DESCRIPTION - LOCATION
LOCATION: BACK;SHOULDER
LOCATION: SHOULDER;NECK;BACK
LOCATION: NECK;SHOULDER

## 2024-06-08 ASSESSMENT — PAIN SCALES - GENERAL
PAINLEVEL_OUTOF10: 8
PAINLEVEL_OUTOF10: 0
PAINLEVEL_OUTOF10: 8
PAINLEVEL_OUTOF10: 0
PAINLEVEL_OUTOF10: 8
PAINLEVEL_OUTOF10: 0
PAINLEVEL_OUTOF10: 2
PAINLEVEL_OUTOF10: 0
PAINLEVEL_OUTOF10: 3

## 2024-06-08 ASSESSMENT — PAIN DESCRIPTION - DESCRIPTORS
DESCRIPTORS: ACHING;SORE
DESCRIPTORS: ACHING;SORE

## 2024-06-08 ASSESSMENT — PAIN DESCRIPTION - ORIENTATION: ORIENTATION: RIGHT;MID

## 2024-06-08 NOTE — PROGRESS NOTES
Patient wanting only 50mg of 110mg ordered dose of methadone. 50mg of methadone given and 60mg returned to [facility] with second RN.     Electronically signed by Ru Huggins RN on 6/8/2024 at 11:52 AM    Electronically signed by Martha Nunez RN on 6/8/2024 at 12:06 PM

## 2024-06-08 NOTE — PROGRESS NOTES
Patient ambulated to bathroom and back to bed with walker and RN. Patient steady on feet and tolerated ambulation fairly well. Patient audibly wheezing and breathing slightly labored from exertion. Patient settled back into bed and placed on home PAP machine.

## 2024-06-08 NOTE — PROGRESS NOTES
NAME:  Clarissa Estrada  YOB: 1975  MEDICAL RECORD NUMBER:  [id number]    Shift Summary: Patient remained alert and oriented x4 throughout shift, patient continues to be inconsistent with bipap use overnight, frequently requesting to take bipap off and get to commode and/or get and drink of water. Complaints of back and shoulder pain related to scoliosis, one time dose of percocet ordered.    Family updated: Yes:   at bedside for part of shift, updated    Rhythm: Normal Sinus Rhythm     Most recent vitals:   Visit Vitals  /88   Pulse 76   Temp 97.9 °F (36.6 °C) (Axillary)   Resp 13   Ht 1.499 m (4' 11\")   Wt 78.5 kg (173 lb 1 oz)   SpO2 100%   BMI 34.95 kg/m²           No data found.    No data found.      Respiratory support needed (if any):  - BiPAP   IPAP: 18 cmH20, CPAP/EPAP: 6 cmH2O continuous as tolerated    Admission weight Weight - Scale: 78.8 kg (173 lb 11.6 oz) (06/05/24 1505)    Today's weight    Wt Readings from Last 1 Encounters:   06/08/24 78.5 kg (173 lb 1 oz)        Duvlal need assessed each shift: N/A - no duvall present  UOP >30ml/hr: YES  Last documented BM (in last 48 hrs):  Patient Vitals for the past 48 hrs:   Last BM (including prior to admit) Stool Occurrence   06/07/24 0956 -- 1   06/07/24 1000 06/07/24 --   06/07/24 1540 -- 1                Restraints (in use currently or dc'd in last 12 hrs): No    Order current and documentation up to date? No    Lines/Drains reviewed @ bedside.  Peripheral IV 06/05/24 Left Antecubital (Active)   Number of days: 3         Drip rates at handoff:    sodium chloride Stopped (06/06/24 0046)       Lab Data:   CBC:   Recent Labs     06/07/24  0329 06/08/24  0351   WBC 7.3 11.0   HGB 8.7* 9.0*   HCT 27.0* 28.6*   MCV 82.3 83.0    299     BMP:    Recent Labs     06/07/24  0329 06/08/24  0352    140   K 4.4 5.6*   CO2 33* 33*   BUN 24* 35*   CREATININE 0.6 0.9     LIVR:   Recent Labs     06/05/24  1519   AST 17   ALT 11

## 2024-06-08 NOTE — PROGRESS NOTES
NAME:  Clarissa Estrada  YOB: 1975  MEDICAL RECORD NUMBER:  [id number]    Shift Summary: VSS. MD convinced pt to stay d/t VBG. Wore home PAP on & off throughout day. VBG ordered for tomorrow AM.     Family updated: Yes:      Rhythm: Normal Sinus Rhythm     Most recent vitals:   Visit Vitals  BP (!) 155/103   Pulse 98   Temp 98.1 °F (36.7 °C) (Oral)   Resp 17   Ht 1.499 m (4' 11\")   Wt 78.5 kg (173 lb 1 oz)   SpO2 97%   BMI 34.95 kg/m²           No data found.    No data found.      Respiratory support needed (if any):  - BiPAP   IPAP: 18 cmH20, CPAP/EPAP: 6 cmH2O continuous    Admission weight Weight - Scale: 78.8 kg (173 lb 11.6 oz) (06/05/24 1505)    Today's weight    Wt Readings from Last 1 Encounters:   06/08/24 78.5 kg (173 lb 1 oz)        Duvall need assessed each shift: N/A - no duvall present  UOP >30ml/hr: YES  Last documented BM (in last 48 hrs):  Patient Vitals for the past 48 hrs:   Last BM (including prior to admit) Stool Occurrence   06/07/24 0956 -- 1   06/07/24 1000 06/07/24 --   06/07/24 1540 -- 1   06/08/24 1500 -- 1                Restraints (in use currently or dc'd in last 12 hrs): No    Order current and documentation up to date? No    Lines/Drains reviewed @ bedside.  Peripheral IV 06/05/24 Left Antecubital (Active)   Number of days: 3         Drip rates at handoff:    sodium chloride Stopped (06/06/24 0046)       Lab Data:   CBC:   Recent Labs     06/07/24  0329 06/08/24  0351   WBC 7.3 11.0   HGB 8.7* 9.0*   HCT 27.0* 28.6*   MCV 82.3 83.0    299     BMP:    Recent Labs     06/07/24  0329 06/08/24  0352    140   K 4.4 5.6*   CO2 33* 33*   BUN 24* 35*   CREATININE 0.6 0.9     LIVR: No results for input(s): \"AST\", \"ALT\" in the last 72 hours.  PT/INR: No results for input(s): \"PROT\", \"INR\" in the last 72 hours.  APTT: No results for input(s): \"APTT\" in the last 72 hours.  ABG:   Recent Labs     06/06/24  0430   PHART 7.270*   KHK9OIP 81.0*   PO2ART 57.4*       Any

## 2024-06-08 NOTE — PLAN OF CARE
Problem: Discharge Planning  Goal: Discharge to home or other facility with appropriate resources  Outcome: Progressing  Flowsheets  Taken 6/7/2024 2000 by Lindy Hamm RN  Discharge to home or other facility with appropriate resources:   Identify barriers to discharge with patient and caregiver   Arrange for needed discharge resources and transportation as appropriate   Identify discharge learning needs (meds, wound care, etc)   Arrange for interpreters to assist at discharge as needed   Refer to discharge planning if patient needs post-hospital services based on physician order or complex needs related to functional status, cognitive ability or social support system  Taken 6/7/2024 0820 by Lakshmi Gaston RN  Discharge to home or other facility with appropriate resources:   Identify barriers to discharge with patient and caregiver   Arrange for needed discharge resources and transportation as appropriate   Identify discharge learning needs (meds, wound care, etc)   Refer to discharge planning if patient needs post-hospital services based on physician order or complex needs related to functional status, cognitive ability or social support system     Problem: Safety - Adult  Goal: Free from fall injury  Outcome: Progressing  Flowsheets (Taken 6/7/2024 1227 by Lakshmi Gaston RN)  Free From Fall Injury:   Instruct family/caregiver on patient safety   Based on caregiver fall risk screen, instruct family/caregiver to ask for assistance with transferring infant if caregiver noted to have fall risk factors     Problem: Skin/Tissue Integrity  Goal: Absence of new skin breakdown  Description: 1.  Monitor for areas of redness and/or skin breakdown  2.  Assess vascular access sites hourly  3.  Every 4-6 hours minimum:  Change oxygen saturation probe site  4.  Every 4-6 hours:  If on nasal continuous positive airway pressure, respiratory therapy assess nares and determine need for appliance change or resting

## 2024-06-08 NOTE — PROGRESS NOTES
Pulmonary Progress Note    Date of Admission: 6/5/2024   LOS: 3 days     CC:  Chief Complaint   Patient presents with    Shortness of Breath     SOB, per EMS pt was 88% O2 sat on 5 LPM O2 upon arrival to ER.  Pt into ER on NRB with 99% O2 sat.  EMS gave a duo neb tx.  Pt with Hx of COPD.             Assessment/Plan     Acute on chronic hypoxemic and hypercapnic respiratory failure  Acute exacerbation of COPD  -IV steroids  -Levaquin x 5 days  -BiPAP as tolerated, spotty adherence.  Is wearing during my visit.  Suspect this is the case at home 2.  Given opiate dependence likely will be a recurrent problem  -Wean oxygen goal saturation 90%    Mild obstructive sleep apnea  -Has BiPAP at home    Due to the immediate potential for life-threatening deterioration due to hypercapnic respiratory failure requiring NIPPV, I spent 31 minutes providing critical care.  This time is excluding time spent performing separately billable procedures.    HPI/Subjective  No acute events overnight.  Attempting to wear BiPAP but remains acidotic and hypercapnic    ROS: Unable to obtain due to BiPAP      Intake/Output Summary (Last 24 hours) at 6/8/2024 0615  Last data filed at 6/8/2024 0426  Gross per 24 hour   Intake 1133.34 ml   Output 475 ml   Net 658.34 ml         PHYSICAL EXAM:   Blood pressure (!) 138/94, pulse 81, temperature 97.9 °F (36.6 °C), temperature source Axillary, resp. rate 11, height 1.499 m (4' 11\"), weight 78.5 kg (173 lb 1 oz), last menstrual period 08/14/2015, SpO2 100 %, not currently breastfeeding.'  Gen:  No acute distress.  Wearing BiPAP mask  Resp:  No crackles. No wheezes. No rhonchi. No dullness on percussion.  CV: Regular rate. Regular rhythm. No murmur or rub. No edema.   M/S: No cyanosis. No clubbing.    Neuro: Awakens and alert          Labs reviewed:  CBC:   Recent Labs     06/06/24  0403 06/07/24  0329 06/08/24  0351   WBC 3.8* 7.3 11.0   HGB 8.9* 8.7* 9.0*   HCT 27.7* 27.0* 28.6*   MCV 82.5 82.3 83.0

## 2024-06-08 NOTE — PROGRESS NOTES
V2.0    [facility] Progress Note      Name:  Clarissa Estrada /Age/Sex: 1975  (48 y.o. female)   MRN & CSN:  [id number] & [id number] Encounter Date/Time: 2024 8:57 AM EDT   Location:  J8Z-2400 PCP: Jocelyn Wolfe APRN - NP     Attending:Althea Regan MD       Hospital Day: 4      HPI :   Chief Complaint: shortness of breath, hypoxia.    Clarissa Estrada is a 48 y.o. female who presents with  history of COPD, BLANCA, chronic respiratory failure hypoxia and hypercapnia on home O2 on BiPAP at bedtime who presented to [facility]  with shortness of breath and hypoxia...  History is obtained from ED staff as patient is currently lethargic.  Patient apparently arrived from home via EMS with complaints of shortness of breath, her oxygen saturations at 88% on 5 L nasal cannula upon arrival to the ED..  She was placed on nonrebreather mask and then BiPAP  Initial BP was 117/57, pulse 103, temperature 98.8, respirations 21,  VBG initially showed a pH of 7.096, pCO2 115,... She was placed on BiPAP      Subjective:   Patient intermittently wore BiPAP last night.  Insisting on using her own machine.   at bedside was updated at length.  Patient contemplating AMA, I explained in detail that given her latest VBG AMA would be endangering her life given her severe acidosis and hypercapnia her  was also informed of above  Review of Systems:      Pertinent positives and negatives discussed in HPI    Objective:     Intake/Output Summary (Last 24 hours) at 2024 1123  Last data filed at 2024 0631  Gross per 24 hour   Intake 1133.34 ml   Output 525 ml   Net 608.34 ml        Vitals:   Vitals:    24 0600 24 0800 24 0815 24 0919   BP: 124/88 131/88     Pulse: 76 72 82    Resp: 13 13 14 16   Temp:  98.1 °F (36.7 °C)     TempSrc:  Oral     SpO2: 100% 100% 99%    Weight:       Height:             Physical Exam:      Physical Exam Performed:    /88   Pulse 82   Temp 98.1 °F

## 2024-06-08 NOTE — PLAN OF CARE
Problem: Discharge Planning  Goal: Discharge to home or other facility with appropriate resources  Outcome: Progressing     Problem: Safety - Adult  Goal: Free from fall injury  Outcome: Progressing     Problem: Skin/Tissue Integrity  Goal: Absence of new skin breakdown  Description: 1.  Monitor for areas of redness and/or skin breakdown  2.  Assess vascular access sites hourly  3.  Every 4-6 hours minimum:  Change oxygen saturation probe site  4.  Every 4-6 hours:  If on nasal continuous positive airway pressure, respiratory therapy assess nares and determine need for appliance change or resting period.  Outcome: Progressing     Problem: ABCDS Injury Assessment  Goal: Absence of physical injury  Outcome: Progressing     Problem: Chronic Conditions and Co-morbidities  Goal: Patient's chronic conditions and co-morbidity symptoms are monitored and maintained or improved  Outcome: Progressing     Problem: Pain  Goal: Verbalizes/displays adequate comfort level or baseline comfort level  Outcome: Progressing     Problem: Neurosensory - Adult  Goal: Achieves stable or improved neurological status  Outcome: Progressing  Goal: Achieves maximal functionality and self care  Outcome: Progressing     Problem: Respiratory - Adult  Goal: Achieves optimal ventilation and oxygenation  Outcome: Progressing     Problem: Cardiovascular - Adult  Goal: Absence of cardiac dysrhythmias or at baseline  Outcome: Progressing     Problem: Skin/Tissue Integrity - Adult  Goal: Skin integrity remains intact  Outcome: Progressing  Goal: Oral mucous membranes remain intact  Outcome: Progressing     Problem: Gastrointestinal - Adult  Goal: Minimal or absence of nausea and vomiting  Outcome: Progressing  Goal: Maintains or returns to baseline bowel function  Outcome: Progressing     Problem: Genitourinary - Adult  Goal: Absence of urinary retention  Outcome: Progressing     Problem: Infection - Adult  Goal: Absence of infection at

## 2024-06-09 VITALS
BODY MASS INDEX: 38.31 KG/M2 | SYSTOLIC BLOOD PRESSURE: 156 MMHG | HEIGHT: 59 IN | RESPIRATION RATE: 18 BRPM | OXYGEN SATURATION: 95 % | DIASTOLIC BLOOD PRESSURE: 90 MMHG | WEIGHT: 190.04 LBS | HEART RATE: 100 BPM | TEMPERATURE: 98.6 F

## 2024-06-09 LAB
ANION GAP SERPL CALCULATED.3IONS-SCNC: 5 MMOL/L (ref 3–16)
BASE EXCESS BLDV CALC-SCNC: 11.5 MMOL/L
BASOPHILS # BLD: 0 K/UL (ref 0–0.2)
BASOPHILS NFR BLD: 0.4 %
BUN SERPL-MCNC: 31 MG/DL (ref 7–20)
CALCIUM SERPL-MCNC: 8.8 MG/DL (ref 8.3–10.6)
CHLORIDE SERPL-SCNC: 96 MMOL/L (ref 99–110)
CO2 BLDV-SCNC: 41 MMOL/L
CO2 SERPL-SCNC: 34 MMOL/L (ref 21–32)
COHGB MFR BLDV: 1.1 %
CREAT SERPL-MCNC: 0.8 MG/DL (ref 0.6–1.1)
DEPRECATED RDW RBC AUTO: 16.8 % (ref 12.4–15.4)
EOSINOPHIL # BLD: 0 K/UL (ref 0–0.6)
EOSINOPHIL NFR BLD: 0.3 %
GFR SERPLBLD CREATININE-BSD FMLA CKD-EPI: 90 ML/MIN/{1.73_M2}
GLUCOSE SERPL-MCNC: 92 MG/DL (ref 70–99)
HCO3 BLDV-SCNC: 39 MMOL/L (ref 23–29)
HCT VFR BLD AUTO: 28 % (ref 36–48)
HGB BLD-MCNC: 9 G/DL (ref 12–16)
LYMPHOCYTES # BLD: 2.8 K/UL (ref 1–5.1)
LYMPHOCYTES NFR BLD: 26.8 %
MAGNESIUM SERPL-MCNC: 1.9 MG/DL (ref 1.8–2.4)
MCH RBC QN AUTO: 26.4 PG (ref 26–34)
MCHC RBC AUTO-ENTMCNC: 32.1 G/DL (ref 31–36)
MCV RBC AUTO: 82.3 FL (ref 80–100)
METHGB MFR BLDV: 0.6 %
MONOCYTES # BLD: 0.6 K/UL (ref 0–1.3)
MONOCYTES NFR BLD: 5.4 %
NEUTROPHILS # BLD: 7 K/UL (ref 1.7–7.7)
NEUTROPHILS NFR BLD: 67.1 %
O2 THERAPY: ABNORMAL
PCO2 BLDV: 72.1 MMHG (ref 40–50)
PH BLDV: 7.34 [PH] (ref 7.35–7.45)
PHOSPHATE SERPL-MCNC: 2.3 MG/DL (ref 2.5–4.9)
PLATELET # BLD AUTO: 316 K/UL (ref 135–450)
PMV BLD AUTO: 7.5 FL (ref 5–10.5)
PO2 BLDV: <30 MMHG
POTASSIUM SERPL-SCNC: 4.3 MMOL/L (ref 3.5–5.1)
RBC # BLD AUTO: 3.4 M/UL (ref 4–5.2)
SAO2 % BLDV: 46 %
SODIUM SERPL-SCNC: 135 MMOL/L (ref 136–145)
WBC # BLD AUTO: 10.4 K/UL (ref 4–11)

## 2024-06-09 PROCEDURE — 6360000002 HC RX W HCPCS: Performed by: HOSPITALIST

## 2024-06-09 PROCEDURE — 94660 CPAP INITIATION&MGMT: CPT

## 2024-06-09 PROCEDURE — 94640 AIRWAY INHALATION TREATMENT: CPT

## 2024-06-09 PROCEDURE — 99291 CRITICAL CARE FIRST HOUR: CPT | Performed by: INTERNAL MEDICINE

## 2024-06-09 PROCEDURE — 94761 N-INVAS EAR/PLS OXIMETRY MLT: CPT

## 2024-06-09 PROCEDURE — 83735 ASSAY OF MAGNESIUM: CPT

## 2024-06-09 PROCEDURE — 6370000000 HC RX 637 (ALT 250 FOR IP): Performed by: HOSPITALIST

## 2024-06-09 PROCEDURE — 84100 ASSAY OF PHOSPHORUS: CPT

## 2024-06-09 PROCEDURE — 2700000000 HC OXYGEN THERAPY PER DAY

## 2024-06-09 PROCEDURE — 6370000000 HC RX 637 (ALT 250 FOR IP): Performed by: INTERNAL MEDICINE

## 2024-06-09 PROCEDURE — 85025 COMPLETE CBC W/AUTO DIFF WBC: CPT

## 2024-06-09 PROCEDURE — 36415 COLL VENOUS BLD VENIPUNCTURE: CPT

## 2024-06-09 PROCEDURE — 6370000000 HC RX 637 (ALT 250 FOR IP): Performed by: STUDENT IN AN ORGANIZED HEALTH CARE EDUCATION/TRAINING PROGRAM

## 2024-06-09 PROCEDURE — 2580000003 HC RX 258: Performed by: STUDENT IN AN ORGANIZED HEALTH CARE EDUCATION/TRAINING PROGRAM

## 2024-06-09 PROCEDURE — 2500000003 HC RX 250 WO HCPCS: Performed by: STUDENT IN AN ORGANIZED HEALTH CARE EDUCATION/TRAINING PROGRAM

## 2024-06-09 PROCEDURE — 80048 BASIC METABOLIC PNL TOTAL CA: CPT

## 2024-06-09 PROCEDURE — 82803 BLOOD GASES ANY COMBINATION: CPT

## 2024-06-09 RX ORDER — PREDNISONE 20 MG/1
TABLET ORAL
Qty: 15 TABLET | Refills: 0 | Status: SHIPPED | OUTPATIENT
Start: 2024-06-10 | End: 2024-06-22

## 2024-06-09 RX ORDER — LEVOFLOXACIN 750 MG/1
750 TABLET, FILM COATED ORAL DAILY
Qty: 2 TABLET | Refills: 0 | Status: SHIPPED | OUTPATIENT
Start: 2024-06-09 | End: 2024-06-11

## 2024-06-09 RX ORDER — IBUPROFEN 800 MG/1
800 TABLET ORAL EVERY 8 HOURS PRN
Qty: 45 TABLET | Refills: 0 | Status: SHIPPED | OUTPATIENT
Start: 2024-06-09 | End: 2024-06-24

## 2024-06-09 RX ADMIN — IPRATROPIUM BROMIDE AND ALBUTEROL SULFATE 1 DOSE: 2.5; .5 SOLUTION RESPIRATORY (INHALATION) at 08:06

## 2024-06-09 RX ADMIN — ACETAMINOPHEN 325MG 650 MG: 325 TABLET ORAL at 06:00

## 2024-06-09 RX ADMIN — METHADONE HYDROCHLORIDE 50 MG: 10 TABLET ORAL at 08:31

## 2024-06-09 RX ADMIN — POTASSIUM PHOSPHATE, MONOBASIC AND POTASSIUM PHOSPHATE, DIBASIC 10 MMOL: 224; 236 INJECTION, SOLUTION INTRAVENOUS at 06:05

## 2024-06-09 RX ADMIN — ACETAMINOPHEN 325MG 650 MG: 325 TABLET ORAL at 11:49

## 2024-06-09 RX ADMIN — CARVEDILOL 3.12 MG: 3.12 TABLET, FILM COATED ORAL at 08:31

## 2024-06-09 RX ADMIN — IPRATROPIUM BROMIDE AND ALBUTEROL SULFATE 1 DOSE: 2.5; .5 SOLUTION RESPIRATORY (INHALATION) at 00:13

## 2024-06-09 RX ADMIN — IPRATROPIUM BROMIDE AND ALBUTEROL SULFATE 1 DOSE: 2.5; .5 SOLUTION RESPIRATORY (INHALATION) at 15:07

## 2024-06-09 RX ADMIN — LISINOPRIL 5 MG: 5 TABLET ORAL at 08:30

## 2024-06-09 RX ADMIN — IPRATROPIUM BROMIDE AND ALBUTEROL SULFATE 1 DOSE: 2.5; .5 SOLUTION RESPIRATORY (INHALATION) at 04:12

## 2024-06-09 RX ADMIN — PREDNISONE 40 MG: 20 TABLET ORAL at 08:30

## 2024-06-09 RX ADMIN — IPRATROPIUM BROMIDE AND ALBUTEROL SULFATE 1 DOSE: 2.5; .5 SOLUTION RESPIRATORY (INHALATION) at 12:36

## 2024-06-09 RX ADMIN — LEVOFLOXACIN 500 MG: 500 TABLET, FILM COATED ORAL at 08:30

## 2024-06-09 RX ADMIN — ENOXAPARIN SODIUM 40 MG: 100 INJECTION SUBCUTANEOUS at 08:31

## 2024-06-09 RX ADMIN — SERTRALINE 125 MG: 100 TABLET, FILM COATED ORAL at 08:31

## 2024-06-09 ASSESSMENT — PAIN SCALES - GENERAL
PAINLEVEL_OUTOF10: 3
PAINLEVEL_OUTOF10: 0
PAINLEVEL_OUTOF10: 8
PAINLEVEL_OUTOF10: 0
PAINLEVEL_OUTOF10: 3

## 2024-06-09 ASSESSMENT — PAIN DESCRIPTION - ORIENTATION: ORIENTATION: RIGHT;MID

## 2024-06-09 ASSESSMENT — PAIN DESCRIPTION - LOCATION
LOCATION: SHOULDER;NECK;BACK
LOCATION: HAND

## 2024-06-09 NOTE — DISCHARGE INSTR - COC
Continuity of Care Form    Patient Name: Clarissa Estrada   :  1975  MRN:  [id number]    Admit date:  2024  Discharge date:  2024    Code Status Order: Full Code   Advance Directives:     Admitting Physician:  Tremaine Noonan MD  PCP: Jocelyn Wolfe APRN - NP    Discharging Nurse: Ru Huggins  Discharging Hospital Unit/Room#: J3K-9164/2105-01  Discharging Unit Phone Number: [phone]    Emergency Contact:   Extended Emergency Contact Information  Primary Emergency Contact: Jeffrey Ashton  Address: [facility]  Home Phone: [phone]  Mobile Phone: [phone]  Relation: Spouse  Secondary Emergency Contact: Janine Ashton  Address: [address]  Home Phone: [phone]  Relation: Child    Past Surgical History:  Past Surgical History:   Procedure Laterality Date    ABDOMEN SURGERY      CHOLECYSTECTOMY      DILATATION, ESOPHAGUS      EYE SURGERY      TUBAL LIGATION         Immunization History:   Immunization History   Administered Date(s) Administered    Influenza Vaccine, unspecified formulation 2011    Influenza Virus Vaccine 2013    Influenza, AFLURIA (age 3 yrs+), FLUZONE, (age 6 mo+), MDV, 0.5mL 10/25/2018    Influenza, FLUARIX, FLULAVAL, FLUZONE (age 6 mo+) AND AFLURIA, (age 3 y+), PF, 0.5mL 2017, 2020    Pneumococcal, PPSV23, PNEUMOVAX 23, (age 2y+), SC/IM, 0.5mL 2011, 2016       Active Problems:  Patient Active Problem List   Diagnosis Code    Sepsis ([facility]) A41.9    Chronic respiratory failure with hypoxia and hypercapnia ([facility]) J96.11, J96.12    Acute respiratory failure (HCC) J96.00    COPD exacerbation ([facility]) J44.1    Respiratory acidosis E87.29    COPD, severe ([facility]) J44.9    Acute on chronic respiratory failure with hypoxia and hypercapnia ([facility]) J96.21, J96.22    Tobacco use Z72.0    Nonhealing ulcer of right lower leg with fat layer  sent with patient):  Glasses, clothes, phone, , extension cord, personal food items, footwear bag/purse w/wallet money keys, personal toiletry items    RN SIGNATURE:  Electronically signed by Ru Huggins RN on 6/9/24 at 1:54 PM EDT    CASE MANAGEMENT/SOCIAL WORK SECTION    Inpatient Status Date: 6/5/2024    Readmission Risk Assessment Score:  Readmission Risk              Risk of Unplanned Readmission:  24           Discharging to Facility/ Agency   Name: [address],   [place] [address]  Phone: [phone]  Fax: [phone]     / signature: Electronically signed by RAMON URBINA on 6/9/24 at 12:53 PM EDT    PHYSICIAN SECTION    Prognosis: Good    Condition at Discharge: Stable    Rehab Potential (if transferring to Rehab): Good    Recommended Labs or Other Treatments After Discharge: Skilled Nursing with [facility]    Physician Certification: I certify the above information and transfer of Clarissa Estrada  is necessary for the continuing treatment of the diagnosis listed and that she requires Home Care for greater 30 days.     Update Admission H&P: No change in H&P    PHYSICIAN SIGNATURE:  Electronically signed by Althea Regan MD on 6/9/24 at 12:55 PM EDT

## 2024-06-09 NOTE — CARE COORDINATION
Case Management Assessment            Discharge Note                    Date / Time of Note: 6/9/2024 12:51 PM                  Discharge Note Completed by: RAMON URBINA    Patient Name: Clarissa Estrada   YOB: 1975  Diagnosis: COPD exacerbation (HCC) [J44.1]  Acute respiratory failure with hypoxia and hypercapnia (HCC) [J96.01, J96.02]   Date / Time: 6/5/2024  2:58 PM    Current PCP: Jocelyn Wolfe APRN - NP  Clinic patient: No    Hospitalization in the last 30 days: No       Advance Directives:  Code Status: Full Code  [place] DNR form completed and on chart: Not Indicated    Financial:  Payor: [facility] MEDICARE / Plan: UNITEDHEALTHCARE DUAL COMPLETE / Product Type: *No Product type* /      Pharmacy:    [facility] [id number] - [facility] [address] - P [phone] - F [phone]  [address] [facility] [address]  Phone: [phone] Fax: [phone]    [facility] - [facility] [address] [facility] - P [phone] - F [phone]  [address] [facility] [address]  Phone: [phone] Fax: [phone]    [facility] [id number] - [place], [place] - [address] [facility] - P [phone] - F [phone]  [address] [facility] [address]  Phone: [phone] Fax: [phone]    [facility] - [place], [place] - [address] - P [phone] - F [phone]  [address]  [address]  [facility] [address]  Phone: [phone] Fax: [phone]      Assistance purchasing medications?: Potential Assistance Purchasing Medications: No  Assistance provided by Case Management: None at this time    Does patient want to participate in local refill/ meds to beds program?: Yes    Meds To Beds General Rules:  1. Can ONLY be done Monday- Friday between 8:30am-5pm  2. Prescription(s) must be in pharmacy by 3pm to be filled same day  3.Copy of patient's insurance/ prescription drug card and patient face sheet  must be sent along with the prescription(s)  4. Cost of Rx cannot be added to hospital bill. If financial assistance is needed, please contact unit  or ;  or  CANNOT provide pharmacy voucher for patients co-pays  5. Patients can then  the prescription on their way out of the hospital at discharge, or pharmacy can deliver to the bedside if staff is available. (payment due at time of pick-up or delivery - cash, check, or card accepted)     Able to afford home medications/ co-pay costs: Yes    ADLS:  Current PT AM-PAC Score:   /24  Current OT AM-PAC Score:   /24    DISCHARGE Disposition: Home with Home Health Care: [facility]     LOC at discharge: Skilled  JAQUELIN Completed: Yes    Notification completed in HENS/PAS?:  Not Applicable    IMM Completed:   IMM Letter given to Patient/Family/Significant other/Guardian/POA/by:: PRINCE Cuevas; agreeable to discharge within 4 hour window of delivery  IMM Letter date given:: 06/09/24  IMM Letter time given:: 1244    Transportation:  Transportation PLAN for discharge: family   Mode of Transport: Private Car  Reason for medical transport: Not Applicable  Name of Transport Company: Not Applicable  Time of Transport: family    Transport form completed: Not Indicated    Home Care:  Home Care ordered at discharge: Yes  Home Care Agency:  [facility]  Phone:    Fax:    Orders faxed: No; pulled from Epic    Durable Medical Equipment:  DME Provider: faustino  Equipment obtained during hospitalization: na    Home Oxygen and Respiratory Equipment:  Oxygen needed at discharge?: Yes  Home Oxygen Company: already set up at home  Portable tank available for discharge?: Yes    Dialysis:  Dialysis patient: No    Dialysis Center:  Not Applicable    Hospice Services:  Location: Not Applicable  Agency: Not Applicable    Consents signed: Not Indicated    Referrals made at DISCHARGE for outpatient continued care:  Not  Applicable    Additional CM Notes: Discharge order noted. This CM met with patient at bedside to discuss discharge plan. She states she has a [facility] nurse stop by weekly (confirmed with [facility] they are active and will resume services). She has oxygen at home (baseline 4L) and has portability for the transportation. Family to transport home.  Denies further needs.    COVID Result:    Lab Results   Component Value Date/Time    COVID19 Not Detected 06/05/2024 03:19 PM    COVID19 Not Detected 06/12/2021 05:40 PM       The Plan for Transition of Care is related to the following treatment goals of COPD exacerbation (HCC) [J44.1]  Acute respiratory failure with hypoxia and hypercapnia (HCC) [J96.01, J96.02]    The Patient and/or patient representative Clarissa and her family were provided with a choice of provider and agrees with the discharge plan Yes    Freedom of choice list was provided with basic dialogue that supports the patient's individualized plan of care/goals and shares the quality data associated with the providers. Yes    Care Transitions patient: No    RAMON URBINA  Case Management Department  [phone]

## 2024-06-09 NOTE — PROGRESS NOTES
NAME:  Clarissa Estrada  YOB: 1975  MEDICAL RECORD NUMBER:  [id number]    Shift Summary: Pt tolerated home bipap during night. pH 7.34, Co2 72.1. Phos replaced.     Family updated: Yes:       Rhythm: Normal Sinus Rhythm     Most recent vitals:   Visit Vitals  BP (!) 157/89   Pulse (!) 105   Temp 98.2 °F (36.8 °C) (Oral)   Resp 16   Ht 1.499 m (4' 11\")   Wt 86.2 kg (190 lb 0.6 oz)   SpO2 99%   BMI 38.38 kg/m²           No data found.    No data found.      Respiratory support needed (if any):  - BiPAP   IPAP: 18 cmH20, CPAP/EPAP:  (home settings) device from home    Admission weight Weight - Scale: 78.8 kg (173 lb 11.6 oz) (06/05/24 1505)   Today's weight    Wt Readings from Last 1 Encounters:   06/09/24 86.2 kg (190 lb 0.6 oz)        Duvall need assessed each shift: N/A - no duvall present  UOP >30ml/hr: YES  Last documented BM (in last 48 hrs):  Patient Vitals for the past 48 hrs:   Last BM (including prior to admit) Stool Occurrence   06/07/24 0956 -- 1   06/07/24 1000 06/07/24 --   06/07/24 1540 -- 1   06/08/24 1500 -- 1   06/09/24 0600 -- 1                Restraints (in use currently or dc'd in last 12 hrs): No    Order current and documentation up to date? No    Lines/Drains reviewed @ bedside.  Peripheral IV 06/05/24 Left Antecubital (Active)   Number of days: 4         Drip rates at handoff:    sodium chloride Stopped (06/06/24 0046)       Lab Data:   CBC:   Recent Labs     06/08/24  0351 06/09/24  0328   WBC 11.0 10.4   HGB 9.0* 9.0*   HCT 28.6* 28.0*   MCV 83.0 82.3    316     BMP:    Recent Labs     06/08/24  0352 06/09/24  0328    135*   K 5.6* 4.3   CO2 33* 34*   BUN 35* 31*   CREATININE 0.9 0.8     LIVR: No results for input(s): \"AST\", \"ALT\" in the last 72 hours.  PT/INR: No results for input(s): \"PROT\", \"INR\" in the last 72 hours.  APTT: No results for input(s): \"APTT\" in the last 72 hours.  ABG: No results for input(s): \"PHART\", \"SEJ9GIT\", \"PO2ART\" in the last 72 hours.    Any  consults during the shift? No    Any signed and held orders to be released?  No        4 Eyes Skin Assessment       The patient is being assessed for  Shift Handoff    I agree that at least one RN has performed a thorough Head to Toe Skin Assessment on the patient. ALL assessment sites listed below have been assessed.      Areas assessed by both nurses: Head, Face, Ears, Shoulders, Back, Chest, Arms, Elbows, Hands, Sacrum. Buttock, Coccyx, Ischium, Legs. Feet and Heels, and Under Medical Devices         Does the Patient have a Wound? No noted wound(s)    Wound Care Orders initiated by RN: No       Jaison Prevention initiated by RN: Yes    Pressure Injury (Stage 3,4, Unstageable, DTI, NWPT, and Complex wounds) if present, place Wound referral order by RN under : No    New Ostomies, if present place, Ostomy referral order under : No     Nurse 1 eSignature: Electronically signed by Caren Reece RN on 6/9/24 at 6:22 AM EDT    **SHARE this note so that the co-signing nurse can place an eSignature**    Nurse 2 eSignature: Electronically signed by Ru Huggins RN on 6/9/24 at 7:19 AM EDT

## 2024-06-09 NOTE — PROGRESS NOTES
Pt able to don and doff home pap unit by self. Pt currently on 4 lpm NC. Pt stated she was not ready for unit at this time and taking a break. Will continue to monitor.

## 2024-06-09 NOTE — DISCHARGE SUMMARY
Hospital Medicine Discharge Summary    Patient ID: Clarissa Estrada      Patient's PCP: Jocelyn Wolfe APRN - NP    Admit Date: 6/5/2024     Discharge Date:   06/09/24      Admitting Provider: Tremaine Noonan MD     Discharge Provider: Althea Regan MD     Discharge Diagnoses:       Active Hospital Problems    Diagnosis     Acute respiratory failure with hypoxia and hypercapnia (HCC) [J96.01, J96.02]        The patient was seen and examined on day of discharge and this discharge summary is in conjunction with any daily progress note from day of discharge.    Hospital Course:     48 y.o. female who presents with  history of COPD, BLANCA, chronic respiratory failure hypoxia and hypercapnia on home O2 on BiPAP at bedtime who presented to [facility]  with shortness of breath and hypoxia...  History is obtained from ED staff as patient is currently lethargic.  Patient apparently arrived from home via EMS with complaints of shortness of breath, her oxygen saturations at 88% on 5 L nasal cannula upon arrival to the ED..  She was placed on nonrebreather mask and then BiPAP  Initial BP was 117/57, pulse 103, temperature 98.8, respirations 21,  VBG initially showed a pH of 7.096, pCO2 115,... She was placed on BiPAP      Acute on chronic respiratory failure with hypoxia and hypercapnia  Severe COPD acute exacerbation  Patient presented to emergency with altered mental status and shortness of breath, was found to be in acute COPD exacerbation, with acute hypercapnia, initially ABG with pH of 7.20 and CO2 of 90.5.  Patient initially agreeable on BiPAP however now requesting her home machine which family will bring in.     VBG 6/8 with pH of 7.20 CO2 of 98.5 ( 6/8)    VBG today improved pH 7.34, CO2 improved to 72  Plan.  -Appreciate pulmonology input.  -Continue on DuoNebs every 4 hours.  -IV Levaquin 500 mg daily.  -Prednisone taper for 12 days as recommended by pulmonology  -Patient strongly advised to adhere to BiPAP  spelling, as well as words and phrases that may be inappropriate. If there are any questions or concerns, please feel free to contact the dictating provider for clarification.

## 2024-06-09 NOTE — PLAN OF CARE
Problem: Discharge Planning  Goal: Discharge to home or other facility with appropriate resources  6/8/2024 2251 by Caren Reece RN  Outcome: Progressing  Flowsheets (Taken 6/8/2024 2000)  Discharge to home or other facility with appropriate resources:   Identify barriers to discharge with patient and caregiver   Arrange for needed discharge resources and transportation as appropriate   Arrange for interpreters to assist at discharge as needed   Identify discharge learning needs (meds, wound care, etc)   Refer to discharge planning if patient needs post-hospital services based on physician order or complex needs related to functional status, cognitive ability or social support system     Problem: Safety - Adult  Goal: Free from fall injury  6/8/2024 2251 by Caren Reece RN  Outcome: Progressing     Problem: Skin/Tissue Integrity  Goal: Absence of new skin breakdown  Description: 1.  Monitor for areas of redness and/or skin breakdown  2.  Assess vascular access sites hourly  3.  Every 4-6 hours minimum:  Change oxygen saturation probe site  4.  Every 4-6 hours:  If on nasal continuous positive airway pressure, respiratory therapy assess nares and determine need for appliance change or resting period.  6/8/2024 2251 by Caren Reece RN  Outcome: Progressing     Problem: ABCDS Injury Assessment  Goal: Absence of physical injury  6/8/2024 2251 by Caren Reece RN  Outcome: Progressing     Problem: Chronic Conditions and Co-morbidities  Goal: Patient's chronic conditions and co-morbidity symptoms are monitored and maintained or improved  6/8/2024 2251 by Caren Reece RN  Outcome: Progressing  Flowsheets (Taken 6/8/2024 2000)  Care Plan - Patient's Chronic Conditions and Co-Morbidity Symptoms are Monitored and Maintained or Improved:   Monitor and assess patient's chronic conditions and comorbid symptoms for stability, deterioration, or improvement   Collaborate with multidisciplinary team to address  increase activity and self care with guidance from physical therapy/occupational therapy     Problem: Respiratory - Adult  Goal: Achieves optimal ventilation and oxygenation  6/8/2024 2251 by Caren Reece RN  Outcome: Progressing  Flowsheets (Taken 6/8/2024 2000)  Achieves optimal ventilation and oxygenation:   Assess for changes in mentation and behavior   Assess for changes in respiratory status   Position to facilitate oxygenation and minimize respiratory effort   Oxygen supplementation based on oxygen saturation or arterial blood gases   Initiate smoking cessation protocol as indicated   Encourage broncho-pulmonary hygiene including cough, deep breathe, incentive spirometry   Assess the need for suctioning and aspirate as needed   Assess and instruct to report shortness of breath or any respiratory difficulty   Respiratory therapy support as indicated     Problem: Cardiovascular - Adult  Goal: Absence of cardiac dysrhythmias or at baseline  6/8/2024 2251 by Caren Reece RN  Outcome: Progressing  Flowsheets (Taken 6/8/2024 2000)  Absence of cardiac dysrhythmias or at baseline:   Monitor cardiac rate and rhythm   Assess for signs of decreased cardiac output   Administer antiarrhythmia medication and electrolyte replacement as ordered     Problem: Skin/Tissue Integrity - Adult  Goal: Skin integrity remains intact  6/8/2024 2251 by Caren Reece RN  Outcome: Progressing  Flowsheets (Taken 6/8/2024 2000)  Skin Integrity Remains Intact:   Monitor for areas of redness and/or skin breakdown   Assess vascular access sites hourly   Every 4-6 hours minimum: Change oxygen saturation probe site   Every 4-6 hours: If on nasal continuous positive airway pressure, respiratory therapy assesses nares and determine need for appliance change or resting period     Problem: Skin/Tissue Integrity - Adult  Goal: Oral mucous membranes remain intact  6/8/2024 2251 by Caren Reece, RN  Outcome: Progressing  Flowsheets (Taken  Caren, RN  Outcome: Progressing  Flowsheets (Taken 6/8/2024 2000)  Will report anxiety at manageable levels:   Administer medication as ordered   Teach and rehearse alternative coping skills   Provide emotional support with 1:1 interaction with staff     Problem: Behavior  Goal: Pt/Family maintain appropriate behavior and adhere to behavioral management agreement, if implemented  Description: INTERVENTIONS:  1. Assess patient/family's coping skills and  non-compliant behavior (including use of illegal substances)  2. Notify security of behavior or suspected illegal substances which indicate the need for search of the family and/or belongings  3. Encourage verbalization of thoughts and concerns in a socially appropriate manner  4. Utilize positive, consistent limit setting strategies supporting safety of patient, staff and others  5. Encourage participation in the decision making process about the behavioral management agreement  6. If a visitor's behavior poses a threat to safety call refer to organization policy.  7. Initiate consult with , Psychosocial CNS, Spiritual Care as appropriate  6/8/2024 2251 by Caren Reece, RN  Outcome: Progressing  Flowsheets (Taken 6/8/2024 2000)  Patient/family maintains appropriate behavior and adheres to behavioral management agreement, if implemented:   Notify security of behavior or suspected illegal substances which indicate the need for search of the patient and/or belongings   Assess patient/family’s coping skills and  non-compliant behavior (including use of illegal substances)   Encourage verbalization of thoughts and concerns in a socially appropriate manner   Utilize positive, consistent limit setting strategies supporting safety of patient, staff and others   Encourage participation in the decision making process about the behavioral management agreement   Implement a Health Care Agreement if patient meets criteria   If a patient’s behavior jeopardizes

## 2024-06-09 NOTE — PROGRESS NOTES
Pulmonary Progress Note    Date of Admission: 6/5/2024   LOS: 4 days     CC:  Chief Complaint   Patient presents with    Shortness of Breath     SOB, per EMS pt was 88% O2 sat on 5 LPM O2 upon arrival to ER.  Pt into ER on NRB with 99% O2 sat.  EMS gave a duo neb tx.  Pt with Hx of COPD.             Assessment/Plan     Acute on chronic hypoxemic and hypercapnic respiratory failure  Acute exacerbation of COPD  -IV steroids, okay to transition to p.o. prednisone.  Recommend 12-day taper  -Levaquin x 5 days  -CPAP at night and with naps.  -Wean oxygen goal saturation 90%    Mild obstructive sleep apnea  -Has BiPAP at home    Can transfer out of the ICU.  No further inpatient recommendations, we will sign off at this time.  Please let us know if we can be of any further assistance.     HPI/Subjective  No acute events overnight.  Patient states she is wearing BiPAP at night.  Dyspnea back to baseline    ROS: No chest pain  No nausea  No diarrhea      Intake/Output Summary (Last 24 hours) at 6/9/2024 0654  Last data filed at 6/9/2024 0600  Gross per 24 hour   Intake 1560 ml   Output 1350 ml   Net 210 ml         PHYSICAL EXAM:   Blood pressure (!) 157/89, pulse (!) 105, temperature 98.2 °F (36.8 °C), temperature source Oral, resp. rate 16, height 1.499 m (4' 11\"), weight 86.2 kg (190 lb 0.6 oz), last menstrual period 08/14/2015, SpO2 99 %, not currently breastfeeding.'  Gen:  No acute distress.    Resp:  No crackles. No wheezes. No rhonchi. No dullness on percussion.  CV: Regular rate. Regular rhythm. No murmur or rub. No edema.   M/S: No cyanosis. No clubbing.    Neuro: Awake alert oriented          Labs reviewed:  CBC:   Recent Labs     06/07/24  0329 06/08/24  0351 06/09/24 0328   WBC 7.3 11.0 10.4   HGB 8.7* 9.0* 9.0*   HCT 27.0* 28.6* 28.0*   MCV 82.3 83.0 82.3    299 316     BMP:   Recent Labs     06/07/24  0329 06/08/24  0352 06/09/24 0328    140 135*   K 4.4 5.6* 4.3   CL 98* 101 96*   CO2 33* 33*  34*   PHOS 1.0* 3.1 2.3*   BUN 24* 35* 31*   CREATININE 0.6 0.9 0.8     LIVER PROFILE:   No results for input(s): \"AST\", \"ALT\", \"LIPASE\", \"AMYLASE\", \"BILIDIR\", \"BILITOT\", \"ALKPHOS\" in the last 72 hours.    Invalid input(s): \"ALB\"    PT/INR: No results for input(s): \"PROTIME\", \"INR\" in the last 72 hours.  APTT: No results for input(s): \"APTT\" in the last 72 hours.  UA:No results for input(s): \"NITRITE\", \"COLORU\", \"PHUR\", \"LABCAST\", \"WBCUA\", \"RBCUA\", \"MUCUS\", \"TRICHOMONAS\", \"YEAST\", \"BACTERIA\", \"CLARITYU\", \"SPECGRAV\", \"LEUKOCYTESUR\", \"UROBILINOGEN\", \"BILIRUBINUR\", \"BLOODU\", \"GLUCOSEU\", \"AMORPHOUS\" in the last 72 hours.    Invalid input(s): \"KETONESU\"  No results for input(s): \"PH\", \"PCO2\", \"PO2\" in the last 72 hours.      Films:  Radiology Review:  Pertinent images / reports were reviewed as a part of this visit.             Access    Arterial          PICC             CVC                  Elsie         Thank you for this consult,    Collins Nunes MD  [facility], Critical [facility], [facility] Sleep Medicine

## 2024-06-09 NOTE — PROGRESS NOTES
Patient requesting only 50mg of ordred 110mg PO methadone this AM. RN administered 50mg and returned 60mg of methadone to [facility] with second RN.     Electronically signed by Ru Huggins RN on 6/9/2024 at 10:14 AM    Electronically signed by Martha Nunez RN on 6/9/2024 at 10:15 AM

## 2024-06-09 NOTE — PROGRESS NOTES
RN reviewed discharge paperwork with patient. RN answered all questions patient had. RN disconnected patient from telemetry and discontinued all IV access. Patient placed on 5L NC via personal portable oxygen tank. Patient belongings gathered and given to patient and significant other. RN wheeled patient down to personal vehicle via wheelchair.

## 2024-06-09 NOTE — PLAN OF CARE
Problem: Discharge Planning  Goal: Discharge to home or other facility with appropriate resources  Outcome: Adequate for Discharge     Problem: Safety - Adult  Goal: Free from fall injury  Outcome: Adequate for Discharge     Problem: Skin/Tissue Integrity  Goal: Absence of new skin breakdown  Description: 1.  Monitor for areas of redness and/or skin breakdown  2.  Assess vascular access sites hourly  3.  Every 4-6 hours minimum:  Change oxygen saturation probe site  4.  Every 4-6 hours:  If on nasal continuous positive airway pressure, respiratory therapy assess nares and determine need for appliance change or resting period.  Outcome: Adequate for Discharge     Problem: ABCDS Injury Assessment  Goal: Absence of physical injury  Outcome: Adequate for Discharge     Problem: Chronic Conditions and Co-morbidities  Goal: Patient's chronic conditions and co-morbidity symptoms are monitored and maintained or improved  Outcome: Adequate for Discharge     Problem: Pain  Goal: Verbalizes/displays adequate comfort level or baseline comfort level  Outcome: Adequate for Discharge     Problem: Neurosensory - Adult  Goal: Achieves stable or improved neurological status  Outcome: Adequate for Discharge  Goal: Achieves maximal functionality and self care  Outcome: Adequate for Discharge     Problem: Respiratory - Adult  Goal: Achieves optimal ventilation and oxygenation  Outcome: Adequate for Discharge     Problem: Cardiovascular - Adult  Goal: Absence of cardiac dysrhythmias or at baseline  Outcome: Adequate for Discharge     Problem: Skin/Tissue Integrity - Adult  Goal: Skin integrity remains intact  Outcome: Adequate for Discharge  Goal: Oral mucous membranes remain intact  Outcome: Adequate for Discharge     Problem: Gastrointestinal - Adult  Goal: Minimal or absence of nausea and vomiting  Outcome: Adequate for Discharge  Goal: Maintains or returns to baseline bowel function  Outcome: Adequate for Discharge     Problem:

## 2024-07-09 RX ORDER — EMPAGLIFLOZIN 10 MG/1
10 TABLET, FILM COATED ORAL DAILY
Qty: 90 TABLET | Refills: 3 | Status: SHIPPED | OUTPATIENT
Start: 2024-07-09

## 2024-07-09 NOTE — TELEPHONE ENCOUNTER
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 I am unable to find the exact reason why this was discontinued. Please advise if she should be taking Jardiance 10 mg daily. She was admitted 6/5-6/9/24 for acute respiratory failure with hypoxia, placed on BiPAP, acute metabolic encephalopathy due to CO2 narcosis. Last seen in office on 4/26/24 - history of CHF, EF 45-50% - states to continue SGLT2i.

## 2024-07-09 NOTE — TELEPHONE ENCOUNTER
Last OV: 4/26/24  Next OV: 10/29/24  Last refill:  Most recent Labs: 6/9/24  Last EKG (if needed): 6/6/24 4/26/24 Jardiance was discontinued.      Called patient unable to leave message, not accepting calls at this time.    Please advise

## 2024-08-14 NOTE — PROGRESS NOTES
HFA  Inhale 2 puffs into the lungs every 4 hours as needed for Wheezing or Shortness of Breath     carvedilol 3.125 MG tablet  Commonly known as: COREG  Take 1 tablet by mouth 2 times daily     gabapentin 300 MG capsule  Commonly known as: NEURONTIN     Incruse Ellipta 62.5 MCG/INH Aepb  Generic drug: Umeclidinium Bromide     ipratropium-albuterol 0.5-2.5 (3) MG/3ML Soln nebulizer solution  Commonly known as: DUONEB  INHALE ONE VIAL (3 MILLILITERS) VIA NEBULIZATION BY MOUTH EVERY 4 HOURS     lisinopril 5 MG tablet  Commonly known as: PRINIVIL;ZESTRIL  Take 1 tablet by mouth daily     methadone 10 MG/5ML solution     omeprazole 40 MG delayed release capsule  Commonly known as: PRILOSEC     sertraline 100 MG tablet  Commonly known as: ZOLOFT     Spacer/Aero-[employer]  1 Device by Does not apply route daily as needed     Symbicort 160-4.5 MCG/ACT Aero  Generic drug: budesonide-formoterol  Inhale 2 puffs into the lungs 2 times daily        STOP taking these medications    doxycycline hyclate 100 MG capsule  Commonly known as: VIBRAMYCIN     furosemide 80 MG tablet  Commonly known as: LASIX     ibuprofen 800 MG tablet  Commonly known as: ADVIL;MOTRIN           Where to Get Your Medications      These medications were sent to [address] [place] [address] & [place] [id number] [address]    Phone: [phone]   · folic acid 1 MG tablet  · oxyCODONE-acetaminophen 5-325 MG per tablet  · torsemide 20 MG tablet  · valACYclovir 1 g tablet         Mojgan White, [facility]  Heart Failure Discharge Medication Reconciliation Program  [phone] Detail Level: Generalized Detail Level: Detailed

## 2024-09-16 ENCOUNTER — APPOINTMENT (OUTPATIENT)
Dept: GENERAL RADIOLOGY | Age: 49
End: 2024-09-16
Payer: MEDICARE

## 2024-09-16 ENCOUNTER — HOSPITAL ENCOUNTER (INPATIENT)
Age: 49
LOS: 4 days | Discharge: HOME HEALTH | End: 2024-09-20
Attending: EMERGENCY MEDICINE | Admitting: HOSPITALIST
Payer: MEDICARE

## 2024-09-16 ENCOUNTER — APPOINTMENT (OUTPATIENT)
Dept: CT IMAGING | Age: 49
End: 2024-09-16
Payer: MEDICARE

## 2024-09-16 DIAGNOSIS — J96.01 ACUTE RESPIRATORY FAILURE WITH HYPOXIA AND HYPERCAPNIA: Primary | ICD-10-CM

## 2024-09-16 DIAGNOSIS — J44.1 COPD EXACERBATION (HCC): ICD-10-CM

## 2024-09-16 DIAGNOSIS — T40.3X1A ACCIDENTAL METHADONE OVERDOSE, INITIAL ENCOUNTER (HCC): ICD-10-CM

## 2024-09-16 DIAGNOSIS — T17.908A ASPIRATION INTO AIRWAY, INITIAL ENCOUNTER: ICD-10-CM

## 2024-09-16 DIAGNOSIS — M54.50 CHRONIC MIDLINE LOW BACK PAIN WITHOUT SCIATICA: ICD-10-CM

## 2024-09-16 DIAGNOSIS — J96.02 ACUTE RESPIRATORY FAILURE WITH HYPOXIA AND HYPERCAPNIA: Primary | ICD-10-CM

## 2024-09-16 DIAGNOSIS — G89.29 CHRONIC MIDLINE LOW BACK PAIN WITHOUT SCIATICA: ICD-10-CM

## 2024-09-16 LAB
ALBUMIN SERPL-MCNC: 3.6 G/DL (ref 3.4–5)
ALBUMIN/GLOB SERPL: 0.6 {RATIO} (ref 1.1–2.2)
ALP SERPL-CCNC: 131 U/L (ref 40–129)
ALT SERPL-CCNC: 9 U/L (ref 10–40)
AMPHETAMINES UR QL SCN>1000 NG/ML: ABNORMAL
ANION GAP SERPL CALCULATED.3IONS-SCNC: 13 MMOL/L (ref 3–16)
AST SERPL-CCNC: 24 U/L (ref 15–37)
BACTERIA URNS QL MICRO: ABNORMAL /HPF
BARBITURATES UR QL SCN>200 NG/ML: ABNORMAL
BASE EXCESS BLDV CALC-SCNC: 1 MMOL/L
BASE EXCESS BLDV CALC-SCNC: 2.4 MMOL/L
BASOPHILS # BLD: 0.1 K/UL (ref 0–0.2)
BASOPHILS NFR BLD: 0.3 %
BENZODIAZ UR QL SCN>200 NG/ML: ABNORMAL
BILIRUB SERPL-MCNC: 0.3 MG/DL (ref 0–1)
BILIRUB UR QL STRIP.AUTO: NEGATIVE
BUN SERPL-MCNC: 23 MG/DL (ref 7–20)
CALCIUM SERPL-MCNC: 9.9 MG/DL (ref 8.3–10.6)
CANNABINOIDS UR QL SCN>50 NG/ML: ABNORMAL
CHARACTER UR: ABNORMAL
CHLORIDE SERPL-SCNC: 95 MMOL/L (ref 99–110)
CLARITY UR: CLEAR
CO2 BLDV-SCNC: 36 MMOL/L
CO2 BLDV-SCNC: 37 MMOL/L
CO2 SERPL-SCNC: 27 MMOL/L (ref 21–32)
COCAINE UR QL SCN: ABNORMAL
COHGB MFR BLDV: 1.4 %
COHGB MFR BLDV: 1.9 %
COLOR UR: YELLOW
CREAT SERPL-MCNC: 1.2 MG/DL (ref 0.6–1.1)
DEPRECATED RDW RBC AUTO: 17.5 % (ref 12.4–15.4)
DRUG SCREEN COMMENT UR-IMP: ABNORMAL
EKG ATRIAL RATE: 143 BPM
EKG DIAGNOSIS: NORMAL
EKG P AXIS: 57 DEGREES
EKG P-R INTERVAL: 136 MS
EKG Q-T INTERVAL: 268 MS
EKG QRS DURATION: 82 MS
EKG QTC CALCULATION (BAZETT): 413 MS
EKG R AXIS: 44 DEGREES
EKG T AXIS: -16 DEGREES
EKG VENTRICULAR RATE: 143 BPM
EOSINOPHIL # BLD: 0.3 K/UL (ref 0–0.6)
EOSINOPHIL NFR BLD: 0.9 %
EPI CELLS #/AREA URNS HPF: ABNORMAL /HPF (ref 0–5)
ETHANOLAMINE SERPL-MCNC: 10 MG/DL (ref 0–0.08)
FENTANYL SCREEN, URINE: ABNORMAL
GFR SERPLBLD CREATININE-BSD FMLA CKD-EPI: 55 ML/MIN/{1.73_M2}
GLUCOSE SERPL-MCNC: 148 MG/DL (ref 70–99)
GLUCOSE UR STRIP.AUTO-MCNC: NEGATIVE MG/DL
HCO3 BLDV-SCNC: 33 MMOL/L (ref 23–29)
HCO3 BLDV-SCNC: 33 MMOL/L (ref 23–29)
HCT VFR BLD AUTO: 34.1 % (ref 36–48)
HGB BLD-MCNC: 10.4 G/DL (ref 12–16)
HGB UR QL STRIP.AUTO: NEGATIVE
KETONES UR STRIP.AUTO-MCNC: NEGATIVE MG/DL
LACTATE BLDV-SCNC: 0.6 MMOL/L (ref 0.4–1.9)
LACTATE BLDV-SCNC: 1.2 MMOL/L (ref 0.4–1.9)
LEUKOCYTE ESTERASE UR QL STRIP.AUTO: ABNORMAL
LYMPHOCYTES # BLD: 2.1 K/UL (ref 1–5.1)
LYMPHOCYTES NFR BLD: 7.2 %
MCH RBC QN AUTO: 26.1 PG (ref 26–34)
MCHC RBC AUTO-ENTMCNC: 30.6 G/DL (ref 31–36)
MCV RBC AUTO: 85.5 FL (ref 80–100)
METHADONE UR QL SCN>300 NG/ML: POSITIVE
METHGB MFR BLDV: 0.2 %
METHGB MFR BLDV: 0.3 %
MONOCYTES # BLD: 1.3 K/UL (ref 0–1.3)
MONOCYTES NFR BLD: 4.3 %
MUCOUS THREADS #/AREA URNS LPF: ABNORMAL /LPF
NEUTROPHILS # BLD: 25.9 K/UL (ref 1.7–7.7)
NEUTROPHILS NFR BLD: 87.3 %
NITRITE UR QL STRIP.AUTO: NEGATIVE
NT-PROBNP SERPL-MCNC: 275 PG/ML (ref 0–124)
O2 THERAPY: ABNORMAL
O2 THERAPY: ABNORMAL
OPIATES UR QL SCN>300 NG/ML: ABNORMAL
OXYCODONE UR QL SCN: ABNORMAL
PCO2 BLDV: 111 MMHG (ref 40–50)
PCO2 BLDV: 89 MMHG (ref 40–50)
PCP UR QL SCN>25 NG/ML: ABNORMAL
PH BLDV: 7.09 [PH] (ref 7.35–7.45)
PH BLDV: 7.18 [PH] (ref 7.35–7.45)
PH UR STRIP.AUTO: 7 [PH] (ref 5–8)
PH UR STRIP: 7 [PH]
PLATELET # BLD AUTO: 633 K/UL (ref 135–450)
PMV BLD AUTO: 7.3 FL (ref 5–10.5)
PO2 BLDV: 31 MMHG
PO2 BLDV: 68 MMHG
POTASSIUM SERPL-SCNC: 4.8 MMOL/L (ref 3.5–5.1)
PROT SERPL-MCNC: 9.3 G/DL (ref 6.4–8.2)
PROT UR STRIP.AUTO-MCNC: 100 MG/DL
RBC # BLD AUTO: 3.99 M/UL (ref 4–5.2)
RBC #/AREA URNS HPF: ABNORMAL /HPF (ref 0–4)
SAO2 % BLDV: 37 %
SAO2 % BLDV: 89 %
SODIUM SERPL-SCNC: 135 MMOL/L (ref 136–145)
SP GR UR STRIP.AUTO: 1.02 (ref 1–1.03)
TROPONIN, HIGH SENSITIVITY: 29 NG/L (ref 0–14)
TROPONIN, HIGH SENSITIVITY: 32 NG/L (ref 0–14)
UA COMPLETE W REFLEX CULTURE PNL UR: ABNORMAL
UA DIPSTICK W REFLEX MICRO PNL UR: YES
URN SPEC COLLECT METH UR: ABNORMAL
UROBILINOGEN UR STRIP-ACNC: 1 E.U./DL
WBC # BLD AUTO: 29.7 K/UL (ref 4–11)
WBC #/AREA URNS HPF: ABNORMAL /HPF (ref 0–5)

## 2024-09-16 PROCEDURE — 71045 X-RAY EXAM CHEST 1 VIEW: CPT

## 2024-09-16 PROCEDURE — 85025 COMPLETE CBC W/AUTO DIFF WBC: CPT

## 2024-09-16 PROCEDURE — 80307 DRUG TEST PRSMV CHEM ANLYZR: CPT

## 2024-09-16 PROCEDURE — 2580000003 HC RX 258: Performed by: EMERGENCY MEDICINE

## 2024-09-16 PROCEDURE — 84484 ASSAY OF TROPONIN QUANT: CPT

## 2024-09-16 PROCEDURE — 6370000000 HC RX 637 (ALT 250 FOR IP): Performed by: HOSPITALIST

## 2024-09-16 PROCEDURE — 94640 AIRWAY INHALATION TREATMENT: CPT

## 2024-09-16 PROCEDURE — 82803 BLOOD GASES ANY COMBINATION: CPT

## 2024-09-16 PROCEDURE — 2580000003 HC RX 258: Performed by: HOSPITALIST

## 2024-09-16 PROCEDURE — 2000000000 HC ICU R&B

## 2024-09-16 PROCEDURE — 6360000002 HC RX W HCPCS: Performed by: HOSPITALIST

## 2024-09-16 PROCEDURE — 96366 THER/PROPH/DIAG IV INF ADDON: CPT

## 2024-09-16 PROCEDURE — 96365 THER/PROPH/DIAG IV INF INIT: CPT

## 2024-09-16 PROCEDURE — 99285 EMERGENCY DEPT VISIT HI MDM: CPT

## 2024-09-16 PROCEDURE — 93010 ELECTROCARDIOGRAM REPORT: CPT | Performed by: INTERNAL MEDICINE

## 2024-09-16 PROCEDURE — 87040 BLOOD CULTURE FOR BACTERIA: CPT

## 2024-09-16 PROCEDURE — 83605 ASSAY OF LACTIC ACID: CPT

## 2024-09-16 PROCEDURE — 6360000002 HC RX W HCPCS: Performed by: EMERGENCY MEDICINE

## 2024-09-16 PROCEDURE — 96374 THER/PROPH/DIAG INJ IV PUSH: CPT

## 2024-09-16 PROCEDURE — 5A09457 ASSISTANCE WITH RESPIRATORY VENTILATION, 24-96 CONSECUTIVE HOURS, CONTINUOUS POSITIVE AIRWAY PRESSURE: ICD-10-PCS | Performed by: EMERGENCY MEDICINE

## 2024-09-16 PROCEDURE — 70450 CT HEAD/BRAIN W/O DYE: CPT

## 2024-09-16 PROCEDURE — 51702 INSERT TEMP BLADDER CATH: CPT

## 2024-09-16 PROCEDURE — 81001 URINALYSIS AUTO W/SCOPE: CPT

## 2024-09-16 PROCEDURE — 2700000000 HC OXYGEN THERAPY PER DAY

## 2024-09-16 PROCEDURE — 82077 ASSAY SPEC XCP UR&BREATH IA: CPT

## 2024-09-16 PROCEDURE — 83880 ASSAY OF NATRIURETIC PEPTIDE: CPT

## 2024-09-16 PROCEDURE — 93005 ELECTROCARDIOGRAM TRACING: CPT | Performed by: EMERGENCY MEDICINE

## 2024-09-16 PROCEDURE — 6370000000 HC RX 637 (ALT 250 FOR IP): Performed by: EMERGENCY MEDICINE

## 2024-09-16 PROCEDURE — 80053 COMPREHEN METABOLIC PANEL: CPT

## 2024-09-16 PROCEDURE — 94761 N-INVAS EAR/PLS OXIMETRY MLT: CPT

## 2024-09-16 PROCEDURE — 94660 CPAP INITIATION&MGMT: CPT

## 2024-09-16 RX ORDER — METHYLPREDNISOLONE SODIUM SUCCINATE 125 MG/2ML
125 INJECTION, POWDER, LYOPHILIZED, FOR SOLUTION INTRAMUSCULAR; INTRAVENOUS ONCE
Status: COMPLETED | OUTPATIENT
Start: 2024-09-16 | End: 2024-09-16

## 2024-09-16 RX ORDER — METHYLPREDNISOLONE SODIUM SUCCINATE 40 MG/ML
40 INJECTION, POWDER, LYOPHILIZED, FOR SOLUTION INTRAMUSCULAR; INTRAVENOUS EVERY 8 HOURS
Status: DISCONTINUED | OUTPATIENT
Start: 2024-09-16 | End: 2024-09-18

## 2024-09-16 RX ORDER — ACETAMINOPHEN 325 MG/1
650 TABLET ORAL EVERY 6 HOURS PRN
Status: DISCONTINUED | OUTPATIENT
Start: 2024-09-16 | End: 2024-09-20 | Stop reason: HOSPADM

## 2024-09-16 RX ORDER — POTASSIUM CHLORIDE 7.45 MG/ML
10 INJECTION INTRAVENOUS PRN
Status: DISCONTINUED | OUTPATIENT
Start: 2024-09-16 | End: 2024-09-20 | Stop reason: HOSPADM

## 2024-09-16 RX ORDER — POTASSIUM CHLORIDE 1500 MG/1
40 TABLET, EXTENDED RELEASE ORAL PRN
Status: DISCONTINUED | OUTPATIENT
Start: 2024-09-16 | End: 2024-09-20 | Stop reason: HOSPADM

## 2024-09-16 RX ORDER — ASPIRIN 81 MG/1
81 TABLET ORAL DAILY
Status: DISCONTINUED | OUTPATIENT
Start: 2024-09-16 | End: 2024-09-20 | Stop reason: HOSPADM

## 2024-09-16 RX ORDER — ALBUTEROL SULFATE 0.83 MG/ML
2.5 SOLUTION RESPIRATORY (INHALATION)
Status: DISCONTINUED | OUTPATIENT
Start: 2024-09-16 | End: 2024-09-17

## 2024-09-16 RX ORDER — CARVEDILOL 3.12 MG/1
3.12 TABLET ORAL 2 TIMES DAILY WITH MEALS
Status: DISCONTINUED | OUTPATIENT
Start: 2024-09-16 | End: 2024-09-20 | Stop reason: HOSPADM

## 2024-09-16 RX ORDER — MAGNESIUM SULFATE IN WATER 40 MG/ML
2000 INJECTION, SOLUTION INTRAVENOUS PRN
Status: DISCONTINUED | OUTPATIENT
Start: 2024-09-16 | End: 2024-09-20 | Stop reason: HOSPADM

## 2024-09-16 RX ORDER — GUAIFENESIN/DEXTROMETHORPHAN 100-10MG/5
5 SYRUP ORAL EVERY 4 HOURS PRN
Status: DISCONTINUED | OUTPATIENT
Start: 2024-09-16 | End: 2024-09-20 | Stop reason: HOSPADM

## 2024-09-16 RX ORDER — ACETAMINOPHEN 650 MG/1
650 SUPPOSITORY RECTAL EVERY 6 HOURS PRN
Status: DISCONTINUED | OUTPATIENT
Start: 2024-09-16 | End: 2024-09-20 | Stop reason: HOSPADM

## 2024-09-16 RX ORDER — ENOXAPARIN SODIUM 100 MG/ML
40 INJECTION SUBCUTANEOUS DAILY
Status: DISCONTINUED | OUTPATIENT
Start: 2024-09-17 | End: 2024-09-20 | Stop reason: HOSPADM

## 2024-09-16 RX ORDER — ACETAMINOPHEN 325 MG/1
650 TABLET ORAL EVERY 6 HOURS PRN
COMMUNITY

## 2024-09-16 RX ORDER — SODIUM CHLORIDE 0.9 % (FLUSH) 0.9 %
5-40 SYRINGE (ML) INJECTION PRN
Status: DISCONTINUED | OUTPATIENT
Start: 2024-09-16 | End: 2024-09-20 | Stop reason: HOSPADM

## 2024-09-16 RX ORDER — SODIUM CHLORIDE 9 MG/ML
INJECTION, SOLUTION INTRAVENOUS PRN
Status: DISCONTINUED | OUTPATIENT
Start: 2024-09-16 | End: 2024-09-20 | Stop reason: HOSPADM

## 2024-09-16 RX ORDER — ONDANSETRON 4 MG/1
4 TABLET, ORALLY DISINTEGRATING ORAL EVERY 8 HOURS PRN
Status: DISCONTINUED | OUTPATIENT
Start: 2024-09-16 | End: 2024-09-20 | Stop reason: HOSPADM

## 2024-09-16 RX ORDER — ONDANSETRON 2 MG/ML
4 INJECTION INTRAMUSCULAR; INTRAVENOUS EVERY 6 HOURS PRN
Status: DISCONTINUED | OUTPATIENT
Start: 2024-09-16 | End: 2024-09-20 | Stop reason: HOSPADM

## 2024-09-16 RX ORDER — POLYETHYLENE GLYCOL 3350 17 G/17G
17 POWDER, FOR SOLUTION ORAL DAILY PRN
Status: DISCONTINUED | OUTPATIENT
Start: 2024-09-16 | End: 2024-09-20 | Stop reason: HOSPADM

## 2024-09-16 RX ORDER — IPRATROPIUM BROMIDE AND ALBUTEROL SULFATE 2.5; .5 MG/3ML; MG/3ML
1 SOLUTION RESPIRATORY (INHALATION) ONCE
Status: COMPLETED | OUTPATIENT
Start: 2024-09-16 | End: 2024-09-16

## 2024-09-16 RX ORDER — PANTOPRAZOLE SODIUM 40 MG/1
40 TABLET, DELAYED RELEASE ORAL
Status: DISCONTINUED | OUTPATIENT
Start: 2024-09-17 | End: 2024-09-20 | Stop reason: HOSPADM

## 2024-09-16 RX ORDER — SODIUM CHLORIDE 0.9 % (FLUSH) 0.9 %
5-40 SYRINGE (ML) INJECTION EVERY 12 HOURS SCHEDULED
Status: DISCONTINUED | OUTPATIENT
Start: 2024-09-16 | End: 2024-09-20 | Stop reason: HOSPADM

## 2024-09-16 RX ORDER — 0.9 % SODIUM CHLORIDE 0.9 %
1000 INTRAVENOUS SOLUTION INTRAVENOUS ONCE
Status: COMPLETED | OUTPATIENT
Start: 2024-09-16 | End: 2024-09-16

## 2024-09-16 RX ORDER — CLONIDINE HYDROCHLORIDE 0.1 MG/1
0.1 TABLET ORAL 2 TIMES DAILY
COMMUNITY

## 2024-09-16 RX ORDER — BUDESONIDE AND FORMOTEROL FUMARATE DIHYDRATE 160; 4.5 UG/1; UG/1
2 AEROSOL RESPIRATORY (INHALATION)
Status: DISCONTINUED | OUTPATIENT
Start: 2024-09-16 | End: 2024-09-20 | Stop reason: HOSPADM

## 2024-09-16 RX ORDER — 0.9 % SODIUM CHLORIDE 0.9 %
1400 INTRAVENOUS SOLUTION INTRAVENOUS ONCE
Status: COMPLETED | OUTPATIENT
Start: 2024-09-16 | End: 2024-09-16

## 2024-09-16 RX ORDER — ALBUTEROL SULFATE 0.83 MG/ML
5 SOLUTION RESPIRATORY (INHALATION) ONCE
Status: COMPLETED | OUTPATIENT
Start: 2024-09-16 | End: 2024-09-16

## 2024-09-16 RX ADMIN — ALBUTEROL SULFATE 5 MG: 2.5 SOLUTION RESPIRATORY (INHALATION) at 15:11

## 2024-09-16 RX ADMIN — BUDESONIDE AND FORMOTEROL FUMARATE DIHYDRATE 2 PUFF: 160; 4.5 AEROSOL RESPIRATORY (INHALATION) at 19:29

## 2024-09-16 RX ADMIN — METHYLPREDNISOLONE SODIUM SUCCINATE 125 MG: 125 INJECTION INTRAMUSCULAR; INTRAVENOUS at 12:15

## 2024-09-16 RX ADMIN — SODIUM CHLORIDE, PRESERVATIVE FREE 10 ML: 5 INJECTION INTRAVENOUS at 21:00

## 2024-09-16 RX ADMIN — SODIUM CHLORIDE 1000 ML: 9 INJECTION, SOLUTION INTRAVENOUS at 12:16

## 2024-09-16 RX ADMIN — ALBUTEROL SULFATE 2.5 MG: 0.83 SOLUTION RESPIRATORY (INHALATION) at 19:29

## 2024-09-16 RX ADMIN — AZITHROMYCIN DIHYDRATE 500 MG: 500 INJECTION, POWDER, LYOPHILIZED, FOR SOLUTION INTRAVENOUS at 17:18

## 2024-09-16 RX ADMIN — IPRATROPIUM BROMIDE 0.5 MG: 0.5 SOLUTION RESPIRATORY (INHALATION) at 19:29

## 2024-09-16 RX ADMIN — WATER 1000 MG: 1 INJECTION INTRAMUSCULAR; INTRAVENOUS; SUBCUTANEOUS at 17:10

## 2024-09-16 RX ADMIN — SODIUM CHLORIDE 1400 ML: 9 INJECTION, SOLUTION INTRAVENOUS at 14:51

## 2024-09-16 RX ADMIN — IPRATROPIUM BROMIDE AND ALBUTEROL SULFATE 1 DOSE: .5; 3 SOLUTION RESPIRATORY (INHALATION) at 11:25

## 2024-09-16 RX ADMIN — ALBUTEROL SULFATE 5 MG: 2.5 SOLUTION RESPIRATORY (INHALATION) at 11:25

## 2024-09-16 RX ADMIN — METHYLPREDNISOLONE SODIUM SUCCINATE 40 MG: 40 INJECTION INTRAMUSCULAR; INTRAVENOUS at 17:09

## 2024-09-16 RX ADMIN — AMPICILLIN AND SULBACTAM 3000 MG: 2; 1 INJECTION, POWDER, FOR SOLUTION INTRAVENOUS at 12:18

## 2024-09-16 RX ADMIN — IPRATROPIUM BROMIDE AND ALBUTEROL SULFATE 1 DOSE: .5; 3 SOLUTION RESPIRATORY (INHALATION) at 15:12

## 2024-09-16 ASSESSMENT — PAIN DESCRIPTION - LOCATION
LOCATION: BACK;GENERALIZED
LOCATION: BACK

## 2024-09-16 ASSESSMENT — PAIN SCALES - GENERAL
PAINLEVEL_OUTOF10: 1
PAINLEVEL_OUTOF10: 9
PAINLEVEL_OUTOF10: 0

## 2024-09-16 ASSESSMENT — PAIN DESCRIPTION - DESCRIPTORS
DESCRIPTORS: ACHING
DESCRIPTORS: SHOOTING

## 2024-09-16 ASSESSMENT — PAIN - FUNCTIONAL ASSESSMENT: PAIN_FUNCTIONAL_ASSESSMENT: 0-10

## 2024-09-17 LAB
ANION GAP SERPL CALCULATED.3IONS-SCNC: 7 MMOL/L (ref 3–16)
BASE EXCESS BLDV CALC-SCNC: 1.7 MMOL/L
BASOPHILS # BLD: 0 K/UL (ref 0–0.2)
BASOPHILS NFR BLD: 0 %
BUN SERPL-MCNC: 31 MG/DL (ref 7–20)
CALCIUM SERPL-MCNC: 9 MG/DL (ref 8.3–10.6)
CHLORIDE SERPL-SCNC: 103 MMOL/L (ref 99–110)
CO2 BLDV-SCNC: 34 MMOL/L
CO2 SERPL-SCNC: 30 MMOL/L (ref 21–32)
COHGB MFR BLDV: 1.1 %
CREAT SERPL-MCNC: 0.8 MG/DL (ref 0.6–1.1)
DEPRECATED RDW RBC AUTO: 17.2 % (ref 12.4–15.4)
EOSINOPHIL # BLD: 0 K/UL (ref 0–0.6)
EOSINOPHIL NFR BLD: 0 %
GFR SERPLBLD CREATININE-BSD FMLA CKD-EPI: 90 ML/MIN/{1.73_M2}
GLUCOSE BLD-MCNC: 109 MG/DL (ref 70–99)
GLUCOSE BLD-MCNC: 113 MG/DL (ref 70–99)
GLUCOSE BLD-MCNC: 134 MG/DL (ref 70–99)
GLUCOSE BLD-MCNC: 141 MG/DL (ref 70–99)
GLUCOSE BLD-MCNC: 162 MG/DL (ref 70–99)
GLUCOSE SERPL-MCNC: 103 MG/DL (ref 70–99)
HCO3 BLDV-SCNC: 31 MMOL/L (ref 23–29)
HCT VFR BLD AUTO: 25.5 % (ref 36–48)
HGB BLD-MCNC: 8 G/DL (ref 12–16)
LYMPHOCYTES # BLD: 0.8 K/UL (ref 1–5.1)
LYMPHOCYTES NFR BLD: 5 %
MAGNESIUM SERPL-MCNC: 2.39 MG/DL (ref 1.8–2.4)
MCH RBC QN AUTO: 26.9 PG (ref 26–34)
MCHC RBC AUTO-ENTMCNC: 31.6 G/DL (ref 31–36)
MCV RBC AUTO: 85 FL (ref 80–100)
METHGB MFR BLDV: 0.7 %
MONOCYTES # BLD: 0.2 K/UL (ref 0–1.3)
MONOCYTES NFR BLD: 1.1 %
MRSA DNA SPEC QL NAA+PROBE: ABNORMAL
NEUTROPHILS # BLD: 15.9 K/UL (ref 1.7–7.7)
NEUTROPHILS NFR BLD: 93.9 %
O2 THERAPY: ABNORMAL
ORGANISM: ABNORMAL
PCO2 BLDV: 80.9 MMHG (ref 40–50)
PERFORMED ON: ABNORMAL
PH BLDV: 7.19 [PH] (ref 7.35–7.45)
PHOSPHATE SERPL-MCNC: 4.7 MG/DL (ref 2.5–4.9)
PLATELET # BLD AUTO: 380 K/UL (ref 135–450)
PMV BLD AUTO: 7.3 FL (ref 5–10.5)
PO2 BLDV: 42 MMHG
POTASSIUM SERPL-SCNC: 4.7 MMOL/L (ref 3.5–5.1)
PROCALCITONIN SERPL IA-MCNC: 4.76 NG/ML (ref 0–0.15)
RBC # BLD AUTO: 2.99 M/UL (ref 4–5.2)
REPORT: NORMAL
RESP PATH DNA+RNA PNL L RESP NAA+NON-PRB: ABNORMAL
RESP PATH DNA+RNA PNL L RESP NAA+NON-PRB: ABNORMAL
SAO2 % BLDV: 68 %
SODIUM SERPL-SCNC: 140 MMOL/L (ref 136–145)
WBC # BLD AUTO: 16.9 K/UL (ref 4–11)

## 2024-09-17 PROCEDURE — 5A0945A ASSISTANCE WITH RESPIRATORY VENTILATION, 24-96 CONSECUTIVE HOURS, HIGH NASAL FLOW/VELOCITY: ICD-10-PCS | Performed by: INTERNAL MEDICINE

## 2024-09-17 PROCEDURE — 83735 ASSAY OF MAGNESIUM: CPT

## 2024-09-17 PROCEDURE — 94640 AIRWAY INHALATION TREATMENT: CPT

## 2024-09-17 PROCEDURE — 6370000000 HC RX 637 (ALT 250 FOR IP): Performed by: NURSE PRACTITIONER

## 2024-09-17 PROCEDURE — 87641 MR-STAPH DNA AMP PROBE: CPT

## 2024-09-17 PROCEDURE — 82803 BLOOD GASES ANY COMBINATION: CPT

## 2024-09-17 PROCEDURE — 94660 CPAP INITIATION&MGMT: CPT

## 2024-09-17 PROCEDURE — 36415 COLL VENOUS BLD VENIPUNCTURE: CPT

## 2024-09-17 PROCEDURE — 6370000000 HC RX 637 (ALT 250 FOR IP): Performed by: INTERNAL MEDICINE

## 2024-09-17 PROCEDURE — 6360000002 HC RX W HCPCS: Performed by: INTERNAL MEDICINE

## 2024-09-17 PROCEDURE — 2580000003 HC RX 258

## 2024-09-17 PROCEDURE — 85025 COMPLETE CBC W/AUTO DIFF WBC: CPT

## 2024-09-17 PROCEDURE — 2580000003 HC RX 258: Performed by: INTERNAL MEDICINE

## 2024-09-17 PROCEDURE — 6370000000 HC RX 637 (ALT 250 FOR IP): Performed by: HOSPITALIST

## 2024-09-17 PROCEDURE — 99291 CRITICAL CARE FIRST HOUR: CPT | Performed by: INTERNAL MEDICINE

## 2024-09-17 PROCEDURE — 6360000002 HC RX W HCPCS: Performed by: HOSPITALIST

## 2024-09-17 PROCEDURE — 2580000003 HC RX 258: Performed by: HOSPITALIST

## 2024-09-17 PROCEDURE — 87205 SMEAR GRAM STAIN: CPT

## 2024-09-17 PROCEDURE — 2000000000 HC ICU R&B

## 2024-09-17 PROCEDURE — 84145 PROCALCITONIN (PCT): CPT

## 2024-09-17 PROCEDURE — 2700000000 HC OXYGEN THERAPY PER DAY

## 2024-09-17 PROCEDURE — 87070 CULTURE OTHR SPECIMN AEROBIC: CPT

## 2024-09-17 PROCEDURE — APPNB15 APP NON BILLABLE TIME 0-15 MINS: Performed by: NURSE PRACTITIONER

## 2024-09-17 PROCEDURE — 80048 BASIC METABOLIC PNL TOTAL CA: CPT

## 2024-09-17 PROCEDURE — 84100 ASSAY OF PHOSPHORUS: CPT

## 2024-09-17 PROCEDURE — 87633 RESP VIRUS 12-25 TARGETS: CPT

## 2024-09-17 PROCEDURE — 94761 N-INVAS EAR/PLS OXIMETRY MLT: CPT

## 2024-09-17 RX ORDER — WATER 10 ML/10ML
INJECTION INTRAMUSCULAR; INTRAVENOUS; SUBCUTANEOUS
Status: COMPLETED
Start: 2024-09-17 | End: 2024-09-17

## 2024-09-17 RX ORDER — OXYCODONE HYDROCHLORIDE 5 MG/1
5 TABLET ORAL EVERY 6 HOURS PRN
Status: DISCONTINUED | OUTPATIENT
Start: 2024-09-17 | End: 2024-09-20 | Stop reason: HOSPADM

## 2024-09-17 RX ORDER — IPRATROPIUM BROMIDE AND ALBUTEROL SULFATE 2.5; .5 MG/3ML; MG/3ML
1 SOLUTION RESPIRATORY (INHALATION)
Status: DISCONTINUED | OUTPATIENT
Start: 2024-09-17 | End: 2024-09-20

## 2024-09-17 RX ADMIN — ACETAMINOPHEN 325MG 650 MG: 325 TABLET ORAL at 16:55

## 2024-09-17 RX ADMIN — WATER 1 ML: 1 INJECTION INTRAMUSCULAR; INTRAVENOUS; SUBCUTANEOUS at 10:34

## 2024-09-17 RX ADMIN — IPRATROPIUM BROMIDE AND ALBUTEROL SULFATE 1 DOSE: 2.5; .5 SOLUTION RESPIRATORY (INHALATION) at 19:24

## 2024-09-17 RX ADMIN — VANCOMYCIN HYDROCHLORIDE 1000 MG: 1 INJECTION, POWDER, LYOPHILIZED, FOR SOLUTION INTRAVENOUS at 13:16

## 2024-09-17 RX ADMIN — CEFTRIAXONE SODIUM 2000 MG: 2 INJECTION, POWDER, FOR SOLUTION INTRAMUSCULAR; INTRAVENOUS at 16:43

## 2024-09-17 RX ADMIN — ENOXAPARIN SODIUM 40 MG: 100 INJECTION SUBCUTANEOUS at 08:14

## 2024-09-17 RX ADMIN — METHYLPREDNISOLONE SODIUM SUCCINATE 40 MG: 40 INJECTION INTRAMUSCULAR; INTRAVENOUS at 10:34

## 2024-09-17 RX ADMIN — WATER 1 ML: 1 INJECTION INTRAMUSCULAR; INTRAVENOUS; SUBCUTANEOUS at 17:47

## 2024-09-17 RX ADMIN — SODIUM CHLORIDE, PRESERVATIVE FREE 10 ML: 5 INJECTION INTRAVENOUS at 08:08

## 2024-09-17 RX ADMIN — IPRATROPIUM BROMIDE AND ALBUTEROL SULFATE 1 DOSE: 2.5; .5 SOLUTION RESPIRATORY (INHALATION) at 11:47

## 2024-09-17 RX ADMIN — SERTRALINE 125 MG: 100 TABLET, FILM COATED ORAL at 10:50

## 2024-09-17 RX ADMIN — METHYLPREDNISOLONE SODIUM SUCCINATE 40 MG: 40 INJECTION INTRAMUSCULAR; INTRAVENOUS at 17:47

## 2024-09-17 RX ADMIN — IPRATROPIUM BROMIDE AND ALBUTEROL SULFATE 1 DOSE: 2.5; .5 SOLUTION RESPIRATORY (INHALATION) at 16:25

## 2024-09-17 RX ADMIN — PANTOPRAZOLE SODIUM 40 MG: 40 TABLET, DELAYED RELEASE ORAL at 08:05

## 2024-09-17 RX ADMIN — OXYCODONE HYDROCHLORIDE 5 MG: 5 TABLET ORAL at 11:05

## 2024-09-17 RX ADMIN — IPRATROPIUM BROMIDE AND ALBUTEROL SULFATE 1 DOSE: 2.5; .5 SOLUTION RESPIRATORY (INHALATION) at 08:12

## 2024-09-17 RX ADMIN — BUDESONIDE AND FORMOTEROL FUMARATE DIHYDRATE 2 PUFF: 160; 4.5 AEROSOL RESPIRATORY (INHALATION) at 19:26

## 2024-09-17 RX ADMIN — BUDESONIDE AND FORMOTEROL FUMARATE DIHYDRATE 2 PUFF: 160; 4.5 AEROSOL RESPIRATORY (INHALATION) at 08:12

## 2024-09-17 RX ADMIN — ASPIRIN 81 MG: 81 TABLET, COATED ORAL at 08:05

## 2024-09-17 RX ADMIN — AZITHROMYCIN DIHYDRATE 500 MG: 500 INJECTION, POWDER, LYOPHILIZED, FOR SOLUTION INTRAVENOUS at 17:53

## 2024-09-17 RX ADMIN — CARVEDILOL 3.12 MG: 3.12 TABLET, FILM COATED ORAL at 08:05

## 2024-09-17 RX ADMIN — CARVEDILOL 3.12 MG: 3.12 TABLET, FILM COATED ORAL at 17:54

## 2024-09-17 RX ADMIN — METHYLPREDNISOLONE SODIUM SUCCINATE 40 MG: 40 INJECTION INTRAMUSCULAR; INTRAVENOUS at 01:36

## 2024-09-17 ASSESSMENT — PAIN DESCRIPTION - ORIENTATION
ORIENTATION: MID
ORIENTATION: RIGHT

## 2024-09-17 ASSESSMENT — PAIN DESCRIPTION - LOCATION
LOCATION: SHOULDER;BACK;KNEE
LOCATION: HEAD
LOCATION: GENERALIZED
LOCATION: GENERALIZED

## 2024-09-17 ASSESSMENT — PAIN SCALES - GENERAL
PAINLEVEL_OUTOF10: 1
PAINLEVEL_OUTOF10: 8
PAINLEVEL_OUTOF10: 0
PAINLEVEL_OUTOF10: 9
PAINLEVEL_OUTOF10: 0

## 2024-09-17 ASSESSMENT — PAIN DESCRIPTION - DESCRIPTORS
DESCRIPTORS: STABBING
DESCRIPTORS: ACHING

## 2024-09-18 PROBLEM — J15.212 PNEUMONIA DUE TO METHICILLIN RESISTANT STAPHYLOCOCCUS AUREUS (MRSA) (HCC): Status: ACTIVE | Noted: 2024-09-18

## 2024-09-18 PROBLEM — J15.69 GRAM-NEGATIVE PNEUMONIA (HCC): Status: ACTIVE | Noted: 2024-09-18

## 2024-09-18 LAB
ANION GAP SERPL CALCULATED.3IONS-SCNC: 9 MMOL/L (ref 3–16)
BASOPHILS # BLD: 0 K/UL (ref 0–0.2)
BASOPHILS NFR BLD: 0.1 %
BUN SERPL-MCNC: 39 MG/DL (ref 7–20)
CALCIUM SERPL-MCNC: 9.3 MG/DL (ref 8.3–10.6)
CHLORIDE SERPL-SCNC: 103 MMOL/L (ref 99–110)
CO2 SERPL-SCNC: 26 MMOL/L (ref 21–32)
CREAT SERPL-MCNC: 0.7 MG/DL (ref 0.6–1.1)
DEPRECATED RDW RBC AUTO: 17.3 % (ref 12.4–15.4)
EOSINOPHIL # BLD: 0 K/UL (ref 0–0.6)
EOSINOPHIL NFR BLD: 0 %
GFR SERPLBLD CREATININE-BSD FMLA CKD-EPI: >90 ML/MIN/{1.73_M2}
GLUCOSE BLD-MCNC: 120 MG/DL (ref 70–99)
GLUCOSE BLD-MCNC: 134 MG/DL (ref 70–99)
GLUCOSE BLD-MCNC: 149 MG/DL (ref 70–99)
GLUCOSE SERPL-MCNC: 162 MG/DL (ref 70–99)
HCT VFR BLD AUTO: 27.5 % (ref 36–48)
HGB BLD-MCNC: 8.4 G/DL (ref 12–16)
LYMPHOCYTES # BLD: 0.9 K/UL (ref 1–5.1)
LYMPHOCYTES NFR BLD: 5.3 %
MAGNESIUM SERPL-MCNC: 2.39 MG/DL (ref 1.8–2.4)
MCH RBC QN AUTO: 26.5 PG (ref 26–34)
MCHC RBC AUTO-ENTMCNC: 30.6 G/DL (ref 31–36)
MCV RBC AUTO: 86.5 FL (ref 80–100)
MONOCYTES # BLD: 0.2 K/UL (ref 0–1.3)
MONOCYTES NFR BLD: 1.2 %
NEUTROPHILS # BLD: 15.2 K/UL (ref 1.7–7.7)
NEUTROPHILS NFR BLD: 93.4 %
PERFORMED ON: ABNORMAL
PHOSPHATE SERPL-MCNC: 3.7 MG/DL (ref 2.5–4.9)
PLATELET # BLD AUTO: 484 K/UL (ref 135–450)
PMV BLD AUTO: 7.2 FL (ref 5–10.5)
POTASSIUM SERPL-SCNC: 4.7 MMOL/L (ref 3.5–5.1)
PROCALCITONIN SERPL IA-MCNC: 3.73 NG/ML (ref 0–0.15)
RBC # BLD AUTO: 3.18 M/UL (ref 4–5.2)
SODIUM SERPL-SCNC: 138 MMOL/L (ref 136–145)
WBC # BLD AUTO: 16.2 K/UL (ref 4–11)

## 2024-09-18 PROCEDURE — 85025 COMPLETE CBC W/AUTO DIFF WBC: CPT

## 2024-09-18 PROCEDURE — 6360000002 HC RX W HCPCS: Performed by: NURSE PRACTITIONER

## 2024-09-18 PROCEDURE — 83735 ASSAY OF MAGNESIUM: CPT

## 2024-09-18 PROCEDURE — 6370000000 HC RX 637 (ALT 250 FOR IP): Performed by: HOSPITALIST

## 2024-09-18 PROCEDURE — 6360000002 HC RX W HCPCS: Performed by: HOSPITALIST

## 2024-09-18 PROCEDURE — 84145 PROCALCITONIN (PCT): CPT

## 2024-09-18 PROCEDURE — APPNB15 APP NON BILLABLE TIME 0-15 MINS: Performed by: NURSE PRACTITIONER

## 2024-09-18 PROCEDURE — 84100 ASSAY OF PHOSPHORUS: CPT

## 2024-09-18 PROCEDURE — 94640 AIRWAY INHALATION TREATMENT: CPT

## 2024-09-18 PROCEDURE — 80048 BASIC METABOLIC PNL TOTAL CA: CPT

## 2024-09-18 PROCEDURE — 2580000003 HC RX 258: Performed by: NURSE PRACTITIONER

## 2024-09-18 PROCEDURE — 2580000003 HC RX 258: Performed by: HOSPITALIST

## 2024-09-18 PROCEDURE — 36415 COLL VENOUS BLD VENIPUNCTURE: CPT

## 2024-09-18 PROCEDURE — 6370000000 HC RX 637 (ALT 250 FOR IP): Performed by: INTERNAL MEDICINE

## 2024-09-18 PROCEDURE — 6370000000 HC RX 637 (ALT 250 FOR IP): Performed by: NURSE PRACTITIONER

## 2024-09-18 PROCEDURE — 94761 N-INVAS EAR/PLS OXIMETRY MLT: CPT

## 2024-09-18 PROCEDURE — 2060000000 HC ICU INTERMEDIATE R&B

## 2024-09-18 PROCEDURE — 2700000000 HC OXYGEN THERAPY PER DAY

## 2024-09-18 PROCEDURE — 99233 SBSQ HOSP IP/OBS HIGH 50: CPT | Performed by: INTERNAL MEDICINE

## 2024-09-18 PROCEDURE — 94660 CPAP INITIATION&MGMT: CPT

## 2024-09-18 RX ORDER — METHYLPREDNISOLONE SODIUM SUCCINATE 40 MG/ML
40 INJECTION, POWDER, LYOPHILIZED, FOR SOLUTION INTRAMUSCULAR; INTRAVENOUS DAILY
Status: DISCONTINUED | OUTPATIENT
Start: 2024-09-19 | End: 2024-09-18

## 2024-09-18 RX ADMIN — BUDESONIDE AND FORMOTEROL FUMARATE DIHYDRATE 2 PUFF: 160; 4.5 AEROSOL RESPIRATORY (INHALATION) at 07:39

## 2024-09-18 RX ADMIN — OXYCODONE HYDROCHLORIDE 5 MG: 5 TABLET ORAL at 04:54

## 2024-09-18 RX ADMIN — IPRATROPIUM BROMIDE AND ALBUTEROL SULFATE 1 DOSE: 2.5; .5 SOLUTION RESPIRATORY (INHALATION) at 20:07

## 2024-09-18 RX ADMIN — METHYLPREDNISOLONE SODIUM SUCCINATE 40 MG: 40 INJECTION INTRAMUSCULAR; INTRAVENOUS at 01:12

## 2024-09-18 RX ADMIN — IPRATROPIUM BROMIDE AND ALBUTEROL SULFATE 1 DOSE: 2.5; .5 SOLUTION RESPIRATORY (INHALATION) at 15:55

## 2024-09-18 RX ADMIN — IPRATROPIUM BROMIDE AND ALBUTEROL SULFATE 1 DOSE: 2.5; .5 SOLUTION RESPIRATORY (INHALATION) at 11:19

## 2024-09-18 RX ADMIN — CARVEDILOL 3.12 MG: 3.12 TABLET, FILM COATED ORAL at 17:51

## 2024-09-18 RX ADMIN — ACETAMINOPHEN 325MG 650 MG: 325 TABLET ORAL at 09:59

## 2024-09-18 RX ADMIN — AZITHROMYCIN DIHYDRATE 500 MG: 500 INJECTION, POWDER, LYOPHILIZED, FOR SOLUTION INTRAVENOUS at 17:55

## 2024-09-18 RX ADMIN — OXYCODONE HYDROCHLORIDE 5 MG: 5 TABLET ORAL at 13:07

## 2024-09-18 RX ADMIN — VANCOMYCIN HYDROCHLORIDE 1000 MG: 1 INJECTION, POWDER, LYOPHILIZED, FOR SOLUTION INTRAVENOUS at 01:06

## 2024-09-18 RX ADMIN — ASPIRIN 81 MG: 81 TABLET, COATED ORAL at 09:54

## 2024-09-18 RX ADMIN — CARVEDILOL 3.12 MG: 3.12 TABLET, FILM COATED ORAL at 09:55

## 2024-09-18 RX ADMIN — PANTOPRAZOLE SODIUM 40 MG: 40 TABLET, DELAYED RELEASE ORAL at 06:49

## 2024-09-18 RX ADMIN — SERTRALINE 125 MG: 100 TABLET, FILM COATED ORAL at 09:54

## 2024-09-18 RX ADMIN — VANCOMYCIN HYDROCHLORIDE 1000 MG: 1 INJECTION, POWDER, LYOPHILIZED, FOR SOLUTION INTRAVENOUS at 13:05

## 2024-09-18 RX ADMIN — ENOXAPARIN SODIUM 40 MG: 100 INJECTION SUBCUTANEOUS at 09:54

## 2024-09-18 RX ADMIN — ACETAMINOPHEN 325MG 650 MG: 325 TABLET ORAL at 17:51

## 2024-09-18 RX ADMIN — SODIUM CHLORIDE, PRESERVATIVE FREE 10 ML: 5 INJECTION INTRAVENOUS at 09:55

## 2024-09-18 RX ADMIN — OXYCODONE HYDROCHLORIDE 5 MG: 5 TABLET ORAL at 19:20

## 2024-09-18 RX ADMIN — BUDESONIDE AND FORMOTEROL FUMARATE DIHYDRATE 2 PUFF: 160; 4.5 AEROSOL RESPIRATORY (INHALATION) at 20:07

## 2024-09-18 RX ADMIN — IPRATROPIUM BROMIDE AND ALBUTEROL SULFATE 1 DOSE: 2.5; .5 SOLUTION RESPIRATORY (INHALATION) at 07:39

## 2024-09-18 ASSESSMENT — PAIN DESCRIPTION - DESCRIPTORS
DESCRIPTORS: ACHING
DESCRIPTORS: ACHING;SORE;TENDER
DESCRIPTORS: ACHING
DESCRIPTORS: ACHING

## 2024-09-18 ASSESSMENT — PAIN DESCRIPTION - ORIENTATION
ORIENTATION: RIGHT
ORIENTATION: MID;RIGHT
ORIENTATION: RIGHT

## 2024-09-18 ASSESSMENT — PAIN DESCRIPTION - LOCATION
LOCATION: BACK
LOCATION: BACK;ARM
LOCATION: BACK;SHOULDER
LOCATION: HEAD
LOCATION: BACK;SHOULDER
LOCATION: HEAD

## 2024-09-18 ASSESSMENT — PAIN SCALES - GENERAL
PAINLEVEL_OUTOF10: 8
PAINLEVEL_OUTOF10: 5
PAINLEVEL_OUTOF10: 10
PAINLEVEL_OUTOF10: 9
PAINLEVEL_OUTOF10: 3
PAINLEVEL_OUTOF10: 0
PAINLEVEL_OUTOF10: 3

## 2024-09-18 ASSESSMENT — PAIN DESCRIPTION - ONSET: ONSET: ON-GOING

## 2024-09-18 ASSESSMENT — PAIN DESCRIPTION - PAIN TYPE: TYPE: CHRONIC PAIN

## 2024-09-18 ASSESSMENT — PAIN DESCRIPTION - FREQUENCY: FREQUENCY: CONTINUOUS

## 2024-09-19 LAB
ANION GAP SERPL CALCULATED.3IONS-SCNC: 6 MMOL/L (ref 3–16)
BACTERIA SPEC RESP CULT: NORMAL
BASOPHILS # BLD: 0 K/UL (ref 0–0.2)
BASOPHILS NFR BLD: 0.1 %
BUN SERPL-MCNC: 36 MG/DL (ref 7–20)
CALCIUM SERPL-MCNC: 9 MG/DL (ref 8.3–10.6)
CHLORIDE SERPL-SCNC: 105 MMOL/L (ref 99–110)
CO2 SERPL-SCNC: 30 MMOL/L (ref 21–32)
CREAT SERPL-MCNC: 0.8 MG/DL (ref 0.6–1.1)
DEPRECATED RDW RBC AUTO: 17.1 % (ref 12.4–15.4)
EOSINOPHIL # BLD: 0 K/UL (ref 0–0.6)
EOSINOPHIL NFR BLD: 0.2 %
GFR SERPLBLD CREATININE-BSD FMLA CKD-EPI: 90 ML/MIN/{1.73_M2}
GLUCOSE BLD-MCNC: 135 MG/DL (ref 70–99)
GLUCOSE BLD-MCNC: 158 MG/DL (ref 70–99)
GLUCOSE BLD-MCNC: 184 MG/DL (ref 70–99)
GLUCOSE BLD-MCNC: 86 MG/DL (ref 70–99)
GLUCOSE SERPL-MCNC: 72 MG/DL (ref 70–99)
GRAM STN SPEC: NORMAL
HCT VFR BLD AUTO: 26.8 % (ref 36–48)
HGB BLD-MCNC: 8.5 G/DL (ref 12–16)
LYMPHOCYTES # BLD: 2.2 K/UL (ref 1–5.1)
LYMPHOCYTES NFR BLD: 23.9 %
MAGNESIUM SERPL-MCNC: 2.25 MG/DL (ref 1.8–2.4)
MCH RBC QN AUTO: 27.2 PG (ref 26–34)
MCHC RBC AUTO-ENTMCNC: 31.8 G/DL (ref 31–36)
MCV RBC AUTO: 85.6 FL (ref 80–100)
MONOCYTES # BLD: 0.8 K/UL (ref 0–1.3)
MONOCYTES NFR BLD: 8.1 %
NEUTROPHILS # BLD: 6.4 K/UL (ref 1.7–7.7)
NEUTROPHILS NFR BLD: 67.7 %
PERFORMED ON: ABNORMAL
PERFORMED ON: NORMAL
PHOSPHATE SERPL-MCNC: 2.3 MG/DL (ref 2.5–4.9)
PLATELET # BLD AUTO: 434 K/UL (ref 135–450)
PMV BLD AUTO: 7.2 FL (ref 5–10.5)
POTASSIUM SERPL-SCNC: 4.5 MMOL/L (ref 3.5–5.1)
RBC # BLD AUTO: 3.13 M/UL (ref 4–5.2)
SODIUM SERPL-SCNC: 141 MMOL/L (ref 136–145)
VANCOMYCIN SERPL-MCNC: 29.5 UG/ML
WBC # BLD AUTO: 9.4 K/UL (ref 4–11)

## 2024-09-19 PROCEDURE — 85025 COMPLETE CBC W/AUTO DIFF WBC: CPT

## 2024-09-19 PROCEDURE — 6370000000 HC RX 637 (ALT 250 FOR IP): Performed by: NURSE PRACTITIONER

## 2024-09-19 PROCEDURE — 36415 COLL VENOUS BLD VENIPUNCTURE: CPT

## 2024-09-19 PROCEDURE — 6360000002 HC RX W HCPCS: Performed by: NURSE PRACTITIONER

## 2024-09-19 PROCEDURE — 99233 SBSQ HOSP IP/OBS HIGH 50: CPT | Performed by: INTERNAL MEDICINE

## 2024-09-19 PROCEDURE — 6370000000 HC RX 637 (ALT 250 FOR IP): Performed by: HOSPITALIST

## 2024-09-19 PROCEDURE — 97530 THERAPEUTIC ACTIVITIES: CPT

## 2024-09-19 PROCEDURE — 94640 AIRWAY INHALATION TREATMENT: CPT

## 2024-09-19 PROCEDURE — 84100 ASSAY OF PHOSPHORUS: CPT

## 2024-09-19 PROCEDURE — 97166 OT EVAL MOD COMPLEX 45 MIN: CPT

## 2024-09-19 PROCEDURE — 94660 CPAP INITIATION&MGMT: CPT

## 2024-09-19 PROCEDURE — 94761 N-INVAS EAR/PLS OXIMETRY MLT: CPT

## 2024-09-19 PROCEDURE — 6370000000 HC RX 637 (ALT 250 FOR IP): Performed by: INTERNAL MEDICINE

## 2024-09-19 PROCEDURE — 80202 ASSAY OF VANCOMYCIN: CPT

## 2024-09-19 PROCEDURE — 97535 SELF CARE MNGMENT TRAINING: CPT

## 2024-09-19 PROCEDURE — 6360000002 HC RX W HCPCS: Performed by: HOSPITALIST

## 2024-09-19 PROCEDURE — 97530 THERAPEUTIC ACTIVITIES: CPT | Performed by: PHYSICAL THERAPIST

## 2024-09-19 PROCEDURE — 80048 BASIC METABOLIC PNL TOTAL CA: CPT

## 2024-09-19 PROCEDURE — 97162 PT EVAL MOD COMPLEX 30 MIN: CPT | Performed by: PHYSICAL THERAPIST

## 2024-09-19 PROCEDURE — 97116 GAIT TRAINING THERAPY: CPT | Performed by: PHYSICAL THERAPIST

## 2024-09-19 PROCEDURE — 83735 ASSAY OF MAGNESIUM: CPT

## 2024-09-19 PROCEDURE — 2580000003 HC RX 258: Performed by: HOSPITALIST

## 2024-09-19 PROCEDURE — 2060000000 HC ICU INTERMEDIATE R&B

## 2024-09-19 PROCEDURE — 2700000000 HC OXYGEN THERAPY PER DAY

## 2024-09-19 PROCEDURE — 2580000003 HC RX 258: Performed by: NURSE PRACTITIONER

## 2024-09-19 RX ORDER — AZITHROMYCIN 500 MG/1
500 TABLET, FILM COATED ORAL DAILY
Status: COMPLETED | OUTPATIENT
Start: 2024-09-19 | End: 2024-09-20

## 2024-09-19 RX ORDER — LINEZOLID 600 MG/1
600 TABLET, FILM COATED ORAL EVERY 12 HOURS SCHEDULED
Status: DISCONTINUED | OUTPATIENT
Start: 2024-09-19 | End: 2024-09-20 | Stop reason: HOSPADM

## 2024-09-19 RX ADMIN — OXYCODONE HYDROCHLORIDE 5 MG: 5 TABLET ORAL at 01:41

## 2024-09-19 RX ADMIN — WATER 40 MG: 1 INJECTION INTRAMUSCULAR; INTRAVENOUS; SUBCUTANEOUS at 08:18

## 2024-09-19 RX ADMIN — BUDESONIDE AND FORMOTEROL FUMARATE DIHYDRATE 2 PUFF: 160; 4.5 AEROSOL RESPIRATORY (INHALATION) at 20:14

## 2024-09-19 RX ADMIN — IPRATROPIUM BROMIDE AND ALBUTEROL SULFATE 1 DOSE: 2.5; .5 SOLUTION RESPIRATORY (INHALATION) at 12:00

## 2024-09-19 RX ADMIN — IPRATROPIUM BROMIDE AND ALBUTEROL SULFATE 1 DOSE: 2.5; .5 SOLUTION RESPIRATORY (INHALATION) at 16:34

## 2024-09-19 RX ADMIN — OXYCODONE HYDROCHLORIDE 5 MG: 5 TABLET ORAL at 08:19

## 2024-09-19 RX ADMIN — SODIUM CHLORIDE, PRESERVATIVE FREE 10 ML: 5 INJECTION INTRAVENOUS at 08:31

## 2024-09-19 RX ADMIN — LINEZOLID 600 MG: 600 TABLET, FILM COATED ORAL at 20:25

## 2024-09-19 RX ADMIN — VANCOMYCIN HYDROCHLORIDE 1000 MG: 1 INJECTION, POWDER, LYOPHILIZED, FOR SOLUTION INTRAVENOUS at 01:45

## 2024-09-19 RX ADMIN — AZITHROMYCIN 500 MG: 500 TABLET, FILM COATED ORAL at 13:44

## 2024-09-19 RX ADMIN — SODIUM CHLORIDE, PRESERVATIVE FREE 10 ML: 5 INJECTION INTRAVENOUS at 20:26

## 2024-09-19 RX ADMIN — IPRATROPIUM BROMIDE AND ALBUTEROL SULFATE 1 DOSE: 2.5; .5 SOLUTION RESPIRATORY (INHALATION) at 20:14

## 2024-09-19 RX ADMIN — IPRATROPIUM BROMIDE AND ALBUTEROL SULFATE 1 DOSE: 2.5; .5 SOLUTION RESPIRATORY (INHALATION) at 08:09

## 2024-09-19 RX ADMIN — OXYCODONE HYDROCHLORIDE 5 MG: 5 TABLET ORAL at 21:03

## 2024-09-19 RX ADMIN — ENOXAPARIN SODIUM 40 MG: 100 INJECTION SUBCUTANEOUS at 08:18

## 2024-09-19 RX ADMIN — SERTRALINE 125 MG: 100 TABLET, FILM COATED ORAL at 08:18

## 2024-09-19 RX ADMIN — PANTOPRAZOLE SODIUM 40 MG: 40 TABLET, DELAYED RELEASE ORAL at 06:10

## 2024-09-19 RX ADMIN — ASPIRIN 81 MG: 81 TABLET, COATED ORAL at 08:20

## 2024-09-19 RX ADMIN — CARVEDILOL 3.12 MG: 3.12 TABLET, FILM COATED ORAL at 17:43

## 2024-09-19 RX ADMIN — ACETAMINOPHEN 325MG 650 MG: 325 TABLET ORAL at 11:48

## 2024-09-19 RX ADMIN — OXYCODONE HYDROCHLORIDE 5 MG: 5 TABLET ORAL at 15:40

## 2024-09-19 RX ADMIN — CARVEDILOL 3.12 MG: 3.12 TABLET, FILM COATED ORAL at 08:20

## 2024-09-19 RX ADMIN — BUDESONIDE AND FORMOTEROL FUMARATE DIHYDRATE 2 PUFF: 160; 4.5 AEROSOL RESPIRATORY (INHALATION) at 08:09

## 2024-09-19 ASSESSMENT — PAIN DESCRIPTION - LOCATION
LOCATION: BACK;KNEE;SHOULDER
LOCATION: HEAD
LOCATION: BACK;SHOULDER
LOCATION: SHOULDER;BACK
LOCATION: BACK

## 2024-09-19 ASSESSMENT — PAIN DESCRIPTION - ORIENTATION
ORIENTATION: RIGHT
ORIENTATION: RIGHT;LOWER
ORIENTATION: LOWER;RIGHT
ORIENTATION: RIGHT

## 2024-09-19 ASSESSMENT — PAIN DESCRIPTION - DESCRIPTORS
DESCRIPTORS: STABBING
DESCRIPTORS: STABBING
DESCRIPTORS: ACHING

## 2024-09-19 ASSESSMENT — PAIN SCALES - GENERAL
PAINLEVEL_OUTOF10: 10
PAINLEVEL_OUTOF10: 3
PAINLEVEL_OUTOF10: 8
PAINLEVEL_OUTOF10: 9
PAINLEVEL_OUTOF10: 9

## 2024-09-20 VITALS
WEIGHT: 169.09 LBS | SYSTOLIC BLOOD PRESSURE: 121 MMHG | RESPIRATION RATE: 18 BRPM | DIASTOLIC BLOOD PRESSURE: 80 MMHG | HEART RATE: 79 BPM | HEIGHT: 59 IN | BODY MASS INDEX: 34.09 KG/M2 | TEMPERATURE: 98.4 F | OXYGEN SATURATION: 95 %

## 2024-09-20 LAB
ANION GAP SERPL CALCULATED.3IONS-SCNC: 9 MMOL/L (ref 3–16)
BACTERIA BLD CULT ORG #2: NORMAL
BACTERIA BLD CULT: NORMAL
BASOPHILS # BLD: 0 K/UL (ref 0–0.2)
BASOPHILS NFR BLD: 0.5 %
BUN SERPL-MCNC: 25 MG/DL (ref 7–20)
CALCIUM SERPL-MCNC: 8.7 MG/DL (ref 8.3–10.6)
CHLORIDE SERPL-SCNC: 102 MMOL/L (ref 99–110)
CO2 SERPL-SCNC: 30 MMOL/L (ref 21–32)
CREAT SERPL-MCNC: 0.7 MG/DL (ref 0.6–1.1)
DEPRECATED RDW RBC AUTO: 17 % (ref 12.4–15.4)
EOSINOPHIL # BLD: 0 K/UL (ref 0–0.6)
EOSINOPHIL NFR BLD: 0.6 %
GFR SERPLBLD CREATININE-BSD FMLA CKD-EPI: >90 ML/MIN/{1.73_M2}
GLUCOSE BLD-MCNC: 108 MG/DL (ref 70–99)
GLUCOSE BLD-MCNC: 130 MG/DL (ref 70–99)
GLUCOSE SERPL-MCNC: 75 MG/DL (ref 70–99)
HCT VFR BLD AUTO: 24.1 % (ref 36–48)
HGB BLD-MCNC: 7.7 G/DL (ref 12–16)
LYMPHOCYTES # BLD: 2.2 K/UL (ref 1–5.1)
LYMPHOCYTES NFR BLD: 33.1 %
MAGNESIUM SERPL-MCNC: 2.14 MG/DL (ref 1.8–2.4)
MCH RBC QN AUTO: 27.1 PG (ref 26–34)
MCHC RBC AUTO-ENTMCNC: 32 G/DL (ref 31–36)
MCV RBC AUTO: 84.6 FL (ref 80–100)
MONOCYTES # BLD: 0.5 K/UL (ref 0–1.3)
MONOCYTES NFR BLD: 8 %
NEUTROPHILS # BLD: 3.8 K/UL (ref 1.7–7.7)
NEUTROPHILS NFR BLD: 57.8 %
PERFORMED ON: ABNORMAL
PERFORMED ON: ABNORMAL
PHOSPHATE SERPL-MCNC: 2.3 MG/DL (ref 2.5–4.9)
PLATELET # BLD AUTO: 441 K/UL (ref 135–450)
PMV BLD AUTO: 7 FL (ref 5–10.5)
POTASSIUM SERPL-SCNC: 4.1 MMOL/L (ref 3.5–5.1)
RBC # BLD AUTO: 2.85 M/UL (ref 4–5.2)
SODIUM SERPL-SCNC: 141 MMOL/L (ref 136–145)
WBC # BLD AUTO: 6.6 K/UL (ref 4–11)

## 2024-09-20 PROCEDURE — 85025 COMPLETE CBC W/AUTO DIFF WBC: CPT

## 2024-09-20 PROCEDURE — 2700000000 HC OXYGEN THERAPY PER DAY

## 2024-09-20 PROCEDURE — 6370000000 HC RX 637 (ALT 250 FOR IP): Performed by: INTERNAL MEDICINE

## 2024-09-20 PROCEDURE — 6370000000 HC RX 637 (ALT 250 FOR IP): Performed by: HOSPITALIST

## 2024-09-20 PROCEDURE — 97530 THERAPEUTIC ACTIVITIES: CPT

## 2024-09-20 PROCEDURE — 6360000002 HC RX W HCPCS: Performed by: HOSPITALIST

## 2024-09-20 PROCEDURE — 94761 N-INVAS EAR/PLS OXIMETRY MLT: CPT

## 2024-09-20 PROCEDURE — 80048 BASIC METABOLIC PNL TOTAL CA: CPT

## 2024-09-20 PROCEDURE — 94640 AIRWAY INHALATION TREATMENT: CPT

## 2024-09-20 PROCEDURE — 83735 ASSAY OF MAGNESIUM: CPT

## 2024-09-20 PROCEDURE — 36415 COLL VENOUS BLD VENIPUNCTURE: CPT

## 2024-09-20 PROCEDURE — 2580000003 HC RX 258: Performed by: NURSE PRACTITIONER

## 2024-09-20 PROCEDURE — 6370000000 HC RX 637 (ALT 250 FOR IP): Performed by: NURSE PRACTITIONER

## 2024-09-20 PROCEDURE — 2580000003 HC RX 258: Performed by: HOSPITALIST

## 2024-09-20 PROCEDURE — 84100 ASSAY OF PHOSPHORUS: CPT

## 2024-09-20 PROCEDURE — 6360000002 HC RX W HCPCS: Performed by: NURSE PRACTITIONER

## 2024-09-20 RX ORDER — IPRATROPIUM BROMIDE AND ALBUTEROL SULFATE 2.5; .5 MG/3ML; MG/3ML
1 SOLUTION RESPIRATORY (INHALATION)
Status: DISCONTINUED | OUTPATIENT
Start: 2024-09-20 | End: 2024-09-20 | Stop reason: HOSPADM

## 2024-09-20 RX ORDER — OXYCODONE HYDROCHLORIDE 5 MG/1
5 TABLET ORAL EVERY 8 HOURS PRN
Qty: 6 TABLET | Refills: 0 | Status: SHIPPED | OUTPATIENT
Start: 2024-09-20 | End: 2024-09-22

## 2024-09-20 RX ORDER — LINEZOLID 600 MG/1
600 TABLET, FILM COATED ORAL EVERY 12 HOURS SCHEDULED
Qty: 7 TABLET | Refills: 0 | Status: SHIPPED | OUTPATIENT
Start: 2024-09-20 | End: 2024-09-24

## 2024-09-20 RX ORDER — PREDNISONE 10 MG/1
TABLET ORAL
Qty: 30 TABLET | Refills: 0 | Status: SHIPPED | OUTPATIENT
Start: 2024-09-20

## 2024-09-20 RX ORDER — ALBUTEROL SULFATE 90 UG/1
2 INHALANT RESPIRATORY (INHALATION) EVERY 4 HOURS PRN
Status: DISCONTINUED | OUTPATIENT
Start: 2024-09-20 | End: 2024-09-20 | Stop reason: HOSPADM

## 2024-09-20 RX ADMIN — WATER 40 MG: 1 INJECTION INTRAMUSCULAR; INTRAVENOUS; SUBCUTANEOUS at 09:04

## 2024-09-20 RX ADMIN — IPRATROPIUM BROMIDE AND ALBUTEROL SULFATE 1 DOSE: 2.5; .5 SOLUTION RESPIRATORY (INHALATION) at 07:36

## 2024-09-20 RX ADMIN — OXYCODONE HYDROCHLORIDE 5 MG: 5 TABLET ORAL at 18:47

## 2024-09-20 RX ADMIN — OXYCODONE HYDROCHLORIDE 5 MG: 5 TABLET ORAL at 04:47

## 2024-09-20 RX ADMIN — CARVEDILOL 3.12 MG: 3.12 TABLET, FILM COATED ORAL at 18:47

## 2024-09-20 RX ADMIN — SERTRALINE 125 MG: 100 TABLET, FILM COATED ORAL at 09:03

## 2024-09-20 RX ADMIN — AZITHROMYCIN 500 MG: 500 TABLET, FILM COATED ORAL at 09:03

## 2024-09-20 RX ADMIN — OXYCODONE HYDROCHLORIDE 5 MG: 5 TABLET ORAL at 12:41

## 2024-09-20 RX ADMIN — BUDESONIDE AND FORMOTEROL FUMARATE DIHYDRATE 2 PUFF: 160; 4.5 AEROSOL RESPIRATORY (INHALATION) at 07:38

## 2024-09-20 RX ADMIN — LINEZOLID 600 MG: 600 TABLET, FILM COATED ORAL at 09:03

## 2024-09-20 RX ADMIN — ASPIRIN 81 MG: 81 TABLET, COATED ORAL at 09:03

## 2024-09-20 RX ADMIN — CARVEDILOL 3.12 MG: 3.12 TABLET, FILM COATED ORAL at 09:03

## 2024-09-20 RX ADMIN — ENOXAPARIN SODIUM 40 MG: 100 INJECTION SUBCUTANEOUS at 09:04

## 2024-09-20 RX ADMIN — PANTOPRAZOLE SODIUM 40 MG: 40 TABLET, DELAYED RELEASE ORAL at 06:04

## 2024-09-20 RX ADMIN — SODIUM CHLORIDE, PRESERVATIVE FREE 10 ML: 5 INJECTION INTRAVENOUS at 09:04

## 2024-09-20 ASSESSMENT — PAIN DESCRIPTION - ORIENTATION
ORIENTATION: RIGHT
ORIENTATION: RIGHT;LOWER
ORIENTATION: RIGHT;MID;LOWER

## 2024-09-20 ASSESSMENT — PAIN SCALES - GENERAL
PAINLEVEL_OUTOF10: 9
PAINLEVEL_OUTOF10: 3
PAINLEVEL_OUTOF10: 8
PAINLEVEL_OUTOF10: 8

## 2024-09-20 ASSESSMENT — PAIN DESCRIPTION - LOCATION
LOCATION: BACK;SHOULDER
LOCATION: BACK;SHOULDER
LOCATION: SHOULDER;BACK

## 2024-09-20 ASSESSMENT — PAIN - FUNCTIONAL ASSESSMENT
PAIN_FUNCTIONAL_ASSESSMENT: PREVENTS OR INTERFERES SOME ACTIVE ACTIVITIES AND ADLS
PAIN_FUNCTIONAL_ASSESSMENT: PREVENTS OR INTERFERES WITH MANY ACTIVE NOT PASSIVE ACTIVITIES
PAIN_FUNCTIONAL_ASSESSMENT: PREVENTS OR INTERFERES SOME ACTIVE ACTIVITIES AND ADLS

## 2024-09-20 ASSESSMENT — PAIN DESCRIPTION - DESCRIPTORS
DESCRIPTORS: ACHING;PENETRATING
DESCRIPTORS: STABBING
DESCRIPTORS: ACHING

## 2024-09-20 ASSESSMENT — PAIN SCALES - WONG BAKER: WONGBAKER_NUMERICALRESPONSE: NO HURT
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
## 2024-10-08 ENCOUNTER — HOSPITAL ENCOUNTER (OUTPATIENT)
Dept: GENERAL RADIOLOGY | Age: 49
Discharge: HOME | End: 2024-10-08
Payer: MEDICARE

## 2024-10-08 ENCOUNTER — HOSPITAL ENCOUNTER (OUTPATIENT)
Age: 49
Discharge: HOME | End: 2024-10-08
Payer: MEDICARE

## 2024-10-08 DIAGNOSIS — M25.511 PAIN, JOINT, SHOULDER REGION, RIGHT: ICD-10-CM

## 2024-10-08 PROCEDURE — 73030 X-RAY EXAM OF SHOULDER: CPT

## 2024-10-10 RX ORDER — LISINOPRIL 5 MG/1
5 TABLET ORAL DAILY
Qty: 90 TABLET | Refills: 3 | Status: SHIPPED | OUTPATIENT
Start: 2024-10-10

## 2024-10-10 RX ORDER — CARVEDILOL 3.12 MG/1
3.12 TABLET ORAL 2 TIMES DAILY
Qty: 180 TABLET | Refills: 3 | Status: SHIPPED | OUTPATIENT
Start: 2024-10-10

## 2024-11-05 ENCOUNTER — TELEPHONE (OUTPATIENT)
Dept: PAIN MANAGEMENT | Age: 49
End: 2024-11-05

## 2024-11-05 NOTE — TELEPHONE ENCOUNTER
Pt called asking why referral was denied. Would like someone to reach out and explain.    [phone]

## 2025-04-20 ENCOUNTER — HOSPITAL ENCOUNTER (INPATIENT)
Age: 50
LOS: 8 days | Discharge: HOME | DRG: 207 | End: 2025-04-28
Attending: EMERGENCY MEDICINE
Payer: MEDICARE

## 2025-04-20 ENCOUNTER — APPOINTMENT (OUTPATIENT)
Dept: GENERAL RADIOLOGY | Age: 50
DRG: 207 | End: 2025-04-20
Payer: MEDICARE

## 2025-04-20 DIAGNOSIS — J96.21 ACUTE ON CHRONIC RESPIRATORY FAILURE WITH HYPOXIA AND HYPERCAPNIA (HCC): ICD-10-CM

## 2025-04-20 DIAGNOSIS — J44.1 COPD EXACERBATION (HCC): Primary | ICD-10-CM

## 2025-04-20 DIAGNOSIS — J96.22 ACUTE ON CHRONIC RESPIRATORY FAILURE WITH HYPOXIA AND HYPERCAPNIA (HCC): ICD-10-CM

## 2025-04-20 LAB
ALBUMIN SERPL-MCNC: 4.2 G/DL (ref 3.4–5)
ALBUMIN/GLOB SERPL: 1.3 {RATIO} (ref 1.1–2.2)
ALP SERPL-CCNC: 97 U/L (ref 40–129)
ALT SERPL-CCNC: 10 U/L (ref 10–40)
ANION GAP SERPL CALCULATED.3IONS-SCNC: 8 MMOL/L (ref 3–16)
ANION GAP SERPL CALCULATED.3IONS-SCNC: 8 MMOL/L (ref 3–16)
AST SERPL-CCNC: 16 U/L (ref 15–37)
BASE EXCESS BLDV CALC-SCNC: 6.7 MMOL/L
BASE EXCESS BLDV CALC-SCNC: 8.3 MMOL/L
BASE EXCESS BLDV CALC-SCNC: 8.4 MMOL/L
BASOPHILS # BLD: 0 K/UL (ref 0–0.2)
BASOPHILS # BLD: 0.1 K/UL (ref 0–0.2)
BASOPHILS NFR BLD: 0.3 %
BASOPHILS NFR BLD: 0.4 %
BILIRUB SERPL-MCNC: <0.2 MG/DL (ref 0–1)
BUN SERPL-MCNC: 21 MG/DL (ref 7–20)
BUN SERPL-MCNC: 21 MG/DL (ref 7–20)
CALCIUM SERPL-MCNC: 9 MG/DL (ref 8.3–10.6)
CALCIUM SERPL-MCNC: 9.2 MG/DL (ref 8.3–10.6)
CHLORIDE SERPL-SCNC: 100 MMOL/L (ref 99–110)
CHLORIDE SERPL-SCNC: 99 MMOL/L (ref 99–110)
CO2 BLDV-SCNC: 39 MMOL/L
CO2 BLDV-SCNC: 40 MMOL/L
CO2 BLDV-SCNC: 40 MMOL/L
CO2 SERPL-SCNC: 32 MMOL/L (ref 21–32)
CO2 SERPL-SCNC: 33 MMOL/L (ref 21–32)
COHGB MFR BLDV: 1.9 %
COHGB MFR BLDV: 2.1 %
COHGB MFR BLDV: 2.4 %
CREAT SERPL-MCNC: 0.6 MG/DL (ref 0.6–1.1)
CREAT SERPL-MCNC: 0.8 MG/DL (ref 0.6–1.1)
DEPRECATED RDW RBC AUTO: 16.4 % (ref 12.4–15.4)
DEPRECATED RDW RBC AUTO: 16.7 % (ref 12.4–15.4)
EOSINOPHIL # BLD: 0 K/UL (ref 0–0.6)
EOSINOPHIL # BLD: 0.5 K/UL (ref 0–0.6)
EOSINOPHIL NFR BLD: 0.3 %
EOSINOPHIL NFR BLD: 3 %
GFR SERPLBLD CREATININE-BSD FMLA CKD-EPI: 89 ML/MIN/{1.73_M2}
GFR SERPLBLD CREATININE-BSD FMLA CKD-EPI: >90 ML/MIN/{1.73_M2}
GLUCOSE SERPL-MCNC: 169 MG/DL (ref 70–99)
GLUCOSE SERPL-MCNC: 182 MG/DL (ref 70–99)
HCO3 BLDV-SCNC: 37 MMOL/L (ref 23–29)
HCO3 BLDV-SCNC: 37 MMOL/L (ref 23–29)
HCO3 BLDV-SCNC: 38 MMOL/L (ref 23–29)
HCT VFR BLD AUTO: 32.5 % (ref 36–48)
HCT VFR BLD AUTO: 35.5 % (ref 36–48)
HGB BLD-MCNC: 10.2 G/DL (ref 12–16)
HGB BLD-MCNC: 10.9 G/DL (ref 12–16)
LACTATE BLDV-SCNC: 1.3 MMOL/L (ref 0.4–1.9)
LACTATE BLDV-SCNC: 1.3 MMOL/L (ref 0.4–1.9)
LYMPHOCYTES # BLD: 0.1 K/UL (ref 1–5.1)
LYMPHOCYTES # BLD: 0.7 K/UL (ref 1–5.1)
LYMPHOCYTES NFR BLD: 1.2 %
LYMPHOCYTES NFR BLD: 4 %
MCH RBC QN AUTO: 27.3 PG (ref 26–34)
MCH RBC QN AUTO: 27.5 PG (ref 26–34)
MCHC RBC AUTO-ENTMCNC: 30.6 G/DL (ref 31–36)
MCHC RBC AUTO-ENTMCNC: 31.5 G/DL (ref 31–36)
MCV RBC AUTO: 87.4 FL (ref 80–100)
MCV RBC AUTO: 89 FL (ref 80–100)
METHGB MFR BLDV: 0 %
METHGB MFR BLDV: 0.3 %
METHGB MFR BLDV: 0.4 %
MONOCYTES # BLD: 0.1 K/UL (ref 0–1.3)
MONOCYTES # BLD: 0.4 K/UL (ref 0–1.3)
MONOCYTES NFR BLD: 0.6 %
MONOCYTES NFR BLD: 2.2 %
NEUTROPHILS # BLD: 12 K/UL (ref 1.7–7.7)
NEUTROPHILS # BLD: 16.4 K/UL (ref 1.7–7.7)
NEUTROPHILS NFR BLD: 90.4 %
NEUTROPHILS NFR BLD: 97.6 %
NT-PROBNP SERPL-MCNC: 81 PG/ML (ref 0–124)
O2 THERAPY: ABNORMAL
PCO2 BLDV: 77.8 MMHG (ref 40–50)
PCO2 BLDV: 81.7 MMHG (ref 40–50)
PCO2 BLDV: 83.8 MMHG (ref 40–50)
PH BLDV: 7.25 [PH] (ref 7.35–7.45)
PH BLDV: 7.27 [PH] (ref 7.35–7.45)
PH BLDV: 7.29 [PH] (ref 7.35–7.45)
PLATELET # BLD AUTO: 350 K/UL (ref 135–450)
PLATELET # BLD AUTO: 419 K/UL (ref 135–450)
PMV BLD AUTO: 7.3 FL (ref 5–10.5)
PMV BLD AUTO: 7.4 FL (ref 5–10.5)
PO2 BLDV: 38 MMHG
PO2 BLDV: 64 MMHG
PO2 BLDV: 64 MMHG
POTASSIUM SERPL-SCNC: 4.7 MMOL/L (ref 3.5–5.1)
POTASSIUM SERPL-SCNC: 4.9 MMOL/L (ref 3.5–5.1)
PROCALCITONIN SERPL IA-MCNC: 0.05 NG/ML (ref 0–0.15)
PROT SERPL-MCNC: 7.4 G/DL (ref 6.4–8.2)
RBC # BLD AUTO: 3.72 M/UL (ref 4–5.2)
RBC # BLD AUTO: 3.99 M/UL (ref 4–5.2)
SAO2 % BLDV: 68 %
SAO2 % BLDV: 91 %
SAO2 % BLDV: 92 %
SODIUM SERPL-SCNC: 139 MMOL/L (ref 136–145)
SODIUM SERPL-SCNC: 141 MMOL/L (ref 136–145)
TROPONIN, HIGH SENSITIVITY: 134 NG/L (ref 0–14)
TROPONIN, HIGH SENSITIVITY: 16 NG/L (ref 0–14)
WBC # BLD AUTO: 12.2 K/UL (ref 4–11)
WBC # BLD AUTO: 18.1 K/UL (ref 4–11)

## 2025-04-20 PROCEDURE — 6370000000 HC RX 637 (ALT 250 FOR IP)

## 2025-04-20 PROCEDURE — 6360000002 HC RX W HCPCS

## 2025-04-20 PROCEDURE — 36415 COLL VENOUS BLD VENIPUNCTURE: CPT

## 2025-04-20 PROCEDURE — 6360000002 HC RX W HCPCS: Performed by: EMERGENCY MEDICINE

## 2025-04-20 PROCEDURE — 80053 COMPREHEN METABOLIC PANEL: CPT

## 2025-04-20 PROCEDURE — 84484 ASSAY OF TROPONIN QUANT: CPT

## 2025-04-20 PROCEDURE — 3E033XZ INTRODUCTION OF VASOPRESSOR INTO PERIPHERAL VEIN, PERCUTANEOUS APPROACH: ICD-10-PCS | Performed by: STUDENT IN AN ORGANIZED HEALTH CARE EDUCATION/TRAINING PROGRAM

## 2025-04-20 PROCEDURE — 83880 ASSAY OF NATRIURETIC PEPTIDE: CPT

## 2025-04-20 PROCEDURE — 83605 ASSAY OF LACTIC ACID: CPT

## 2025-04-20 PROCEDURE — 82803 BLOOD GASES ANY COMBINATION: CPT

## 2025-04-20 PROCEDURE — 71045 X-RAY EXAM CHEST 1 VIEW: CPT

## 2025-04-20 PROCEDURE — 96374 THER/PROPH/DIAG INJ IV PUSH: CPT

## 2025-04-20 PROCEDURE — 2580000003 HC RX 258: Performed by: EMERGENCY MEDICINE

## 2025-04-20 PROCEDURE — 96375 TX/PRO/DX INJ NEW DRUG ADDON: CPT

## 2025-04-20 PROCEDURE — 85025 COMPLETE CBC W/AUTO DIFF WBC: CPT

## 2025-04-20 PROCEDURE — 94640 AIRWAY INHALATION TREATMENT: CPT

## 2025-04-20 PROCEDURE — 5A1955Z RESPIRATORY VENTILATION, GREATER THAN 96 CONSECUTIVE HOURS: ICD-10-PCS | Performed by: STUDENT IN AN ORGANIZED HEALTH CARE EDUCATION/TRAINING PROGRAM

## 2025-04-20 PROCEDURE — 94660 CPAP INITIATION&MGMT: CPT

## 2025-04-20 PROCEDURE — 2700000000 HC OXYGEN THERAPY PER DAY

## 2025-04-20 PROCEDURE — 2500000003 HC RX 250 WO HCPCS

## 2025-04-20 PROCEDURE — 84145 PROCALCITONIN (PCT): CPT

## 2025-04-20 PROCEDURE — 93005 ELECTROCARDIOGRAM TRACING: CPT | Performed by: EMERGENCY MEDICINE

## 2025-04-20 PROCEDURE — 2500000003 HC RX 250 WO HCPCS: Performed by: EMERGENCY MEDICINE

## 2025-04-20 PROCEDURE — 94761 N-INVAS EAR/PLS OXIMETRY MLT: CPT

## 2025-04-20 PROCEDURE — 2000000000 HC ICU R&B

## 2025-04-20 PROCEDURE — 6370000000 HC RX 637 (ALT 250 FOR IP): Performed by: EMERGENCY MEDICINE

## 2025-04-20 PROCEDURE — 99285 EMERGENCY DEPT VISIT HI MDM: CPT

## 2025-04-20 PROCEDURE — 0BH17EZ INSERTION OF ENDOTRACHEAL AIRWAY INTO TRACHEA, VIA NATURAL OR ARTIFICIAL OPENING: ICD-10-PCS | Performed by: STUDENT IN AN ORGANIZED HEALTH CARE EDUCATION/TRAINING PROGRAM

## 2025-04-20 RX ORDER — ALBUTEROL SULFATE 90 UG/1
1 INHALANT RESPIRATORY (INHALATION)
Status: DISCONTINUED | OUTPATIENT
Start: 2025-04-20 | End: 2025-04-20 | Stop reason: SDUPTHER

## 2025-04-20 RX ORDER — CARVEDILOL 3.12 MG/1
3.12 TABLET ORAL 2 TIMES DAILY
Status: DISCONTINUED | OUTPATIENT
Start: 2025-04-20 | End: 2025-04-26

## 2025-04-20 RX ORDER — METHYLPREDNISOLONE SODIUM SUCCINATE 125 MG/2ML
40 INJECTION INTRAMUSCULAR; INTRAVENOUS EVERY 6 HOURS
Status: COMPLETED | OUTPATIENT
Start: 2025-04-21 | End: 2025-04-22

## 2025-04-20 RX ORDER — IPRATROPIUM BROMIDE AND ALBUTEROL SULFATE 2.5; .5 MG/3ML; MG/3ML
1 SOLUTION RESPIRATORY (INHALATION)
Status: DISCONTINUED | OUTPATIENT
Start: 2025-04-20 | End: 2025-04-20 | Stop reason: SDUPTHER

## 2025-04-20 RX ORDER — ONDANSETRON 2 MG/ML
4 INJECTION INTRAMUSCULAR; INTRAVENOUS EVERY 6 HOURS PRN
Status: DISCONTINUED | OUTPATIENT
Start: 2025-04-20 | End: 2025-04-28 | Stop reason: HOSPADM

## 2025-04-20 RX ORDER — METHYLPREDNISOLONE SODIUM SUCCINATE 125 MG/2ML
80 INJECTION INTRAMUSCULAR; INTRAVENOUS ONCE
Status: COMPLETED | OUTPATIENT
Start: 2025-04-20 | End: 2025-04-20

## 2025-04-20 RX ORDER — ACETAMINOPHEN 325 MG/1
650 TABLET ORAL EVERY 6 HOURS PRN
Status: DISCONTINUED | OUTPATIENT
Start: 2025-04-20 | End: 2025-04-28 | Stop reason: HOSPADM

## 2025-04-20 RX ORDER — CLONIDINE HYDROCHLORIDE 0.1 MG/1
0.1 TABLET ORAL 2 TIMES DAILY
Status: DISCONTINUED | OUTPATIENT
Start: 2025-04-20 | End: 2025-04-28 | Stop reason: HOSPADM

## 2025-04-20 RX ORDER — TORSEMIDE 20 MG/1
20 TABLET ORAL DAILY PRN
Status: DISCONTINUED | OUTPATIENT
Start: 2025-04-20 | End: 2025-04-28 | Stop reason: HOSPADM

## 2025-04-20 RX ORDER — ALBUTEROL SULFATE 0.83 MG/ML
2.5 SOLUTION RESPIRATORY (INHALATION) EVERY 4 HOURS PRN
Status: DISCONTINUED | OUTPATIENT
Start: 2025-04-20 | End: 2025-04-28 | Stop reason: HOSPADM

## 2025-04-20 RX ORDER — IPRATROPIUM BROMIDE AND ALBUTEROL SULFATE 2.5; .5 MG/3ML; MG/3ML
1 SOLUTION RESPIRATORY (INHALATION) ONCE
Status: COMPLETED | OUTPATIENT
Start: 2025-04-20 | End: 2025-04-20

## 2025-04-20 RX ORDER — MIDAZOLAM HYDROCHLORIDE 1 MG/ML
2 INJECTION, SOLUTION INTRAMUSCULAR; INTRAVENOUS ONCE
Status: DISCONTINUED | OUTPATIENT
Start: 2025-04-20 | End: 2025-04-20

## 2025-04-20 RX ORDER — WATER 10 ML/10ML
INJECTION INTRAMUSCULAR; INTRAVENOUS; SUBCUTANEOUS
Status: DISCONTINUED
Start: 2025-04-20 | End: 2025-04-21

## 2025-04-20 RX ORDER — IPRATROPIUM BROMIDE AND ALBUTEROL SULFATE 2.5; .5 MG/3ML; MG/3ML
1 SOLUTION RESPIRATORY (INHALATION)
Status: DISCONTINUED | OUTPATIENT
Start: 2025-04-21 | End: 2025-04-21

## 2025-04-20 RX ORDER — ASPIRIN 81 MG/1
81 TABLET ORAL DAILY
Status: DISCONTINUED | OUTPATIENT
Start: 2025-04-21 | End: 2025-04-28 | Stop reason: HOSPADM

## 2025-04-20 RX ORDER — SODIUM CHLORIDE 9 MG/ML
INJECTION, SOLUTION INTRAVENOUS PRN
Status: DISCONTINUED | OUTPATIENT
Start: 2025-04-20 | End: 2025-04-24

## 2025-04-20 RX ORDER — METHADONE HYDROCHLORIDE 10 MG/ML
20 CONCENTRATE ORAL DAILY
Refills: 0 | Status: DISCONTINUED | OUTPATIENT
Start: 2025-04-20 | End: 2025-04-20

## 2025-04-20 RX ORDER — SODIUM CHLORIDE 0.9 % (FLUSH) 0.9 %
5-40 SYRINGE (ML) INJECTION EVERY 12 HOURS SCHEDULED
Status: DISCONTINUED | OUTPATIENT
Start: 2025-04-20 | End: 2025-04-24

## 2025-04-20 RX ORDER — ALBUTEROL SULFATE 0.83 MG/ML
2.5 SOLUTION RESPIRATORY (INHALATION) ONCE
Status: COMPLETED | OUTPATIENT
Start: 2025-04-20 | End: 2025-04-20

## 2025-04-20 RX ORDER — PANTOPRAZOLE SODIUM 40 MG/1
40 TABLET, DELAYED RELEASE ORAL
Status: DISCONTINUED | OUTPATIENT
Start: 2025-04-21 | End: 2025-04-21

## 2025-04-20 RX ORDER — LISINOPRIL 5 MG/1
5 TABLET ORAL DAILY
Status: DISCONTINUED | OUTPATIENT
Start: 2025-04-21 | End: 2025-04-21

## 2025-04-20 RX ORDER — ACETAMINOPHEN 650 MG/1
650 SUPPOSITORY RECTAL EVERY 6 HOURS PRN
Status: DISCONTINUED | OUTPATIENT
Start: 2025-04-20 | End: 2025-04-28 | Stop reason: HOSPADM

## 2025-04-20 RX ORDER — BENZONATATE 100 MG/1
100 CAPSULE ORAL 3 TIMES DAILY PRN
Status: DISCONTINUED | OUTPATIENT
Start: 2025-04-20 | End: 2025-04-28 | Stop reason: HOSPADM

## 2025-04-20 RX ORDER — POLYETHYLENE GLYCOL 3350 17 G/17G
17 POWDER, FOR SOLUTION ORAL DAILY PRN
Status: DISCONTINUED | OUTPATIENT
Start: 2025-04-20 | End: 2025-04-28 | Stop reason: HOSPADM

## 2025-04-20 RX ORDER — ONDANSETRON 4 MG/1
4 TABLET, ORALLY DISINTEGRATING ORAL EVERY 8 HOURS PRN
Status: DISCONTINUED | OUTPATIENT
Start: 2025-04-20 | End: 2025-04-28 | Stop reason: HOSPADM

## 2025-04-20 RX ORDER — GUAIFENESIN/DEXTROMETHORPHAN 100-10MG/5
5 SYRUP ORAL EVERY 4 HOURS PRN
Status: DISCONTINUED | OUTPATIENT
Start: 2025-04-20 | End: 2025-04-28 | Stop reason: HOSPADM

## 2025-04-20 RX ORDER — SODIUM CHLORIDE 0.9 % (FLUSH) 0.9 %
5-40 SYRINGE (ML) INJECTION PRN
Status: DISCONTINUED | OUTPATIENT
Start: 2025-04-20 | End: 2025-04-24

## 2025-04-20 RX ORDER — DEXMEDETOMIDINE HYDROCHLORIDE 4 UG/ML
.1-1.5 INJECTION, SOLUTION INTRAVENOUS CONTINUOUS
Status: DISCONTINUED | OUTPATIENT
Start: 2025-04-20 | End: 2025-04-26 | Stop reason: HOSPADM

## 2025-04-20 RX ORDER — ENOXAPARIN SODIUM 100 MG/ML
40 INJECTION SUBCUTANEOUS NIGHTLY
Status: DISCONTINUED | OUTPATIENT
Start: 2025-04-20 | End: 2025-04-28 | Stop reason: HOSPADM

## 2025-04-20 RX ORDER — AZITHROMYCIN 500 MG/1
500 TABLET, FILM COATED ORAL DAILY
Status: COMPLETED | OUTPATIENT
Start: 2025-04-21 | End: 2025-04-22

## 2025-04-20 RX ORDER — PREDNISONE 20 MG/1
40 TABLET ORAL DAILY
Status: COMPLETED | OUTPATIENT
Start: 2025-04-23 | End: 2025-04-25

## 2025-04-20 RX ORDER — BUDESONIDE AND FORMOTEROL FUMARATE DIHYDRATE 160; 4.5 UG/1; UG/1
2 AEROSOL RESPIRATORY (INHALATION)
Status: DISCONTINUED | OUTPATIENT
Start: 2025-04-20 | End: 2025-04-21

## 2025-04-20 RX ADMIN — SODIUM CHLORIDE, PRESERVATIVE FREE 10 ML: 5 INJECTION INTRAVENOUS at 22:35

## 2025-04-20 RX ADMIN — ALBUTEROL SULFATE 2.5 MG: 0.83 SOLUTION RESPIRATORY (INHALATION) at 23:53

## 2025-04-20 RX ADMIN — ENOXAPARIN SODIUM 40 MG: 100 INJECTION SUBCUTANEOUS at 22:35

## 2025-04-20 RX ADMIN — IPRATROPIUM BROMIDE AND ALBUTEROL SULFATE 1 DOSE: .5; 2.5 SOLUTION RESPIRATORY (INHALATION) at 18:57

## 2025-04-20 RX ADMIN — METHYLPREDNISOLONE SODIUM SUCCINATE 80 MG: 125 INJECTION INTRAMUSCULAR; INTRAVENOUS at 18:58

## 2025-04-20 RX ADMIN — DEXMEDETOMIDINE HYDROCHLORIDE 0.2 MCG/KG/HR: 400 INJECTION, SOLUTION INTRAVENOUS at 19:22

## 2025-04-20 RX ADMIN — CARVEDILOL 3.12 MG: 3.12 TABLET, FILM COATED ORAL at 22:34

## 2025-04-20 RX ADMIN — CLONIDINE HYDROCHLORIDE 0.1 MG: 0.1 TABLET ORAL at 22:34

## 2025-04-20 RX ADMIN — AZITHROMYCIN MONOHYDRATE 500 MG: 500 INJECTION, POWDER, LYOPHILIZED, FOR SOLUTION INTRAVENOUS at 22:43

## 2025-04-20 RX ADMIN — ALBUTEROL SULFATE 2.5 MG: 2.5 SOLUTION RESPIRATORY (INHALATION) at 18:57

## 2025-04-20 ASSESSMENT — PAIN DESCRIPTION - PAIN TYPE: TYPE: ACUTE PAIN

## 2025-04-20 ASSESSMENT — PAIN SCALES - GENERAL
PAINLEVEL_OUTOF10: 10
PAINLEVEL_OUTOF10: 0

## 2025-04-20 ASSESSMENT — PAIN - FUNCTIONAL ASSESSMENT
PAIN_FUNCTIONAL_ASSESSMENT: 0-10
PAIN_FUNCTIONAL_ASSESSMENT: PREVENTS OR INTERFERES SOME ACTIVE ACTIVITIES AND ADLS

## 2025-04-20 ASSESSMENT — PAIN DESCRIPTION - ORIENTATION: ORIENTATION: RIGHT

## 2025-04-20 ASSESSMENT — PAIN DESCRIPTION - DESCRIPTORS: DESCRIPTORS: ACHING

## 2025-04-20 ASSESSMENT — PAIN DESCRIPTION - LOCATION: LOCATION: CHEST;BACK

## 2025-04-20 NOTE — ED TRIAGE NOTES
Pt has sob 1 hour before arrival. 4L baseline. Pt to 7L @home with no relife. Increased resp per ems. 75mcg fent. Given in rout. 1 breathing treatment given.

## 2025-04-21 ENCOUNTER — APPOINTMENT (OUTPATIENT)
Dept: GENERAL RADIOLOGY | Age: 50
DRG: 207 | End: 2025-04-21
Payer: MEDICARE

## 2025-04-21 LAB
ALBUMIN SERPL-MCNC: 3.7 G/DL (ref 3.4–5)
ALBUMIN/GLOB SERPL: 1.4 {RATIO} (ref 1.1–2.2)
ALP SERPL-CCNC: 105 U/L (ref 40–129)
ALT SERPL-CCNC: 30 U/L (ref 10–40)
ANION GAP SERPL CALCULATED.3IONS-SCNC: 4 MMOL/L (ref 3–16)
ANION GAP SERPL CALCULATED.3IONS-SCNC: 6 MMOL/L (ref 3–16)
ANION GAP SERPL CALCULATED.3IONS-SCNC: 7 MMOL/L (ref 3–16)
AST SERPL-CCNC: 43 U/L (ref 15–37)
BASE EXCESS BLDA CALC-SCNC: 3.4 MMOL/L (ref -3–3)
BASE EXCESS BLDV CALC-SCNC: 5.9 MMOL/L
BASE EXCESS BLDV CALC-SCNC: 6.6 MMOL/L
BASOPHILS # BLD: 0 K/UL (ref 0–0.2)
BASOPHILS NFR BLD: 0.1 %
BILIRUB SERPL-MCNC: <0.2 MG/DL (ref 0–1)
BUN SERPL-MCNC: 20 MG/DL (ref 7–20)
BUN SERPL-MCNC: 23 MG/DL (ref 7–20)
BUN SERPL-MCNC: 24 MG/DL (ref 7–20)
CALCIUM SERPL-MCNC: 8.9 MG/DL (ref 8.3–10.6)
CALCIUM SERPL-MCNC: 9.4 MG/DL (ref 8.3–10.6)
CALCIUM SERPL-MCNC: 9.8 MG/DL (ref 8.3–10.6)
CHLORIDE SERPL-SCNC: 100 MMOL/L (ref 99–110)
CHLORIDE SERPL-SCNC: 100 MMOL/L (ref 99–110)
CHLORIDE SERPL-SCNC: 98 MMOL/L (ref 99–110)
CO2 BLDA-SCNC: 36.3 MMOL/L
CO2 BLDV-SCNC: 38 MMOL/L
CO2 BLDV-SCNC: 39 MMOL/L
CO2 SERPL-SCNC: 33 MMOL/L (ref 21–32)
CO2 SERPL-SCNC: 33 MMOL/L (ref 21–32)
CO2 SERPL-SCNC: 34 MMOL/L (ref 21–32)
COHGB MFR BLDA: 0.8 % (ref 0–1.5)
COHGB MFR BLDV: 1.2 %
COHGB MFR BLDV: 1.5 %
CREAT SERPL-MCNC: 0.6 MG/DL (ref 0.6–1.1)
DEPRECATED RDW RBC AUTO: 16.4 % (ref 12.4–15.4)
EKG ATRIAL RATE: 124 BPM
EKG DIAGNOSIS: NORMAL
EKG P AXIS: 54 DEGREES
EKG P-R INTERVAL: 132 MS
EKG Q-T INTERVAL: 310 MS
EKG QRS DURATION: 80 MS
EKG QTC CALCULATION (BAZETT): 445 MS
EKG R AXIS: 19 DEGREES
EKG T AXIS: 19 DEGREES
EKG VENTRICULAR RATE: 124 BPM
EOSINOPHIL # BLD: 0 K/UL (ref 0–0.6)
EOSINOPHIL NFR BLD: 0.1 %
GFR SERPLBLD CREATININE-BSD FMLA CKD-EPI: >90 ML/MIN/{1.73_M2}
GLUCOSE BLD-MCNC: 139 MG/DL (ref 70–99)
GLUCOSE BLD-MCNC: 152 MG/DL (ref 70–99)
GLUCOSE BLD-MCNC: 177 MG/DL (ref 70–99)
GLUCOSE BLD-MCNC: 190 MG/DL (ref 70–99)
GLUCOSE BLD-MCNC: 199 MG/DL (ref 70–99)
GLUCOSE BLD-MCNC: 214 MG/DL (ref 70–99)
GLUCOSE BLD-MCNC: 247 MG/DL (ref 70–99)
GLUCOSE BLD-MCNC: 259 MG/DL (ref 70–99)
GLUCOSE BLD-MCNC: 313 MG/DL (ref 70–99)
GLUCOSE SERPL-MCNC: 152 MG/DL (ref 70–99)
GLUCOSE SERPL-MCNC: 164 MG/DL (ref 70–99)
GLUCOSE SERPL-MCNC: 223 MG/DL (ref 70–99)
HCO3 BLDA-SCNC: 33.6 MMOL/L (ref 21–29)
HCO3 BLDV-SCNC: 36 MMOL/L (ref 23–29)
HCO3 BLDV-SCNC: 36 MMOL/L (ref 23–29)
HCT VFR BLD AUTO: 32.2 % (ref 36–48)
HGB BLD-MCNC: 10.5 G/DL (ref 12–16)
HGB BLDA-MCNC: 10.5 G/DL (ref 12–16)
LACTATE BLDV-SCNC: 1.2 MMOL/L (ref 0.4–2)
LYMPHOCYTES # BLD: 0.3 K/UL (ref 1–5.1)
LYMPHOCYTES NFR BLD: 2.4 %
MAGNESIUM SERPL-MCNC: 2.28 MG/DL (ref 1.8–2.4)
MCH RBC QN AUTO: 28.2 PG (ref 26–34)
MCHC RBC AUTO-ENTMCNC: 32.6 G/DL (ref 31–36)
MCV RBC AUTO: 86.7 FL (ref 80–100)
METHGB MFR BLDA: 0.4 %
METHGB MFR BLDV: 0.3 %
METHGB MFR BLDV: 0.7 %
MONOCYTES # BLD: 0.1 K/UL (ref 0–1.3)
MONOCYTES NFR BLD: 0.7 %
NEUTROPHILS # BLD: 10.5 K/UL (ref 1.7–7.7)
NEUTROPHILS NFR BLD: 96.7 %
O2 THERAPY: ABNORMAL
ORGANISM: ABNORMAL
ORGANISM: ABNORMAL
PCO2 BLDA: 88 MMHG (ref 35–45)
PCO2 BLDV: 84.1 MMHG (ref 40–50)
PCO2 BLDV: 85.7 MMHG (ref 40–50)
PERFORMED ON: ABNORMAL
PH BLDA: 7.19 [PH] (ref 7.35–7.45)
PH BLDV: 7.23 [PH] (ref 7.35–7.45)
PH BLDV: 7.24 [PH] (ref 7.35–7.45)
PHOSPHATE SERPL-MCNC: 3.7 MG/DL (ref 2.5–4.9)
PLATELET # BLD AUTO: 314 K/UL (ref 135–450)
PMV BLD AUTO: 7.3 FL (ref 5–10.5)
PO2 BLDA: 231 MMHG (ref 75–108)
PO2 BLDV: 53 MMHG
PO2 BLDV: 58 MMHG
POTASSIUM SERPL-SCNC: 4.9 MMOL/L (ref 3.5–5.1)
POTASSIUM SERPL-SCNC: 5.5 MMOL/L (ref 3.5–5.1)
POTASSIUM SERPL-SCNC: 6.3 MMOL/L (ref 3.5–5.1)
PROT SERPL-MCNC: 6.4 G/DL (ref 6.4–8.2)
RBC # BLD AUTO: 3.72 M/UL (ref 4–5.2)
REASON FOR REJECTION: NORMAL
REJECTED TEST: NORMAL
REPORT: NORMAL
RESP PATH DNA+RNA PNL L RESP NAA+NON-PRB: ABNORMAL
RESP PATH DNA+RNA PNL L RESP NAA+NON-PRB: ABNORMAL
SAO2 % BLDA: 99.4 %
SAO2 % BLDV: 84 %
SAO2 % BLDV: 87 %
SODIUM SERPL-SCNC: 136 MMOL/L (ref 136–145)
SODIUM SERPL-SCNC: 139 MMOL/L (ref 136–145)
SODIUM SERPL-SCNC: 140 MMOL/L (ref 136–145)
WBC # BLD AUTO: 10.8 K/UL (ref 4–11)

## 2025-04-21 PROCEDURE — 87633 RESP VIRUS 12-25 TARGETS: CPT

## 2025-04-21 PROCEDURE — 93010 ELECTROCARDIOGRAM REPORT: CPT | Performed by: INTERNAL MEDICINE

## 2025-04-21 PROCEDURE — 94002 VENT MGMT INPAT INIT DAY: CPT

## 2025-04-21 PROCEDURE — 94640 AIRWAY INHALATION TREATMENT: CPT

## 2025-04-21 PROCEDURE — 36592 COLLECT BLOOD FROM PICC: CPT

## 2025-04-21 PROCEDURE — 6370000000 HC RX 637 (ALT 250 FOR IP)

## 2025-04-21 PROCEDURE — 36556 INSERT NON-TUNNEL CV CATH: CPT

## 2025-04-21 PROCEDURE — 84100 ASSAY OF PHOSPHORUS: CPT

## 2025-04-21 PROCEDURE — 36600 WITHDRAWAL OF ARTERIAL BLOOD: CPT

## 2025-04-21 PROCEDURE — 71045 X-RAY EXAM CHEST 1 VIEW: CPT

## 2025-04-21 PROCEDURE — 6370000000 HC RX 637 (ALT 250 FOR IP): Performed by: NURSE PRACTITIONER

## 2025-04-21 PROCEDURE — 99291 CRITICAL CARE FIRST HOUR: CPT | Performed by: INTERNAL MEDICINE

## 2025-04-21 PROCEDURE — 6360000002 HC RX W HCPCS

## 2025-04-21 PROCEDURE — 2500000003 HC RX 250 WO HCPCS: Performed by: NURSE PRACTITIONER

## 2025-04-21 PROCEDURE — 80053 COMPREHEN METABOLIC PANEL: CPT

## 2025-04-21 PROCEDURE — 83735 ASSAY OF MAGNESIUM: CPT

## 2025-04-21 PROCEDURE — 2500000003 HC RX 250 WO HCPCS

## 2025-04-21 PROCEDURE — 2700000000 HC OXYGEN THERAPY PER DAY

## 2025-04-21 PROCEDURE — 87070 CULTURE OTHR SPECIMN AEROBIC: CPT

## 2025-04-21 PROCEDURE — 51702 INSERT TEMP BLADDER CATH: CPT

## 2025-04-21 PROCEDURE — 02HV33Z INSERTION OF INFUSION DEVICE INTO SUPERIOR VENA CAVA, PERCUTANEOUS APPROACH: ICD-10-PCS | Performed by: STUDENT IN AN ORGANIZED HEALTH CARE EDUCATION/TRAINING PROGRAM

## 2025-04-21 PROCEDURE — 94761 N-INVAS EAR/PLS OXIMETRY MLT: CPT

## 2025-04-21 PROCEDURE — 83605 ASSAY OF LACTIC ACID: CPT

## 2025-04-21 PROCEDURE — 85025 COMPLETE CBC W/AUTO DIFF WBC: CPT

## 2025-04-21 PROCEDURE — 2580000003 HC RX 258: Performed by: NURSE PRACTITIONER

## 2025-04-21 PROCEDURE — 31500 INSERT EMERGENCY AIRWAY: CPT

## 2025-04-21 PROCEDURE — 6360000002 HC RX W HCPCS: Performed by: NURSE PRACTITIONER

## 2025-04-21 PROCEDURE — 5A09357 ASSISTANCE WITH RESPIRATORY VENTILATION, LESS THAN 24 CONSECUTIVE HOURS, CONTINUOUS POSITIVE AIRWAY PRESSURE: ICD-10-PCS | Performed by: STUDENT IN AN ORGANIZED HEALTH CARE EDUCATION/TRAINING PROGRAM

## 2025-04-21 PROCEDURE — 82803 BLOOD GASES ANY COMBINATION: CPT

## 2025-04-21 PROCEDURE — 94003 VENT MGMT INPAT SUBQ DAY: CPT

## 2025-04-21 PROCEDURE — 87205 SMEAR GRAM STAIN: CPT

## 2025-04-21 PROCEDURE — APPNB15 APP NON BILLABLE TIME 0-15 MINS: Performed by: NURSE PRACTITIONER

## 2025-04-21 PROCEDURE — 6360000002 HC RX W HCPCS: Performed by: INTERNAL MEDICINE

## 2025-04-21 PROCEDURE — 2580000003 HC RX 258

## 2025-04-21 PROCEDURE — 2000000000 HC ICU R&B

## 2025-04-21 PROCEDURE — 2500000003 HC RX 250 WO HCPCS: Performed by: EMERGENCY MEDICINE

## 2025-04-21 RX ORDER — INSULIN LISPRO 100 [IU]/ML
0-4 INJECTION, SOLUTION INTRAVENOUS; SUBCUTANEOUS EVERY 6 HOURS SCHEDULED
Status: DISCONTINUED | OUTPATIENT
Start: 2025-04-21 | End: 2025-04-27

## 2025-04-21 RX ORDER — INSULIN LISPRO 100 [IU]/ML
0-4 INJECTION, SOLUTION INTRAVENOUS; SUBCUTANEOUS
Status: DISCONTINUED | OUTPATIENT
Start: 2025-04-21 | End: 2025-04-21

## 2025-04-21 RX ORDER — OXYCODONE AND ACETAMINOPHEN 5; 325 MG/1; MG/1
1 TABLET ORAL EVERY 6 HOURS PRN
COMMUNITY

## 2025-04-21 RX ORDER — PROPOFOL 10 MG/ML
5-50 INJECTION, EMULSION INTRAVENOUS CONTINUOUS
Status: DISCONTINUED | OUTPATIENT
Start: 2025-04-21 | End: 2025-04-23

## 2025-04-21 RX ORDER — ETOMIDATE 2 MG/ML
0.3 INJECTION INTRAVENOUS ONCE
Status: COMPLETED | OUTPATIENT
Start: 2025-04-21 | End: 2025-04-21

## 2025-04-21 RX ORDER — CALCIUM GLUCONATE 20 MG/ML
1000 INJECTION, SOLUTION INTRAVENOUS ONCE
Status: COMPLETED | OUTPATIENT
Start: 2025-04-21 | End: 2025-04-21

## 2025-04-21 RX ORDER — ALBUTEROL SULFATE 0.83 MG/ML
2.5 SOLUTION RESPIRATORY (INHALATION) EVERY 6 HOURS PRN
COMMUNITY

## 2025-04-21 RX ORDER — GLUCAGON 1 MG/ML
1 KIT INJECTION PRN
Status: DISCONTINUED | OUTPATIENT
Start: 2025-04-21 | End: 2025-04-28 | Stop reason: HOSPADM

## 2025-04-21 RX ORDER — WATER 10 ML/10ML
INJECTION INTRAMUSCULAR; INTRAVENOUS; SUBCUTANEOUS
Status: COMPLETED
Start: 2025-04-21 | End: 2025-04-21

## 2025-04-21 RX ORDER — DEXTROSE MONOHYDRATE 100 MG/ML
INJECTION, SOLUTION INTRAVENOUS CONTINUOUS PRN
Status: DISCONTINUED | OUTPATIENT
Start: 2025-04-21 | End: 2025-04-21 | Stop reason: ALTCHOICE

## 2025-04-21 RX ORDER — ROCURONIUM BROMIDE 10 MG/ML
0.6 INJECTION, SOLUTION INTRAVENOUS ONCE
Status: COMPLETED | OUTPATIENT
Start: 2025-04-21 | End: 2025-04-21

## 2025-04-21 RX ORDER — IPRATROPIUM BROMIDE AND ALBUTEROL SULFATE 2.5; .5 MG/3ML; MG/3ML
1 SOLUTION RESPIRATORY (INHALATION)
Status: DISCONTINUED | OUTPATIENT
Start: 2025-04-21 | End: 2025-04-22

## 2025-04-21 RX ORDER — DEXTROSE MONOHYDRATE 100 MG/ML
INJECTION, SOLUTION INTRAVENOUS CONTINUOUS PRN
Status: DISCONTINUED | OUTPATIENT
Start: 2025-04-21 | End: 2025-04-28 | Stop reason: HOSPADM

## 2025-04-21 RX ORDER — PROPOFOL 10 MG/ML
5-50 INJECTION, EMULSION INTRAVENOUS CONTINUOUS
Status: DISCONTINUED | OUTPATIENT
Start: 2025-04-21 | End: 2025-04-21

## 2025-04-21 RX ORDER — NOREPINEPHRINE BITARTRATE 0.06 MG/ML
1-100 INJECTION, SOLUTION INTRAVENOUS CONTINUOUS
Status: DISCONTINUED | OUTPATIENT
Start: 2025-04-21 | End: 2025-04-22

## 2025-04-21 RX ORDER — FENTANYL CITRATE 50 UG/ML
100 INJECTION, SOLUTION INTRAMUSCULAR; INTRAVENOUS ONCE
Status: DISCONTINUED | OUTPATIENT
Start: 2025-04-21 | End: 2025-04-21

## 2025-04-21 RX ORDER — GLUCAGON 1 MG/ML
1 KIT INJECTION PRN
Status: DISCONTINUED | OUTPATIENT
Start: 2025-04-21 | End: 2025-04-21

## 2025-04-21 RX ORDER — FENTANYL CITRATE-0.9 % NACL/PF 10 MCG/ML
25-200 PLASTIC BAG, INJECTION (ML) INTRAVENOUS CONTINUOUS
Refills: 0 | Status: DISCONTINUED | OUTPATIENT
Start: 2025-04-21 | End: 2025-04-23

## 2025-04-21 RX ORDER — MAGNESIUM SULFATE IN WATER 40 MG/ML
2000 INJECTION, SOLUTION INTRAVENOUS ONCE
Status: COMPLETED | OUTPATIENT
Start: 2025-04-21 | End: 2025-04-21

## 2025-04-21 RX ORDER — ALBUTEROL SULFATE 0.83 MG/ML
10 SOLUTION RESPIRATORY (INHALATION) ONCE
Status: COMPLETED | OUTPATIENT
Start: 2025-04-21 | End: 2025-04-21

## 2025-04-21 RX ORDER — BUDESONIDE 0.5 MG/2ML
0.5 INHALANT ORAL
Status: DISCONTINUED | OUTPATIENT
Start: 2025-04-21 | End: 2025-04-24

## 2025-04-21 RX ADMIN — DEXMEDETOMIDINE HYDROCHLORIDE 0.9 MCG/KG/HR: 400 INJECTION, SOLUTION INTRAVENOUS at 18:44

## 2025-04-21 RX ADMIN — INSULIN LISPRO 1 UNITS: 100 INJECTION, SOLUTION INTRAVENOUS; SUBCUTANEOUS at 11:27

## 2025-04-21 RX ADMIN — ETOMIDATE 23.6 MG: 2 INJECTION, SOLUTION INTRAVENOUS at 03:37

## 2025-04-21 RX ADMIN — NOREPINEPHRINE BITARTRATE 5 MCG/MIN: 64 SOLUTION INTRAVENOUS at 03:26

## 2025-04-21 RX ADMIN — ALBUTEROL SULFATE 2.5 MG: 0.83 SOLUTION RESPIRATORY (INHALATION) at 03:50

## 2025-04-21 RX ADMIN — MAGNESIUM SULFATE HEPTAHYDRATE 2000 MG: 40 INJECTION, SOLUTION INTRAVENOUS at 05:18

## 2025-04-21 RX ADMIN — SODIUM CHLORIDE, PRESERVATIVE FREE 20 MG: 5 INJECTION INTRAVENOUS at 09:10

## 2025-04-21 RX ADMIN — WATER 10 ML: 1 INJECTION INTRAMUSCULAR; INTRAVENOUS; SUBCUTANEOUS at 07:23

## 2025-04-21 RX ADMIN — PROPOFOL 20 MCG/KG/MIN: 10 INJECTION, EMULSION INTRAVENOUS at 04:00

## 2025-04-21 RX ADMIN — IPRATROPIUM BROMIDE AND ALBUTEROL SULFATE 1 DOSE: .5; 3 SOLUTION RESPIRATORY (INHALATION) at 04:00

## 2025-04-21 RX ADMIN — SERTRALINE 125 MG: 100 TABLET, FILM COATED ORAL at 07:25

## 2025-04-21 RX ADMIN — Medication 100 MCG/HR: at 19:16

## 2025-04-21 RX ADMIN — Medication 50 MCG/HR: at 09:19

## 2025-04-21 RX ADMIN — IPRATROPIUM BROMIDE AND ALBUTEROL SULFATE 1 DOSE: .5; 3 SOLUTION RESPIRATORY (INHALATION) at 20:56

## 2025-04-21 RX ADMIN — DEXMEDETOMIDINE HYDROCHLORIDE 0.9 MCG/KG/HR: 400 INJECTION, SOLUTION INTRAVENOUS at 08:58

## 2025-04-21 RX ADMIN — INSULIN HUMAN 10 UNITS: 100 INJECTION, SOLUTION PARENTERAL at 08:31

## 2025-04-21 RX ADMIN — ALBUTEROL SULFATE 10 MG: 2.5 SOLUTION RESPIRATORY (INHALATION) at 08:12

## 2025-04-21 RX ADMIN — METHYLPREDNISOLONE SODIUM SUCCINATE 40 MG: 125 INJECTION, POWDER, LYOPHILIZED, FOR SOLUTION INTRAMUSCULAR; INTRAVENOUS at 20:01

## 2025-04-21 RX ADMIN — WATER 2 ML: 1 INJECTION INTRAMUSCULAR; INTRAVENOUS; SUBCUTANEOUS at 20:01

## 2025-04-21 RX ADMIN — SODIUM CHLORIDE, PRESERVATIVE FREE 10 ML: 5 INJECTION INTRAVENOUS at 20:06

## 2025-04-21 RX ADMIN — CALCIUM GLUCONATE 1000 MG: 20 INJECTION, SOLUTION INTRAVENOUS at 07:22

## 2025-04-21 RX ADMIN — METHYLPREDNISOLONE SODIUM SUCCINATE 40 MG: 125 INJECTION, POWDER, LYOPHILIZED, FOR SOLUTION INTRAMUSCULAR; INTRAVENOUS at 02:28

## 2025-04-21 RX ADMIN — DEXMEDETOMIDINE HYDROCHLORIDE 1.3 MCG/KG/HR: 400 INJECTION, SOLUTION INTRAVENOUS at 03:59

## 2025-04-21 RX ADMIN — PANTOPRAZOLE SODIUM 40 MG: 40 TABLET, DELAYED RELEASE ORAL at 07:25

## 2025-04-21 RX ADMIN — IPRATROPIUM BROMIDE AND ALBUTEROL SULFATE 1 DOSE: .5; 3 SOLUTION RESPIRATORY (INHALATION) at 05:44

## 2025-04-21 RX ADMIN — ENOXAPARIN SODIUM 40 MG: 100 INJECTION SUBCUTANEOUS at 20:01

## 2025-04-21 RX ADMIN — IPRATROPIUM BROMIDE AND ALBUTEROL SULFATE 1 DOSE: .5; 3 SOLUTION RESPIRATORY (INHALATION) at 13:43

## 2025-04-21 RX ADMIN — IPRATROPIUM BROMIDE AND ALBUTEROL SULFATE 1 DOSE: .5; 3 SOLUTION RESPIRATORY (INHALATION) at 21:53

## 2025-04-21 RX ADMIN — PROPOFOL 45 MCG/KG/MIN: 10 INJECTION, EMULSION INTRAVENOUS at 09:00

## 2025-04-21 RX ADMIN — PROPOFOL 45 MCG/KG/MIN: 10 INJECTION, EMULSION INTRAVENOUS at 17:30

## 2025-04-21 RX ADMIN — SODIUM CHLORIDE, PRESERVATIVE FREE 20 MG: 5 INJECTION INTRAVENOUS at 20:01

## 2025-04-21 RX ADMIN — IPRATROPIUM BROMIDE AND ALBUTEROL SULFATE 1 DOSE: .5; 3 SOLUTION RESPIRATORY (INHALATION) at 18:27

## 2025-04-21 RX ADMIN — WATER 10 ML: 1 INJECTION INTRAMUSCULAR; INTRAVENOUS; SUBCUTANEOUS at 13:54

## 2025-04-21 RX ADMIN — METHYLPREDNISOLONE SODIUM SUCCINATE 40 MG: 125 INJECTION, POWDER, LYOPHILIZED, FOR SOLUTION INTRAMUSCULAR; INTRAVENOUS at 07:23

## 2025-04-21 RX ADMIN — PROPOFOL 45 MCG/KG/MIN: 10 INJECTION, EMULSION INTRAVENOUS at 13:53

## 2025-04-21 RX ADMIN — IPRATROPIUM BROMIDE AND ALBUTEROL SULFATE 1 DOSE: .5; 3 SOLUTION RESPIRATORY (INHALATION) at 11:28

## 2025-04-21 RX ADMIN — IPRATROPIUM BROMIDE AND ALBUTEROL SULFATE 1 DOSE: .5; 3 SOLUTION RESPIRATORY (INHALATION) at 09:47

## 2025-04-21 RX ADMIN — DEXMEDETOMIDINE HYDROCHLORIDE 1.5 MCG/KG/HR: 400 INJECTION, SOLUTION INTRAVENOUS at 00:16

## 2025-04-21 RX ADMIN — BUDESONIDE 500 MCG: 0.5 INHALANT RESPIRATORY (INHALATION) at 20:57

## 2025-04-21 RX ADMIN — BUDESONIDE 500 MCG: 0.5 INHALANT RESPIRATORY (INHALATION) at 08:08

## 2025-04-21 RX ADMIN — METHYLPREDNISOLONE SODIUM SUCCINATE 40 MG: 125 INJECTION, POWDER, LYOPHILIZED, FOR SOLUTION INTRAMUSCULAR; INTRAVENOUS at 13:54

## 2025-04-21 RX ADMIN — PROPOFOL 45 MCG/KG/MIN: 10 INJECTION, EMULSION INTRAVENOUS at 22:13

## 2025-04-21 RX ADMIN — ROCURONIUM BROMIDE 47 MG: 10 INJECTION INTRAVENOUS at 03:37

## 2025-04-21 RX ADMIN — ASPIRIN 81 MG: 81 TABLET, COATED ORAL at 07:25

## 2025-04-21 RX ADMIN — SODIUM CHLORIDE, PRESERVATIVE FREE 10 ML: 5 INJECTION INTRAVENOUS at 07:24

## 2025-04-21 RX ADMIN — AZITHROMYCIN 500 MG: 500 TABLET, FILM COATED ORAL at 14:36

## 2025-04-21 RX ADMIN — SODIUM ZIRCONIUM CYCLOSILICATE 10 G: 10 POWDER, FOR SUSPENSION ORAL at 07:29

## 2025-04-21 RX ADMIN — IPRATROPIUM BROMIDE AND ALBUTEROL SULFATE 1 DOSE: .5; 3 SOLUTION RESPIRATORY (INHALATION) at 16:07

## 2025-04-21 RX ADMIN — DEXTROSE 250 ML: 10 SOLUTION INTRAVENOUS at 08:30

## 2025-04-21 RX ADMIN — DEXMEDETOMIDINE HYDROCHLORIDE 0.9 MCG/KG/HR: 400 INJECTION, SOLUTION INTRAVENOUS at 15:19

## 2025-04-21 RX ADMIN — IPRATROPIUM BROMIDE AND ALBUTEROL SULFATE 1 DOSE: .5; 3 SOLUTION RESPIRATORY (INHALATION) at 08:13

## 2025-04-21 ASSESSMENT — PULMONARY FUNCTION TESTS
PIF_VALUE: 34
PIF_VALUE: 32
PIF_VALUE: 32
PIF_VALUE: 35
PIF_VALUE: 34
PIF_VALUE: 39
PIF_VALUE: 38
PIF_VALUE: 34
PIF_VALUE: 35
PIF_VALUE: 39
PIF_VALUE: 32
PIF_VALUE: 33
PIF_VALUE: 35
PIF_VALUE: 38
PIF_VALUE: 34
PIF_VALUE: 32
PIF_VALUE: 38
PIF_VALUE: 32
PIF_VALUE: 39
PIF_VALUE: 38
PIF_VALUE: 35
PIF_VALUE: 39
PIF_VALUE: 32
PIF_VALUE: 35
PIF_VALUE: 35
PIF_VALUE: 34
PIF_VALUE: 38
PIF_VALUE: 38
PIF_VALUE: 35
PIF_VALUE: 32
PIF_VALUE: 34
PIF_VALUE: 35
PIF_VALUE: 35
PIF_VALUE: 39
PIF_VALUE: 32
PIF_VALUE: 39
PIF_VALUE: 35
PIF_VALUE: 35
PIF_VALUE: 32
PIF_VALUE: 34
PIF_VALUE: 38
PIF_VALUE: 33
PIF_VALUE: 32
PIF_VALUE: 39
PIF_VALUE: 34
PIF_VALUE: 32
PIF_VALUE: 34
PIF_VALUE: 32
PIF_VALUE: 33
PIF_VALUE: 34
PIF_VALUE: 32
PIF_VALUE: 34
PIF_VALUE: 32
PIF_VALUE: 32
PIF_VALUE: 35
PIF_VALUE: 36
PIF_VALUE: 32
PIF_VALUE: 35
PIF_VALUE: 33
PIF_VALUE: 34
PIF_VALUE: 37
PIF_VALUE: 33
PIF_VALUE: 38
PIF_VALUE: 38
PIF_VALUE: 32
PIF_VALUE: 39
PIF_VALUE: 38
PIF_VALUE: 35
PIF_VALUE: 39
PIF_VALUE: 34
PIF_VALUE: 35
PIF_VALUE: 34
PIF_VALUE: 34
PIF_VALUE: 38
PIF_VALUE: 38
PIF_VALUE: 32
PIF_VALUE: 34
PIF_VALUE: 35

## 2025-04-21 ASSESSMENT — PAIN SCALES - GENERAL
PAINLEVEL_OUTOF10: 0

## 2025-04-21 NOTE — ED PROVIDER NOTES
[facility] EMERGENCY DEPARTMENT    CHIEF COMPLAINT  Shortness of Breath (Pt has sob 1 hour before arrival. 4L baseline. Pt to 7L @home with no relife. Increased resp per ems. 75mcg fent. Given in rout. 1 breathing treatment given. )       HISTORY OF PRESENT ILLNESS  Clarissa Estrada is a 49 y.o. female who presents to the ED complaining of shortness of breath.  Symptoms times several days, worse today.  Requires 4 L nasal cannula at baseline.  I received a call ahead from EMS for patient in respiratory distress.  I gave verbal authorization for 75 mcg of fentanyl to help with the patient's anxiety tolerating CPAP.  Patient was also administered 1 DuoNeb prior to arrival.  Patient denies fever, chest pain, nausea, vomiting, diarrhea, or abdominal pain.    I have reviewed the following from the nursing documentation:    Past Medical History:   Diagnosis Date    Arthritis     Blind right eye     CHF (congestive heart failure) (HCC)     Chronic respiratory failure with hypoxia and hypercapnia (HCC)     COPD (chronic obstructive pulmonary disease) (HCC)     GERD (gastroesophageal reflux disease)     Hypertension     Movement disorder     Neuromuscular disorder (HCC)     On home O2     Pneumonia     Sleep apnea      Past Surgical History:   Procedure Laterality Date    ABDOMEN SURGERY      CHOLECYSTECTOMY      DILATATION, ESOPHAGUS      EYE SURGERY      TUBAL LIGATION       Family History   Problem Relation Age of Onset    Cancer Mother         lung cancer    Osteoporosis Mother     Cancer Father         cancer    Arthritis Father      Social History     Socioeconomic History    Marital status:      Spouse name: Not on file    Number of children: 4    Years of education: 9    Highest education level: Not on file   Occupational History    Occupation: disabled   Tobacco Use    Smoking status: Former     Current packs/day: 0.00     Average packs/day: 0.5 packs/day for 33.7 years (16.8 ttl pk-yrs)     Types:

## 2025-04-21 NOTE — SIGNIFICANT EVENT
Nurse notified around 330 patient went off of BiPAP She quickly developed respiratory distress.  Patient was evaluated at bedside.  Patient currently in respiratory distress.    Her O2 saturation decreased to 70% and turned gray.  ABG ordered and evaluated pH 7.190, pCO2 88 which has worsened since admission  RSI was initiated, patient was successfully intubated.    Chest x-ray ordered.    Due to the immediate potential for life-threatening deterioration, I spent 31 minutes providing critical care. There was a high probability of clinically significant/life threatening deterioration in the patient's condition which required my urgent intervention. This time excludes time spent performing procedures but includes time spent on direct patient care, history retrieval, review of the chart, and discussions with patient, family, and consultant(s)

## 2025-04-21 NOTE — H&P
Food Insecurity: No Food Insecurity (12/31/2024)    Received from [facility]    Hunger Vital Sign    • Worried About Running Out of Food in the Last Year: Never true    • Ran Out of Food in the Last Year: Never true   Transportation Needs: No Transportation Needs (12/31/2024)    Received from [facility]    PRAPARE - Transportation    • Lack of Transportation (Medical): No    • Lack of Transportation (Non-Medical): No   Housing Stability: High Risk (12/31/2024)    Received from [facility]    Housing Stability Vital Sign    • Unable to Pay for Housing in the Last Year: Yes    • Number of Times Moved in the Last Year: 0    • Homeless in the Last Year: No       Medications:   Medications:   • sterile water       • azithromycin  500 mg IntraVENous Once   • [START ON 4/21/2025] aspirin  81 mg Oral Daily   • carvedilol  3.125 mg Oral BID   • cloNIDine  0.1 mg Oral BID   • [START ON 4/21/2025] lisinopril  5 mg Oral Daily   • [START ON 4/21/2025] pantoprazole  40 mg Oral QAM AC   • [START ON 4/21/2025] sertraline  125 mg Oral Daily   • [START ON 4/21/2025] tiotropium  2 puff Inhalation Daily RT   • budesonide-formoterol  2 puff Inhalation BID RT   • sodium chloride flush  5-40 mL IntraVENous 2 times per day   • enoxaparin  40 mg SubCUTAneous Nightly   • [START ON 4/21/2025] azithromycin  500 mg Oral Daily   • [START ON 4/21/2025] ipratropium 0.5 mg-albuterol 2.5 mg  1 Dose Inhalation Q4H WA RT   • [START ON 4/21/2025] methylPREDNISolone  40 mg IntraVENous Q6H    Followed by   • [START ON 4/23/2025] predniSONE  40 mg Oral Daily      Infusions:   • dexmedeTOMIDine 1.2 mcg/kg/hr (04/20/25 9004)   • sodium chloride       PRN Meds: sterile water, ,   torsemide, 20 mg, Daily PRN  sodium chloride flush, 5-40 mL, PRN  sodium chloride, , PRN  ondansetron, 4 mg, Q8H PRN   Or  ondansetron, 4 mg, Q6H PRN  polyethylene glycol, 17 g, Daily PRN  acetaminophen, 650 mg, Q6H PRN   Or  acetaminophen, 650 mg, Q6H

## 2025-04-21 NOTE — PROCEDURES
Patient Name: Clarissa Estrada   Medical Record Number: [id number]  Date: 4/21/2025   Time: 7:07 AM   Room/Bed: [place] [address]/2116-01  Central Line Placement Procedure Note  Indication: hypovolemia    Consent: Unable to be obtained due to the emergent nature of this procedure.    Procedure: The patient was positioned appropriately and the skin over the right femoral vein was prepped with betadine and draped in a sterile fashion. Local anesthesia was not performed due to the emergent nature of this procedure.  A large bore needle was used to identify the vein.  A guide wire was then inserted into the vein through the needle. A triple lumen catheter was then inserted into the vessel over the guide wire using the Seldinger technique.  All ports showed good, free flowing blood return and were flushed with saline solution.  The catheter was then securely fastened to the skin with sutures and covered with a sterile dressing.  A post procedure X-ray was not indicated.    The patient tolerated the procedure well.    Complications: None, 5 cc blood loss    Electronically Signed by: @[email or web address]@PROCEDURE NOTE  Date: 4/21/2025   Name: Clarissa Estrada  YOB: 1975    Procedures

## 2025-04-21 NOTE — RT PROTOCOL NOTE
RT Inhaler-Nebulizer Bronchodilator Protocol Note    There is a bronchodilator order in the chart from a provider indicating to follow the RT Bronchodilator Protocol and there is an “Initiate RT Inhaler-Nebulizer Bronchodilator Protocol” order as well (see protocol at bottom of note).    CXR Findings:  XR CHEST PORTABLE  Result Date: 4/20/2025  Cardiomegaly and pulmonary vascular congestion without evidence of pulmonary edema. No confluent airspace consolidation.       The findings from the last RT Protocol Assessment were as follows:   History Pulmonary Disease: Chronic pulmonary disease  Respiratory Pattern: Use of accessory muscles, prolonged exhalation, or RR 26-30 bpm  Breath Sounds: Intermittent or unilateral wheezes  Cough: Strong, spontaneous, non-productive  Indication for Bronchodilator Therapy: Wheezing associated with pulm disorder  Bronchodilator Assessment Score: 12    Aerosolized bronchodilator medication orders have been revised according to the RT Inhaler-Nebulizer Bronchodilator Protocol below.    Respiratory Therapist to perform RT Therapy Protocol Assessment initially then follow the protocol.  Repeat RT Therapy Protocol Assessment PRN for score 0-3 or on second treatment, BID, and PRN for scores above 3.    No Indications - adjust the frequency to every 6 hours PRN wheezing or bronchospasm, if no treatments needed after 48 hours then discontinue using Per Protocol order mode.     If indication present, adjust the RT bronchodilator orders based on the Bronchodilator Assessment Score as indicated below.  Use Inhaler orders unless patient has one or more of the following: on home nebulizer, not able to hold breath for 10 seconds, is not alert and oriented, cannot activate and use MDI correctly, or respiratory rate 25 breaths per minute or more, then use the equivalent nebulizer order(s) with same Frequency and PRN reasons based on the score.  If a patient is on this medication at home then do not

## 2025-04-21 NOTE — PROCEDURES
PROCEDURE NOTE  Date: 4/21/2025   Name: Clarissa Estrada  YOB: 1975    Procedures    Patient Name: Clarissa Estrada   Medical Record Number: [id number]  Date: 4/21/2025   Time: 7:06 AM   Room/Bed: [id number]-[id number]/2116-01  Intubation Procedure Note  Indication: Respiratory failure and impending respiratory failure    Consent: Unable to be obtained due to the emergent nature of this procedure.    Medications Used: etomidate intravenously and fentanyl intravenously    Procedure: The patient was placed in the appropriate position.  Cricoid pressure was not required.  Intubation was performed by direct laryngoscopy using a laryngoscope and a 7.5 cuffed endotracheal tube.  The cuff was then inflated and the tube was secured appropriately at a distance of 23 cm to the dental ridge.  Initial confirmation of placement included bilateral breath sounds.  A chest x-ray to verify correct placement of the tube has been ordered but is still pending.    The patient tolerated the procedure well.     Complications: None    Electronically Signed by: @[email or web address]@

## 2025-04-22 LAB
ALBUMIN SERPL-MCNC: 3.5 G/DL (ref 3.4–5)
ALBUMIN/GLOB SERPL: 1.3 {RATIO} (ref 1.1–2.2)
ALP SERPL-CCNC: 84 U/L (ref 40–129)
ALT SERPL-CCNC: 24 U/L (ref 10–40)
ANION GAP SERPL CALCULATED.3IONS-SCNC: 5 MMOL/L (ref 3–16)
AST SERPL-CCNC: 21 U/L (ref 15–37)
BASE EXCESS BLDV CALC-SCNC: 7.8 MMOL/L
BASOPHILS # BLD: 0 K/UL (ref 0–0.2)
BASOPHILS NFR BLD: 0 %
BILIRUB SERPL-MCNC: <0.2 MG/DL (ref 0–1)
BUN SERPL-MCNC: 21 MG/DL (ref 7–20)
CALCIUM SERPL-MCNC: 9.2 MG/DL (ref 8.3–10.6)
CHLORIDE SERPL-SCNC: 98 MMOL/L (ref 99–110)
CO2 BLDV-SCNC: 38 MMOL/L
CO2 SERPL-SCNC: 34 MMOL/L (ref 21–32)
COHGB MFR BLDV: 1.3 %
CREAT SERPL-MCNC: 0.6 MG/DL (ref 0.6–1.1)
DEPRECATED RDW RBC AUTO: 15.9 % (ref 12.4–15.4)
EOSINOPHIL # BLD: 0 K/UL (ref 0–0.6)
EOSINOPHIL NFR BLD: 0 %
EST. AVERAGE GLUCOSE BLD GHB EST-MCNC: 105.4 MG/DL
GFR SERPLBLD CREATININE-BSD FMLA CKD-EPI: >90 ML/MIN/{1.73_M2}
GLUCOSE BLD-MCNC: 133 MG/DL (ref 70–99)
GLUCOSE BLD-MCNC: 140 MG/DL (ref 70–99)
GLUCOSE BLD-MCNC: 155 MG/DL (ref 70–99)
GLUCOSE BLD-MCNC: 156 MG/DL (ref 70–99)
GLUCOSE SERPL-MCNC: 154 MG/DL (ref 70–99)
HBA1C MFR BLD: 5.3 %
HCO3 BLDV-SCNC: 36 MMOL/L (ref 23–29)
HCT VFR BLD AUTO: 30.3 % (ref 36–48)
HGB BLD-MCNC: 10.1 G/DL (ref 12–16)
LYMPHOCYTES # BLD: 0.5 K/UL (ref 1–5.1)
LYMPHOCYTES NFR BLD: 5 %
MAGNESIUM SERPL-MCNC: 2.45 MG/DL (ref 1.8–2.4)
MCH RBC QN AUTO: 28.4 PG (ref 26–34)
MCHC RBC AUTO-ENTMCNC: 33.2 G/DL (ref 31–36)
MCV RBC AUTO: 85.6 FL (ref 80–100)
METHGB MFR BLDV: 0.5 %
MONOCYTES # BLD: 0.2 K/UL (ref 0–1.3)
MONOCYTES NFR BLD: 2.4 %
NEUTROPHILS # BLD: 9.1 K/UL (ref 1.7–7.7)
NEUTROPHILS NFR BLD: 92.6 %
O2 THERAPY: ABNORMAL
PCO2 BLDV: 74 MMHG (ref 40–50)
PERFORMED ON: ABNORMAL
PH BLDV: 7.3 [PH] (ref 7.35–7.45)
PHOSPHATE SERPL-MCNC: 3.7 MG/DL (ref 2.5–4.9)
PLATELET # BLD AUTO: 324 K/UL (ref 135–450)
PMV BLD AUTO: 7.4 FL (ref 5–10.5)
PO2 BLDV: 56 MMHG
POTASSIUM SERPL-SCNC: 4.8 MMOL/L (ref 3.5–5.1)
PROT SERPL-MCNC: 6.1 G/DL (ref 6.4–8.2)
RBC # BLD AUTO: 3.54 M/UL (ref 4–5.2)
SAO2 % BLDV: 88 %
SODIUM SERPL-SCNC: 137 MMOL/L (ref 136–145)
WBC # BLD AUTO: 9.9 K/UL (ref 4–11)

## 2025-04-22 PROCEDURE — 6360000002 HC RX W HCPCS

## 2025-04-22 PROCEDURE — 2580000003 HC RX 258: Performed by: NURSE PRACTITIONER

## 2025-04-22 PROCEDURE — 6370000000 HC RX 637 (ALT 250 FOR IP)

## 2025-04-22 PROCEDURE — 6360000002 HC RX W HCPCS: Performed by: NURSE PRACTITIONER

## 2025-04-22 PROCEDURE — 80053 COMPREHEN METABOLIC PANEL: CPT

## 2025-04-22 PROCEDURE — 6370000000 HC RX 637 (ALT 250 FOR IP): Performed by: NURSE PRACTITIONER

## 2025-04-22 PROCEDURE — 2500000003 HC RX 250 WO HCPCS

## 2025-04-22 PROCEDURE — 94640 AIRWAY INHALATION TREATMENT: CPT

## 2025-04-22 PROCEDURE — 2500000003 HC RX 250 WO HCPCS: Performed by: EMERGENCY MEDICINE

## 2025-04-22 PROCEDURE — APPNB15 APP NON BILLABLE TIME 0-15 MINS: Performed by: NURSE PRACTITIONER

## 2025-04-22 PROCEDURE — 2500000003 HC RX 250 WO HCPCS: Performed by: STUDENT IN AN ORGANIZED HEALTH CARE EDUCATION/TRAINING PROGRAM

## 2025-04-22 PROCEDURE — 85025 COMPLETE CBC W/AUTO DIFF WBC: CPT

## 2025-04-22 PROCEDURE — 99291 CRITICAL CARE FIRST HOUR: CPT | Performed by: INTERNAL MEDICINE

## 2025-04-22 PROCEDURE — 83036 HEMOGLOBIN GLYCOSYLATED A1C: CPT

## 2025-04-22 PROCEDURE — 94003 VENT MGMT INPAT SUBQ DAY: CPT

## 2025-04-22 PROCEDURE — 6360000002 HC RX W HCPCS: Performed by: INTERNAL MEDICINE

## 2025-04-22 PROCEDURE — 82803 BLOOD GASES ANY COMBINATION: CPT

## 2025-04-22 PROCEDURE — 94761 N-INVAS EAR/PLS OXIMETRY MLT: CPT

## 2025-04-22 PROCEDURE — 6360000002 HC RX W HCPCS: Performed by: STUDENT IN AN ORGANIZED HEALTH CARE EDUCATION/TRAINING PROGRAM

## 2025-04-22 PROCEDURE — 2000000000 HC ICU R&B

## 2025-04-22 PROCEDURE — 2500000003 HC RX 250 WO HCPCS: Performed by: NURSE PRACTITIONER

## 2025-04-22 PROCEDURE — 84100 ASSAY OF PHOSPHORUS: CPT

## 2025-04-22 PROCEDURE — 2700000000 HC OXYGEN THERAPY PER DAY

## 2025-04-22 PROCEDURE — 83735 ASSAY OF MAGNESIUM: CPT

## 2025-04-22 RX ORDER — WATER 10 ML/10ML
INJECTION INTRAMUSCULAR; INTRAVENOUS; SUBCUTANEOUS
Status: COMPLETED
Start: 2025-04-22 | End: 2025-04-22

## 2025-04-22 RX ORDER — SENNA LEAF EXTRACT 176MG/5ML
5 SYRUP ORAL NIGHTLY
Status: DISCONTINUED | OUTPATIENT
Start: 2025-04-22 | End: 2025-04-26 | Stop reason: SDUPTHER

## 2025-04-22 RX ORDER — IPRATROPIUM BROMIDE AND ALBUTEROL SULFATE 2.5; .5 MG/3ML; MG/3ML
1 SOLUTION RESPIRATORY (INHALATION)
Status: DISCONTINUED | OUTPATIENT
Start: 2025-04-22 | End: 2025-04-26

## 2025-04-22 RX ORDER — POLYETHYLENE GLYCOL 3350 17 G/17G
17 POWDER, FOR SOLUTION ORAL DAILY
Status: DISCONTINUED | OUTPATIENT
Start: 2025-04-22 | End: 2025-04-28 | Stop reason: HOSPADM

## 2025-04-22 RX ADMIN — SODIUM CHLORIDE, PRESERVATIVE FREE 20 MG: 5 INJECTION INTRAVENOUS at 20:30

## 2025-04-22 RX ADMIN — METHYLPREDNISOLONE SODIUM SUCCINATE 40 MG: 125 INJECTION, POWDER, LYOPHILIZED, FOR SOLUTION INTRAMUSCULAR; INTRAVENOUS at 20:30

## 2025-04-22 RX ADMIN — POLYETHYLENE GLYCOL 3350 17 G: 17 POWDER, FOR SOLUTION ORAL at 10:54

## 2025-04-22 RX ADMIN — METHYLPREDNISOLONE SODIUM SUCCINATE 40 MG: 125 INJECTION, POWDER, LYOPHILIZED, FOR SOLUTION INTRAMUSCULAR; INTRAVENOUS at 12:58

## 2025-04-22 RX ADMIN — SODIUM CHLORIDE, PRESERVATIVE FREE 10 ML: 5 INJECTION INTRAVENOUS at 20:40

## 2025-04-22 RX ADMIN — PROPOFOL 45 MCG/KG/MIN: 10 INJECTION, EMULSION INTRAVENOUS at 22:17

## 2025-04-22 RX ADMIN — ASPIRIN 81 MG: 81 TABLET, COATED ORAL at 07:35

## 2025-04-22 RX ADMIN — PROPOFOL 45 MCG/KG/MIN: 10 INJECTION, EMULSION INTRAVENOUS at 12:34

## 2025-04-22 RX ADMIN — METHYLPREDNISOLONE SODIUM SUCCINATE 40 MG: 125 INJECTION, POWDER, LYOPHILIZED, FOR SOLUTION INTRAMUSCULAR; INTRAVENOUS at 07:29

## 2025-04-22 RX ADMIN — WATER 1000 MG: 1 INJECTION INTRAMUSCULAR; INTRAVENOUS; SUBCUTANEOUS at 07:39

## 2025-04-22 RX ADMIN — DEXMEDETOMIDINE HYDROCHLORIDE 1.1 MCG/KG/HR: 400 INJECTION, SOLUTION INTRAVENOUS at 10:54

## 2025-04-22 RX ADMIN — PROPOFOL 45 MCG/KG/MIN: 10 INJECTION, EMULSION INTRAVENOUS at 07:24

## 2025-04-22 RX ADMIN — PROPOFOL 45 MCG/KG/MIN: 10 INJECTION, EMULSION INTRAVENOUS at 03:33

## 2025-04-22 RX ADMIN — PROPOFOL 45 MCG/KG/MIN: 10 INJECTION, EMULSION INTRAVENOUS at 17:52

## 2025-04-22 RX ADMIN — DEXMEDETOMIDINE HYDROCHLORIDE 1.1 MCG/KG/HR: 400 INJECTION, SOLUTION INTRAVENOUS at 20:32

## 2025-04-22 RX ADMIN — Medication 150 MCG/HR: at 20:34

## 2025-04-22 RX ADMIN — IPRATROPIUM BROMIDE AND ALBUTEROL SULFATE 1 DOSE: .5; 3 SOLUTION RESPIRATORY (INHALATION) at 00:10

## 2025-04-22 RX ADMIN — IPRATROPIUM BROMIDE AND ALBUTEROL SULFATE 1 DOSE: .5; 3 SOLUTION RESPIRATORY (INHALATION) at 12:48

## 2025-04-22 RX ADMIN — IPRATROPIUM BROMIDE AND ALBUTEROL SULFATE 1 DOSE: .5; 3 SOLUTION RESPIRATORY (INHALATION) at 20:41

## 2025-04-22 RX ADMIN — DEXMEDETOMIDINE HYDROCHLORIDE 1.1 MCG/KG/HR: 400 INJECTION, SOLUTION INTRAVENOUS at 15:29

## 2025-04-22 RX ADMIN — DEXMEDETOMIDINE HYDROCHLORIDE 0.9 MCG/KG/HR: 400 INJECTION, SOLUTION INTRAVENOUS at 01:16

## 2025-04-22 RX ADMIN — WATER 10 ML: 1 INJECTION INTRAMUSCULAR; INTRAVENOUS; SUBCUTANEOUS at 07:29

## 2025-04-22 RX ADMIN — DEXMEDETOMIDINE HYDROCHLORIDE 0.9 MCG/KG/HR: 400 INJECTION, SOLUTION INTRAVENOUS at 05:37

## 2025-04-22 RX ADMIN — BUDESONIDE 500 MCG: 0.5 INHALANT RESPIRATORY (INHALATION) at 20:41

## 2025-04-22 RX ADMIN — ENOXAPARIN SODIUM 40 MG: 100 INJECTION SUBCUTANEOUS at 20:31

## 2025-04-22 RX ADMIN — SODIUM CHLORIDE, PRESERVATIVE FREE 10 ML: 5 INJECTION INTRAVENOUS at 07:30

## 2025-04-22 RX ADMIN — Medication 100 MCG/HR: at 06:24

## 2025-04-22 RX ADMIN — IPRATROPIUM BROMIDE AND ALBUTEROL SULFATE 1 DOSE: .5; 3 SOLUTION RESPIRATORY (INHALATION) at 01:49

## 2025-04-22 RX ADMIN — Medication 150 MCG/HR: at 13:18

## 2025-04-22 RX ADMIN — IPRATROPIUM BROMIDE AND ALBUTEROL SULFATE 1 DOSE: .5; 3 SOLUTION RESPIRATORY (INHALATION) at 05:33

## 2025-04-22 RX ADMIN — HYDROCODONE BITARTRATE AND ACETAMINOPHEN 5 ML: 176/5 SOLUTION ORAL at 20:31

## 2025-04-22 RX ADMIN — WATER 10 ML: 1 INJECTION INTRAMUSCULAR; INTRAVENOUS; SUBCUTANEOUS at 20:40

## 2025-04-22 RX ADMIN — DOCUSATE SODIUM 100 MG: 50 LIQUID ORAL at 10:54

## 2025-04-22 RX ADMIN — WATER 10 ML: 1 INJECTION INTRAMUSCULAR; INTRAVENOUS; SUBCUTANEOUS at 12:58

## 2025-04-22 RX ADMIN — SODIUM CHLORIDE, PRESERVATIVE FREE 20 MG: 5 INJECTION INTRAVENOUS at 07:33

## 2025-04-22 RX ADMIN — IPRATROPIUM BROMIDE AND ALBUTEROL SULFATE 1 DOSE: .5; 3 SOLUTION RESPIRATORY (INHALATION) at 03:43

## 2025-04-22 RX ADMIN — IPRATROPIUM BROMIDE AND ALBUTEROL SULFATE 1 DOSE: .5; 3 SOLUTION RESPIRATORY (INHALATION) at 07:42

## 2025-04-22 RX ADMIN — BUDESONIDE 500 MCG: 0.5 INHALANT RESPIRATORY (INHALATION) at 07:42

## 2025-04-22 RX ADMIN — METHYLPREDNISOLONE SODIUM SUCCINATE 40 MG: 125 INJECTION, POWDER, LYOPHILIZED, FOR SOLUTION INTRAMUSCULAR; INTRAVENOUS at 02:45

## 2025-04-22 RX ADMIN — AZITHROMYCIN 500 MG: 500 TABLET, FILM COATED ORAL at 15:23

## 2025-04-22 RX ADMIN — IPRATROPIUM BROMIDE AND ALBUTEROL SULFATE 1 DOSE: .5; 3 SOLUTION RESPIRATORY (INHALATION) at 09:50

## 2025-04-22 ASSESSMENT — PULMONARY FUNCTION TESTS
PIF_VALUE: 32
PIF_VALUE: 30
PIF_VALUE: 30
PIF_VALUE: 32
PIF_VALUE: 32
PIF_VALUE: 30
PIF_VALUE: 32
PIF_VALUE: 30
PIF_VALUE: 32
PIF_VALUE: 30
PIF_VALUE: 32
PIF_VALUE: 30
PIF_VALUE: 32
PIF_VALUE: 30
PIF_VALUE: 33
PIF_VALUE: 32
PIF_VALUE: 30
PIF_VALUE: 32
PIF_VALUE: 30
PIF_VALUE: 32
PIF_VALUE: 30
PIF_VALUE: 30
PIF_VALUE: 31
PIF_VALUE: 32
PIF_VALUE: 32
PIF_VALUE: 30
PIF_VALUE: 32
PIF_VALUE: 30
PIF_VALUE: 32
PIF_VALUE: 30
PIF_VALUE: 32
PIF_VALUE: 32
PIF_VALUE: 31
PIF_VALUE: 30
PIF_VALUE: 32
PIF_VALUE: 32
PIF_VALUE: 30
PIF_VALUE: 31
PIF_VALUE: 32

## 2025-04-22 ASSESSMENT — PAIN SCALES - GENERAL
PAINLEVEL_OUTOF10: 0

## 2025-04-22 NOTE — CONSULTS
Comprehensive Nutrition Assessment    Type and Reason for Visit:  Initial, Consult    Nutrition Recommendations/Plan:   Nutrition Support:  Start: Vital AF 1.2 (Peptide based) @ 15mL/hr  Advance: As tolerated, increase by 15 mL/hr q 4 hours  Goal: 45 mL/hr  Water Flushes: 30ml q4 (Maintenance)     Malnutrition Assessment:  Malnutrition Status:  No malnutrition (04/22/25 0902)      Nutrition Assessment:    Consult for TF order and management. Pt w/ acute on chronic respiratory failure, intubated 4/21 sedated on 21.2 ml/hr propofol providing 560 kcals/day. Ogt in place. Nutrition related labs reviewed. Tf order above    Nutrition Related Findings:    133 glucose Wound Type: None       Current Nutrition Intake & Therapies:    Average Meal Intake: NPO  Average Supplements Intake: NPO  No diet orders on file  Current Tube Feeding (TF) Orders:  Current TF Provides: Vital AF 1.2 @ 45 ml/hr (Calculated over 20 hrs provides 900 ml total volume, 1080 kcals, 68g protein, 730 ml free water)    Anthropometric Measures:  Height: 149.9 cm (4' 11\")  Ideal Body Weight (IBW): 95 lbs (43 kg)       Current Body Weight: 80.1 kg (176 lb 9.4 oz),   IBW.    Current BMI (kg/m2): 35.6                                  Estimated Daily Nutrient Needs:  Energy Requirements Based On: Kcal/kg  Weight Used for Energy Requirements: Current  Energy (kcal/day): 1200 - 1600 (15 - 20 kcal/kg)  Weight Used for Protein Requirements: Ideal  Protein (g/day): 52 - 65 (1.2 - 1.5 g/kg IBW)  Method Used for Fluid Requirements: 1 ml/kcal  Fluid (ml/day): or per provider    Nutrition Diagnosis:   Inadequate oral intake related to impaired respiratory function as evidenced by intubation    Nutrition Interventions:   Food and/or Nutrient Delivery: Start Tube Feeding  Nutrition Education/Counseling: No recommendation at this time  Coordination of Nutrition Care: No recommendation at this time       Goals:  Goals: by next RD assessment, Initiate nutrition support, 
the tip in the region of the  distal gastric body.  Cardiac and mediastinal silhouettes appear similar.  No  new airspace disease.  No pneumothorax.  Osseous structures appear similar.  Lungs are otherwise similar.    Impression  1. ET tube measures 1.7 cm above the anupama.  2. Enteric catheter tip terminates in the distal gastric body.

## 2025-04-23 ENCOUNTER — APPOINTMENT (OUTPATIENT)
Dept: GENERAL RADIOLOGY | Age: 50
DRG: 207 | End: 2025-04-23
Payer: MEDICARE

## 2025-04-23 LAB
ALBUMIN SERPL-MCNC: 3.4 G/DL (ref 3.4–5)
ALBUMIN/GLOB SERPL: 1.5 {RATIO} (ref 1.1–2.2)
ALP SERPL-CCNC: 67 U/L (ref 40–129)
ALT SERPL-CCNC: 16 U/L (ref 10–40)
ANION GAP SERPL CALCULATED.3IONS-SCNC: 6 MMOL/L (ref 3–16)
AST SERPL-CCNC: 12 U/L (ref 15–37)
BACTERIA SPEC RESP CULT: NORMAL
BASE EXCESS BLDV CALC-SCNC: 9 MMOL/L
BASOPHILS # BLD: 0 K/UL (ref 0–0.2)
BASOPHILS NFR BLD: 0 %
BILIRUB SERPL-MCNC: <0.2 MG/DL (ref 0–1)
BUN SERPL-MCNC: 27 MG/DL (ref 7–20)
CALCIUM SERPL-MCNC: 8.7 MG/DL (ref 8.3–10.6)
CHLORIDE SERPL-SCNC: 100 MMOL/L (ref 99–110)
CO2 BLDV-SCNC: 38 MMOL/L
CO2 SERPL-SCNC: 33 MMOL/L (ref 21–32)
COHGB MFR BLDV: 1.4 %
CREAT SERPL-MCNC: 0.6 MG/DL (ref 0.6–1.1)
DEPRECATED RDW RBC AUTO: 16.1 % (ref 12.4–15.4)
EOSINOPHIL # BLD: 0 K/UL (ref 0–0.6)
EOSINOPHIL NFR BLD: 0 %
GFR SERPLBLD CREATININE-BSD FMLA CKD-EPI: >90 ML/MIN/{1.73_M2}
GLUCOSE BLD-MCNC: 108 MG/DL (ref 70–99)
GLUCOSE BLD-MCNC: 118 MG/DL (ref 70–99)
GLUCOSE BLD-MCNC: 121 MG/DL (ref 70–99)
GLUCOSE BLD-MCNC: 140 MG/DL (ref 70–99)
GLUCOSE BLD-MCNC: 158 MG/DL (ref 70–99)
GLUCOSE SERPL-MCNC: 138 MG/DL (ref 70–99)
GRAM STN SPEC: NORMAL
HCO3 BLDV-SCNC: 36 MMOL/L (ref 23–29)
HCT VFR BLD AUTO: 29.6 % (ref 36–48)
HGB BLD-MCNC: 9.6 G/DL (ref 12–16)
LYMPHOCYTES # BLD: 0.6 K/UL (ref 1–5.1)
LYMPHOCYTES NFR BLD: 6.6 %
MAGNESIUM SERPL-MCNC: 2.32 MG/DL (ref 1.8–2.4)
MCH RBC QN AUTO: 28 PG (ref 26–34)
MCHC RBC AUTO-ENTMCNC: 32.3 G/DL (ref 31–36)
MCV RBC AUTO: 86.6 FL (ref 80–100)
METHGB MFR BLDV: 0.9 %
MONOCYTES # BLD: 0.4 K/UL (ref 0–1.3)
MONOCYTES NFR BLD: 4.8 %
NEUTROPHILS # BLD: 7.6 K/UL (ref 1.7–7.7)
NEUTROPHILS NFR BLD: 88.6 %
O2 THERAPY: ABNORMAL
PCO2 BLDV: 66 MMHG (ref 40–50)
PERFORMED ON: ABNORMAL
PH BLDV: 7.35 [PH] (ref 7.35–7.45)
PHOSPHATE SERPL-MCNC: 3.1 MG/DL (ref 2.5–4.9)
PLATELET # BLD AUTO: 338 K/UL (ref 135–450)
PMV BLD AUTO: 7.9 FL (ref 5–10.5)
PO2 BLDV: 40 MMHG
POTASSIUM SERPL-SCNC: 4.4 MMOL/L (ref 3.5–5.1)
PROT SERPL-MCNC: 5.7 G/DL (ref 6.4–8.2)
RBC # BLD AUTO: 3.41 M/UL (ref 4–5.2)
SAO2 % BLDV: 72 %
SODIUM SERPL-SCNC: 139 MMOL/L (ref 136–145)
WBC # BLD AUTO: 8.6 K/UL (ref 4–11)

## 2025-04-23 PROCEDURE — 80053 COMPREHEN METABOLIC PANEL: CPT

## 2025-04-23 PROCEDURE — 83735 ASSAY OF MAGNESIUM: CPT

## 2025-04-23 PROCEDURE — 6370000000 HC RX 637 (ALT 250 FOR IP)

## 2025-04-23 PROCEDURE — 94761 N-INVAS EAR/PLS OXIMETRY MLT: CPT

## 2025-04-23 PROCEDURE — 2500000003 HC RX 250 WO HCPCS: Performed by: NURSE PRACTITIONER

## 2025-04-23 PROCEDURE — 6370000000 HC RX 637 (ALT 250 FOR IP): Performed by: NURSE PRACTITIONER

## 2025-04-23 PROCEDURE — 2500000003 HC RX 250 WO HCPCS: Performed by: EMERGENCY MEDICINE

## 2025-04-23 PROCEDURE — 2500000003 HC RX 250 WO HCPCS

## 2025-04-23 PROCEDURE — 94640 AIRWAY INHALATION TREATMENT: CPT

## 2025-04-23 PROCEDURE — 71045 X-RAY EXAM CHEST 1 VIEW: CPT

## 2025-04-23 PROCEDURE — APPNB15 APP NON BILLABLE TIME 0-15 MINS: Performed by: NURSE PRACTITIONER

## 2025-04-23 PROCEDURE — 2580000003 HC RX 258: Performed by: NURSE PRACTITIONER

## 2025-04-23 PROCEDURE — 2000000000 HC ICU R&B

## 2025-04-23 PROCEDURE — 6360000002 HC RX W HCPCS

## 2025-04-23 PROCEDURE — 84100 ASSAY OF PHOSPHORUS: CPT

## 2025-04-23 PROCEDURE — 6360000002 HC RX W HCPCS: Performed by: NURSE PRACTITIONER

## 2025-04-23 PROCEDURE — 6360000002 HC RX W HCPCS: Performed by: STUDENT IN AN ORGANIZED HEALTH CARE EDUCATION/TRAINING PROGRAM

## 2025-04-23 PROCEDURE — 85025 COMPLETE CBC W/AUTO DIFF WBC: CPT

## 2025-04-23 PROCEDURE — 94003 VENT MGMT INPAT SUBQ DAY: CPT

## 2025-04-23 PROCEDURE — 6360000002 HC RX W HCPCS: Performed by: INTERNAL MEDICINE

## 2025-04-23 PROCEDURE — 2700000000 HC OXYGEN THERAPY PER DAY

## 2025-04-23 PROCEDURE — 2500000003 HC RX 250 WO HCPCS: Performed by: STUDENT IN AN ORGANIZED HEALTH CARE EDUCATION/TRAINING PROGRAM

## 2025-04-23 PROCEDURE — 36592 COLLECT BLOOD FROM PICC: CPT

## 2025-04-23 PROCEDURE — 82803 BLOOD GASES ANY COMBINATION: CPT

## 2025-04-23 PROCEDURE — 99291 CRITICAL CARE FIRST HOUR: CPT | Performed by: INTERNAL MEDICINE

## 2025-04-23 RX ORDER — NOREPINEPHRINE BITARTRATE 0.06 MG/ML
1-100 INJECTION, SOLUTION INTRAVENOUS CONTINUOUS
Status: DISCONTINUED | OUTPATIENT
Start: 2025-04-23 | End: 2025-04-26 | Stop reason: HOSPADM

## 2025-04-23 RX ORDER — FENTANYL CITRATE-0.9 % NACL/PF 10 MCG/ML
25-200 PLASTIC BAG, INJECTION (ML) INTRAVENOUS CONTINUOUS
Status: DISCONTINUED | OUTPATIENT
Start: 2025-04-23 | End: 2025-04-26 | Stop reason: HOSPADM

## 2025-04-23 RX ORDER — MIDAZOLAM HYDROCHLORIDE 1 MG/ML
INJECTION, SOLUTION INTRAMUSCULAR; INTRAVENOUS
Status: DISPENSED
Start: 2025-04-23 | End: 2025-04-23

## 2025-04-23 RX ORDER — PROPOFOL 10 MG/ML
5-50 INJECTION, EMULSION INTRAVENOUS CONTINUOUS
Status: DISCONTINUED | OUTPATIENT
Start: 2025-04-23 | End: 2025-04-26 | Stop reason: HOSPADM

## 2025-04-23 RX ORDER — MIDAZOLAM HYDROCHLORIDE 1 MG/ML
2 INJECTION, SOLUTION INTRAMUSCULAR; INTRAVENOUS ONCE
Status: COMPLETED | OUTPATIENT
Start: 2025-04-23 | End: 2025-04-23

## 2025-04-23 RX ADMIN — PROPOFOL 50 MCG/KG/MIN: 10 INJECTION, EMULSION INTRAVENOUS at 10:11

## 2025-04-23 RX ADMIN — Medication 200 MCG/HR: at 10:13

## 2025-04-23 RX ADMIN — DOCUSATE SODIUM 100 MG: 50 LIQUID ORAL at 08:25

## 2025-04-23 RX ADMIN — BUDESONIDE 500 MCG: 0.5 INHALANT RESPIRATORY (INHALATION) at 09:04

## 2025-04-23 RX ADMIN — Medication 200 MCG/HR: at 15:13

## 2025-04-23 RX ADMIN — IPRATROPIUM BROMIDE AND ALBUTEROL SULFATE 1 DOSE: .5; 3 SOLUTION RESPIRATORY (INHALATION) at 12:32

## 2025-04-23 RX ADMIN — DEXMEDETOMIDINE HYDROCHLORIDE 1.1 MCG/KG/HR: 400 INJECTION, SOLUTION INTRAVENOUS at 05:32

## 2025-04-23 RX ADMIN — IPRATROPIUM BROMIDE AND ALBUTEROL SULFATE 1 DOSE: .5; 3 SOLUTION RESPIRATORY (INHALATION) at 09:04

## 2025-04-23 RX ADMIN — WATER 1000 MG: 1 INJECTION INTRAMUSCULAR; INTRAVENOUS; SUBCUTANEOUS at 08:30

## 2025-04-23 RX ADMIN — PREDNISONE 40 MG: 20 TABLET ORAL at 08:25

## 2025-04-23 RX ADMIN — DEXMEDETOMIDINE HYDROCHLORIDE 1.5 MCG/KG/HR: 400 INJECTION, SOLUTION INTRAVENOUS at 09:20

## 2025-04-23 RX ADMIN — SODIUM CHLORIDE, PRESERVATIVE FREE 20 MG: 5 INJECTION INTRAVENOUS at 20:21

## 2025-04-23 RX ADMIN — SODIUM CHLORIDE, PRESERVATIVE FREE 10 ML: 5 INJECTION INTRAVENOUS at 08:26

## 2025-04-23 RX ADMIN — POLYETHYLENE GLYCOL 3350 17 G: 17 POWDER, FOR SOLUTION ORAL at 08:25

## 2025-04-23 RX ADMIN — PROPOFOL 50 MCG/KG/MIN: 10 INJECTION, EMULSION INTRAVENOUS at 13:24

## 2025-04-23 RX ADMIN — PROPOFOL 45 MCG/KG/MIN: 10 INJECTION, EMULSION INTRAVENOUS at 01:37

## 2025-04-23 RX ADMIN — ENOXAPARIN SODIUM 40 MG: 100 INJECTION SUBCUTANEOUS at 20:21

## 2025-04-23 RX ADMIN — Medication 200 MCG/HR: at 20:20

## 2025-04-23 RX ADMIN — NOREPINEPHRINE BITARTRATE 5 MCG/MIN: 64 SOLUTION INTRAVENOUS at 23:38

## 2025-04-23 RX ADMIN — PROPOFOL 50 MCG/KG/MIN: 10 INJECTION, EMULSION INTRAVENOUS at 06:42

## 2025-04-23 RX ADMIN — DEXMEDETOMIDINE HYDROCHLORIDE 1.1 MCG/KG/HR: 400 INJECTION, SOLUTION INTRAVENOUS at 00:58

## 2025-04-23 RX ADMIN — HYDROCODONE BITARTRATE AND ACETAMINOPHEN 5 ML: 176/5 SOLUTION ORAL at 20:21

## 2025-04-23 RX ADMIN — MIDAZOLAM HYDROCHLORIDE 2 MG: 1 INJECTION, SOLUTION INTRAMUSCULAR; INTRAVENOUS at 10:14

## 2025-04-23 RX ADMIN — ASPIRIN 81 MG: 81 TABLET, COATED ORAL at 08:25

## 2025-04-23 RX ADMIN — DEXMEDETOMIDINE HYDROCHLORIDE 1.5 MCG/KG/HR: 400 INJECTION, SOLUTION INTRAVENOUS at 16:12

## 2025-04-23 RX ADMIN — SODIUM CHLORIDE, PRESERVATIVE FREE 20 MG: 5 INJECTION INTRAVENOUS at 08:27

## 2025-04-23 RX ADMIN — PROPOFOL 50 MCG/KG/MIN: 10 INJECTION, EMULSION INTRAVENOUS at 17:44

## 2025-04-23 RX ADMIN — ALBUTEROL SULFATE 2.5 MG: 0.83 SOLUTION RESPIRATORY (INHALATION) at 00:07

## 2025-04-23 RX ADMIN — DEXMEDETOMIDINE HYDROCHLORIDE 1.5 MCG/KG/HR: 400 INJECTION, SOLUTION INTRAVENOUS at 22:56

## 2025-04-23 RX ADMIN — PROPOFOL 50 MCG/KG/MIN: 10 INJECTION, EMULSION INTRAVENOUS at 22:16

## 2025-04-23 RX ADMIN — DEXMEDETOMIDINE HYDROCHLORIDE 1.5 MCG/KG/HR: 400 INJECTION, SOLUTION INTRAVENOUS at 12:26

## 2025-04-23 RX ADMIN — Medication 150 MCG/HR: at 03:26

## 2025-04-23 RX ADMIN — IPRATROPIUM BROMIDE AND ALBUTEROL SULFATE 1 DOSE: .5; 3 SOLUTION RESPIRATORY (INHALATION) at 20:52

## 2025-04-23 RX ADMIN — IPRATROPIUM BROMIDE AND ALBUTEROL SULFATE 1 DOSE: .5; 3 SOLUTION RESPIRATORY (INHALATION) at 02:02

## 2025-04-23 RX ADMIN — DEXMEDETOMIDINE HYDROCHLORIDE 1.5 MCG/KG/HR: 400 INJECTION, SOLUTION INTRAVENOUS at 19:20

## 2025-04-23 RX ADMIN — BUDESONIDE 500 MCG: 0.5 INHALANT RESPIRATORY (INHALATION) at 20:52

## 2025-04-23 ASSESSMENT — PULMONARY FUNCTION TESTS
PIF_VALUE: 30
PIF_VALUE: 27
PIF_VALUE: 28
PIF_VALUE: 30
PIF_VALUE: 28
PIF_VALUE: 30
PIF_VALUE: 27
PIF_VALUE: 30
PIF_VALUE: 27
PIF_VALUE: 27
PIF_VALUE: 30
PIF_VALUE: 30
PIF_VALUE: 27
PIF_VALUE: 27
PIF_VALUE: 30
PIF_VALUE: 27
PIF_VALUE: 30
PIF_VALUE: 30
PIF_VALUE: 17
PIF_VALUE: 18
PIF_VALUE: 27
PIF_VALUE: 30
PIF_VALUE: 27
PIF_VALUE: 30
PIF_VALUE: 17
PIF_VALUE: 27
PIF_VALUE: 30
PIF_VALUE: 27
PIF_VALUE: 28
PIF_VALUE: 31
PIF_VALUE: 30
PIF_VALUE: 28
PIF_VALUE: 27
PIF_VALUE: 17
PIF_VALUE: 27
PIF_VALUE: 27

## 2025-04-23 ASSESSMENT — PAIN SCALES - GENERAL
PAINLEVEL_OUTOF10: 0

## 2025-04-24 LAB
ANION GAP SERPL CALCULATED.3IONS-SCNC: 5 MMOL/L (ref 3–16)
ANION GAP SERPL CALCULATED.3IONS-SCNC: 6 MMOL/L (ref 3–16)
BASE EXCESS BLDV CALC-SCNC: 6.4 MMOL/L
BASOPHILS # BLD: 0 K/UL (ref 0–0.2)
BASOPHILS NFR BLD: 0.2 %
BUN SERPL-MCNC: 24 MG/DL (ref 7–20)
BUN SERPL-MCNC: 28 MG/DL (ref 7–20)
CALCIUM SERPL-MCNC: 8.1 MG/DL (ref 8.3–10.6)
CALCIUM SERPL-MCNC: 8.5 MG/DL (ref 8.3–10.6)
CHLORIDE SERPL-SCNC: 101 MMOL/L (ref 99–110)
CHLORIDE SERPL-SCNC: 103 MMOL/L (ref 99–110)
CO2 BLDV-SCNC: 37 MMOL/L
CO2 SERPL-SCNC: 32 MMOL/L (ref 21–32)
CO2 SERPL-SCNC: 33 MMOL/L (ref 21–32)
COHGB MFR BLDV: 1.3 %
CREAT SERPL-MCNC: 0.6 MG/DL (ref 0.6–1.1)
CREAT SERPL-MCNC: 0.6 MG/DL (ref 0.6–1.1)
DEPRECATED RDW RBC AUTO: 16.1 % (ref 12.4–15.4)
DEPRECATED RDW RBC AUTO: 16.6 % (ref 12.4–15.4)
EOSINOPHIL # BLD: 0.1 K/UL (ref 0–0.6)
EOSINOPHIL NFR BLD: 0.8 %
GFR SERPLBLD CREATININE-BSD FMLA CKD-EPI: >90 ML/MIN/{1.73_M2}
GFR SERPLBLD CREATININE-BSD FMLA CKD-EPI: >90 ML/MIN/{1.73_M2}
GLUCOSE BLD-MCNC: 108 MG/DL (ref 70–99)
GLUCOSE BLD-MCNC: 110 MG/DL (ref 70–99)
GLUCOSE BLD-MCNC: 96 MG/DL (ref 70–99)
GLUCOSE BLD-MCNC: 96 MG/DL (ref 70–99)
GLUCOSE SERPL-MCNC: 103 MG/DL (ref 70–99)
GLUCOSE SERPL-MCNC: 113 MG/DL (ref 70–99)
HCO3 BLDV-SCNC: 35 MMOL/L (ref 23–29)
HCT VFR BLD AUTO: 27.9 % (ref 36–48)
HCT VFR BLD AUTO: 30.3 % (ref 36–48)
HGB BLD-MCNC: 10.1 G/DL (ref 12–16)
HGB BLD-MCNC: 9.2 G/DL (ref 12–16)
LACTATE BLDV-SCNC: 0.5 MMOL/L (ref 0.4–2)
LYMPHOCYTES # BLD: 2.1 K/UL (ref 1–5.1)
LYMPHOCYTES NFR BLD: 27.6 %
MAGNESIUM SERPL-MCNC: 2.22 MG/DL (ref 1.8–2.4)
MCH RBC QN AUTO: 28.4 PG (ref 26–34)
MCH RBC QN AUTO: 28.4 PG (ref 26–34)
MCHC RBC AUTO-ENTMCNC: 33.1 G/DL (ref 31–36)
MCHC RBC AUTO-ENTMCNC: 33.4 G/DL (ref 31–36)
MCV RBC AUTO: 85.1 FL (ref 80–100)
MCV RBC AUTO: 85.8 FL (ref 80–100)
METHGB MFR BLDV: 0.8 %
MONOCYTES # BLD: 0.7 K/UL (ref 0–1.3)
MONOCYTES NFR BLD: 8.7 %
NEUTROPHILS # BLD: 4.7 K/UL (ref 1.7–7.7)
NEUTROPHILS NFR BLD: 62.7 %
O2 THERAPY: ABNORMAL
PCO2 BLDV: 74.4 MMHG (ref 40–50)
PERFORMED ON: ABNORMAL
PERFORMED ON: ABNORMAL
PERFORMED ON: NORMAL
PERFORMED ON: NORMAL
PH BLDV: 7.28 [PH] (ref 7.35–7.45)
PHOSPHATE SERPL-MCNC: 2.9 MG/DL (ref 2.5–4.9)
PLATELET # BLD AUTO: 288 K/UL (ref 135–450)
PLATELET # BLD AUTO: 367 K/UL (ref 135–450)
PMV BLD AUTO: 7.2 FL (ref 5–10.5)
PMV BLD AUTO: 7.4 FL (ref 5–10.5)
PO2 BLDV: 61 MMHG
POTASSIUM SERPL-SCNC: 4.1 MMOL/L (ref 3.5–5.1)
POTASSIUM SERPL-SCNC: 4.2 MMOL/L (ref 3.5–5.1)
RBC # BLD AUTO: 3.26 M/UL (ref 4–5.2)
RBC # BLD AUTO: 3.56 M/UL (ref 4–5.2)
SAO2 % BLDV: 89 %
SODIUM SERPL-SCNC: 139 MMOL/L (ref 136–145)
SODIUM SERPL-SCNC: 141 MMOL/L (ref 136–145)
WBC # BLD AUTO: 13 K/UL (ref 4–11)
WBC # BLD AUTO: 7.6 K/UL (ref 4–11)

## 2025-04-24 PROCEDURE — 2700000000 HC OXYGEN THERAPY PER DAY

## 2025-04-24 PROCEDURE — APPNB15 APP NON BILLABLE TIME 0-15 MINS: Performed by: NURSE PRACTITIONER

## 2025-04-24 PROCEDURE — 94761 N-INVAS EAR/PLS OXIMETRY MLT: CPT

## 2025-04-24 PROCEDURE — 6360000002 HC RX W HCPCS

## 2025-04-24 PROCEDURE — 94640 AIRWAY INHALATION TREATMENT: CPT

## 2025-04-24 PROCEDURE — 85027 COMPLETE CBC AUTOMATED: CPT

## 2025-04-24 PROCEDURE — 2500000003 HC RX 250 WO HCPCS: Performed by: EMERGENCY MEDICINE

## 2025-04-24 PROCEDURE — 76937 US GUIDE VASCULAR ACCESS: CPT

## 2025-04-24 PROCEDURE — 36569 INSJ PICC 5 YR+ W/O IMAGING: CPT

## 2025-04-24 PROCEDURE — 6370000000 HC RX 637 (ALT 250 FOR IP): Performed by: NURSE PRACTITIONER

## 2025-04-24 PROCEDURE — 84100 ASSAY OF PHOSPHORUS: CPT

## 2025-04-24 PROCEDURE — 2580000003 HC RX 258: Performed by: NURSE PRACTITIONER

## 2025-04-24 PROCEDURE — 36592 COLLECT BLOOD FROM PICC: CPT

## 2025-04-24 PROCEDURE — 82803 BLOOD GASES ANY COMBINATION: CPT

## 2025-04-24 PROCEDURE — 83735 ASSAY OF MAGNESIUM: CPT

## 2025-04-24 PROCEDURE — 83605 ASSAY OF LACTIC ACID: CPT

## 2025-04-24 PROCEDURE — 94003 VENT MGMT INPAT SUBQ DAY: CPT

## 2025-04-24 PROCEDURE — 2500000003 HC RX 250 WO HCPCS

## 2025-04-24 PROCEDURE — 85025 COMPLETE CBC W/AUTO DIFF WBC: CPT

## 2025-04-24 PROCEDURE — 6360000002 HC RX W HCPCS: Performed by: NURSE PRACTITIONER

## 2025-04-24 PROCEDURE — 2500000003 HC RX 250 WO HCPCS: Performed by: NURSE PRACTITIONER

## 2025-04-24 PROCEDURE — 87040 BLOOD CULTURE FOR BACTERIA: CPT

## 2025-04-24 PROCEDURE — 99291 CRITICAL CARE FIRST HOUR: CPT | Performed by: INTERNAL MEDICINE

## 2025-04-24 PROCEDURE — 80048 BASIC METABOLIC PNL TOTAL CA: CPT

## 2025-04-24 PROCEDURE — 6360000002 HC RX W HCPCS: Performed by: INTERNAL MEDICINE

## 2025-04-24 PROCEDURE — 2000000000 HC ICU R&B

## 2025-04-24 PROCEDURE — C1751 CATH, INF, PER/CENT/MIDLINE: HCPCS

## 2025-04-24 PROCEDURE — 36415 COLL VENOUS BLD VENIPUNCTURE: CPT

## 2025-04-24 PROCEDURE — 6370000000 HC RX 637 (ALT 250 FOR IP)

## 2025-04-24 RX ORDER — CALCIUM GLUCONATE 20 MG/ML
1000 INJECTION, SOLUTION INTRAVENOUS ONCE
Status: COMPLETED | OUTPATIENT
Start: 2025-04-24 | End: 2025-04-24

## 2025-04-24 RX ORDER — BUDESONIDE 0.5 MG/2ML
1 INHALANT ORAL
Status: DISCONTINUED | OUTPATIENT
Start: 2025-04-24 | End: 2025-04-28 | Stop reason: HOSPADM

## 2025-04-24 RX ORDER — PREDNISONE 20 MG/1
20 TABLET ORAL DAILY
Status: DISCONTINUED | OUTPATIENT
Start: 2025-04-26 | End: 2025-04-28 | Stop reason: HOSPADM

## 2025-04-24 RX ORDER — SODIUM CHLORIDE 9 MG/ML
INJECTION, SOLUTION INTRAVENOUS PRN
Status: DISCONTINUED | OUTPATIENT
Start: 2025-04-24 | End: 2025-04-28 | Stop reason: HOSPADM

## 2025-04-24 RX ORDER — SODIUM CHLORIDE 0.9 % (FLUSH) 0.9 %
5-40 SYRINGE (ML) INJECTION PRN
Status: DISCONTINUED | OUTPATIENT
Start: 2025-04-24 | End: 2025-04-28 | Stop reason: HOSPADM

## 2025-04-24 RX ORDER — LIDOCAINE HYDROCHLORIDE 10 MG/ML
50 INJECTION, SOLUTION EPIDURAL; INFILTRATION; INTRACAUDAL; PERINEURAL ONCE
Status: DISCONTINUED | OUTPATIENT
Start: 2025-04-24 | End: 2025-04-28 | Stop reason: HOSPADM

## 2025-04-24 RX ORDER — SODIUM CHLORIDE 0.9 % (FLUSH) 0.9 %
5-40 SYRINGE (ML) INJECTION EVERY 12 HOURS SCHEDULED
Status: DISCONTINUED | OUTPATIENT
Start: 2025-04-24 | End: 2025-04-28 | Stop reason: HOSPADM

## 2025-04-24 RX ADMIN — BUDESONIDE 500 MCG: 0.5 INHALANT RESPIRATORY (INHALATION) at 08:16

## 2025-04-24 RX ADMIN — DOCUSATE SODIUM 100 MG: 50 LIQUID ORAL at 08:12

## 2025-04-24 RX ADMIN — Medication 100 MCG/HR: at 21:55

## 2025-04-24 RX ADMIN — IPRATROPIUM BROMIDE AND ALBUTEROL SULFATE 1 DOSE: .5; 3 SOLUTION RESPIRATORY (INHALATION) at 08:16

## 2025-04-24 RX ADMIN — IPRATROPIUM BROMIDE AND ALBUTEROL SULFATE 1 DOSE: .5; 3 SOLUTION RESPIRATORY (INHALATION) at 20:56

## 2025-04-24 RX ADMIN — WATER 1000 MG: 1 INJECTION INTRAMUSCULAR; INTRAVENOUS; SUBCUTANEOUS at 08:13

## 2025-04-24 RX ADMIN — IPRATROPIUM BROMIDE AND ALBUTEROL SULFATE 1 DOSE: .5; 3 SOLUTION RESPIRATORY (INHALATION) at 01:00

## 2025-04-24 RX ADMIN — SODIUM CHLORIDE, PRESERVATIVE FREE 10 ML: 5 INJECTION INTRAVENOUS at 20:29

## 2025-04-24 RX ADMIN — DEXMEDETOMIDINE HYDROCHLORIDE 1.5 MCG/KG/HR: 400 INJECTION, SOLUTION INTRAVENOUS at 05:37

## 2025-04-24 RX ADMIN — HYDROCODONE BITARTRATE AND ACETAMINOPHEN 5 ML: 176/5 SOLUTION ORAL at 20:22

## 2025-04-24 RX ADMIN — PROPOFOL 25 MCG/KG/MIN: 10 INJECTION, EMULSION INTRAVENOUS at 13:30

## 2025-04-24 RX ADMIN — SODIUM CHLORIDE, PRESERVATIVE FREE 10 ML: 5 INJECTION INTRAVENOUS at 08:13

## 2025-04-24 RX ADMIN — PROPOFOL 30 MCG/KG/MIN: 10 INJECTION, EMULSION INTRAVENOUS at 20:21

## 2025-04-24 RX ADMIN — PROPOFOL 50 MCG/KG/MIN: 10 INJECTION, EMULSION INTRAVENOUS at 02:01

## 2025-04-24 RX ADMIN — ASPIRIN 81 MG: 81 TABLET, COATED ORAL at 08:12

## 2025-04-24 RX ADMIN — PROPOFOL 50 MCG/KG/MIN: 10 INJECTION, EMULSION INTRAVENOUS at 05:38

## 2025-04-24 RX ADMIN — PREDNISONE 40 MG: 20 TABLET ORAL at 08:12

## 2025-04-24 RX ADMIN — Medication 200 MCG/HR: at 07:15

## 2025-04-24 RX ADMIN — Medication 200 MCG/HR: at 01:59

## 2025-04-24 RX ADMIN — IPRATROPIUM BROMIDE AND ALBUTEROL SULFATE 1 DOSE: .5; 3 SOLUTION RESPIRATORY (INHALATION) at 11:40

## 2025-04-24 RX ADMIN — BUDESONIDE 1000 MCG: 0.5 INHALANT RESPIRATORY (INHALATION) at 20:56

## 2025-04-24 RX ADMIN — POLYETHYLENE GLYCOL 3350 17 G: 17 POWDER, FOR SOLUTION ORAL at 08:12

## 2025-04-24 RX ADMIN — SODIUM CHLORIDE, PRESERVATIVE FREE 20 MG: 5 INJECTION INTRAVENOUS at 08:13

## 2025-04-24 RX ADMIN — ENOXAPARIN SODIUM 40 MG: 100 INJECTION SUBCUTANEOUS at 20:22

## 2025-04-24 RX ADMIN — DEXMEDETOMIDINE HYDROCHLORIDE 0.2 MCG/KG/HR: 400 INJECTION, SOLUTION INTRAVENOUS at 12:42

## 2025-04-24 RX ADMIN — DEXMEDETOMIDINE HYDROCHLORIDE 1.5 MCG/KG/HR: 400 INJECTION, SOLUTION INTRAVENOUS at 02:04

## 2025-04-24 RX ADMIN — SODIUM CHLORIDE, PRESERVATIVE FREE 20 MG: 5 INJECTION INTRAVENOUS at 20:22

## 2025-04-24 RX ADMIN — CALCIUM GLUCONATE 1000 MG: 20 INJECTION, SOLUTION INTRAVENOUS at 09:26

## 2025-04-24 ASSESSMENT — PULMONARY FUNCTION TESTS
PIF_VALUE: 27
PIF_VALUE: 36
PIF_VALUE: 28
PIF_VALUE: 28
PIF_VALUE: 27
PIF_VALUE: 28
PIF_VALUE: 28
PIF_VALUE: 27
PIF_VALUE: 28
PIF_VALUE: 27
PIF_VALUE: 27
PIF_VALUE: 28
PIF_VALUE: 27
PIF_VALUE: 28
PIF_VALUE: 27
PIF_VALUE: 28
PIF_VALUE: 28
PIF_VALUE: 27
PIF_VALUE: 28
PIF_VALUE: 27
PIF_VALUE: 28
PIF_VALUE: 27
PIF_VALUE: 30
PIF_VALUE: 28
PIF_VALUE: 27
PIF_VALUE: 29
PIF_VALUE: 27
PIF_VALUE: 29
PIF_VALUE: 28
PIF_VALUE: 27
PIF_VALUE: 27
PIF_VALUE: 28
PIF_VALUE: 27

## 2025-04-24 ASSESSMENT — PAIN SCALES - GENERAL
PAINLEVEL_OUTOF10: 0

## 2025-04-25 LAB
ANION GAP SERPL CALCULATED.3IONS-SCNC: 8 MMOL/L (ref 3–16)
BASE EXCESS BLDV CALC-SCNC: 26 MMOL/L (ref -3–3)
BASOPHILS # BLD: 0 K/UL (ref 0–0.2)
BASOPHILS NFR BLD: 0.1 %
BUN SERPL-MCNC: 21 MG/DL (ref 7–20)
CALCIUM SERPL-MCNC: 8.7 MG/DL (ref 8.3–10.6)
CHLORIDE SERPL-SCNC: 101 MMOL/L (ref 99–110)
CO2 BLDV-SCNC: >50 MMOL/L
CO2 SERPL-SCNC: 33 MMOL/L (ref 21–32)
CREAT SERPL-MCNC: 0.5 MG/DL (ref 0.6–1.1)
DEPRECATED RDW RBC AUTO: 16.2 % (ref 12.4–15.4)
EOSINOPHIL # BLD: 0.2 K/UL (ref 0–0.6)
EOSINOPHIL NFR BLD: 2.1 %
GFR SERPLBLD CREATININE-BSD FMLA CKD-EPI: >90 ML/MIN/{1.73_M2}
GLUCOSE BLD-MCNC: 103 MG/DL (ref 70–99)
GLUCOSE BLD-MCNC: 109 MG/DL (ref 70–99)
GLUCOSE BLD-MCNC: 64 MG/DL (ref 70–99)
GLUCOSE BLD-MCNC: 75 MG/DL (ref 70–99)
GLUCOSE BLD-MCNC: 86 MG/DL (ref 70–99)
GLUCOSE BLD-MCNC: 89 MG/DL (ref 70–99)
GLUCOSE SERPL-MCNC: 78 MG/DL (ref 70–99)
HCO3 BLDV-SCNC: 49.2 MMOL/L (ref 23–29)
HCT VFR BLD AUTO: 28.5 % (ref 36–48)
HGB BLD-MCNC: 9.2 G/DL (ref 12–16)
LYMPHOCYTES # BLD: 2.7 K/UL (ref 1–5.1)
LYMPHOCYTES NFR BLD: 28.6 %
MAGNESIUM SERPL-MCNC: 2.17 MG/DL (ref 1.8–2.4)
MCH RBC QN AUTO: 27.8 PG (ref 26–34)
MCHC RBC AUTO-ENTMCNC: 32.2 G/DL (ref 31–36)
MCV RBC AUTO: 86.5 FL (ref 80–100)
MONOCYTES # BLD: 0.9 K/UL (ref 0–1.3)
MONOCYTES NFR BLD: 9.8 %
NEUTROPHILS # BLD: 5.6 K/UL (ref 1.7–7.7)
NEUTROPHILS NFR BLD: 59.4 %
PCO2 BLDV: 65.4 MM HG (ref 40–50)
PERFORMED ON: ABNORMAL
PERFORMED ON: NORMAL
PH BLDV: 7.48 [PH] (ref 7.35–7.45)
PHOSPHATE SERPL-MCNC: 2.8 MG/DL (ref 2.5–4.9)
PLATELET # BLD AUTO: 287 K/UL (ref 135–450)
PMV BLD AUTO: 7.5 FL (ref 5–10.5)
PO2 BLDV: 37 MM HG
POC SAMPLE TYPE: ABNORMAL
POTASSIUM SERPL-SCNC: 3.7 MMOL/L (ref 3.5–5.1)
RBC # BLD AUTO: 3.29 M/UL (ref 4–5.2)
SAO2 % BLDV: 72 %
SODIUM SERPL-SCNC: 142 MMOL/L (ref 136–145)
WBC # BLD AUTO: 9.4 K/UL (ref 4–11)

## 2025-04-25 PROCEDURE — 2000000000 HC ICU R&B

## 2025-04-25 PROCEDURE — 2700000000 HC OXYGEN THERAPY PER DAY

## 2025-04-25 PROCEDURE — 6360000002 HC RX W HCPCS: Performed by: NURSE PRACTITIONER

## 2025-04-25 PROCEDURE — 85025 COMPLETE CBC W/AUTO DIFF WBC: CPT

## 2025-04-25 PROCEDURE — 36592 COLLECT BLOOD FROM PICC: CPT

## 2025-04-25 PROCEDURE — 80048 BASIC METABOLIC PNL TOTAL CA: CPT

## 2025-04-25 PROCEDURE — 2500000003 HC RX 250 WO HCPCS: Performed by: NURSE PRACTITIONER

## 2025-04-25 PROCEDURE — 2500000003 HC RX 250 WO HCPCS: Performed by: EMERGENCY MEDICINE

## 2025-04-25 PROCEDURE — 6360000002 HC RX W HCPCS: Performed by: STUDENT IN AN ORGANIZED HEALTH CARE EDUCATION/TRAINING PROGRAM

## 2025-04-25 PROCEDURE — 6360000002 HC RX W HCPCS

## 2025-04-25 PROCEDURE — 2580000003 HC RX 258: Performed by: STUDENT IN AN ORGANIZED HEALTH CARE EDUCATION/TRAINING PROGRAM

## 2025-04-25 PROCEDURE — 94761 N-INVAS EAR/PLS OXIMETRY MLT: CPT

## 2025-04-25 PROCEDURE — 82803 BLOOD GASES ANY COMBINATION: CPT

## 2025-04-25 PROCEDURE — 94640 AIRWAY INHALATION TREATMENT: CPT

## 2025-04-25 PROCEDURE — 6370000000 HC RX 637 (ALT 250 FOR IP): Performed by: NURSE PRACTITIONER

## 2025-04-25 PROCEDURE — 94003 VENT MGMT INPAT SUBQ DAY: CPT

## 2025-04-25 PROCEDURE — 84100 ASSAY OF PHOSPHORUS: CPT

## 2025-04-25 PROCEDURE — 5A0945A ASSISTANCE WITH RESPIRATORY VENTILATION, 24-96 CONSECUTIVE HOURS, HIGH NASAL FLOW/VELOCITY: ICD-10-PCS | Performed by: STUDENT IN AN ORGANIZED HEALTH CARE EDUCATION/TRAINING PROGRAM

## 2025-04-25 PROCEDURE — 6370000000 HC RX 637 (ALT 250 FOR IP)

## 2025-04-25 PROCEDURE — 83735 ASSAY OF MAGNESIUM: CPT

## 2025-04-25 PROCEDURE — 99291 CRITICAL CARE FIRST HOUR: CPT | Performed by: INTERNAL MEDICINE

## 2025-04-25 RX ADMIN — DEXMEDETOMIDINE HYDROCHLORIDE 0.4 MCG/KG/HR: 400 INJECTION, SOLUTION INTRAVENOUS at 00:44

## 2025-04-25 RX ADMIN — Medication 100 MCG/HR: at 08:31

## 2025-04-25 RX ADMIN — IPRATROPIUM BROMIDE AND ALBUTEROL SULFATE 1 DOSE: .5; 3 SOLUTION RESPIRATORY (INHALATION) at 07:45

## 2025-04-25 RX ADMIN — ASPIRIN 81 MG: 81 TABLET, COATED ORAL at 08:11

## 2025-04-25 RX ADMIN — IPRATROPIUM BROMIDE AND ALBUTEROL SULFATE 1 DOSE: .5; 3 SOLUTION RESPIRATORY (INHALATION) at 12:07

## 2025-04-25 RX ADMIN — IPRATROPIUM BROMIDE AND ALBUTEROL SULFATE 1 DOSE: .5; 3 SOLUTION RESPIRATORY (INHALATION) at 00:04

## 2025-04-25 RX ADMIN — BUDESONIDE 1000 MCG: 0.5 INHALANT RESPIRATORY (INHALATION) at 20:37

## 2025-04-25 RX ADMIN — SODIUM CHLORIDE, PRESERVATIVE FREE 10 ML: 5 INJECTION INTRAVENOUS at 20:50

## 2025-04-25 RX ADMIN — HYDROMORPHONE HYDROCHLORIDE 0.5 MG: 1 INJECTION, SOLUTION INTRAMUSCULAR; INTRAVENOUS; SUBCUTANEOUS at 17:51

## 2025-04-25 RX ADMIN — WATER 1000 MG: 1 INJECTION INTRAMUSCULAR; INTRAVENOUS; SUBCUTANEOUS at 08:20

## 2025-04-25 RX ADMIN — DOCUSATE SODIUM 100 MG: 50 LIQUID ORAL at 08:11

## 2025-04-25 RX ADMIN — ENOXAPARIN SODIUM 40 MG: 100 INJECTION SUBCUTANEOUS at 20:55

## 2025-04-25 RX ADMIN — BUDESONIDE 1000 MCG: 0.5 INHALANT RESPIRATORY (INHALATION) at 07:45

## 2025-04-25 RX ADMIN — PROPOFOL 30 MCG/KG/MIN: 10 INJECTION, EMULSION INTRAVENOUS at 07:38

## 2025-04-25 RX ADMIN — PROPOFOL 35 MCG/KG/MIN: 10 INJECTION, EMULSION INTRAVENOUS at 02:46

## 2025-04-25 RX ADMIN — PREDNISONE 40 MG: 20 TABLET ORAL at 08:11

## 2025-04-25 RX ADMIN — SODIUM CHLORIDE, PRESERVATIVE FREE 10 ML: 5 INJECTION INTRAVENOUS at 20:55

## 2025-04-25 RX ADMIN — SODIUM CHLORIDE, PRESERVATIVE FREE 20 MG: 5 INJECTION INTRAVENOUS at 20:55

## 2025-04-25 RX ADMIN — IPRATROPIUM BROMIDE AND ALBUTEROL SULFATE 1 DOSE: .5; 3 SOLUTION RESPIRATORY (INHALATION) at 20:37

## 2025-04-25 RX ADMIN — SODIUM CHLORIDE, PRESERVATIVE FREE 20 MG: 5 INJECTION INTRAVENOUS at 08:17

## 2025-04-25 RX ADMIN — HYDROMORPHONE HYDROCHLORIDE 0.5 MG: 1 INJECTION, SOLUTION INTRAMUSCULAR; INTRAVENOUS; SUBCUTANEOUS at 22:14

## 2025-04-25 RX ADMIN — HYDROMORPHONE HYDROCHLORIDE 0.5 MG: 1 INJECTION, SOLUTION INTRAMUSCULAR; INTRAVENOUS; SUBCUTANEOUS at 11:52

## 2025-04-25 RX ADMIN — NOREPINEPHRINE BITARTRATE 5 MCG/MIN: 64 SOLUTION INTRAVENOUS at 08:34

## 2025-04-25 RX ADMIN — DEXTROSE MONOHYDRATE 125 ML: 100 INJECTION, SOLUTION INTRAVENOUS at 23:56

## 2025-04-25 RX ADMIN — SODIUM CHLORIDE, PRESERVATIVE FREE 10 ML: 5 INJECTION INTRAVENOUS at 08:18

## 2025-04-25 ASSESSMENT — PULMONARY FUNCTION TESTS
PIF_VALUE: 27
PIF_VALUE: 28
PIF_VALUE: 27
PIF_VALUE: 27
PIF_VALUE: 11
PIF_VALUE: 27
PIF_VALUE: 29
PIF_VALUE: 27
PIF_VALUE: 27
PIF_VALUE: 28
PIF_VALUE: 27
PIF_VALUE: 28
PIF_VALUE: 27
PIF_VALUE: 28
PIF_VALUE: 28
PIF_VALUE: 27

## 2025-04-25 ASSESSMENT — PAIN SCALES - GENERAL
PAINLEVEL_OUTOF10: 0
PAINLEVEL_OUTOF10: 10
PAINLEVEL_OUTOF10: 6
PAINLEVEL_OUTOF10: 4
PAINLEVEL_OUTOF10: 8
PAINLEVEL_OUTOF10: 3

## 2025-04-25 ASSESSMENT — PAIN DESCRIPTION - DESCRIPTORS
DESCRIPTORS: ACHING;STABBING
DESCRIPTORS: STABBING
DESCRIPTORS: STABBING
DESCRIPTORS: ACHING

## 2025-04-25 ASSESSMENT — PAIN DESCRIPTION - FREQUENCY
FREQUENCY: CONTINUOUS

## 2025-04-25 ASSESSMENT — PAIN DESCRIPTION - ONSET
ONSET: ON-GOING
ONSET: ON-GOING

## 2025-04-25 ASSESSMENT — PAIN DESCRIPTION - ORIENTATION
ORIENTATION: RIGHT

## 2025-04-25 ASSESSMENT — PAIN DESCRIPTION - PAIN TYPE
TYPE: CHRONIC PAIN

## 2025-04-25 ASSESSMENT — PAIN DESCRIPTION - LOCATION
LOCATION: ARM;SHOULDER
LOCATION: SHOULDER
LOCATION: SHOULDER
LOCATION: ARM

## 2025-04-26 LAB
ANION GAP SERPL CALCULATED.3IONS-SCNC: 10 MMOL/L (ref 3–16)
BASOPHILS # BLD: 0 K/UL (ref 0–0.2)
BASOPHILS NFR BLD: 0.3 %
BUN SERPL-MCNC: 19 MG/DL (ref 7–20)
CALCIUM SERPL-MCNC: 8.7 MG/DL (ref 8.3–10.6)
CHLORIDE SERPL-SCNC: 101 MMOL/L (ref 99–110)
CO2 SERPL-SCNC: 33 MMOL/L (ref 21–32)
CREAT SERPL-MCNC: 0.4 MG/DL (ref 0.6–1.1)
DEPRECATED RDW RBC AUTO: 16.1 % (ref 12.4–15.4)
EOSINOPHIL # BLD: 0.4 K/UL (ref 0–0.6)
EOSINOPHIL NFR BLD: 3.8 %
GFR SERPLBLD CREATININE-BSD FMLA CKD-EPI: >90 ML/MIN/{1.73_M2}
GLUCOSE BLD-MCNC: 101 MG/DL (ref 70–99)
GLUCOSE BLD-MCNC: 108 MG/DL (ref 70–99)
GLUCOSE BLD-MCNC: 144 MG/DL (ref 70–99)
GLUCOSE BLD-MCNC: 202 MG/DL (ref 70–99)
GLUCOSE BLD-MCNC: 72 MG/DL (ref 70–99)
GLUCOSE SERPL-MCNC: 71 MG/DL (ref 70–99)
HCT VFR BLD AUTO: 29.9 % (ref 36–48)
HGB BLD-MCNC: 9.7 G/DL (ref 12–16)
LYMPHOCYTES # BLD: 2.6 K/UL (ref 1–5.1)
LYMPHOCYTES NFR BLD: 27.2 %
MAGNESIUM SERPL-MCNC: 2.25 MG/DL (ref 1.8–2.4)
MCH RBC QN AUTO: 27.6 PG (ref 26–34)
MCHC RBC AUTO-ENTMCNC: 32.6 G/DL (ref 31–36)
MCV RBC AUTO: 84.8 FL (ref 80–100)
MONOCYTES # BLD: 0.7 K/UL (ref 0–1.3)
MONOCYTES NFR BLD: 7.6 %
NEUTROPHILS # BLD: 5.9 K/UL (ref 1.7–7.7)
NEUTROPHILS NFR BLD: 61.1 %
PERFORMED ON: ABNORMAL
PERFORMED ON: NORMAL
PHOSPHATE SERPL-MCNC: 2.3 MG/DL (ref 2.5–4.9)
PLATELET # BLD AUTO: 280 K/UL (ref 135–450)
PMV BLD AUTO: 7.7 FL (ref 5–10.5)
POTASSIUM SERPL-SCNC: 3.5 MMOL/L (ref 3.5–5.1)
RBC # BLD AUTO: 3.52 M/UL (ref 4–5.2)
SODIUM SERPL-SCNC: 144 MMOL/L (ref 136–145)
WBC # BLD AUTO: 9.7 K/UL (ref 4–11)

## 2025-04-26 PROCEDURE — 94640 AIRWAY INHALATION TREATMENT: CPT

## 2025-04-26 PROCEDURE — 6360000002 HC RX W HCPCS

## 2025-04-26 PROCEDURE — 6370000000 HC RX 637 (ALT 250 FOR IP): Performed by: STUDENT IN AN ORGANIZED HEALTH CARE EDUCATION/TRAINING PROGRAM

## 2025-04-26 PROCEDURE — 51798 US URINE CAPACITY MEASURE: CPT

## 2025-04-26 PROCEDURE — 97166 OT EVAL MOD COMPLEX 45 MIN: CPT

## 2025-04-26 PROCEDURE — 85025 COMPLETE CBC W/AUTO DIFF WBC: CPT

## 2025-04-26 PROCEDURE — 2500000003 HC RX 250 WO HCPCS: Performed by: STUDENT IN AN ORGANIZED HEALTH CARE EDUCATION/TRAINING PROGRAM

## 2025-04-26 PROCEDURE — 80048 BASIC METABOLIC PNL TOTAL CA: CPT

## 2025-04-26 PROCEDURE — 6370000000 HC RX 637 (ALT 250 FOR IP)

## 2025-04-26 PROCEDURE — 6370000000 HC RX 637 (ALT 250 FOR IP): Performed by: INTERNAL MEDICINE

## 2025-04-26 PROCEDURE — 2060000000 HC ICU INTERMEDIATE R&B

## 2025-04-26 PROCEDURE — 2700000000 HC OXYGEN THERAPY PER DAY

## 2025-04-26 PROCEDURE — 97116 GAIT TRAINING THERAPY: CPT

## 2025-04-26 PROCEDURE — 92610 EVALUATE SWALLOWING FUNCTION: CPT

## 2025-04-26 PROCEDURE — 94760 N-INVAS EAR/PLS OXIMETRY 1: CPT

## 2025-04-26 PROCEDURE — 6370000000 HC RX 637 (ALT 250 FOR IP): Performed by: NURSE PRACTITIONER

## 2025-04-26 PROCEDURE — 99233 SBSQ HOSP IP/OBS HIGH 50: CPT | Performed by: INTERNAL MEDICINE

## 2025-04-26 PROCEDURE — 97162 PT EVAL MOD COMPLEX 30 MIN: CPT

## 2025-04-26 PROCEDURE — 84100 ASSAY OF PHOSPHORUS: CPT

## 2025-04-26 PROCEDURE — 97530 THERAPEUTIC ACTIVITIES: CPT

## 2025-04-26 PROCEDURE — 6360000002 HC RX W HCPCS: Performed by: NURSE PRACTITIONER

## 2025-04-26 PROCEDURE — 36592 COLLECT BLOOD FROM PICC: CPT

## 2025-04-26 PROCEDURE — 83735 ASSAY OF MAGNESIUM: CPT

## 2025-04-26 PROCEDURE — 2500000003 HC RX 250 WO HCPCS: Performed by: NURSE PRACTITIONER

## 2025-04-26 PROCEDURE — 6360000002 HC RX W HCPCS: Performed by: STUDENT IN AN ORGANIZED HEALTH CARE EDUCATION/TRAINING PROGRAM

## 2025-04-26 RX ORDER — CARVEDILOL 3.12 MG/1
3.12 TABLET ORAL 2 TIMES DAILY
Status: DISCONTINUED | OUTPATIENT
Start: 2025-04-26 | End: 2025-04-28 | Stop reason: HOSPADM

## 2025-04-26 RX ORDER — TRAZODONE HYDROCHLORIDE 50 MG/1
50 TABLET ORAL NIGHTLY
Status: DISCONTINUED | OUTPATIENT
Start: 2025-04-26 | End: 2025-04-28 | Stop reason: HOSPADM

## 2025-04-26 RX ORDER — SENNA AND DOCUSATE SODIUM 50; 8.6 MG/1; MG/1
2 TABLET, FILM COATED ORAL DAILY
Status: DISCONTINUED | OUTPATIENT
Start: 2025-04-26 | End: 2025-04-28 | Stop reason: HOSPADM

## 2025-04-26 RX ORDER — OXYCODONE AND ACETAMINOPHEN 5; 325 MG/1; MG/1
1 TABLET ORAL EVERY 6 HOURS PRN
Refills: 0 | Status: DISCONTINUED | OUTPATIENT
Start: 2025-04-26 | End: 2025-04-28 | Stop reason: HOSPADM

## 2025-04-26 RX ORDER — IPRATROPIUM BROMIDE AND ALBUTEROL SULFATE 2.5; .5 MG/3ML; MG/3ML
1 SOLUTION RESPIRATORY (INHALATION)
Status: DISCONTINUED | OUTPATIENT
Start: 2025-04-27 | End: 2025-04-28 | Stop reason: HOSPADM

## 2025-04-26 RX ADMIN — ENOXAPARIN SODIUM 40 MG: 100 INJECTION SUBCUTANEOUS at 20:52

## 2025-04-26 RX ADMIN — TRAZODONE HYDROCHLORIDE 50 MG: 50 TABLET ORAL at 20:52

## 2025-04-26 RX ADMIN — OXYCODONE AND ACETAMINOPHEN 1 TABLET: 325; 5 TABLET ORAL at 21:16

## 2025-04-26 RX ADMIN — Medication 3 MG: at 20:52

## 2025-04-26 RX ADMIN — IPRATROPIUM BROMIDE AND ALBUTEROL SULFATE 1 DOSE: .5; 3 SOLUTION RESPIRATORY (INHALATION) at 12:31

## 2025-04-26 RX ADMIN — CLONIDINE HYDROCHLORIDE 0.1 MG: 0.1 TABLET ORAL at 20:52

## 2025-04-26 RX ADMIN — SENNOSIDES AND DOCUSATE SODIUM 2 TABLET: 50; 8.6 TABLET ORAL at 11:57

## 2025-04-26 RX ADMIN — OXYCODONE AND ACETAMINOPHEN 1 TABLET: 325; 5 TABLET ORAL at 12:16

## 2025-04-26 RX ADMIN — BUDESONIDE 1000 MCG: 0.5 INHALANT RESPIRATORY (INHALATION) at 20:14

## 2025-04-26 RX ADMIN — SODIUM CHLORIDE, PRESERVATIVE FREE 20 MG: 5 INJECTION INTRAVENOUS at 20:52

## 2025-04-26 RX ADMIN — POLYETHYLENE GLYCOL 3350 17 G: 17 POWDER, FOR SOLUTION ORAL at 10:09

## 2025-04-26 RX ADMIN — ACETAMINOPHEN 650 MG: 650 SUPPOSITORY RECTAL at 05:20

## 2025-04-26 RX ADMIN — ONDANSETRON 4 MG: 2 INJECTION, SOLUTION INTRAMUSCULAR; INTRAVENOUS at 05:19

## 2025-04-26 RX ADMIN — IPRATROPIUM BROMIDE AND ALBUTEROL SULFATE 1 DOSE: .5; 3 SOLUTION RESPIRATORY (INHALATION) at 20:14

## 2025-04-26 RX ADMIN — DOCUSATE SODIUM 100 MG: 50 LIQUID ORAL at 09:30

## 2025-04-26 RX ADMIN — WATER 1000 MG: 1 INJECTION INTRAMUSCULAR; INTRAVENOUS; SUBCUTANEOUS at 09:19

## 2025-04-26 RX ADMIN — SODIUM CHLORIDE, PRESERVATIVE FREE 20 MG: 5 INJECTION INTRAVENOUS at 09:19

## 2025-04-26 RX ADMIN — CLONIDINE HYDROCHLORIDE 0.1 MG: 0.1 TABLET ORAL at 16:19

## 2025-04-26 RX ADMIN — ASPIRIN 81 MG: 81 TABLET, COATED ORAL at 10:09

## 2025-04-26 RX ADMIN — POLYETHYLENE GLYCOL 3350 17 G: 17 POWDER, FOR SOLUTION ORAL at 21:17

## 2025-04-26 RX ADMIN — IPRATROPIUM BROMIDE AND ALBUTEROL SULFATE 1 DOSE: .5; 3 SOLUTION RESPIRATORY (INHALATION) at 08:38

## 2025-04-26 RX ADMIN — SODIUM CHLORIDE, PRESERVATIVE FREE 10 ML: 5 INJECTION INTRAVENOUS at 20:53

## 2025-04-26 RX ADMIN — PREDNISONE 20 MG: 20 TABLET ORAL at 10:09

## 2025-04-26 RX ADMIN — CARVEDILOL 3.12 MG: 3.12 TABLET, FILM COATED ORAL at 11:57

## 2025-04-26 RX ADMIN — IPRATROPIUM BROMIDE AND ALBUTEROL SULFATE 1 DOSE: .5; 3 SOLUTION RESPIRATORY (INHALATION) at 02:07

## 2025-04-26 RX ADMIN — BUDESONIDE 1000 MCG: 0.5 INHALANT RESPIRATORY (INHALATION) at 08:39

## 2025-04-26 RX ADMIN — SODIUM CHLORIDE, PRESERVATIVE FREE 10 ML: 5 INJECTION INTRAVENOUS at 09:19

## 2025-04-26 RX ADMIN — INSULIN LISPRO 1 UNITS: 100 INJECTION, SOLUTION INTRAVENOUS; SUBCUTANEOUS at 18:55

## 2025-04-26 ASSESSMENT — PAIN DESCRIPTION - LOCATION
LOCATION: SHOULDER
LOCATION: HEAD
LOCATION: ARM;SHOULDER;NECK

## 2025-04-26 ASSESSMENT — PAIN DESCRIPTION - ORIENTATION
ORIENTATION: RIGHT
ORIENTATION: RIGHT

## 2025-04-26 ASSESSMENT — PAIN DESCRIPTION - ONSET
ONSET: ON-GOING
ONSET: AWAKENED FROM SLEEP

## 2025-04-26 ASSESSMENT — PAIN - FUNCTIONAL ASSESSMENT
PAIN_FUNCTIONAL_ASSESSMENT: PREVENTS OR INTERFERES WITH MANY ACTIVE NOT PASSIVE ACTIVITIES
PAIN_FUNCTIONAL_ASSESSMENT: PREVENTS OR INTERFERES SOME ACTIVE ACTIVITIES AND ADLS

## 2025-04-26 ASSESSMENT — PAIN SCALES - GENERAL
PAINLEVEL_OUTOF10: 6
PAINLEVEL_OUTOF10: 10
PAINLEVEL_OUTOF10: 4
PAINLEVEL_OUTOF10: 8
PAINLEVEL_OUTOF10: 10
PAINLEVEL_OUTOF10: 8

## 2025-04-26 ASSESSMENT — PAIN DESCRIPTION - FREQUENCY: FREQUENCY: CONTINUOUS

## 2025-04-26 ASSESSMENT — PAIN DESCRIPTION - DESCRIPTORS
DESCRIPTORS: STABBING
DESCRIPTORS: ACHING

## 2025-04-26 ASSESSMENT — PAIN DESCRIPTION - PAIN TYPE
TYPE: ACUTE PAIN
TYPE: CHRONIC PAIN

## 2025-04-27 LAB
GLUCOSE BLD-MCNC: 103 MG/DL (ref 70–99)
GLUCOSE BLD-MCNC: 112 MG/DL (ref 70–99)
GLUCOSE BLD-MCNC: 136 MG/DL (ref 70–99)
GLUCOSE BLD-MCNC: 143 MG/DL (ref 70–99)
GLUCOSE BLD-MCNC: 175 MG/DL (ref 70–99)
PERFORMED ON: ABNORMAL

## 2025-04-27 PROCEDURE — 2500000003 HC RX 250 WO HCPCS: Performed by: NURSE PRACTITIONER

## 2025-04-27 PROCEDURE — 94760 N-INVAS EAR/PLS OXIMETRY 1: CPT

## 2025-04-27 PROCEDURE — 6360000002 HC RX W HCPCS: Performed by: NURSE PRACTITIONER

## 2025-04-27 PROCEDURE — 94640 AIRWAY INHALATION TREATMENT: CPT

## 2025-04-27 PROCEDURE — 2500000003 HC RX 250 WO HCPCS: Performed by: STUDENT IN AN ORGANIZED HEALTH CARE EDUCATION/TRAINING PROGRAM

## 2025-04-27 PROCEDURE — 6360000002 HC RX W HCPCS: Performed by: STUDENT IN AN ORGANIZED HEALTH CARE EDUCATION/TRAINING PROGRAM

## 2025-04-27 PROCEDURE — 2060000000 HC ICU INTERMEDIATE R&B

## 2025-04-27 PROCEDURE — 6370000000 HC RX 637 (ALT 250 FOR IP): Performed by: INTERNAL MEDICINE

## 2025-04-27 PROCEDURE — 2700000000 HC OXYGEN THERAPY PER DAY

## 2025-04-27 PROCEDURE — 6370000000 HC RX 637 (ALT 250 FOR IP): Performed by: NURSE PRACTITIONER

## 2025-04-27 PROCEDURE — 6370000000 HC RX 637 (ALT 250 FOR IP): Performed by: STUDENT IN AN ORGANIZED HEALTH CARE EDUCATION/TRAINING PROGRAM

## 2025-04-27 PROCEDURE — 6360000002 HC RX W HCPCS

## 2025-04-27 PROCEDURE — 99232 SBSQ HOSP IP/OBS MODERATE 35: CPT | Performed by: NURSE PRACTITIONER

## 2025-04-27 PROCEDURE — 6370000000 HC RX 637 (ALT 250 FOR IP)

## 2025-04-27 RX ADMIN — BUDESONIDE 1000 MCG: 0.5 INHALANT RESPIRATORY (INHALATION) at 20:14

## 2025-04-27 RX ADMIN — SENNOSIDES AND DOCUSATE SODIUM 2 TABLET: 50; 8.6 TABLET ORAL at 09:31

## 2025-04-27 RX ADMIN — TRAZODONE HYDROCHLORIDE 50 MG: 50 TABLET ORAL at 20:32

## 2025-04-27 RX ADMIN — IPRATROPIUM BROMIDE AND ALBUTEROL SULFATE 1 DOSE: .5; 2.5 SOLUTION RESPIRATORY (INHALATION) at 11:57

## 2025-04-27 RX ADMIN — ACETAMINOPHEN 650 MG: 325 TABLET ORAL at 05:53

## 2025-04-27 RX ADMIN — OXYCODONE AND ACETAMINOPHEN 1 TABLET: 325; 5 TABLET ORAL at 04:25

## 2025-04-27 RX ADMIN — POLYETHYLENE GLYCOL 3350 17 G: 17 POWDER, FOR SOLUTION ORAL at 09:31

## 2025-04-27 RX ADMIN — CARVEDILOL 3.12 MG: 3.12 TABLET, FILM COATED ORAL at 09:31

## 2025-04-27 RX ADMIN — PREDNISONE 20 MG: 20 TABLET ORAL at 09:31

## 2025-04-27 RX ADMIN — OXYCODONE AND ACETAMINOPHEN 1 TABLET: 325; 5 TABLET ORAL at 18:38

## 2025-04-27 RX ADMIN — SODIUM CHLORIDE, PRESERVATIVE FREE 20 MG: 5 INJECTION INTRAVENOUS at 09:30

## 2025-04-27 RX ADMIN — SODIUM CHLORIDE, PRESERVATIVE FREE 10 ML: 5 INJECTION INTRAVENOUS at 20:37

## 2025-04-27 RX ADMIN — SODIUM CHLORIDE, PRESERVATIVE FREE 10 ML: 5 INJECTION INTRAVENOUS at 09:32

## 2025-04-27 RX ADMIN — CARVEDILOL 3.12 MG: 3.12 TABLET, FILM COATED ORAL at 16:24

## 2025-04-27 RX ADMIN — IPRATROPIUM BROMIDE AND ALBUTEROL SULFATE 1 DOSE: .5; 2.5 SOLUTION RESPIRATORY (INHALATION) at 20:14

## 2025-04-27 RX ADMIN — OXYCODONE AND ACETAMINOPHEN 1 TABLET: 325; 5 TABLET ORAL at 10:55

## 2025-04-27 RX ADMIN — CLONIDINE HYDROCHLORIDE 0.1 MG: 0.1 TABLET ORAL at 20:32

## 2025-04-27 RX ADMIN — IPRATROPIUM BROMIDE AND ALBUTEROL SULFATE 1 DOSE: .5; 2.5 SOLUTION RESPIRATORY (INHALATION) at 07:52

## 2025-04-27 RX ADMIN — BUDESONIDE 1000 MCG: 0.5 INHALANT RESPIRATORY (INHALATION) at 08:01

## 2025-04-27 RX ADMIN — Medication 3 MG: at 20:32

## 2025-04-27 RX ADMIN — WATER 1000 MG: 1 INJECTION INTRAMUSCULAR; INTRAVENOUS; SUBCUTANEOUS at 09:30

## 2025-04-27 RX ADMIN — CLONIDINE HYDROCHLORIDE 0.1 MG: 0.1 TABLET ORAL at 09:31

## 2025-04-27 RX ADMIN — ASPIRIN 81 MG: 81 TABLET, COATED ORAL at 09:31

## 2025-04-27 RX ADMIN — SODIUM CHLORIDE, PRESERVATIVE FREE 20 MG: 5 INJECTION INTRAVENOUS at 20:32

## 2025-04-27 RX ADMIN — ENOXAPARIN SODIUM 40 MG: 100 INJECTION SUBCUTANEOUS at 20:32

## 2025-04-27 ASSESSMENT — PAIN SCALES - GENERAL
PAINLEVEL_OUTOF10: 3
PAINLEVEL_OUTOF10: 2
PAINLEVEL_OUTOF10: 7
PAINLEVEL_OUTOF10: 7
PAINLEVEL_OUTOF10: 8
PAINLEVEL_OUTOF10: 9

## 2025-04-27 ASSESSMENT — PAIN DESCRIPTION - LOCATION: LOCATION: SHOULDER

## 2025-04-27 ASSESSMENT — PAIN SCALES - WONG BAKER: WONGBAKER_NUMERICALRESPONSE: NO HURT

## 2025-04-27 ASSESSMENT — PAIN DESCRIPTION - ORIENTATION: ORIENTATION: RIGHT

## 2025-04-27 NOTE — DISCHARGE INSTR - COC
Continuity of Care Form    Patient Name: Clarissa Estrada   :  1975  MRN:  [id number]    Admit date:  2025  Discharge date:  ***    Code Status Order: Full Code   Advance Directives:     Admitting Physician:  Valeria Snow MD  PCP: Jocelyn Wolfe APRN - NP    Discharging Nurse: ***  Discharging Hospital Unit/Room#: A6V-4443/5269-01  Discharging Unit Phone Number: ***    Emergency Contact:   Extended Emergency Contact Information  Primary Emergency Contact: Jeffrey Ashton  Address: [address]  Home Phone: [phone]  Mobile Phone: [phone]  Relation: Spouse  Secondary Emergency Contact: Janine Ashton  Address: [address] [place] [address]  Home Phone: [phone]  Relation: Child    Past Surgical History:  Past Surgical History:   Procedure Laterality Date    ABDOMEN SURGERY      CHOLECYSTECTOMY      DILATATION, ESOPHAGUS      EYE SURGERY      TUBAL LIGATION         Immunization History:   Immunization History   Administered Date(s) Administered    Influenza Vaccine, unspecified formulation 2011    Influenza Virus Vaccine 2013    Influenza, AFLURIA (age 3 y+), FLUZONE, (age 6 mo+), Quadv MDV, 0.5mL 10/25/2018    Influenza, FLUARIX, FLULAVAL, FLUZONE (age 6 mo+) and AFLURIA, (age 3 y+), Quadv PF, 0.5mL 2017, 2020    Pneumococcal, PPSV23, PNEUMOVAX 23, (age 2y+), SC/IM, 0.5mL 2011, 2016       Active Problems:  Patient Active Problem List   Diagnosis Code    Sepsis ([facility]) A41.9    Chronic respiratory failure with hypoxia and hypercapnia ([facility]) J96.11, J96.12    Acute respiratory failure ([facility]) J96.00    COPD exacerbation ([facility]) J44.1    Respiratory acidosis E87.29    COPD, severe ([facility]) J44.9    Acute on chronic respiratory failure with hypoxia and hypercapnia ([facility]) J96.21, J96.22    Tobacco use Z72.0    Nonhealing ulcer of right lower leg with fat layer exposed ([facility])

## 2025-04-27 NOTE — RT PROTOCOL NOTE
RT Nebulizer Bronchodilator Protocol Note    There is a bronchodilator order in the chart from a provider indicating to follow the RT Bronchodilator Protocol and there is an “Initiate RT Bronchodilator Protocol” order as well (see protocol at bottom of note).    CXR Findings:  No results found.    The findings from the last RT Protocol Assessment were as follows:  Smoking: Chronic pulmonary disease  Respiratory Pattern: Dyspnea on exertion or RR 21-25 bpm  Breath Sounds: Intermittent or unilateral wheezes  Cough: Strong, spontaneous, non-productive  Indication for Bronchodilator Therapy: Wheezing associated with pulm disorder  Bronchodilator Assessment Score: 8    Aerosolized bronchodilator medication orders have been revised according to the RT Nebulizer Bronchodilator Protocol below.    Respiratory Therapist to perform RT Therapy Protocol Assessment initially then follow the protocol.  Repeat RT Therapy Protocol Assessment PRN for score 0-3 or on second treatment, BID, and PRN for scores above 3.    No Indications - adjust the frequency to every 6 hours PRN wheezing or bronchospasm, if no treatments needed after 48 hours then discontinue using Per Protocol order mode.     If indication present, adjust the RT bronchodilator orders based on the Bronchodilator Assessment Score as indicated below.  If a patient is on this medication at home then do not decrease Frequency below that used at home.    0-3 - enter or revise RT bronchodilator order(s) to equivalent RT Bronchodilator order with Frequency of every 4 hours PRN for wheezing or increased work of breathing using Per Protocol order mode.       4-6 - enter or revise RT Bronchodilator order(s) to two equivalent RT bronchodilator orders with one order with BID Frequency and one order with Frequency of every 4 hours PRN wheezing or increased work of breathing using Per Protocol order mode.         7-10 - enter or revise RT Bronchodilator order(s) to two equivalent RT

## 2025-04-27 NOTE — DISCHARGE INSTR - DIET

## 2025-04-28 ENCOUNTER — TELEPHONE (OUTPATIENT)
Dept: PULMONOLOGY | Age: 50
End: 2025-04-28

## 2025-04-28 VITALS
TEMPERATURE: 99.3 F | DIASTOLIC BLOOD PRESSURE: 85 MMHG | HEIGHT: 59 IN | HEART RATE: 101 BPM | OXYGEN SATURATION: 93 % | BODY MASS INDEX: 34.67 KG/M2 | RESPIRATION RATE: 18 BRPM | WEIGHT: 171.96 LBS | SYSTOLIC BLOOD PRESSURE: 146 MMHG

## 2025-04-28 LAB
BACTERIA BLD CULT ORG #2: NORMAL
BACTERIA BLD CULT: NORMAL

## 2025-04-28 PROCEDURE — 6370000000 HC RX 637 (ALT 250 FOR IP): Performed by: STUDENT IN AN ORGANIZED HEALTH CARE EDUCATION/TRAINING PROGRAM

## 2025-04-28 PROCEDURE — 6370000000 HC RX 637 (ALT 250 FOR IP)

## 2025-04-28 PROCEDURE — 6360000002 HC RX W HCPCS: Performed by: NURSE PRACTITIONER

## 2025-04-28 PROCEDURE — 9990000010 HC NO CHARGE VISIT

## 2025-04-28 PROCEDURE — 2700000000 HC OXYGEN THERAPY PER DAY

## 2025-04-28 PROCEDURE — 6370000000 HC RX 637 (ALT 250 FOR IP): Performed by: INTERNAL MEDICINE

## 2025-04-28 PROCEDURE — 94760 N-INVAS EAR/PLS OXIMETRY 1: CPT

## 2025-04-28 PROCEDURE — 2500000003 HC RX 250 WO HCPCS: Performed by: NURSE PRACTITIONER

## 2025-04-28 PROCEDURE — 92526 ORAL FUNCTION THERAPY: CPT

## 2025-04-28 PROCEDURE — 2500000003 HC RX 250 WO HCPCS: Performed by: STUDENT IN AN ORGANIZED HEALTH CARE EDUCATION/TRAINING PROGRAM

## 2025-04-28 PROCEDURE — 6360000002 HC RX W HCPCS: Performed by: STUDENT IN AN ORGANIZED HEALTH CARE EDUCATION/TRAINING PROGRAM

## 2025-04-28 PROCEDURE — 6370000000 HC RX 637 (ALT 250 FOR IP): Performed by: NURSE PRACTITIONER

## 2025-04-28 PROCEDURE — 94640 AIRWAY INHALATION TREATMENT: CPT

## 2025-04-28 RX ORDER — PREDNISONE 20 MG/1
20 TABLET ORAL DAILY
Qty: 2 TABLET | Refills: 0 | Status: SHIPPED | OUTPATIENT
Start: 2025-04-29 | End: 2025-05-01

## 2025-04-28 RX ADMIN — BUDESONIDE 1000 MCG: 0.5 INHALANT RESPIRATORY (INHALATION) at 09:03

## 2025-04-28 RX ADMIN — PREDNISONE 20 MG: 20 TABLET ORAL at 08:43

## 2025-04-28 RX ADMIN — SODIUM CHLORIDE, PRESERVATIVE FREE 10 ML: 5 INJECTION INTRAVENOUS at 08:44

## 2025-04-28 RX ADMIN — SODIUM CHLORIDE, PRESERVATIVE FREE 20 MG: 5 INJECTION INTRAVENOUS at 08:44

## 2025-04-28 RX ADMIN — CLONIDINE HYDROCHLORIDE 0.1 MG: 0.1 TABLET ORAL at 08:43

## 2025-04-28 RX ADMIN — WATER 1000 MG: 1 INJECTION INTRAMUSCULAR; INTRAVENOUS; SUBCUTANEOUS at 08:43

## 2025-04-28 RX ADMIN — OXYCODONE AND ACETAMINOPHEN 1 TABLET: 325; 5 TABLET ORAL at 05:27

## 2025-04-28 RX ADMIN — IPRATROPIUM BROMIDE AND ALBUTEROL SULFATE 1 DOSE: .5; 2.5 SOLUTION RESPIRATORY (INHALATION) at 09:03

## 2025-04-28 RX ADMIN — SENNOSIDES AND DOCUSATE SODIUM 2 TABLET: 50; 8.6 TABLET ORAL at 08:43

## 2025-04-28 RX ADMIN — POLYETHYLENE GLYCOL 3350 17 G: 17 POWDER, FOR SOLUTION ORAL at 08:43

## 2025-04-28 RX ADMIN — OXYCODONE AND ACETAMINOPHEN 1 TABLET: 325; 5 TABLET ORAL at 14:06

## 2025-04-28 RX ADMIN — CARVEDILOL 3.12 MG: 3.12 TABLET, FILM COATED ORAL at 08:43

## 2025-04-28 RX ADMIN — ASPIRIN 81 MG: 81 TABLET, COATED ORAL at 08:43

## 2025-04-28 ASSESSMENT — PAIN DESCRIPTION - DESCRIPTORS: DESCRIPTORS: ACHING;DISCOMFORT;TENDER

## 2025-04-28 ASSESSMENT — PAIN SCALES - GENERAL
PAINLEVEL_OUTOF10: 8
PAINLEVEL_OUTOF10: 5
PAINLEVEL_OUTOF10: 8
PAINLEVEL_OUTOF10: 8

## 2025-04-28 ASSESSMENT — PAIN DESCRIPTION - LOCATION: LOCATION: SHOULDER

## 2025-04-28 ASSESSMENT — PAIN SCALES - WONG BAKER: WONGBAKER_NUMERICALRESPONSE: NO HURT

## 2025-04-28 ASSESSMENT — PAIN DESCRIPTION - ORIENTATION: ORIENTATION: RIGHT

## 2025-04-28 ASSESSMENT — PAIN - FUNCTIONAL ASSESSMENT: PAIN_FUNCTIONAL_ASSESSMENT: ACTIVITIES ARE NOT PREVENTED

## 2025-04-28 NOTE — TELEPHONE ENCOUNTER
Wilfrid from [facility] called and is wanting a verbal order for POC and care check from MD     [phone]

## 2025-04-28 NOTE — DISCHARGE SUMMARY
within 1 week  Diet: regular diet   Activity: activity as tolerated  Disposition: Discharged to:   [x]Home, []C, []SNF, []Acute Rehab, []Hospice   Condition on discharge: Stable  Labs and Tests to be Followed up as an outpatient by PCP or Specialist:     Discharge Medications:        Medication List        CHANGE how you take these medications      predniSONE 20 MG tablet  Commonly known as: DELTASONE  Take 1 tablet by mouth daily for 2 doses  Start taking on: April 29, 2025  What changed:   medication strength  how much to take  how to take this  when to take this  additional instructions     torsemide 20 MG tablet  Commonly known as: DEMADEX  TAKE THREE TABLETS BY MOUTH DAILY  What changed: See the new instructions.            CONTINUE taking these medications      acetaminophen 325 MG tablet  Commonly known as: TYLENOL     * albuterol (2.5 MG/3ML) 0.083% nebulizer solution  Commonly known as: PROVENTIL     * albuterol sulfate  (90 Base) MCG/ACT inhaler  Commonly known as: PROVENTIL;VENTOLIN;PROAIR  INHALE 2 PUFFS BY MOUTH EVERY 4 HOURS AS NEEDED FOR WHEEZING OR FOR SHORTNESS OF BREATH     aspirin 81 MG EC tablet     carvedilol 3.125 MG tablet  Commonly known as: COREG  TAKE 1 TABLET BY MOUTH TWICE A DAY     cloNIDine 0.1 MG tablet  Commonly known as: CATAPRES     ipratropium 0.5 mg-albuterol 2.5 mg 0.5-2.5 (3) MG/3ML Soln nebulizer solution  Commonly known as: DUONEB  Inhale 3 mLs into the lungs every 4 hours     Jardiance 10 MG tablet  Generic drug: empagliflozin  TAKE ONE TABLET BY MOUTH DAILY     lisinopril 5 MG tablet  Commonly known as: PRINIVIL;ZESTRIL  TAKE 1 TABLET BY MOUTH DAILY     miconazole 2 % powder  Commonly known as: MICOTIN  Apply topically 2 times daily.     omeprazole 40 MG delayed release capsule  Commonly known as: PRILOSEC     oxyCODONE-acetaminophen 5-325 MG per tablet  Commonly known as: PERCOCET     OXYGEN  Inhale 4 L into the lungs See Admin Instructions     Spacer/Aero-Holding

## 2025-04-28 NOTE — CARE COORDINATION
Calls placed to daughter, Janine and , Jeffrey. Left .    Patient has orders for home care. Patient currently has home care at her home, family was unsure of company. Just wanting to verify if they can cover patients needs.    Will follow up when more information is available.     Electronically signed by Connie Rosen RN on 4/28/2025 at 11:12 AM    Spoke with patient regarding home care. She said her first choice was [facility] and the second would be [facility].     Call placed to [facility] and  left.     Spoke with Emili, [facility], and they cannot accept because the patient is active with another company and could not get authorization.     Waiting on return call from [facility] to verify if she is active with them at this time.     The Plan for Transition of Care is related to the following treatment goals: home care     The Patient and/or patient representative patient was provided with a choice of provider and agrees   with the discharge plan. [x] Yes [] No    Freedom of choice list was provided with basic dialogue that supports the patient's individualized plan of care/goals, treatment preferences and shares the quality data associated with the providers. [x] Yes [] No     Electronically signed by Connie Rosen RN on 4/28/2025 at 1:50 PM    [facility] is not able to accept this patient due to staffing. Patient was made aware.     She is unsure of what company comes to her house for home care. Patient is going to call the company she is with and let them know if she would like them to resume services with them.    If she does not resume services with them, she will call  and see if [facility] can accept her.     Electronically signed by Connie Rosen RN on 4/28/2025 at 2:07 PM                   
Case Management Assessment  Initial Evaluation    Date/Time of Evaluation: 4/21/2025 4:35 PM  Assessment Completed by: Marion Santos RN    If patient is discharged prior to next notation, then this note serves as note for discharge by case management.    Patient Name: Clarissa Estrada                   YOB: 1975  Diagnosis: COPD exacerbation (HCC) [J44.1]  Acute on chronic respiratory failure with hypoxia and hypercapnia [J96.21, J96.22]                   Date / Time: 4/20/2025  6:50 PM    Patient Admission Status: Inpatient   Readmission Risk (Low < 19, Mod (19-27), High > 27): Readmission Risk Score: 17    Current PCP: Jocelyn Wolfe APRN - NP  PCP verified by CM? Yes    Chart Reviewed: Yes      History Provided by: Child/Family  Patient Orientation: Sedated    Patient Cognition: Other (see comment) (Vent/sedation)    Hospitalization in the last 30 days (Readmission):  No    If yes, Readmission Assessment in  Navigator will be completed.    Advance Directives:      Code Status: Full Code   Patient's Primary Decision Maker is: Legal Next of Kin    Primary Decision Maker: Jeffrey Ashton - Spouse - [phone]    Primary Decision Maker: Janine Ashton - Child - [phone]    Discharge Planning:    Patient lives with: Spouse/Significant Other, Children, Family Members Type of Home: House  Primary Care Giver: Spouse (Children assist)  Patient Support Systems include: Children, Family Members, Spouse/Significant Other   Current Financial resources: Medicare, Medicaid  Current community resources: None  Current services prior to admission: Durable Medical Equipment, Oxygen Therapy            Current DME: Oxygen Therapy (Comment), Wheelchair, Walker, Cane, Shower Chair, Bedside Commode (Electric w/c, manual w/c, [employer] supplies oxygen at 4lpm)            Type of Home Care services:  None    ADLS  Prior functional level: Assistance with the following:, Bathing, Dressing, Cooking, Housework, Shopping, 
Discharge Planning:     Extubated today. Now on vapotherm.     Discharge plan is to be determined. Will require PT/OT evaluations. From home w/spouse and children. Daughters provide patient with a lot of assistance. May insist on taking her home.     Marion MIRANDA RN  Case Management  [phone]    Electronically signed by Marion Santos RN on 4/25/2025 at 6:12 PM    
and time on IMM letter #2 on the the appropriate lines. Copy of letter offered and they are aware that the original copy of IMM letter #2 is available prior to discharge from the paper chart on the unit.  Electronic documentation has been entered into epic for IMM letter #2 and original paper copy has been added to the paper chart at the nurses station.     Additional CM Notes: Patient to Dc home with home care and family support. Patients daughter will transport her home and is bringing an O2 tank. Patient will resume services with [facility] for home O2.     RN and patient aware of DC plan and agreeable.     The Plan for Transition of Care is related to the following treatment goals of COPD exacerbation (HCC) [J44.1]  Acute on chronic respiratory failure with hypoxia and hypercapnia (HCC) [J96.21, J96.22]    The Patient and/or patient representative Clarissa and her family were provided with a choice of provider and agrees with the discharge plan Yes    Freedom of choice list was provided with basic dialogue that supports the patient's individualized plan of care/goals and shares the quality data associated with the providers. Yes    Connie Rosen RN  [facility]   Case Management Department  Ph: [phone]

## 2025-04-28 NOTE — PLAN OF CARE
Problem: Chronic Conditions and Co-morbidities  Goal: Patient's chronic conditions and co-morbidity symptoms are monitored and maintained or improved  4/21/2025 0303 by Jason Rowland RN  Outcome: Progressing  4/21/2025 0251 by Jason Rowland RN  Outcome: Progressing     Problem: Discharge Planning  Goal: Discharge to home or other facility with appropriate resources  4/21/2025 0303 by Jason Rowland RN  Outcome: Progressing  4/21/2025 0251 by Jason Rowland RN  Outcome: Progressing     Problem: Pain  Goal: Verbalizes/displays adequate comfort level or baseline comfort level  4/21/2025 0303 by Jason Rowland RN  Outcome: Progressing  4/21/2025 0251 by Jason Rowland RN  Outcome: Progressing  Flowsheets  Taken 4/21/2025 0000  Verbalizes/displays adequate comfort level or baseline comfort level: Assess pain using appropriate pain scale  Taken 4/20/2025 2200  Verbalizes/displays adequate comfort level or baseline comfort level: Encourage patient to monitor pain and request assistance     Problem: Skin/Tissue Integrity  Goal: Skin integrity remains intact  Description: 1.  Monitor for areas of redness and/or skin breakdown2.  Assess vascular access sites hourly3.  Every 4-6 hours minimum:  Change oxygen saturation probe site4.  Every 4-6 hours:  If on nasal continuous positive airway pressure, respiratory therapy assess nares and determine need for appliance change or resting period  4/21/2025 0303 by Jason Rowland RN  Outcome: Progressing  4/21/2025 0251 by Jason Rowland RN  Outcome: Progressing     Problem: Respiratory - Adult  Goal: Achieves optimal ventilation and oxygenation  Outcome: Progressing     Problem: Infection - Adult  Goal: Absence of infection at discharge  Outcome: Progressing     
  Problem: Chronic Conditions and Co-morbidities  Goal: Patient's chronic conditions and co-morbidity symptoms are monitored and maintained or improved  4/22/2025 1038 by Eulalia Jay RN  Outcome: Progressing  4/22/2025 0128 by Margie Allen RN  Outcome: Progressing     Problem: Discharge Planning  Goal: Discharge to home or other facility with appropriate resources  4/22/2025 1038 by Eulalia Jay RN  Outcome: Progressing  4/22/2025 0128 by Margie Allen RN  Outcome: Progressing     Problem: Pain  Goal: Verbalizes/displays adequate comfort level or baseline comfort level  4/22/2025 1038 by Eulalia Jay RN  Outcome: Progressing  4/22/2025 0128 by Margie Allen RN  Outcome: Progressing     Problem: Skin/Tissue Integrity  Goal: Skin integrity remains intact  Description: 1.  Monitor for areas of redness and/or skin breakdown2.  Assess vascular access sites hourly3.  Every 4-6 hours minimum:  Change oxygen saturation probe site4.  Every 4-6 hours:  If on nasal continuous positive airway pressure, respiratory therapy assess nares and determine need for appliance change or resting period  4/22/2025 1038 by Eulalia Jay RN  Outcome: Progressing  4/22/2025 0128 by Margie Allen RN  Outcome: Progressing     Problem: Respiratory - Adult  Goal: Achieves optimal ventilation and oxygenation  4/22/2025 1038 by Eulalia Jay RN  Outcome: Progressing  4/22/2025 0128 by Margie Allen RN  Outcome: Progressing     Problem: Infection - Adult  Goal: Absence of infection at discharge  4/22/2025 1038 by Eulalia Jay RN  Outcome: Progressing  4/22/2025 0128 by Margie Allen RN  Outcome: Progressing     Problem: Safety - Medical Restraint  Goal: Remains free of injury from restraints (Restraint for Interference with Medical Device)  Description: INTERVENTIONS:1. Determine that other, less restrictive measures have been tried or would not be effective before applying the restraint2. Evaluate the patient's condition at the 
  Problem: Chronic Conditions and Co-morbidities  Goal: Patient's chronic conditions and co-morbidity symptoms are monitored and maintained or improved  4/26/2025 2307 by Ely Mena RN  Outcome: Progressing  Flowsheets (Taken 4/26/2025 2307)  Care Plan - Patient's Chronic Conditions and Co-Morbidity Symptoms are Monitored and Maintained or Improved: Monitor and assess patient's chronic conditions and comorbid symptoms for stability, deterioration, or improvement     Problem: Discharge Planning  Goal: Discharge to home or other facility with appropriate resources  4/26/2025 2307 by Ely Mena RN  Outcome: Progressing  Flowsheets (Taken 4/26/2025 2307)  Discharge to home or other facility with appropriate resources: Identify barriers to discharge with patient and caregiver     Problem: Pain  Goal: Verbalizes/displays adequate comfort level or baseline comfort level  4/26/2025 2307 by Ely Mena RN  Outcome: Progressing  Flowsheets (Taken 4/26/2025 2307)  Verbalizes/displays adequate comfort level or baseline comfort level: Encourage patient to monitor pain and request assistance     Problem: Respiratory - Adult  Goal: Achieves optimal ventilation and oxygenation  4/26/2025 2307 by Ely Mena, RN  Outcome: Progressing  Flowsheets (Taken 4/26/2025 2307)  Achieves optimal ventilation and oxygenation: Assess for changes in respiratory status     Problem: Safety - Adult  Goal: Free from fall injury  4/26/2025 2307 by Ely Mena RN  Outcome: Progressing  Flowsheets (Taken 4/26/2025 2307)  Free From Fall Injury: Instruct family/caregiver on patient safety     
  Problem: Chronic Conditions and Co-morbidities  Goal: Patient's chronic conditions and co-morbidity symptoms are monitored and maintained or improved  4/28/2025 0017 by Odilia Denton RN  Outcome: Progressing  Flowsheets (Taken 4/27/2025 2034)  Care Plan - Patient's Chronic Conditions and Co-Morbidity Symptoms are Monitored and Maintained or Improved: Monitor and assess patient's chronic conditions and comorbid symptoms for stability, deterioration, or improvement     Problem: Discharge Planning  Goal: Discharge to home or other facility with appropriate resources  4/28/2025 0017 by Odilia Denton RN  Outcome: Progressing  Flowsheets (Taken 4/27/2025 2034)  Discharge to home or other facility with appropriate resources: Identify barriers to discharge with patient and caregiver     Problem: Pain  Goal: Verbalizes/displays adequate comfort level or baseline comfort level  4/28/2025 0017 by Odilia Denton RN  Outcome: Progressing     Problem: Skin/Tissue Integrity  Goal: Skin integrity remains intact  Description: 1.  Monitor for areas of redness and/or skin breakdown2.  Assess vascular access sites hourly3.  Every 4-6 hours minimum:  Change oxygen saturation probe site4.  Every 4-6 hours:  If on nasal continuous positive airway pressure, respiratory therapy assess nares and determine need for appliance change or resting period  4/28/2025 0017 by Odilia Denton RN  Outcome: Progressing  Flowsheets (Taken 4/27/2025 2034)  Skin Integrity Remains Intact: Monitor for areas of redness and/or skin breakdown     Problem: Respiratory - Adult  Goal: Achieves optimal ventilation and oxygenation  4/28/2025 0017 by Odilia Denton RN  Outcome: Progressing  Flowsheets (Taken 4/27/2025 2034)  Achieves optimal ventilation and oxygenation: Assess for changes in respiratory status     Problem: Infection - Adult  Goal: Absence of infection at discharge  4/28/2025 0017 by Odilia Denton RN  Outcome: Progressing  Flowsheets (Taken 
  Problem: Chronic Conditions and Co-morbidities  Goal: Patient's chronic conditions and co-morbidity symptoms are monitored and maintained or improved  Outcome: Progressing     Problem: Discharge Planning  Goal: Discharge to home or other facility with appropriate resources  Outcome: Progressing     Problem: Pain  Goal: Verbalizes/displays adequate comfort level or baseline comfort level  Outcome: Progressing     Problem: Skin/Tissue Integrity  Goal: Skin integrity remains intact  Description: 1.  Monitor for areas of redness and/or skin breakdown2.  Assess vascular access sites hourly3.  Every 4-6 hours minimum:  Change oxygen saturation probe site4.  Every 4-6 hours:  If on nasal continuous positive airway pressure, respiratory therapy assess nares and determine need for appliance change or resting period  Outcome: Progressing     Problem: Respiratory - Adult  Goal: Achieves optimal ventilation and oxygenation  Outcome: Progressing     Problem: Infection - Adult  Goal: Absence of infection at discharge  Outcome: Progressing     Problem: Safety - Medical Restraint  Goal: Remains free of injury from restraints (Restraint for Interference with Medical Device)  Description: INTERVENTIONS:1. Determine that other, less restrictive measures have been tried or would not be effective before applying the restraint2. Evaluate the patient's condition at the time of restraint application3. Inform patient/family regarding the reason for restraint4. Q2H: Monitor safety, psychosocial status, comfort, nutrition and hydration  Outcome: Progressing  Flowsheets (Taken 4/21/2025 2063)  Remains free of injury from restraints (restraint for interference with medical device): Every 2 hours: Monitor safety, psychosocial status, comfort, nutrition and hydration     Problem: Safety - Adult  Goal: Free from fall injury  Outcome: Progressing     Problem: Risk for Elopement  Goal: Patient will not exit the unit/facility without proper 
  Problem: Chronic Conditions and Co-morbidities  Goal: Patient's chronic conditions and co-morbidity symptoms are monitored and maintained or improved  Outcome: Progressing     Problem: Discharge Planning  Goal: Discharge to home or other facility with appropriate resources  Outcome: Progressing     Problem: Pain  Goal: Verbalizes/displays adequate comfort level or baseline comfort level  Outcome: Progressing  Flowsheets (Taken 4/23/2025 1652)  Verbalizes/displays adequate comfort level or baseline comfort level:   Assess pain using appropriate pain scale   Administer analgesics based on type and severity of pain and evaluate response   Implement non-pharmacological measures as appropriate and evaluate response     Problem: Skin/Tissue Integrity  Goal: Skin integrity remains intact  Description: 1.  Monitor for areas of redness and/or skin breakdown2.  Assess vascular access sites hourly3.  Every 4-6 hours minimum:  Change oxygen saturation probe site4.  Every 4-6 hours:  If on nasal continuous positive airway pressure, respiratory therapy assess nares and determine need for appliance change or resting period  Outcome: Progressing     Problem: Respiratory - Adult  Goal: Achieves optimal ventilation and oxygenation  Outcome: Progressing  Flowsheets (Taken 4/23/2025 1652)  Achieves optimal ventilation and oxygenation:   Assess for changes in respiratory status   Assess for changes in mentation and behavior   Position to facilitate oxygenation and minimize respiratory effort   Oxygen supplementation based on oxygen saturation or arterial blood gases     Problem: Infection - Adult  Goal: Absence of infection at discharge  Outcome: Progressing     Problem: Safety - Medical Restraint  Goal: Remains free of injury from restraints (Restraint for Interference with Medical Device)  Description: INTERVENTIONS:1. Determine that other, less restrictive measures have been tried or would not be effective before applying the 
  Problem: Chronic Conditions and Co-morbidities  Goal: Patient's chronic conditions and co-morbidity symptoms are monitored and maintained or improved  Outcome: Progressing     Problem: Discharge Planning  Goal: Discharge to home or other facility with appropriate resources  Outcome: Progressing     Problem: Pain  Goal: Verbalizes/displays adequate comfort level or baseline comfort level  Outcome: Progressing  Flowsheets (Taken 4/24/2025 1433)  Verbalizes/displays adequate comfort level or baseline comfort level:   Encourage patient to monitor pain and request assistance   Assess pain using appropriate pain scale   Administer analgesics based on type and severity of pain and evaluate response     Problem: Skin/Tissue Integrity  Goal: Skin integrity remains intact  Description: 1.  Monitor for areas of redness and/or skin breakdown2.  Assess vascular access sites hourly3.  Every 4-6 hours minimum:  Change oxygen saturation probe site4.  Every 4-6 hours:  If on nasal continuous positive airway pressure, respiratory therapy assess nares and determine need for appliance change or resting period  Outcome: Progressing  Flowsheets (Taken 4/24/2025 1433)  Skin Integrity Remains Intact:   Monitor for areas of redness and/or skin breakdown   Every 4-6 hours minimum:  Change oxygen saturation probe site   Every 4-6 hours:  If on nasal continuous positive airway pressure, assess nares and determine need for appliance change or resting period   Turn and reposition as indicated   Assess vascular access sites hourly     Problem: Respiratory - Adult  Goal: Achieves optimal ventilation and oxygenation  Outcome: Progressing  Flowsheets (Taken 4/24/2025 1433)  Achieves optimal ventilation and oxygenation:   Assess for changes in mentation and behavior   Position to facilitate oxygenation and minimize respiratory effort   Assess for changes in respiratory status     Problem: Safety - Medical Restraint  Goal: Remains free of injury 
  Problem: Nutrition Deficit:  Goal: Optimize nutritional status  Outcome: Not Progressing  Flowsheets (Taken 4/25/2025 1256 by Valdo Davison RD)  Nutrient intake appropriate for improving, restoring, or maintaining nutritional needs: Recommend, monitor, and adjust tube feedings and TPN/PPN based on assessed needs.   Failed bedside swallow x2, pending SLP eval. Remains NPO in interim.       Problem: Chronic Conditions and Co-morbidities  Goal: Patient's chronic conditions and co-morbidity symptoms are monitored and maintained or improved  Outcome: Progressing  Flowsheets  Taken 4/25/2025 2000 by Christie Olvera RN  Care Plan - Patient's Chronic Conditions and Co-Morbidity Symptoms are Monitored and Maintained or Improved:   Monitor and assess patient's chronic conditions and comorbid symptoms for stability, deterioration, or improvement   Update acute care plan with appropriate goals if chronic or comorbid symptoms are exacerbated and prevent overall improvement and discharge    Problem: Discharge Planning  Goal: Discharge to home or other facility with appropriate resources  Outcome: Progressing  Flowsheets  Taken 4/25/2025 2000 by Christie Olvera RN  Discharge to home or other facility with appropriate resources:   Identify barriers to discharge with patient and caregiver   Identify discharge learning needs (meds, wound care, etc)   Refer to discharge planning if patient needs post-hospital services based on physician order or complex needs related to functional status, cognitive ability or social support system    Problem: Pain  Goal: Verbalizes/displays adequate comfort level or baseline comfort level  Outcome: Progressing     Problem: Skin/Tissue Integrity  Goal: Skin integrity remains intact  Description: 1.  Monitor for areas of redness and/or skin breakdown2.  Assess vascular access sites hourly3.  Every 4-6 hours minimum:  Change oxygen saturation probe site4.  Every 4-6 hours:  If on nasal 
Adult  Goal: Achieves optimal ventilation and oxygenation  4/21/2025 1107 by Eulalia Jay RN  Outcome: Progressing  4/21/2025 0303 by Jason Rowland RN  Outcome: Progressing     Problem: Infection - Adult  Goal: Absence of infection at discharge  4/21/2025 1107 by Eulalia Jay RN  Outcome: Progressing  4/21/2025 0303 by Jason Rowland RN  Outcome: Progressing     Problem: Safety - Medical Restraint  Goal: Remains free of injury from restraints (Restraint for Interference with Medical Device)  Description: INTERVENTIONS:1. Determine that other, less restrictive measures have been tried or would not be effective before applying the restraint2. Evaluate the patient's condition at the time of restraint application3. Inform patient/family regarding the reason for restraint4. Q2H: Monitor safety, psychosocial status, comfort, nutrition and hydration  Outcome: Progressing  Flowsheets (Taken 4/21/2025 0400 by Jason Rowland, RN)  Remains free of injury from restraints (restraint for interference with medical device): Every 2 hours: Monitor safety, psychosocial status, comfort, nutrition and hydration     Problem: Safety - Adult  Goal: Free from fall injury  Outcome: Progressing     Problem: Risk for Elopement  Goal: Patient will not exit the unit/facility without proper excort  Outcome: Progressing     
redistribution bed/mattress (bed type)   Monitor skin under medical devices   Every 4-6 hours minimum:  Change oxygen saturation probe site  4/22/2025 1038 by Eulalia Jay RN  Outcome: Progressing     Problem: Respiratory - Adult  Goal: Achieves optimal ventilation and oxygenation  4/22/2025 2226 by Nora Whatley RN  Outcome: Progressing  Flowsheets (Taken 4/22/2025 2226)  Achieves optimal ventilation and oxygenation: Assess for changes in respiratory status  4/22/2025 1038 by Eulalia Jay RN  Outcome: Progressing     Problem: Infection - Adult  Goal: Absence of infection at discharge  4/22/2025 2226 by Nora Whatley RN  Outcome: Progressing  Flowsheets (Taken 4/22/2025 2226)  Absence of infection at discharge: Assess and monitor for signs and symptoms of infection  4/22/2025 1038 by Eulalia Jay RN  Outcome: Progressing     Problem: Safety - Medical Restraint  Goal: Remains free of injury from restraints (Restraint for Interference with Medical Device)  Description: INTERVENTIONS:1. Determine that other, less restrictive measures have been tried or would not be effective before applying the restraint2. Evaluate the patient's condition at the time of restraint application3. Inform patient/family regarding the reason for restraint4. Q2H: Monitor safety, psychosocial status, comfort, nutrition and hydration  4/22/2025 1038 by Eulalia Jay RN  Outcome: Progressing     Problem: Safety - Adult  Goal: Free from fall injury  4/22/2025 1038 by Eulalia Jay RN  Outcome: Progressing     Problem: Risk for Elopement  Goal: Patient will not exit the unit/facility without proper excort  4/22/2025 1038 by Eulalia Jay RN  Outcome: Progressing     Problem: Nutrition Deficit:  Goal: Optimize nutritional status  4/22/2025 1038 by Eulalia Jay RN  Outcome: Progressing     
determine need for appliance change or resting period   Turn and reposition as indicated   Assess vascular access sites hourly     Problem: Respiratory - Adult  Goal: Achieves optimal ventilation and oxygenation  4/25/2025 0148 by Marilin Schultz, RN  Outcome: Progressing  Flowsheets (Taken 4/24/2025 2000)  Achieves optimal ventilation and oxygenation:   Assess for changes in respiratory status   Assess for changes in mentation and behavior   Position to facilitate oxygenation and minimize respiratory effort   Oxygen supplementation based on oxygen saturation or arterial blood gases   Initiate smoking cessation protocol as indicated   Encourage broncho-pulmonary hygiene including cough, deep breathe, incentive spirometry   Assess the need for suctioning and aspirate as needed   Assess and instruct to report shortness of breath or any respiratory difficulty   Respiratory therapy support as indicated  4/24/2025 1433 by Sebastian Pritchett, RN  Outcome: Progressing  Flowsheets (Taken 4/24/2025 1433)  Achieves optimal ventilation and oxygenation:   Assess for changes in mentation and behavior   Position to facilitate oxygenation and minimize respiratory effort   Assess for changes in respiratory status     Problem: Infection - Adult  Goal: Absence of infection at discharge  Outcome: Progressing  Flowsheets (Taken 4/24/2025 2000)  Absence of infection at discharge:   Assess and monitor for signs and symptoms of infection   Monitor lab/diagnostic results   Administer medications as ordered   Monitor all insertion sites i.e., indwelling lines, tubes and drains   [place] appropriate cooling/warming therapies per order   Monitor endotracheal (as able) and nasal secretions for changes in amount and color   Instruct and encourage patient and family to use good hand hygiene technique   Identify and instruct in appropriate isolation precautions for identified infection/condition     Problem: Safety - Medical

## 2025-04-29 NOTE — PROGRESS NOTES
Bedside eval: hypotension    Hospital day 4  Admitted for acute on chronic respiratory failure, currently intubated on mechanical ventilation, sedation Fentanyl, propofol and Precedex  SBP was attempted today  Patient currently not on pressor therapy    Just prior to the hypotensive episode she also had a hypoxic event.  And the patient had sat up in bed and even with restraints tried to extubate herself.  She was found to have tube feeding residual within the MV tubing connection to the ET tube.  RT suctioned tube feed from the ET tube and the tubing connections in the posterior pharynx.    On my exam the patient is currently sedated, skin warm and dry, pupils are nearly pinpoint nonreactive.  Lung fields are rhonchorous radha wheezing congested.    Plan: Portable chest x-ray, laboratory studies, Levophed    Results: Metabolic panel BUN 28  CBC white count 13, H&H 10.1 and 30.3, lactic acid 0.5 and glucose was 108  Chest x-ray noting shallow inflation and she was rotated, ET tube in proper position, enteric tube in the upper abdomen.  Patient is rotated so when you compare this radiograph to a prior study it is difficult to assess.    I am concerned about aspiration: Patient is already on antimicrobial coverage  Hypotension may be multifactorial due to the sedation, aspiration    CRITICAL CARE:   Because of high probability of sudden clinical deterioration of the patient's condition or  further deterioration, critical care time included my full attention to the patient's condition, including chart data review, documentation, medication ordering, reviewing the patient's old records, reevaluation patient's cardiac, pulmonary and neurological status. Reassessment of vital signs. Consultations with  attending  physician. Ordering, interpreting reviewing diagnostic testing. Therefore, my critical care time was 60 minutes of direct attention to the patient's condition did not include time spent on procedures.      
    V2.0    [facility] – [place], [place] Progress Note      Name:  Clarissa Estrada /Age/Sex: 1975  (49 y.o. female)   MRN & CSN:  [id number] & [id number] Encounter Date/Time: 2025 12:12 PM EDT   Location:  [id number] PCP: Jocelyn Wolfe APRN - NP     Attending:Favian Love DO       Hospital Day: 2  Brief HPI  Clarissa Estrada is a 49 y.o. female with pertinent PMHx of chronic hypoxic respiratory failure, COPD, CHF, HTN, BLANCA who presented complaining of acute worsening shortness of breath.  She had increasing supplemental oxygen requirements from her baseline of 4 L and was trialed on BiPAP however was unable to tolerate and developed worsening respiratory acidosis respiratory distress requiring intubation.  Assessment & Plan     Acute on chronic respiratory failure with hypoxemia and hypercapnia  COPD with acute exacerbation  - Baseline 4 L via nasal cannula, intubated overnight  - Mechanical ventilation per critical care, currently has high peak pressures most likely from bronchospasm  - Scheduled DuoNebs  - Pulmicort  - IV Solu-Medrol  - IV azithromycin for atypical coverage  - Pulmonology following, note reviewed today , PEEP increased    Hyperkalemia  - S/p calcium, insulin, dextrose, Lokelma  - Recheck BMP this afternoon    Hyperglycemia  - Most likely secondary to steroids  - Check hemoglobin A1c  - Low-dose sliding scale correctional insulin every 6 hours  - Check point-of-care glucose every 6 hours to monitor for hypoglycemia from insulin toxicity    Essential hypertension  - Holding home antihypertensives at this time as blood pressure has been lower limits of normal most likely secondary to sedation    Independent interpretation of telemetry strip today  showing normal sinus rhythm    Diet No diet orders on file   DVT Prophylaxis [x] Lovenox, []  Heparin, [] SCDs, [] Ambulation,  [] Eliquis, [] Xarelto  [] Coumadin   Code Status Full Code   Disposition From: Home  Expected Disposition: Home  Estimated 
    V2.0    [facility] – [place], [place] Progress Note      Name:  Clarissa Estrada /Age/Sex: 1975  (49 y.o. female)   MRN & CSN:  [id number] & [id number] Encounter Date/Time: 2025 12:12 PM EDT   Location:  [id number] PCP: Jocelyn Wolfe APRN - NP     Attending:Favian Love DO       Hospital Day: 7  Brief HPI  Clarissa Estrada is a 49 y.o. female with pertinent PMHx of chronic hypoxic respiratory failure, COPD, CHF, HTN, BLANCA who presented complaining of acute worsening shortness of breath.  She had increasing supplemental oxygen requirements from her baseline of 4 L and was trialed on BiPAP however was unable to tolerate and developed worsening respiratory acidosis respiratory distress requiring intubation.  Assessment & Plan     Acute on chronic respiratory failure with hypoxemia and hypercapnia, improving  COPD with acute exacerbation  Community-acquired pneumonia  - Baseline 4 L via nasal cannula, intubated , extubated , currently on high flow nasal cannula  - Wean supplemental oxygen as tolerated  - DuoNebs every 6 hours  - Pulmicort twice daily  - Continue prednisone 40 mg daily, with plan for taper  - Continue IV Rocephin for H. influenzae to complete a 7-day course  - Respiratory panel positive for haemophilus influenza and human rhinovirus  - Pulmonology following    Hyperglycemia, improved  - secondary to steroids, hemoglobin A1c 5.3  - Continue low-dose sliding scale correctional insulin every 6 hours  - Check point-of-care glucose every 6 hours to monitor for hypoglycemia from insulin toxicity    Essential hypertension  - Resume home Coreg, clonidine      Diet ADULT TUBE FEEDING; Orogastric; Peptide Based; Continuous; 15; Yes; 15; Q 4 hours; 45; 30; Q 4 hours  ADULT DIET; Regular   DVT Prophylaxis [x] Lovenox, []  Heparin, [] SCDs, [] Ambulation,  [] Eliquis, [] Xarelto  [] Coumadin   Code Status Full Code   Disposition From: Home  Expected Disposition: Home  Estimated Date of Discharge:      
    V2.0    [facility] – [place], [place] Progress Note      Name:  Clarissa Estrada /Age/Sex: 1975  (49 y.o. female)   MRN & CSN:  [id number] & [id number] Encounter Date/Time: 2025 12:12 PM EDT   Location:  [id number] PCP: Jocelyn Wolfe APRN - [place]     Attending:Favian Love DO       Hospital Day: 3  Brief HPI  Clarissa Estrada is a 49 y.o. female with pertinent PMHx of chronic hypoxic respiratory failure, COPD, CHF, HTN, BLANCA who presented complaining of acute worsening shortness of breath.  She had increasing supplemental oxygen requirements from her baseline of 4 L and was trialed on BiPAP however was unable to tolerate and developed worsening respiratory acidosis respiratory distress requiring intubation.  Assessment & Plan     Acute on chronic respiratory failure with hypoxemia and hypercapnia  COPD with acute exacerbation  - Baseline 4 L via nasal cannula, intubated   - Mechanical ventilation management per critical care  - Continue with Pulmicort and scheduled DuoNebs  - IV Solu-Medrol  - IV azithromycin for atypical coverage  - Respiratory panel positive for haemophilus influenza and human rhinovirus  - Start IV Rocephin  - VBG still with significant hypercapnia, patient still has a lot of bronchospasm  - Pulmonology following    Hyperkalemia, improved  - S/p calcium, insulin, dextrose, Lokelma  - Check BMP tomorrow    Hyperglycemia  - Most likely secondary to steroids  - Check hemoglobin A1c, remains pending  - Continue low-dose sliding scale correctional insulin every 6 hours  - Check point-of-care glucose every 6 hours to monitor for hypoglycemia from insulin toxicity    Essential hypertension  - Continue holding home antihypertensives at this time as blood pressure has been lower limits of normal most likely secondary to sedation    Independent interpretation of telemetry strip today  showing normal sinus rhythm    Diet ADULT TUBE FEEDING; Orogastric; Peptide Based; Continuous; 15; Yes; 15; Q 4 hours; 
    V2.0    [facility] – [place] Progress Note      Name:  Clarissa Estrada /Age/Sex: 1975  (49 y.o. female)   MRN & CSN:  [id number] & [id number] Encounter Date/Time: 2025 12:12 PM EDT   Location:  [id number] PCP: Jocelyn Wolfe APRN - NP     Attending:Favian Love DO       Hospital Day: 8  Brief HPI  Clarissa Estrada is a 49 y.o. female with pertinent PMHx of chronic hypoxic respiratory failure, COPD, CHF, HTN, BLANCA who presented complaining of acute worsening shortness of breath.  She had increasing supplemental oxygen requirements from her baseline of 4 L and was trialed on BiPAP however was unable to tolerate and developed worsening respiratory acidosis respiratory distress requiring intubation.  Assessment & Plan     Acute on chronic respiratory failure with hypoxemia and hypercapnia, improving  COPD with acute exacerbation  Community-acquired pneumonia  - intubated , extubated   -Successfully weaned down to home baseline 4 L via nasal cannula  - DuoNebs every 6 hours  - Pulmicort twice daily  - Prednisone decreased to 20 mg daily  - Continue IV Rocephin for H. influenzae to complete a 7-day course  - Respiratory panel was positive for haemophilus influenza and human rhinovirus  - Pulmonology following  - Likely discharge home tomorrow    Hyperglycemia, resolved  - secondary to steroids, hemoglobin A1c 5.3  - Discontinue sliding-scale insulin and glucose checks as she has not required recently    Essential hypertension  - Resume home Coreg, clonidine      Diet ADULT DIET; Regular   DVT Prophylaxis [x] Lovenox, []  Heparin, [] SCDs, [] Ambulation,  [] Eliquis, [] Xarelto  [] Coumadin   Code Status Full Code   Disposition From: Home  Expected Disposition: Home  Estimated Date of Discharge:            Subjective:     Chief Complaint: Shortness of breath    Patient was seen and examined today sitting up in bed  Says she feels much better today, not complaining of any shortness of breath or cough 
  [facility] - Occupational Therapy   Phone: [phone]    Occupational Therapy  Facility/Department:[address] PROGRESSIVE CARE    [x] Initial Evaluation            [x] Daily Treatment Note         [] Discharge Summary      Patient: Clarissa Estrada   : 1975   MRN: [id number]   Date of Service:  2025    Staff Mobility Recommendation: RWx1 stand pivot vs stedy    AM-PAC Score: 16/24  Clarisas Estrada scored a 16/24 on the AM-PAC ADL Inpatient form. Current research shows that an AM-PAC score of 18 or greater is typically associated with a discharge to the patient's home setting. Based on the patient's AM-PAC score, and their current ADL deficits, it is recommended that the patient have 2-3 sessions per week of Occupational Therapy at d/c to increase the patient's independence.  At this time, this patient demonstrates the endurance and safety to discharge home with HHOT and a follow up treatment frequency of 2-3x/wk.   Please see assessment section for further patient specific details.    If patient discharges prior to next session this note will serve as a discharge summary.  Please see below for the latest assessment towards goals.     Discharge Recommendations: 24hr HHOT    Admitting Diagnosis:  Acute on chronic respiratory failure with hypoxia and hypercapnia (HCC)  Ordering Physician: Beltran Martinez MD   Current Admission Summary: per ED note, Clarissa Estrada is a 49 y.o. female who presents to the ED complaining of shortness of breath.  Symptoms times several days, worse today.  Requires 4 L nasal cannula at baseline.  I received a call ahead from EMS for patient in respiratory distress.  I gave verbal authorization for 75 mcg of fentanyl to help with the patient's anxiety tolerating CPAP.  Patient was also administered 1 DuoNeb prior to arrival.  Patient denies fever, chest pain, nausea, vomiting, diarrhea, or abdominal pain.     Past Medical History:  has a past medical history of Arthritis, 
 Latest Reference Range & Units 04/21/25 03:42   pH, Arterial 7.350 - 7.450  7.190 (LL)   pCO2, Arterial 35.0 - 45.0 mmHg 88.0 (HH)   pO2, Arterial 75.0 - 108.0 mmHg 231.0 (H)   HCO3, Arterial 21.0 - 29.0 mmol/L 33.6 (H)     ABG pre intubation  
 Latest Reference Range & Units 04/21/25 05:40   pH, Mainor 7.350 - 7.450  7.227 (L)   pCO2, Mainor 40.0 - 50.0 mmHg 85.7 (H)   pO2, Mainor Not Established mmHg 58     Repeat VBG. Results sent to MD  
0321: 24 mg etomidate  0322: 47 mg ernesto  0322: ETT 7.5 @ 23, bilat breath sounds    
0900   sbt 5/5/45 %  
4 Eyes Skin Assessment     NAME:  Clarissa Estrada  YOB: 1975  MEDICAL RECORD NUMBER:  [id number]    The patient is being assessed for  Admission    I agree that at least one RN has performed a thorough Head to Toe Skin Assessment on the patient. ALL assessment sites listed below have been assessed.      Areas assessed by both nurses:    Head, Face, Ears, Shoulders, Back, Chest, Arms, Elbows, Hands, Sacrum. Buttock, Coccyx, Ischium, Legs. Feet and Heels, and Under Medical Devices         Does the Patient have a Wound? No noted wound(s)       Jaison Prevention initiated by RN: Yes  Wound Care Orders initiated by RN: No    Pressure Injury (Stage 3,4, Unstageable, DTI, NWPT, and Complex wounds) if present, place Wound referral order by RN under : No    New Ostomies, if present place, Ostomy referral order under : No     Nurse 1 eSignature: Electronically signed by Jason Rowland RN on 4/20/25 at 8:53 PM EDT    **SHARE this note so that the co-signing nurse can place an eSignature**    Nurse 2 eSignature: Electronically signed by Lindy Hamm RN on 4/21/25 at 3:05 AM EDT    
Arrived to place PICC after chart review. Pre-procedure and timeout done with MALGORZATA Bennett. Discussed limitations of placement and allergies. Consent confirmed. Procedure explained to pt, including risks and benefits. All questions answered. Pt verbalized understanding.      Vessels easily collapsible upon assessment. No difficulty accessing Basilic vein. Blood free flowing and non-pulsatile. Guidewire, introducer, and catheter all easily inserted. PICC placement verified using 3CG technology as evidenced by peaked P-waves with no initial deflection. Line has brisk blood return and flushes easily.     OK to use PICC. Please use new IV tubing when connecting to the newly placed central line.      Patient tolerated sterile procedure well. Bed left in low position with belongings and call light within reach. Educated on line care. Handoff to bedside RN.      Please call with any questions or concerns. The  will direct you to the PICC RN that is on call.      [phone]             
BP dropped to 70's, map 50's.  informed. Levo started as ordered. Pt making adequate amount urine at this time. TF stopped for possible vomiting.  
Back on previous settings  
Discharge orders acknowledged by RN . Discharge teaching completed with pt. AVS reviewed and all questions answered. Medication regimen reviewed and pt understands schedule. MedsToBeds received / E-scripts sent to pt RX / Pt was sent with paper scripts to be filled. Follow up appointments also reviewed with pt and resources given for discharge. IV removed. Bedside monitor removed from pt. Pt vitals WNL. Pt discharged with all belongings to home with daughter. Pt transported off of unit via wheelchair with daughter. No complications.    
[facility]: [facility] 5N PROGRESSIVE CARE  DYSPHAGIA BEDSIDE SWALLOW EVALUATION     Patient: Clarissa Estrada   : 1975   MRN: [id number]      Evaluation Date: 2025     Admitting Diagnosis:   COPD exacerbation (HCC) [J44.1]  Acute on chronic respiratory failure with hypoxia and hypercapnia (HCC) [J96.21, J96.22]. Current MRSA and Haemophilus Influenza Rhinovirus contact precautions   has a past medical history of Arthritis, Blind right eye, CHF (congestive heart failure) (HCC), Chronic respiratory failure with hypoxia and hypercapnia (HCC), COPD (chronic obstructive pulmonary disease) (HCC), GERD (gastroesophageal reflux disease), Hypertension, Movement disorder, Neuromuscular disorder (HCC), On home O2, Pneumonia, and Sleep apnea.   has a past surgical history that includes Cholecystectomy; Tubal ligation; Abdomen surgery; eye surgery; and Dilatation, esophagus.  Allergies: allergy list reviewed  SLP Dysphagia history: History of SLP JUAN JOSÉ and f/u in ; at which time pt was on regular with thin liquid diet  GI history: HX of Acid reflux; esophageal dilatation, and ulcer   Baseline/Prior Level of Function: Living Status: Lives with family; assist from family; on 4L/min O2 at baseline; reduced mobility at baseline; baseline visual deficit/impairment Baseline diet: regular diet; does not wear dentures    Onset Date: 2025        Reason for referral: SLP evaluation orders received due to Dysphagia assessment s/p intubation                        CURRENT ENCOUNTER DIAGNOSTICS/COURSE OF ADMISSION     H&P:   Chief Complaint:   Clarissa Estrada is a 49 y.o. female with pmh of COPD, chronic respiratory failure on 4 L oxygen, hypertension, depression and anxiety, severe chronic pain secondary to osteoarthritis and rheumatoid arthritis, sarcoidosis, obstructive sleep apnea on nightly BiPAP who presents with shortness of breath.  Patient was seen in the room awake alert oriented to person place and situation currently 
MD came to talk to pt about staying on bipap due to her SOB. Pt keeps wanting off for ice chips. MD stated to pt that she needs to remain on bipap for the night, if not pt may need to be placed on ventilator  
MD notified about patient desaturating to mid 80's on 15lpm HFNC. Patient placed on vapotherm 30lpm 100% per MD. Patient not 93%  
[facility]  SLP DYSPHAGIA THERAPY    Patient: Clarissa Estrada   : 1975   MRN: [id number]    Treatment Date: 2025   Admitting Diagnosis:   COPD exacerbation ([facility]) [J44.1]  Acute on chronic respiratory failure with hypoxia and hypercapnia (HCC) [J96.21, J96.22] CONTACT PRECAUTIONS DUE TO INFLUENZA  has a past medical history of Arthritis, Blind right eye, CHF (congestive heart failure) ([facility]), Chronic respiratory failure with hypoxia and hypercapnia (HCC), COPD (chronic obstructive pulmonary disease) ([facility]), GERD (gastroesophageal reflux disease), Hypertension, Movement disorder, Neuromuscular disorder (HCC), On home O2, Pneumonia, and Sleep apnea.   has a past surgical history that includes Cholecystectomy; Tubal ligation; Abdomen surgery; eye surgery; and Dilatation, esophagus.  Allergies: allergy list reviewed  SLP Dysphagia history: History of SLP JUAN JOSÉ and f/u in ; at which time pt was on regular with thin liquid diet  GI history: HX of Acid reflux; esophageal dilatation, and ulcer   Baseline/Prior Level of Function: Living Status: Lives with family; assist from family; on 4L/min O2 at baseline; reduced mobility at baseline; baseline visual deficit/impairment Baseline diet: regular diet; does not wear dentures  Onset: 2025     Treatment Diagnosis: Oropharyngeal dysphagia and post intubation    CURRENT ENCOUNTER DIAGNOSTICS/COURSE OF ADMISSION     H&P:   Chief Complaint:   Clarissa Estrada is a 49 y.o. female with pmh of COPD, chronic respiratory failure on 4 L oxygen, hypertension, depression and anxiety, severe chronic pain secondary to osteoarthritis and rheumatoid arthritis, sarcoidosis, obstructive sleep apnea on nightly BiPAP who presents with shortness of breath.  Patient was seen in the room awake alert oriented to person place and situation currently she is off of BiPAP states unable to tolerate.  She is placed on Precedex still she is tachypneic, tachycardic and O2 saturating at 89% on 10 L 
NAME:  Clarissa Estrada  YOB: 1975  MEDICAL RECORD NUMBER:  [id number]    Shift Summary: Pt flipped to spontaneous per  @0913. Pt failed SAT & SBT. Pt flipped back to ACPC & FiO2 increased to 50% d/t SpO2 88%. Sedation restarted. Versed IVP given for RASS +2. RASS goal changed to 0 to -1.     Family updated: Yes.Pt's daughter    Rhythm: Normal Sinus Rhythm     Most recent vitals:   Visit Vitals  /73   Pulse 76   Temp 98.8 °F (37.1 °C) (Oral)   Resp 17   Ht 1.499 m (4' 11\")   Wt 78.2 kg (172 lb 6.4 oz)   SpO2 96%   BMI 34.82 kg/m²           No data found.    No data found.      Respiratory support needed (if any):  - Ventilator Settings:  Vent Days 3       Resp Rate (Set): 18 bpm  FiO2 : 50 %    PEEP/CPAP (cmH2O): 5       Admission weight Weight - Scale: 81.9 kg (180 lb 8.9 oz) (04/20/25 1855)    Today's weight    Wt Readings from Last 1 Encounters:   04/23/25 78.2 kg (172 lb 6.4 oz)        Duvall need assessed each shift: YES -  - continue duvall r/t - fluid volume management  UOP >30ml/hr: YES  Last documented BM (in last 48 hrs):  No data found.             Restraints (in use currently or dc'd in last 12 hrs): Yes    Order current and documentation up to date? Yes    Lines/Drains reviewed @ bedside.  CVC  04/21/25 Right Femoral (Active)   Number of days: 2       Peripheral IV 04/20/25 Right;Anterior Forearm (Active)   Number of days: 2       Urinary Catheter 04/21/25 2 Way (Active)   Number of days: 2         Drip rates at handoff:    dextrose      propofol 50 mcg/kg/min (04/23/25 0855)    fentaNYL 200 mcg/hr (04/23/25 0855)    dexmedeTOMIDine 1.5 mcg/kg/hr (04/23/25 0920)    sodium chloride         Lab Data:   CBC:   Recent Labs     04/22/25  0335 04/23/25  0515   WBC 9.9 8.6   HGB 10.1* 9.6*   HCT 30.3* 29.6*   MCV 85.6 86.6    338     BMP:    Recent Labs     04/22/25  0335 04/23/25  0515    139   K 4.8 4.4   CO2 34* 33*   BUN 21* 27*   CREATININE 0.6 0.6     ABG:   Recent 
NAME:  Clarissa Estrada  YOB: 1975  MEDICAL RECORD NUMBER:  [id number]    Shift Summary: Pt extubated and requiring vapotherm support. Pt failed RN bedside swallow screen d/t coughing with water intake. Speech consulted.    Family updated: Yes:  Janine    Rhythm: Normal Sinus Rhythm / Sinus Tachycardia    Most recent vitals:   Visit Vitals  /64   Pulse 93   Temp 98 °F (36.7 °C) (Oral)   Resp 15   Ht 1.499 m (4' 11\")   Wt 81.4 kg (179 lb 7.3 oz)   SpO2 96%   BMI 36.25 kg/m²           No data found.    No data found.      Respiratory support needed (if any):  - O2 - Vapotherm - settings FiO2 70% 25L.     Admission weight Weight - Scale: 81.9 kg (180 lb 8.9 oz) (04/20/25 1855)    Today's weight    Wt Readings from Last 1 Encounters:   04/25/25 81.4 kg (179 lb 7.3 oz)        Duvall need assessed each shift: YES -  - continue duvall r/t - strict I's and O's  UOP >30ml/hr: YES  Last documented BM (in last 48 hrs):  No data found.             Restraints (in use currently or dc'd in last 12 hrs): Yes    Order current and documentation up to date? Yes    Lines/Drains reviewed @ bedside.  PICC 04/24/25 Left Basilic (Active)   Number of days: 0       Peripheral IV 04/20/25 Right;Anterior Forearm (Active)   Number of days: 4       Urinary Catheter 04/21/25 2 Way (Active)   Number of days: 4         Drip rates at handoff:    sodium chloride      fentaNYL Stopped (04/25/25 0901)    propofol Stopped (04/25/25 0900)    norepinephrine Stopped (04/25/25 1153)    dextrose      dexmedeTOMIDine Stopped (04/25/25 0940)       Lab Data:   CBC:   Recent Labs     04/24/25  0632 04/25/25  0359   WBC 7.6 9.4   HGB 9.2* 9.2*   HCT 27.9* 28.5*   MCV 85.8 86.5    287     BMP:    Recent Labs     04/24/25  0632 04/25/25  0359    142   K 4.1 3.7   CO2 33* 33*   BUN 24* 21*   CREATININE 0.6 0.5*     ABG: No results for input(s): \"PHART\", \"VDT7RHE\", \"PO2ART\" in the last 72 hours.    Any consults during the shift? Yes: 
NAME:  Clarissa Estrada  YOB: 1975  MEDICAL RECORD NUMBER:  [id number]    Shift Summary: Levo gtt started/stopped. Unable to follow commands.      Family updated: No    Rhythm: Normal Sinus Rhythm     Most recent vitals:   Visit Vitals  BP (!) 87/57   Pulse 74   Temp 98.4 °F (36.9 °C) (Axillary)   Resp 18   Ht 1.499 m (4' 11\")   Wt 80.1 kg (176 lb 9.4 oz)   SpO2 97%   BMI 35.67 kg/m²           No data found.    No data found.      Respiratory support needed (if any):  - Ventilator Settings:  Vent Days 4       Resp Rate (Set): 18 bpm  FiO2 : 45 %    PEEP/CPAP (cmH2O): 5       Admission weight Weight - Scale: 81.9 kg (180 lb 8.9 oz) (04/20/25 1855)    Today's weight    Wt Readings from Last 1 Encounters:   04/24/25 80.1 kg (176 lb 9.4 oz)        Duvall need assessed each shift: YES -  - continue duvall r/t - Immobile  UOP >30ml/hr: YES  Last documented BM (in last 48 hrs):  No data found.             Restraints (in use currently or dc'd in last 12 hrs): Yes    Order current and documentation up to date? Yes    Lines/Drains reviewed @ bedside.  CVC  04/21/25 Right Femoral (Active)   Number of days: 3       Peripheral IV 04/20/25 Right;Anterior Forearm (Active)   Number of days: 3       Urinary Catheter 04/21/25 2 Way (Active)   Number of days: 3         Drip rates at handoff:    fentaNYL 200 mcg/hr (04/24/25 0629)    propofol 50 mcg/kg/min (04/24/25 0629)    norepinephrine Stopped (04/24/25 0413)    dextrose      dexmedeTOMIDine 1.5 mcg/kg/hr (04/24/25 0629)    sodium chloride         Lab Data:   CBC:   Recent Labs     04/23/25  0515 04/24/25  0124   WBC 8.6 13.0*   HGB 9.6* 10.1*   HCT 29.6* 30.3*   MCV 86.6 85.1    367     BMP:    Recent Labs     04/23/25  0515 04/24/25  0124    139   K 4.4 4.2   CO2 33* 32   BUN 27* 28*   CREATININE 0.6 0.6     ABG: No results for input(s): \"PHART\", \"OIK3IVT\", \"PO2ART\" in the last 72 hours.    Any consults during the shift? No    Any signed and held orders to 
NAME:  Clarissa Estrada  YOB: 1975  MEDICAL RECORD NUMBER:  [id number]    Shift Summary: Keep RASS -4 per critical care. Pt continues to remain sedated with propofol, precedex, & fentanyl. Tube feeds started. Colace & glycolax given d/t no BM's t/o admission. Pt intermittently following commands.     Family updated: Yes:  Janine (daughter)    Rhythm: Normal Sinus Rhythm     Most recent vitals:   Visit Vitals  /78   Pulse (!) 103   Temp 99.1 °F (37.3 °C)   Resp 19   Ht 1.499 m (4' 11\")   Wt 80.1 kg (176 lb 9.4 oz)   SpO2 96%   BMI 35.67 kg/m²           No data found.    No data found.      Respiratory support needed (if any):  - Ventilator Settings:  Vent Days 2       Resp Rate (Set): 18 bpm  FiO2 : 50 %    PEEP/CPAP (cmH2O): 8       Admission weight Weight - Scale: 81.9 kg (180 lb 8.9 oz) (04/20/25 1855)    Today's weight    Wt Readings from Last 1 Encounters:   04/22/25 80.1 kg (176 lb 9.4 oz)        Duvall need assessed each shift: YES -  - continue duvall r/t - fluid volume management  UOP >30ml/hr: YES  Last documented BM (in last 48 hrs):  No data found.             Restraints (in use currently or dc'd in last 12 hrs): Yes    Order current and documentation up to date? Yes    Lines/Drains reviewed @ bedside.  CVC  04/21/25 Right Femoral (Active)   Number of days: 1       Peripheral IV 04/20/25 Right;Anterior Forearm (Active)   Number of days: 1       Urinary Catheter 04/21/25 2 Way (Active)   Number of days: 1         Drip rates at handoff:    dextrose      propofol 45 mcg/kg/min (04/22/25 1021)    fentaNYL 150 mcg/hr (04/22/25 1021)    dexmedeTOMIDine 1.1 mcg/kg/hr (04/22/25 1054)    sodium chloride         Lab Data:   CBC:   Recent Labs     04/21/25  0540 04/22/25  0335   WBC 10.8 9.9   HGB 10.5* 10.1*   HCT 32.2* 30.3*   MCV 86.7 85.6    324     BMP:    Recent Labs     04/21/25  1230 04/22/25  0335    137   K 4.9 4.8   CO2 34* 34*   BUN 20 21*   CREATININE 0.6 0.6     ABG: 
NAME:  Clarissa Estrada  YOB: 1975  MEDICAL RECORD NUMBER:  [id number]    Shift Summary: 4/20 PM: To ED via EMS d/t SOB and tachypnea found on 7L NC (base 4) @ home. 75 mcg fent en route, precedex gtt started. To ICU. Noncompliant w/ BiPAP, Intubated, Propofol, Levo gtt started. Weaning precedex. 2 g Mag admin. CC/Pulm c/s for am.     Family updated: Yes:  Attempted to contact , no answer.  Left VM.     Rhythm: Sinus Tachycardia     Most recent vitals:   Visit Vitals  BP (!) 126/92   Pulse (!) 131   Temp 98.4 °F (36.9 °C) (Oral)   Resp 22   Ht 1.499 m (4' 11\")   Wt 81.9 kg (180 lb 8.9 oz)   SpO2 94%   BMI 36.47 kg/m²           No data found.    No data found.      Respiratory support needed (if any):  - BiPAP   IPAP: 14 cmH20, CPAP/EPAP: 6 cmH2O continuous    Admission weight Weight - Scale: 81.9 kg (180 lb 8.9 oz) (04/20/25 1855)    Today's weight    Wt Readings from Last 1 Encounters:   04/20/25 81.9 kg (180 lb 8.9 oz)        Zimmerman need assessed each shift: Yes - urine retention  UOP >30ml/hr: YES  Last documented BM (in last 48 hrs):  No data found.             Restraints (in use currently or dc'd in last 12 hrs): No    Order current and documentation up to date? Yes    Lines/Drains reviewed @ bedside.         Drip rates at handoff:    dexmedeTOMIDine 0.2 mcg/kg/hr (04/20/25 1922)    sodium chloride         Lab Data:   CBC:   Recent Labs     04/20/25 1900   WBC 18.1*   HGB 10.9*   HCT 35.5*   MCV 89.0        BMP:    Recent Labs     04/20/25 1900      K 4.9   CO2 32   BUN 21*   CREATININE 0.8     ABG: No results for input(s): \"PHART\", \"EXQ7UHF\", \"PO2ART\" in the last 72 hours.    Any consults during the shift? CC/Pulm notified of Adm.     Any signed and held orders to be released?  No        4 Eyes Skin Assessment       The patient is being assessed for  Shift Handoff    I agree that at least one RN has performed a thorough Head to Toe Skin Assessment on the patient. ALL 
NAME:  Clarissa Estrada  YOB: 1975  MEDICAL RECORD NUMBER:  [id number]    Shift Summary: Keep patient RASS -4 per CC. Patient remains sedated on propofol, precedex, and fentanyl. Tube feeds at goal 45mL/hr. Still no BM.    Family updated: No    Rhythm: Normal Sinus Rhythm     Most recent vitals:   Visit Vitals  /78   Pulse 85   Temp 97.7 °F (36.5 °C) (Oral)   Resp 18   Ht 1.499 m (4' 11\")   Wt 78.2 kg (172 lb 6.4 oz)   SpO2 93%   BMI 34.82 kg/m²           No data found.    No data found.      Respiratory support needed (if any):  - Ventilator Settings:  Vent Days 3       Resp Rate (Set): 18 bpm  FiO2 : 40 %    PEEP/CPAP (cmH2O): 8       Admission weight Weight - Scale: 81.9 kg (180 lb 8.9 oz) (04/20/25 1855)    Today's weight    Wt Readings from Last 1 Encounters:   04/23/25 78.2 kg (172 lb 6.4 oz)        Duvall need assessed each shift: YES -  - continue duvall r/t - fluid volume management  UOP >30ml/hr: YES  Last documented BM (in last 48 hrs):  No data found.             Restraints (in use currently or dc'd in last 12 hrs): Yes    Order current and documentation up to date? Yes    Lines/Drains reviewed @ bedside.  CVC  04/21/25 Right Femoral (Active)   Number of days: 2       Peripheral IV 04/20/25 Right;Anterior Forearm (Active)   Number of days: 2       Urinary Catheter 04/21/25 2 Way (Active)   Number of days: 2         Drip rates at handoff:    dextrose      propofol 50 mcg/kg/min (04/23/25 0642)    fentaNYL 175 mcg/hr (04/23/25 0640)    dexmedeTOMIDine 1.3 mcg/kg/hr (04/23/25 0635)    sodium chloride         Lab Data:   CBC:   Recent Labs     04/22/25 0335 04/23/25  0515   WBC 9.9 8.6   HGB 10.1* 9.6*   HCT 30.3* 29.6*   MCV 85.6 86.6    338     BMP:    Recent Labs     04/22/25 0335 04/23/25  0515    139   K 4.8 4.4   CO2 34* 33*   BUN 21* 27*   CREATININE 0.6 0.6     ABG:   Recent Labs     04/21/25  0342   PHART 7.190*   TPT5ONP 88.0*   PO2ART 231.0*       Any consults 
NAME:  Clarissa Estrada  YOB: 1975  MEDICAL RECORD NUMBER:  [id number]    Shift Summary: Vapotherm weaned to 25L 40%, expiratory wheezes to bilat lungs. Duvall d/c at 0000, has not voided yet, bladder scanned this AM and revealed 231ml, denies any discomfort, will monitor. PRN dilaudid for c/o R shoulder/arm pain, causing nausea and headache- PRN tylenol/ zofran. D10 bolus given x1 for BS 64. Bath given.     Family updated: Yes:  , Jeffrey at bedside  until 2100ish.     Rhythm: Sinus Tachycardia     Most recent vitals:   Visit Vitals  /83   Pulse 100   Temp 97.4 °F (36.3 °C) (Rectal)   Resp 11   Ht 1.499 m (4' 11\")   Wt 77.5 kg (170 lb 13.7 oz)   SpO2 95%   BMI 34.51 kg/m²           No data found.    No data found.      Respiratory support needed (if any):  - O2 - Vapotherm - settings 25L 40%    Admission weight Weight - Scale: 81.9 kg (180 lb 8.9 oz) (04/20/25 1855)    Today's weight    Wt Readings from Last 1 Encounters:   04/26/25 77.5 kg (170 lb 13.7 oz)        Duvall need assessed each shift: N/A - no duvall present  UOP >30ml/hr: YES duvall removed at 0000, has not voided yet. Bladder scan this AM showed 231 ml. Purewick in place.     Last documented BM (in last 48 hrs):  No data found.             Restraints (in use currently or dc'd in last 12 hrs): No    Order current and documentation up to date? No    Lines/Drains reviewed @ bedside.  PICC 04/24/25 Left Basilic (Active)   Number of days: 1       Peripheral IV 04/20/25 Right;Anterior Forearm (Active)   Number of days: 5         Drip rates at handoff:    sodium chloride      fentaNYL Stopped (04/25/25 0901)    propofol Stopped (04/25/25 0900)    norepinephrine Stopped (04/25/25 1153)    dextrose      dexmedeTOMIDine Stopped (04/25/25 0940)       Lab Data:   CBC:   Recent Labs     04/25/25  0359 04/26/25  0338   WBC 9.4 9.7   HGB 9.2* 9.7*   HCT 28.5* 29.9*   MCV 86.5 84.8    280     BMP:    Recent Labs     04/25/25  0358 
NAME:  Clarissa Estrada  YOB: 1975  MEDICAL RECORD NUMBER:  [id number]    Shift Summary: Pt on mechanical ventilator,sedated with propofol,fentanyl and precedex, easy to wake up and able to follow commands.Pt had an SBT and failed.Good urine output.VS stable.    Family updated: Yes:  Pt's daughter:Janine    Rhythm: Sinus Tachycardia     Most recent vitals:   Visit Vitals  /85   Pulse (!) 111   Temp 99.2 °F (37.3 °C) (Axillary)   Resp 19   Ht 1.499 m (4' 11\")   Wt 80.1 kg (176 lb 9.4 oz)   SpO2 94%   BMI 35.67 kg/m²           No data found.    No data found.      Respiratory support needed (if any):  - Ventilator Settings:  Vent Days 4       Resp Rate (Set): 18 bpm  FiO2 : 55 %    PEEP/CPAP (cmH2O): 5       Admission weight Weight - Scale: 81.9 kg (180 lb 8.9 oz) (04/20/25 1855)    Today's weight    Wt Readings from Last 1 Encounters:   04/24/25 80.1 kg (176 lb 9.4 oz)        Duvall need assessed each shift: YES -  - continue duvall r/t - strict I and O  UOP >30ml/hr: YES  Last documented BM (in last 48 hrs):  No data found.             Restraints (in use currently or dc'd in last 12 hrs): Yes    Order current and documentation up to date? Yes    Lines/Drains reviewed @ bedside.  CVC  04/21/25 Right Femoral (Active)   Number of days: 3       Peripheral IV 04/20/25 Right;Anterior Forearm (Active)   Number of days: 3       Urinary Catheter 04/21/25 2 Way (Active)   Number of days: 3         Drip rates at handoff:    sodium chloride      fentaNYL 75 mcg/hr (04/24/25 1510)    propofol 30 mcg/kg/min (04/24/25 1510)    norepinephrine Stopped (04/24/25 0413)    dextrose      dexmedeTOMIDine 0.4 mcg/kg/hr (04/24/25 1509)       Lab Data:   CBC:   Recent Labs     04/24/25  0124 04/24/25  0632   WBC 13.0* 7.6   HGB 10.1* 9.2*   HCT 30.3* 27.9*   MCV 85.1 85.8    288     BMP:    Recent Labs     04/24/25  0124 04/24/25  0632    141   K 4.2 4.1   CO2 32 33*   BUN 28* 24*   CREATININE 0.6 0.6     ABG: 
NAME:  Clarissa Estrada  YOB: 1975  MEDICAL RECORD NUMBER:  [id number]    Shift Summary: Pt is very alert and awake, trying to talk. Femoral CVC was removed and PICC placed. TF is still on hold BG is stable but on a lower side. VSS stable, UOP adequate, no BM.     Family updated: No    Rhythm: Normal Sinus Rhythm     Most recent vitals:   Visit Vitals  /83   Pulse 91   Temp 97.9 °F (36.6 °C) (Axillary)   Resp 19   Ht 1.499 m (4' 11\")   Wt 80.1 kg (176 lb 9.4 oz)   SpO2 96%   BMI 35.67 kg/m²           No data found.    No data found.      Respiratory support needed (if any):  - Ventilator Settings:  Vent Days 5       Resp Rate (Set): 18 bpm  FiO2 : 55 %    PEEP/CPAP (cmH2O): 5       Admission weight Weight - Scale: 81.9 kg (180 lb 8.9 oz) (04/20/25 1855)    Today's weight    Wt Readings from Last 1 Encounters:   04/24/25 80.1 kg (176 lb 9.4 oz)        Duvall need assessed each shift: YES -  - continue duvall r/t - monitor I&Os  UOP >30ml/hr: YES  Last documented BM (in last 48 hrs):  No data found.             Restraints (in use currently or dc'd in last 12 hrs): Yes    Order current and documentation up to date? Yes    Lines/Drains reviewed @ bedside.  PICC 04/24/25 Left Basilic (Active)   Number of days: 0       Peripheral IV 04/20/25 Right;Anterior Forearm (Active)   Number of days: 4       Urinary Catheter 04/21/25 2 Way (Active)   Number of days: 4         Drip rates at handoff:    sodium chloride      fentaNYL 100 mcg/hr (04/24/25 7255)    propofol 35 mcg/kg/min (04/25/25 0246)    norepinephrine Stopped (04/24/25 0413)    dextrose      dexmedeTOMIDine 0.4 mcg/kg/hr (04/25/25 0044)       Lab Data:   CBC:   Recent Labs     04/24/25  0632 04/25/25  0359   WBC 7.6 9.4   HGB 9.2* 9.2*   HCT 27.9* 28.5*   MCV 85.8 86.5    287     BMP:    Recent Labs     04/24/25  0632 04/25/25  0359    142   K 4.1 3.7   CO2 33* 33*   BUN 24* 21*   CREATININE 0.6 0.5*     ABG: No results for input(s): 
NAME:  Clarissa Estrada  YOB: 1975  MEDICAL RECORD NUMBER:  [id number]    Shift Summary: RASS goal changed to -4 for vent compliance. Fentanyl gtt added for sedation. K critical this AM- hyperkalemic protocol initiated. Recheck K 5.5. PEEP increased to 10. VBG: pH 7.241, pCO2 84.1, pO2 53, Bicarb 36. Respiratory sample + for influenza, H flu, rhinovirus.     Family updated: No. This RN made X2 attempts to contact Jeffrey, patient spouse, but no answer was received. This RN updated Janine, patient daughter, of status @ 1244.     Rhythm: Normal Sinus Rhythm     Most recent vitals:   Visit Vitals  /73   Pulse 85   Temp 97.5 °F (36.4 °C) (Oral)   Resp 18   Ht 1.499 m (4' 11\")   Wt 78.5 kg (173 lb 1 oz)   SpO2 99%   BMI 34.95 kg/m²           No data found.    No data found.      Respiratory support needed (if any):  - Ventilator Settings:  Vent Days 1       Resp Rate (Set): 18 bpm  FiO2 : 60 %    PEEP/CPAP (cmH2O): 10       Admission weight Weight - Scale: 81.9 kg (180 lb 8.9 oz) (04/20/25 1855)    Today's weight    Wt Readings from Last 1 Encounters:   04/21/25 78.5 kg (173 lb 1 oz)        Duvall need assessed each shift: YES -  - continue duvall r/t - fluid volume management  UOP >30ml/hr: YES  Last documented BM (in last 48 hrs):  No data found.             Restraints (in use currently or dc'd in last 12 hrs): Yes    Order current and documentation up to date? Yes    Lines/Drains reviewed @ bedside.  CVC  04/21/25 Right Femoral (Active)   Number of days: 0       Peripheral IV 04/20/25 Right Antecubital (Active)   Number of days: 0       Peripheral IV 04/20/25 Right;Anterior Forearm (Active)   Number of days: 0       Urinary Catheter 04/21/25 2 Way (Active)   Number of days: 0         Drip rates at handoff:    norepinephrine Stopped (04/21/25 0515)    dextrose      propofol 45 mcg/kg/min (04/21/25 1046)    fentaNYL 50 mcg/hr (04/21/25 1046)    dexmedeTOMIDine 0.9 mcg/kg/hr (04/21/25 1046)    sodium 
NAME:  Clarissa Estrada  YOB: 1975  MEDICAL RECORD NUMBER:  [id number]    Shift Summary: uneventful shift, arousable only with painful stimuli, VSS, BP soft    Family updated: No    Rhythm: Normal Sinus Rhythm     Most recent vitals:   Visit Vitals  BP (!) 96/56   Pulse 78   Temp 98.7 °F (37.1 °C) (Axillary)   Resp 18   Ht 1.499 m (4' 11\")   Wt 78.5 kg (173 lb 1 oz)   SpO2 98%   BMI 34.95 kg/m²           No data found.    No data found.      Respiratory support needed (if any):  - Ventilator Settings:  Vent Days 2       Resp Rate (Set): 18 bpm  FiO2 : 60 %    PEEP/CPAP (cmH2O): 10       Admission weight Weight - Scale: 81.9 kg (180 lb 8.9 oz) (04/20/25 1855)    Today's weight    Wt Readings from Last 1 Encounters:   04/21/25 78.5 kg (173 lb 1 oz)        Duvall need assessed each shift: YES -  - continue duvall r/t - fluid volume management  UOP >30ml/hr: YES  Last documented BM (in last 48 hrs):  No data found.             Restraints (in use currently or dc'd in last 12 hrs): Yes    Order current and documentation up to date? Yes    Lines/Drains reviewed @ bedside.  CVC  04/21/25 Right Femoral (Active)   Number of days: 0       Peripheral IV 04/20/25 Right;Anterior Forearm (Active)   Number of days: 1       Urinary Catheter 04/21/25 2 Way (Active)   Number of days: 1         Drip rates at handoff:    norepinephrine Stopped (04/21/25 0515)    dextrose      propofol 45 mcg/kg/min (04/22/25 0333)    fentaNYL 100 mcg/hr (04/21/25 5788)    dexmedeTOMIDine 0.9 mcg/kg/hr (04/22/25 0116)    sodium chloride         Lab Data:   CBC:   Recent Labs     04/20/25  2208 04/21/25  0540   WBC 12.2* 10.8   HGB 10.2* 10.5*   HCT 32.5* 32.2*   MCV 87.4 86.7    314     BMP:    Recent Labs     04/21/25  0823 04/21/25  1230    136   K 5.5* 4.9   CO2 33* 34*   BUN 23* 20   CREATININE 0.6 0.6     ABG:   Recent Labs     04/21/25  0342   PHART 7.190*   [id number] 88.0*   PO2ART 231.0*       Any consults during the shift? 
Patient extubated per MD at 940 am. Patient placed on 5lpm was 91% then dropped to mid 80's. Patient placed on 15lpm HFNC to obtain a saturation of 90-91%  
Physical Therapy    [facility] - Physical Therapy   Phone: [phone]    Physical Therapy  Facility/Department:[address] PROGRESSIVE CARE    [x] Initial Evaluation            [x] Daily Treatment Note         [] Discharge Summary      Patient: Clarissa Estrada   : 1975   MRN: [id number]   Date of Service:  2025  Staff Mobility Recommendation: CGA with RW bed <> chair vs stedy    AM-PAC score: 1624  Discharge Recommendations: home with 24/7 and home PT  Clarissa Estrada scored a 16/ on the AM-PAC short mobility form. Current research shows that an AM-PAC score of 18 or greater is typically associated with a discharge to the patient's home setting. Based on the patient's AM-PAC score and their current functional mobility deficits, it is recommended that the patient have 2-3 sessions per week of Physical Therapy at d/c to increase the patient's independence.  At this time, this patient demonstrates the endurance and safety to discharge home with 24/7 assist and home PT (home vs OP services) and a follow up treatment frequency of 2-3x/wk.  Please see assessment section for further patient specific details.    If patient discharges prior to next session this note will serve as a discharge summary.  Please see below for the latest assessment towards goals.       Admitting Diagnosis: Acute on chronic respiratory failure with hypoxia and hypercapnia (HCC)  Ordering Physician: Beltran Martinez MD   Current Admission Summary: The pt is a 49 y.o. female who presents to the ED complaining of shortness of breath. Symptoms times several days, worse today. Requires 4 L nasal cannula at baseline. The pt unable to tolerate bi-pap and was intubated for a short time.     Past Medical History:  has a past medical history of Arthritis, Blind right eye, CHF (congestive heart failure) (HCC), Chronic respiratory failure with hypoxia and hypercapnia (HCC), COPD (chronic obstructive pulmonary disease) (HCC), GERD 
Physical Therapy  Treatment Attempt    25    Name: Clarissa Estrada   : 1975    MRN: [id number]    Patient unable to be seen by PT as patient is currently working with speech therapy at bedside. Patient plans to follow up with home care and family assist following discharge.    Please refer to previous PT note for d/c summary.    Electronically signed by Ryder Rayo PT on 2025 at 1:00 PM        
Pt 's sedation was stopped at 0843 for SBT purposes. Pt woke up and follow commands.At 0945 , VBG was performed.Results shows pH: 7.281 pCO2 : 74.4.Dr. Martinez informed and MD order to put back on sedation.At 1020,Don RT placed back ventilator setting to ACPC.Pt failed SBT.  
Pt extubated at 0940 to 5L. O2 quickly increased to 15L. SpO2 88-89%. Beltran Martinez MD notified. Pt to be placed on Vapotherm.     Electronically signed by Jessica L Kerley, RN on 4/25/2025 at 9:58 AM    
Pt has been convinced to try bipap. Pt is tolerating at this time but with constant reassurance by pt's RN. Pt is SOB off the bipap. Again it has beenexplained to pt that she needs to try to use the bipap to help with her respiratory status  
Pt requested to go on bipap. Was given in report that pt did not tolerate bipap in the ER and that the pt wanted to just hold the bipap mask up to her face. That was explained to her that she could not use the bipap in that manner. Pt tolerated the bipap for less than 5 minutes and became agitated and panic. Pt was upset that she could not take bipap off and on to drink. Pt again was explained to the importance of wearing the bipap. Will continue to monitor  
Pt was on bipap. Pt wanted off. Very quickly developed respiratory distress. Spo2 decreased to the 70's and pt turned grey. Pt was turned up to 100% on the bipap while intubation equipment was gathered. Pt was intubated with a 7.5 ETT @ 23 andrea at the lips. Breath sounds are very decreased with I/E wheezing. Duoneb has been changed to Q2. Switched to AC/PC. VT's are 190 to 20 with a minute volume of 3 to 4l/m. Changed settings to AC/PC rate of 28/ insp pressure of 30/ insp time of .7/ 60%/+5 peep. Even on ventilator pt is still in respiratory distress. On those settings Vt's are 183 with a minute ventilation of over 5. ETCO2 is now 46. Peak pressure are 38, plateau pressures are around 23. Suctioning out a large amount of thick yellow  
Pt's daughter ( Janine) and pt's  ( Jeffrey) were called for updating about medical status of pt and getting a consent for PICC line.But, no one answered phone call.Planning to call later.  
Pulmonary Progress Note    Date of Admission: 4/20/2025   LOS: 2 days       CC:  Chief Complaint   Patient presents with    Shortness of Breath     Pt has sob 1 hour before arrival. 4L baseline. Pt to 7L @home with no relife. Increased resp per ems. 75mcg fent. Given in rout. 1 breathing treatment given.         Subjective:  Sedation and peep increased yesterday.        Assessment:          Plan:     This note may have been transcribed using Dragon Dictation software. Please disregard any translational errors.       Hospital Day: 2     COPD with exacerbation  Acute on chronic hypercapnic respiratory failure pH less than 7.35  Chronic hypoxemic respiratory failure 4 L nasal cannula baseline  DuoNebs wean q 6.   Solu-Medrol 40 mg every 6 x 48 hours followed by prednisone 40 mg x 3 doses  Symbicort twice daily-changed to budesonide nebulizer  Zithromax x 3 days.    CTX with + culture.       Mechanical ventilation  Wean peep today to 8  PC 22.   RR 18.    Keep sedation at rass -4.         Sedation  Propofol  Fentanyl   RASS -4        Narcotic use  Depressive disorder  Methadone has been stopped as outpatient  Zoloft        Prophylaxis  - GI -Pepcid  - DVT - lovenox        H. Flu  Rhinovirus   CTX        Prophylaxis  - GI - pepcid    - DVT -   lovenox         Nutrition  - ADULT TUBE FEEDING; Orogastric; Peptide Based; Continuous; 15; Yes; 15; Q 4 hours; 45; 30; Q 4 hours  -   Intake/Output Summary (Last 24 hours) at 4/22/2025 1043  Last data filed at 4/22/2025 1021  Gross per 24 hour   Intake 1646.13 ml   Output 1710 ml   Net -63.87 ml       Mobility                   I spent 34 minutes of critical care time with this patient excluding any procedures.     Data:        PHYSICAL EXAM:   Blood pressure 113/74, pulse 87, temperature 98.9 °F (37.2 °C), temperature source Oral, resp. rate 21, height 1.499 m (4' 11\"), weight 80.1 kg (176 lb 9.4 oz), last menstrual period 08/14/2015, SpO2 95%, not currently breastfeeding.'  Body 
Pulmonary Progress Note    Date of Admission: 4/20/2025   LOS: 4 days       CC:  Chief Complaint   Patient presents with    Shortness of Breath     Pt has sob 1 hour before arrival. 4L baseline. Pt to 7L @home with no relife. Increased resp per ems. 75mcg fent. Given in rout. 1 breathing treatment given.         Subjective:        Assessment:          Plan:     This note may have been transcribed using Dragon Dictation software. Please disregard any translational errors.       Hospital Day: 4     COPD with exacerbation  Acute on chronic hypercapnic respiratory failure pH less than 7.35  Chronic hypoxemic respiratory failure 4 L nasal cannula baseline  DuoNebs wean q 6.   prednisone 40 mg x 3 doses.  Decrease to 20 mg daily Saturday   budesonide nebulizer - increase to 1 gm   Zithromax x 3 days.    CTX with + culture.  Plan for 7-day     Mechanical ventilation  Sedation  Repeat SAT / SBT          Narcotic use  Depressive disorder  Methadone has been stopped as outpatient  Zoloft        Prophylaxis  - GI -Pepcid  - DVT - lovenox        H. Flu  Rhinovirus   CTX        Prophylaxis  - GI - pepcid    - DVT -   lovenox         Nutrition  - ADULT TUBE FEEDING; Orogastric; Peptide Based; Continuous; 15; Yes; 15; Q 4 hours; 45; 30; Q 4 hours  -   Intake/Output Summary (Last 24 hours) at 4/24/2025 0840  Last data filed at 4/24/2025 0800  Gross per 24 hour   Intake 2205.12 ml   Output 1500 ml   Net 705.12 ml       Mobility                   I spent 31  minutes of critical care time with this patient excluding any procedures.     Data:        PHYSICAL EXAM:   Blood pressure (!) 89/60, pulse 74, temperature 98.5 °F (36.9 °C), temperature source Axillary, resp. rate 18, height 1.499 m (4' 11\"), weight 80.1 kg (176 lb 9.4 oz), last menstrual period 08/14/2015, SpO2 100%, not currently breastfeeding.'  Body mass index is 35.67 kg/m².   Gen: No distress.    ENT:   Resp: No accessory muscle use. No crackles. few  wheezes right and + 
Pulmonary Progress Note    Date of Admission: 4/20/2025   LOS: 5 days       CC:  Chief Complaint   Patient presents with    Shortness of Breath     Pt has sob 1 hour before arrival. 4L baseline. Pt to 7L @home with no relife. Increased resp per ems. 75mcg fent. Given in rout. 1 breathing treatment given.         Subjective:  Failed SBT yesterday       Assessment:          Plan:     This note may have been transcribed using Dragon Dictation software. Please disregard any translational errors.       Hospital Day: 5     COPD with exacerbation  Acute on chronic hypercapnic respiratory failure pH less than 7.35  Chronic hypoxemic respiratory failure 4 L nasal cannula baseline  DuoNebs wean q 6.   prednisone 40 mg x 3 doses.  Decrease to 20 mg daily Saturday   budesonide nebulizer - 1 gm   Zithromax x 3 days.    CTX with + culture.  Plan for 7-day     Mechanical ventilation  Sedation  Repeat SAT / SBT          Narcotic use  Depressive disorder  Methadone has been stopped as outpatient  Zoloft             H. Flu  Rhinovirus   CTX        Prophylaxis  - GI - pepcid    - DVT -   lovenox         Nutrition  - ADULT TUBE FEEDING; Orogastric; Peptide Based; Continuous; 15; Yes; 15; Q 4 hours; 45; 30; Q 4 hours  -   Intake/Output Summary (Last 24 hours) at 4/25/2025 0900  Last data filed at 4/25/2025 0815  Gross per 24 hour   Intake 936.86 ml   Output 1158 ml   Net -221.14 ml                        I spent 31  minutes of critical care time with this patient excluding any procedures.     Data:        PHYSICAL EXAM:   Blood pressure (!) 83/53, pulse 88, temperature 97.9 °F (36.6 °C), temperature source Oral, resp. rate 19, height 1.499 m (4' 11\"), weight 81.4 kg (179 lb 7.3 oz), last menstrual period 08/14/2015, SpO2 98%, not currently breastfeeding.'  Body mass index is 36.25 kg/m².   Gen: No distress.    ENT:   Resp: No accessory muscle use. No crackles. few  wheezes right and + wheezing - improved  . No rhonchi.    CV: Regular 
Pulmonary Progress Note    Date of Admission: 4/20/2025   LOS: 6 days       CC:  Chief Complaint   Patient presents with    Shortness of Breath     Pt has sob 1 hour before arrival. 4L baseline. Pt to 7L @home with no relife. Increased resp per ems. 75mcg fent. Given in rout. 1 breathing treatment given.         Subjective:  Extubated yesterday.  Placed on Vapotherm secondary to shortness of breath.  Weaned to FiO2 of 40% with a flow of 25    Feeling significantly better    Assessment:          Plan:     This note may have been transcribed using Dragon Dictation software. Please disregard any translational errors.       Hospital Day: 6     COPD with exacerbation  Acute on chronic hypercapnic respiratory failure pH less than 7.35  Chronic hypoxemic respiratory failure 4 L nasal cannula baseline  DuoNebs wean q 6.   prednisone 40 mg x 3 doses.  Decrease to 20 mg daily Saturday   budesonide nebulizer - 1 gm   Zithromax x 3 days.    CTX with + culture.  Plan for 7-day     Mechanical ventilation  Sedation  Extubated yesterday  Placed on Vapotherm with a high flow rate to help with work of breathing  Work of breathing now resolved try nasal cannula.  Discussed with nurse.  With wheezing and work of breathing resolved, transferred to PCU         Narcotic use  Depressive disorder  Methadone has been stopped as outpatient  Zoloft     Tobacco  States that she quit with admission       H. Flu  Rhinovirus   CTX x 7 days       Prophylaxis  - GI - pepcid    - DVT -   lovenox         Nutrition  - ADULT TUBE FEEDING; Orogastric; Peptide Based; Continuous; 15; Yes; 15; Q 4 hours; 45; 30; Q 4 hours  Start diet  -   Intake/Output Summary (Last 24 hours) at 4/26/2025 0701  Last data filed at 4/26/2025 0500  Gross per 24 hour   Intake 734.38 ml   Output 1568 ml   Net -833.62 ml                     Data:        PHYSICAL EXAM:   Blood pressure 138/86, pulse 100, temperature 97.4 °F (36.3 °C), temperature source Rectal, resp. rate 10, 
Pulmonary/Critical Care Progress Note    CC:  Follow-up:  Acute on chronic hypoxemic and hypercapnic respiratory failure with SPO2 <90% on room air and pH <7.35  COPD with exacerbation  H.flu  Tobacco abuse    Subjective:  Remains on baseline 4L NC  Feeling much better  Some coughing  No fever or chills  Appetite improved  Wants to go home    ROS  No fever  Some coughing  SOB improved  No intake or output data in the 24 hours ending 04/27/25 0800      PHYSICAL EXAM:  Blood pressure 133/72, pulse 86, temperature 97.7 °F (36.5 °C), temperature source Oral, resp. rate 17, height 1.499 m (4' 11\"), weight 77.7 kg (171 lb 4.8 oz), last menstrual period 08/14/2015, SpO2 100%, not currently breastfeeding.'  Gen: No distress. Well developed, well-nourished  Eyes: No scleral icterus. No conjunctival injection.   ENT: External appearance of ears and nose normal.  Neck: Trachea midline. No obvious mass. No visible thyroid enlargement     Respiratory: No crackles. Some expiratory wheezing bilaterally. No rhonchi. No accessory muscle use.   Cardiovascular: Regular rate. Regular rhythm. No murmur or rub.  No edema  GI: Non-tender. Non-distended. No hernia. Bowel sounds present  Skin: Warm, dry, normal texture and turgor. No abnormalities on exposed extremities.   Musculoskeletal: No cyanosis. No clubbing. No joint deformity.    Neuro: Moves all four extremities.   Psychiatric:  Normal mood and affect; exhibits normal insight and judgement     Medications:    Scheduled Meds:   sennosides-docusate sodium  2 tablet Oral Daily    melatonin  3 mg Oral Nightly    traZODone  50 mg Oral Nightly    carvedilol  3.125 mg Oral BID    ipratropium 0.5 mg-albuterol 2.5 mg  1 Dose Inhalation TID RT    budesonide  1 mg Nebulization BID RT    predniSONE  20 mg Oral Daily    sodium chloride flush  5-40 mL IntraVENous 2 times per day    lidocaine 1 % injection  50 mg IntraDERmal Once    cefTRIAXone (ROCEPHIN) IV  1,000 mg IntraVENous Q24H    
Slight increase in aeration bilaterally. Still I/E wheezing is heard. VT's are now 269 with a minute ventilation of 7.5. Peak pressures are still 38  
Spiritual Health History and Assessment/Progress Note  [facility]    (P) Spiritual/Emotional Needs,  ,  ,      Name: Clarissa Estrada MRN: [id number]    Age: 49 y.o.     Sex: female   Language: English   Muslim: Temple   Acute on chronic respiratory failure with hypoxia and hypercapnia     Date: 4/22/2025            Total Time Calculated: (P) 2 min              Spiritual Assessment began in [facility] ICU        Referral/Consult From: (P) Rounding   Encounter Overview/Reason: (P) Spiritual/Emotional Needs  Service Provided For: (P) Patient    Marley, Belief, Meaning:   Patient unable to assess at this time  Family/Friends No family/friends present      Importance and Influence:  Patient unable to assess at this time  Family/Friends No family/friends present    Community:  Patient Other: Unable to assess at this time  Family/Friends No family/friends present    Assessment and Plan of Care:     Patient Interventions include: Other: Offered silent prayer  Family/Friends Interventions include: No family/friends present    Patient Plan of Care: Spiritual Care available upon further referral  Family/Friends Plan of Care: Spiritual Care available upon further referral    Electronically signed by Chaplain Nigel on 4/22/2025 at 6:51 PM   
Transfer order acknowledged, RN called report to Josie, PCU RN, all questions answered.    4 Eyes Skin Assessment     NAME:  Clarissa Estrada  YOB: 1975  MEDICAL RECORD NUMBER:  [id number]    The patient is being assessed for  Transfer to New Unit    I agree that at least one RN has performed a thorough Head to Toe Skin Assessment on the patient. ALL assessment sites listed below have been assessed.      Areas assessed by both nurses:    Head, Face, Ears, Shoulders, Back, Chest, Arms, Elbows, Hands, Sacrum. Buttock, Coccyx, Ischium, Legs. Feet and Heels, and Under Medical Devices         Does the Patient have a Wound? No noted wound(s) vascular discoloration to BLE       Jaison Prevention initiated by RN: Yes  Wound Care Orders initiated by RN: No    Pressure Injury (Stage 3,4, Unstageable, DTI, NWPT, and Complex wounds) if present, place Wound referral order by RN under : No    New Ostomies, if present place, Ostomy referral order under : No     Nurse 1 eSignature: Electronically signed by Luanne Barrera RN on 4/26/25 at 7:41 PM EDT    **SHARE this note so that the co-signing nurse can place an eSignature**    Nurse 2 eSignature: Electronically signed by Lexi Serna RN on 4/26/25 at 7:49 AM EDT    
Vent High pressure alarm noted, at this time, pt sitting up in bed, gray in color, pt had ETT in hand and leaning forward attempting to pull out. White secretions noted in ETT and pt mouth, same color as TF. Sp02 70's at the time. RT called to room. Pt restrained from removing ETT. In line and oral suctioning performed. Sp02 increased to 90's folllowing suctioning.  
Heparin, [] SCDs, [] Ambulation,  [] Eliquis, [] Xarelto  [] Coumadin   Code Status Full Code   Disposition From: Home  Expected Disposition: Home  Estimated Date of Discharge: 4/26-4/27           Subjective:     Chief Complaint: Shortness of breath    Patient was seen and examined today in the ICU on spontaneous breathing trial  Awake and following commands appropriately  Does not seem to be in pain  Coming down on vent support    Review of Systems:      Pertinent positives and negatives discussed in HPI    Objective:     Intake/Output Summary (Last 24 hours) at 4/24/2025 1552  Last data filed at 4/24/2025 1418  Gross per 24 hour   Intake 1890.03 ml   Output 1505 ml   Net 385.03 ml      Vitals:   Vitals:    04/24/25 1200 04/24/25 1300 04/24/25 1400 04/24/25 1500   BP: 127/85 (!) 134/91 127/86 (!) 124/94   Pulse: (!) 112 (!) 118 (!) 112 (!) 116   Resp: 20 20 19 22   Temp:       TempSrc:       SpO2: 92% 93% 93% 98%   Weight:       Height:             Physical Exam:    Physical Exam  Constitutional:       Appearance: She is obese.      Interventions: She is sedated and intubated.   Cardiovascular:      Rate and Rhythm: Normal rate and regular rhythm.      Heart sounds: No murmur heard.  Pulmonary:      Effort: She is intubated.      Breath sounds: Wheezing present. No rhonchi or rales.   Abdominal:      General: There is no distension.      Palpations: Abdomen is soft.   Musculoskeletal:      Right lower leg: No edema.      Left lower leg: No edema.   Neurological:      Mental Status: She is alert.        Medications:   Medications:    budesonide  1 mg Nebulization BID RT    [START ON 4/26/2025] predniSONE  20 mg Oral Daily    sodium chloride flush  5-40 mL IntraVENous 2 times per day    lidocaine 1 % injection  50 mg IntraDERmal Once    cefTRIAXone (ROCEPHIN) IV  1,000 mg IntraVENous Q24H    ipratropium 0.5 mg-albuterol 2.5 mg  1 Dose Inhalation Q6H RT    docusate  100 mg Oral Daily    senna  5 mL Oral Nightly    
rate  Type of Goal: Continue current goal  Previous Goal Met: Goal(s) Achieved    Nutrition Monitoring and Evaluation:   Behavioral-Environmental Outcomes: None Identified  Food/Nutrient Intake Outcomes: Enteral Nutrition Intake/Tolerance  Physical Signs/Symptoms Outcomes: Biochemical Data, Weight, Nutrition Focused Physical Findings, GI Status    Discharge Planning:    No discharge needs at this time     Valdo Davison RD  Contact: [phone]    
BID    insulin lispro  0-4 Units SubCUTAneous 4 times per day    aspirin  81 mg Oral Daily    [Held by provider] carvedilol  3.125 mg Oral BID    [Held by provider] cloNIDine  0.1 mg Oral BID    [Held by provider] sertraline  125 mg Oral Daily    [Held by provider] tiotropium  2 puff Inhalation Daily RT    sodium chloride flush  5-40 mL IntraVENous 2 times per day    enoxaparin  40 mg SubCUTAneous Nightly    predniSONE  40 mg Oral Daily      Infusions:    fentaNYL 200 mcg/hr (04/23/25 1103)    propofol 50 mcg/kg/min (04/23/25 1103)    dextrose      dexmedeTOMIDine 1.5 mcg/kg/hr (04/23/25 1103)    sodium chloride       PRN Meds: midazolam, ,   glucose, 4 tablet, PRN  dextrose bolus, 125 mL, PRN   Or  dextrose bolus, 250 mL, PRN  glucagon (rDNA), 1 mg, PRN  dextrose, , Continuous PRN  torsemide, 20 mg, Daily PRN  sodium chloride flush, 5-40 mL, PRN  sodium chloride, , PRN  ondansetron, 4 mg, Q8H PRN   Or  ondansetron, 4 mg, Q6H PRN  polyethylene glycol, 17 g, Daily PRN  acetaminophen, 650 mg, Q6H PRN   Or  acetaminophen, 650 mg, Q6H PRN  guaiFENesin-dextromethorphan, 5 mL, Q4H PRN  benzonatate, 100 mg, TID PRN  albuterol, 2.5 mg, Q4H PRN        Labs and Imaging   XR CHEST PORTABLE  Result Date: 4/21/2025  EXAMINATION: ONE XRAY VIEW OF THE CHEST 4/21/2025 4:27 am COMPARISON: 04/20/2025 HISTORY: ORDERING SYSTEM PROVIDED HISTORY: ETT and OGT placement TECHNOLOGIST PROVIDED HISTORY: Reason for exam:->ETT and OGT placement Reason for Exam: ett/og FINDINGS: The endotracheal tube measures 1.7 cm above the anupama.  The enteric catheter side hole and tip are in the stomach, with the tip in the region of the distal gastric body.  Cardiac and mediastinal silhouettes appear similar.  No new airspace disease.  No pneumothorax.  Osseous structures appear similar. Lungs are otherwise similar.     1. ET tube measures 1.7 cm above the anupama. 2. Enteric catheter tip terminates in the distal gastric body.     XR CHEST PORTABLE  Result 
lispro  0-4 Units SubCUTAneous 4 times per day    aspirin  81 mg Oral Daily    [Held by provider] carvedilol  3.125 mg Oral BID    [Held by provider] cloNIDine  0.1 mg Oral BID    [Held by provider] sertraline  125 mg Oral Daily    [Held by provider] tiotropium  2 puff Inhalation Daily RT    enoxaparin  40 mg SubCUTAneous Nightly      Infusions:    sodium chloride      fentaNYL Stopped (04/25/25 0901)    propofol Stopped (04/25/25 0900)    norepinephrine 3 mcg/min (04/25/25 0925)    dextrose      dexmedeTOMIDine 0.8 mcg/kg/hr (04/25/25 0925)     PRN Meds: HYDROmorphone, 0.5 mg, Q4H PRN  sodium chloride flush, 5-40 mL, PRN  sodium chloride, , PRN  glucose, 4 tablet, PRN  dextrose bolus, 125 mL, PRN   Or  dextrose bolus, 250 mL, PRN  glucagon (rDNA), 1 mg, PRN  dextrose, , Continuous PRN  torsemide, 20 mg, Daily PRN  ondansetron, 4 mg, Q8H PRN   Or  ondansetron, 4 mg, Q6H PRN  polyethylene glycol, 17 g, Daily PRN  acetaminophen, 650 mg, Q6H PRN   Or  acetaminophen, 650 mg, Q6H PRN  guaiFENesin-dextromethorphan, 5 mL, Q4H PRN  benzonatate, 100 mg, TID PRN  albuterol, 2.5 mg, Q4H PRN        Labs and Imaging   XR CHEST PORTABLE  Result Date: 4/21/2025  EXAMINATION: ONE XRAY VIEW OF THE CHEST 4/21/2025 4:27 am COMPARISON: 04/20/2025 HISTORY: ORDERING SYSTEM PROVIDED HISTORY: ETT and OGT placement TECHNOLOGIST PROVIDED HISTORY: Reason for exam:->ETT and OGT placement Reason for Exam: ett/og FINDINGS: The endotracheal tube measures 1.7 cm above the anupama.  The enteric catheter side hole and tip are in the stomach, with the tip in the region of the distal gastric body.  Cardiac and mediastinal silhouettes appear similar.  No new airspace disease.  No pneumothorax.  Osseous structures appear similar. Lungs are otherwise similar.     1. ET tube measures 1.7 cm above the anupama. 2. Enteric catheter tip terminates in the distal gastric body.     XR CHEST PORTABLE  Result Date: 4/20/2025  EXAMINATION: ONE XRAY VIEW OF THE CHEST 
exam:->sob  Decision Support Exception - unselect if not a suspected or confirmed  emergency medical condition->Emergency Medical Condition (MA)  Reason for Exam: SOB, HX of asthma, COPD, CHF.  Pt wears 4L NC at home, pt on  cpap en route per EMS., AMS    FINDINGS:  Pulmonary Arteries: Evaluation of multiple segmental and subsegmental  pulmonary arteries is suboptimal secondary to poor opacification with  contrast.  No evidence of intraluminal filling defect to suggest pulmonary  embolism.    Mediastinum: Enlarged anterior paratracheal lymph node which measures 1.4 cm  in short axis (series 2, image 147).  Enlarged precarinal lymph node which  measures 1.9 cm in short axis with associated punctate calcification (series  2, image 123).  Calcified subcarinal lymph node.  Thyroid gland is  unremarkable in appearance.    Cardiac chambers are unremarkable in appearance.  No pericardial effusion or  pericardial thickening.    Lungs/pleura: Bilateral pulmonary infiltrates.  Small bilateral pleural  effusions with associated adjacent atelectasis.  No pneumothorax.  No  suspicious pulmonary nodularity.  Central airways are patent.  Endotracheal  tube extends to the mid trachea.  This is approximately 2.4 cm from the  anupama.    Upper Abdomen: Calcified granulomas in the liver.  Remainder of the imaged  upper abdominal organs are unremarkable in appearance.    Soft Tissues/Bones: Spondylosis of the thoracic spine.  Remote right-sided  rib fractures.  Acute fracture of the right posterolateral 4th rib (series 2  image 148).  Remote left-sided rib fractures.  Indeterminate mild compression  of the T12 vertebral body.    Impression  Negative for findings of a pulmonary embolus.  Evaluation of the lingular and  left lower lobe segmental and subsegmental pulmonary arteries is suboptimal  secondary to patient motion on the examination.    Bilateral pulmonary infiltrates.    Small bilateral pleural effusions with associated adjacent

## 2025-05-08 ENCOUNTER — PATIENT MESSAGE (OUTPATIENT)
Dept: PULMONOLOGY | Age: 50
End: 2025-05-08

## 2025-05-19 ENCOUNTER — TELEPHONE (OUTPATIENT)
Dept: PULMONOLOGY | Age: 50
End: 2025-05-19

## 2025-05-19 NOTE — TELEPHONE ENCOUNTER
[facility]   Portable O2  [facility] called and stated they will send CMN for Dr. Love to sign for portable O2 order.

## 2025-06-18 ENCOUNTER — OFFICE VISIT (OUTPATIENT)
Dept: PULMONOLOGY | Age: 50
End: 2025-06-18
Payer: MEDICARE

## 2025-06-18 VITALS
HEART RATE: 91 BPM | SYSTOLIC BLOOD PRESSURE: 127 MMHG | BODY MASS INDEX: 34.47 KG/M2 | OXYGEN SATURATION: 93 % | DIASTOLIC BLOOD PRESSURE: 83 MMHG | TEMPERATURE: 98.2 F | WEIGHT: 171 LBS | RESPIRATION RATE: 18 BRPM | HEIGHT: 59 IN

## 2025-06-18 DIAGNOSIS — J44.9 COPD, SEVERE (HCC): Primary | ICD-10-CM

## 2025-06-18 DIAGNOSIS — J96.12 CHRONIC RESPIRATORY FAILURE WITH HYPOXIA AND HYPERCAPNIA (HCC): ICD-10-CM

## 2025-06-18 DIAGNOSIS — G47.33 OSA TREATED WITH BIPAP: ICD-10-CM

## 2025-06-18 DIAGNOSIS — J96.11 CHRONIC RESPIRATORY FAILURE WITH HYPOXIA AND HYPERCAPNIA (HCC): ICD-10-CM

## 2025-06-18 DIAGNOSIS — Z87.891 PERSONAL HISTORY OF TOBACCO USE: ICD-10-CM

## 2025-06-18 PROCEDURE — 3023F SPIROM DOC REV: CPT | Performed by: INTERNAL MEDICINE

## 2025-06-18 PROCEDURE — G8417 CALC BMI ABV UP PARAM F/U: HCPCS | Performed by: INTERNAL MEDICINE

## 2025-06-18 PROCEDURE — G0296 VISIT TO DETERM LDCT ELIG: HCPCS | Performed by: INTERNAL MEDICINE

## 2025-06-18 PROCEDURE — 99214 OFFICE O/P EST MOD 30 MIN: CPT | Performed by: INTERNAL MEDICINE

## 2025-06-18 PROCEDURE — G2211 COMPLEX E/M VISIT ADD ON: HCPCS | Performed by: INTERNAL MEDICINE

## 2025-06-18 PROCEDURE — 3017F COLORECTAL CA SCREEN DOC REV: CPT | Performed by: INTERNAL MEDICINE

## 2025-06-18 PROCEDURE — G8427 DOCREV CUR MEDS BY ELIG CLIN: HCPCS | Performed by: INTERNAL MEDICINE

## 2025-06-18 PROCEDURE — 1036F TOBACCO NON-USER: CPT | Performed by: INTERNAL MEDICINE

## 2025-06-18 RX ORDER — PREDNISONE 5 MG/1
5 TABLET ORAL DAILY
Qty: 90 TABLET | Refills: 3 | Status: SHIPPED | OUTPATIENT
Start: 2025-06-18

## 2025-06-18 RX ORDER — PREDNISONE 10 MG/1
TABLET ORAL
Qty: 30 TABLET | Refills: 0 | Status: SHIPPED | OUTPATIENT
Start: 2025-06-18

## 2025-06-18 RX ORDER — TIOTROPIUM BROMIDE 18 UG/1
18 CAPSULE ORAL; RESPIRATORY (INHALATION) DAILY
Qty: 90 CAPSULE | Refills: 1 | Status: SHIPPED | OUTPATIENT
Start: 2025-06-18

## 2025-06-18 NOTE — PROGRESS NOTES
Chief Complaint   Patient presents with    Follow-up    COPD       HPI  Here for CC of COPD, BLANCA    Not breathing to well.  Feels like she needs steroids.  Her breathing is worse with exertion and seems to be worsening overall.  She is only on symbicort and albuterol.  She was taken off sprivia during recent admission.  She was admitted and intubated back in April.  CO2 retention.  She says her bpap machine is not working.  Does not seem to be blowing enough air and the motor is very loud.  She said the machine is 10-15 years old.  Has had 4 admissions in the past year related to hypercapnic and COPD flare up      Current Outpatient Medications   Medication Sig Dispense Refill    tiotropium (SPIRIVA HANDIHALER) 18 MCG inhalation capsule Inhale 1 capsule into the lungs daily 90 capsule 1    predniSONE (DELTASONE) 10 MG tablet 40mg for 3days 30mg for 3days 20mg for 3days 10mg for 3days 30 tablet 0    predniSONE (DELTASONE) 5 MG tablet Take 1 tablet by mouth daily 90 tablet 3    albuterol (PROVENTIL) (2.5 MG/3ML) 0.083% nebulizer solution Take 3 mLs by nebulization every 6 hours as needed for Wheezing      oxyCODONE-acetaminophen (PERCOCET) 5-325 MG per tablet Take 1 tablet by mouth every 6 hours as needed for Pain.      lisinopril (PRINIVIL;ZESTRIL) 5 MG tablet TAKE 1 TABLET BY MOUTH DAILY 90 tablet 3    carvedilol (COREG) 3.125 MG tablet TAKE 1 TABLET BY MOUTH TWICE A  tablet 3    acetaminophen (TYLENOL) 325 MG tablet Take 2 tablets by mouth every 6 hours as needed for Pain      cloNIDine (CATAPRES) 0.1 MG tablet Take 1 tablet by mouth 2 times daily      empagliflozin (JARDIANCE) 10 MG tablet TAKE ONE TABLET BY MOUTH DAILY 90 tablet 3    OXYGEN Inhale 4 L into the lungs See Admin Instructions 1 each 0    albuterol sulfate HFA (PROVENTIL;VENTOLIN;PROAIR) 108 (90 Base) MCG/ACT inhaler INHALE 2 PUFFS BY MOUTH EVERY 4 HOURS AS NEEDED FOR WHEEZING OR FOR SHORTNESS OF BREATH 8.5 g 11    torsemide (DEMADEX) 20 MG

## 2025-06-18 NOTE — PATIENT INSTRUCTIONS
Resume spiriva    Take prednisone burst then start 5 mg per day    Continue with symbicort    I will order a new bpap machine    CT chest    Follow up in 3-4 months      Learning About Lung Cancer Screening  What is screening for lung cancer?     Lung cancer screening is a way to find some lung cancers early, before a person has any symptoms of the cancer.  Lung cancer screening may help those who have the highest risk for lung cancer--people age 50 and older who are or were heavy smokers. For most people, who aren't at increased risk, screening for lung cancer probably isn't helpful.  Screening won't prevent cancer. And it may not find all lung cancers. Lung cancer screening may lower the risk of dying from lung cancer in a small number of people.  How is it done?  Lung cancer screening is done with a low-dose CT (computed tomography) scan. A CT scan uses X-rays, or radiation, to make detailed pictures of your body. Experts recommend that screening be done in medical centers that focus on finding and treating lung cancer.  Who is screening recommended for?  Lung cancer screening is recommended for people age 50 and older who are or were heavy smokers. That means people with a smoking history of at least 20 pack years. A pack year is a way to measure how heavy a smoker you are or were.  To figure out your pack years, multiply how many packs a day on average (assuming 20 cigarettes per pack) you have smoked by how many years you have smoked. For example:  If you smoked 1 pack a day for 20 years, that's 1 times 20. So you have a smoking history of 20 pack years.  If you smoked 2 packs a day for 10 years, that's 2 times 10. So you have a smoking history of 20 pack years.  Experts agree that screening is for people who have a high risk of lung cancer. But experts don't agree on what high risk means. Some say people age 50 or older with at least a 20-pack-year smoking history are high risk. Others say it's people age 55

## 2025-06-18 NOTE — PROGRESS NOTES
Clarissa Estrada         : 1975  [] [facility]     [] [facility]      [] [facility]     [][facility]    [] [facility]  [] [facility]   [] [facility]  [] [facility] [] [employer] [] [employer] [] [facility] [x] [facility] [] [facility]  Diagnosis: [x] BLANCA (G47.33) [] CSA (G47.31) [] Apnea (G47.30)   Length of Need: [] 12 Months [x] 99 Months [] Other:    Machine (HALLEY!):  [x] ResMed AirSense     Auto [] Other:     []  CPAP () [x] Bilevel ()   Mode: [] Auto [] Spontaneous    Mode: [x] Auto [] Spontaneous                       IPAP 15  EPAP 8     Comfort Settings:     If the order for CPAP  - Ramp Pressure: 5 cmH2O                                        - Ramp time: 15 min                                     -  Flex/EPR - 3 full time  If the order is for BiLevel:                                    - For ResMed Bilevel (TiMax-4 sec   TiMin- 0.2 sec)     Humidifier: [x] Heated ()        [x] Water chamber replacement ()/ 1 per 6 months        Mask:  Please always start with the mask the patient used during the titraion   [] Nasal () /1 per 3 months [] Full Face () /1 per 3 months   [] Patient choice -Size and fit mask [] Patient Choice - Size and fit mask   [] Dispense:  [] Dispense:    [] Headgear () / 1 per 6 months [] Headgear () / 1 per 3 months   [] Replacement Nasal Cushion ()/2 per month [] Interface Replacement ()/1 per month   [] Replacement Nasal Pillows ()/2 per month         Tubing: [] Heated ()/1 per 3 months    [] Standard ()/1 per 3 months [] Other:           Filters: [] Non-disposable ()/1 per 6 months     [] Ultra-Fine, Disposable ()/2 per month        Miscellaneous: [] Chin Strap ()/ 1 per 6 months [x] O2 bleed-in:   4    LPM   [] Oximetry on CPAP/Bilevel []  Other:          Start Order Date: 25    MEDICAL JUSTIFICATION:  I, the undersigned, certify that the above prescribed supplies are

## 2025-08-07 ENCOUNTER — APPOINTMENT (OUTPATIENT)
Dept: GENERAL RADIOLOGY | Age: 50
End: 2025-08-07
Payer: MEDICARE

## 2025-08-07 ENCOUNTER — HOSPITAL ENCOUNTER (INPATIENT)
Age: 50
LOS: 2 days | Discharge: HOME HEALTH | End: 2025-08-09
Attending: EMERGENCY MEDICINE | Admitting: FAMILY MEDICINE
Payer: MEDICARE

## 2025-08-07 DIAGNOSIS — R79.89 ELEVATED TROPONIN: ICD-10-CM

## 2025-08-07 DIAGNOSIS — J96.01 ACUTE RESPIRATORY FAILURE WITH HYPOXIA AND HYPERCARBIA (HCC): Primary | ICD-10-CM

## 2025-08-07 DIAGNOSIS — N17.9 AKI (ACUTE KIDNEY INJURY): ICD-10-CM

## 2025-08-07 DIAGNOSIS — J44.9 COPD, SEVERE (HCC): ICD-10-CM

## 2025-08-07 DIAGNOSIS — I50.9 CONGESTIVE HEART FAILURE, UNSPECIFIED HF CHRONICITY, UNSPECIFIED HEART FAILURE TYPE (HCC): ICD-10-CM

## 2025-08-07 DIAGNOSIS — J96.02 ACUTE RESPIRATORY FAILURE WITH HYPOXIA AND HYPERCARBIA (HCC): Primary | ICD-10-CM

## 2025-08-07 DIAGNOSIS — J18.9 PNEUMONIA OF LEFT LOWER LOBE DUE TO INFECTIOUS ORGANISM: ICD-10-CM

## 2025-08-07 DIAGNOSIS — D72.829 LEUKOCYTOSIS, UNSPECIFIED TYPE: ICD-10-CM

## 2025-08-07 DIAGNOSIS — Z91.199 NONADHERENCE TO MEDICAL TREATMENT: ICD-10-CM

## 2025-08-07 DIAGNOSIS — D64.9 ANEMIA, UNSPECIFIED TYPE: ICD-10-CM

## 2025-08-07 LAB
ALBUMIN SERPL-MCNC: 4.1 G/DL (ref 3.4–5)
ALBUMIN/GLOB SERPL: 1.2 {RATIO} (ref 1.1–2.2)
ALP SERPL-CCNC: 91 U/L (ref 40–129)
ALT SERPL-CCNC: 11 U/L (ref 10–40)
AMPHETAMINES UR QL SCN>1000 NG/ML: ABNORMAL
ANION GAP SERPL CALCULATED.3IONS-SCNC: 8 MMOL/L (ref 3–16)
AST SERPL-CCNC: 15 U/L (ref 15–37)
BACTERIA URNS QL MICRO: NORMAL /HPF
BARBITURATES UR QL SCN>200 NG/ML: ABNORMAL
BASE EXCESS BLDV CALC-SCNC: 3.7 MMOL/L
BASE EXCESS BLDV CALC-SCNC: 3.7 MMOL/L
BASE EXCESS BLDV CALC-SCNC: 3.8 MMOL/L
BASE EXCESS BLDV CALC-SCNC: 4.4 MMOL/L
BASE EXCESS BLDV CALC-SCNC: 4.6 MMOL/L
BASE EXCESS BLDV CALC-SCNC: 6.7 MMOL/L
BASOPHILS # BLD: 0 K/UL (ref 0–0.2)
BASOPHILS NFR BLD: 0 %
BENZODIAZ UR QL SCN>200 NG/ML: ABNORMAL
BILIRUB SERPL-MCNC: <0.2 MG/DL (ref 0–1)
BILIRUB UR QL STRIP.AUTO: NEGATIVE
BUN SERPL-MCNC: 28 MG/DL (ref 7–20)
CALCIUM SERPL-MCNC: 9.1 MG/DL (ref 8.3–10.6)
CANNABINOIDS UR QL SCN>50 NG/ML: ABNORMAL
CHLORIDE SERPL-SCNC: 99 MMOL/L (ref 99–110)
CLARITY UR: CLEAR
CO2 BLDV-SCNC: 34 MMOL/L
CO2 BLDV-SCNC: 35 MMOL/L
CO2 BLDV-SCNC: 37 MMOL/L
CO2 BLDV-SCNC: 39 MMOL/L
CO2 BLDV-SCNC: 40 MMOL/L
CO2 BLDV-SCNC: 40 MMOL/L
CO2 SERPL-SCNC: 35 MMOL/L (ref 21–32)
COCAINE UR QL SCN: ABNORMAL
COHGB MFR BLDV: 1.9 %
COHGB MFR BLDV: 2.5 %
COHGB MFR BLDV: 2.7 %
COHGB MFR BLDV: 3 %
COHGB MFR BLDV: 3 %
COHGB MFR BLDV: 3.5 %
COLOR UR: YELLOW
CREAT SERPL-MCNC: 1.1 MG/DL (ref 0.6–1.1)
D-DIMER QUANTITATIVE: <0.27 UG/ML FEU (ref 0–0.6)
DEPRECATED RDW RBC AUTO: 15 % (ref 12.4–15.4)
DRUG SCREEN COMMENT UR-IMP: ABNORMAL
EKG ATRIAL RATE: 125 BPM
EKG DIAGNOSIS: NORMAL
EKG P AXIS: 66 DEGREES
EKG P-R INTERVAL: 134 MS
EKG Q-T INTERVAL: 314 MS
EKG QRS DURATION: 82 MS
EKG QTC CALCULATION (BAZETT): 453 MS
EKG R AXIS: 62 DEGREES
EKG T AXIS: 40 DEGREES
EKG VENTRICULAR RATE: 125 BPM
EOSINOPHIL # BLD: 0.2 K/UL (ref 0–0.6)
EOSINOPHIL NFR BLD: 1 %
EPI CELLS #/AREA URNS AUTO: 2 /HPF (ref 0–5)
FENTANYL SCREEN, URINE: ABNORMAL
GFR SERPLBLD CREATININE-BSD FMLA CKD-EPI: 61 ML/MIN/{1.73_M2}
GLUCOSE BLD-MCNC: 140 MG/DL (ref 70–99)
GLUCOSE SERPL-MCNC: 144 MG/DL (ref 70–99)
GLUCOSE UR STRIP.AUTO-MCNC: NEGATIVE MG/DL
HCO3 BLDV-SCNC: 32 MMOL/L (ref 23–29)
HCO3 BLDV-SCNC: 32 MMOL/L (ref 23–29)
HCO3 BLDV-SCNC: 34 MMOL/L (ref 23–29)
HCO3 BLDV-SCNC: 36 MMOL/L (ref 23–29)
HCO3 BLDV-SCNC: 37 MMOL/L (ref 23–29)
HCO3 BLDV-SCNC: 37 MMOL/L (ref 23–29)
HCT VFR BLD AUTO: 33.5 % (ref 36–48)
HGB BLD-MCNC: 10.5 G/DL (ref 12–16)
HGB UR QL STRIP.AUTO: NEGATIVE
HYALINE CASTS #/AREA URNS AUTO: 4 /LPF (ref 0–8)
KETONES UR STRIP.AUTO-MCNC: NEGATIVE MG/DL
LEUKOCYTE ESTERASE UR QL STRIP.AUTO: NEGATIVE
LYMPHOCYTES # BLD: 3 K/UL (ref 1–5.1)
LYMPHOCYTES NFR BLD: 14 %
MCH RBC QN AUTO: 29 PG (ref 26–34)
MCHC RBC AUTO-ENTMCNC: 31.4 G/DL (ref 31–36)
MCV RBC AUTO: 92.5 FL (ref 80–100)
METHADONE UR QL SCN>300 NG/ML: POSITIVE
METHGB MFR BLDV: 0.5 %
METHGB MFR BLDV: 0.5 %
METHGB MFR BLDV: 0.6 %
METHGB MFR BLDV: 1.2 %
METHGB MFR BLDV: 1.2 %
METHGB MFR BLDV: 1.3 %
MONOCYTES # BLD: 2.2 K/UL (ref 0–1.3)
MONOCYTES NFR BLD: 10 %
NEUTROPHILS # BLD: 16.1 K/UL (ref 1.7–7.7)
NEUTROPHILS NFR BLD: 74 %
NEUTS BAND NFR BLD MANUAL: 1 % (ref 0–7)
NITRITE UR QL STRIP.AUTO: NEGATIVE
NT-PROBNP SERPL-MCNC: 125 PG/ML (ref 0–124)
O2 THERAPY: ABNORMAL
OPIATES UR QL SCN>300 NG/ML: ABNORMAL
OXYCODONE UR QL SCN: POSITIVE
PCO2 BLDV: 116 MMHG (ref 40–50)
PCO2 BLDV: 118 MMHG (ref 40–50)
PCO2 BLDV: 69.7 MMHG (ref 40–50)
PCO2 BLDV: 74.7 MMHG (ref 40–50)
PCO2 BLDV: 90.9 MMHG (ref 40–50)
PCO2 BLDV: 96.5 MMHG (ref 40–50)
PCP UR QL SCN>25 NG/ML: ABNORMAL
PERFORMED ON: ABNORMAL
PH BLDV: 7.1 [PH] (ref 7.35–7.45)
PH BLDV: 7.11 [PH] (ref 7.35–7.45)
PH BLDV: 7.16 [PH] (ref 7.35–7.45)
PH BLDV: 7.21 [PH] (ref 7.35–7.45)
PH BLDV: 7.24 [PH] (ref 7.35–7.45)
PH BLDV: 7.28 [PH] (ref 7.35–7.45)
PH UR STRIP.AUTO: 6.5 [PH] (ref 5–8)
PH UR STRIP: 6.5 [PH]
PLATELET # BLD AUTO: 423 K/UL (ref 135–450)
PLATELET BLD QL SMEAR: ADEQUATE
PMV BLD AUTO: 7 FL (ref 5–10.5)
PO2 BLDV: 32 MMHG
PO2 BLDV: 36 MMHG
PO2 BLDV: 47 MMHG
PO2 BLDV: 56 MMHG
PO2 BLDV: 95 MMHG
PO2 BLDV: <30 MMHG
POTASSIUM SERPL-SCNC: 4.8 MMOL/L (ref 3.5–5.1)
PROCALCITONIN SERPL IA-MCNC: 0.08 NG/ML (ref 0–0.15)
PROT SERPL-MCNC: 7.6 G/DL (ref 6.4–8.2)
PROT UR STRIP.AUTO-MCNC: 30 MG/DL
RBC # BLD AUTO: 3.62 M/UL (ref 4–5.2)
RBC CLUMPS #/AREA URNS AUTO: 0 /HPF (ref 0–4)
SAO2 % BLDV: 45 %
SAO2 % BLDV: 59 %
SAO2 % BLDV: 70 %
SAO2 % BLDV: 79 %
SAO2 % BLDV: 85 %
SAO2 % BLDV: 97 %
SLIDE REVIEW: ABNORMAL
SODIUM SERPL-SCNC: 142 MMOL/L (ref 136–145)
SP GR UR STRIP.AUTO: 1.02 (ref 1–1.03)
TOXIC GRANULES BLD QL SMEAR: PRESENT
TROPONIN, HIGH SENSITIVITY: 15 NG/L (ref 0–14)
TROPONIN, HIGH SENSITIVITY: 18 NG/L (ref 0–14)
UA COMPLETE W REFLEX CULTURE PNL UR: ABNORMAL
UA DIPSTICK W REFLEX MICRO PNL UR: YES
URN SPEC COLLECT METH UR: ABNORMAL
UROBILINOGEN UR STRIP-ACNC: 0.2 E.U./DL
WBC # BLD AUTO: 21.5 K/UL (ref 4–11)
WBC #/AREA URNS AUTO: 1 /HPF (ref 0–5)

## 2025-08-07 PROCEDURE — 94761 N-INVAS EAR/PLS OXIMETRY MLT: CPT

## 2025-08-07 PROCEDURE — 80307 DRUG TEST PRSMV CHEM ANLYZR: CPT

## 2025-08-07 PROCEDURE — 81001 URINALYSIS AUTO W/SCOPE: CPT

## 2025-08-07 PROCEDURE — C1751 CATH, INF, PER/CENT/MIDLINE: HCPCS

## 2025-08-07 PROCEDURE — 85379 FIBRIN DEGRADATION QUANT: CPT

## 2025-08-07 PROCEDURE — 82803 BLOOD GASES ANY COMBINATION: CPT

## 2025-08-07 PROCEDURE — 6370000000 HC RX 637 (ALT 250 FOR IP): Performed by: FAMILY MEDICINE

## 2025-08-07 PROCEDURE — 2580000003 HC RX 258: Performed by: INTERNAL MEDICINE

## 2025-08-07 PROCEDURE — 36592 COLLECT BLOOD FROM PICC: CPT

## 2025-08-07 PROCEDURE — 2500000003 HC RX 250 WO HCPCS

## 2025-08-07 PROCEDURE — 84145 PROCALCITONIN (PCT): CPT

## 2025-08-07 PROCEDURE — 6360000002 HC RX W HCPCS: Performed by: NURSE PRACTITIONER

## 2025-08-07 PROCEDURE — 83880 ASSAY OF NATRIURETIC PEPTIDE: CPT

## 2025-08-07 PROCEDURE — 87641 MR-STAPH DNA AMP PROBE: CPT

## 2025-08-07 PROCEDURE — 76937 US GUIDE VASCULAR ACCESS: CPT

## 2025-08-07 PROCEDURE — 2700000000 HC OXYGEN THERAPY PER DAY

## 2025-08-07 PROCEDURE — 84484 ASSAY OF TROPONIN QUANT: CPT

## 2025-08-07 PROCEDURE — 96365 THER/PROPH/DIAG IV INF INIT: CPT

## 2025-08-07 PROCEDURE — 36415 COLL VENOUS BLD VENIPUNCTURE: CPT

## 2025-08-07 PROCEDURE — 99291 CRITICAL CARE FIRST HOUR: CPT | Performed by: INTERNAL MEDICINE

## 2025-08-07 PROCEDURE — 5A09357 ASSISTANCE WITH RESPIRATORY VENTILATION, LESS THAN 24 CONSECUTIVE HOURS, CONTINUOUS POSITIVE AIRWAY PRESSURE: ICD-10-PCS | Performed by: FAMILY MEDICINE

## 2025-08-07 PROCEDURE — 36569 INSJ PICC 5 YR+ W/O IMAGING: CPT

## 2025-08-07 PROCEDURE — 93010 ELECTROCARDIOGRAM REPORT: CPT | Performed by: INTERNAL MEDICINE

## 2025-08-07 PROCEDURE — 80053 COMPREHEN METABOLIC PANEL: CPT

## 2025-08-07 PROCEDURE — 6360000002 HC RX W HCPCS: Performed by: EMERGENCY MEDICINE

## 2025-08-07 PROCEDURE — 2500000003 HC RX 250 WO HCPCS: Performed by: FAMILY MEDICINE

## 2025-08-07 PROCEDURE — 6360000002 HC RX W HCPCS: Performed by: FAMILY MEDICINE

## 2025-08-07 PROCEDURE — 02HV33Z INSERTION OF INFUSION DEVICE INTO SUPERIOR VENA CAVA, PERCUTANEOUS APPROACH: ICD-10-PCS | Performed by: FAMILY MEDICINE

## 2025-08-07 PROCEDURE — 2500000003 HC RX 250 WO HCPCS: Performed by: NURSE PRACTITIONER

## 2025-08-07 PROCEDURE — 6370000000 HC RX 637 (ALT 250 FOR IP): Performed by: NURSE PRACTITIONER

## 2025-08-07 PROCEDURE — 96375 TX/PRO/DX INJ NEW DRUG ADDON: CPT

## 2025-08-07 PROCEDURE — 2000000000 HC ICU R&B

## 2025-08-07 PROCEDURE — 93005 ELECTROCARDIOGRAM TRACING: CPT | Performed by: EMERGENCY MEDICINE

## 2025-08-07 PROCEDURE — 6360000002 HC RX W HCPCS: Performed by: INTERNAL MEDICINE

## 2025-08-07 PROCEDURE — 6370000000 HC RX 637 (ALT 250 FOR IP)

## 2025-08-07 PROCEDURE — 94660 CPAP INITIATION&MGMT: CPT

## 2025-08-07 PROCEDURE — 85025 COMPLETE CBC W/AUTO DIFF WBC: CPT

## 2025-08-07 PROCEDURE — 87040 BLOOD CULTURE FOR BACTERIA: CPT

## 2025-08-07 PROCEDURE — 99285 EMERGENCY DEPT VISIT HI MDM: CPT

## 2025-08-07 PROCEDURE — 2060000000 HC ICU INTERMEDIATE R&B

## 2025-08-07 PROCEDURE — 2580000003 HC RX 258: Performed by: FAMILY MEDICINE

## 2025-08-07 PROCEDURE — 94640 AIRWAY INHALATION TREATMENT: CPT

## 2025-08-07 PROCEDURE — 2580000003 HC RX 258: Performed by: EMERGENCY MEDICINE

## 2025-08-07 PROCEDURE — 2500000003 HC RX 250 WO HCPCS: Performed by: EMERGENCY MEDICINE

## 2025-08-07 PROCEDURE — 71045 X-RAY EXAM CHEST 1 VIEW: CPT

## 2025-08-07 RX ORDER — BUDESONIDE 0.5 MG/2ML
0.5 INHALANT ORAL
Status: DISCONTINUED | OUTPATIENT
Start: 2025-08-07 | End: 2025-08-09 | Stop reason: HOSPADM

## 2025-08-07 RX ORDER — HYDROXYZINE PAMOATE 25 MG/1
50 CAPSULE ORAL EVERY 8 HOURS PRN
Status: DISCONTINUED | OUTPATIENT
Start: 2025-08-07 | End: 2025-08-09 | Stop reason: HOSPADM

## 2025-08-07 RX ORDER — PROMETHAZINE HYDROCHLORIDE 25 MG/1
25 TABLET ORAL EVERY 6 HOURS PRN
Status: DISCONTINUED | OUTPATIENT
Start: 2025-08-07 | End: 2025-08-09 | Stop reason: HOSPADM

## 2025-08-07 RX ORDER — ENOXAPARIN SODIUM 100 MG/ML
40 INJECTION SUBCUTANEOUS NIGHTLY
Status: DISCONTINUED | OUTPATIENT
Start: 2025-08-07 | End: 2025-08-09 | Stop reason: HOSPADM

## 2025-08-07 RX ORDER — ALBUTEROL SULFATE 0.83 MG/ML
5 SOLUTION RESPIRATORY (INHALATION) ONCE
Status: COMPLETED | OUTPATIENT
Start: 2025-08-07 | End: 2025-08-07

## 2025-08-07 RX ORDER — PREDNISONE 5 MG/1
5 TABLET ORAL DAILY
Status: DISCONTINUED | OUTPATIENT
Start: 2025-08-07 | End: 2025-08-07

## 2025-08-07 RX ORDER — OLANZAPINE 10 MG/2ML
5 INJECTION, POWDER, FOR SOLUTION INTRAMUSCULAR
Status: DISCONTINUED | OUTPATIENT
Start: 2025-08-07 | End: 2025-08-09 | Stop reason: HOSPADM

## 2025-08-07 RX ORDER — ONDANSETRON 2 MG/ML
4 INJECTION INTRAMUSCULAR; INTRAVENOUS EVERY 6 HOURS PRN
Status: DISCONTINUED | OUTPATIENT
Start: 2025-08-07 | End: 2025-08-09 | Stop reason: HOSPADM

## 2025-08-07 RX ORDER — BUDESONIDE AND FORMOTEROL FUMARATE DIHYDRATE 160; 4.5 UG/1; UG/1
2 AEROSOL RESPIRATORY (INHALATION)
Status: DISCONTINUED | OUTPATIENT
Start: 2025-08-07 | End: 2025-08-07

## 2025-08-07 RX ORDER — 0.9 % SODIUM CHLORIDE 0.9 %
500 INTRAVENOUS SOLUTION INTRAVENOUS ONCE
Status: COMPLETED | OUTPATIENT
Start: 2025-08-07 | End: 2025-08-07

## 2025-08-07 RX ORDER — DIAZEPAM 10 MG/2ML
5 INJECTION, SOLUTION INTRAMUSCULAR; INTRAVENOUS EVERY 8 HOURS PRN
Status: DISCONTINUED | OUTPATIENT
Start: 2025-08-07 | End: 2025-08-09 | Stop reason: HOSPADM

## 2025-08-07 RX ORDER — POTASSIUM CHLORIDE 7.45 MG/ML
10 INJECTION INTRAVENOUS PRN
Status: DISCONTINUED | OUTPATIENT
Start: 2025-08-07 | End: 2025-08-09 | Stop reason: HOSPADM

## 2025-08-07 RX ORDER — MAGNESIUM SULFATE IN WATER 40 MG/ML
2000 INJECTION, SOLUTION INTRAVENOUS ONCE
Status: COMPLETED | OUTPATIENT
Start: 2025-08-07 | End: 2025-08-07

## 2025-08-07 RX ORDER — MIDODRINE HYDROCHLORIDE 10 MG/1
10 TABLET ORAL
Status: DISCONTINUED | OUTPATIENT
Start: 2025-08-07 | End: 2025-08-08

## 2025-08-07 RX ORDER — LOPERAMIDE HYDROCHLORIDE 2 MG/1
2 CAPSULE ORAL 4 TIMES DAILY PRN
Status: DISCONTINUED | OUTPATIENT
Start: 2025-08-07 | End: 2025-08-09 | Stop reason: HOSPADM

## 2025-08-07 RX ORDER — MAGNESIUM SULFATE IN WATER 40 MG/ML
2000 INJECTION, SOLUTION INTRAVENOUS PRN
Status: DISCONTINUED | OUTPATIENT
Start: 2025-08-07 | End: 2025-08-09 | Stop reason: HOSPADM

## 2025-08-07 RX ORDER — LEVOFLOXACIN 5 MG/ML
750 INJECTION, SOLUTION INTRAVENOUS EVERY 24 HOURS
Status: DISCONTINUED | OUTPATIENT
Start: 2025-08-07 | End: 2025-08-07

## 2025-08-07 RX ORDER — PANTOPRAZOLE SODIUM 40 MG/1
40 TABLET, DELAYED RELEASE ORAL
Status: DISCONTINUED | OUTPATIENT
Start: 2025-08-08 | End: 2025-08-09 | Stop reason: HOSPADM

## 2025-08-07 RX ORDER — LISINOPRIL 5 MG/1
5 TABLET ORAL DAILY
Status: DISCONTINUED | OUTPATIENT
Start: 2025-08-07 | End: 2025-08-09 | Stop reason: HOSPADM

## 2025-08-07 RX ORDER — CARVEDILOL 3.12 MG/1
3.12 TABLET ORAL 2 TIMES DAILY WITH MEALS
Status: DISCONTINUED | OUTPATIENT
Start: 2025-08-07 | End: 2025-08-09 | Stop reason: HOSPADM

## 2025-08-07 RX ORDER — ONDANSETRON 4 MG/1
4 TABLET, ORALLY DISINTEGRATING ORAL EVERY 8 HOURS PRN
Status: DISCONTINUED | OUTPATIENT
Start: 2025-08-07 | End: 2025-08-09 | Stop reason: HOSPADM

## 2025-08-07 RX ORDER — IPRATROPIUM BROMIDE AND ALBUTEROL SULFATE 2.5; .5 MG/3ML; MG/3ML
1 SOLUTION RESPIRATORY (INHALATION)
Status: DISCONTINUED | OUTPATIENT
Start: 2025-08-07 | End: 2025-08-09 | Stop reason: HOSPADM

## 2025-08-07 RX ORDER — SODIUM CHLORIDE 9 MG/ML
INJECTION, SOLUTION INTRAVENOUS PRN
Status: DISCONTINUED | OUTPATIENT
Start: 2025-08-07 | End: 2025-08-08

## 2025-08-07 RX ORDER — SODIUM CHLORIDE 0.9 % (FLUSH) 0.9 %
5-40 SYRINGE (ML) INJECTION PRN
Status: DISCONTINUED | OUTPATIENT
Start: 2025-08-07 | End: 2025-08-08

## 2025-08-07 RX ORDER — POLYETHYLENE GLYCOL 3350 17 G/17G
17 POWDER, FOR SOLUTION ORAL DAILY PRN
Status: DISCONTINUED | OUTPATIENT
Start: 2025-08-07 | End: 2025-08-09 | Stop reason: HOSPADM

## 2025-08-07 RX ORDER — WATER 10 ML/10ML
INJECTION INTRAMUSCULAR; INTRAVENOUS; SUBCUTANEOUS
Status: COMPLETED
Start: 2025-08-07 | End: 2025-08-07

## 2025-08-07 RX ORDER — METHYLPREDNISOLONE SODIUM SUCCINATE 40 MG/ML
40 INJECTION INTRAMUSCULAR; INTRAVENOUS EVERY 12 HOURS
Status: DISCONTINUED | OUTPATIENT
Start: 2025-08-08 | End: 2025-08-09

## 2025-08-07 RX ORDER — METHOCARBAMOL 750 MG/1
750 TABLET, FILM COATED ORAL EVERY 6 HOURS PRN
Status: DISCONTINUED | OUTPATIENT
Start: 2025-08-07 | End: 2025-08-09 | Stop reason: HOSPADM

## 2025-08-07 RX ORDER — SODIUM CHLORIDE 9 MG/ML
INJECTION, SOLUTION INTRAVENOUS PRN
Status: DISCONTINUED | OUTPATIENT
Start: 2025-08-07 | End: 2025-08-09 | Stop reason: HOSPADM

## 2025-08-07 RX ORDER — GABAPENTIN 300 MG/1
300 CAPSULE ORAL EVERY 8 HOURS PRN
Status: DISCONTINUED | OUTPATIENT
Start: 2025-08-07 | End: 2025-08-09 | Stop reason: HOSPADM

## 2025-08-07 RX ORDER — MIDAZOLAM HYDROCHLORIDE 1 MG/ML
5 INJECTION, SOLUTION INTRAMUSCULAR; INTRAVENOUS ONCE
Status: DISCONTINUED | OUTPATIENT
Start: 2025-08-07 | End: 2025-08-07

## 2025-08-07 RX ORDER — DEXAMETHASONE SODIUM PHOSPHATE 4 MG/ML
10 INJECTION, SOLUTION INTRA-ARTICULAR; INTRALESIONAL; INTRAMUSCULAR; INTRAVENOUS; SOFT TISSUE ONCE
Status: COMPLETED | OUTPATIENT
Start: 2025-08-07 | End: 2025-08-07

## 2025-08-07 RX ORDER — DICYCLOMINE HYDROCHLORIDE 10 MG/1
20 CAPSULE ORAL EVERY 6 HOURS PRN
Status: DISCONTINUED | OUTPATIENT
Start: 2025-08-07 | End: 2025-08-09 | Stop reason: HOSPADM

## 2025-08-07 RX ORDER — 0.9 % SODIUM CHLORIDE 0.9 %
1000 INTRAVENOUS SOLUTION INTRAVENOUS ONCE
Status: DISCONTINUED | OUTPATIENT
Start: 2025-08-07 | End: 2025-08-09 | Stop reason: HOSPADM

## 2025-08-07 RX ORDER — ALBUTEROL SULFATE 0.83 MG/ML
2.5 SOLUTION RESPIRATORY (INHALATION) ONCE
Status: COMPLETED | OUTPATIENT
Start: 2025-08-07 | End: 2025-08-07

## 2025-08-07 RX ORDER — METHYLPREDNISOLONE SODIUM SUCCINATE 40 MG/ML
40 INJECTION INTRAMUSCULAR; INTRAVENOUS DAILY
Status: DISCONTINUED | OUTPATIENT
Start: 2025-08-07 | End: 2025-08-07

## 2025-08-07 RX ORDER — ASPIRIN 81 MG/1
81 TABLET ORAL DAILY
Status: DISCONTINUED | OUTPATIENT
Start: 2025-08-07 | End: 2025-08-09 | Stop reason: HOSPADM

## 2025-08-07 RX ORDER — ACETAMINOPHEN 325 MG/1
650 TABLET ORAL EVERY 6 HOURS PRN
Status: DISCONTINUED | OUTPATIENT
Start: 2025-08-07 | End: 2025-08-09 | Stop reason: HOSPADM

## 2025-08-07 RX ORDER — 0.9 % SODIUM CHLORIDE 0.9 %
500 INTRAVENOUS SOLUTION INTRAVENOUS ONCE
Status: DISCONTINUED | OUTPATIENT
Start: 2025-08-07 | End: 2025-08-09 | Stop reason: HOSPADM

## 2025-08-07 RX ORDER — SODIUM CHLORIDE 0.9 % (FLUSH) 0.9 %
5-40 SYRINGE (ML) INJECTION EVERY 12 HOURS SCHEDULED
Status: DISCONTINUED | OUTPATIENT
Start: 2025-08-07 | End: 2025-08-09 | Stop reason: HOSPADM

## 2025-08-07 RX ORDER — SODIUM CHLORIDE 0.9 % (FLUSH) 0.9 %
5-40 SYRINGE (ML) INJECTION PRN
Status: DISCONTINUED | OUTPATIENT
Start: 2025-08-07 | End: 2025-08-09 | Stop reason: HOSPADM

## 2025-08-07 RX ORDER — LIDOCAINE HYDROCHLORIDE 10 MG/ML
50 INJECTION, SOLUTION EPIDURAL; INFILTRATION; INTRACAUDAL; PERINEURAL ONCE
Status: DISCONTINUED | OUTPATIENT
Start: 2025-08-07 | End: 2025-08-09 | Stop reason: HOSPADM

## 2025-08-07 RX ORDER — 0.9 % SODIUM CHLORIDE 0.9 %
1000 INTRAVENOUS SOLUTION INTRAVENOUS ONCE
Status: COMPLETED | OUTPATIENT
Start: 2025-08-07 | End: 2025-08-07

## 2025-08-07 RX ORDER — OXYCODONE AND ACETAMINOPHEN 5; 325 MG/1; MG/1
1 TABLET ORAL EVERY 6 HOURS PRN
Refills: 0 | Status: DISCONTINUED | OUTPATIENT
Start: 2025-08-07 | End: 2025-08-09 | Stop reason: HOSPADM

## 2025-08-07 RX ORDER — SODIUM CHLORIDE 0.9 % (FLUSH) 0.9 %
5-40 SYRINGE (ML) INJECTION EVERY 12 HOURS SCHEDULED
Status: DISCONTINUED | OUTPATIENT
Start: 2025-08-07 | End: 2025-08-08

## 2025-08-07 RX ORDER — POTASSIUM CHLORIDE 1500 MG/1
40 TABLET, EXTENDED RELEASE ORAL PRN
Status: DISCONTINUED | OUTPATIENT
Start: 2025-08-07 | End: 2025-08-09 | Stop reason: HOSPADM

## 2025-08-07 RX ORDER — ACETAMINOPHEN 650 MG/1
650 SUPPOSITORY RECTAL EVERY 6 HOURS PRN
Status: DISCONTINUED | OUTPATIENT
Start: 2025-08-07 | End: 2025-08-09 | Stop reason: HOSPADM

## 2025-08-07 RX ORDER — NALOXONE HYDROCHLORIDE 0.4 MG/ML
0.2 INJECTION, SOLUTION INTRAMUSCULAR; INTRAVENOUS; SUBCUTANEOUS ONCE
Status: COMPLETED | OUTPATIENT
Start: 2025-08-07 | End: 2025-08-07

## 2025-08-07 RX ORDER — CLONIDINE HYDROCHLORIDE 0.1 MG/1
0.1 TABLET ORAL EVERY 6 HOURS PRN
Status: DISCONTINUED | OUTPATIENT
Start: 2025-08-07 | End: 2025-08-09 | Stop reason: HOSPADM

## 2025-08-07 RX ORDER — LEVOFLOXACIN 5 MG/ML
750 INJECTION, SOLUTION INTRAVENOUS EVERY 24 HOURS
Status: DISCONTINUED | OUTPATIENT
Start: 2025-08-07 | End: 2025-08-09 | Stop reason: HOSPADM

## 2025-08-07 RX ADMIN — AZITHROMYCIN MONOHYDRATE 500 MG: 500 INJECTION, POWDER, LYOPHILIZED, FOR SOLUTION INTRAVENOUS at 10:08

## 2025-08-07 RX ADMIN — IPRATROPIUM BROMIDE AND ALBUTEROL SULFATE 1 DOSE: .5; 2.5 SOLUTION RESPIRATORY (INHALATION) at 16:10

## 2025-08-07 RX ADMIN — Medication 3 MG: at 22:30

## 2025-08-07 RX ADMIN — IPRATROPIUM BROMIDE AND ALBUTEROL SULFATE 1 DOSE: .5; 2.5 SOLUTION RESPIRATORY (INHALATION) at 19:46

## 2025-08-07 RX ADMIN — IPRATROPIUM BROMIDE 0.5 MG: 0.5 SOLUTION RESPIRATORY (INHALATION) at 07:11

## 2025-08-07 RX ADMIN — OXYCODONE AND ACETAMINOPHEN 1 TABLET: 5; 325 TABLET ORAL at 16:56

## 2025-08-07 RX ADMIN — DEXAMETHASONE SODIUM PHOSPHATE 10 MG: 4 INJECTION, SOLUTION INTRAMUSCULAR; INTRAVENOUS at 07:03

## 2025-08-07 RX ADMIN — SODIUM CHLORIDE, PRESERVATIVE FREE 10 ML: 5 INJECTION INTRAVENOUS at 20:31

## 2025-08-07 RX ADMIN — MAGNESIUM SULFATE HEPTAHYDRATE 2000 MG: 40 INJECTION, SOLUTION INTRAVENOUS at 07:06

## 2025-08-07 RX ADMIN — METHYLPREDNISOLONE SODIUM SUCCINATE 40 MG: 40 INJECTION INTRAMUSCULAR; INTRAVENOUS at 13:08

## 2025-08-07 RX ADMIN — IPRATROPIUM BROMIDE AND ALBUTEROL SULFATE 1 DOSE: .5; 2.5 SOLUTION RESPIRATORY (INHALATION) at 13:43

## 2025-08-07 RX ADMIN — ALBUTEROL SULFATE 2.5 MG: 2.5 SOLUTION RESPIRATORY (INHALATION) at 07:10

## 2025-08-07 RX ADMIN — SODIUM CHLORIDE 500 ML: 0.9 INJECTION, SOLUTION INTRAVENOUS at 18:45

## 2025-08-07 RX ADMIN — SODIUM CHLORIDE 1000 ML: 0.9 INJECTION, SOLUTION INTRAVENOUS at 10:06

## 2025-08-07 RX ADMIN — NALXONE HYDROCHLORIDE 0.2 MG: 0.4 INJECTION INTRAMUSCULAR; INTRAVENOUS; SUBCUTANEOUS at 07:58

## 2025-08-07 RX ADMIN — WATER 1 ML: 1 INJECTION INTRAMUSCULAR; INTRAVENOUS; SUBCUTANEOUS at 13:08

## 2025-08-07 RX ADMIN — SODIUM CHLORIDE, PRESERVATIVE FREE 10 ML: 5 INJECTION INTRAVENOUS at 20:32

## 2025-08-07 RX ADMIN — LEVOFLOXACIN 750 MG: 5 INJECTION, SOLUTION INTRAVENOUS at 17:00

## 2025-08-07 RX ADMIN — ALBUTEROL SULFATE 5 MG: 2.5 SOLUTION RESPIRATORY (INHALATION) at 07:11

## 2025-08-07 RX ADMIN — CEFTRIAXONE 1000 MG: 1 INJECTION, POWDER, FOR SOLUTION INTRAMUSCULAR; INTRAVENOUS at 10:07

## 2025-08-07 RX ADMIN — HYDROXYZINE PAMOATE 50 MG: 25 CAPSULE ORAL at 18:51

## 2025-08-07 RX ADMIN — ENOXAPARIN SODIUM 40 MG: 100 INJECTION SUBCUTANEOUS at 20:32

## 2025-08-07 RX ADMIN — VANCOMYCIN HYDROCHLORIDE 1000 MG: 1 INJECTION, POWDER, LYOPHILIZED, FOR SOLUTION INTRAVENOUS at 14:50

## 2025-08-07 RX ADMIN — BUDESONIDE 500 MCG: 0.5 SUSPENSION RESPIRATORY (INHALATION) at 19:46

## 2025-08-07 RX ADMIN — BUDESONIDE AND FORMOTEROL FUMARATE DIHYDRATE 2 PUFF: 160; 4.5 AEROSOL RESPIRATORY (INHALATION) at 13:43

## 2025-08-07 RX ADMIN — SODIUM CHLORIDE 500 ML: 0.9 INJECTION, SOLUTION INTRAVENOUS at 12:56

## 2025-08-07 RX ADMIN — MIDODRINE HYDROCHLORIDE 10 MG: 10 TABLET ORAL at 16:26

## 2025-08-07 RX ADMIN — DIAZEPAM 5 MG: 5 INJECTION, SOLUTION INTRAMUSCULAR; INTRAVENOUS at 22:30

## 2025-08-07 RX ADMIN — IPRATROPIUM BROMIDE AND ALBUTEROL SULFATE 1 DOSE: .5; 2.5 SOLUTION RESPIRATORY (INHALATION) at 23:01

## 2025-08-07 ASSESSMENT — PAIN DESCRIPTION - DESCRIPTORS
DESCRIPTORS: STABBING
DESCRIPTORS: ACHING

## 2025-08-07 ASSESSMENT — PAIN DESCRIPTION - ORIENTATION: ORIENTATION: RIGHT

## 2025-08-07 ASSESSMENT — PAIN DESCRIPTION - FREQUENCY: FREQUENCY: CONTINUOUS

## 2025-08-07 ASSESSMENT — PAIN - FUNCTIONAL ASSESSMENT: PAIN_FUNCTIONAL_ASSESSMENT: ACTIVITIES ARE NOT PREVENTED

## 2025-08-07 ASSESSMENT — PAIN SCALES - GENERAL
PAINLEVEL_OUTOF10: 10
PAINLEVEL_OUTOF10: 4

## 2025-08-07 ASSESSMENT — PAIN DESCRIPTION - LOCATION
LOCATION: SHOULDER;BACK
LOCATION: GENERALIZED

## 2025-08-07 ASSESSMENT — PAIN DESCRIPTION - PAIN TYPE: TYPE: CHRONIC PAIN

## 2025-08-07 ASSESSMENT — PAIN DESCRIPTION - ONSET: ONSET: ON-GOING

## 2025-08-08 ENCOUNTER — APPOINTMENT (OUTPATIENT)
Age: 50
End: 2025-08-08
Attending: FAMILY MEDICINE
Payer: MEDICARE

## 2025-08-08 LAB
ANION GAP SERPL CALCULATED.3IONS-SCNC: 6 MMOL/L (ref 3–16)
BACTERIA BLD CULT ORG #2: NORMAL
BACTERIA BLD CULT: NORMAL
BASE EXCESS BLDV CALC-SCNC: 3.9 MMOL/L
BASE EXCESS BLDV CALC-SCNC: 4.4 MMOL/L
BASOPHILS # BLD: 0 K/UL (ref 0–0.2)
BASOPHILS NFR BLD: 0.1 %
BUN SERPL-MCNC: 22 MG/DL (ref 7–20)
CALCIUM SERPL-MCNC: 8.9 MG/DL (ref 8.3–10.6)
CHLORIDE SERPL-SCNC: 104 MMOL/L (ref 99–110)
CO2 BLDV-SCNC: 34 MMOL/L
CO2 BLDV-SCNC: 34 MMOL/L
CO2 SERPL-SCNC: 32 MMOL/L (ref 21–32)
COHGB MFR BLDV: 1.7 %
COHGB MFR BLDV: 1.8 %
CREAT SERPL-MCNC: 0.6 MG/DL (ref 0.6–1.1)
DEPRECATED RDW RBC AUTO: 15.5 % (ref 12.4–15.4)
ECHO AV MEAN GRADIENT: 6 MMHG
ECHO AV MEAN VELOCITY: 1.1 M/S
ECHO AV PEAK GRADIENT: 10 MMHG
ECHO AV PEAK VELOCITY: 1.6 M/S
ECHO AV VELOCITY RATIO: 0.69
ECHO AV VTI: 30.6 CM
ECHO BSA: 1.85 M2
ECHO EST RA PRESSURE: 8 MMHG
ECHO IVC EXP: 2.4 CM
ECHO IVC INSP: 1 CM
ECHO LA AREA 2C: 16.3 CM2
ECHO LA AREA 4C: 16.2 CM2
ECHO LA MAJOR AXIS: 5.2 CM
ECHO LA MINOR AXIS: 5.3 CM
ECHO LA VOL BP: 41 ML (ref 22–52)
ECHO LA VOL MOD A2C: 40 ML (ref 22–52)
ECHO LA VOL MOD A4C: 41 ML (ref 22–52)
ECHO LA VOL/BSA BIPLANE: 23 ML/M2 (ref 16–34)
ECHO LA VOLUME INDEX MOD A2C: 22 ML/M2 (ref 16–34)
ECHO LA VOLUME INDEX MOD A4C: 23 ML/M2 (ref 16–34)
ECHO LV E' LATERAL VELOCITY: 16.3 CM/S
ECHO LV E' SEPTAL VELOCITY: 11.3 CM/S
ECHO LV EDV A2C: 147 ML
ECHO LV EDV A4C: 160 ML
ECHO LV EDV INDEX A4C: 88 ML/M2
ECHO LV EDV NDEX A2C: 81 ML/M2
ECHO LV EF PHYSICIAN: 65 %
ECHO LV EJECTION FRACTION A2C: 73 %
ECHO LV EJECTION FRACTION A4C: 71 %
ECHO LV EJECTION FRACTION BIPLANE: 71 % (ref 55–100)
ECHO LV ESV A2C: 40 ML
ECHO LV ESV A4C: 47 ML
ECHO LV ESV INDEX A2C: 22 ML/M2
ECHO LV ESV INDEX A4C: 26 ML/M2
ECHO LV FRACTIONAL SHORTENING: 43 % (ref 28–44)
ECHO LV INTERNAL DIMENSION DIASTOLE INDEX: 2.82 CM/M2
ECHO LV INTERNAL DIMENSION DIASTOLIC: 5.1 CM (ref 3.9–5.3)
ECHO LV INTERNAL DIMENSION SYSTOLIC INDEX: 1.6 CM/M2
ECHO LV INTERNAL DIMENSION SYSTOLIC: 2.9 CM
ECHO LV IVSD: 1.2 CM (ref 0.6–0.9)
ECHO LV MASS 2D: 227.4 G (ref 67–162)
ECHO LV MASS INDEX 2D: 125.6 G/M2 (ref 43–95)
ECHO LV POSTERIOR WALL DIASTOLIC: 1.1 CM (ref 0.6–0.9)
ECHO LV RELATIVE WALL THICKNESS RATIO: 0.43
ECHO LVOT AV VTI INDEX: 0.67
ECHO LVOT MEAN GRADIENT: 3 MMHG
ECHO LVOT PEAK GRADIENT: 5 MMHG
ECHO LVOT PEAK VELOCITY: 1.1 M/S
ECHO LVOT VTI: 20.5 CM
ECHO MV A VELOCITY: 0.83 M/S
ECHO MV E DECELERATION TIME (DT): 287 MS
ECHO MV E VELOCITY: 0.84 M/S
ECHO MV E/A RATIO: 1.01
ECHO MV E/E' LATERAL: 5.15
ECHO MV E/E' RATIO (AVERAGED): 6.29
ECHO MV E/E' SEPTAL: 7.43
ECHO MV LVOT VTI INDEX: 1.25
ECHO MV MAX VELOCITY: 1 M/S
ECHO MV MEAN GRADIENT: 2 MMHG
ECHO MV MEAN VELOCITY: 0.7 M/S
ECHO MV PEAK GRADIENT: 4 MMHG
ECHO MV VTI: 25.6 CM
EOSINOPHIL # BLD: 0 K/UL (ref 0–0.6)
EOSINOPHIL NFR BLD: 0 %
EST. AVERAGE GLUCOSE BLD GHB EST-MCNC: 99.7 MG/DL
GFR SERPLBLD CREATININE-BSD FMLA CKD-EPI: >90 ML/MIN/{1.73_M2}
GLUCOSE SERPL-MCNC: 149 MG/DL (ref 70–99)
HBA1C MFR BLD: 5.1 %
HCO3 BLDV-SCNC: 32 MMOL/L (ref 23–29)
HCO3 BLDV-SCNC: 32 MMOL/L (ref 23–29)
HCT VFR BLD AUTO: 27.4 % (ref 36–48)
HGB BLD-MCNC: 8.4 G/DL (ref 12–16)
LYMPHOCYTES # BLD: 0.8 K/UL (ref 1–5.1)
LYMPHOCYTES NFR BLD: 10.2 %
MAGNESIUM SERPL-MCNC: 2.21 MG/DL (ref 1.8–2.4)
MCH RBC QN AUTO: 28.4 PG (ref 26–34)
MCHC RBC AUTO-ENTMCNC: 30.7 G/DL (ref 31–36)
MCV RBC AUTO: 92.6 FL (ref 80–100)
METHGB MFR BLDV: 0.3 %
METHGB MFR BLDV: 1.3 %
MONOCYTES # BLD: 0.3 K/UL (ref 0–1.3)
MONOCYTES NFR BLD: 3.7 %
MRSA DNA SPEC QL NAA+PROBE: ABNORMAL
NEUTROPHILS # BLD: 7 K/UL (ref 1.7–7.7)
NEUTROPHILS NFR BLD: 86 %
O2 THERAPY: ABNORMAL
O2 THERAPY: ABNORMAL
ORGANISM: ABNORMAL
PCO2 BLDV: 66.6 MMHG (ref 40–50)
PCO2 BLDV: 69.9 MMHG (ref 40–50)
PH BLDV: 7.27 [PH] (ref 7.35–7.45)
PH BLDV: 7.28 [PH] (ref 7.35–7.45)
PHOSPHATE SERPL-MCNC: 2.5 MG/DL (ref 2.5–4.9)
PLATELET # BLD AUTO: 336 K/UL (ref 135–450)
PMV BLD AUTO: 7.8 FL (ref 5–10.5)
PO2 BLDV: 40 MMHG
PO2 BLDV: 51 MMHG
POTASSIUM SERPL-SCNC: 4.1 MMOL/L (ref 3.5–5.1)
RBC # BLD AUTO: 2.96 M/UL (ref 4–5.2)
SAO2 % BLDV: 73 %
SAO2 % BLDV: 84 %
SODIUM SERPL-SCNC: 142 MMOL/L (ref 136–145)
WBC # BLD AUTO: 8.3 K/UL (ref 4–11)

## 2025-08-08 PROCEDURE — 6370000000 HC RX 637 (ALT 250 FOR IP): Performed by: NURSE PRACTITIONER

## 2025-08-08 PROCEDURE — 85025 COMPLETE CBC W/AUTO DIFF WBC: CPT

## 2025-08-08 PROCEDURE — 6360000002 HC RX W HCPCS: Performed by: INTERNAL MEDICINE

## 2025-08-08 PROCEDURE — 36592 COLLECT BLOOD FROM PICC: CPT

## 2025-08-08 PROCEDURE — 6360000002 HC RX W HCPCS: Performed by: FAMILY MEDICINE

## 2025-08-08 PROCEDURE — 1200000000 HC SEMI PRIVATE

## 2025-08-08 PROCEDURE — 2700000000 HC OXYGEN THERAPY PER DAY

## 2025-08-08 PROCEDURE — 6370000000 HC RX 637 (ALT 250 FOR IP): Performed by: FAMILY MEDICINE

## 2025-08-08 PROCEDURE — 97166 OT EVAL MOD COMPLEX 45 MIN: CPT

## 2025-08-08 PROCEDURE — C8929 TTE W OR WO FOL WCON,DOPPLER: HCPCS

## 2025-08-08 PROCEDURE — 83036 HEMOGLOBIN GLYCOSYLATED A1C: CPT

## 2025-08-08 PROCEDURE — 2500000003 HC RX 250 WO HCPCS: Performed by: NURSE PRACTITIONER

## 2025-08-08 PROCEDURE — 99233 SBSQ HOSP IP/OBS HIGH 50: CPT | Performed by: INTERNAL MEDICINE

## 2025-08-08 PROCEDURE — 97161 PT EVAL LOW COMPLEX 20 MIN: CPT

## 2025-08-08 PROCEDURE — 94640 AIRWAY INHALATION TREATMENT: CPT

## 2025-08-08 PROCEDURE — 82803 BLOOD GASES ANY COMBINATION: CPT

## 2025-08-08 PROCEDURE — 97530 THERAPEUTIC ACTIVITIES: CPT

## 2025-08-08 PROCEDURE — 6360000004 HC RX CONTRAST MEDICATION: Performed by: INTERNAL MEDICINE

## 2025-08-08 PROCEDURE — 80048 BASIC METABOLIC PNL TOTAL CA: CPT

## 2025-08-08 PROCEDURE — 84100 ASSAY OF PHOSPHORUS: CPT

## 2025-08-08 PROCEDURE — 6370000000 HC RX 637 (ALT 250 FOR IP)

## 2025-08-08 PROCEDURE — 83735 ASSAY OF MAGNESIUM: CPT

## 2025-08-08 PROCEDURE — 94761 N-INVAS EAR/PLS OXIMETRY MLT: CPT

## 2025-08-08 PROCEDURE — 93306 TTE W/DOPPLER COMPLETE: CPT | Performed by: INTERNAL MEDICINE

## 2025-08-08 PROCEDURE — 94660 CPAP INITIATION&MGMT: CPT

## 2025-08-08 RX ADMIN — METHYLPREDNISOLONE SODIUM SUCCINATE 40 MG: 40 INJECTION INTRAMUSCULAR; INTRAVENOUS at 03:00

## 2025-08-08 RX ADMIN — LEVOFLOXACIN 750 MG: 5 INJECTION, SOLUTION INTRAVENOUS at 16:56

## 2025-08-08 RX ADMIN — METHYLPREDNISOLONE SODIUM SUCCINATE 40 MG: 40 INJECTION INTRAMUSCULAR; INTRAVENOUS at 12:19

## 2025-08-08 RX ADMIN — OXYCODONE AND ACETAMINOPHEN 1 TABLET: 5; 325 TABLET ORAL at 20:09

## 2025-08-08 RX ADMIN — MIDODRINE HYDROCHLORIDE 10 MG: 10 TABLET ORAL at 07:53

## 2025-08-08 RX ADMIN — IPRATROPIUM BROMIDE AND ALBUTEROL SULFATE 1 DOSE: .5; 2.5 SOLUTION RESPIRATORY (INHALATION) at 03:30

## 2025-08-08 RX ADMIN — ENOXAPARIN SODIUM 40 MG: 100 INJECTION SUBCUTANEOUS at 20:09

## 2025-08-08 RX ADMIN — ASPIRIN 81 MG: 81 TABLET, DELAYED RELEASE ORAL at 07:52

## 2025-08-08 RX ADMIN — IPRATROPIUM BROMIDE AND ALBUTEROL SULFATE 1 DOSE: .5; 2.5 SOLUTION RESPIRATORY (INHALATION) at 11:19

## 2025-08-08 RX ADMIN — IPRATROPIUM BROMIDE AND ALBUTEROL SULFATE 1 DOSE: .5; 2.5 SOLUTION RESPIRATORY (INHALATION) at 07:25

## 2025-08-08 RX ADMIN — SODIUM CHLORIDE, PRESERVATIVE FREE 10 ML: 5 INJECTION INTRAVENOUS at 07:54

## 2025-08-08 RX ADMIN — IPRATROPIUM BROMIDE AND ALBUTEROL SULFATE 1 DOSE: .5; 2.5 SOLUTION RESPIRATORY (INHALATION) at 20:16

## 2025-08-08 RX ADMIN — SULFUR HEXAFLUORIDE 2 ML: 60.7; .19; .19 INJECTION, POWDER, LYOPHILIZED, FOR SUSPENSION INTRAVENOUS; INTRAVESICAL at 10:40

## 2025-08-08 RX ADMIN — Medication 3 MG: at 23:30

## 2025-08-08 RX ADMIN — SERTRALINE 50 MG: 50 TABLET, FILM COATED ORAL at 16:14

## 2025-08-08 RX ADMIN — OXYCODONE AND ACETAMINOPHEN 1 TABLET: 5; 325 TABLET ORAL at 06:25

## 2025-08-08 RX ADMIN — BUDESONIDE 500 MCG: 0.5 SUSPENSION RESPIRATORY (INHALATION) at 07:25

## 2025-08-08 RX ADMIN — OXYCODONE AND ACETAMINOPHEN 1 TABLET: 5; 325 TABLET ORAL at 12:26

## 2025-08-08 RX ADMIN — IPRATROPIUM BROMIDE AND ALBUTEROL SULFATE 1 DOSE: .5; 2.5 SOLUTION RESPIRATORY (INHALATION) at 16:39

## 2025-08-08 RX ADMIN — BUDESONIDE 500 MCG: 0.5 SUSPENSION RESPIRATORY (INHALATION) at 20:16

## 2025-08-08 RX ADMIN — SODIUM CHLORIDE, PRESERVATIVE FREE 10 ML: 5 INJECTION INTRAVENOUS at 20:11

## 2025-08-08 RX ADMIN — PANTOPRAZOLE SODIUM 40 MG: 40 TABLET, DELAYED RELEASE ORAL at 06:25

## 2025-08-08 ASSESSMENT — PAIN SCALES - GENERAL
PAINLEVEL_OUTOF10: 9
PAINLEVEL_OUTOF10: 10
PAINLEVEL_OUTOF10: 8
PAINLEVEL_OUTOF10: 8
PAINLEVEL_OUTOF10: 10
PAINLEVEL_OUTOF10: 5
PAINLEVEL_OUTOF10: 8

## 2025-08-08 ASSESSMENT — PAIN DESCRIPTION - PAIN TYPE
TYPE: CHRONIC PAIN

## 2025-08-08 ASSESSMENT — PAIN DESCRIPTION - ORIENTATION
ORIENTATION: RIGHT

## 2025-08-08 ASSESSMENT — PAIN - FUNCTIONAL ASSESSMENT
PAIN_FUNCTIONAL_ASSESSMENT: ACTIVITIES ARE NOT PREVENTED

## 2025-08-08 ASSESSMENT — PAIN DESCRIPTION - LOCATION
LOCATION: SHOULDER
LOCATION: GENERALIZED
LOCATION: NECK;BACK;SHOULDER
LOCATION: SHOULDER

## 2025-08-08 ASSESSMENT — PAIN DESCRIPTION - DESCRIPTORS
DESCRIPTORS: ACHING

## 2025-08-08 ASSESSMENT — PAIN DESCRIPTION - ONSET
ONSET: ON-GOING
ONSET: ON-GOING

## 2025-08-08 ASSESSMENT — PAIN DESCRIPTION - FREQUENCY
FREQUENCY: CONTINUOUS
FREQUENCY: CONTINUOUS

## 2025-08-09 VITALS
DIASTOLIC BLOOD PRESSURE: 77 MMHG | HEIGHT: 64 IN | TEMPERATURE: 97.9 F | BODY MASS INDEX: 31.35 KG/M2 | WEIGHT: 183.64 LBS | SYSTOLIC BLOOD PRESSURE: 136 MMHG | RESPIRATION RATE: 20 BRPM | HEART RATE: 79 BPM | OXYGEN SATURATION: 97 %

## 2025-08-09 LAB
ANION GAP SERPL CALCULATED.3IONS-SCNC: 7 MMOL/L (ref 3–16)
BASOPHILS # BLD: 0 K/UL (ref 0–0.2)
BASOPHILS NFR BLD: 0.1 %
BUN SERPL-MCNC: 24 MG/DL (ref 7–20)
CALCIUM SERPL-MCNC: 9.2 MG/DL (ref 8.3–10.6)
CHLORIDE SERPL-SCNC: 99 MMOL/L (ref 99–110)
CO2 SERPL-SCNC: 31 MMOL/L (ref 21–32)
CREAT SERPL-MCNC: 0.7 MG/DL (ref 0.6–1.1)
DEPRECATED RDW RBC AUTO: 15.2 % (ref 12.4–15.4)
EOSINOPHIL # BLD: 0 K/UL (ref 0–0.6)
EOSINOPHIL NFR BLD: 0 %
GFR SERPLBLD CREATININE-BSD FMLA CKD-EPI: >90 ML/MIN/{1.73_M2}
GLUCOSE SERPL-MCNC: 155 MG/DL (ref 70–99)
HCT VFR BLD AUTO: 26.9 % (ref 36–48)
HGB BLD-MCNC: 8.8 G/DL (ref 12–16)
LYMPHOCYTES # BLD: 0.4 K/UL (ref 1–5.1)
LYMPHOCYTES NFR BLD: 5.6 %
MAGNESIUM SERPL-MCNC: 2.16 MG/DL (ref 1.8–2.4)
MCH RBC QN AUTO: 29.4 PG (ref 26–34)
MCHC RBC AUTO-ENTMCNC: 32.7 G/DL (ref 31–36)
MCV RBC AUTO: 89.8 FL (ref 80–100)
MONOCYTES # BLD: 0.1 K/UL (ref 0–1.3)
MONOCYTES NFR BLD: 1.6 %
NEUTROPHILS # BLD: 6.9 K/UL (ref 1.7–7.7)
NEUTROPHILS NFR BLD: 92.7 %
PHOSPHATE SERPL-MCNC: 2.5 MG/DL (ref 2.5–4.9)
PLATELET # BLD AUTO: 321 K/UL (ref 135–450)
PMV BLD AUTO: 7.5 FL (ref 5–10.5)
POTASSIUM SERPL-SCNC: 4.3 MMOL/L (ref 3.5–5.1)
PROCALCITONIN SERPL IA-MCNC: 0.04 NG/ML (ref 0–0.15)
RBC # BLD AUTO: 3 M/UL (ref 4–5.2)
SODIUM SERPL-SCNC: 137 MMOL/L (ref 136–145)
WBC # BLD AUTO: 7.5 K/UL (ref 4–11)

## 2025-08-09 PROCEDURE — 6360000002 HC RX W HCPCS: Performed by: INTERNAL MEDICINE

## 2025-08-09 PROCEDURE — 85025 COMPLETE CBC W/AUTO DIFF WBC: CPT

## 2025-08-09 PROCEDURE — 84100 ASSAY OF PHOSPHORUS: CPT

## 2025-08-09 PROCEDURE — 6370000000 HC RX 637 (ALT 250 FOR IP): Performed by: NURSE PRACTITIONER

## 2025-08-09 PROCEDURE — 84145 PROCALCITONIN (PCT): CPT

## 2025-08-09 PROCEDURE — 2700000000 HC OXYGEN THERAPY PER DAY

## 2025-08-09 PROCEDURE — 6370000000 HC RX 637 (ALT 250 FOR IP): Performed by: FAMILY MEDICINE

## 2025-08-09 PROCEDURE — 94640 AIRWAY INHALATION TREATMENT: CPT

## 2025-08-09 PROCEDURE — 2500000003 HC RX 250 WO HCPCS

## 2025-08-09 PROCEDURE — 94660 CPAP INITIATION&MGMT: CPT

## 2025-08-09 PROCEDURE — 80048 BASIC METABOLIC PNL TOTAL CA: CPT

## 2025-08-09 PROCEDURE — 83735 ASSAY OF MAGNESIUM: CPT

## 2025-08-09 PROCEDURE — 2500000003 HC RX 250 WO HCPCS: Performed by: NURSE PRACTITIONER

## 2025-08-09 PROCEDURE — 99232 SBSQ HOSP IP/OBS MODERATE 35: CPT | Performed by: INTERNAL MEDICINE

## 2025-08-09 PROCEDURE — 94760 N-INVAS EAR/PLS OXIMETRY 1: CPT

## 2025-08-09 PROCEDURE — 6370000000 HC RX 637 (ALT 250 FOR IP)

## 2025-08-09 RX ORDER — LEVOFLOXACIN 750 MG/1
750 TABLET, FILM COATED ORAL DAILY
Qty: 5 TABLET | Refills: 0 | Status: SHIPPED | OUTPATIENT
Start: 2025-08-09 | End: 2025-08-14

## 2025-08-09 RX ORDER — WATER 10 ML/10ML
INJECTION INTRAMUSCULAR; INTRAVENOUS; SUBCUTANEOUS
Status: COMPLETED
Start: 2025-08-09 | End: 2025-08-09

## 2025-08-09 RX ORDER — PREDNISONE 5 MG/1
5 TABLET ORAL DAILY
Qty: 90 TABLET | Refills: 0 | OUTPATIENT
Start: 2025-08-14

## 2025-08-09 RX ORDER — PREDNISONE 20 MG/1
40 TABLET ORAL DAILY
Status: DISCONTINUED | OUTPATIENT
Start: 2025-08-10 | End: 2025-08-09 | Stop reason: HOSPADM

## 2025-08-09 RX ORDER — PREDNISONE 20 MG/1
40 TABLET ORAL DAILY
Qty: 10 TABLET | Refills: 0 | Status: SHIPPED | OUTPATIENT
Start: 2025-08-10 | End: 2025-08-15

## 2025-08-09 RX ADMIN — IPRATROPIUM BROMIDE AND ALBUTEROL SULFATE 1 DOSE: .5; 2.5 SOLUTION RESPIRATORY (INHALATION) at 12:27

## 2025-08-09 RX ADMIN — IPRATROPIUM BROMIDE AND ALBUTEROL SULFATE 1 DOSE: .5; 2.5 SOLUTION RESPIRATORY (INHALATION) at 03:08

## 2025-08-09 RX ADMIN — SODIUM CHLORIDE, PRESERVATIVE FREE 30 ML: 5 INJECTION INTRAVENOUS at 09:32

## 2025-08-09 RX ADMIN — CARVEDILOL 3.12 MG: 3.12 TABLET, FILM COATED ORAL at 09:28

## 2025-08-09 RX ADMIN — ASPIRIN 81 MG: 81 TABLET, DELAYED RELEASE ORAL at 09:28

## 2025-08-09 RX ADMIN — METHYLPREDNISOLONE SODIUM SUCCINATE 40 MG: 40 INJECTION INTRAMUSCULAR; INTRAVENOUS at 00:05

## 2025-08-09 RX ADMIN — IPRATROPIUM BROMIDE AND ALBUTEROL SULFATE 1 DOSE: .5; 2.5 SOLUTION RESPIRATORY (INHALATION) at 08:36

## 2025-08-09 RX ADMIN — OXYCODONE AND ACETAMINOPHEN 1 TABLET: 5; 325 TABLET ORAL at 09:40

## 2025-08-09 RX ADMIN — WATER 10 ML: 1 INJECTION INTRAMUSCULAR; INTRAVENOUS; SUBCUTANEOUS at 00:11

## 2025-08-09 RX ADMIN — PANTOPRAZOLE SODIUM 40 MG: 40 TABLET, DELAYED RELEASE ORAL at 05:08

## 2025-08-09 RX ADMIN — OXYCODONE AND ACETAMINOPHEN 1 TABLET: 5; 325 TABLET ORAL at 03:50

## 2025-08-09 RX ADMIN — BUDESONIDE 500 MCG: 0.5 SUSPENSION RESPIRATORY (INHALATION) at 08:36

## 2025-08-09 RX ADMIN — SERTRALINE 50 MG: 50 TABLET, FILM COATED ORAL at 09:28

## 2025-08-09 RX ADMIN — IPRATROPIUM BROMIDE AND ALBUTEROL SULFATE 1 DOSE: .5; 2.5 SOLUTION RESPIRATORY (INHALATION) at 00:04

## 2025-08-09 ASSESSMENT — PAIN DESCRIPTION - LOCATION
LOCATION: BACK;SHOULDER
LOCATION: NECK;SHOULDER

## 2025-08-09 ASSESSMENT — PAIN - FUNCTIONAL ASSESSMENT
PAIN_FUNCTIONAL_ASSESSMENT: 0-10
PAIN_FUNCTIONAL_ASSESSMENT: ACTIVITIES ARE NOT PREVENTED
PAIN_FUNCTIONAL_ASSESSMENT: 0-10
PAIN_FUNCTIONAL_ASSESSMENT: 0-10

## 2025-08-09 ASSESSMENT — PAIN DESCRIPTION - ORIENTATION: ORIENTATION: RIGHT

## 2025-08-09 ASSESSMENT — PAIN SCALES - GENERAL
PAINLEVEL_OUTOF10: 8
PAINLEVEL_OUTOF10: 6
PAINLEVEL_OUTOF10: 3
PAINLEVEL_OUTOF10: 9

## 2025-08-09 ASSESSMENT — PAIN DESCRIPTION - DESCRIPTORS
DESCRIPTORS: SORE
DESCRIPTORS: DISCOMFORT

## 2025-08-11 LAB
BACTERIA BLD CULT ORG #2: NORMAL
BACTERIA BLD CULT: NORMAL

## 2025-09-02 ENCOUNTER — TELEPHONE (OUTPATIENT)
Dept: PULMONOLOGY | Age: 50
End: 2025-09-02

## 2025-09-02 DIAGNOSIS — J44.1 COPD EXACERBATION (HCC): Primary | ICD-10-CM

## 2025-09-02 RX ORDER — LEVOFLOXACIN 500 MG/1
500 TABLET, FILM COATED ORAL DAILY
Qty: 7 TABLET | Refills: 0 | Status: SHIPPED | OUTPATIENT
Start: 2025-09-02 | End: 2025-09-09

## 2025-09-02 RX ORDER — PREDNISONE 10 MG/1
TABLET ORAL
Qty: 30 TABLET | Refills: 0 | Status: SHIPPED | OUTPATIENT
Start: 2025-09-02